# Patient Record
Sex: FEMALE | Race: WHITE | NOT HISPANIC OR LATINO | Employment: FULL TIME | URBAN - METROPOLITAN AREA
[De-identification: names, ages, dates, MRNs, and addresses within clinical notes are randomized per-mention and may not be internally consistent; named-entity substitution may affect disease eponyms.]

---

## 2017-01-05 ENCOUNTER — COMMUNICATION - HEALTHEAST (OUTPATIENT)
Dept: ADMINISTRATIVE | Facility: CLINIC | Age: 37
End: 2017-01-05

## 2017-01-09 ENCOUNTER — AMBULATORY - HEALTHEAST (OUTPATIENT)
Dept: INTERNAL MEDICINE | Facility: CLINIC | Age: 37
End: 2017-01-09

## 2017-01-12 ENCOUNTER — COMMUNICATION - HEALTHEAST (OUTPATIENT)
Dept: ENDOCRINOLOGY | Facility: CLINIC | Age: 37
End: 2017-01-12

## 2017-01-18 ENCOUNTER — OFFICE VISIT - HEALTHEAST (OUTPATIENT)
Dept: RHEUMATOLOGY | Facility: CLINIC | Age: 37
End: 2017-01-18

## 2017-01-18 DIAGNOSIS — Z79.899 HIGH RISK MEDICATION USE: ICD-10-CM

## 2017-01-18 DIAGNOSIS — M25.50 PAIN IN JOINT, MULTIPLE SITES: ICD-10-CM

## 2017-01-18 DIAGNOSIS — M06.4 INFLAMMATORY POLYARTHRITIS (H): ICD-10-CM

## 2017-01-18 LAB
ALT SERPL W P-5'-P-CCNC: 23 U/L (ref 0–45)
CREAT SERPL-MCNC: 0.71 MG/DL (ref 0.6–1.1)
GFR SERPL CREATININE-BSD FRML MDRD: >60 ML/MIN/1.73M2

## 2017-01-24 ENCOUNTER — COMMUNICATION - HEALTHEAST (OUTPATIENT)
Dept: ENDOCRINOLOGY | Facility: CLINIC | Age: 37
End: 2017-01-24

## 2017-01-24 DIAGNOSIS — E10.9 TYPE 1 DIABETES MELLITUS WITHOUT COMPLICATION (H): ICD-10-CM

## 2017-01-30 ENCOUNTER — OFFICE VISIT - HEALTHEAST (OUTPATIENT)
Dept: EDUCATION SERVICES | Facility: CLINIC | Age: 37
End: 2017-01-30

## 2017-01-31 ENCOUNTER — COMMUNICATION - HEALTHEAST (OUTPATIENT)
Dept: ENDOCRINOLOGY | Facility: CLINIC | Age: 37
End: 2017-01-31

## 2017-01-31 ENCOUNTER — COMMUNICATION - HEALTHEAST (OUTPATIENT)
Dept: INTERNAL MEDICINE | Facility: CLINIC | Age: 37
End: 2017-01-31

## 2017-02-01 ENCOUNTER — COMMUNICATION - HEALTHEAST (OUTPATIENT)
Dept: INTERNAL MEDICINE | Facility: CLINIC | Age: 37
End: 2017-02-01

## 2017-02-02 ENCOUNTER — COMMUNICATION - HEALTHEAST (OUTPATIENT)
Dept: INTERNAL MEDICINE | Facility: CLINIC | Age: 37
End: 2017-02-02

## 2017-02-02 ENCOUNTER — RECORDS - HEALTHEAST (OUTPATIENT)
Dept: ADMINISTRATIVE | Facility: OTHER | Age: 37
End: 2017-02-02

## 2017-02-06 ENCOUNTER — RECORDS - HEALTHEAST (OUTPATIENT)
Dept: ADMINISTRATIVE | Facility: OTHER | Age: 37
End: 2017-02-06

## 2017-02-06 ENCOUNTER — OFFICE VISIT - HEALTHEAST (OUTPATIENT)
Dept: EDUCATION SERVICES | Facility: CLINIC | Age: 37
End: 2017-02-06

## 2017-02-08 ENCOUNTER — AMBULATORY - HEALTHEAST (OUTPATIENT)
Dept: ENDOCRINOLOGY | Facility: CLINIC | Age: 37
End: 2017-02-08

## 2017-02-08 ENCOUNTER — OFFICE VISIT - HEALTHEAST (OUTPATIENT)
Dept: INTERNAL MEDICINE | Facility: CLINIC | Age: 37
End: 2017-02-08

## 2017-02-08 DIAGNOSIS — E10.9 TYPE 1 DIABETES (H): ICD-10-CM

## 2017-02-08 DIAGNOSIS — E10.9 TYPE 1 DIABETES MELLITUS WITHOUT COMPLICATION (H): ICD-10-CM

## 2017-02-08 DIAGNOSIS — Z91.199 NONCOMPLIANCE: ICD-10-CM

## 2017-02-08 DIAGNOSIS — E11.10 DIABETIC KETOACIDOSIS (H): ICD-10-CM

## 2017-02-08 DIAGNOSIS — E03.9 HYPOTHYROIDISM: ICD-10-CM

## 2017-02-14 ENCOUNTER — COMMUNICATION - HEALTHEAST (OUTPATIENT)
Dept: INTERNAL MEDICINE | Facility: CLINIC | Age: 37
End: 2017-02-14

## 2017-02-14 DIAGNOSIS — E03.9 HYPOTHYROIDISM, UNSPECIFIED TYPE: ICD-10-CM

## 2017-02-16 ENCOUNTER — COMMUNICATION - HEALTHEAST (OUTPATIENT)
Dept: SCHEDULING | Facility: CLINIC | Age: 37
End: 2017-02-16

## 2017-02-16 ENCOUNTER — AMBULATORY - HEALTHEAST (OUTPATIENT)
Dept: LAB | Facility: CLINIC | Age: 37
End: 2017-02-16

## 2017-02-16 DIAGNOSIS — E10.9 TYPE 1 DIABETES MELLITUS WITHOUT COMPLICATION (H): ICD-10-CM

## 2017-02-16 LAB — LDLC SERPL CALC-MCNC: 76 MG/DL

## 2017-02-17 LAB — HBA1C MFR BLD: 9.7 % (ref 4.2–6.1)

## 2017-02-20 ENCOUNTER — AMBULATORY - HEALTHEAST (OUTPATIENT)
Dept: ENDOCRINOLOGY | Facility: CLINIC | Age: 37
End: 2017-02-20

## 2017-02-20 ENCOUNTER — OFFICE VISIT - HEALTHEAST (OUTPATIENT)
Dept: EDUCATION SERVICES | Facility: CLINIC | Age: 37
End: 2017-02-20

## 2017-02-20 ENCOUNTER — COMMUNICATION - HEALTHEAST (OUTPATIENT)
Dept: INTERNAL MEDICINE | Facility: CLINIC | Age: 37
End: 2017-02-20

## 2017-02-20 ENCOUNTER — OFFICE VISIT - HEALTHEAST (OUTPATIENT)
Dept: ENDOCRINOLOGY | Facility: CLINIC | Age: 37
End: 2017-02-20

## 2017-02-20 DIAGNOSIS — E03.9 HYPOTHYROIDISM, UNSPECIFIED TYPE: ICD-10-CM

## 2017-02-20 DIAGNOSIS — L30.3 ECZEMA, PUSTULAR: ICD-10-CM

## 2017-02-20 DIAGNOSIS — E10.9 TYPE 1 DIABETES MELLITUS WITHOUT COMPLICATION (H): ICD-10-CM

## 2017-02-20 ASSESSMENT — MIFFLIN-ST. JEOR: SCORE: 1165.29

## 2017-02-21 ENCOUNTER — RECORDS - HEALTHEAST (OUTPATIENT)
Dept: ADMINISTRATIVE | Facility: OTHER | Age: 37
End: 2017-02-21

## 2017-02-23 ENCOUNTER — COMMUNICATION - HEALTHEAST (OUTPATIENT)
Dept: INTERNAL MEDICINE | Facility: CLINIC | Age: 37
End: 2017-02-23

## 2017-02-23 DIAGNOSIS — G47.00 INSOMNIA: ICD-10-CM

## 2017-02-27 ENCOUNTER — COMMUNICATION - HEALTHEAST (OUTPATIENT)
Dept: INTERNAL MEDICINE | Facility: CLINIC | Age: 37
End: 2017-02-27

## 2017-02-28 ENCOUNTER — RECORDS - HEALTHEAST (OUTPATIENT)
Dept: ADMINISTRATIVE | Facility: OTHER | Age: 37
End: 2017-02-28

## 2017-03-11 ENCOUNTER — COMMUNICATION - HEALTHEAST (OUTPATIENT)
Dept: INTERNAL MEDICINE | Facility: CLINIC | Age: 37
End: 2017-03-11

## 2017-03-11 ENCOUNTER — COMMUNICATION - HEALTHEAST (OUTPATIENT)
Dept: RHEUMATOLOGY | Facility: CLINIC | Age: 37
End: 2017-03-11

## 2017-03-11 DIAGNOSIS — M06.4 INFLAMMATORY POLYARTHRITIS (H): ICD-10-CM

## 2017-03-11 DIAGNOSIS — M25.50 PAIN IN JOINT, MULTIPLE SITES: ICD-10-CM

## 2017-03-14 ENCOUNTER — COMMUNICATION - HEALTHEAST (OUTPATIENT)
Dept: INTERNAL MEDICINE | Facility: CLINIC | Age: 37
End: 2017-03-14

## 2017-03-16 ENCOUNTER — COMMUNICATION - HEALTHEAST (OUTPATIENT)
Dept: INTERNAL MEDICINE | Facility: CLINIC | Age: 37
End: 2017-03-16

## 2017-03-23 ENCOUNTER — AMBULATORY - HEALTHEAST (OUTPATIENT)
Dept: LAB | Facility: CLINIC | Age: 37
End: 2017-03-23

## 2017-03-23 DIAGNOSIS — M25.50 PAIN IN JOINT, MULTIPLE SITES: ICD-10-CM

## 2017-03-23 DIAGNOSIS — M06.4 INFLAMMATORY POLYARTHRITIS (H): ICD-10-CM

## 2017-03-23 LAB
ALT SERPL W P-5'-P-CCNC: 53 U/L (ref 0–45)
CREAT SERPL-MCNC: 0.64 MG/DL (ref 0.6–1.1)
GFR SERPL CREATININE-BSD FRML MDRD: >60 ML/MIN/1.73M2

## 2017-03-27 ENCOUNTER — OFFICE VISIT - HEALTHEAST (OUTPATIENT)
Dept: RHEUMATOLOGY | Facility: CLINIC | Age: 37
End: 2017-03-27

## 2017-03-27 DIAGNOSIS — M25.50 POLYARTHRALGIA: ICD-10-CM

## 2017-03-27 DIAGNOSIS — Z79.899 HIGH RISK MEDICATION USE: ICD-10-CM

## 2017-03-27 DIAGNOSIS — M06.4 INFLAMMATORY POLYARTHRITIS (H): ICD-10-CM

## 2017-03-27 DIAGNOSIS — M25.50 PAIN IN JOINT, MULTIPLE SITES: ICD-10-CM

## 2017-03-27 ASSESSMENT — MIFFLIN-ST. JEOR: SCORE: 1165.29

## 2017-04-03 ENCOUNTER — COMMUNICATION - HEALTHEAST (OUTPATIENT)
Dept: INTERNAL MEDICINE | Facility: CLINIC | Age: 37
End: 2017-04-03

## 2017-04-03 DIAGNOSIS — J30.2 SEASONAL ALLERGIES: ICD-10-CM

## 2017-04-03 DIAGNOSIS — F32.A DEPRESSION: ICD-10-CM

## 2017-04-10 ENCOUNTER — COMMUNICATION - HEALTHEAST (OUTPATIENT)
Dept: SLEEP MEDICINE | Facility: CLINIC | Age: 37
End: 2017-04-10

## 2017-04-10 ENCOUNTER — COMMUNICATION - HEALTHEAST (OUTPATIENT)
Dept: ENDOCRINOLOGY | Facility: CLINIC | Age: 37
End: 2017-04-10

## 2017-04-10 DIAGNOSIS — E10.9 TYPE 1 DIABETES MELLITUS WITHOUT COMPLICATION (H): ICD-10-CM

## 2017-04-17 ENCOUNTER — RECORDS - HEALTHEAST (OUTPATIENT)
Dept: ADMINISTRATIVE | Facility: OTHER | Age: 37
End: 2017-04-17

## 2017-05-06 ENCOUNTER — COMMUNICATION - HEALTHEAST (OUTPATIENT)
Dept: RHEUMATOLOGY | Facility: CLINIC | Age: 37
End: 2017-05-06

## 2017-05-06 DIAGNOSIS — M06.4 INFLAMMATORY POLYARTHRITIS (H): ICD-10-CM

## 2017-05-06 DIAGNOSIS — M25.50 PAIN IN JOINT, MULTIPLE SITES: ICD-10-CM

## 2017-05-09 ENCOUNTER — COMMUNICATION - HEALTHEAST (OUTPATIENT)
Dept: INTERNAL MEDICINE | Facility: CLINIC | Age: 37
End: 2017-05-09

## 2017-05-19 ENCOUNTER — AMBULATORY - HEALTHEAST (OUTPATIENT)
Dept: LAB | Facility: CLINIC | Age: 37
End: 2017-05-19

## 2017-05-19 DIAGNOSIS — E03.9 HYPOTHYROIDISM, UNSPECIFIED TYPE: ICD-10-CM

## 2017-05-19 DIAGNOSIS — E10.9 TYPE 1 DIABETES MELLITUS WITHOUT COMPLICATION (H): ICD-10-CM

## 2017-05-19 LAB — HBA1C MFR BLD: 7.7 % (ref 3.5–6)

## 2017-05-22 ENCOUNTER — RECORDS - HEALTHEAST (OUTPATIENT)
Dept: ADMINISTRATIVE | Facility: OTHER | Age: 37
End: 2017-05-22

## 2017-05-23 ENCOUNTER — AMBULATORY - HEALTHEAST (OUTPATIENT)
Dept: ENDOCRINOLOGY | Facility: CLINIC | Age: 37
End: 2017-05-23

## 2017-05-23 ENCOUNTER — COMMUNICATION - HEALTHEAST (OUTPATIENT)
Dept: ENDOCRINOLOGY | Facility: CLINIC | Age: 37
End: 2017-05-23

## 2017-05-23 ENCOUNTER — RECORDS - HEALTHEAST (OUTPATIENT)
Dept: ADMINISTRATIVE | Facility: OTHER | Age: 37
End: 2017-05-23

## 2017-05-23 ENCOUNTER — OFFICE VISIT - HEALTHEAST (OUTPATIENT)
Dept: ENDOCRINOLOGY | Facility: CLINIC | Age: 37
End: 2017-05-23

## 2017-05-23 DIAGNOSIS — E10.9 TYPE 1 DIABETES MELLITUS WITHOUT COMPLICATION (H): ICD-10-CM

## 2017-05-23 DIAGNOSIS — E10.9 TYPE 1 DIABETES MELLITUS (H): ICD-10-CM

## 2017-05-23 DIAGNOSIS — E03.9 HYPOTHYROIDISM: ICD-10-CM

## 2017-05-23 ASSESSMENT — MIFFLIN-ST. JEOR: SCORE: 1176.17

## 2017-05-24 ENCOUNTER — RECORDS - HEALTHEAST (OUTPATIENT)
Dept: ADMINISTRATIVE | Facility: OTHER | Age: 37
End: 2017-05-24

## 2017-05-24 ENCOUNTER — OFFICE VISIT - HEALTHEAST (OUTPATIENT)
Dept: INTERNAL MEDICINE | Facility: CLINIC | Age: 37
End: 2017-05-24

## 2017-05-24 DIAGNOSIS — K21.9 GERD (GASTROESOPHAGEAL REFLUX DISEASE): ICD-10-CM

## 2017-05-24 DIAGNOSIS — L91.8 SKIN TAG: ICD-10-CM

## 2017-05-24 ASSESSMENT — MIFFLIN-ST. JEOR: SCORE: 1179.8

## 2017-06-01 ENCOUNTER — COMMUNICATION - HEALTHEAST (OUTPATIENT)
Dept: ENDOCRINOLOGY | Facility: CLINIC | Age: 37
End: 2017-06-01

## 2017-06-06 ENCOUNTER — COMMUNICATION - HEALTHEAST (OUTPATIENT)
Dept: RHEUMATOLOGY | Facility: CLINIC | Age: 37
End: 2017-06-06

## 2017-06-06 ENCOUNTER — COMMUNICATION - HEALTHEAST (OUTPATIENT)
Dept: INTERNAL MEDICINE | Facility: CLINIC | Age: 37
End: 2017-06-06

## 2017-06-06 DIAGNOSIS — M25.50 PAIN IN JOINT, MULTIPLE SITES: ICD-10-CM

## 2017-06-06 DIAGNOSIS — M06.4 INFLAMMATORY POLYARTHRITIS (H): ICD-10-CM

## 2017-06-07 ENCOUNTER — AMBULATORY - HEALTHEAST (OUTPATIENT)
Dept: LAB | Facility: CLINIC | Age: 37
End: 2017-06-07

## 2017-06-07 DIAGNOSIS — M06.4 INFLAMMATORY POLYARTHRITIS (H): ICD-10-CM

## 2017-06-07 DIAGNOSIS — E10.9 TYPE 1 DIABETES MELLITUS WITHOUT COMPLICATION (H): ICD-10-CM

## 2017-06-07 DIAGNOSIS — M25.50 PAIN IN JOINT, MULTIPLE SITES: ICD-10-CM

## 2017-06-07 LAB
ALT SERPL W P-5'-P-CCNC: 17 U/L (ref 0–45)
CREAT SERPL-MCNC: 0.77 MG/DL (ref 0.6–1.1)
GFR SERPL CREATININE-BSD FRML MDRD: >60 ML/MIN/1.73M2

## 2017-06-16 ENCOUNTER — COMMUNICATION - HEALTHEAST (OUTPATIENT)
Dept: ENDOCRINOLOGY | Facility: CLINIC | Age: 37
End: 2017-06-16

## 2017-06-19 ENCOUNTER — COMMUNICATION - HEALTHEAST (OUTPATIENT)
Dept: ENDOCRINOLOGY | Facility: CLINIC | Age: 37
End: 2017-06-19

## 2017-07-04 ENCOUNTER — COMMUNICATION - HEALTHEAST (OUTPATIENT)
Dept: ENDOCRINOLOGY | Facility: CLINIC | Age: 37
End: 2017-07-04

## 2017-07-04 DIAGNOSIS — E10.9 TYPE 1 DIABETES MELLITUS WITHOUT COMPLICATION (H): ICD-10-CM

## 2017-07-05 ENCOUNTER — RECORDS - HEALTHEAST (OUTPATIENT)
Dept: ADMINISTRATIVE | Facility: OTHER | Age: 37
End: 2017-07-05

## 2017-07-10 ENCOUNTER — OFFICE VISIT - HEALTHEAST (OUTPATIENT)
Dept: RHEUMATOLOGY | Facility: CLINIC | Age: 37
End: 2017-07-10

## 2017-07-10 DIAGNOSIS — M25.50 PAIN IN JOINT, MULTIPLE SITES: ICD-10-CM

## 2017-07-10 DIAGNOSIS — M06.4 INFLAMMATORY POLYARTHRITIS (H): ICD-10-CM

## 2017-07-10 DIAGNOSIS — M79.641 RIGHT HAND PAIN: ICD-10-CM

## 2017-07-10 DIAGNOSIS — Z79.899 HIGH RISK MEDICATION USE: ICD-10-CM

## 2017-07-13 ENCOUNTER — RECORDS - HEALTHEAST (OUTPATIENT)
Dept: ADMINISTRATIVE | Facility: OTHER | Age: 37
End: 2017-07-13

## 2017-07-17 ENCOUNTER — COMMUNICATION - HEALTHEAST (OUTPATIENT)
Dept: ENDOCRINOLOGY | Facility: CLINIC | Age: 37
End: 2017-07-17

## 2017-07-17 DIAGNOSIS — E10.9 TYPE 1 DIABETES MELLITUS WITHOUT COMPLICATION (H): ICD-10-CM

## 2017-07-20 ENCOUNTER — COMMUNICATION - HEALTHEAST (OUTPATIENT)
Dept: INTERNAL MEDICINE | Facility: CLINIC | Age: 37
End: 2017-07-20

## 2017-07-24 ENCOUNTER — COMMUNICATION - HEALTHEAST (OUTPATIENT)
Dept: ENDOCRINOLOGY | Facility: CLINIC | Age: 37
End: 2017-07-24

## 2017-07-24 ENCOUNTER — COMMUNICATION - HEALTHEAST (OUTPATIENT)
Dept: INTERNAL MEDICINE | Facility: CLINIC | Age: 37
End: 2017-07-24

## 2017-07-26 ENCOUNTER — OFFICE VISIT - HEALTHEAST (OUTPATIENT)
Dept: INTERNAL MEDICINE | Facility: CLINIC | Age: 37
End: 2017-07-26

## 2017-07-26 DIAGNOSIS — G44.001: ICD-10-CM

## 2017-07-26 DIAGNOSIS — R13.10 PAIN WITH SWALLOWING: ICD-10-CM

## 2017-07-26 DIAGNOSIS — J02.9 ACUTE PHARYNGITIS: ICD-10-CM

## 2017-07-27 ENCOUNTER — COMMUNICATION - HEALTHEAST (OUTPATIENT)
Dept: ENDOCRINOLOGY | Facility: CLINIC | Age: 37
End: 2017-07-27

## 2017-07-27 ENCOUNTER — COMMUNICATION - HEALTHEAST (OUTPATIENT)
Dept: INTERNAL MEDICINE | Facility: CLINIC | Age: 37
End: 2017-07-27

## 2017-07-28 ENCOUNTER — COMMUNICATION - HEALTHEAST (OUTPATIENT)
Dept: SCHEDULING | Facility: CLINIC | Age: 37
End: 2017-07-28

## 2017-07-31 ENCOUNTER — COMMUNICATION - HEALTHEAST (OUTPATIENT)
Dept: EDUCATION SERVICES | Facility: CLINIC | Age: 37
End: 2017-07-31

## 2017-08-02 ENCOUNTER — COMMUNICATION - HEALTHEAST (OUTPATIENT)
Dept: ENDOCRINOLOGY | Facility: CLINIC | Age: 37
End: 2017-08-02

## 2017-08-02 ENCOUNTER — OFFICE VISIT - HEALTHEAST (OUTPATIENT)
Dept: FAMILY MEDICINE | Facility: CLINIC | Age: 37
End: 2017-08-02

## 2017-08-02 DIAGNOSIS — J02.9 PHARYNGITIS: ICD-10-CM

## 2017-08-17 ENCOUNTER — COMMUNICATION - HEALTHEAST (OUTPATIENT)
Dept: LAB | Facility: CLINIC | Age: 37
End: 2017-08-17

## 2017-08-18 ENCOUNTER — COMMUNICATION - HEALTHEAST (OUTPATIENT)
Dept: ENDOCRINOLOGY | Facility: CLINIC | Age: 37
End: 2017-08-18

## 2017-08-18 ENCOUNTER — COMMUNICATION - HEALTHEAST (OUTPATIENT)
Dept: INTERNAL MEDICINE | Facility: CLINIC | Age: 37
End: 2017-08-18

## 2017-08-18 DIAGNOSIS — E10.9 TYPE 1 DIABETES MELLITUS WITHOUT COMPLICATION (H): ICD-10-CM

## 2017-08-29 ENCOUNTER — COMMUNICATION - HEALTHEAST (OUTPATIENT)
Dept: ENDOCRINOLOGY | Facility: CLINIC | Age: 37
End: 2017-08-29

## 2017-08-29 ENCOUNTER — COMMUNICATION - HEALTHEAST (OUTPATIENT)
Dept: PEDIATRICS | Facility: CLINIC | Age: 37
End: 2017-08-29

## 2017-08-29 DIAGNOSIS — E10.9 TYPE 1 DIABETES MELLITUS WITHOUT COMPLICATION (H): ICD-10-CM

## 2017-08-31 ENCOUNTER — OFFICE VISIT - HEALTHEAST (OUTPATIENT)
Dept: ENDOCRINOLOGY | Facility: CLINIC | Age: 37
End: 2017-08-31

## 2017-08-31 DIAGNOSIS — K21.9 GERD (GASTROESOPHAGEAL REFLUX DISEASE): ICD-10-CM

## 2017-08-31 DIAGNOSIS — Z87.891 HISTORY OF TOBACCO ABUSE: ICD-10-CM

## 2017-08-31 DIAGNOSIS — E10.9 TYPE 1 DIABETES MELLITUS WITHOUT COMPLICATION (H): ICD-10-CM

## 2017-08-31 ASSESSMENT — MIFFLIN-ST. JEOR: SCORE: 1183.43

## 2017-09-01 ENCOUNTER — COMMUNICATION - HEALTHEAST (OUTPATIENT)
Dept: ENDOCRINOLOGY | Facility: CLINIC | Age: 37
End: 2017-09-01

## 2017-09-01 DIAGNOSIS — Z87.891 HISTORY OF TOBACCO ABUSE: ICD-10-CM

## 2017-09-13 ENCOUNTER — AMBULATORY - HEALTHEAST (OUTPATIENT)
Dept: LAB | Facility: CLINIC | Age: 37
End: 2017-09-13

## 2017-09-13 DIAGNOSIS — M06.4 INFLAMMATORY POLYARTHRITIS (H): ICD-10-CM

## 2017-09-13 DIAGNOSIS — M25.50 PAIN IN JOINT, MULTIPLE SITES: ICD-10-CM

## 2017-09-13 LAB
ALT SERPL W P-5'-P-CCNC: 22 U/L (ref 0–45)
CREAT SERPL-MCNC: 0.78 MG/DL (ref 0.6–1.1)
GFR SERPL CREATININE-BSD FRML MDRD: >60 ML/MIN/1.73M2

## 2017-09-18 ENCOUNTER — OFFICE VISIT - HEALTHEAST (OUTPATIENT)
Dept: INTERNAL MEDICINE | Facility: CLINIC | Age: 37
End: 2017-09-18

## 2017-09-18 ENCOUNTER — COMMUNICATION - HEALTHEAST (OUTPATIENT)
Dept: INTERNAL MEDICINE | Facility: CLINIC | Age: 37
End: 2017-09-18

## 2017-09-18 DIAGNOSIS — M26.69 TMJ INFLAMMATION: ICD-10-CM

## 2017-09-18 DIAGNOSIS — Z72.0 TOBACCO ABUSE: ICD-10-CM

## 2017-09-18 DIAGNOSIS — L30.3 ECZEMA, PUSTULAR: ICD-10-CM

## 2017-09-18 DIAGNOSIS — E10.9 TYPE 1 DIABETES MELLITUS WITHOUT COMPLICATION (H): ICD-10-CM

## 2017-09-18 DIAGNOSIS — F32.A DEPRESSION: ICD-10-CM

## 2017-09-18 DIAGNOSIS — K21.9 GERD (GASTROESOPHAGEAL REFLUX DISEASE): ICD-10-CM

## 2017-09-18 DIAGNOSIS — G25.81 RESTLESS LEGS SYNDROME (RLS): ICD-10-CM

## 2017-09-18 DIAGNOSIS — G47.00 INSOMNIA: ICD-10-CM

## 2017-09-18 DIAGNOSIS — E03.9 HYPOTHYROIDISM, UNSPECIFIED TYPE: ICD-10-CM

## 2017-09-21 ENCOUNTER — COMMUNICATION - HEALTHEAST (OUTPATIENT)
Dept: ADMINISTRATIVE | Facility: CLINIC | Age: 37
End: 2017-09-21

## 2017-09-29 ENCOUNTER — COMMUNICATION - HEALTHEAST (OUTPATIENT)
Dept: INTERNAL MEDICINE | Facility: CLINIC | Age: 37
End: 2017-09-29

## 2017-09-29 ENCOUNTER — COMMUNICATION - HEALTHEAST (OUTPATIENT)
Dept: ENDOCRINOLOGY | Facility: CLINIC | Age: 37
End: 2017-09-29

## 2017-09-29 DIAGNOSIS — G44.001: ICD-10-CM

## 2017-09-29 DIAGNOSIS — E10.9 TYPE 1 DIABETES MELLITUS WITHOUT COMPLICATION (H): ICD-10-CM

## 2017-10-02 ENCOUNTER — AMBULATORY - HEALTHEAST (OUTPATIENT)
Dept: EDUCATION SERVICES | Facility: CLINIC | Age: 37
End: 2017-10-02

## 2017-10-02 DIAGNOSIS — E10.65 TYPE 1 DIABETES MELLITUS WITH HYPERGLYCEMIA (H): ICD-10-CM

## 2017-10-04 ENCOUNTER — COMMUNICATION - HEALTHEAST (OUTPATIENT)
Dept: EDUCATION SERVICES | Facility: CLINIC | Age: 37
End: 2017-10-04

## 2017-10-11 ENCOUNTER — COMMUNICATION - HEALTHEAST (OUTPATIENT)
Dept: ENDOCRINOLOGY | Facility: CLINIC | Age: 37
End: 2017-10-11

## 2017-10-11 DIAGNOSIS — E10.9 TYPE 1 DIABETES MELLITUS WITHOUT COMPLICATION (H): ICD-10-CM

## 2017-10-11 DIAGNOSIS — Z87.891 HISTORY OF TOBACCO ABUSE: ICD-10-CM

## 2017-10-13 ENCOUNTER — AMBULATORY - HEALTHEAST (OUTPATIENT)
Dept: INTERNAL MEDICINE | Facility: CLINIC | Age: 37
End: 2017-10-13

## 2017-10-13 ENCOUNTER — COMMUNICATION - HEALTHEAST (OUTPATIENT)
Dept: INTERNAL MEDICINE | Facility: CLINIC | Age: 37
End: 2017-10-13

## 2017-10-13 DIAGNOSIS — K21.9 GERD (GASTROESOPHAGEAL REFLUX DISEASE): ICD-10-CM

## 2017-10-17 ENCOUNTER — COMMUNICATION - HEALTHEAST (OUTPATIENT)
Dept: ADMINISTRATIVE | Facility: CLINIC | Age: 37
End: 2017-10-17

## 2017-10-17 ENCOUNTER — RECORDS - HEALTHEAST (OUTPATIENT)
Dept: ADMINISTRATIVE | Facility: OTHER | Age: 37
End: 2017-10-17

## 2017-10-24 ENCOUNTER — RECORDS - HEALTHEAST (OUTPATIENT)
Dept: ADMINISTRATIVE | Facility: OTHER | Age: 37
End: 2017-10-24

## 2017-10-25 ENCOUNTER — COMMUNICATION - HEALTHEAST (OUTPATIENT)
Dept: ENDOCRINOLOGY | Facility: CLINIC | Age: 37
End: 2017-10-25

## 2017-10-25 DIAGNOSIS — E10.9 TYPE 1 DIABETES MELLITUS WITHOUT COMPLICATION (H): ICD-10-CM

## 2017-10-26 ENCOUNTER — COMMUNICATION - HEALTHEAST (OUTPATIENT)
Dept: ADMINISTRATIVE | Facility: CLINIC | Age: 37
End: 2017-10-26

## 2017-10-26 ENCOUNTER — COMMUNICATION - HEALTHEAST (OUTPATIENT)
Dept: INTERNAL MEDICINE | Facility: CLINIC | Age: 37
End: 2017-10-26

## 2017-10-29 ENCOUNTER — COMMUNICATION - HEALTHEAST (OUTPATIENT)
Dept: RHEUMATOLOGY | Facility: CLINIC | Age: 37
End: 2017-10-29

## 2017-10-29 DIAGNOSIS — M06.4 INFLAMMATORY POLYARTHRITIS (H): ICD-10-CM

## 2017-10-30 ENCOUNTER — COMMUNICATION - HEALTHEAST (OUTPATIENT)
Dept: INTERNAL MEDICINE | Facility: CLINIC | Age: 37
End: 2017-10-30

## 2017-10-31 ENCOUNTER — COMMUNICATION - HEALTHEAST (OUTPATIENT)
Dept: RHEUMATOLOGY | Facility: CLINIC | Age: 37
End: 2017-10-31

## 2017-10-31 DIAGNOSIS — M06.4 INFLAMMATORY POLYARTHRITIS (H): ICD-10-CM

## 2017-10-31 DIAGNOSIS — M25.50 PAIN IN JOINT, MULTIPLE SITES: ICD-10-CM

## 2017-11-02 ENCOUNTER — RECORDS - HEALTHEAST (OUTPATIENT)
Dept: ADMINISTRATIVE | Facility: OTHER | Age: 37
End: 2017-11-02

## 2017-11-03 ENCOUNTER — COMMUNICATION - HEALTHEAST (OUTPATIENT)
Dept: INTERNAL MEDICINE | Facility: CLINIC | Age: 37
End: 2017-11-03

## 2017-11-06 ENCOUNTER — COMMUNICATION - HEALTHEAST (OUTPATIENT)
Dept: INTERNAL MEDICINE | Facility: CLINIC | Age: 37
End: 2017-11-06

## 2017-11-07 ENCOUNTER — COMMUNICATION - HEALTHEAST (OUTPATIENT)
Dept: ENDOCRINOLOGY | Facility: CLINIC | Age: 37
End: 2017-11-07

## 2017-11-08 ENCOUNTER — COMMUNICATION - HEALTHEAST (OUTPATIENT)
Dept: INTERNAL MEDICINE | Facility: CLINIC | Age: 37
End: 2017-11-08

## 2017-11-08 ENCOUNTER — AMBULATORY - HEALTHEAST (OUTPATIENT)
Dept: LAB | Facility: CLINIC | Age: 37
End: 2017-11-08

## 2017-11-08 DIAGNOSIS — M25.50 PAIN IN JOINT, MULTIPLE SITES: ICD-10-CM

## 2017-11-08 DIAGNOSIS — M06.4 INFLAMMATORY POLYARTHRITIS (H): ICD-10-CM

## 2017-11-08 LAB
ALT SERPL W P-5'-P-CCNC: 20 U/L (ref 0–45)
CREAT SERPL-MCNC: 0.79 MG/DL (ref 0.6–1.1)
GFR SERPL CREATININE-BSD FRML MDRD: >60 ML/MIN/1.73M2

## 2017-11-10 ENCOUNTER — AMBULATORY - HEALTHEAST (OUTPATIENT)
Dept: ENDOCRINOLOGY | Facility: CLINIC | Age: 37
End: 2017-11-10

## 2017-11-10 DIAGNOSIS — E10.65 TYPE 1 DIABETES MELLITUS WITH HYPERGLYCEMIA (H): ICD-10-CM

## 2017-11-14 ENCOUNTER — OFFICE VISIT - HEALTHEAST (OUTPATIENT)
Dept: RHEUMATOLOGY | Facility: CLINIC | Age: 37
End: 2017-11-14

## 2017-11-14 DIAGNOSIS — M06.4 INFLAMMATORY POLYARTHRITIS (H): ICD-10-CM

## 2017-11-14 ASSESSMENT — MIFFLIN-ST. JEOR: SCORE: 1185.24

## 2017-11-20 ENCOUNTER — RECORDS - HEALTHEAST (OUTPATIENT)
Dept: ADMINISTRATIVE | Facility: OTHER | Age: 37
End: 2017-11-20

## 2017-11-20 ENCOUNTER — COMMUNICATION - HEALTHEAST (OUTPATIENT)
Dept: INTERNAL MEDICINE | Facility: CLINIC | Age: 37
End: 2017-11-20

## 2017-11-30 ENCOUNTER — COMMUNICATION - HEALTHEAST (OUTPATIENT)
Dept: RHEUMATOLOGY | Facility: CLINIC | Age: 37
End: 2017-11-30

## 2017-11-30 DIAGNOSIS — M25.50 PAIN IN JOINT, MULTIPLE SITES: ICD-10-CM

## 2017-11-30 DIAGNOSIS — M06.4 INFLAMMATORY POLYARTHRITIS (H): ICD-10-CM

## 2017-12-05 ENCOUNTER — AMBULATORY - HEALTHEAST (OUTPATIENT)
Dept: LAB | Facility: CLINIC | Age: 37
End: 2017-12-05

## 2017-12-05 DIAGNOSIS — E10.65 TYPE 1 DIABETES MELLITUS WITH HYPERGLYCEMIA (H): ICD-10-CM

## 2017-12-05 LAB — HBA1C MFR BLD: 7.8 % (ref 3.5–6)

## 2017-12-06 ENCOUNTER — RECORDS - HEALTHEAST (OUTPATIENT)
Dept: ADMINISTRATIVE | Facility: OTHER | Age: 37
End: 2017-12-06

## 2017-12-12 ENCOUNTER — OFFICE VISIT - HEALTHEAST (OUTPATIENT)
Dept: ENDOCRINOLOGY | Facility: CLINIC | Age: 37
End: 2017-12-12

## 2017-12-12 DIAGNOSIS — E10.65 TYPE 1 DIABETES MELLITUS WITH HYPERGLYCEMIA (H): ICD-10-CM

## 2017-12-12 ASSESSMENT — MIFFLIN-ST. JEOR: SCORE: 1209.29

## 2017-12-27 ENCOUNTER — OFFICE VISIT - HEALTHEAST (OUTPATIENT)
Dept: EDUCATION SERVICES | Facility: CLINIC | Age: 37
End: 2017-12-27

## 2017-12-28 ENCOUNTER — COMMUNICATION - HEALTHEAST (OUTPATIENT)
Dept: RHEUMATOLOGY | Facility: CLINIC | Age: 37
End: 2017-12-28

## 2017-12-28 DIAGNOSIS — M25.50 PAIN IN JOINT, MULTIPLE SITES: ICD-10-CM

## 2017-12-28 DIAGNOSIS — M06.4 INFLAMMATORY POLYARTHRITIS (H): ICD-10-CM

## 2018-01-08 ENCOUNTER — COMMUNICATION - HEALTHEAST (OUTPATIENT)
Dept: INTERNAL MEDICINE | Facility: CLINIC | Age: 38
End: 2018-01-08

## 2018-01-08 ENCOUNTER — COMMUNICATION - HEALTHEAST (OUTPATIENT)
Dept: RHEUMATOLOGY | Facility: CLINIC | Age: 38
End: 2018-01-08

## 2018-01-08 DIAGNOSIS — M06.4 INFLAMMATORY POLYARTHRITIS (H): ICD-10-CM

## 2018-01-08 DIAGNOSIS — M25.50 PAIN IN JOINT, MULTIPLE SITES: ICD-10-CM

## 2018-01-09 ENCOUNTER — AMBULATORY - HEALTHEAST (OUTPATIENT)
Dept: INTERNAL MEDICINE | Facility: CLINIC | Age: 38
End: 2018-01-09

## 2018-01-09 DIAGNOSIS — Z87.891 HISTORY OF TOBACCO ABUSE: ICD-10-CM

## 2018-01-12 ENCOUNTER — COMMUNICATION - HEALTHEAST (OUTPATIENT)
Dept: LAB | Facility: CLINIC | Age: 38
End: 2018-01-12

## 2018-01-12 DIAGNOSIS — M06.4 INFLAMMATORY POLYARTHRITIS (H): ICD-10-CM

## 2018-01-30 ENCOUNTER — COMMUNICATION - HEALTHEAST (OUTPATIENT)
Dept: RHEUMATOLOGY | Facility: CLINIC | Age: 38
End: 2018-01-30

## 2018-01-30 ENCOUNTER — RECORDS - HEALTHEAST (OUTPATIENT)
Dept: ADMINISTRATIVE | Facility: OTHER | Age: 38
End: 2018-01-30

## 2018-01-30 ENCOUNTER — OFFICE VISIT - HEALTHEAST (OUTPATIENT)
Dept: EDUCATION SERVICES | Facility: CLINIC | Age: 38
End: 2018-01-30

## 2018-01-30 DIAGNOSIS — M06.4 INFLAMMATORY POLYARTHRITIS (H): ICD-10-CM

## 2018-01-30 DIAGNOSIS — M25.50 PAIN IN JOINT, MULTIPLE SITES: ICD-10-CM

## 2018-01-30 DIAGNOSIS — E10.9 TYPE 1 DIABETES MELLITUS (H): ICD-10-CM

## 2018-02-09 ENCOUNTER — AMBULATORY - HEALTHEAST (OUTPATIENT)
Dept: ENDOCRINOLOGY | Facility: CLINIC | Age: 38
End: 2018-02-09

## 2018-02-09 DIAGNOSIS — E10.65 TYPE 1 DIABETES MELLITUS WITH HYPERGLYCEMIA (H): ICD-10-CM

## 2018-02-12 ENCOUNTER — AMBULATORY - HEALTHEAST (OUTPATIENT)
Dept: LAB | Facility: CLINIC | Age: 38
End: 2018-02-12

## 2018-02-12 DIAGNOSIS — M06.4 INFLAMMATORY POLYARTHRITIS (H): ICD-10-CM

## 2018-02-12 LAB
ALBUMIN SERPL-MCNC: 3.9 G/DL (ref 3.5–5)
ALT SERPL W P-5'-P-CCNC: 26 U/L (ref 0–45)
CREAT SERPL-MCNC: 0.66 MG/DL (ref 0.6–1.1)
ERYTHROCYTE [DISTWIDTH] IN BLOOD BY AUTOMATED COUNT: 11.2 % (ref 11–14.5)
GFR SERPL CREATININE-BSD FRML MDRD: >60 ML/MIN/1.73M2
HCT VFR BLD AUTO: 42.7 % (ref 35–47)
HGB BLD-MCNC: 14.3 G/DL (ref 12–16)
MCH RBC QN AUTO: 30.7 PG (ref 27–34)
MCHC RBC AUTO-ENTMCNC: 33.5 G/DL (ref 32–36)
MCV RBC AUTO: 92 FL (ref 80–100)
PLATELET # BLD AUTO: 427 THOU/UL (ref 140–440)
PMV BLD AUTO: 7.9 FL (ref 7–10)
RBC # BLD AUTO: 4.66 MILL/UL (ref 3.8–5.4)
WBC: 11.1 THOU/UL (ref 4–11)

## 2018-02-15 ENCOUNTER — RECORDS - HEALTHEAST (OUTPATIENT)
Dept: ADMINISTRATIVE | Facility: OTHER | Age: 38
End: 2018-02-15

## 2018-02-20 ENCOUNTER — OFFICE VISIT - HEALTHEAST (OUTPATIENT)
Dept: CARDIOLOGY | Facility: CLINIC | Age: 38
End: 2018-02-20

## 2018-02-20 DIAGNOSIS — I34.0 NON-RHEUMATIC MITRAL REGURGITATION: ICD-10-CM

## 2018-02-20 ASSESSMENT — MIFFLIN-ST. JEOR: SCORE: 1225.16

## 2018-02-26 ENCOUNTER — COMMUNICATION - HEALTHEAST (OUTPATIENT)
Dept: RHEUMATOLOGY | Facility: CLINIC | Age: 38
End: 2018-02-26

## 2018-02-26 DIAGNOSIS — M25.50 PAIN IN JOINT, MULTIPLE SITES: ICD-10-CM

## 2018-02-26 DIAGNOSIS — M06.4 INFLAMMATORY POLYARTHRITIS (H): ICD-10-CM

## 2018-02-27 ENCOUNTER — HOSPITAL ENCOUNTER (OUTPATIENT)
Dept: CARDIOLOGY | Facility: HOSPITAL | Age: 38
Discharge: HOME | End: 2018-02-27
Attending: INTERNAL MEDICINE

## 2018-02-27 DIAGNOSIS — I34.0 NON-RHEUMATIC MITRAL REGURGITATION: ICD-10-CM

## 2018-02-27 LAB
AORTIC ROOT: 2.7 CM
AORTIC VALVE MEAN VELOCITY: 81.6 CM/S
ASCENDING AORTA: 3.3 CM
AV DIMENSIONLESS INDEX VTI: 0.7
AV MEAN GRADIENT: 3 MMHG
AV PEAK GRADIENT: 6.3 MMHG
AV VALVE AREA: 2 CM2
BSA FOR ECHO PROCEDURE: 1.61 M2
CV BLOOD PRESSURE: NORMAL MMHG
CV ECHO HEIGHT: 64 IN
CV ECHO WEIGHT: 127 LBS
DOP CALC AO PEAK VEL: 125 CM/S
DOP CALC AO VTI: 26.7 CM
DOP CALC LVOT AREA: 2.83 CM2
DOP CALC LVOT DIAMETER: 1.9 CM
DOP CALC LVOT STROKE VOLUME: 52.7 CM3
DOP CALC MV VTI: 35.8 CM
DOP CALCLVOT PEAK VEL VTI: 18.6 CM
EJECTION FRACTION: 63 % (ref 55–75)
FRACTIONAL SHORTENING: 35.4 % (ref 28–44)
INTERVENTRICULAR SEPTUM IN END DIASTOLE: 0.9 CM (ref 0.6–0.9)
IVS/PW RATIO: 1
LA AREA 1: 22 CM2
LA AREA 2: 28.1 CM2
LEFT ATRIUM AREA: 28.1 CM2
LEFT ATRIUM LENGTH: 5.8 CM
LEFT ATRIUM SIZE: 3.7 CM
LEFT ATRIUM VOLUME INDEX: 56.3 ML/M2
LEFT ATRIUM VOLUME: 90.6 ML
LEFT VENTRICLE DIASTOLIC VOLUME INDEX: 56.5 CM3/M2 (ref 34–74)
LEFT VENTRICLE DIASTOLIC VOLUME: 91 CM3 (ref 46–106)
LEFT VENTRICLE MASS INDEX: 91.8 G/M2
LEFT VENTRICLE SYSTOLIC VOLUME INDEX: 21.1 CM3/M2 (ref 11–31)
LEFT VENTRICLE SYSTOLIC VOLUME: 34 CM3 (ref 14–42)
LEFT VENTRICULAR INTERNAL DIMENSION IN DIASTOLE: 4.8 CM (ref 3.8–5.2)
LEFT VENTRICULAR INTERNAL DIMENSION IN SYSTOLE: 3.1 CM (ref 2.2–3.5)
LEFT VENTRICULAR MASS: 147.8 G
LEFT VENTRICULAR OUTFLOW TRACT MEAN GRADIENT: 2 MMHG
LEFT VENTRICULAR OUTFLOW TRACT MEAN VELOCITY: 57.7 CM/S
LEFT VENTRICULAR POSTERIOR WALL IN END DIASTOLE: 0.9 CM (ref 0.6–0.9)
LV STROKE VOLUME INDEX: 32.7 ML/M2
MITRAL REGURGITANT VELOCITY TIME INTEGRAL: 114 CM
MITRAL VALVE DECELERATION SLOPE: 5610 MM/S2
MITRAL VALVE E/A RATIO: 2.2
MITRAL VALVE MEAN INFLOW VELOCITY: 55.1 CM/S
MITRAL VALVE PEAK VELOCITY: 119 CM/S
MITRAL VALVE PRESSURE HALF-TIME: 63 MS
MR FLOW: 7 CM3
MR MEAN GRADIENT: 83 MMHG
MR MEAN VELOCITY: 420 CM/S
MR PEAK GRADIENT: 116.6 MMHG
MR PISA EROA: 0.1 CM2
MR PISA RADIUS: 0.4 CM
MR PISA VN NYQUIST: 34.6 CM/S
MV AREA VTI: 1.47 CM2
MV AVERAGE E/E' RATIO: 12 CM/S
MV DECELERATION TIME: 268 MS
MV E'TISSUE VEL-LAT: 7.7 CM/S
MV E'TISSUE VEL-MED: 8.77 CM/S
MV LATERAL E/E' RATIO: 12.9
MV MEAN GRADIENT: 2 MMHG
MV MEDIAL E/E' RATIO: 11.3
MV PEAK A VELOCITY: 44.9 CM/S
MV PEAK E VELOCITY: 99.2 CM/S
MV PEAK GRADIENT: 5.7 MMHG
MV REGURGITANT VOLUME: 7.3 CC
MV VALVE AREA BY CONTINUITY EQUATION: 1.5 CM2
MV VALVE AREA PRESSURE 1/2 METHOD: 3.5 CM2
NUC REST DIASTOLIC VOLUME INDEX: 2028.8 LBS
NUC REST SYSTOLIC VOLUME INDEX: 64 IN
PISA MR PEAK VEL: 540 CM/S
TRICUSPID REGURGITATION PEAK PRESSURE GRADIENT: 20.6 MMHG
TRICUSPID VALVE ANULAR PLANE SYSTOLIC EXCURSION: 2.6 CM
TRICUSPID VALVE PEAK REGURGITANT VELOCITY: 227 CM/S

## 2018-02-27 ASSESSMENT — MIFFLIN-ST. JEOR: SCORE: 1225.16

## 2018-03-11 ENCOUNTER — COMMUNICATION - HEALTHEAST (OUTPATIENT)
Dept: RHEUMATOLOGY | Facility: CLINIC | Age: 38
End: 2018-03-11

## 2018-03-11 DIAGNOSIS — M06.4 INFLAMMATORY POLYARTHRITIS (H): ICD-10-CM

## 2018-03-13 ENCOUNTER — AMBULATORY - HEALTHEAST (OUTPATIENT)
Dept: LAB | Facility: CLINIC | Age: 38
End: 2018-03-13

## 2018-03-13 DIAGNOSIS — E10.65 TYPE 1 DIABETES MELLITUS WITH HYPERGLYCEMIA (H): ICD-10-CM

## 2018-03-13 LAB
HBA1C MFR BLD: 7.5 % (ref 3.5–6)
LDLC SERPL CALC-MCNC: 81 MG/DL

## 2018-03-14 ENCOUNTER — COMMUNICATION - HEALTHEAST (OUTPATIENT)
Dept: INTERNAL MEDICINE | Facility: CLINIC | Age: 38
End: 2018-03-14

## 2018-03-15 ENCOUNTER — RECORDS - HEALTHEAST (OUTPATIENT)
Dept: ADMINISTRATIVE | Facility: OTHER | Age: 38
End: 2018-03-15

## 2018-03-19 ENCOUNTER — OFFICE VISIT - HEALTHEAST (OUTPATIENT)
Dept: INTERNAL MEDICINE | Facility: CLINIC | Age: 38
End: 2018-03-19

## 2018-03-19 DIAGNOSIS — L91.8 SKIN TAG: ICD-10-CM

## 2018-03-19 DIAGNOSIS — E03.9 HYPOTHYROIDISM, UNSPECIFIED TYPE: ICD-10-CM

## 2018-03-19 DIAGNOSIS — K21.9 GERD (GASTROESOPHAGEAL REFLUX DISEASE): ICD-10-CM

## 2018-03-19 DIAGNOSIS — M26.69 TMJ INFLAMMATION: ICD-10-CM

## 2018-03-19 DIAGNOSIS — F41.9 ANXIETY: ICD-10-CM

## 2018-03-19 DIAGNOSIS — F32.A DEPRESSION: ICD-10-CM

## 2018-03-19 DIAGNOSIS — I05.9 MITRAL VALVE DISORDER: ICD-10-CM

## 2018-03-19 DIAGNOSIS — E10.65 TYPE 1 DIABETES MELLITUS WITH HYPERGLYCEMIA (H): ICD-10-CM

## 2018-03-19 DIAGNOSIS — R25.2 MUSCLE CRAMPS: ICD-10-CM

## 2018-03-19 DIAGNOSIS — Z72.0 TOBACCO ABUSE: ICD-10-CM

## 2018-04-09 ENCOUNTER — COMMUNICATION - HEALTHEAST (OUTPATIENT)
Dept: ADMINISTRATIVE | Facility: CLINIC | Age: 38
End: 2018-04-09

## 2018-04-17 ENCOUNTER — OFFICE VISIT - HEALTHEAST (OUTPATIENT)
Dept: ENDOCRINOLOGY | Facility: CLINIC | Age: 38
End: 2018-04-17

## 2018-04-17 DIAGNOSIS — E10.65 TYPE 1 DIABETES MELLITUS WITH HYPERGLYCEMIA (H): ICD-10-CM

## 2018-04-17 DIAGNOSIS — E11.10 DKA (DIABETIC KETOACIDOSES): ICD-10-CM

## 2018-04-17 ASSESSMENT — MIFFLIN-ST. JEOR: SCORE: 1232.87

## 2018-04-24 ENCOUNTER — COMMUNICATION - HEALTHEAST (OUTPATIENT)
Dept: RHEUMATOLOGY | Facility: CLINIC | Age: 38
End: 2018-04-24

## 2018-04-24 DIAGNOSIS — M06.4 INFLAMMATORY POLYARTHRITIS (H): ICD-10-CM

## 2018-04-24 DIAGNOSIS — M25.50 PAIN IN JOINT, MULTIPLE SITES: ICD-10-CM

## 2018-04-30 ENCOUNTER — COMMUNICATION - HEALTHEAST (OUTPATIENT)
Dept: ENDOCRINOLOGY | Facility: CLINIC | Age: 38
End: 2018-04-30

## 2018-04-30 DIAGNOSIS — E10.9 TYPE 1 DIABETES MELLITUS WITHOUT COMPLICATION (H): ICD-10-CM

## 2018-05-11 ENCOUNTER — AMBULATORY - HEALTHEAST (OUTPATIENT)
Dept: LAB | Facility: CLINIC | Age: 38
End: 2018-05-11

## 2018-05-11 DIAGNOSIS — M06.4 INFLAMMATORY POLYARTHRITIS (H): ICD-10-CM

## 2018-05-11 LAB
ALBUMIN SERPL-MCNC: 3.5 G/DL (ref 3.5–5)
ALT SERPL W P-5'-P-CCNC: 23 U/L (ref 0–45)
CREAT SERPL-MCNC: 0.69 MG/DL (ref 0.6–1.1)
ERYTHROCYTE [DISTWIDTH] IN BLOOD BY AUTOMATED COUNT: 11.5 % (ref 11–14.5)
GFR SERPL CREATININE-BSD FRML MDRD: >60 ML/MIN/1.73M2
HCT VFR BLD AUTO: 37.6 % (ref 35–47)
HGB BLD-MCNC: 12.6 G/DL (ref 12–16)
MCH RBC QN AUTO: 30.7 PG (ref 27–34)
MCHC RBC AUTO-ENTMCNC: 33.6 G/DL (ref 32–36)
MCV RBC AUTO: 91 FL (ref 80–100)
PLATELET # BLD AUTO: 246 THOU/UL (ref 140–440)
PMV BLD AUTO: 8.6 FL (ref 7–10)
RBC # BLD AUTO: 4.11 MILL/UL (ref 3.8–5.4)
WBC: 9.5 THOU/UL (ref 4–11)

## 2018-05-14 ENCOUNTER — OFFICE VISIT - HEALTHEAST (OUTPATIENT)
Dept: RHEUMATOLOGY | Facility: CLINIC | Age: 38
End: 2018-05-14

## 2018-05-14 DIAGNOSIS — M79.641 RIGHT HAND PAIN: ICD-10-CM

## 2018-05-14 DIAGNOSIS — M25.50 PAIN IN JOINT, MULTIPLE SITES: ICD-10-CM

## 2018-05-14 DIAGNOSIS — M06.4 INFLAMMATORY POLYARTHRITIS (H): ICD-10-CM

## 2018-05-14 DIAGNOSIS — M25.50 POLYARTHRALGIA: ICD-10-CM

## 2018-05-14 ASSESSMENT — MIFFLIN-ST. JEOR: SCORE: 1224.71

## 2018-05-20 ENCOUNTER — COMMUNICATION - HEALTHEAST (OUTPATIENT)
Dept: ENDOCRINOLOGY | Facility: CLINIC | Age: 38
End: 2018-05-20

## 2018-05-20 DIAGNOSIS — E10.9 TYPE 1 DIABETES MELLITUS WITHOUT COMPLICATION (H): ICD-10-CM

## 2018-06-01 ENCOUNTER — COMMUNICATION - HEALTHEAST (OUTPATIENT)
Dept: SCHEDULING | Facility: CLINIC | Age: 38
End: 2018-06-01

## 2018-06-05 ENCOUNTER — COMMUNICATION - HEALTHEAST (OUTPATIENT)
Dept: ADMINISTRATIVE | Facility: CLINIC | Age: 38
End: 2018-06-05

## 2018-06-14 ENCOUNTER — AMBULATORY - HEALTHEAST (OUTPATIENT)
Dept: ENDOCRINOLOGY | Facility: CLINIC | Age: 38
End: 2018-06-14

## 2018-06-14 DIAGNOSIS — E10.65 TYPE 1 DIABETES MELLITUS WITH HYPERGLYCEMIA (H): ICD-10-CM

## 2018-06-21 ENCOUNTER — COMMUNICATION - HEALTHEAST (OUTPATIENT)
Dept: ENDOCRINOLOGY | Facility: CLINIC | Age: 38
End: 2018-06-21

## 2018-06-21 DIAGNOSIS — K21.9 GERD (GASTROESOPHAGEAL REFLUX DISEASE): ICD-10-CM

## 2018-06-21 DIAGNOSIS — E10.9 TYPE 1 DIABETES MELLITUS WITHOUT COMPLICATION (H): ICD-10-CM

## 2018-06-28 ENCOUNTER — RECORDS - HEALTHEAST (OUTPATIENT)
Dept: ADMINISTRATIVE | Facility: OTHER | Age: 38
End: 2018-06-28

## 2018-07-13 ENCOUNTER — COMMUNICATION - HEALTHEAST (OUTPATIENT)
Dept: RHEUMATOLOGY | Facility: CLINIC | Age: 38
End: 2018-07-13

## 2018-07-13 DIAGNOSIS — M06.4 INFLAMMATORY POLYARTHRITIS (H): ICD-10-CM

## 2018-07-13 DIAGNOSIS — M25.50 PAIN IN JOINT, MULTIPLE SITES: ICD-10-CM

## 2018-07-17 ENCOUNTER — AMBULATORY - HEALTHEAST (OUTPATIENT)
Dept: LAB | Facility: CLINIC | Age: 38
End: 2018-07-17

## 2018-07-17 ENCOUNTER — AMBULATORY - HEALTHEAST (OUTPATIENT)
Dept: ENDOCRINOLOGY | Facility: CLINIC | Age: 38
End: 2018-07-17

## 2018-07-17 DIAGNOSIS — E10.65 TYPE 1 DIABETES MELLITUS WITH HYPERGLYCEMIA (H): ICD-10-CM

## 2018-07-17 LAB — TSH SERPL DL<=0.005 MIU/L-ACNC: 0.81 UIU/ML (ref 0.3–5)

## 2018-08-14 ENCOUNTER — AMBULATORY - HEALTHEAST (OUTPATIENT)
Dept: LAB | Facility: CLINIC | Age: 38
End: 2018-08-14

## 2018-08-14 DIAGNOSIS — M06.4 INFLAMMATORY POLYARTHRITIS (H): ICD-10-CM

## 2018-08-14 LAB
ALBUMIN SERPL-MCNC: 3.5 G/DL (ref 3.5–5)
ALT SERPL W P-5'-P-CCNC: 20 U/L (ref 0–45)
CREAT SERPL-MCNC: 0.73 MG/DL (ref 0.6–1.1)
ERYTHROCYTE [DISTWIDTH] IN BLOOD BY AUTOMATED COUNT: 10.6 % (ref 11–14.5)
GFR SERPL CREATININE-BSD FRML MDRD: >60 ML/MIN/1.73M2
HCT VFR BLD AUTO: 38.7 % (ref 35–47)
HGB BLD-MCNC: 13.1 G/DL (ref 12–16)
MCH RBC QN AUTO: 30.4 PG (ref 27–34)
MCHC RBC AUTO-ENTMCNC: 33.9 G/DL (ref 32–36)
MCV RBC AUTO: 90 FL (ref 80–100)
PLATELET # BLD AUTO: 284 THOU/UL (ref 140–440)
PMV BLD AUTO: 8.2 FL (ref 7–10)
RBC # BLD AUTO: 4.32 MILL/UL (ref 3.8–5.4)
WBC: 6.3 THOU/UL (ref 4–11)

## 2018-08-15 ENCOUNTER — COMMUNICATION - HEALTHEAST (OUTPATIENT)
Dept: INTERNAL MEDICINE | Facility: CLINIC | Age: 38
End: 2018-08-15

## 2018-08-15 DIAGNOSIS — E03.9 HYPOTHYROIDISM, UNSPECIFIED TYPE: ICD-10-CM

## 2018-08-17 ENCOUNTER — OFFICE VISIT - HEALTHEAST (OUTPATIENT)
Dept: INTERNAL MEDICINE | Facility: CLINIC | Age: 38
End: 2018-08-17

## 2018-08-17 DIAGNOSIS — M06.4 INFLAMMATORY POLYARTHRITIS (H): ICD-10-CM

## 2018-08-17 DIAGNOSIS — D80.2 SELECTIVE IGA IMMUNODEFICIENCY (H): ICD-10-CM

## 2018-08-17 DIAGNOSIS — E10.65 TYPE 1 DIABETES MELLITUS WITH HYPERGLYCEMIA (H): ICD-10-CM

## 2018-08-17 DIAGNOSIS — J32.9 RHINOSINUSITIS: ICD-10-CM

## 2018-08-17 DIAGNOSIS — R05.9 COUGH: ICD-10-CM

## 2018-09-04 ENCOUNTER — AMBULATORY - HEALTHEAST (OUTPATIENT)
Dept: ENDOCRINOLOGY | Facility: CLINIC | Age: 38
End: 2018-09-04

## 2018-09-04 ENCOUNTER — OFFICE VISIT - HEALTHEAST (OUTPATIENT)
Dept: ENDOCRINOLOGY | Facility: CLINIC | Age: 38
End: 2018-09-04

## 2018-09-04 ENCOUNTER — AMBULATORY - HEALTHEAST (OUTPATIENT)
Dept: LAB | Facility: CLINIC | Age: 38
End: 2018-09-04

## 2018-09-04 DIAGNOSIS — E10.65 TYPE 1 DIABETES MELLITUS WITH HYPERGLYCEMIA (H): ICD-10-CM

## 2018-09-04 DIAGNOSIS — Z96.41 INSULIN PUMP STATUS: ICD-10-CM

## 2018-09-04 DIAGNOSIS — E10.9 TYPE 1 DIABETES MELLITUS (H): ICD-10-CM

## 2018-09-04 LAB
CREAT UR-MCNC: 21.6 MG/DL
HBA1C MFR BLD: 7.3 % (ref 3.5–6)
MICROALBUMIN UR-MCNC: <0.5 MG/DL (ref 0–1.99)
MICROALBUMIN/CREAT UR: NORMAL MG/G

## 2018-09-04 ASSESSMENT — MIFFLIN-ST. JEOR: SCORE: 1206.11

## 2018-09-12 ENCOUNTER — COMMUNICATION - HEALTHEAST (OUTPATIENT)
Dept: RHEUMATOLOGY | Facility: CLINIC | Age: 38
End: 2018-09-12

## 2018-09-12 DIAGNOSIS — M06.4 INFLAMMATORY POLYARTHRITIS (H): ICD-10-CM

## 2018-09-15 ENCOUNTER — COMMUNICATION - HEALTHEAST (OUTPATIENT)
Dept: ENDOCRINOLOGY | Facility: CLINIC | Age: 38
End: 2018-09-15

## 2018-09-15 DIAGNOSIS — E10.9 TYPE 1 DIABETES MELLITUS WITHOUT COMPLICATION (H): ICD-10-CM

## 2018-09-27 ENCOUNTER — COMMUNICATION - HEALTHEAST (OUTPATIENT)
Dept: INTERNAL MEDICINE | Facility: CLINIC | Age: 38
End: 2018-09-27

## 2018-09-27 DIAGNOSIS — E03.9 HYPOTHYROIDISM, UNSPECIFIED TYPE: ICD-10-CM

## 2018-09-29 ENCOUNTER — COMMUNICATION - HEALTHEAST (OUTPATIENT)
Dept: ENDOCRINOLOGY | Facility: CLINIC | Age: 38
End: 2018-09-29

## 2018-09-29 DIAGNOSIS — E10.9 TYPE 1 DIABETES MELLITUS WITHOUT COMPLICATION (H): ICD-10-CM

## 2018-10-02 ENCOUNTER — COMMUNICATION - HEALTHEAST (OUTPATIENT)
Dept: INTERNAL MEDICINE | Facility: CLINIC | Age: 38
End: 2018-10-02

## 2018-10-02 DIAGNOSIS — L30.3 ECZEMA, PUSTULAR: ICD-10-CM

## 2018-10-14 ENCOUNTER — COMMUNICATION - HEALTHEAST (OUTPATIENT)
Dept: INTERNAL MEDICINE | Facility: CLINIC | Age: 38
End: 2018-10-14

## 2018-10-14 DIAGNOSIS — F32.A DEPRESSION: ICD-10-CM

## 2018-10-16 ENCOUNTER — COMMUNICATION - HEALTHEAST (OUTPATIENT)
Dept: ENDOCRINOLOGY | Facility: CLINIC | Age: 38
End: 2018-10-16

## 2018-10-16 DIAGNOSIS — E10.9 TYPE 1 DIABETES MELLITUS WITHOUT COMPLICATION (H): ICD-10-CM

## 2018-10-25 ENCOUNTER — AMBULATORY - HEALTHEAST (OUTPATIENT)
Dept: INTERNAL MEDICINE | Facility: CLINIC | Age: 38
End: 2018-10-25

## 2018-10-25 ENCOUNTER — OFFICE VISIT - HEALTHEAST (OUTPATIENT)
Dept: INTERNAL MEDICINE | Facility: CLINIC | Age: 38
End: 2018-10-25

## 2018-10-25 DIAGNOSIS — F33.1 MODERATE EPISODE OF RECURRENT MAJOR DEPRESSIVE DISORDER (H): ICD-10-CM

## 2018-10-25 DIAGNOSIS — M06.4 INFLAMMATORY POLYARTHRITIS (H): ICD-10-CM

## 2018-10-25 DIAGNOSIS — B35.3 TINEA PEDIS OF LEFT FOOT: ICD-10-CM

## 2018-10-25 ASSESSMENT — MIFFLIN-ST. JEOR: SCORE: 1226.07

## 2018-10-29 ENCOUNTER — COMMUNICATION - HEALTHEAST (OUTPATIENT)
Dept: INTERNAL MEDICINE | Facility: CLINIC | Age: 38
End: 2018-10-29

## 2018-10-30 ENCOUNTER — OFFICE VISIT - HEALTHEAST (OUTPATIENT)
Dept: INTERNAL MEDICINE | Facility: CLINIC | Age: 38
End: 2018-10-30

## 2018-10-30 DIAGNOSIS — L98.9 PAINFUL SKIN LESION: ICD-10-CM

## 2018-10-30 LAB
ALBUMIN UR-MCNC: NEGATIVE MG/DL
ALT SERPL W P-5'-P-CCNC: 28 U/L (ref 0–45)
ANION GAP SERPL CALCULATED.3IONS-SCNC: 8 MMOL/L (ref 5–18)
APPEARANCE UR: ABNORMAL
AST SERPL W P-5'-P-CCNC: 25 U/L (ref 0–40)
BACTERIA #/AREA URNS HPF: ABNORMAL HPF
BILIRUB UR QL STRIP: ABNORMAL
BUN SERPL-MCNC: 9 MG/DL (ref 8–22)
CALCIUM SERPL-MCNC: 9.2 MG/DL (ref 8.5–10.5)
CHLORIDE BLD-SCNC: 104 MMOL/L (ref 98–107)
CO2 SERPL-SCNC: 25 MMOL/L (ref 22–31)
COLOR UR AUTO: YELLOW
CREAT SERPL-MCNC: 0.73 MG/DL (ref 0.6–1.1)
GFR SERPL CREATININE-BSD FRML MDRD: >60 ML/MIN/1.73M2
GLUCOSE BLD-MCNC: 173 MG/DL (ref 70–125)
GLUCOSE UR STRIP-MCNC: NEGATIVE MG/DL
HGB UR QL STRIP: ABNORMAL
KETONES UR STRIP-MCNC: ABNORMAL MG/DL
LEUKOCYTE ESTERASE UR QL STRIP: ABNORMAL
NITRATE UR QL: NEGATIVE
PH UR STRIP: 5.5 [PH] (ref 5–8)
POTASSIUM BLD-SCNC: 4.1 MMOL/L (ref 3.5–5)
RBC #/AREA URNS AUTO: ABNORMAL HPF
SODIUM SERPL-SCNC: 137 MMOL/L (ref 136–145)
SP GR UR STRIP: 1.02 (ref 1–1.03)
SQUAMOUS #/AREA URNS AUTO: ABNORMAL LPF
UROBILINOGEN UR STRIP-ACNC: ABNORMAL
WBC #/AREA URNS AUTO: ABNORMAL HPF

## 2018-10-30 ASSESSMENT — MIFFLIN-ST. JEOR: SCORE: 1226.07

## 2018-10-31 ENCOUNTER — RECORDS - HEALTHEAST (OUTPATIENT)
Dept: ADMINISTRATIVE | Facility: OTHER | Age: 38
End: 2018-10-31

## 2018-10-31 LAB — BACTERIA SPEC CULT: NORMAL

## 2018-11-05 ENCOUNTER — COMMUNICATION - HEALTHEAST (OUTPATIENT)
Dept: INTERNAL MEDICINE | Facility: CLINIC | Age: 38
End: 2018-11-05

## 2018-11-12 ENCOUNTER — AMBULATORY - HEALTHEAST (OUTPATIENT)
Dept: LAB | Facility: CLINIC | Age: 38
End: 2018-11-12

## 2018-11-12 DIAGNOSIS — M06.4 INFLAMMATORY POLYARTHRITIS (H): ICD-10-CM

## 2018-11-12 DIAGNOSIS — E10.65 TYPE 1 DIABETES MELLITUS WITH HYPERGLYCEMIA (H): ICD-10-CM

## 2018-11-12 LAB
ALBUMIN SERPL-MCNC: 3.7 G/DL (ref 3.5–5)
ALT SERPL W P-5'-P-CCNC: 18 U/L (ref 0–45)
ANION GAP SERPL CALCULATED.3IONS-SCNC: 8 MMOL/L (ref 5–18)
BUN SERPL-MCNC: 5 MG/DL (ref 8–22)
CALCIUM SERPL-MCNC: 9.2 MG/DL (ref 8.5–10.5)
CHLORIDE BLD-SCNC: 101 MMOL/L (ref 98–107)
CO2 SERPL-SCNC: 28 MMOL/L (ref 22–31)
CREAT SERPL-MCNC: 0.69 MG/DL (ref 0.6–1.1)
ERYTHROCYTE [DISTWIDTH] IN BLOOD BY AUTOMATED COUNT: 11.5 % (ref 11–14.5)
GFR SERPL CREATININE-BSD FRML MDRD: >60 ML/MIN/1.73M2
GLUCOSE BLD-MCNC: 159 MG/DL (ref 70–125)
HCT VFR BLD AUTO: 39.7 % (ref 35–47)
HGB BLD-MCNC: 13.5 G/DL (ref 12–16)
MCH RBC QN AUTO: 30.7 PG (ref 27–34)
MCHC RBC AUTO-ENTMCNC: 34 G/DL (ref 32–36)
MCV RBC AUTO: 90 FL (ref 80–100)
PLATELET # BLD AUTO: 284 THOU/UL (ref 140–440)
PMV BLD AUTO: 8.1 FL (ref 7–10)
POTASSIUM BLD-SCNC: 4.1 MMOL/L (ref 3.5–5)
RBC # BLD AUTO: 4.39 MILL/UL (ref 3.8–5.4)
SODIUM SERPL-SCNC: 137 MMOL/L (ref 136–145)
WBC: 6.3 THOU/UL (ref 4–11)

## 2018-11-14 ENCOUNTER — OFFICE VISIT - HEALTHEAST (OUTPATIENT)
Dept: RHEUMATOLOGY | Facility: CLINIC | Age: 38
End: 2018-11-14

## 2018-11-14 DIAGNOSIS — Z79.899 HIGH RISK MEDICATION USE: ICD-10-CM

## 2018-11-14 DIAGNOSIS — M06.4 INFLAMMATORY POLYARTHRITIS (H): ICD-10-CM

## 2018-11-14 DIAGNOSIS — M25.50 PAIN IN JOINT, MULTIPLE SITES: ICD-10-CM

## 2018-11-14 DIAGNOSIS — Z84.0 FAMILY HISTORY OF PSORIASIS IN MOTHER: ICD-10-CM

## 2018-11-17 ENCOUNTER — COMMUNICATION - HEALTHEAST (OUTPATIENT)
Dept: RHEUMATOLOGY | Facility: CLINIC | Age: 38
End: 2018-11-17

## 2018-11-17 DIAGNOSIS — M06.4 INFLAMMATORY POLYARTHRITIS (H): ICD-10-CM

## 2018-11-30 ENCOUNTER — RECORDS - HEALTHEAST (OUTPATIENT)
Dept: ADMINISTRATIVE | Facility: OTHER | Age: 38
End: 2018-11-30

## 2018-11-30 ENCOUNTER — COMMUNICATION - HEALTHEAST (OUTPATIENT)
Dept: ENDOCRINOLOGY | Facility: CLINIC | Age: 38
End: 2018-11-30

## 2018-12-04 ENCOUNTER — AMBULATORY - HEALTHEAST (OUTPATIENT)
Dept: LAB | Facility: CLINIC | Age: 38
End: 2018-12-04

## 2018-12-04 DIAGNOSIS — E10.65 TYPE 1 DIABETES MELLITUS WITH HYPERGLYCEMIA (H): ICD-10-CM

## 2018-12-04 LAB — HBA1C MFR BLD: 8 % (ref 3.5–6)

## 2018-12-11 ENCOUNTER — OFFICE VISIT - HEALTHEAST (OUTPATIENT)
Dept: ENDOCRINOLOGY | Facility: CLINIC | Age: 38
End: 2018-12-11

## 2018-12-11 DIAGNOSIS — Z96.41 INSULIN PUMP STATUS: ICD-10-CM

## 2018-12-11 DIAGNOSIS — E10.65 TYPE 1 DIABETES MELLITUS WITH HYPERGLYCEMIA (H): ICD-10-CM

## 2018-12-11 DIAGNOSIS — K21.9 GASTROESOPHAGEAL REFLUX DISEASE, ESOPHAGITIS PRESENCE NOT SPECIFIED: ICD-10-CM

## 2018-12-11 ASSESSMENT — MIFFLIN-ST. JEOR: SCORE: 1200.86

## 2018-12-12 ENCOUNTER — RECORDS - HEALTHEAST (OUTPATIENT)
Dept: ADMINISTRATIVE | Facility: OTHER | Age: 38
End: 2018-12-12

## 2018-12-24 ENCOUNTER — COMMUNICATION - HEALTHEAST (OUTPATIENT)
Dept: ADMINISTRATIVE | Facility: CLINIC | Age: 38
End: 2018-12-24

## 2019-01-14 ENCOUNTER — COMMUNICATION - HEALTHEAST (OUTPATIENT)
Dept: INTERNAL MEDICINE | Facility: CLINIC | Age: 39
End: 2019-01-14

## 2019-01-14 DIAGNOSIS — F32.A DEPRESSION: ICD-10-CM

## 2019-01-17 ENCOUNTER — COMMUNICATION - HEALTHEAST (OUTPATIENT)
Dept: ADMINISTRATIVE | Facility: CLINIC | Age: 39
End: 2019-01-17

## 2019-01-17 DIAGNOSIS — E10.65 TYPE 1 DIABETES MELLITUS WITH HYPERGLYCEMIA (H): ICD-10-CM

## 2019-01-22 ENCOUNTER — AMBULATORY - HEALTHEAST (OUTPATIENT)
Dept: ENDOCRINOLOGY | Facility: CLINIC | Age: 39
End: 2019-01-22

## 2019-01-22 DIAGNOSIS — E10.65 TYPE 1 DIABETES MELLITUS WITH HYPERGLYCEMIA (H): ICD-10-CM

## 2019-01-24 ENCOUNTER — OFFICE VISIT - HEALTHEAST (OUTPATIENT)
Dept: INTERNAL MEDICINE | Facility: CLINIC | Age: 39
End: 2019-01-24

## 2019-01-24 DIAGNOSIS — Z96.41 INSULIN PUMP STATUS: ICD-10-CM

## 2019-01-24 DIAGNOSIS — K21.9 GASTROESOPHAGEAL REFLUX DISEASE, ESOPHAGITIS PRESENCE NOT SPECIFIED: ICD-10-CM

## 2019-01-24 DIAGNOSIS — F41.9 ANXIETY: ICD-10-CM

## 2019-01-24 DIAGNOSIS — Z02.89 ENCOUNTER FOR COMPLETION OF FORM WITH PATIENT: ICD-10-CM

## 2019-01-24 DIAGNOSIS — E03.9 HYPOTHYROIDISM, UNSPECIFIED TYPE: ICD-10-CM

## 2019-01-24 DIAGNOSIS — F33.1 MODERATE EPISODE OF RECURRENT MAJOR DEPRESSIVE DISORDER (H): ICD-10-CM

## 2019-01-24 DIAGNOSIS — E10.65 TYPE 1 DIABETES MELLITUS WITH HYPERGLYCEMIA (H): ICD-10-CM

## 2019-01-24 DIAGNOSIS — M06.4 INFLAMMATORY POLYARTHRITIS (H): ICD-10-CM

## 2019-01-24 DIAGNOSIS — F51.01 PRIMARY INSOMNIA: ICD-10-CM

## 2019-01-25 ENCOUNTER — COMMUNICATION - HEALTHEAST (OUTPATIENT)
Dept: INTERNAL MEDICINE | Facility: CLINIC | Age: 39
End: 2019-01-25

## 2019-01-28 ENCOUNTER — COMMUNICATION - HEALTHEAST (OUTPATIENT)
Dept: INTERNAL MEDICINE | Facility: CLINIC | Age: 39
End: 2019-01-28

## 2019-02-01 ENCOUNTER — COMMUNICATION - HEALTHEAST (OUTPATIENT)
Dept: INTERNAL MEDICINE | Facility: CLINIC | Age: 39
End: 2019-02-01

## 2019-02-04 ENCOUNTER — COMMUNICATION - HEALTHEAST (OUTPATIENT)
Dept: INTERNAL MEDICINE | Facility: CLINIC | Age: 39
End: 2019-02-04

## 2019-02-05 ENCOUNTER — AMBULATORY - HEALTHEAST (OUTPATIENT)
Dept: INTERNAL MEDICINE | Facility: CLINIC | Age: 39
End: 2019-02-05

## 2019-02-22 ENCOUNTER — COMMUNICATION - HEALTHEAST (OUTPATIENT)
Dept: INTERNAL MEDICINE | Facility: CLINIC | Age: 39
End: 2019-02-22

## 2019-02-22 DIAGNOSIS — E03.9 HYPOTHYROIDISM, UNSPECIFIED TYPE: ICD-10-CM

## 2019-02-28 ENCOUNTER — COMMUNICATION - HEALTHEAST (OUTPATIENT)
Dept: INTERNAL MEDICINE | Facility: CLINIC | Age: 39
End: 2019-02-28

## 2019-03-04 ENCOUNTER — OFFICE VISIT - HEALTHEAST (OUTPATIENT)
Dept: CARDIOLOGY | Facility: CLINIC | Age: 39
End: 2019-03-04

## 2019-03-04 DIAGNOSIS — I34.0 NON-RHEUMATIC MITRAL REGURGITATION: ICD-10-CM

## 2019-03-04 ASSESSMENT — MIFFLIN-ST. JEOR: SCORE: 1226.07

## 2019-03-05 ENCOUNTER — AMBULATORY - HEALTHEAST (OUTPATIENT)
Dept: LAB | Facility: CLINIC | Age: 39
End: 2019-03-05

## 2019-03-05 DIAGNOSIS — E10.65 TYPE 1 DIABETES MELLITUS WITH HYPERGLYCEMIA (H): ICD-10-CM

## 2019-03-05 LAB
HBA1C MFR BLD: 7.5 % (ref 3.5–6)
LDLC SERPL CALC-MCNC: 84 MG/DL

## 2019-03-12 ENCOUNTER — AMBULATORY - HEALTHEAST (OUTPATIENT)
Dept: ENDOCRINOLOGY | Facility: CLINIC | Age: 39
End: 2019-03-12

## 2019-03-12 ENCOUNTER — OFFICE VISIT - HEALTHEAST (OUTPATIENT)
Dept: ENDOCRINOLOGY | Facility: CLINIC | Age: 39
End: 2019-03-12

## 2019-03-12 DIAGNOSIS — E10.9 TYPE 1 DIABETES MELLITUS WITHOUT COMPLICATION (H): ICD-10-CM

## 2019-03-12 DIAGNOSIS — E10.65 TYPE 1 DIABETES MELLITUS WITH HYPERGLYCEMIA (H): ICD-10-CM

## 2019-03-12 ASSESSMENT — MIFFLIN-ST. JEOR: SCORE: 1210.19

## 2019-03-21 ENCOUNTER — COMMUNICATION - HEALTHEAST (OUTPATIENT)
Dept: INTERNAL MEDICINE | Facility: CLINIC | Age: 39
End: 2019-03-21

## 2019-03-21 DIAGNOSIS — J31.0 RHINITIS, UNSPECIFIED TYPE: ICD-10-CM

## 2019-03-27 ENCOUNTER — COMMUNICATION - HEALTHEAST (OUTPATIENT)
Dept: INTERNAL MEDICINE | Facility: CLINIC | Age: 39
End: 2019-03-27

## 2019-04-02 ENCOUNTER — OFFICE VISIT - HEALTHEAST (OUTPATIENT)
Dept: INTERNAL MEDICINE | Facility: CLINIC | Age: 39
End: 2019-04-02

## 2019-04-02 DIAGNOSIS — Z72.0 TOBACCO ABUSE: ICD-10-CM

## 2019-04-02 DIAGNOSIS — J10.1 INFLUENZA A: ICD-10-CM

## 2019-04-02 DIAGNOSIS — Z09 HOSPITAL DISCHARGE FOLLOW-UP: ICD-10-CM

## 2019-04-02 DIAGNOSIS — L21.9 SEBORRHEIC DERMATITIS: ICD-10-CM

## 2019-04-26 ENCOUNTER — OFFICE VISIT - HEALTHEAST (OUTPATIENT)
Dept: INTERNAL MEDICINE | Facility: CLINIC | Age: 39
End: 2019-04-26

## 2019-04-26 DIAGNOSIS — H10.30 ACUTE BACTERIAL CONJUNCTIVITIS, UNSPECIFIED LATERALITY: ICD-10-CM

## 2019-06-14 ENCOUNTER — COMMUNICATION - HEALTHEAST (OUTPATIENT)
Dept: ENDOCRINOLOGY | Facility: CLINIC | Age: 39
End: 2019-06-14

## 2019-06-16 ENCOUNTER — COMMUNICATION - HEALTHEAST (OUTPATIENT)
Dept: INTERNAL MEDICINE | Facility: CLINIC | Age: 39
End: 2019-06-16

## 2019-06-16 DIAGNOSIS — R11.0 NAUSEA: ICD-10-CM

## 2019-06-24 ENCOUNTER — COMMUNICATION - HEALTHEAST (OUTPATIENT)
Dept: RHEUMATOLOGY | Facility: CLINIC | Age: 39
End: 2019-06-24

## 2019-06-24 DIAGNOSIS — M06.4 INFLAMMATORY POLYARTHRITIS (H): ICD-10-CM

## 2019-06-25 ENCOUNTER — COMMUNICATION - HEALTHEAST (OUTPATIENT)
Dept: INTERNAL MEDICINE | Facility: CLINIC | Age: 39
End: 2019-06-25

## 2019-06-25 DIAGNOSIS — G44.001: ICD-10-CM

## 2019-06-29 ENCOUNTER — COMMUNICATION - HEALTHEAST (OUTPATIENT)
Dept: ENDOCRINOLOGY | Facility: CLINIC | Age: 39
End: 2019-06-29

## 2019-06-29 DIAGNOSIS — E10.65 TYPE 1 DIABETES MELLITUS WITH HYPERGLYCEMIA (H): ICD-10-CM

## 2019-07-09 ENCOUNTER — COMMUNICATION - HEALTHEAST (OUTPATIENT)
Dept: ENDOCRINOLOGY | Facility: CLINIC | Age: 39
End: 2019-07-09

## 2019-07-09 DIAGNOSIS — E10.9 TYPE 1 DIABETES MELLITUS WITHOUT COMPLICATION (H): ICD-10-CM

## 2019-07-23 ENCOUNTER — AMBULATORY - HEALTHEAST (OUTPATIENT)
Dept: LAB | Facility: CLINIC | Age: 39
End: 2019-07-23

## 2019-07-23 DIAGNOSIS — E10.9 TYPE 1 DIABETES MELLITUS WITHOUT COMPLICATION (H): ICD-10-CM

## 2019-07-23 LAB
ANION GAP SERPL CALCULATED.3IONS-SCNC: 10 MMOL/L (ref 5–18)
BUN SERPL-MCNC: 9 MG/DL (ref 8–22)
CALCIUM SERPL-MCNC: 9.5 MG/DL (ref 8.5–10.5)
CHLORIDE BLD-SCNC: 104 MMOL/L (ref 98–107)
CO2 SERPL-SCNC: 24 MMOL/L (ref 22–31)
CREAT SERPL-MCNC: 0.79 MG/DL (ref 0.6–1.1)
GFR SERPL CREATININE-BSD FRML MDRD: >60 ML/MIN/1.73M2
GLUCOSE BLD-MCNC: 215 MG/DL (ref 70–125)
HBA1C MFR BLD: 7.8 % (ref 3.5–6)
POTASSIUM BLD-SCNC: 4.6 MMOL/L (ref 3.5–5)
SODIUM SERPL-SCNC: 138 MMOL/L (ref 136–145)
TSH SERPL DL<=0.005 MIU/L-ACNC: 1 UIU/ML (ref 0.3–5)

## 2019-07-25 ENCOUNTER — COMMUNICATION - HEALTHEAST (OUTPATIENT)
Dept: INTERNAL MEDICINE | Facility: CLINIC | Age: 39
End: 2019-07-25

## 2019-07-25 DIAGNOSIS — F32.A DEPRESSION: ICD-10-CM

## 2019-07-30 ENCOUNTER — RECORDS - HEALTHEAST (OUTPATIENT)
Dept: ADMINISTRATIVE | Facility: OTHER | Age: 39
End: 2019-07-30

## 2019-07-30 ENCOUNTER — AMBULATORY - HEALTHEAST (OUTPATIENT)
Dept: ENDOCRINOLOGY | Facility: CLINIC | Age: 39
End: 2019-07-30

## 2019-07-30 ENCOUNTER — OFFICE VISIT - HEALTHEAST (OUTPATIENT)
Dept: ENDOCRINOLOGY | Facility: CLINIC | Age: 39
End: 2019-07-30

## 2019-07-30 DIAGNOSIS — E10.65 TYPE 1 DIABETES MELLITUS WITH HYPERGLYCEMIA (H): ICD-10-CM

## 2019-07-30 ASSESSMENT — MIFFLIN-ST. JEOR: SCORE: 1207.02

## 2019-08-26 ENCOUNTER — COMMUNICATION - HEALTHEAST (OUTPATIENT)
Dept: INTERNAL MEDICINE | Facility: CLINIC | Age: 39
End: 2019-08-26

## 2019-08-26 DIAGNOSIS — K21.9 GASTROESOPHAGEAL REFLUX DISEASE, ESOPHAGITIS PRESENCE NOT SPECIFIED: ICD-10-CM

## 2019-08-26 DIAGNOSIS — E03.9 HYPOTHYROIDISM, UNSPECIFIED TYPE: ICD-10-CM

## 2019-08-28 ENCOUNTER — COMMUNICATION - HEALTHEAST (OUTPATIENT)
Dept: ENDOCRINOLOGY | Facility: CLINIC | Age: 39
End: 2019-08-28

## 2019-08-30 ENCOUNTER — AMBULATORY - HEALTHEAST (OUTPATIENT)
Dept: SURGERY | Facility: CLINIC | Age: 39
End: 2019-08-30

## 2019-09-23 ENCOUNTER — COMMUNICATION - HEALTHEAST (OUTPATIENT)
Dept: INTERNAL MEDICINE | Facility: CLINIC | Age: 39
End: 2019-09-23

## 2019-09-23 DIAGNOSIS — J31.0 RHINITIS, UNSPECIFIED TYPE: ICD-10-CM

## 2019-09-23 DIAGNOSIS — G44.001: ICD-10-CM

## 2019-09-24 ENCOUNTER — OFFICE VISIT - HEALTHEAST (OUTPATIENT)
Dept: INTERNAL MEDICINE | Facility: CLINIC | Age: 39
End: 2019-09-24

## 2019-09-24 ENCOUNTER — AMBULATORY - HEALTHEAST (OUTPATIENT)
Dept: INTERNAL MEDICINE | Facility: CLINIC | Age: 39
End: 2019-09-24

## 2019-09-24 ENCOUNTER — COMMUNICATION - HEALTHEAST (OUTPATIENT)
Dept: INTERNAL MEDICINE | Facility: CLINIC | Age: 39
End: 2019-09-24

## 2019-09-24 DIAGNOSIS — H10.33 ACUTE BACTERIAL CONJUNCTIVITIS OF BOTH EYES: ICD-10-CM

## 2019-09-24 DIAGNOSIS — H10.30 ACUTE BACTERIAL CONJUNCTIVITIS, UNSPECIFIED LATERALITY: ICD-10-CM

## 2019-11-08 ENCOUNTER — COMMUNICATION - HEALTHEAST (OUTPATIENT)
Dept: INTERNAL MEDICINE | Facility: CLINIC | Age: 39
End: 2019-11-08

## 2019-11-08 DIAGNOSIS — F32.A DEPRESSION: ICD-10-CM

## 2019-11-18 ENCOUNTER — COMMUNICATION - HEALTHEAST (OUTPATIENT)
Dept: ENDOCRINOLOGY | Facility: CLINIC | Age: 39
End: 2019-11-18

## 2019-11-27 ENCOUNTER — RECORDS - HEALTHEAST (OUTPATIENT)
Dept: ADMINISTRATIVE | Facility: OTHER | Age: 39
End: 2019-11-27

## 2019-12-12 ENCOUNTER — COMMUNICATION - HEALTHEAST (OUTPATIENT)
Dept: INTERNAL MEDICINE | Facility: CLINIC | Age: 39
End: 2019-12-12

## 2019-12-12 DIAGNOSIS — F51.01 PRIMARY INSOMNIA: ICD-10-CM

## 2019-12-18 ENCOUNTER — HOSPITAL ENCOUNTER (OUTPATIENT)
Dept: MEDSURG UNIT | Facility: HOSPITAL | Age: 39
Setting detail: OBSERVATION
Discharge: HOME | End: 2019-12-19
Attending: INTERNAL MEDICINE | Admitting: SURGERY
Payer: COMMERCIAL

## 2019-12-18 ENCOUNTER — ANESTHESIA - HEALTHEAST (OUTPATIENT)
Dept: SURGERY | Facility: HOSPITAL | Age: 39
End: 2019-12-18

## 2019-12-18 DIAGNOSIS — K81.0 ACUTE CHOLECYSTITIS: ICD-10-CM

## 2019-12-18 DIAGNOSIS — R73.9 HYPERGLYCEMIA: ICD-10-CM

## 2019-12-18 LAB
ALBUMIN SERPL-MCNC: 4.1 G/DL (ref 3.5–5)
ALBUMIN UR-MCNC: NEGATIVE MG/DL
ALP SERPL-CCNC: 48 U/L (ref 45–120)
ALT SERPL W P-5'-P-CCNC: 19 U/L (ref 0–45)
ANION GAP SERPL CALCULATED.3IONS-SCNC: 13 MMOL/L (ref 5–18)
APPEARANCE UR: CLEAR
AST SERPL W P-5'-P-CCNC: 17 U/L (ref 0–40)
ATRIAL RATE - MUSE: 52 BPM
BACTERIA #/AREA URNS HPF: ABNORMAL HPF
BASE EXCESS BLDV CALC-SCNC: -6.2 MMOL/L
BILIRUB DIRECT SERPL-MCNC: 0.3 MG/DL
BILIRUB SERPL-MCNC: 1 MG/DL (ref 0–1)
BILIRUB UR QL STRIP: NEGATIVE
BUN SERPL-MCNC: 15 MG/DL (ref 8–22)
CALCIUM SERPL-MCNC: 9.4 MG/DL (ref 8.5–10.5)
CHLORIDE BLD-SCNC: 101 MMOL/L (ref 98–107)
CO2 SERPL-SCNC: 22 MMOL/L (ref 22–31)
COLOR UR AUTO: YELLOW
CREAT SERPL-MCNC: 1.09 MG/DL (ref 0.6–1.1)
D DIMER PPP FEU-MCNC: <0.27 FEU UG/ML
DIASTOLIC BLOOD PRESSURE - MUSE: NORMAL
ERYTHROCYTE [DISTWIDTH] IN BLOOD BY AUTOMATED COUNT: 12.2 % (ref 11–14.5)
GFR SERPL CREATININE-BSD FRML MDRD: 56 ML/MIN/1.73M2
GLUCOSE BLD-MCNC: 465 MG/DL (ref 70–125)
GLUCOSE BLDC GLUCOMTR-MCNC: 248 MG/DL (ref 70–139)
GLUCOSE BLDC GLUCOMTR-MCNC: 264 MG/DL (ref 70–139)
GLUCOSE BLDC GLUCOMTR-MCNC: 330 MG/DL (ref 70–139)
GLUCOSE BLDC GLUCOMTR-MCNC: 336 MG/DL (ref 70–139)
GLUCOSE BLDC GLUCOMTR-MCNC: 358 MG/DL (ref 70–139)
GLUCOSE BLDC GLUCOMTR-MCNC: 408 MG/DL (ref 70–139)
GLUCOSE UR STRIP-MCNC: ABNORMAL MG/DL
HCO3, VENOUS, CALC - HISTORICAL: 19.8 MMOL/L (ref 24–30)
HCT VFR BLD AUTO: 40.6 % (ref 35–47)
HGB BLD-MCNC: 13.6 G/DL (ref 12–16)
HGB UR QL STRIP: ABNORMAL
INTERPRETATION ECG - MUSE: NORMAL
KETONES BLD-SCNC: 2.18 MMOL/L
KETONES UR STRIP-MCNC: ABNORMAL MG/DL
LACTATE SERPL-SCNC: 2 MMOL/L (ref 0.5–2.2)
LACTATE SERPL-SCNC: 3.1 MMOL/L (ref 0.5–2.2)
LEUKOCYTE ESTERASE UR QL STRIP: NEGATIVE
LIPASE SERPL-CCNC: 25 U/L (ref 0–52)
MCH RBC QN AUTO: 30.4 PG (ref 27–34)
MCHC RBC AUTO-ENTMCNC: 33.5 G/DL (ref 32–36)
MCV RBC AUTO: 91 FL (ref 80–100)
NITRATE UR QL: NEGATIVE
OXYHEMOGLOBIN (VBG) - HISTORICAL: 86.1 % (ref 70–75)
OXYHGB MFR BLDV: 88.7 % (ref 70–75)
P AXIS - MUSE: 58 DEGREES
PCO2 BLDV: 36 MM HG (ref 35–50)
PH BLDV: 7.33 [PH] (ref 7.35–7.45)
PH UR STRIP: 5.5 [PH] (ref 4.5–8)
PLATELET # BLD AUTO: 334 THOU/UL (ref 140–440)
PMV BLD AUTO: 10.8 FL (ref 8.5–12.5)
PO2 BLDV: 51 MM HG (ref 25–47)
POC PREG URINE (HCG) HE - HISTORICAL: NEGATIVE
POCT KIT EXPIRATION DATE HE - HISTORICAL: NORMAL
POCT KIT LOT NUMBER HE - HISTORICAL: NORMAL
POCT NEGATIVE CONTROL HE - HISTORICAL: NORMAL
POCT POSITIVE CONTROL HE - HISTORICAL: NORMAL
POTASSIUM BLD-SCNC: 4.5 MMOL/L (ref 3.5–5)
PR INTERVAL - MUSE: 124 MS
PROT SERPL-MCNC: 7.1 G/DL (ref 6–8)
QRS DURATION - MUSE: 108 MS
QT - MUSE: 486 MS
QTC - MUSE: 451 MS
R AXIS - MUSE: 76 DEGREES
RBC # BLD AUTO: 4.47 MILL/UL (ref 3.8–5.4)
RBC #/AREA URNS AUTO: ABNORMAL HPF
SODIUM SERPL-SCNC: 136 MMOL/L (ref 136–145)
SP GR UR STRIP: 1.02 (ref 1–1.03)
SQUAMOUS #/AREA URNS AUTO: ABNORMAL LPF
SYSTOLIC BLOOD PRESSURE - MUSE: NORMAL
T AXIS - MUSE: 74 DEGREES
TROPONIN I SERPL-MCNC: <0.01 NG/ML (ref 0–0.29)
TROPONIN I SERPL-MCNC: <0.01 NG/ML (ref 0–0.29)
UROBILINOGEN UR STRIP-ACNC: ABNORMAL
VENTRICULAR RATE- MUSE: 52 BPM
WBC #/AREA URNS AUTO: ABNORMAL HPF
WBC: 12.2 THOU/UL (ref 4–11)

## 2019-12-18 ASSESSMENT — MIFFLIN-ST. JEOR
SCORE: 1183.26
SCORE: 1183.26

## 2019-12-19 ENCOUNTER — SURGERY - HEALTHEAST (OUTPATIENT)
Dept: SURGERY | Facility: HOSPITAL | Age: 39
End: 2019-12-19

## 2019-12-19 LAB
ALBUMIN SERPL-MCNC: 3 G/DL (ref 3.5–5)
ALP SERPL-CCNC: 32 U/L (ref 45–120)
ALT SERPL W P-5'-P-CCNC: 14 U/L (ref 0–45)
ANION GAP SERPL CALCULATED.3IONS-SCNC: 6 MMOL/L (ref 5–18)
AST SERPL W P-5'-P-CCNC: 14 U/L (ref 0–40)
BILIRUB DIRECT SERPL-MCNC: 0.3 MG/DL
BILIRUB SERPL-MCNC: 0.8 MG/DL (ref 0–1)
BUN SERPL-MCNC: 13 MG/DL (ref 8–22)
CALCIUM SERPL-MCNC: 7.7 MG/DL (ref 8.5–10.5)
CHLORIDE BLD-SCNC: 111 MMOL/L (ref 98–107)
CO2 SERPL-SCNC: 22 MMOL/L (ref 22–31)
CREAT SERPL-MCNC: 0.71 MG/DL (ref 0.6–1.1)
ERYTHROCYTE [DISTWIDTH] IN BLOOD BY AUTOMATED COUNT: 12.5 % (ref 11–14.5)
GFR SERPL CREATININE-BSD FRML MDRD: >60 ML/MIN/1.73M2
GLUCOSE BLD-MCNC: 120 MG/DL (ref 70–125)
GLUCOSE BLDC GLUCOMTR-MCNC: 107 MG/DL (ref 70–139)
GLUCOSE BLDC GLUCOMTR-MCNC: 115 MG/DL (ref 70–139)
GLUCOSE BLDC GLUCOMTR-MCNC: 147 MG/DL (ref 70–139)
GLUCOSE BLDC GLUCOMTR-MCNC: 175 MG/DL (ref 70–139)
GLUCOSE BLDC GLUCOMTR-MCNC: 209 MG/DL (ref 70–139)
GLUCOSE BLDC GLUCOMTR-MCNC: 231 MG/DL (ref 70–139)
GLUCOSE BLDC GLUCOMTR-MCNC: 238 MG/DL (ref 70–139)
GLUCOSE BLDC GLUCOMTR-MCNC: 282 MG/DL (ref 70–139)
HBA1C MFR BLD: 8.2 % (ref 4.2–6.1)
HCG UR QL: NEGATIVE
HCT VFR BLD AUTO: 31.7 % (ref 35–47)
HGB BLD-MCNC: 10.7 G/DL (ref 12–16)
INR PPP: 1.34 (ref 0.9–1.1)
MCH RBC QN AUTO: 30.7 PG (ref 27–34)
MCHC RBC AUTO-ENTMCNC: 33.8 G/DL (ref 32–36)
MCV RBC AUTO: 91 FL (ref 80–100)
PLATELET # BLD AUTO: 259 THOU/UL (ref 140–440)
PMV BLD AUTO: 10.4 FL (ref 8.5–12.5)
POTASSIUM BLD-SCNC: 3.7 MMOL/L (ref 3.5–5)
PROT SERPL-MCNC: 5.1 G/DL (ref 6–8)
RBC # BLD AUTO: 3.49 MILL/UL (ref 3.8–5.4)
SODIUM SERPL-SCNC: 139 MMOL/L (ref 136–145)
TROPONIN I SERPL-MCNC: 0.02 NG/ML (ref 0–0.29)
WBC: 11.8 THOU/UL (ref 4–11)

## 2019-12-22 LAB
LAB AP CHARGES (HE HISTORICAL CONVERSION): NORMAL
PATH REPORT.COMMENTS IMP SPEC: NORMAL
PATH REPORT.FINAL DX SPEC: NORMAL
PATH REPORT.GROSS SPEC: NORMAL
PATH REPORT.MICROSCOPIC SPEC OTHER STN: NORMAL
PATH REPORT.RELEVANT HX SPEC: NORMAL
RESULT FLAG (HE HISTORICAL CONVERSION): NORMAL

## 2019-12-24 ENCOUNTER — OFFICE VISIT - HEALTHEAST (OUTPATIENT)
Dept: INTERNAL MEDICINE | Facility: CLINIC | Age: 39
End: 2019-12-24

## 2019-12-24 ENCOUNTER — COMMUNICATION - HEALTHEAST (OUTPATIENT)
Dept: NURSING | Facility: CLINIC | Age: 39
End: 2019-12-24

## 2019-12-24 ENCOUNTER — AMBULATORY - HEALTHEAST (OUTPATIENT)
Dept: CARE COORDINATION | Facility: CLINIC | Age: 39
End: 2019-12-24

## 2019-12-24 DIAGNOSIS — K81.0 ACUTE CHOLECYSTITIS: ICD-10-CM

## 2019-12-24 DIAGNOSIS — Z09 HOSPITAL DISCHARGE FOLLOW-UP: ICD-10-CM

## 2019-12-24 DIAGNOSIS — F33.1 MAJOR DEPRESSIVE DISORDER, RECURRENT EPISODE, MODERATE (H): ICD-10-CM

## 2019-12-24 DIAGNOSIS — E10.65 TYPE 1 DIABETES MELLITUS WITH HYPERGLYCEMIA (H): ICD-10-CM

## 2019-12-24 DIAGNOSIS — E10.649 TYPE 1 DIABETES MELLITUS WITH HYPOGLYCEMIA AND WITHOUT COMA (H): ICD-10-CM

## 2019-12-26 ENCOUNTER — COMMUNICATION - HEALTHEAST (OUTPATIENT)
Dept: NURSING | Facility: CLINIC | Age: 39
End: 2019-12-26

## 2019-12-31 ENCOUNTER — AMBULATORY - HEALTHEAST (OUTPATIENT)
Dept: NURSING | Facility: CLINIC | Age: 39
End: 2019-12-31

## 2019-12-31 ASSESSMENT — ACTIVITIES OF DAILY LIVING (ADL): DEPENDENT_IADLS:: INDEPENDENT

## 2020-01-09 ENCOUNTER — COMMUNICATION - HEALTHEAST (OUTPATIENT)
Dept: CARE COORDINATION | Facility: CLINIC | Age: 40
End: 2020-01-09

## 2020-01-13 ENCOUNTER — COMMUNICATION - HEALTHEAST (OUTPATIENT)
Dept: NURSING | Facility: CLINIC | Age: 40
End: 2020-01-13

## 2020-01-21 ENCOUNTER — COMMUNICATION - HEALTHEAST (OUTPATIENT)
Dept: NURSING | Facility: CLINIC | Age: 40
End: 2020-01-21

## 2020-01-23 ENCOUNTER — COMMUNICATION - HEALTHEAST (OUTPATIENT)
Dept: INTERNAL MEDICINE | Facility: CLINIC | Age: 40
End: 2020-01-23

## 2020-01-23 ENCOUNTER — COMMUNICATION - HEALTHEAST (OUTPATIENT)
Dept: ADMINISTRATIVE | Facility: CLINIC | Age: 40
End: 2020-01-23

## 2020-01-23 DIAGNOSIS — F32.A DEPRESSION: ICD-10-CM

## 2020-01-23 DIAGNOSIS — E10.65 TYPE 1 DIABETES MELLITUS WITH HYPERGLYCEMIA (H): ICD-10-CM

## 2020-01-23 DIAGNOSIS — R06.2 WHEEZING: ICD-10-CM

## 2020-01-29 ENCOUNTER — AMBULATORY - HEALTHEAST (OUTPATIENT)
Dept: LAB | Facility: CLINIC | Age: 40
End: 2020-01-29

## 2020-01-29 DIAGNOSIS — E10.65 TYPE 1 DIABETES MELLITUS WITH HYPERGLYCEMIA (H): ICD-10-CM

## 2020-01-29 LAB
ANION GAP SERPL CALCULATED.3IONS-SCNC: 8 MMOL/L (ref 5–18)
BUN SERPL-MCNC: 7 MG/DL (ref 8–22)
CALCIUM SERPL-MCNC: 9.5 MG/DL (ref 8.5–10.5)
CHLORIDE BLD-SCNC: 102 MMOL/L (ref 98–107)
CO2 SERPL-SCNC: 30 MMOL/L (ref 22–31)
CREAT SERPL-MCNC: 0.68 MG/DL (ref 0.6–1.1)
CREAT UR-MCNC: 106.6 MG/DL
GFR SERPL CREATININE-BSD FRML MDRD: >60 ML/MIN/1.73M2
GLUCOSE BLD-MCNC: 156 MG/DL (ref 70–125)
HBA1C MFR BLD: 7.7 % (ref 3.5–6)
MICROALBUMIN UR-MCNC: 0.57 MG/DL (ref 0–1.99)
MICROALBUMIN/CREAT UR: 5.3 MG/G
POTASSIUM BLD-SCNC: 3.5 MMOL/L (ref 3.5–5)
SODIUM SERPL-SCNC: 140 MMOL/L (ref 136–145)

## 2020-02-04 ENCOUNTER — OFFICE VISIT - HEALTHEAST (OUTPATIENT)
Dept: ENDOCRINOLOGY | Facility: CLINIC | Age: 40
End: 2020-02-04

## 2020-02-04 DIAGNOSIS — E10.65 TYPE 1 DIABETES MELLITUS WITH HYPERGLYCEMIA (H): ICD-10-CM

## 2020-02-04 ASSESSMENT — MIFFLIN-ST. JEOR: SCORE: 1183.42

## 2020-02-12 ENCOUNTER — COMMUNICATION - HEALTHEAST (OUTPATIENT)
Dept: NURSING | Facility: CLINIC | Age: 40
End: 2020-02-12

## 2020-02-17 ENCOUNTER — COMMUNICATION - HEALTHEAST (OUTPATIENT)
Dept: SCHEDULING | Facility: CLINIC | Age: 40
End: 2020-02-17

## 2020-02-24 ENCOUNTER — COMMUNICATION - HEALTHEAST (OUTPATIENT)
Dept: NURSING | Facility: CLINIC | Age: 40
End: 2020-02-24

## 2020-02-26 ENCOUNTER — COMMUNICATION - HEALTHEAST (OUTPATIENT)
Dept: ENDOCRINOLOGY | Facility: CLINIC | Age: 40
End: 2020-02-26

## 2020-02-26 DIAGNOSIS — E10.65 TYPE 1 DIABETES MELLITUS WITH HYPERGLYCEMIA (H): ICD-10-CM

## 2020-03-04 ENCOUNTER — COMMUNICATION - HEALTHEAST (OUTPATIENT)
Dept: ENDOCRINOLOGY | Facility: CLINIC | Age: 40
End: 2020-03-04

## 2020-03-04 DIAGNOSIS — E10.65 TYPE 1 DIABETES MELLITUS WITH HYPERGLYCEMIA (H): ICD-10-CM

## 2020-03-12 ENCOUNTER — COMMUNICATION - HEALTHEAST (OUTPATIENT)
Dept: ENDOCRINOLOGY | Facility: CLINIC | Age: 40
End: 2020-03-12

## 2020-03-12 DIAGNOSIS — E10.65 TYPE 1 DIABETES MELLITUS WITH HYPERGLYCEMIA (H): ICD-10-CM

## 2020-03-15 ENCOUNTER — COMMUNICATION - HEALTHEAST (OUTPATIENT)
Dept: ENDOCRINOLOGY | Facility: CLINIC | Age: 40
End: 2020-03-15

## 2020-03-15 DIAGNOSIS — E10.9 TYPE 1 DIABETES MELLITUS WITHOUT COMPLICATION (H): ICD-10-CM

## 2020-03-17 ENCOUNTER — COMMUNICATION - HEALTHEAST (OUTPATIENT)
Dept: ADMINISTRATIVE | Facility: CLINIC | Age: 40
End: 2020-03-17

## 2020-03-17 DIAGNOSIS — E10.65 TYPE 1 DIABETES MELLITUS WITH HYPERGLYCEMIA (H): ICD-10-CM

## 2020-03-25 ENCOUNTER — COMMUNICATION - HEALTHEAST (OUTPATIENT)
Dept: NURSING | Facility: CLINIC | Age: 40
End: 2020-03-25

## 2020-04-01 ENCOUNTER — AMBULATORY - HEALTHEAST (OUTPATIENT)
Dept: MULTI SPECIALTY CLINIC | Facility: CLINIC | Age: 40
End: 2020-04-01

## 2020-04-01 LAB — PAP SMEAR - HIM PATIENT REPORTED: NORMAL

## 2020-04-08 ENCOUNTER — COMMUNICATION - HEALTHEAST (OUTPATIENT)
Dept: NURSING | Facility: CLINIC | Age: 40
End: 2020-04-08

## 2020-04-16 ENCOUNTER — COMMUNICATION - HEALTHEAST (OUTPATIENT)
Dept: NURSING | Facility: CLINIC | Age: 40
End: 2020-04-16

## 2020-04-28 ENCOUNTER — COMMUNICATION - HEALTHEAST (OUTPATIENT)
Dept: INTERNAL MEDICINE | Facility: CLINIC | Age: 40
End: 2020-04-28

## 2020-04-29 ENCOUNTER — OFFICE VISIT - HEALTHEAST (OUTPATIENT)
Dept: INTERNAL MEDICINE | Facility: CLINIC | Age: 40
End: 2020-04-29

## 2020-04-29 DIAGNOSIS — E10.65 TYPE 1 DIABETES MELLITUS WITH HYPERGLYCEMIA (H): ICD-10-CM

## 2020-04-29 DIAGNOSIS — Z72.0 TOBACCO ABUSE: ICD-10-CM

## 2020-04-29 DIAGNOSIS — E03.9 HYPOTHYROIDISM, UNSPECIFIED TYPE: ICD-10-CM

## 2020-04-29 DIAGNOSIS — K21.9 GASTROESOPHAGEAL REFLUX DISEASE, ESOPHAGITIS PRESENCE NOT SPECIFIED: ICD-10-CM

## 2020-04-29 DIAGNOSIS — F33.41 RECURRENT MAJOR DEPRESSIVE DISORDER, IN PARTIAL REMISSION (H): ICD-10-CM

## 2020-04-29 ASSESSMENT — PATIENT HEALTH QUESTIONNAIRE - PHQ9: SUM OF ALL RESPONSES TO PHQ QUESTIONS 1-9: 9

## 2020-05-01 ENCOUNTER — COMMUNICATION - HEALTHEAST (OUTPATIENT)
Dept: ADMINISTRATIVE | Facility: CLINIC | Age: 40
End: 2020-05-01

## 2020-05-06 ENCOUNTER — OFFICE VISIT - HEALTHEAST (OUTPATIENT)
Dept: EDUCATION SERVICES | Facility: CLINIC | Age: 40
End: 2020-05-06

## 2020-05-06 DIAGNOSIS — E10.65 TYPE 1 DIABETES MELLITUS WITH HYPERGLYCEMIA (H): ICD-10-CM

## 2020-05-13 ENCOUNTER — COMMUNICATION - HEALTHEAST (OUTPATIENT)
Dept: NURSING | Facility: CLINIC | Age: 40
End: 2020-05-13

## 2020-05-28 ENCOUNTER — COMMUNICATION - HEALTHEAST (OUTPATIENT)
Dept: CARE COORDINATION | Facility: CLINIC | Age: 40
End: 2020-05-28

## 2020-06-09 ENCOUNTER — AMBULATORY - HEALTHEAST (OUTPATIENT)
Dept: ENDOCRINOLOGY | Facility: CLINIC | Age: 40
End: 2020-06-09

## 2020-06-09 DIAGNOSIS — E10.65 TYPE 1 DIABETES MELLITUS WITH HYPERGLYCEMIA (H): ICD-10-CM

## 2020-06-15 ENCOUNTER — COMMUNICATION - HEALTHEAST (OUTPATIENT)
Dept: NURSING | Facility: CLINIC | Age: 40
End: 2020-06-15

## 2020-06-29 ENCOUNTER — COMMUNICATION - HEALTHEAST (OUTPATIENT)
Dept: CARE COORDINATION | Facility: CLINIC | Age: 40
End: 2020-06-29

## 2020-07-14 ENCOUNTER — COMMUNICATION - HEALTHEAST (OUTPATIENT)
Dept: NURSING | Facility: CLINIC | Age: 40
End: 2020-07-14

## 2020-07-14 ENCOUNTER — COMMUNICATION - HEALTHEAST (OUTPATIENT)
Dept: CARE COORDINATION | Facility: CLINIC | Age: 40
End: 2020-07-14

## 2020-09-17 ENCOUNTER — COMMUNICATION - HEALTHEAST (OUTPATIENT)
Dept: ENDOCRINOLOGY | Facility: CLINIC | Age: 40
End: 2020-09-17

## 2020-09-17 DIAGNOSIS — E10.65 TYPE 1 DIABETES MELLITUS WITH HYPERGLYCEMIA (H): ICD-10-CM

## 2020-09-24 ENCOUNTER — COMMUNICATION - HEALTHEAST (OUTPATIENT)
Dept: ENDOCRINOLOGY | Facility: CLINIC | Age: 40
End: 2020-09-24

## 2020-09-24 DIAGNOSIS — E10.65 TYPE 1 DIABETES MELLITUS WITH HYPERGLYCEMIA (H): ICD-10-CM

## 2020-09-30 ENCOUNTER — COMMUNICATION - HEALTHEAST (OUTPATIENT)
Dept: ADMINISTRATIVE | Facility: CLINIC | Age: 40
End: 2020-09-30

## 2020-10-18 ENCOUNTER — COMMUNICATION - HEALTHEAST (OUTPATIENT)
Dept: INTERNAL MEDICINE | Facility: CLINIC | Age: 40
End: 2020-10-18

## 2020-10-18 DIAGNOSIS — E03.9 HYPOTHYROIDISM, UNSPECIFIED TYPE: ICD-10-CM

## 2020-10-18 DIAGNOSIS — F33.41 RECURRENT MAJOR DEPRESSIVE DISORDER, IN PARTIAL REMISSION (H): ICD-10-CM

## 2020-11-18 ENCOUNTER — AMBULATORY - HEALTHEAST (OUTPATIENT)
Dept: LAB | Facility: CLINIC | Age: 40
End: 2020-11-18

## 2020-11-18 DIAGNOSIS — E10.65 TYPE 1 DIABETES MELLITUS WITH HYPERGLYCEMIA (H): ICD-10-CM

## 2020-11-18 LAB
ANION GAP SERPL CALCULATED.3IONS-SCNC: 12 MMOL/L (ref 5–18)
BUN SERPL-MCNC: 15 MG/DL (ref 8–22)
CALCIUM SERPL-MCNC: 9.1 MG/DL (ref 8.5–10.5)
CHLORIDE BLD-SCNC: 103 MMOL/L (ref 98–107)
CO2 SERPL-SCNC: 22 MMOL/L (ref 22–31)
CREAT SERPL-MCNC: 0.82 MG/DL (ref 0.6–1.1)
GFR SERPL CREATININE-BSD FRML MDRD: >60 ML/MIN/1.73M2
GLUCOSE BLD-MCNC: 315 MG/DL (ref 70–125)
HBA1C MFR BLD: 9.1 %
LDLC SERPL CALC-MCNC: 115 MG/DL
POTASSIUM BLD-SCNC: 4.1 MMOL/L (ref 3.5–5)
SODIUM SERPL-SCNC: 137 MMOL/L (ref 136–145)
TSH SERPL DL<=0.005 MIU/L-ACNC: 0.43 UIU/ML (ref 0.3–5)

## 2020-11-19 ENCOUNTER — OFFICE VISIT - HEALTHEAST (OUTPATIENT)
Dept: ENDOCRINOLOGY | Facility: CLINIC | Age: 40
End: 2020-11-19

## 2020-11-19 DIAGNOSIS — E10.65 TYPE 1 DIABETES MELLITUS WITH HYPERGLYCEMIA (H): ICD-10-CM

## 2020-11-24 ENCOUNTER — COMMUNICATION - HEALTHEAST (OUTPATIENT)
Dept: ENDOCRINOLOGY | Facility: CLINIC | Age: 40
End: 2020-11-24

## 2020-12-17 ENCOUNTER — COMMUNICATION - HEALTHEAST (OUTPATIENT)
Dept: ENDOCRINOLOGY | Facility: CLINIC | Age: 40
End: 2020-12-17

## 2020-12-17 DIAGNOSIS — E10.65 TYPE 1 DIABETES MELLITUS WITH HYPERGLYCEMIA (H): ICD-10-CM

## 2021-01-06 ENCOUNTER — COMMUNICATION - HEALTHEAST (OUTPATIENT)
Dept: INTERNAL MEDICINE | Facility: CLINIC | Age: 41
End: 2021-01-06

## 2021-01-12 ENCOUNTER — COMMUNICATION - HEALTHEAST (OUTPATIENT)
Dept: ENDOCRINOLOGY | Facility: CLINIC | Age: 41
End: 2021-01-12

## 2021-01-12 ENCOUNTER — COMMUNICATION - HEALTHEAST (OUTPATIENT)
Dept: INTERNAL MEDICINE | Facility: CLINIC | Age: 41
End: 2021-01-12

## 2021-01-12 ENCOUNTER — OFFICE VISIT - HEALTHEAST (OUTPATIENT)
Dept: INTERNAL MEDICINE | Facility: CLINIC | Age: 41
End: 2021-01-12

## 2021-01-12 DIAGNOSIS — M06.4 INFLAMMATORY POLYARTHRITIS (H): ICD-10-CM

## 2021-01-12 DIAGNOSIS — G44.001: ICD-10-CM

## 2021-01-12 DIAGNOSIS — Z72.0 TOBACCO ABUSE: ICD-10-CM

## 2021-01-12 DIAGNOSIS — F33.41 RECURRENT MAJOR DEPRESSIVE DISORDER, IN PARTIAL REMISSION (H): ICD-10-CM

## 2021-01-12 DIAGNOSIS — E10.65 TYPE 1 DIABETES MELLITUS WITH HYPERGLYCEMIA (H): ICD-10-CM

## 2021-01-12 DIAGNOSIS — F15.11 METHAMPHETAMINE ABUSE IN REMISSION (H): ICD-10-CM

## 2021-01-12 ASSESSMENT — ANXIETY QUESTIONNAIRES
6. BECOMING EASILY ANNOYED OR IRRITABLE: SEVERAL DAYS
4. TROUBLE RELAXING: NOT AT ALL
5. BEING SO RESTLESS THAT IT IS HARD TO SIT STILL: SEVERAL DAYS
7. FEELING AFRAID AS IF SOMETHING AWFUL MIGHT HAPPEN: NOT AT ALL
2. NOT BEING ABLE TO STOP OR CONTROL WORRYING: SEVERAL DAYS
3. WORRYING TOO MUCH ABOUT DIFFERENT THINGS: SEVERAL DAYS
GAD7 TOTAL SCORE: 5
1. FEELING NERVOUS, ANXIOUS, OR ON EDGE: SEVERAL DAYS

## 2021-01-12 ASSESSMENT — PATIENT HEALTH QUESTIONNAIRE - PHQ9: SUM OF ALL RESPONSES TO PHQ QUESTIONS 1-9: 11

## 2021-01-22 ENCOUNTER — COMMUNICATION - HEALTHEAST (OUTPATIENT)
Dept: ADMINISTRATIVE | Facility: CLINIC | Age: 41
End: 2021-01-22

## 2021-02-09 ENCOUNTER — COMMUNICATION - HEALTHEAST (OUTPATIENT)
Dept: INTERNAL MEDICINE | Facility: CLINIC | Age: 41
End: 2021-02-09

## 2021-02-09 DIAGNOSIS — E03.9 HYPOTHYROIDISM, UNSPECIFIED TYPE: ICD-10-CM

## 2021-02-18 ENCOUNTER — COMMUNICATION - HEALTHEAST (OUTPATIENT)
Dept: LAB | Facility: CLINIC | Age: 41
End: 2021-02-18

## 2021-02-18 DIAGNOSIS — E10.65 TYPE 1 DIABETES MELLITUS WITH HYPERGLYCEMIA (H): ICD-10-CM

## 2021-02-19 ENCOUNTER — AMBULATORY - HEALTHEAST (OUTPATIENT)
Dept: LAB | Facility: CLINIC | Age: 41
End: 2021-02-19

## 2021-02-19 DIAGNOSIS — E10.65 TYPE 1 DIABETES MELLITUS WITH HYPERGLYCEMIA (H): ICD-10-CM

## 2021-02-19 LAB
CREAT UR-MCNC: 142.6 MG/DL
HBA1C MFR BLD: 8.8 %
MICROALBUMIN UR-MCNC: 3.34 MG/DL (ref 0–1.99)
MICROALBUMIN/CREAT UR: 23.4 MG/G

## 2021-02-22 ENCOUNTER — COMMUNICATION - HEALTHEAST (OUTPATIENT)
Dept: ENDOCRINOLOGY | Facility: CLINIC | Age: 41
End: 2021-02-22

## 2021-02-23 ENCOUNTER — AMBULATORY - HEALTHEAST (OUTPATIENT)
Dept: INTERNAL MEDICINE | Facility: CLINIC | Age: 41
End: 2021-02-23

## 2021-02-23 ENCOUNTER — COMMUNICATION - HEALTHEAST (OUTPATIENT)
Dept: FAMILY MEDICINE | Facility: CLINIC | Age: 41
End: 2021-02-23

## 2021-02-23 DIAGNOSIS — E03.9 HYPOTHYROIDISM, UNSPECIFIED TYPE: ICD-10-CM

## 2021-02-23 DIAGNOSIS — E10.29 TYPE 1 DIABETES MELLITUS WITH MICROALBUMINURIA (H): ICD-10-CM

## 2021-02-23 DIAGNOSIS — R80.9 TYPE 1 DIABETES MELLITUS WITH MICROALBUMINURIA (H): ICD-10-CM

## 2021-02-25 ENCOUNTER — OFFICE VISIT - HEALTHEAST (OUTPATIENT)
Dept: ENDOCRINOLOGY | Facility: CLINIC | Age: 41
End: 2021-02-25

## 2021-02-25 ENCOUNTER — COMMUNICATION - HEALTHEAST (OUTPATIENT)
Dept: ENDOCRINOLOGY | Facility: CLINIC | Age: 41
End: 2021-02-25

## 2021-02-25 DIAGNOSIS — R80.9 TYPE 1 DIABETES MELLITUS WITH MICROALBUMINURIA (H): ICD-10-CM

## 2021-02-25 DIAGNOSIS — E10.29 TYPE 1 DIABETES MELLITUS WITH MICROALBUMINURIA (H): ICD-10-CM

## 2021-02-25 DIAGNOSIS — E10.65 TYPE 1 DIABETES MELLITUS WITH HYPERGLYCEMIA (H): ICD-10-CM

## 2021-03-01 ENCOUNTER — COMMUNICATION - HEALTHEAST (OUTPATIENT)
Dept: ENDOCRINOLOGY | Facility: CLINIC | Age: 41
End: 2021-03-01

## 2021-03-12 ENCOUNTER — COMMUNICATION - HEALTHEAST (OUTPATIENT)
Dept: OTHER | Facility: CLINIC | Age: 41
End: 2021-03-12

## 2021-03-12 ENCOUNTER — COMMUNICATION - HEALTHEAST (OUTPATIENT)
Dept: ADMINISTRATIVE | Facility: CLINIC | Age: 41
End: 2021-03-12

## 2021-03-24 ENCOUNTER — COMMUNICATION - HEALTHEAST (OUTPATIENT)
Dept: OTHER | Facility: CLINIC | Age: 41
End: 2021-03-24

## 2021-04-13 ENCOUNTER — COMMUNICATION - HEALTHEAST (OUTPATIENT)
Dept: ENDOCRINOLOGY | Facility: CLINIC | Age: 41
End: 2021-04-13

## 2021-04-20 ENCOUNTER — OFFICE VISIT - HEALTHEAST (OUTPATIENT)
Dept: FAMILY MEDICINE | Facility: CLINIC | Age: 41
End: 2021-04-20

## 2021-04-20 ENCOUNTER — AMBULATORY - HEALTHEAST (OUTPATIENT)
Dept: FAMILY MEDICINE | Facility: CLINIC | Age: 41
End: 2021-04-20

## 2021-04-20 DIAGNOSIS — J02.9 SORE THROAT: ICD-10-CM

## 2021-04-20 DIAGNOSIS — Z20.822 SUSPECTED COVID-19 VIRUS INFECTION: ICD-10-CM

## 2021-04-20 LAB
DEPRECATED S PYO AG THROAT QL EIA: NORMAL
GROUP A STREP BY PCR: NORMAL

## 2021-04-21 LAB
SARS-COV-2 PCR COMMENT: NORMAL
SARS-COV-2 RNA SPEC QL NAA+PROBE: NEGATIVE
SARS-COV-2 VIRUS SPECIMEN SOURCE: NORMAL

## 2021-04-22 ENCOUNTER — COMMUNICATION - HEALTHEAST (OUTPATIENT)
Dept: SCHEDULING | Facility: CLINIC | Age: 41
End: 2021-04-22

## 2021-04-28 ENCOUNTER — COMMUNICATION - HEALTHEAST (OUTPATIENT)
Dept: ADMINISTRATIVE | Facility: CLINIC | Age: 41
End: 2021-04-28

## 2021-04-29 ENCOUNTER — OFFICE VISIT - HEALTHEAST (OUTPATIENT)
Dept: INTERNAL MEDICINE | Facility: CLINIC | Age: 41
End: 2021-04-29

## 2021-04-29 DIAGNOSIS — D80.2 SELECTIVE IGA IMMUNODEFICIENCY (H): ICD-10-CM

## 2021-04-29 DIAGNOSIS — R53.83 FATIGUE, UNSPECIFIED TYPE: ICD-10-CM

## 2021-04-29 DIAGNOSIS — L91.8 SKIN TAG: ICD-10-CM

## 2021-04-29 DIAGNOSIS — D17.30 LIPOMA OF SKIN AND SUBCUTANEOUS TISSUE: ICD-10-CM

## 2021-04-29 LAB
ERYTHROCYTE [DISTWIDTH] IN BLOOD BY AUTOMATED COUNT: 12.3 % (ref 11–14.5)
HCT VFR BLD AUTO: 40.3 % (ref 35–47)
HGB BLD-MCNC: 13.5 G/DL (ref 12–16)
MCH RBC QN AUTO: 31.2 PG (ref 27–34)
MCHC RBC AUTO-ENTMCNC: 33.5 G/DL (ref 32–36)
MCV RBC AUTO: 93 FL (ref 80–100)
PLATELET # BLD AUTO: 326 THOU/UL (ref 140–440)
PMV BLD AUTO: 10.7 FL (ref 7–10)
RBC # BLD AUTO: 4.33 MILL/UL (ref 3.8–5.4)
TSH SERPL DL<=0.005 MIU/L-ACNC: 0.81 UIU/ML (ref 0.3–5)
WBC: 7.6 THOU/UL (ref 4–11)

## 2021-05-03 ENCOUNTER — COMMUNICATION - HEALTHEAST (OUTPATIENT)
Dept: SCHEDULING | Facility: CLINIC | Age: 41
End: 2021-05-03

## 2021-05-04 ENCOUNTER — AMBULATORY - HEALTHEAST (OUTPATIENT)
Dept: SURGERY | Facility: AMBULATORY SURGERY CENTER | Age: 41
End: 2021-05-04

## 2021-05-04 ENCOUNTER — COMMUNICATION - HEALTHEAST (OUTPATIENT)
Dept: SURGERY | Facility: CLINIC | Age: 41
End: 2021-05-04
Payer: COMMERCIAL

## 2021-05-04 ENCOUNTER — OFFICE VISIT - HEALTHEAST (OUTPATIENT)
Dept: SURGERY | Facility: CLINIC | Age: 41
End: 2021-05-04

## 2021-05-04 DIAGNOSIS — Z11.59 ENCOUNTER FOR SCREENING FOR OTHER VIRAL DISEASES: ICD-10-CM

## 2021-05-04 DIAGNOSIS — R22.9 SKIN MASS: ICD-10-CM

## 2021-05-05 ENCOUNTER — COMMUNICATION - HEALTHEAST (OUTPATIENT)
Dept: INTERNAL MEDICINE | Facility: CLINIC | Age: 41
End: 2021-05-05

## 2021-05-05 DIAGNOSIS — K21.9 GASTROESOPHAGEAL REFLUX DISEASE: ICD-10-CM

## 2021-05-10 ENCOUNTER — AMBULATORY - HEALTHEAST (OUTPATIENT)
Dept: LAB | Facility: CLINIC | Age: 41
End: 2021-05-10

## 2021-05-10 DIAGNOSIS — Z11.59 ENCOUNTER FOR SCREENING FOR OTHER VIRAL DISEASES: ICD-10-CM

## 2021-05-11 ENCOUNTER — COMMUNICATION - HEALTHEAST (OUTPATIENT)
Dept: SCHEDULING | Facility: CLINIC | Age: 41
End: 2021-05-11

## 2021-05-12 ENCOUNTER — ANESTHESIA - HEALTHEAST (OUTPATIENT)
Dept: SURGERY | Facility: AMBULATORY SURGERY CENTER | Age: 41
End: 2021-05-12

## 2021-05-12 ENCOUNTER — COMMUNICATION - HEALTHEAST (OUTPATIENT)
Dept: SCHEDULING | Facility: CLINIC | Age: 41
End: 2021-05-12

## 2021-05-12 ASSESSMENT — MIFFLIN-ST. JEOR
SCORE: 1257.36
SCORE: 1262.36

## 2021-05-13 ENCOUNTER — HOSPITAL ENCOUNTER (OUTPATIENT)
Dept: SURGERY | Facility: AMBULATORY SURGERY CENTER | Age: 41
Discharge: HOME | End: 2021-05-13
Attending: SURGERY | Admitting: SURGERY
Payer: COMMERCIAL

## 2021-05-13 ENCOUNTER — SURGERY - HEALTHEAST (OUTPATIENT)
Dept: SURGERY | Facility: AMBULATORY SURGERY CENTER | Age: 41
End: 2021-05-13

## 2021-05-13 DIAGNOSIS — R22.9 SKIN MASS: ICD-10-CM

## 2021-05-13 LAB
DIPSTICK EXPIRATION DATE - HISTORICAL: NORMAL
DIPSTICK LOT NUMBER - HISTORICAL: NORMAL
GLUCOSE BLDC GLUCOMTR-MCNC: 226 MG/DL (ref 70–125)
Lab: 210
POC PREG URINE (HCG) HE - HISTORICAL: NEGATIVE
POC SPECIFIC GRAVITY, URINE - HISTORICAL: NORMAL
POCT KIT EXPIRATION DATE - HISTORICAL: NORMAL
POCT KIT LOT NUMBER HE - HISTORICAL: NORMAL
POCT NEGATIVE CONTROL HE - HISTORICAL: NORMAL
POCT POSITIVE CONTROL HE - HISTORICAL: NORMAL

## 2021-05-13 ASSESSMENT — MIFFLIN-ST. JEOR
SCORE: 1257.36
SCORE: 1262.36

## 2021-05-20 ENCOUNTER — COMMUNICATION - HEALTHEAST (OUTPATIENT)
Dept: ENDOCRINOLOGY | Facility: CLINIC | Age: 41
End: 2021-05-20

## 2021-05-20 ENCOUNTER — AMBULATORY - HEALTHEAST (OUTPATIENT)
Dept: LAB | Facility: CLINIC | Age: 41
End: 2021-05-20

## 2021-05-20 DIAGNOSIS — E10.65 TYPE 1 DIABETES MELLITUS WITH HYPERGLYCEMIA (H): ICD-10-CM

## 2021-05-20 DIAGNOSIS — R80.9 TYPE 1 DIABETES MELLITUS WITH MICROALBUMINURIA (H): ICD-10-CM

## 2021-05-20 DIAGNOSIS — E10.29 TYPE 1 DIABETES MELLITUS WITH MICROALBUMINURIA (H): ICD-10-CM

## 2021-05-20 LAB
ANION GAP SERPL CALCULATED.3IONS-SCNC: 11 MMOL/L (ref 5–18)
BUN SERPL-MCNC: 17 MG/DL (ref 8–22)
CALCIUM SERPL-MCNC: 8.6 MG/DL (ref 8.5–10.5)
CHLORIDE BLD-SCNC: 98 MMOL/L (ref 98–107)
CO2 SERPL-SCNC: 25 MMOL/L (ref 22–31)
CREAT SERPL-MCNC: 0.78 MG/DL (ref 0.6–1.1)
GFR SERPL CREATININE-BSD FRML MDRD: >60 ML/MIN/1.73M2
GLUCOSE BLD-MCNC: 258 MG/DL (ref 70–125)
HBA1C MFR BLD: 8.9 %
POTASSIUM BLD-SCNC: 4.3 MMOL/L (ref 3.5–5)
SODIUM SERPL-SCNC: 134 MMOL/L (ref 136–145)

## 2021-05-27 ENCOUNTER — OFFICE VISIT - HEALTHEAST (OUTPATIENT)
Dept: ENDOCRINOLOGY | Facility: CLINIC | Age: 41
End: 2021-05-27

## 2021-05-27 DIAGNOSIS — R80.9 TYPE 1 DIABETES MELLITUS WITH MICROALBUMINURIA (H): ICD-10-CM

## 2021-05-27 DIAGNOSIS — E10.29 TYPE 1 DIABETES MELLITUS WITH MICROALBUMINURIA (H): ICD-10-CM

## 2021-05-29 ENCOUNTER — RECORDS - HEALTHEAST (OUTPATIENT)
Dept: ADMINISTRATIVE | Facility: CLINIC | Age: 41
End: 2021-05-29

## 2021-05-30 ENCOUNTER — HEALTH MAINTENANCE LETTER (OUTPATIENT)
Age: 41
End: 2021-05-30

## 2021-06-09 ENCOUNTER — COMMUNICATION - HEALTHEAST (OUTPATIENT)
Dept: INTERNAL MEDICINE | Facility: CLINIC | Age: 41
End: 2021-06-09

## 2021-06-14 ENCOUNTER — OFFICE VISIT - HEALTHEAST (OUTPATIENT)
Dept: INTERNAL MEDICINE | Facility: CLINIC | Age: 41
End: 2021-06-14

## 2021-06-14 DIAGNOSIS — J31.0 RHINITIS, UNSPECIFIED TYPE: ICD-10-CM

## 2021-06-14 DIAGNOSIS — M54.2 CERVICAL PAIN (NECK): ICD-10-CM

## 2021-06-14 DIAGNOSIS — R06.83 SNORING: ICD-10-CM

## 2021-06-14 DIAGNOSIS — M54.50 ACUTE BILATERAL LOW BACK PAIN WITHOUT SCIATICA: ICD-10-CM

## 2021-06-17 ENCOUNTER — COMMUNICATION - HEALTHEAST (OUTPATIENT)
Dept: ENDOCRINOLOGY | Facility: CLINIC | Age: 41
End: 2021-06-17

## 2021-06-17 DIAGNOSIS — E10.65 TYPE 1 DIABETES MELLITUS WITH HYPERGLYCEMIA (H): ICD-10-CM

## 2021-07-01 ENCOUNTER — OFFICE VISIT - HEALTHEAST (OUTPATIENT)
Dept: EDUCATION SERVICES | Facility: CLINIC | Age: 41
End: 2021-07-01

## 2021-07-02 ENCOUNTER — COMMUNICATION - HEALTHEAST (OUTPATIENT)
Dept: INTERNAL MEDICINE | Facility: CLINIC | Age: 41
End: 2021-07-02

## 2021-07-02 DIAGNOSIS — R06.2 WHEEZING: ICD-10-CM

## 2021-07-04 NOTE — TELEPHONE ENCOUNTER
Telephone Encounter by Chica King LPN at 7/2/2021 12:08 PM     Author: Chica King LPN Service: -- Author Type: Licensed Nurse    Filed: 7/2/2021 12:09 PM Encounter Date: 7/2/2021 Status: Signed    : Chica King LPN (Licensed Nurse)         Last Office Visit  6/14/2021 Denia Llamas FNP  Notes:  Problem List Items Addressed This Visit           Low back pain - Primary        Start nabumetone and methocarbamol for low back pain.  Consider PT referral if pain is not improving.  We also discussed doing an MRI if symptoms persist beyond 4 to 6 weeks.            Relevant Medications      nabumetone (RELAFEN) 500 MG tablet      methocarbamoL (ROBAXIN) 500 MG tablet                Other Visit Diagnoses      Rhinitis, unspecified type         Relevant Medications     cetirizine (ZYRTEC) 10 MG tablet     Cervical pain (neck)         Relevant Medications     nabumetone (RELAFEN) 500 MG tablet     methocarbamoL (ROBAXIN) 500 MG tablet     Snoring         She does not have sufficient symptoms to have a high suspicion for sleep apnea.              Last Filled:  albuterol (PROAIR HFA;PROVENTIL HFA;VENTOLIN HFA) 90 mcg/actuation inhaler 18 g 0 1/23/2020  No   Sig: INHALE 2 PUFFS BY MOUTH EVERY 6 HOURS AS NEEDED FOR WHEEZING   Sent to pharmacy as: albuterol sulfate HFA 90 mcg/actuation aerosol inhaler (PROAIR HFA;PROVENTIL HFA;VENTOLIN HFA)   Notes to Pharmacy: Patient is due for physical before next refill due.  Call 24/7 to schedule   E-Prescribing Status: Receipt confirmed by pharmacy (1/23/2020 10:25 AM CST)       Next OV:  Visit date not found        Medication teed up for provider signature

## 2021-07-05 ENCOUNTER — COMMUNICATION - HEALTHEAST (OUTPATIENT)
Dept: INTERNAL MEDICINE | Facility: CLINIC | Age: 41
End: 2021-07-05

## 2021-07-05 NOTE — TELEPHONE ENCOUNTER
Telephone Encounter by Samantha Carbajal at 7/5/2021  3:50 PM     Author: Samantha Carbajal Service: -- Author Type: --    Filed: 7/5/2021  3:53 PM Encounter Date: 7/5/2021 Status: Signed    : Samantha Carbajal - [facility]    Checking on status of Prior Auth for Synthroid that was faxed to  (clinic fax) on 06/29/21    Requesting call back.    This is a call center, asking that Care Team requests to speak to Nayely Reyes at

## 2021-07-06 NOTE — TELEPHONE ENCOUNTER
Telephone Encounter by Chica King LPN at 7/6/2021  3:12 PM     Author: Chica King LPN Service: -- Author Type: Licensed Nurse    Filed: 7/6/2021  3:13 PM Encounter Date: 7/5/2021 Status: Signed    : Chica King LPN (Licensed Nurse)       Called and spoke with Nayely,    She is going to fax this form to [phone]

## 2021-07-06 NOTE — TELEPHONE ENCOUNTER
Telephone Encounter by Denia Llamas FNP at 7/6/2021  1:41 PM     Author: Denia Llamas FNP Service: -- Author Type: Nurse Practitioner    Filed: 7/6/2021  1:41 PM Encounter Date: 7/5/2021 Status: Signed    : Denia Llamas FNP (Nurse Practitioner)       I don't recall receiving a PA form. Can it be re-faxed?

## 2021-07-08 ENCOUNTER — COMMUNICATION - HEALTHEAST (OUTPATIENT)
Dept: INTERNAL MEDICINE | Facility: CLINIC | Age: 41
End: 2021-07-08

## 2021-07-08 ENCOUNTER — COMMUNICATION - HEALTHEAST (OUTPATIENT)
Dept: ENDOCRINOLOGY | Facility: CLINIC | Age: 41
End: 2021-07-08

## 2021-07-08 DIAGNOSIS — R80.9 TYPE 1 DIABETES MELLITUS WITH MICROALBUMINURIA (H): ICD-10-CM

## 2021-07-08 DIAGNOSIS — E10.29 TYPE 1 DIABETES MELLITUS WITH MICROALBUMINURIA (H): ICD-10-CM

## 2021-07-08 NOTE — TELEPHONE ENCOUNTER
Telephone Encounter by Chica King LPN at 7/8/2021  3:54 PM     Author: Chica King LPN Service: -- Author Type: Licensed Nurse    Filed: 7/8/2021  3:56 PM Encounter Date: 7/5/2021 Status: Signed    : Chica King LPN (Licensed Nurse)       Prior Authorization Request  Whos requesting:  Pharmacy  Pharmacy Name and Location: [facility] [phone]  Medication Name: Synthroid 125 MCG  Insurance Plan: [employer]  Insurance Member ID Number:  [id number]  CoverMyMeds Key: N/A  Informed patient that prior authorizations can take up to 10 business days for response:   Yes  Okay to leave a detailed message: Yes

## 2021-07-08 NOTE — TELEPHONE ENCOUNTER
Telephone Encounter by Herminia Wilson at 7/8/2021  4:29 PM     Author: Herminia iWlson Service: -- Author Type: Patient Access    Filed: 7/8/2021  4:35 PM Encounter Date: 7/5/2021 Status: Signed    : Herminia Wilson (Patient Access)       Central PA team  [phone]  Pool: HE PA MED ([id number])          PA has been initiated.       PA form completed and faxed insurance via Cover My Meds     Sanchez:  [employer] PERDUE (Sanchez: [id number])     Medication:  SYNTHROID 125 MCG    Insurance:  [employer]        Response will be received via fax and may take up to 5-10 business days depending on plan

## 2021-07-08 NOTE — TELEPHONE ENCOUNTER
Telephone Encounter by Eber Yeh at 7/8/2021  1:46 PM     Author: Eber Yeh Service: -- Author Type: Patient Access    Filed: 7/8/2021  1:47 PM Encounter Date: 7/5/2021 Status: Signed    : Eber Yeh (Patient Access)       Nayely calling from [facility] to confirm that form faxed on 7/6/21 was received and to check status of PA request.  Please call her back with an update at [phone].

## 2021-07-08 NOTE — TELEPHONE ENCOUNTER
Telephone Encounter by Herminia Wilson at 7/8/2021  4:34 PM     Author: Herminia Wilson Service: -- Author Type: Patient Access    Filed: 7/8/2021  4:35 PM Encounter Date: 7/5/2021 Status: Signed    : Herminia Wilson (Patient Access)

## 2021-07-09 NOTE — TELEPHONE ENCOUNTER
Telephone Encounter by Herminia Wilson at 7/9/2021  9:07 AM     Author: Herminia Wilson Service: -- Author Type: Patient Access    Filed: 7/9/2021  9:08 AM Encounter Date: 7/5/2021 Status: Signed    : Herminia Wilson (Patient Access)       Answered clinical questions and sent for electronic review at 9:07 AM

## 2021-07-16 NOTE — H&P
lpadfr3\clpadl0\clpadfl3\clpadb0\clpadfb3\ylbqb0557  \clvertalt\clcbpat6\clpadt0\clpadft3\clpadr0\clpadfr3\clpadl0\clpadfl3\clpadb0\clpadfb3  \sdmvu7092\clvertalt\clcbpat6\clpadt0\clpadft3\clpadr0\clpadfr3\clpadl0\clpadfl3  \clpadb0\clpadfb3\pusfh2108\pard\intbl\ssparaaux0\s0\ri90\ql\plain\f0\fs22\plain  \f1\fs18\uces7703\hich\f1\dbch\f1\loch\f1\cf5\fs18\protect0 Result\plain\f1\fs20  \jiks9570\hich\f1\dbch\f1\loch\f1\cf1\fs20\protect0\cell\pard\intbl\ssparaaux0\s0  \ri90\ql\plain\f0\fs22\plain\f1\fs18\blqy8681\hich\f1\dbch\f1\loch\f1\cf5\fs18\protect0   Value\plain\f1\fs20\eusg7525\hich\f1\dbch\f1\loch\f1\cf1\fs20\protect0\cell\pard  \intbl\ssparaaux0\s0\ri90\ql\plain\f0\fs22\plain\f1\fs18\kydx6029\hich\f1\dbch\f1  \loch\f1\cf5\fs18\protect0 Ref Range\plain\f1\fs20\kkkg3725\hich\f1\dbch\f1\loch  \f1\cf1\fs20\protect0\cell\intbl\row\trowd\trgaph0\trpaddl0\trpaddfl3\trpaddr0\trpaddfr3  \trpaddt0\trpaddft3\trpaddb0\trpaddfb3\trleft0\clvertalt\clpadt0\clpadft3\clpadr0  \clpadfr3\clpadl0\clpadfl3\clpadb0\clpadfb3\eedqf042\clvertalt\clpadt0\clpadft3  \clpadr0\clpadfr3\clpadl0\clpadfl3\clpadb0\clpadfb3\ritiv4953\clvertalt\clpadt0  \clpadft3\clpadr0\clpadfr3\clpadl0\clpadfl3\clpadb0\clpadfb3\tncup2345\clvertalt  \clpadt0\clpadft3\clpadr0\clpadfr3\clpadl0\clpadfl3\clpadb0\clpadfb3\durmq9686\pard  \intbl\ssparaaux0\s0\qc\plain\f0\fs22\plain\f1\fs20\dadx0056\hich\f1\dbch\f1\loch  \f1\cf1\fs20\protect0\cell\pard\intbl\ssparaaux0\s0\ri90\ql\plain\f0\fs22\plain  \f1\fs22\jcds4851\hich\f1\dbch\f1\loch\f1\cf1\fs22\protect0 Lactic Acid\plain\f1  \fs20\cozz7171\hich\f1\dbch\f1\loch\f1\cf1\fs20\protect0\cell\pard\intbl\ssparaaux0  \s0\ri90\ql\plain\f0\fs22\plain\f1\fs22\dmfu6874\hich\f1\dbch\f1\loch\f1\cf1\fs22\protect0   2.0\plain\f1\fs20\vbrs1544\hich\f1\dbch\f1\loch\f1\cf1\fs20\protect0\cell\pard\intbl  \ssparaaux0\s0\ri90\ql\plain\f0\fs22\plain\f1\fs22\flgo8623\hich\f1\dbch\f1\loch  \f1\cf1\fs22\protect0 0.5 - 2.2  mmol/L\plain\f1\fs20\cozu5278\hich\f1\dbch\f1\loch  \f1\cf1\fs20\protect0\cell\intbl\row\trowd\trgaph0\trpaddl0\trpaddfl3\trpaddr0\trpaddfr3  \trpaddt0\trpaddft3\trpaddb0\trpaddfb3\trleft0\clvertalt\clpadt0\clpadft3\clpadr0  \clpadfr3\clpadl0\clpadfl3\clpadb0\clpadfb3\xgqbr9378\pard\intbl\ssparaaux0\s0\ri90  \ql\plain\f0\fs22\plain\f1\fs18\xxab5677\hich\f1\dbch\f1\loch\f1\cf5\fs18\b\protect0   POCT Glucose\plain\f1\fs20\butf5920\hich\f1\dbch\f1\loch\f1\cf1\fs20\protect0\cell  \intbl\row\trowd\trgaph0\trpaddl0\trpaddfl3\trpaddr0\trpaddfr3\trpaddt0\trpaddft3  \trpaddb0\trpaddfb3\trleft0\clvertalt\clpadt0\clpadft3\clpadr0\clpadfr3\clpadl0  \clpadfl3\clpadb0\clpadfb3\fllzj662\clvertalt\clpadt0\clpadft3\clpadr0\clpadfr3  \clpadl0\clpadfl3\clpadb0\clpadfb3\strcl7824\pard\intbl\ssparaaux0\s0\qc\plain\f0  \fs22\plain\f1\fs20\quya7016\hich\f1\dbch\f1\loch\f1\cf1\fs20\protect0\cell\pard  \intbl\ssparaaux0\s0\ri90\ql\plain\f0\fs22\plain\f1\fs22\qiwk3740\hich\f1\dbch\f1  \loch\f1\cf1\fs22\protect0 Collection Time: 12/18/19  1:48 PM\plain\f1\fs20\ufrt8156  \hich\f1\dbch\f1\loch\f1\cf1\fs20\protect0\cell\intbl\row\trowd\trgaph0\trpaddl0  \trpaddfl3\trpaddr0\trpaddfr3\trpaddt0\trpaddft3\trpaddb0\trpaddfb3\trleft0\clvertalt  \clcbpat6\clpadt0\clpadft3\clpadr0\clpadfr3\clpadl0\clpadfl3\clpadb0\clpadfb3\pfpcr0737  \clvertalt\clcbpat6\clpadt0\clpadft3\clpadr0\clpadfr3\clpadl0\clpadfl3\clpadb0\clpadfb3  \oxrzj6559\clvertalt\clcbpat6\clpadt0\clpadft3\clpadr0\clpadfr3\clpadl0\clpadfl3  \clpadb0\clpadfb3\lnews7894\pard\intbl\ssparaaux0\s0\ri90\ql\plain\f0\fs22\plain  \f1\fs18\mgez0469\hich\f1\dbch\f1\loch\f1\cf5\fs18\protect0 Result\plain\f1\fs20  \fbfh4555\hich\f1\dbch\f1\loch\f1\cf1\fs20\protect0\cell\pard\intbl\ssparaaux0\s0  \ri90\ql\plain\f0\fs22\plain\f1\fs18\itxx7861\hich\f1\dbch\f1\loch\f1\cf5\fs18\protect0    Value\plain\f1\fs20\mohe5002\hich\f1\dbch\f1\loch\f1\cf1\fs20\protect0\cell\pard  \intbl\ssparaaux0\s0\ri90\ql\plain\f0\fs22\plain\f1\fs18\xlro8815\hich\f1\dbch\f1  \loch\f1\cf5\fs18\protect0 Ref Range\plain\f1\fs20\yhkq4209\hich\f1\dbch\f1\loch  \f1\cf1\fs20\protect0\cell\intbl\row\trowd\trgaph0\lastrow\trpaddl0\trpaddfl3\trpaddr0  \trpaddfr3\trpaddt0\trpaddft3\trpaddb0\trpaddfb3\trleft0\clvertalt\clpadt0\clpadft3  \clpadr0\clpadfr3\clpadl0\clpadfl3\clpadb0\clpadfb3\mdopf774\clvertalt\clpadt0\clpadft3  \clpadr0\clpadfr3\clpadl0\clpadfl3\clpadb0\clpadfb3\znffu5392\clvertalt\clpadt0  \clpadft3\clpadr0\clpadfr3\clpadl0\clpadfl3\clpadb0\clpadfb3\zbuqr1900\clvertalt  \clpadt0\clpadft3\clpadr0\clpadfr3\clpadl0\clpadfl3\clpadb0\clpadfb3\zyvsu1864\pard  \intbl\ssparaaux0\s0\qc\plain\f0\fs22\plain\f1\fs20\vvtb7354\hich\f1\dbch\f1\loch  \f1\cf1\fs20\protect0\cell\pard\intbl\ssparaaux0\s0\ri90\ql\plain\f0\fs22\plain  \f1\fs22\iqza5360\hich\f1\dbch\f1\loch\f1\cf1\fs22\protect0 Glucose\plain\f1\fs20  \mtzh6201\hich\f1\dbch\f1\loch\f1\cf1\fs20\protect0\cell\pard\intbl\ssparaaux0\s0  \ri90\ql\plain\f0\fs22\plain\f1\fs22\sbvf4845\hich\f1\dbch\f1\loch\f1\cf1\fs22\protect0   330 (H)\plain\f1\fs20\oykj8146\hich\f1\dbch\f1\loch\f1\cf1\fs20\protect0\cell\pard  \intbl\ssparaaux0\s0\ri90\ql\plain\f0\fs22\plain\f1\fs22\myoh3184\hich\f1\dbch\f1  \loch\f1\cf1\fs22\protect0 70 - 139 mg/dL\plain\f1\fs20\jwki2955\hich\f1\dbch\f1  \loch\f1\cf1\fs20\protect0\cell\intbl\row\pard\plain\f0\fs22\plain\f1\fs22\hgju3443  \hich\f1\dbch\f1\loch\f1\cf1\fs22\protect0\protect0 \plain\f1\fs22\par\par\plain  \f1\fs22\tupt8289\hich\f1\dbch\f1\loch\f1\cf1\fs22\b\ul Pertinent Radiology\par\plain\f1\fs22   Radiology Results: personally reviewed\par\par EKG Results: Personally reviewed  \par\par\plain\f1\fs22\gmne8219\hich\f1\dbch\f1\loch\f1\cf1\fs22\b\par\par Advanced   Care  Planning\par\plain\f1\fs22\glcn0136\hich\f1\dbch\f1\loch\f1\cf1\fs22\b\ul\par  \par\plain\f1\fs22  \plain\f1\fs22\dxaw2156\hich\f1\dbch\f1\loch\f1\cf1\fs22\protect\protect0   \plain\f1\fs22\untw6992\hich\f1\dbch\f1\loch\f1\cf1\fs22\protect0 [place]  \plain\f1\fs22\xrcn1254\hich\f1\dbch\f1\loch\f1\cf1\fs22\protect0\protect0 \plain\f1\fs22    M.D \par\par\ql\plain\f0\fs22\plain\f1\fs22\par}  lpadfr3\clpadl0\clpadfl3\clpadb0\clpadfb3\xxlwa9455  \clvertalt\clcbpat6\clpadt0\clpadft3\clpadr0\clpadfr3\clpadl0\clpadfl3\clpadb0\clpadfb3  \udlfs6257\clvertalt\clcbpat6\clpadt0\clpadft3\clpadr0\clpadfr3\clpadl0\clpadfl3  \clpadb0\clpadfb3\jwmvv5451\pard\intbl\ssparaaux0\s0\ri90\ql\plain\f0\fs22\plain  \f1\fs18\mzjz9049\hich\f1\dbch\f1\loch\f1\cf5\fs18\protect0 Result\plain\f1\fs20  \fuje3047\hich\f1\dbch\f1\loch\f1\cf1\fs20\protect0\cell\pard\intbl\ssparaaux0\s0  \ri90\ql\plain\f0\fs22\plain\f1\fs18\cxjc2346\hich\f1\dbch\f1\loch\f1\cf5\fs18\protect0   Value\plain\f1\fs20\afym7331\hich\f1\dbch\f1\loch\f1\cf1\fs20\protect0\cell\pard  \intbl\ssparaaux0\s0\ri90\ql\plain\f0\fs22\plain\f1\fs18\kxrw1806\hich\f1\dbch\f1  \loch\f1\cf5\fs18\protect0 Ref Range\plain\f1\fs20\ickt0650\hich\f1\dbch\f1\loch  \f1\cf1\fs20\protect0\cell\intbl\row\trowd\trgaph0\trpaddl0\trpaddfl3\trpaddr0\trpaddfr3  \trpaddt0\trpaddft3\trpaddb0\trpaddfb3\trleft0\clvertalt\clpadt0\clpadft3\clpadr0  \clpadfr3\clpadl0\clpadfl3\clpadb0\clpadfb3\ddikk230\clvertalt\clpadt0\clpadft3  \clpadr0\clpadfr3\clpadl0\clpadfl3\clpadb0\clpadfb3\mxzun3486\clvertalt\clpadt0  \clpadft3\clpadr0\clpadfr3\clpadl0\clpadfl3\clpadb0\clpadfb3\yyfdd5765\clvertalt  \clpadt0\clpadft3\clpadr0\clpadfr3\clpadl0\clpadfl3\clpadb0\clpadfb3\omblj4142\pard  \intbl\ssparaaux0\s0\qc\plain\f0\fs22\plain\f1\fs20\rlzr4345\hich\f1\dbch\f1\loch  \f1\cf1\fs20\protect0\cell\pard\intbl\ssparaaux0\s0\ri90\ql\plain\f0\fs22\plain  \f1\fs22\uwne5379\hich\f1\dbch\f1\loch\f1\cf1\fs22\protect0 Lactic  Acid\plain\f1  \fs20\pwjl7933\hich\f1\dbch\f1\loch\f1\cf1\fs20\protect0\cell\pard\intbl\ssparaaux0  \s0\ri90\ql\plain\f0\fs22\plain\f1\fs22\vppm0295\hich\f1\dbch\f1\loch\f1\cf1\fs22\protect0   2.0\plain\f1\fs20\fnld5267\hich\f1\dbch\f1\loch\f1\cf1\fs20\protect0\cell\pard\intbl  \ssparaaux0\s0\ri90\ql\plain\f0\fs22\plain\f1\fs22\xedd2475\hich\f1\dbch\f1\loch  \f1\cf1\fs22\protect0 0.5 - 2.2 mmol/L\plain\f1\fs20\wsui1344\hich\f1\dbch\f1\loch  \f1\cf1\fs20\protect0\cell\intbl\row\trowd\trgaph0\trpaddl0\trpaddfl3\trpaddr0\trpaddfr3  \trpaddt0\trpaddft3\trpaddb0\trpaddfb3\trleft0\clvertalt\clpadt0\clpadft3\clpadr0  \clpadfr3\clpadl0\clpadfl3\clpadb0\clpadfb3\ffpwy9084\pard\intbl\ssparaaux0\s0\ri90  \ql\plain\f0\fs22\plain\f1\fs18\mank5115\hich\f1\dbch\f1\loch\f1\cf5\fs18\b\protect0   POCT Glucose\plain\f1\fs20\bomw1546\hich\f1\dbch\f1\loch\f1\cf1\fs20\protect0\cell  \intbl\row\trowd\trgaph0\trpaddl0\trpaddfl3\trpaddr0\trpaddfr3\trpaddt0\trpaddft3  \trpaddb0\trpaddfb3\trleft0\clvertalt\clpadt0\clpadft3\clpadr0\clpadfr3\clpadl0  \clpadfl3\clpadb0\clpadfb3\nldnz881\clvertalt\clpadt0\clpadft3\clpadr0\clpadfr3  \clpadl0\clpadfl3\clpadb0\clpadfb3\eaomw8393\pard\intbl\ssparaaux0\s0\qc\plain\f0  \fs22\plain\f1\fs20\bwbw5590\hich\f1\dbch\f1\loch\f1\cf1\fs20\protect0\cell\pard  \intbl\ssparaaux0\s0\ri90\ql\plain\f0\fs22\plain\f1\fs22\nqqi6285\hich\f1\dbch\f1  \loch\f1\cf1\fs22\protect0 Collection Time: 12/18/19  1:48  PM\plain\f1\fs20\tcag9753  \hich\f1\dbch\f1\loch\f1\cf1\fs20\protect0\cell\intbl\row\trowd\trgaph0\trpaddl0  \trpaddfl3\trpaddr0\trpaddfr3\trpaddt0\trpaddft3\trpaddb0\trpaddfb3\trleft0\clvertalt  \clcbpat6\clpadt0\clpadft3\clpadr0\clpadfr3\clpadl0\clpadfl3\clpadb0\clpadfb3\hfnyh1938  \clvertalt\clcbpat6\clpadt0\clpadft3\clpadr0\clpadfr3\clpadl0\clpadfl3\clpadb0\clpadfb3  \hhhjy8505\clvertalt\clcbpat6\clpadt0\clpadft3\clpadr0\clpadfr3\clpadl0\clpadfl3  \clpadb0\clpadfb3\upmnd2161\pard\intbl\ssparaaux0\s0\ri90\ql\plain\f0\fs22\plain  \f1\fs18\xhyh9075\hich\f1\dbch\f1\loch\f1\cf5\fs18\protect0 Result\plain\f1\fs20  \chbm5002\hich\f1\dbch\f1\loch\f1\cf1\fs20\protect0\cell\pard\intbl\ssparaaux0\s0  \ri90\ql\plain\f0\fs22\plain\f1\fs18\qumr7563\hich\f1\dbch\f1\loch\f1\cf5\fs18\protect0   Value\plain\f1\fs20\zmdu2005\hich\f1\dbch\f1\loch\f1\cf1\fs20\protect0\cell\pard  \intbl\ssparaaux0\s0\ri90\ql\plain\f0\fs22\plain\f1\fs18\xsyz9826\hich\f1\dbch\f1  \loch\f1\cf5\fs18\protect0 Ref Range\plain\f1\fs20\idao6975\hich\f1\dbch\f1\loch  \f1\cf1\fs20\protect0\cell\intbl\row\trowd\trgaph0\lastrow\trpaddl0\trpaddfl3\trpaddr0  \trpaddfr3\trpaddt0\trpaddft3\trpaddb0\trpaddfb3\trleft0\clvertalt\clpadt0\clpadft3  \clpadr0\clpadfr3\clpadl0\clpadfl3\clpadb0\clpadfb3\umzoo001\clvertalt\clpadt0\clpadft3  \clpadr0\clpadfr3\clpadl0\clpadfl3\clpadb0\clpadfb3\pdcmh6728\clvertalt\clpadt0  \clpadft3\clpadr0\clpadfr3\clpadl0\clpadfl3\clpadb0\clpadfb3\iqlnp5214\clvertalt  \clpadt0\clpadft3\clpadr0\clpadfr3\clpadl0\clpadfl3\clpadb0\clpadfb3\jqxil8614\pard  \intbl\ssparaaux0\s0\qc\plain\f0\fs22\plain\f1\fs20\ksbz2047\hich\f1\dbch\f1\loch  \f1\cf1\fs20\protect0\cell\pard\intbl\ssparaaux0\s0\ri90\ql\plain\f0\fs22\plain  \f1\fs22\otki0025\hich\f1\dbch\f1\loch\f1\cf1\fs22\protect0  Glucose\plain\f1\fs20  \jpfv1168\hich\f1\dbch\f1\loch\f1\cf1\fs20\protect0\cell\pard\intbl\ssparaaux0\s0  \ri90\ql\plain\f0\fs22\plain\f1\fs22\khch5303\hich\f1\dbch\f1\loch\f1\cf1\fs22\protect0   330 (H)\plain\f1\fs20\qezk0303\hich\f1\dbch\f1\loch\f1\cf1\fs20\protect0\cell\pard  \intbl\ssparaaux0\s0\ri90\ql\plain\f0\fs22\plain\f1\fs22\rpai6073\hich\f1\dbch\f1  \loch\f1\cf1\fs22\protect0 70 - 139 mg/dL\plain\f1\fs20\ihkb4857\hich\f1\dbch\f1  \loch\f1\cf1\fs20\protect0\cell\intbl\row\pard\plain\f0\fs22\plain\f1\fs22\ptft0097  \hich\f1\dbch\f1\loch\f1\cf1\fs22\protect0\protect0 \plain\f1\fs22\par\par\plain  \f1\fs22\wozk4387\hich\f1\dbch\f1\loch\f1\cf1\fs22\b\ul Pertinent Radiology\par\plain\f1\fs22   Radiology Results: personally reviewed\par\par EKG Results: Personally reviewed  \par\par\plain\f1\fs22\nldr5017\hich\f1\dbch\f1\loch\f1\cf1\fs22\b\par\par Advanced   Care Planning\par\plain\f1\fs22\aesq9995\hich\f1\dbch\f1\loch\f1\cf1\fs22\b\ul\par  \par\plain\f1\fs22  \plain\f1\fs22\twpb5963\hich\f1\dbch\f1\loch\f1\cf1\fs22\protect\protect0   \plain\f1\fs22\xoyk4333\hich\f1\dbch\f1\loch\f1\cf1\fs22\protect0 [place]  \plain\f1\fs22\lrcu3852\hich\f1\dbch\f1\loch\f1\cf1\fs22\protect0\protect0 \plain\f1\fs22    M.D \par\par\ql\plain\f0\fs22\plain\f1\fs22\par}

## 2021-07-16 NOTE — ED PROVIDER NOTES
EEE  EEEEEEEEEEEEEEEEEEEprotect0   12.0 - 16.0 g/dL\plain\f1\fs20\pglt6167\hich\f1\dbch\f1\loch\f1\cf1\fs20\protect0  \cell\intbl\row\pard\intbl\ssparaaux0\s0\qc\plain\f0\fs22\plain\f1\fs20\bmdz9608  \hich\f1\dbch\f1\loch\f1\cf1\fs20\protect0\cell\pard\intbl\ssparaaux0\s0\ri90\ql  \plain\f0\fs22\plain\f1\fs22\kaez5149\hich\f1\dbch\f1\loch\f1\cf1\fs22\protect0   Hematocrit\plain\f1\fs20\aavm4722\hich\f1\dbch\f1\loch\f1\cf1\fs20\protect0\cell  \pard\intbl\ssparaaux0\s0\ri90\ql\plain\f0\fs22\plain\f1\fs22\lshm6869\hich\f1\dbch  \f1\loch\f1\cf1\fs22\protect0 40.6\plain\f1\fs20\wuno9758\hich\f1\dbch\f1\loch\f1  \cf1\fs20\protect0\cell\pard\intbl\ssparaaux0\s0\ri90\ql\plain\f0\fs22\plain\f1  \fs22\sgee8012\hich\f1\dbch\f1\loch\f1\cf1\fs22\protect0 35.0 - 47.0 %\plain\f1  \fs20\luch4609\hich\f1\dbch\f1\loch\f1\cf1\fs20\protect0\cell\intbl\row\pard\intbl  \ssparaaux0\s0\qc\plain\f0\fs22\plain\f1\fs20\ysez0443\hich\f1\dbch\f1\loch\f1\cf1  \fs20\protect0\cell\pard\intbl\ssparaaux0\s0\ri90\ql\plain\f0\fs22\plain\f1\fs22  \jeee0963\hich\f1\dbch\f1\loch\f1\cf1\fs22\protect0 MCV\plain\f1\fs20\oyhi1709\hich  \f1\dbch\f1\loch\f1\cf1\fs20\protect0\cell\pard\intbl\ssparaaux0\s0\ri90\ql\plain  \f0\fs22\plain\f1\fs22\ipwi8050\hich\f1\dbch\f1\loch\f1\cf1\fs22\protect0 91\plain  \f1\fs20\pzfd2671\hich\f1\dbch\f1\loch\f1\cf1\fs20\protect0\cell\pard\intbl\ssparaaux0  \s0\ri90\ql\plain\f0\fs22\plain\f1\fs22\djzo4856\hich\f1\dbch\f1\loch\f1\cf1\fs22\protect0   80 - 100 fL\plain\f1\fs20\ptsk4604\hich\f1\dbch\f1\loch\f1\cf1\fs20\protect0\cell  \intbl\row\pard\intbl\ssparaaux0\s0\qc\plain\f0\fs22\plain\f1\fs20\efxq5847\hich  \f1\dbch\f1\loch\f1\cf1\fs20\protect0\cell\pard\intbl\ssparaaux0\s0\ri90\ql\plain  \f0\fs22\plain\f1\fs22\gqbe9651\hich\f1\dbch\f1\loch\f1\cf1\fs22\protect0  MCH\plain  \f1\fs20\oqhe9069\hich\f1\dbch\f1\loch\f1\cf1\fs20\protect0\cell\pard\intbl\ssparaaux0  \s0\ri90\ql\plain\f0\fs22\plain\f1\fs22\hrxz2160\hich\f1\dbch\f1\loch\f1\cf1\fs22\protect0   30.4\plain\f1\fs20\qjmv7864\hich\f1\dbch\f1\loch\f1\cf1\fs20\protect0\cell\pard  \intbl\ssparaaux0\s0\ri90\ql\plain\f0\fs22\plain\f1\fs22\oyeq3703\hich\f1\dbch\f1  \loch\f1\cf1\fs22\protect0 27.0 - 34.0 pg\plain\f1\fs20\eenu3088\hich\f1\dbch\f1  \loch\f1\cf1\fs20\protect0\cell\intbl\row\pard\intbl\ssparaaux0\s0\qc\plain\f0\fs22  \plain\f1\fs20\hrnz2400\hich\f1\dbch\f1\loch\f1\cf1\fs20\protect0\cell\pard\intbl  \ssparaaux0\s0\ri90\ql\plain\f0\fs22\plain\f1\fs22\qycg7573\hich\f1\dbch\f1\loch  \f1\cf1\fs22\protect0 MCHC\plain\f1\fs20\mpkk7862\hich\f1\dbch\f1\loch\f1\cf1\fs20  \protect0\cell\pard\intbl\ssparaaux0\s0\ri90\ql\plain\f0\fs22\plain\f1\fs22\anks5096  \hich\f1\dbch\f1\loch\f1\cf1\fs22\protect0 33.5\plain\f1\fs20\uzkz2129\hich\f1\dbch  \f1\loch\f1\cf1\fs20\protect0\cell\pard\intbl\ssparaaux0\s0\ri90\ql\plain\f0\fs22  \plain\f1\fs22\hyqt8564\hich\f1\dbch\f1\loch\f1\cf1\fs22\protect0 32.0 - 36.0 g/dL  \plain\f1\fs20\cxmr3648\hich\f1\dbch\f1\loch\f1\cf1\fs20\protect0\cell\intbl\row  \pard\intbl\ssparaaux0\s0\qc\plain\f0\fs22\plain\f1\fs20\cqjc0107\hich\f1\dbch\f1  \loch\f1\cf1\fs20\protect0\cell\pard\intbl\ssparaaux0\s0\ri90\ql\plain\f0\fs22\plain  \f1\fs22\abqc0782\hich\f1\dbch\f1\loch\f1\cf1\fs22\protect0 RDW\plain\f1\fs20\cqdz7562  \hich\f1\dbch\f1\loch\f1\cf1\fs20\protect0\cell\pard\intbl\ssparaaux0\s0\ri90\ql  \plain\f0\fs22\plain\f1\fs22\zpce0019\hich\f1\dbch\f1\loch\f1\cf1\fs22\protect0   12.2\plain\f1\fs20\izde6198\hich\f1\dbch\f1\loch\f1\cf1\fs20\protect0\cell\pard  \intbl\ssparaaux0\s0\ri90\ql\plain\f0\fs22\plain\f1\fs22\tmhh2489\hich\f1\dbch\f1  \loch\f1\cf1\fs22\protect0 11.0 - 14.5  %\plain\f1\fs20\hniw5664\hich\f1\dbch\f1  \loch\f1\cf1\fs20\protect0\cell\intbl\row\pard\intbl\ssparaaux0\s0\qc\plain\f0\fs22  \plain\f1\fs20\frlk5974\hich\f1\dbch\f1\loch\f1\cf1\fs20\protect0\cell\pard\intbl  \ssparaaux0\s0\ri90\ql\plain\f0\fs22\plain\f1\fs22\lotw8164\hich\f1\dbch\f1\loch  \f1\cf1\fs22\protect0 Platelets\plain\f1\fs20\atht8452\hich\f1\dbch\f1\loch\f1\cf1  \fs20\protect0\cell\pard\intbl\ssparaaux0\s0\ri90\ql\plain\f0\fs22\plain\f1\fs22  \qkpm4686\hich\f1\dbch\f1\loch\f1\cf1\fs22\protect0 334\plain\f1\fs20\aqat7706\hich  \f1\dbch\f1\loch\f1\cf1\fs20\protect0\cell\pard\intbl\ssparaaux0\s0\ri90\ql\plain  \f0\fs22\plain\f1\fs22\slmu3391\hich\f1\dbch\f1\loch\f1\cf1\fs22\protect0 140 -   440 thou/uL\plain\f1\fs20\jnnc8394\hich\f1\dbch\f1\loch\f1\cf1\fs20\protect0\cell  \intbl\row\pard\intbl\ssparaaux0\s0\qc\plain\f0\fs22\plain\f1\fs20\vxxd5436\hich  \f1\dbch\f1\loch\f1\cf1\fs20\protect0\cell\pard\intbl\ssparaaux0\s0\ri90\ql\plain  \f0\fs22\plain\f1\fs22\ffwm1679\hich\f1\dbch\f1\loch\f1\cf1\fs22\protect0 MPV\plain  \f1\fs20\svjw0277\hich\f1\dbch\f1\loch\f1\cf1\fs20\protect0\cell\pard\intbl\ssparaaux0  \s0\ri90\ql\plain\f0\fs22\plain\f1\fs22\kwui3025\hich\f1\dbch\f1\loch\f1\cf1\fs22\protect0   10.8\plain\f1\fs20\bspd1840\hich\f1\dbch\f1\loch\f1\cf1\fs20\protect0\cell\pard  \intbl\ssparaaux0\s0\ri90\ql\plain\f0\fs22\plain\f1\fs22\sibo5228\hich\f1\dbch\f1  \loch\f1\cf1\fs22\protect0 8.5 - 12.5 fL\plain\f1\fs20\yqrw3656\hich\f1\dbch\f1  \loch\f1\cf1\fs20\protect0\cell\intbl\row\trowd\trgaph0\trpaddl0\trpaddfl3\trpaddr0  \trpaddfr3\trpaddt0\trpaddft3\trpaddb0\trpaddfb3\trleft0\clvertalt\clpadt0\clpadft3  \clpadr0\clpadfr3\clpadl0\clpadfl3\clpadb0\clpadfb3\aommg4226\pard\intbl\ssparaaux0  \s0\ri90\ql\plain\f0\fs22\plain\f1\fs18\fxrd3659\hich\f1\dbch\f1\loch\f1\cf6\fs18\b\protect0    Lipase\plain\f1\fs20\tiqr8229\hich\f1\dbch\f1\loch\f1\cf1\fs20\protect0\cell\intbl  \row\trowd\trgaph0\trpaddl0\trpaddfl3\trpaddr0\trpaddfr3\trpaddt0\trpaddft3\trpaddb0  \trpaddfb3\trleft0\clvertalt\sszpntt63\clpadt0\clpadft3\clpadr0\clpadfr3\clpadl0  \clpadfl3\clpadb0\clpadfb3\oxevj6273\clvertalt\mglqusd33\clpadt0\clpadft3\clpadr0  \clpadfr3\clpadl0\clpadfl3\clpadb0\clpadfb3\pmyzm2784\clvertalt\iwrhoiq15\clpadt0  \clpadft3\clpadr0\clpadfr3\clpadl0\clpadfl3\clpadb0\clpadfb3\ppkiz6420\pard\intbl  \ssparaaux0\s0\ri90\ql\plain\f0\fs22\plain\f1\fs18\fbpb8816\hich\f1\dbch\f1\loch  \f1\cf6\fs18\protect0 Result\plain\f1\fs20\ujui3813\hich\f1\dbch\f1\loch\f1\cf1  \fs20\protect0\cell\pard\intbl\ssparaaux0\s0\ri90\ql\plain\f0\fs22\plain\f1\fs18  \iuon6516\hich\f1\dbch\f1\loch\f1\cf6\fs18\protect0 Value\plain\f1\fs20\zbwt5926  \hich\f1\dbch\f1\loch\f1\cf1\fs20\protect0\cell\pard\intbl\ssparaaux0\s0\ri90\ql  \plain\f0\fs22\plain\f1\fs18\lqcn8025\hich\f1\dbch\f1\loch\f1\cf6\fs18\protect0   Ref Range\plain\f1\fs20\uuwe6429\hich\f1\dbch\f1\loch\f1\cf1\fs20\protect0\cell  \intbl\row\trowd\trgaph0\trpaddl0\trpaddfl3\trpaddr0\trpaddfr3\trpaddt0\trpaddft3  \trpaddb0\trpaddfb3\trleft0\clvertalt\clpadt0\clpadft3\clpadr0\clpadfr3\clpadl0  \clpadfl3\clpadb0\clpadfb3\clnyd406\clvertalt\clpadt0\clpadft3\clpadr0\clpadfr3  \clpadl0\clpadfl3\clpadb0\clpadfb3\uawzz8251\clvertalt\clpadt0\clpadft3\clpadr0  \clpadfr3\clpadl0\clpadfl3\clpadb0\clpadfb3\uokig9976\clvertalt\clpadt0\clpadft3  \clpadr0\clpadfr3\clpadl0\clpadfl3\clpadb0\clpadfb3\oeufm5000\pard\intbl\ssparaaux0  \s0\qc\plain\f0\fs22\plain\f1\fs20\epzs1438\hich\f1\dbch\f1\loch\f1\cf1\fs20\protect0  \cell\pard\intbl\ssparaaux0\s0\ri90\ql\plain\f0\fs22\plain\f1\fs22\ivvb4843\hich  \f1\dbch\f1\loch\f1\cf1\fs22\protect0  Lipase\plain\f1\fs20\xndz2938\hich\f1\dbch  \f1\loch\f1\cf1\fs20\protect0\cell\pard\intbl\ssparaaux0\s0\ri90\ql\plain\f0\fs22  \plain\f1\fs22\pdnt5113\hich\f1\dbch\f1\loch\f1\cf1\fs22\protect0 25\plain\f1\fs20  \inli9034\hich\f1\dbch\f1\loch\f1\cf1\fs20\protect0\cell\pard\intbl\ssparaaux0\s0  \ri90\ql\plain\f0\fs22\plain\f1\fs22\lkun0747\hich\f1\dbch\f1\loch\f1\cf1\fs22\protect0   0 - 52 U/L\plain\f1\fs20\bqrx7709\hich\f1\dbch\f1\loch\f1\cf1\fs20\protect0\cell  \intbl\row\trowd\trgaph0\trpaddl0\trpaddfl3\trpaddr0\trpaddfr3\trpaddt0\trpaddft3  \trpaddb0\trpaddfb3\trleft0\clvertalt\clpadt0\clpadft3\clpadr0\clpadfr3\clpadl0  \clpadfl3\clpadb0\clpadfb3\ncnef5848\pard\intbl\ssparaaux0\s0\ri90\ql\plain\f0\fs22  \plain\f1\fs18\ghil1481\hich\f1\dbch\f1\loch\f1\cf6\fs18\b\protect0 Urinalysis-UC   if Indicated\plain\f1\fs20\nptk0188\hich\f1\dbch\f1\loch\f1\cf1\fs20\protect0\cell  \intbl\row\trowd\trgaph0\trpaddl0\trpaddfl3\trpaddr0\trpaddfr3\trpaddt0\trpaddft3  \trpaddb0\trpaddfb3\trleft0\clvertalt\czjlcga76\clpadt0\clpadft3\clpadr0\clpadfr3  \clpadl0\clpadfl3\clpadb0\clpadfb3\wleja3153\clvertalt\qmmlsnv29\clpadt0\clpadft3  \clpadr0\clpadfr3\clpadl0\clpadfl3\clpadb0\clpadfb3\wgxnb8811\clvertalt\gqmvgpa51  \clpadt0\clpadft3\clpadr0\clpadfr3\clpadl0\clpadfl3\clpadb0\clpadfb3\suqsr3837\pard  \intbl\ssparaaux0\s0\ri90\ql\plain\f0\fs22\plain\f1\fs18\zyxn8813\hich\f1\dbch\f1  \loch\f1\cf6\fs18\protect0 Result\plain\f1\fs20\dayi4742\hich\f1\dbch\f1\loch\f1  \cf1\fs20\protect0\cell\pard\intbl\ssparaaux0\s0\ri90\ql\plain\f0\fs22\plain\f1  \fs18\sveo8526\hich\f1\dbch\f1\loch\f1\cf6\fs18\protect0 Value\plain\f1\fs20\kxvm8612  \hich\f1\dbch\f1\loch\f1\cf1\fs20\protect0\cell\pard\intbl\ssparaaux0\s0\ri90\ql  \plain\f0\fs22\plain\f1\fs18\svjj1434\hich\f1\dbch\f1\loch\f1\cf6\fs18\protect0   Ref  Range\plain\f1\fs20\fwzp1282\hich\f1\dbch\f1\loch\f1\cf1\fs20\protect0\cell  \intbl\row\trowd\trgaph0\trpaddl0\trpaddfl3\trpaddr0\trpaddfr3\trpaddt0\trpaddft3  \trpaddb0\trpaddfb3\trleft0\clvertalt\clpadt0\clpadft3\clpadr0\clpadfr3\clpadl0  \clpadfl3\clpadb0\clpadfb3\ghupf354\clvertalt\clpadt0\clpadft3\clpadr0\clpadfr3  \clpadl0\clpadfl3\clpadb0\clpadfb3\clbus5821\clvertalt\clpadt0\clpadft3\clpadr0  \clpadfr3\clpadl0\clpadfl3\clpadb0\clpadfb3\trieh1119\clvertalt\clpadt0\clpadft3  \clpadr0\clpadfr3\clpadl0\clpadfl3\clpadb0\clpadfb3\oltqg6036\pard\intbl\ssparaaux0  \s0\qc\plain\f0\fs22\plain\f1\fs20\ukfm6265\hich\f1\dbch\f1\loch\f1\cf1\fs20\protect0  \cell\pard\intbl\ssparaaux0\s0\ri90\ql\plain\f0\fs22\plain\f1\fs22\jius2944\hich  \f1\dbch\f1\loch\f1\cf1\fs22\protect0 Color, UA\plain\f1\fs20\pvmd6576\hich\f1\dbch  \f1\loch\f1\cf1\fs20\protect0\cell\pard\intbl\ssparaaux0\s0\ri90\ql\plain\f0\fs22  \plain\f1\fs22\wlpc6264\hich\f1\dbch\f1\loch\f1\cf1\fs22\protect0 Yellow\plain\f1  \fs20\cxlj2578\hich\f1\dbch\f1\loch\f1\cf1\fs20\protect0\cell\pard\intbl\ssparaaux0  \s0\ri90\ql\plain\f0\fs22\plain\f1\fs22\bzby8308\hich\f1\dbch\f1\loch\f1\cf1\fs22\protect0   Colorless, Yellow, Straw, Light Yellow\plain\f1\fs20\llpb1341\hich\f1\dbch\f1\loch  \f1\cf1\fs20\protect0\cell\intbl\row\pard\intbl\ssparaaux0\s0\qc\plain\f0\fs22\plain  \f1\fs20\nyva9195\hich\f1\dbch\f1\loch\f1\cf1\fs20\protect0\cell\pard\intbl\ssparaaux0  \s0\ri90\ql\plain\f0\fs22\plain\f1\fs22\anrn6877\hich\f1\dbch\f1\loch\f1\cf1\fs22\protect0   Clarity, UA\plain\f1\fs20\jqgt7965\hich\f1\dbch\f1\loch\f1\cf1\fs20\protect0\cell  \pard\intbl\ssparaaux0\s0\ri90\ql\plain\f0\fs22\plain\f1\fs22\pqld1717\hich\f1\dbch  \f1\loch\f1\cf1\fs22\protect0 Clear\plain\f1\fs20\gxqw8714\hich\f1\dbch\f1\loch  \f1\cf1\fs20\protect0\cell\pard\intbl\ssparaaux0\s0\ri90\ql\plain\f0\fs22\plain  \f1\fs22\dywc4844\hich\f1\dbch\f1\loch\f1\cf1\fs22\protect0  Clear\plain\f1\fs20  \cann5696\hich\f1\dbch\f1\loch\f1\cf1\fs20\protect0\cell\intbl\row\pard\intbl\ssparaaux0  \s0\qc\plain\f0\fs22\plain\f1\fs20\rcvp5787\hich\f1\dbch\f1\loch\f1\cf1\fs20\protect0  \cell\pard\intbl\ssparaaux0\s0\ri90\ql\plain\f0\fs22\plain\f1\fs22\cnoi6293\hich  \f1\dbch\f1\loch\f1\cf1\fs22\protect0 Glucose, UA\plain\f1\fs20\rapl9374\hich\f1  \dbch\f1\loch\f1\cf1\fs20\protect0\cell\pard\intbl\ssparaaux0\s0\ri90\ql\plain\f0  \fs22\plain\f1\fs22\dmcs5925\hich\f1\dbch\f1\loch\f1\cf1\fs22\protect0 >1000 mg/dL   (!!)\plain\f1\fs20\rgrh0244\hich\f1\dbch\f1\loch\f1\cf1\fs20\protect0\cell\pard  \intbl\ssparaaux0\s0\ri90\ql\plain\f0\fs22\plain\f1\fs22\pbvb7055\hich\f1\dbch\f1  \loch\f1\cf1\fs22\protect0 Negative\plain\f1\fs20\qiox2085\hich\f1\dbch\f1\loch  \f1\cf1\fs20\protect0\cell\intbl\row\pard\intbl\ssparaaux0\s0\qc\plain\f0\fs22\plain  \f1\fs20\cool6461\hich\f1\dbch\f1\loch\f1\cf1\fs20\protect0\cell\pard\intbl\ssparaaux0  \s0\ri90\ql\plain\f0\fs22\plain\f1\fs22\bccn1196\hich\f1\dbch\f1\loch\f1\cf1\fs22\protect0   Bilirubin, UA\plain\f1\fs20\kwkv3742\hich\f1\dbch\f1\loch\f1\cf1\fs20\protect0\cell  \pard\intbl\ssparaaux0\s0\ri90\ql\plain\f0\fs22\plain\f1\fs22\skii4915\hich\f1\dbch  \f1\loch\f1\cf1\fs22\protect0 Negative\plain\f1\fs20\wvih5911\hich\f1\dbch\f1\loch  \f1\cf1\fs20\protect0\cell\pard\intbl\ssparaaux0\s0\ri90\ql\plain\f0\fs22\plain  \f1\fs22\scwd2130\hich\f1\dbch\f1\loch\f1\cf1\fs22\protect0 Negative\plain\f1\fs20  \wvpy0309\hich\f1\dbch\f1\loch\f1\cf1\fs20\protect0\cell\intbl\row\pard\intbl\ssparaaux0  \s0\qc\plain\f0\fs22\plain\f1\fs20\yuhi6031\hich\f1\dbch\f1\loch\f1\cf1\fs20\protect0  \cell\pard\intbl\ssparaaux0\s0\ri90\ql\plain\f0\fs22\plain\f1\fs22\bbzy3342\hich  \f1\dbch\f1\loch\f1\cf1\fs22\protect0 Ketones,  UA\plain\f1\fs20\husp5143\hich\f1  \dbch\f1\loch\f1\cf1\fs20\protect0\cell\pard\intbl\ssparaaux0\s0\ri90\ql\plain\f0  \fs22\plain\f1\fs22\mqww9787\hich\f1\dbch\f1\loch\f1\cf1\fs22\protect0 80 mg/dL   (!!)\plain\f1\fs20\hyex9653\hich\f1\dbch\f1\loch\f1\cf1\fs20\protect0\cell\pard  \intbl\ssparaaux0\s0\ri90\ql\plain\f0\fs22\plain\f1\fs22\paym4290\hich\f1\dbch\f1  \loch\f1\cf1\fs22\protect0 Negative\plain\f1\fs20\arvo5253\hich\f1\dbch\f1\loch  \f1\cf1\fs20\protect0\cell\intbl\row\pard\intbl\ssparaaux0\s0\qc\plain\f0\fs22\plain  \f1\fs20\qkpk3639\hich\f1\dbch\f1\loch\f1\cf1\fs20\protect0\cell\pard\intbl\ssparaaux0  \s0\ri90\ql\plain\f0\fs22\plain\f1\fs22\xnag2507\hich\f1\dbch\f1\loch\f1\cf1\fs22\protect0   Specific [place], UA\plain\f1\fs20\aauc2157\hich\f1\dbch\f1\loch\f1\cf1\fs20\protect0  \cell\pard\intbl\ssparaaux0\s0\ri90\ql\plain\f0\fs22\plain\f1\fs22\ujjw7527\hich  \f1\dbch\f1\loch\f1\cf1\fs22\protect0 1.024\plain\f1\fs20\ccjl0341\hich\f1\dbch  \f1\loch\f1\cf1\fs20\protect0\cell\pard\intbl\ssparaaux0\s0\ri90\ql\plain\f0\fs22  \plain\f1\fs22\ffrl6479\hich\f1\dbch\f1\loch\f1\cf1\fs22\protect0 1.001 - 1.030  \plain\f1\fs20\oevk1685\hich\f1\dbch\f1\loch\f1\cf1\fs20\protect0\cell\intbl\row  \pard\intbl\ssparaaux0\s0\qc\plain\f0\fs22\plain\f1\fs20\nqkh5201\hich\f1\dbch\f1  \loch\f1\cf1\fs20\protect0\cell\pard\intbl\ssparaaux0\s0\ri90\ql\plain\f0\fs22\plain  \f1\fs22\uyln8354\hich\f1\dbch\f1\loch\f1\cf1\fs22\protect0 Blood, UA\plain\f1\fs20  \pnrj3004\hich\f1\dbch\f1\loch\f1\cf1\fs20\protect0\cell\pard\intbl\ssparaaux0\s0  \ri90\ql\plain\f0\fs22\plain\f1\fs22\mvtd1721\hich\f1\dbch\f1\loch\f1\cf1\fs22\protect0   Moderate (!)\plain\f1\fs20\oldo5222\hich\f1\dbch\f1\loch\f1\cf1\fs20\protect0\cell  \pard\intbl\ssparaaux0\s0\ri90\ql\plain\f0\fs22\plain\f1\fs22\dbrx9152\hich\f1\dbch  \f1\loch\f1\cf1\fs22\protect0  Negative\plain\f1\fs20\ogiy6733\hich\f1\dbch\f1\loch  \f1\cf1\fs20\protect0\cell\intbl\row\pard\intbl\ssparaaux0\s0\qc\plain\f0\fs22\plain  \f1\fs20\yjuv6581\hich\f1\dbch\f1\loch\f1\cf1\fs20\protect0\cell\pard\intbl\ssparaaux0  \s0\ri90\ql\plain\f0\fs22\plain\f1\fs22\pmey5916\hich\f1\dbch\f1\loch\f1\cf1\fs22\protect0   pH, UA\plain\f1\fs20\eayx8181\hich\f1\dbch\f1\loch\f1\cf1\fs20\protect0\cell\pard  \intbl\ssparaaux0\s0\ri90\ql\plain\f0\fs22\plain\f1\fs22\ewnn9224\hich\f1\dbch\f1  \loch\f1\cf1\fs22\protect0 5.5\plain\f1\fs20\ceor3291\hich\f1\dbch\f1\loch\f1\cf1  \fs20\protect0\cell\pard\intbl\ssparaaux0\s0\ri90\ql\plain\f0\fs22\plain\f1\fs22  \rzji4599\hich\f1\dbch\f1\loch\f1\cf1\fs22\protect0 4.5 - 8.0\plain\f1\fs20\cxrd0875  \hich\f1\dbch\f1\loch\f1\cf1\fs20\protect0\cell\intbl\row\pard\intbl\ssparaaux0  \s0\qc\plain\f0\fs22\plain\f1\fs20\lesy1323\hich\f1\dbch\f1\loch\f1\cf1\fs20\protect0  \cell\pard\intbl\ssparaaux0\s0\ri90\ql\plain\f0\fs22\plain\f1\fs22\ulmw8604\hich  \f1\dbch\f1\loch\f1\cf1\fs22\protect0 Protein, UA\plain\f1\fs20\ixfu1660\hich\f1  \dbch\f1\loch\f1\cf1\fs20\protect0\cell\pard\intbl\ssparaaux0\s0\ri90\ql\plain\f0  \fs22\plain\f1\fs22\okpj7156\hich\f1\dbch\f1\loch\f1\cf1\fs22\protect0 Negative  \plain\f1\fs20\okar3087\hich\f1\dbch\f1\loch\f1\cf1\fs20\protect0\cell\pard\intbl  \ssparaaux0\s0\ri90\ql\plain\f0\fs22\plain\f1\fs22\naty8658\hich\f1\dbch\f1\loch  \f1\cf1\fs22\protect0 Negative mg/dL\plain\f1\fs20\cyzy7511\hich\f1\dbch\f1\loch  \f1\cf1\fs20\protect0\cell\intbl\row\pard\intbl\ssparaaux0\s0\qc\plain\f0\fs22\plain  \f1\fs20\wlox1084\hich\f1\dbch\f1\loch\f1\cf1\fs20\protect0\cell\pard\intbl\ssparaaux0  \s0\ri90\ql\plain\f0\fs22\plain\f1\fs22\vffx6648\hich\f1\dbch\f1\loch\f1\cf1\fs22\protect0   Urobilinogen,  UA\plain\f1\fs20\cfed6183\hich\f1\dbch\f1\loch\f1\cf1\fs20\protect0  \cell\pard\intbl\ssparaaux0\s0\ri90\ql\plain\f0\fs22\plain\f1\fs22\rmkl0691\hich  \f1\dbch\f1\loch\f1\cf1\fs22\protect0 <2.0 E.U./dL\plain\f1\fs20\kuxe0755\hich\f1  \dbch\f1\loch\f1\cf1\fs20\protect0\cell\pard\intbl\ssparaaux0\s0\ri90\ql\plain\f0  \fs22\plain\f1\fs22\oblq4243\hich\f1\dbch\f1\loch\f1\cf1\fs22\protect0 <2.0 E.U./dL,   2.0 E.U./dL\plain\f1\fs20\wzfe9723\hich\f1\dbch\f1\loch\f1\cf1\fs20\protect0\cell  \intbl\row\pard\intbl\ssparaaux0\s0\qc\plain\f0\fs22\plain\f1\fs20\imix7538\hich  \f1\dbch\f1\loch\f1\cf1\fs20\protect0\cell\pard\intbl\ssparaaux0\s0\ri90\ql\plain  \f0\fs22\plain\f1\fs22\qxgf1112\hich\f1\dbch\f1\loch\f1\cf1\fs22\protect0 Nitrite,   UA\plain\f1\fs20\drlj1141\hich\f1\dbch\f1\loch\f1\cf1\fs20\protect0\cell\pard\intbl  \ssparaaux0\s0\ri90\ql\plain\f0\fs22\plain\f1\fs22\xzly6408\hich\f1\dbch\f1\loch  \f1\cf1\fs22\protect0 Negative\plain\f1\fs20\wubo1759\hich\f1\dbch\f1\loch\f1\cf1  \fs20\protect0\cell\pard\intbl\ssparaaux0\s0\ri90\ql\plain\f0\fs22\plain\f1\fs22  \rtzb3759\hich\f1\dbch\f1\loch\f1\cf1\fs22\protect0 Negative\plain\f1\fs20\lpwk9532  \hich\f1\dbch\f1\loch\f1\cf1\fs20\protect0\cell\intbl\row\pard\intbl\ssparaaux0  \s0\qc\plain\f0\fs22\plain\f1\fs20\ofcn5791\hich\f1\dbch\f1\loch\f1\cf1\fs20\protect0  \cell\pard\intbl\ssparaaux0\s0\ri90\ql\plain\f0\fs22\plain\f1\fs22\emcd2559\hich  \f1\dbch\f1\loch\f1\cf1\fs22\protect0 Leukocytes, UA\plain\f1\fs20\myjz0923\hich  \f1\dbch\f1\loch\f1\cf1\fs20\protect0\cell\pard\intbl\ssparaaux0\s0\ri90\ql\plain  \f0\fs22\plain\f1\fs22\ihey6038\hich\f1\dbch\f1\loch\f1\cf1\fs22\protect0 Negative  \plain\f1\fs20\qdxa5686\hich\f1\dbch\f1\loch\f1\cf1\fs20\protect0\cell\pard\intbl  \ssparaaux0\s0\ri90\ql\plain\f0\fs22\plain\f1\fs22\bamc4053\hich\f1\dbch\f1\loch  \f1\cf1\fs22\protect0  Negative\plain\f1\fs20\igwl8021\hich\f1\dbch\f1\loch\f1\cf1  \fs20\protect0\cell\intbl\row\pard\intbl\ssparaaux0\s0\qc\plain\f0\fs22\plain\f1  \fs20\jvko4249\hich\f1\dbch\f1\loch\f1\cf1\fs20\protect0\cell\pard\intbl\ssparaaux0  \s0\ri90\ql\plain\f0\fs22\plain\f1\fs22\vtmb9549\hich\f1\dbch\f1\loch\f1\cf1\fs22\protect0   Bacteria, UA\plain\f1\fs20\hqws5999\hich\f1\dbch\f1\loch\f1\cf1\fs20\protect0\cell  \pard\intbl\ssparaaux0\s0\ri90\ql\plain\f0\fs22\plain\f1\fs22\dhkr9858\hich\f1\dbch  \f1\loch\f1\cf1\fs22\protect0 None Seen\plain\f1\fs20\vygk8162\hich\f1\dbch\f1\loch  \f1\cf1\fs20\protect0\cell\pard\intbl\ssparaaux0\s0\ri90\ql\plain\f0\fs22\plain  \f1\fs22\hxdf2208\hich\f1\dbch\f1\loch\f1\cf1\fs22\protect0 None Seen hpf\plain  \f1\fs20\bvup5544\hich\f1\dbch\f1\loch\f1\cf1\fs20\protect0\cell\intbl\row\pard  \intbl\ssparaaux0\s0\qc\plain\f0\fs22\plain\f1\fs20\zjak4312\hich\f1\dbch\f1\loch  \f1\cf1\fs20\protect0\cell\pard\intbl\ssparaaux0\s0\ri90\ql\plain\f0\fs22\plain  \f1\fs22\tgxn4731\hich\f1\dbch\f1\loch\f1\cf1\fs22\protect0 RBC, UA\plain\f1\fs20  \lfga3786\hich\f1\dbch\f1\loch\f1\cf1\fs20\protect0\cell\pard\intbl\ssparaaux0\s0  \ri90\ql\plain\f0\fs22\plain\f1\fs22\yfvz8959\hich\f1\dbch\f1\loch\f1\cf1\fs22\protect0   0-2\plain\f1\fs20\jnwo6391\hich\f1\dbch\f1\loch\f1\cf1\fs20\protect0\cell\pard\intbl  \ssparaaux0\s0\ri90\ql\plain\f0\fs22\plain\f1\fs22\afsh2120\hich\f1\dbch\f1\loch  \f1\cf1\fs22\protect0 None Seen, 0-2 hpf\plain\f1\fs20\bnfv8668\hich\f1\dbch\f1  \loch\f1\cf1\fs20\protect0\cell\intbl\row\pard\intbl\ssparaaux0\s0\qc\plain\f0\fs22  \plain\f1\fs20\qsek7597\hich\f1\dbch\f1\loch\f1\cf1\fs20\protect0\cell\pard\intbl  \ssparaaux0\s0\ri90\ql\plain\f0\fs22\plain\f1\fs22\iwdd0951\hich\f1\dbch\f1\loch  \f1\cf1\fs22\protect0 WBC,  UA\plain\f1\fs20\iugi8282\hich\f1\dbch\f1\loch\f1\cf1  \fs20\protect0\cell\pard\intbl\ssparaaux0\s0\ri90\ql\plain\f0\fs22\plain\f1\fs22  \wald0475\hich\f1\dbch\f1\loch\f1\cf1\fs22\protect0 0-5\plain\f1\fs20\dubf4010\hich  \f1\dbch\f1\loch\f1\cf1\fs20\protect0\cell\pard\intbl\ssparaaux0\s0\ri90\ql\plain  \f0\fs22\plain\f1\fs22\exll1678\hich\f1\dbch\f1\loch\f1\cf1\fs22\protect0 None Seen,   0-5 hpf\plain\f1\fs20\maqd0867\hich\f1\dbch\f1\loch\f1\cf1\fs20\protect0\cell\intbl  \row\pard\intbl\ssparaaux0\s0\qc\plain\f0\fs22\plain\f1\fs20\fciy3968\hich\f1\dbch  \f1\loch\f1\cf1\fs20\protect0\cell\pard\intbl\ssparaaux0\s0\ri90\ql\plain\f0\fs22  \plain\f1\fs22\yvea2621\hich\f1\dbch\f1\loch\f1\cf1\fs22\protect0 Squam Epithel,   UA\plain\f1\fs20\kgpt6166\hich\f1\dbch\f1\loch\f1\cf1\fs20\protect0\cell\pard\intbl  \ssparaaux0\s0\ri90\ql\plain\f0\fs22\plain\f1\fs22\hptt1633\hich\f1\dbch\f1\loch  \f1\cf1\fs22\protect0 25-50 (!)\plain\f1\fs20\hlav4639\hich\f1\dbch\f1\loch\f1\cf1  \fs20\protect0\cell\pard\intbl\ssparaaux0\s0\ri90\ql\plain\f0\fs22\plain\f1\fs22  \ppxv2612\hich\f1\dbch\f1\loch\f1\cf1\fs22\protect0 None Seen, 0-5 lpf\plain\f1  \fs20\rhkn2375\hich\f1\dbch\f1\loch\f1\cf1\fs20\protect0\cell\intbl\row\trowd\trgaph0  \trpaddl0\trpaddfl3\trpaddr0\trpaddfr3\trpaddt0\trpaddft3\trpaddb0\trpaddfb3\trleft0  \clvertalt\clpadt0\clpadft3\clpadr0\clpadfr3\clpadl0\clpadfl3\clpadb0\clpadfb3\uwker8034  \pard\intbl\ssparaaux0\s0\ri90\ql\plain\f0\fs22\plain\f1\fs18\otqz2596\hich\f1\dbch  \f1\loch\f1\cf6\fs18\b\protect0 Blood Gases,  Venous\plain\f1\fs20\kpmn8012\hich  \f1\dbch\f1\loch\f1\cf1\fs20\protect0\cell\intbl\row\trowd\trgaph0\trpaddl0\trpaddfl3  \trpaddr0\trpaddfr3\trpaddt0\trpaddft3\trpaddb0\trpaddfb3\trleft0\clvertalt\nrpdrjy49  \clpadt0\clpadft3\clpadr0\clpadfr3\clpadl0\clpadfl3\clpadb0\clpadfb3\oyxrk1405\clvertalt  \gcefhva48\clpadt0\clpadft3\clpadr0\clpadfr3\clpadl0\clpadfl3\clpadb0\clpadfb3\hdcjz4019  \clvertalt\eikunqa93\clpadt0\clpadft3\clpadr0\clpadfr3\clpadl0\clpadfl3\clpadb0  \clpadfb3\weqih8340\pard\intbl\ssparaaux0\s0\ri90\ql\plain\f0\fs22\plain\f1\fs18  \ewvi3750\hich\f1\dbch\f1\loch\f1\cf6\fs18\protect0 Result\plain\f1\fs20\zpwf4374  \hich\f1\dbch\f1\loch\f1\cf1\fs20\protect0\cell\pard\intbl\ssparaaux0\s0\ri90\ql  \plain\f0\fs22\plain\f1\fs18\eveu0505\hich\f1\dbch\f1\loch\f1\cf6\fs18\protect0   Value\plain\f1\fs20\fovy8392\hich\f1\dbch\f1\loch\f1\cf1\fs20\protect0\cell\pard  \intbl\ssparaaux0\s0\ri90\ql\plain\f0\fs22\plain\f1\fs18\htbt2616\hich\f1\dbch\f1  \loch\f1\cf6\fs18\protect0 Ref Range\plain\f1\fs20\goxh3009\hich\f1\dbch\f1\loch  \f1\cf1\fs20\protect0\cell\intbl\row\trowd\trgaph0\trpaddl0\trpaddfl3\trpaddr0\trpaddfr3  \trpaddt0\trpaddft3\trpaddb0\trpaddfb3\trleft0\clvertalt\clpadt0\clpadft3\clpadr0  \clpadfr3\clpadl0\clpadfl3\clpadb0\clpadfb3\zwjph714\clvertalt\clpadt0\clpadft3  \clpadr0\clpadfr3\clpadl0\clpadfl3\clpadb0\clpadfb3\xqkvo7445\clvertalt\clpadt0  \clpadft3\clpadr0\clpadfr3\clpadl0\clpadfl3\clpadb0\clpadfb3\updpb2425\clvertalt  \clpadt0\clpadft3\clpadr0\clpadfr3\clpadl0\clpadfl3\clpadb0\clpadfb3\ytcos5452\pard  \intbl\ssparaaux0\s0\qc\plain\f0\fs22\plain\f1\fs20\qxxc5921\hich\f1\dbch\f1\loch  \f1\cf1\fs20\protect0\cell\pard\intbl\ssparaaux0\s0\ri90\ql\plain\f0\fs22\plain  \f1\fs22\ybzq1183\hich\f1\dbch\f1\loch\f1\cf1\fs22\protect0 pH,  Venous\plain\f1  \fs20\knjk2903\hich\f1\dbch\f1\loch\f1\cf1\fs20\protect0\cell\pard\intbl\ssparaaux0  \s0\ri90\ql\plain\f0\fs22\plain\f1\fs22\rstc5479\hich\f1\dbch\f1\loch\f1\cf1\fs22\protect0   7.33 (L)\plain\f1\fs20\fcqj3168\hich\f1\dbch\f1\loch\f1\cf1\fs20\protect0\cell\pard  \intbl\ssparaaux0\s0\ri90\ql\plain\f0\fs22\plain\f1\fs22\luku7355\hich\f1\dbch\f1  \loch\f1\cf1\fs22\protect0 7.35 - 7.45\plain\f1\fs20\krvq4654\hich\f1\dbch\f1\loch  \f1\cf1\fs20\protect0\cell\intbl\row\pard\intbl\ssparaaux0\s0\qc\plain\f0\fs22\plain  \f1\fs20\exqq5357\hich\f1\dbch\f1\loch\f1\cf1\fs20\protect0\cell\pard\intbl\ssparaaux0  \s0\ri90\ql\plain\f0\fs22\plain\f1\fs22\lbxm2478\hich\f1\dbch\f1\loch\f1\cf1\fs22\protect0   pCO2, Venous\plain\f1\fs20\gyri4949\hich\f1\dbch\f1\loch\f1\cf1\fs20\protect0\cell  \pard\intbl\ssparaaux0\s0\ri90\ql\plain\f0\fs22\plain\f1\fs22\fzeo8177\hich\f1\dbch  \f1\loch\f1\cf1\fs22\protect0 36\plain\f1\fs20\okfi8469\hich\f1\dbch\f1\loch\f1  \cf1\fs20\protect0\cell\pard\intbl\ssparaaux0\s0\ri90\ql\plain\f0\fs22\plain\f1  \fs22\qxhe7688\hich\f1\dbch\f1\loch\f1\cf1\fs22\protect0 35 - 50 mm Hg\plain\f1  \fs20\klez5984\hich\f1\dbch\f1\loch\f1\cf1\fs20\protect0\cell\intbl\row\pard\intbl  \ssparaaux0\s0\qc\plain\f0\fs22\plain\f1\fs20\ssht7645\hich\f1\dbch\f1\loch\f1\cf1  \fs20\protect0\cell\pard\intbl\ssparaaux0\s0\ri90\ql\plain\f0\fs22\plain\f1\fs22  \ftoq5389\hich\f1\dbch\f1\loch\f1\cf1\fs22\protect0 pO2, Brenden\plain\f1\fs20\sttp3257  \hich\f1\dbch\f1\loch\f1\cf1\fs20\protect0\cell\pard\intbl\ssparaaux0\s0\ri90\ql  \plain\f0\fs22\plain\f1\fs22\djsa2162\hich\f1\dbch\f1\loch\f1\cf1\fs22\protect0   51 (H)\plain\f1\fs20\iqfo6989\hich\f1\dbch\f1\loch\f1\cf1\fs20\protect0\cell\pard  \intbl\ssparaaux0\s0\ri90\ql\plain\f0\fs22\plain\f1\fs22\zijr2251\hich\f1\dbch\f1  \loch\f1\cf1\fs22\protect0 25 - 47 mm  Hg\plain\f1\fs20\fgeq2391\hich\f1\dbch\f1  \loch\f1\cf1\fs20\protect0\cell\intbl\row\pard\intbl\ssparaaux0\s0\qc\plain\f0\fs22  \plain\f1\fs20\cndu4718\hich\f1\dbch\f1\loch\f1\cf1\fs20\protect0\cell\pard\intbl  \ssparaaux0\s0\ri90\ql\plain\f0\fs22\plain\f1\fs22\ffjt7270\hich\f1\dbch\f1\loch  \f1\cf1\fs22\protect0 Base Excess, Venous\plain\f1\fs20\dzth9320\hich\f1\dbch\f1  \loch\f1\cf1\fs20\protect0\cell\pard\intbl\ssparaaux0\s0\ri90\ql\plain\f0\fs22\plain  \f1\fs22\cnqg3161\hich\f1\dbch\f1\loch\f1\cf1\fs22\protect0 -6.2\plain\f1\fs20\axbi2262  \hich\f1\dbch\f1\loch\f1\cf1\fs20\protect0\cell\pard\intbl\ssparaaux0\s0\ri90\ql  \plain\f0\fs22\plain\f1\fs22\tlls0216\hich\f1\dbch\f1\loch\f1\cf1\fs22\protect0   mmol/L\plain\f1\fs20\qkyb0750\hich\f1\dbch\f1\loch\f1\cf1\fs20\protect0\cell\intbl  \row\pard\intbl\ssparaaux0\s0\qc\plain\f0\fs22\plain\f1\fs20\dzrx6211\hich\f1\dbch  \f1\loch\f1\cf1\fs20\protect0\cell\pard\intbl\ssparaaux0\s0\ri90\ql\plain\f0\fs22  \plain\f1\fs22\raiv1603\hich\f1\dbch\f1\loch\f1\cf1\fs22\protect0 HCO3, Venous\plain  \f1\fs20\olli1352\hich\f1\dbch\f1\loch\f1\cf1\fs20\protect0\cell\pard\intbl\ssparaaux0  \s0\ri90\ql\plain\f0\fs22\plain\f1\fs22\gkkr6450\hich\f1\dbch\f1\loch\f1\cf1\fs22\protect0   19.8 (L)\plain\f1\fs20\wuip8770\hich\f1\dbch\f1\loch\f1\cf1\fs20\protect0\cell\pard  \intbl\ssparaaux0\s0\ri90\ql\plain\f0\fs22\plain\f1\fs22\smqr0751\hich\f1\dbch\f1  \loch\f1\cf1\fs22\protect0 24.0 - 30.0 mmol/L\plain\f1\fs20\zdgo5717\hich\f1\dbch  \f1\loch\f1\cf1\fs20\protect0\cell\intbl\row\pard\intbl\ssparaaux0\s0\qc\plain\f0  \fs22\plain\f1\fs20\ivum7473\hich\f1\dbch\f1\loch\f1\cf1\fs20\protect0\cell\pard  \intbl\ssparaaux0\s0\ri90\ql\plain\f0\fs22\plain\f1\fs22\yfql3192\hich\f1\dbch\f1  \loch\f1\cf1\fs22\protect0  Oxyhemoglobin\plain\f1\fs20\yikv4862\hich\f1\dbch\f1  \loch\f1\cf1\fs20\protect0\cell\pard\intbl\ssparaaux0\s0\ri90\ql\plain\f0\fs22\plain  \f1\fs22\cjtf0330\hich\f1\dbch\f1\loch\f1\cf1\fs22\protect0 86.1 (H)\plain\f1\fs20  \vfas3705\hich\f1\dbch\f1\loch\f1\cf1\fs20\protect0\cell\pard\intbl\ssparaaux0\s0  \ri90\ql\plain\f0\fs22\plain\f1\fs22\cnvx3273\hich\f1\dbch\f1\loch\f1\cf1\fs22\protect0   70.0 - 75.0 %\plain\f1\fs20\jwui5134\hich\f1\dbch\f1\loch\f1\cf1\fs20\protect0\cell  \intbl\row\pard\intbl\ssparaaux0\s0\qc\plain\f0\fs22\plain\f1\fs20\ljke6626\hich  \f1\dbch\f1\loch\f1\cf1\fs20\protect0\cell\pard\intbl\ssparaaux0\s0\ri90\ql\plain  \f0\fs22\plain\f1\fs22\eyko1723\hich\f1\dbch\f1\loch\f1\cf1\fs22\protect0 O2 Sat,   Venous\plain\f1\fs20\rosq8628\hich\f1\dbch\f1\loch\f1\cf1\fs20\protect0\cell\pard  \intbl\ssparaaux0\s0\ri90\ql\plain\f0\fs22\plain\f1\fs22\yeka8776\hich\f1\dbch\f1  \loch\f1\cf1\fs22\protect0 88.7 (H)\plain\f1\fs20\zzwl2392\hich\f1\dbch\f1\loch  \f1\cf1\fs20\protect0\cell\pard\intbl\ssparaaux0\s0\ri90\ql\plain\f0\fs22\plain  \f1\fs22\kahs4461\hich\f1\dbch\f1\loch\f1\cf1\fs22\protect0 70.0 - 75.0 %\plain  \f1\fs20\uwdv9086\hich\f1\dbch\f1\loch\f1\cf1\fs20\protect0\cell\intbl\row\trowd  \trgaph0\trpaddl0\trpaddfl3\trpaddr0\trpaddfr3\trpaddt0\trpaddft3\trpaddb0\trpaddfb3  \trleft0\clvertalt\clpadt0\clpadft3\clpadr0\clpadfr3\clpadl0\clpadfl3\clpadb0\clpadfb3  \hcdpi2954\pard\intbl\ssparaaux0\s0\ri90\ql\plain\f0\fs22\plain\f1\fs18\sica4845  \hich\f1\dbch\f1\loch\f1\cf6\fs18\b\protect0 Beta-hydroxybutyrate  (BHOB)\plain\f1  \fs20\wqrq4911\hich\f1\dbch\f1\loch\f1\cf1\fs20\protect0\cell\intbl\row\trowd\trgaph0  \trpaddl0\trpaddfl3\trpaddr0\trpaddfr3\trpaddt0\trpaddft3\trpaddb0\trpaddfb3\trleft0  \clvertalt\uuxswlp62\clpadt0\clpadft3\clpadr0\clpadfr3\clpadl0\clpadfl3\clpadb0  \clpadfb3\hbhfe4617\clvertalt\whvkpfs44\clpadt0\clpadft3\clpadr0\clpadfr3\clpadl0  \clpadfl3\clpadb0\clpadfb3\hfrwj3842\clvertalt\aqumfnb30\clpadt0\clpadft3\clpadr0  \clpadfr3\clpadl0\clpadfl3\clpadb0\clpadfb3\hvalg0230\pard\intbl\ssparaaux0\s0\ri90  \ql\plain\f0\fs22\plain\f1\fs18\qisj5416\hich\f1\dbch\f1\loch\f1\cf6\fs18\protect0   Result\plain\f1\fs20\tpdi4053\hich\f1\dbch\f1\loch\f1\cf1\fs20\protect0\cell\pard  \intbl\ssparaaux0\s0\ri90\ql\plain\f0\fs22\plain\f1\fs18\raal6846\hich\f1\dbch\f1  \loch\f1\cf6\fs18\protect0 Value\plain\f1\fs20\otzf3699\hich\f1\dbch\f1\loch\f1  \cf1\fs20\protect0\cell\pard\intbl\ssparaaux0\s0\ri90\ql\plain\f0\fs22\plain\f1  \fs18\jmwk4907\hich\f1\dbch\f1\loch\f1\cf6\fs18\protect0 Ref Range\plain\f1\fs20  \wtsn0843\hich\f1\dbch\f1\loch\f1\cf1\fs20\protect0\cell\intbl\row\trowd\trgaph0  \trpaddl0\trpaddfl3\trpaddr0\trpaddfr3\trpaddt0\trpaddft3\trpaddb0\trpaddfb3\trleft0  \clvertalt\clpadt0\clpadft3\clpadr0\clpadfr3\clpadl0\clpadfl3\clpadb0\clpadfb3\mgfsq489  \clvertalt\clpadt0\clpadft3\clpadr0\clpadfr3\clpadl0\clpadfl3\clpadb0\clpadfb3\nkolb9515  \clvertalt\clpadt0\clpadft3\clpadr0\clpadfr3\clpadl0\clpadfl3\clpadb0\clpadfb3\vtqge0300  \clvertalt\clpadt0\clpadft3\clpadr0\clpadfr3\clpadl0\clpadfl3\clpadb0\clpadfb3\yqdrk4952  \pard\intbl\ssparaaux0\s0\qc\plain\f0\fs22\plain\f1\fs20\fzrw4320\hich\f1\dbch\f1  \loch\f1\cf1\fs20\protect0\cell\pard\intbl\ssparaaux0\s0\ri90\ql\plain\f0\fs22\plain  \f1\fs22\ygbp7491\hich\f1\dbch\f1\loch\f1\cf1\fs22\protect0 Beta-Hydroxybutyrate    (BHOB)\plain\f1\fs20\rhcb4828\hich\f1\dbch\f1\loch\f1\cf1\fs20\protect0\cell\pard  \intbl\ssparaaux0\s0\ri90\ql\plain\f0\fs22\plain\f1\fs22\hdqi0919\hich\f1\dbch\f1  \loch\f1\cf1\fs22\protect0 2.18 (H)\plain\f1\fs20\ioqg4554\hich\f1\dbch\f1\loch  \f1\cf1\fs20\protect0\cell\pard\intbl\ssparaaux0\s0\ri90\ql\plain\f0\fs22\plain  \f1\fs22\dpuz0604\hich\f1\dbch\f1\loch\f1\cf1\fs22\protect0 <=0.3 mmol/L\plain\f1  \fs20\cyky8907\hich\f1\dbch\f1\loch\f1\cf1\fs20\protect0\cell\intbl\row\trowd\trgaph0  \trpaddl0\trpaddfl3\trpaddr0\trpaddfr3\trpaddt0\trpaddft3\trpaddb0\trpaddfb3\trleft0  \clvertalt\clpadt0\clpadft3\clpadr0\clpadfr3\clpadl0\clpadfl3\clpadb0\clpadfb3\bgqgz3850  \pard\intbl\ssparaaux0\s0\ri90\ql\plain\f0\fs22\plain\f1\fs18\ddbt9162\hich\f1\dbch  \f1\loch\f1\cf6\fs18\b\protect0 Lactic Acid\plain\f1\fs20\miub7504\hich\f1\dbch  \f1\loch\f1\cf1\fs20\protect0\cell\intbl\row\trowd\trgaph0\trpaddl0\trpaddfl3\trpaddr0  \trpaddfr3\trpaddt0\trpaddft3\trpaddb0\trpaddfb3\trleft0\clvertalt\osvnxrl94\clpadt0  \clpadft3\clpadr0\clpadfr3\clpadl0\clpadfl3\clpadb0\clpadfb3\bxxib1545\clvertalt  \zaddobh30\clpadt0\clpadft3\clpadr0\clpadfr3\clpadl0\clpadfl3\clpadb0\clpadfb3\jepou3051  \clvertalt\tuonjvi20\clpadt0\clpadft3\clpadr0\clpadfr3\clpadl0\clpadfl3\clpadb0  \clpadfb3\krllm2613\pard\intbl\ssparaaux0\s0\ri90\ql\plain\f0\fs22\plain\f1\fs18  \dsvt1509\hich\f1\dbch\f1\loch\f1\cf6\fs18\protect0 Result\plain\f1\fs20\outd8781  \hich\f1\dbch\f1\loch\f1\cf1\fs20\protect0\cell\pard\intbl\ssparaaux0\s0\ri90\ql  \plain\f0\fs22\plain\f1\fs18\vpel1753\hich\f1\dbch\f1\loch\f1\cf6\fs18\protect0   Value\plain\f1\fs20\nkjv7977\hich\f1\dbch\f1\loch\f1\cf1\fs20\protect0\cell\pard  \intbl\ssparaaux0\s0\ri90\ql\plain\f0\fs22\plain\f1\fs18\nlth7548\hich\f1\dbch\f1  \loch\f1\cf6\fs18\protect0 Ref  Range\plain\f1\fs20\pprd1949\hich\f1\dbch\f1\loch  \f1\cf1\fs20\protect0\cell\intbl\row\trowd\trgaph0\trpaddl0\trpaddfl3\trpaddr0\trpaddfr3  \trpaddt0\trpaddft3\trpaddb0\trpaddfb3\trleft0\clvertalt\clpadt0\clpadft3\clpadr0  \clpadfr3\clpadl0\clpadfl3\clpadb0\clpadfb3\ydohs710\clvertalt\clpadt0\clpadft3  \clpadr0\clpadfr3\clpadl0\clpadfl3\clpadb0\clpadfb3\ineyz7544\clvertalt\clpadt0  \clpadft3\clpadr0\clpadfr3\clpadl0\clpadfl3\clpadb0\clpadfb3\dkoex4723\clvertalt  \clpadt0\clpadft3\clpadr0\clpadfr3\clpadl0\clpadfl3\clpadb0\clpadfb3\tytax0338\pard  \intbl\ssparaaux0\s0\qc\plain\f0\fs22\plain\f1\fs20\weie8087\hich\f1\dbch\f1\loch  \f1\cf1\fs20\protect0\cell\pard\intbl\ssparaaux0\s0\ri90\ql\plain\f0\fs22\plain  \f1\fs22\mwkz8541\hich\f1\dbch\f1\loch\f1\cf1\fs22\protect0 Lactic Acid\plain\f1  \fs20\uxkb3539\hich\f1\dbch\f1\loch\f1\cf1\fs20\protect0\cell\pard\intbl\ssparaaux0  \s0\ri90\ql\plain\f0\fs22\plain\f1\fs22\txux8718\hich\f1\dbch\f1\loch\f1\cf1\fs22\protect0   3.1 (H)\plain\f1\fs20\kehj5600\hich\f1\dbch\f1\loch\f1\cf1\fs20\protect0\cell\pard  \intbl\ssparaaux0\s0\ri90\ql\plain\f0\fs22\plain\f1\fs22\itbz7641\hich\f1\dbch\f1  \loch\f1\cf1\fs22\protect0 0.5 - 2.2 mmol/L\plain\f1\fs20\fwoi1046\hich\f1\dbch  \f1\loch\f1\cf1\fs20\protect0\cell\intbl\row\trowd\trgaph0\trpaddl0\trpaddfl3\trpaddr0  \trpaddfr3\trpaddt0\trpaddft3\trpaddb0\trpaddfb3\trleft0\clvertalt\clpadt0\clpadft3  \clpadr0\clpadfr3\clpadl0\clpadfl3\clpadb0\clpadfb3\vypqk3120\pard\intbl\ssparaaux0  \s0\ri90\ql\plain\f0\fs22\plain\f1\fs18\gkad4954\hich\f1\dbch\f1\loch\f1\cf6\fs18\b\protect0   Basic Metabolic  Panel\plain\f1\fs20\cwvn8352\hich\f1\dbch\f1\loch\f1\cf1\fs20\protect0  \cell\intbl\row\trowd\trgaph0\trpaddl0\trpaddfl3\trpaddr0\trpaddfr3\trpaddt0\trpaddft3  \trpaddb0\trpaddfb3\trleft0\clvertalt\lixjsrr94\clpadt0\clpadft3\clpadr0\clpadfr3  \clpadl0\clpadfl3\clpadb0\clpadfb3\ppzol4856\clvertalt\fmbuzqv21\clpadt0\clpadft3  \clpadr0\clpadfr3\clpadl0\clpadfl3\clpadb0\clpadfb3\itpfy5134\clvertalt\qghqlpa87  \clpadt0\clpadft3\clpadr0\clpadfr3\clpadl0\clpadfl3\clpadb0\clpadfb3\pjfuq4031\pard  \intbl\ssparaaux0\s0\ri90\ql\plain\f0\fs22\plain\f1\fs18\karh6269\hich\f1\dbch\f1  \loch\f1\cf6\fs18\protect0 Result\plain\f1\fs20\kwfh0456\hich\f1\dbch\f1\loch\f1  \cf1\fs20\protect0\cell\pard\intbl\ssparaaux0\s0\ri90\ql\plain\f0\fs22\plain\f1  \fs18\mhtl2551\hich\f1\dbch\f1\loch\f1\cf6\fs18\protect0 Value\plain\f1\fs20\ppcx7985  \hich\f1\dbch\f1\loch\f1\cf1\fs20\protect0\cell\pard\intbl\ssparaaux0\s0\ri90\ql  \plain\f0\fs22\plain\f1\fs18\hsqg6458\hich\f1\dbch\f1\loch\f1\cf6\fs18\protect0   Ref Range\plain\f1\fs20\hexo0945\hich\f1\dbch\f1\loch\f1\cf1\fs20\protect0\cell  \intbl\row\trowd\trgaph0\trpaddl0\trpaddfl3\trpaddr0\trpaddfr3\trpaddt0\trpaddft3  \trpaddb0\trpaddfb3\trleft0\clvertalt\clpadt0\clpadft3\clpadr0\clpadfr3\clpadl0  \clpadfl3\clpadb0\clpadfb3\wwwpm462\clvertalt\clpadt0\clpadft3\clpadr0\clpadfr3  \clpadl0\clpadfl3\clpadb0\clpadfb3\xpfnc9212\clvertalt\clpadt0\clpadft3\clpadr0  \clpadfr3\clpadl0\clpadfl3\clpadb0\clpadfb3\yefhu4234\clvertalt\clpadt0\clpadft3  \clpadr0\clpadfr3\clpadl0\clpadfl3\clpadb0\clpadfb3\jlfwj1790\pard\intbl\ssparaaux0  \s0\qc\plain\f0\fs22\plain\f1\fs20\kxsd2042\hich\f1\dbch\f1\loch\f1\cf1\fs20\protect0  \cell\pard\intbl\ssparaaux0\s0\ri90\ql\plain\f0\fs22\plain\f1\fs22\nktv5834\hich  \f1\dbch\f1\loch\f1\cf1\fs22\protect0  Sodium\plain\f1\fs20\ockt1210\hich\f1\dbch  \f1\loch\f1\cf1\fs20\protect0\cell\pard\intbl\ssparaaux0\s0\ri90\ql\plain\f0\fs22  \plain\f1\fs22\szjt4544\hich\f1\dbch\f1\loch\f1\cf1\fs22\protect0 136\plain\f1\fs20  \wmit9464\hich\f1\dbch\f1\loch\f1\cf1\fs20\protect0\cell\pard\intbl\ssparaaux0\s0  \ri90\ql\plain\f0\fs22\plain\f1\fs22\jttl5055\hich\f1\dbch\f1\loch\f1\cf1\fs22\protect0   136 - 145 mmol/L\plain\f1\fs20\yndd8581\hich\f1\dbch\f1\loch\f1\cf1\fs20\protect0  \cell\intbl\row\pard\intbl\ssparaaux0\s0\qc\plain\f0\fs22\plain\f1\fs20\sejr9237  \hich\f1\dbch\f1\loch\f1\cf1\fs20\protect0\cell\pard\intbl\ssparaaux0\s0\ri90\ql  \plain\f0\fs22\plain\f1\fs22\ggbw4183\hich\f1\dbch\f1\loch\f1\cf1\fs22\protect0   Potassium\plain\f1\fs20\oirv8585\hich\f1\dbch\f1\loch\f1\cf1\fs20\protect0\cell  \pard\intbl\ssparaaux0\s0\ri90\ql\plain\f0\fs22\plain\f1\fs22\vmtk1184\hich\f1\dbch  \f1\loch\f1\cf1\fs22\protect0 4.5\plain\f1\fs20\zuew5456\hich\f1\dbch\f1\loch\f1  \cf1\fs20\protect0\cell\pard\intbl\ssparaaux0\s0\ri90\ql\plain\f0\fs22\plain\f1  \fs22\jfeu7744\hich\f1\dbch\f1\loch\f1\cf1\fs22\protect0 3.5 - 5.0 mmol/L\plain  \f1\fs20\sqsv3360\hich\f1\dbch\f1\loch\f1\cf1\fs20\protect0\cell\intbl\row\pard  \intbl\ssparaaux0\s0\qc\plain\f0\fs22\plain\f1\fs20\beqy7480\hich\f1\dbch\f1\loch  \f1\cf1\fs20\protect0\cell\pard\intbl\ssparaaux0\s0\ri90\ql\plain\f0\fs22\plain  \f1\fs22\enxa0478\hich\f1\dbch\f1\loch\f1\cf1\fs22\protect0 Chloride\plain\f1\fs20  \pyso3100\hich\f1\dbch\f1\loch\f1\cf1\fs20\protect0\cell\pard\intbl\ssparaaux0\s0  \ri90\ql\plain\f0\fs22\plain\f1\fs22\pcto0621\hich\f1\dbch\f1\loch\f1\cf1\fs22\protect0   101\plain\f1\fs20\pwtp0726\hich\f1\dbch\f1\loch\f1\cf1\fs20\protect0\cell\pard\intbl  \ssparaaux0\s0\ri90\ql\plain\f0\fs22\plain\f1\fs22\pkeu2265\hich\f1\dbch\f1\loch  \f1\cf1\fs22\protect0 98 - 107  mmol/L\plain\f1\fs20\hoey4399\hich\f1\dbch\f1\loch  \f1\cf1\fs20\protect0\cell\intbl\row\pard\intbl\ssparaaux0\s0\qc\plain\f0\fs22\plain  \f1\fs20\fjuz7931\hich\f1\dbch\f1\loch\f1\cf1\fs20\protect0\cell\pard\intbl\ssparaaux0  \s0\ri90\ql\plain\f0\fs22\plain\f1\fs22\txwb5825\hich\f1\dbch\f1\loch\f1\cf1\fs22\protect0   CO2\plain\f1\fs20\ytsx4725\hich\f1\dbch\f1\loch\f1\cf1\fs20\protect0\cell\pard\intbl  \ssparaaux0\s0\ri90\ql\plain\f0\fs22\plain\f1\fs22\jkfw9176\hich\f1\dbch\f1\loch  \f1\cf1\fs22\protect0 22\plain\f1\fs20\wdbc6626\hich\f1\dbch\f1\loch\f1\cf1\fs20  \protect0\cell\pard\intbl\ssparaaux0\s0\ri90\ql\plain\f0\fs22\plain\f1\fs22\ctsq5166  \hich\f1\dbch\f1\loch\f1\cf1\fs22\protect0 22 - 31 mmol/L\plain\f1\fs20\yjgk2084  \hich\f1\dbch\f1\loch\f1\cf1\fs20\protect0\cell\intbl\row\pard\intbl\ssparaaux0  \s0\qc\plain\f0\fs22\plain\f1\fs20\euki8123\hich\f1\dbch\f1\loch\f1\cf1\fs20\protect0  \cell\pard\intbl\ssparaaux0\s0\ri90\ql\plain\f0\fs22\plain\f1\fs22\xzku9482\hich  \f1\dbch\f1\loch\f1\cf1\fs22\protect0 Anion Gap, Calculation\plain\f1\fs20\dook7664  \hich\f1\dbch\f1\loch\f1\cf1\fs20\protect0\cell\pard\intbl\ssparaaux0\s0\ri90\ql  \plain\f0\fs22\plain\f1\fs22\gkbx9718\hich\f1\dbch\f1\loch\f1\cf1\fs22\protect0   13\plain\f1\fs20\myba5925\hich\f1\dbch\f1\loch\f1\cf1\fs20\protect0\cell\pard\intbl  \ssparaaux0\s0\ri90\ql\plain\f0\fs22\plain\f1\fs22\sbrt3034\hich\f1\dbch\f1\loch  \f1\cf1\fs22\protect0 5 - 18 mmol/L\plain\f1\fs20\apzu1421\hich\f1\dbch\f1\loch  \f1\cf1\fs20\protect0\cell\intbl\row\pard\intbl\ssparaaux0\s0\qc\plain\f0\fs22\plain  \f1\fs20\kghs0938\hich\f1\dbch\f1\loch\f1\cf1\fs20\protect0\cell\pard\intbl\ssparaaux0  \s0\ri90\ql\plain\f0\fs22\plain\f1\fs22\xali9341\hich\f1\dbch\f1\loch\f1\cf1\fs22\protect0   Glucose\plain\f1\fs20\dplz6678\hich\f1\dbch\f1\loch\f1\cf1\fs20\protect0\cell\pard  \intbl\ssparaaux0\s0\ri90\ql\plain\f0\fs22\plain\f1\fs22\kfru8211\hich\f1\dbch\f1  \loch\f1\cf1\fs22\protect0 465  (HH)\plain\f1\fs20\ereo4257\hich\f1\dbch\f1\loch  \f1\cf1\fs20\protect0\cell\pard\intbl\ssparaaux0\s0\ri90\ql\plain\f0\fs22\plain  \f1\fs22\hsey0218\hich\f1\dbch\f1\loch\f1\cf1\fs22\protect0 70 - 125 mg/dL\plain  \f1\fs20\rprt3735\hich\f1\dbch\f1\loch\f1\cf1\fs20\protect0\cell\intbl\row\pard  \intbl\ssparaaux0\s0\qc\plain\f0\fs22\plain\f1\fs20\zigv6246\hich\f1\dbch\f1\loch  \f1\cf1\fs20\protect0\cell\pard\intbl\ssparaaux0\s0\ri90\ql\plain\f0\fs22\plain  \f1\fs22\drng0324\hich\f1\dbch\f1\loch\f1\cf1\fs22\protect0 Calcium\plain\f1\fs20  \nenm7618\hich\f1\dbch\f1\loch\f1\cf1\fs20\protect0\cell\pard\intbl\ssparaaux0\s0  \ri90\ql\plain\f0\fs22\plain\f1\fs22\urgd8660\hich\f1\dbch\f1\loch\f1\cf1\fs22\protect0   9.4\plain\f1\fs20\elgw4704\hich\f1\dbch\f1\loch\f1\cf1\fs20\protect0\cell\pard\intbl  \ssparaaux0\s0\ri90\ql\plain\f0\fs22\plain\f1\fs22\gtuo9242\hich\f1\dbch\f1\loch  \f1\cf1\fs22\protect0 8.5 - 10.5 mg/dL\plain\f1\fs20\ogob5233\hich\f1\dbch\f1\loch  \f1\cf1\fs20\protect0\cell\intbl\row\pard\intbl\ssparaaux0\s0\qc\plain\f0\fs22\plain  \f1\fs20\udil8250\hich\f1\dbch\f1\loch\f1\cf1\fs20\protect0\cell\pard\intbl\ssparaaux0  \s0\ri90\ql\plain\f0\fs22\plain\f1\fs22\fftx2240\hich\f1\dbch\f1\loch\f1\cf1\fs22\protect0   BUN\plain\f1\fs20\vjia6024\hich\f1\dbch\f1\loch\f1\cf1\fs20\protect0\cell\pard\intbl  \ssparaaux0\s0\ri90\ql\plain\f0\fs22\plain\f1\fs22\waym4255\hich\f1\dbch\f1\loch  \f1\cf1\fs22\protect0 15\plain\f1\fs20\aogt3776\hich\f1\dbch\f1\loch\f1\cf1\fs20  \protect0\cell\pard\intbl\ssparaaux0\s0\ri90\ql\plain\f0\fs22\plain\f1\fs22\lsar3680  \hich\f1\dbch\f1\loch\f1\cf1\fs22\protect0 8 - 22 mg/dL\plain\f1\fs20\iykv5690\hich  \f1\dbch\f1\loch\f1\cf1\fs20\protect0\cell\intbl\row\pard\intbl\ssparaaux0\s0\qc  \plain\f0\fs22\plain\f1\fs20\yooq2950\hich\f1\dbch\f1\loch\f1\cf1\fs20\protect0  \cell\pard\intbl\ssparaaux0\s0\ri90\ql\plain\f0\fs22\plain\f1\fs22\lxtf7197\hich  \f1\dbch\f1\loch\f1\cf1\fs22\protect0  Creatinine\plain\f1\fs20\mems1009\hich\f1  \dbch\f1\loch\f1\cf1\fs20\protect0\cell\pard\intbl\ssparaaux0\s0\ri90\ql\plain\f0  \fs22\plain\f1\fs22\xzmt7136\hich\f1\dbch\f1\loch\f1\cf1\fs22\protect0 1.09\plain  \f1\fs20\boya0049\hich\f1\dbch\f1\loch\f1\cf1\fs20\protect0\cell\pard\intbl\ssparaaux0  \s0\ri90\ql\plain\f0\fs22\plain\f1\fs22\uhni1318\hich\f1\dbch\f1\loch\f1\cf1\fs22\protect0   0.60 - 1.10 mg/dL\plain\f1\fs20\pdtr6628\hich\f1\dbch\f1\loch\f1\cf1\fs20\protect0  \cell\intbl\row\pard\intbl\ssparaaux0\s0\qc\plain\f0\fs22\plain\f1\fs20\rbgd5728  \hich\f1\dbch\f1\loch\f1\cf1\fs20\protect0\cell\pard\intbl\ssparaaux0\s0\ri90\ql  \plain\f0\fs22\plain\f1\fs22\etwx5883\hich\f1\dbch\f1\loch\f1\cf1\fs22\protect0   GFR MDRD Af Amer\plain\f1\fs20\xemv3158\hich\f1\dbch\f1\loch\f1\cf1\fs20\protect0  \cell\pard\intbl\ssparaaux0\s0\ri90\ql\plain\f0\fs22\plain\f1\fs22\upgw0802\hich  \f1\dbch\f1\loch\f1\cf1\fs22\protect0 >60\plain\f1\fs20\bynj7595\hich\f1\dbch\f1  \loch\f1\cf1\fs20\protect0\cell\pard\intbl\ssparaaux0\s0\ri90\ql\plain\f0\fs22\plain  \f1\fs22\pqja3153\hich\f1\dbch\f1\loch\f1\cf1\fs22\protect0 >60 mL/min/1.73m2\plain  \f1\fs20\yqtc3318\hich\f1\dbch\f1\loch\f1\cf1\fs20\protect0\cell\intbl\row\pard  \intbl\ssparaaux0\s0\qc\plain\f0\fs22\plain\f1\fs20\iqos4320\hich\f1\dbch\f1\loch  \f1\cf1\fs20\protect0\cell\pard\intbl\ssparaaux0\s0\ri90\ql\plain\f0\fs22\plain  \f1\fs22\bccw9507\hich\f1\dbch\f1\loch\f1\cf1\fs22\protect0 GFR MDRD Non Af Amer  \plain\f1\fs20\glqk4297\hich\f1\dbch\f1\loch\f1\cf1\fs20\protect0\cell\pard\intbl  \ssparaaux0\s0\ri90\ql\plain\f0\fs22\plain\f1\fs22\dzon0384\hich\f1\dbch\f1\loch  \f1\cf1\fs22\protect0 56 (L)\plain\f1\fs20\jzkc7593\hich\f1\dbch\f1\loch\f1\cf1  \fs20\protect0\cell\pard\intbl\ssparaaux0\s0\ri90\ql\plain\f0\fs22\plain\f1\fs22  \uvzs3403\hich\f1\dbch\f1\loch\f1\cf1\fs22\protect0 >60  mL/min/1.73m2\plain\f1\fs20  \jfwv7345\hich\f1\dbch\f1\loch\f1\cf1\fs20\protect0\cell\intbl\row\trowd\trgaph0  \trpaddl0\trpaddfl3\trpaddr0\trpaddfr3\trpaddt0\trpaddft3\trpaddb0\trpaddfb3\trleft0  \clvertalt\clpadt0\clpadft3\clpadr0\clpadfr3\clpadl0\clpadfl3\clpadb0\clpadfb3\dalxx8859  \pard\intbl\ssparaaux0\s0\ri90\ql\plain\f0\fs22\plain\f1\fs18\vxji5511\hich\f1\dbch  \f1\loch\f1\cf6\fs18\b\protect0 Hepatic Profile\plain\f1\fs20\wvvn5250\hich\f1\dbch  \f1\loch\f1\cf1\fs20\protect0\cell\intbl\row\trowd\trgaph0\trpaddl0\trpaddfl3\trpaddr0  \trpaddfr3\trpaddt0\trpaddft3\trpaddb0\trpaddfb3\trleft0\clvertalt\wgmgibe43\clpadt0  \clpadft3\clpadr0\clpadfr3\clpadl0\clpadfl3\clpadb0\clpadfb3\oppjl5430\clvertalt  \yuqczet24\clpadt0\clpadft3\clpadr0\clpadfr3\clpadl0\clpadfl3\clpadb0\clpadfb3\qhpxw7677  \clvertalt\jifpiub70\clpadt0\clpadft3\clpadr0\clpadfr3\clpadl0\clpadfl3\clpadb0  \clpadfb3\bfmbq7242\pard\intbl\ssparaaux0\s0\ri90\ql\plain\f0\fs22\plain\f1\fs18  \yojv7458\hich\f1\dbch\f1\loch\f1\cf6\fs18\protect0 Result\plain\f1\fs20\zojb7898  \hich\f1\dbch\f1\loch\f1\cf1\fs20\protect0\cell\pard\intbl\ssparaaux0\s0\ri90\ql  \plain\f0\fs22\plain\f1\fs18\tfkp8843\hich\f1\dbch\f1\loch\f1\cf6\fs18\protect0   Value\plain\f1\fs20\xluu7074\hich\f1\dbch\f1\loch\f1\cf1\fs20\protect0\cell\pard  \intbl\ssparaaux0\s0\ri90\ql\plain\f0\fs22\plain\f1\fs18\rslc8691\hich\f1\dbch\f1  \loch\f1\cf6\fs18\protect0 Ref  Range\plain\f1\fs20\vwxc8258\hich\f1\dbch\f1\loch  \f1\cf1\fs20\protect0\cell\intbl\row\trowd\trgaph0\trpaddl0\trpaddfl3\trpaddr0\trpaddfr3  \trpaddt0\trpaddft3\trpaddb0\trpaddfb3\trleft0\clvertalt\clpadt0\clpadft3\clpadr0  \clpadfr3\clpadl0\clpadfl3\clpadb0\clpadfb3\wgklt456\clvertalt\clpadt0\clpadft3  \clpadr0\clpadfr3\clpadl0\clpadfl3\clpadb0\clpadfb3\rodkr1907\clvertalt\clpadt0  \clpadft3\clpadr0\clpadfr3\clpadl0\clpadfl3\clpadb0\clpadfb3\zknkl6965\clvertalt  \clpadt0\clpadft3\clpadr0\clpadfr3\clpadl0\clpadfl3\clpadb0\clpadfb3\citqs0485\pard  \intbl\ssparaaux0\s0\qc\plain\f0\fs22\plain\f1\fs20\hhas4382\hich\f1\dbch\f1\loch  \f1\cf1\fs20\protect0\cell\pard\intbl\ssparaaux0\s0\ri90\ql\plain\f0\fs22\plain  \f1\fs22\oqmm5009\hich\f1\dbch\f1\loch\f1\cf1\fs22\protect0 Bilirubin, Total\plain  \f1\fs20\mfsv9382\hich\f1\dbch\f1\loch\f1\cf1\fs20\protect0\cell\pard\intbl\ssparaaux0  \s0\ri90\ql\plain\f0\fs22\plain\f1\fs22\rggb0418\hich\f1\dbch\f1\loch\f1\cf1\fs22\protect0   1.0\plain\f1\fs20\npqw3743\hich\f1\dbch\f1\loch\f1\cf1\fs20\protect0\cell\pard\intbl  \ssparaaux0\s0\ri90\ql\plain\f0\fs22\plain\f1\fs22\nafr4384\hich\f1\dbch\f1\loch  \f1\cf1\fs22\protect0 0.0 - 1.0 mg/dL\plain\f1\fs20\elsk7266\hich\f1\dbch\f1\loch  \f1\cf1\fs20\protect0\cell\intbl\row\pard\intbl\ssparaaux0\s0\qc\plain\f0\fs22\plain  \f1\fs20\vvta2039\hich\f1\dbch\f1\loch\f1\cf1\fs20\protect0\cell\pard\intbl\ssparaaux0  \s0\ri90\ql\plain\f0\fs22\plain\f1\fs22\wmzx1183\hich\f1\dbch\f1\loch\f1\cf1\fs22\protect0   Bilirubin, Direct\plain\f1\fs20\gyfz3066\hich\f1\dbch\f1\loch\f1\cf1\fs20\protect0  \cell\pard\intbl\ssparaaux0\s0\ri90\ql\plain\f0\fs22\plain\f1\fs22\qper2319\hich  \f1\dbch\f1\loch\f1\cf1\fs22\protect0 0.3\plain\f1\fs20\mddn2951\hich\f1\dbch\f1  \loch\f1\cf1\fs20\protect0\cell\pard\intbl\ssparaaux0\s0\ri90\ql\plain\f0\fs22\plain  \f1\fs22\beho6870\hich\f1\dbch\f1\loch\f1\cf1\fs22\protect0 <=0.5  mg/dL\plain\f1  \fs20\nfyo8472\hich\f1\dbch\f1\loch\f1\cf1\fs20\protect0\cell\intbl\row\pard\intbl  \ssparaaux0\s0\qc\plain\f0\fs22\plain\f1\fs20\jzkq7145\hich\f1\dbch\f1\loch\f1\cf1  \fs20\protect0\cell\pard\intbl\ssparaaux0\s0\ri90\ql\plain\f0\fs22\plain\f1\fs22  \myub3425\hich\f1\dbch\f1\loch\f1\cf1\fs22\protect0 Protein, Total\plain\f1\fs20  \msdb9384\hich\f1\dbch\f1\loch\f1\cf1\fs20\protect0\cell\pard\intbl\ssparaaux0\s0  \ri90\ql\plain\f0\fs22\plain\f1\fs22\npiz3515\hich\f1\dbch\f1\loch\f1\cf1\fs22\protect0   7.1\plain\f1\fs20\mnfc4385\hich\f1\dbch\f1\loch\f1\cf1\fs20\protect0\cell\pard\intbl  \ssparaaux0\s0\ri90\ql\plain\f0\fs22\plain\f1\fs22\cmfy0452\hich\f1\dbch\f1\loch  \f1\cf1\fs22\protect0 6.0 - 8.0 g/dL\plain\f1\fs20\iiut1389\hich\f1\dbch\f1\loch  \f1\cf1\fs20\protect0\cell\intbl\row\pard\intbl\ssparaaux0\s0\qc\plain\f0\fs22\plain  \f1\fs20\xirl5489\hich\f1\dbch\f1\loch\f1\cf1\fs20\protect0\cell\pard\intbl\ssparaaux0  \s0\ri90\ql\plain\f0\fs22\plain\f1\fs22\vedh5629\hich\f1\dbch\f1\loch\f1\cf1\fs22\protect0   Albumin\plain\f1\fs20\uekm0232\hich\f1\dbch\f1\loch\f1\cf1\fs20\protect0\cell\pard  \intbl\ssparaaux0\s0\ri90\ql\plain\f0\fs22\plain\f1\fs22\yzeb8275\hich\f1\dbch\f1  \loch\f1\cf1\fs22\protect0 4.1\plain\f1\fs20\dnul6233\hich\f1\dbch\f1\loch\f1\cf1  \fs20\protect0\cell\pard\intbl\ssparaaux0\s0\ri90\ql\plain\f0\fs22\plain\f1\fs22  \ijer3212\hich\f1\dbch\f1\loch\f1\cf1\fs22\protect0 3.5 - 5.0 g/dL\plain\f1\fs20  \hudx1867\hich\f1\dbch\f1\loch\f1\cf1\fs20\protect0\cell\intbl\row\pard\intbl\ssparaaux0  \s0\qc\plain\f0\fs22\plain\f1\fs20\waem9118\hich\f1\dbch\f1\loch\f1\cf1\fs20\protect0  \cell\pard\intbl\ssparaaux0\s0\ri90\ql\plain\f0\fs22\plain\f1\fs22\vmyg3416\hich  \f1\dbch\f1\loch\f1\cf1\fs22\protect0 Alkaline  Phosphatase\plain\f1\fs20\xykd9778  \hich\f1\dbch\f1\loch\f1\cf1\fs20\protect0\cell\pard\intbl\ssparaaux0\s0\ri90\ql  \plain\f0\fs22\plain\f1\fs22\xdvc6144\hich\f1\dbch\f1\loch\f1\cf1\fs22\protect0   48\plain\f1\fs20\jxid8910\hich\f1\dbch\f1\loch\f1\cf1\fs20\protect0\cell\pard\intbl  \ssparaaux0\s0\ri90\ql\plain\f0\fs22\plain\f1\fs22\faby8504\hich\f1\dbch\f1\loch  \f1\cf1\fs22\protect0 45 - 120 U/L\plain\f1\fs20\xyep0953\hich\f1\dbch\f1\loch\f1  \cf1\fs20\protect0\cell\intbl\row\pard\intbl\ssparaaux0\s0\qc\plain\f0\fs22\plain  \f1\fs20\jenf1455\hich\f1\dbch\f1\loch\f1\cf1\fs20\protect0\cell\pard\intbl\ssparaaux0  \s0\ri90\ql\plain\f0\fs22\plain\f1\fs22\bmle5910\hich\f1\dbch\f1\loch\f1\cf1\fs22\protect0   AST\plain\f1\fs20\goeg8650\hich\f1\dbch\f1\loch\f1\cf1\fs20\protect0\cell\pard\intbl  \ssparaaux0\s0\ri90\ql\plain\f0\fs22\plain\f1\fs22\ucoh1240\hich\f1\dbch\f1\loch  \f1\cf1\fs22\protect0 17\plain\f1\fs20\qedq1938\hich\f1\dbch\f1\loch\f1\cf1\fs20  \protect0\cell\pard\intbl\ssparaaux0\s0\ri90\ql\plain\f0\fs22\plain\f1\fs22\pinn2054  \hich\f1\dbch\f1\loch\f1\cf1\fs22\protect0 0 - 40 U/L\plain\f1\fs20\urnv2331\hich  \f1\dbch\f1\loch\f1\cf1\fs20\protect0\cell\intbl\row\pard\intbl\ssparaaux0\s0\qc  \plain\f0\fs22\plain\f1\fs20\pyox7881\hich\f1\dbch\f1\loch\f1\cf1\fs20\protect0  \cell\pard\intbl\ssparaaux0\s0\ri90\ql\plain\f0\fs22\plain\f1\fs22\dkky1283\hich  \f1\dbch\f1\loch\f1\cf1\fs22\protect0 ALT\plain\f1\fs20\mgrx5043\hich\f1\dbch\f1  \loch\f1\cf1\fs20\protect0\cell\pard\intbl\ssparaaux0\s0\ri90\ql\plain\f0\fs22\plain  \f1\fs22\ypan9765\hich\f1\dbch\f1\loch\f1\cf1\fs22\protect0 19\plain\f1\fs20\lhlo5454  \hich\f1\dbch\f1\loch\f1\cf1\fs20\protect0\cell\pard\intbl\ssparaaux0\s0\ri90\ql  \plain\f0\fs22\plain\f1\fs22\scbf8912\hich\f1\dbch\f1\loch\f1\cf1\fs22\protect0   0 - 45  U/L\plain\f1\fs20\qjhh4449\hich\f1\dbch\f1\loch\f1\cf1\fs20\protect0\cell  \intbl\row\trowd\trgaph0\trpaddl0\trpaddfl3\trpaddr0\trpaddfr3\trpaddt0\trpaddft3  \trpaddb0\trpaddfb3\trleft0\clvertalt\clpadt0\clpadft3\clpadr0\clpadfr3\clpadl0  \clpadfl3\clpadb0\clpadfb3\vktmb9428\pard\intbl\ssparaaux0\s0\ri90\ql\plain\f0\fs22  \plain\f1\fs18\imak5043\hich\f1\dbch\f1\loch\f1\cf6\fs18\b\protect0 D-dimer, Quantitative  \plain\f1\fs20\ukui4734\hich\f1\dbch\f1\loch\f1\cf1\fs20\protect0\cell\intbl\row  \trowd\trgaph0\trpaddl0\trpaddfl3\trpaddr0\trpaddfr3\trpaddt0\trpaddft3\trpaddb0  \trpaddfb3\trleft0\clvertalt\auvyzsw52\clpadt0\clpadft3\clpadr0\clpadfr3\clpadl0  \clpadfl3\clpadb0\clpadfb3\sksin4269\clvertalt\dbptarh54\clpadt0\clpadft3\clpadr0  \clpadfr3\clpadl0\clpadfl3\clpadb0\clpadfb3\pnhzh6517\clvertalt\vbhznms46\clpadt0  \clpadft3\clpadr0\clpadfr3\clpadl0\clpadfl3\clpadb0\clpadfb3\hvqqo4164\pard\intbl  \ssparaaux0\s0\ri90\ql\plain\f0\fs22\plain\f1\fs18\kmrn5651\hich\f1\dbch\f1\loch  \f1\cf6\fs18\protect0 Result\plain\f1\fs20\tstx6042\hich\f1\dbch\f1\loch\f1\cf1  \fs20\protect0\cell\pard\intbl\ssparaaux0\s0\ri90\ql\plain\f0\fs22\plain\f1\fs18  \tkdx9123\hich\f1\dbch\f1\loch\f1\cf6\fs18\protect0 Value\plain\f1\fs20\myeq1869  \hich\f1\dbch\f1\loch\f1\cf1\fs20\protect0\cell\pard\intbl\ssparaaux0\s0\ri90\ql  \plain\f0\fs22\plain\f1\fs18\mkps2277\hich\f1\dbch\f1\loch\f1\cf6\fs18\protect0   Ref  Range\plain\f1\fs20\hpzz0364\hich\f1\dbch\f1\loch\f1\cf1\fs20\protect0\cell  \intbl\row\trowd\trgaph0\trpaddl0\trpaddfl3\trpaddr0\trpaddfr3\trpaddt0\trpaddft3  \trpaddb0\trpaddfb3\trleft0\clvertalt\clpadt0\clpadft3\clpadr0\clpadfr3\clpadl0  \clpadfl3\clpadb0\clpadfb3\tuzcj845\clvertalt\clpadt0\clpadft3\clpadr0\clpadfr3  \clpadl0\clpadfl3\clpadb0\clpadfb3\jkwwh4632\clvertalt\clpadt0\clpadft3\clpadr0  \clpadfr3\clpadl0\clpadfl3\clpadb0\clpadfb3\xghzl5431\clvertalt\clpadt0\clpadft3  \clpadr0\clpadfr3\clpadl0\clpadfl3\clpadb0\clpadfb3\trtft1969\pard\intbl\ssparaaux0  \s0\qc\plain\f0\fs22\plain\f1\fs20\ihvv0016\hich\f1\dbch\f1\loch\f1\cf1\fs20\protect0  \cell\pard\intbl\ssparaaux0\s0\ri90\ql\plain\f0\fs22\plain\f1\fs22\yvhb3136\hich  \f1\dbch\f1\loch\f1\cf1\fs22\protect0 D-Dimer, Quant\plain\f1\fs20\sqmf9761\hich  \f1\dbch\f1\loch\f1\cf1\fs20\protect0\cell\pard\intbl\ssparaaux0\s0\ri90\ql\plain  \f0\fs22\plain\f1\fs22\wmkx0811\hich\f1\dbch\f1\loch\f1\cf1\fs22\protect0 <0.27  \plain\f1\fs20\xgiq8907\hich\f1\dbch\f1\loch\f1\cf1\fs20\protect0\cell\pard\intbl  \ssparaaux0\s0\ri90\ql\plain\f0\fs22\plain\f1\fs22\ihgw4167\hich\f1\dbch\f1\loch  \f1\cf1\fs22\protect0 <=0.50 FEU ug/mL\plain\f1\fs20\dwft0856\hich\f1\dbch\f1\loch  \f1\cf1\fs20\protect0\cell\intbl\row\trowd\trgaph0\trpaddl0\trpaddfl3\trpaddr0\trpaddfr3  \trpaddt0\trpaddft3\trpaddb0\trpaddfb3\trleft0\clvertalt\clpadt0\clpadft3\clpadr0  \clpadfr3\clpadl0\clpadfl3\clpadb0\clpadfb3\yqknv9756\pard\intbl\ssparaaux0\s0\ri90  \ql\plain\f0\fs22\plain\f1\fs18\fzpt7201\hich\f1\dbch\f1\loch\f1\cf6\fs18\b\protect0   Lactic  Acid\plain\f1\fs20\yrbk2300\hich\f1\dbch\f1\loch\f1\cf1\fs20\protect0\cell  \intbl\row\trowd\trgaph0\trpaddl0\trpaddfl3\trpaddr0\trpaddfr3\trpaddt0\trpaddft3  \trpaddb0\trpaddfb3\trleft0\clvertalt\foprosf60\clpadt0\clpadft3\clpadr0\clpadfr3  \clpadl0\clpadfl3\clpadb0\clpadfb3\sbfor1994\clvertalt\hrdlnpf61\clpadt0\clpadft3  \clpadr0\clpadfr3\clpadl0\clpadfl3\clpadb0\clpadfb3\olewi8031\clvertalt\avscrxi96  \clpadt0\clpadft3\clpadr0\clpadfr3\clpadl0\clpadfl3\clpadb0\clpadfb3\vxnwb3276\pard  \intbl\ssparaaux0\s0\ri90\ql\plain\f0\fs22\plain\f1\fs18\tpyj9870\hich\f1\dbch\f1  \loch\f1\cf6\fs18\protect0 Result\plain\f1\fs20\dzyz9718\hich\f1\dbch\f1\loch\f1  \cf1\fs20\protect0\cell\pard\intbl\ssparaaux0\s0\ri90\ql\plain\f0\fs22\plain\f1  \fs18\oxsk9409\hich\f1\dbch\f1\loch\f1\cf6\fs18\protect0 Value\plain\f1\fs20\edby5871  \hich\f1\dbch\f1\loch\f1\cf1\fs20\protect0\cell\pard\intbl\ssparaaux0\s0\ri90\ql  \plain\f0\fs22\plain\f1\fs18\hicc6956\hich\f1\dbch\f1\loch\f1\cf6\fs18\protect0   Ref Range\plain\f1\fs20\yjkf8561\hich\f1\dbch\f1\loch\f1\cf1\fs20\protect0\cell  \intbl\row\trowd\trgaph0\trpaddl0\trpaddfl3\trpaddr0\trpaddfr3\trpaddt0\trpaddft3  \trpaddb0\trpaddfb3\trleft0\clvertalt\clpadt0\clpadft3\clpadr0\clpadfr3\clpadl0  \clpadfl3\clpadb0\clpadfb3\qlmlu433\clvertalt\clpadt0\clpadft3\clpadr0\clpadfr3  \clpadl0\clpadfl3\clpadb0\clpadfb3\oqknn0840\clvertalt\clpadt0\clpadft3\clpadr0  \clpadfr3\clpadl0\clpadfl3\clpadb0\clpadfb3\dunik6117\clvertalt\clpadt0\clpadft3  \clpadr0\clpadfr3\clpadl0\clpadfl3\clpadb0\clpadfb3\pkyeg7399\pard\intbl\ssparaaux0  \s0\qc\plain\f0\fs22\plain\f1\fs20\shve8671\hich\f1\dbch\f1\loch\f1\cf1\fs20\protect0  \cell\pard\intbl\ssparaaux0\s0\ri90\ql\plain\f0\fs22\plain\f1\fs22\scqe2933\hich  \f1\dbch\f1\loch\f1\cf1\fs22\protect0 Lactic  Acid\plain\f1\fs20\ckrs0729\hich\f1  \dbch\f1\loch\f1\cf1\fs20\protect0\cell\pard\intbl\ssparaaux0\s0\ri90\ql\plain\f0  \fs22\plain\f1\fs22\xkzo0544\hich\f1\dbch\f1\loch\f1\cf1\fs22\protect0 2.0\plain  \f1\fs20\cvku7346\hich\f1\dbch\f1\loch\f1\cf1\fs20\protect0\cell\pard\intbl\ssparaaux0  \s0\ri90\ql\plain\f0\fs22\plain\f1\fs22\mlpq8005\hich\f1\dbch\f1\loch\f1\cf1\fs22\protect0   0.5 - 2.2 mmol/L\plain\f1\fs20\rgew1956\hich\f1\dbch\f1\loch\f1\cf1\fs20\protect0  \cell\intbl\row\trowd\trgaph0\trpaddl0\trpaddfl3\trpaddr0\trpaddfr3\trpaddt0\trpaddft3  \trpaddb0\trpaddfb3\trleft0\clvertalt\clpadt0\clpadft3\clpadr0\clpadfr3\clpadl0  \clpadfl3\clpadb0\clpadfb3\nkpzd1397\pard\intbl\ssparaaux0\s0\ri90\ql\plain\f0\fs22  \plain\f1\fs18\bqom8236\hich\f1\dbch\f1\loch\f1\cf6\fs18\b\protect0 POCT Glucose  \plain\f1\fs20\hawg2617\hich\f1\dbch\f1\loch\f1\cf1\fs20\protect0\cell\intbl\row  \trowd\trgaph0\trpaddl0\trpaddfl3\trpaddr0\trpaddfr3\trpaddt0\trpaddft3\trpaddb0  \trpaddfb3\trleft0\clvertalt\qleiqmk80\clpadt0\clpadft3\clpadr0\clpadfr3\clpadl0  \clpadfl3\clpadb0\clpadfb3\ajwmk2388\clvertalt\bovfhsg41\clpadt0\clpadft3\clpadr0  \clpadfr3\clpadl0\clpadfl3\clpadb0\clpadfb3\wqmju5777\clvertalt\odvovly32\clpadt0  \clpadft3\clpadr0\clpadfr3\clpadl0\clpadfl3\clpadb0\clpadfb3\cmdaf1897\pard\intbl  \ssparaaux0\s0\ri90\ql\plain\f0\fs22\plain\f1\fs18\mwmz4553\hich\f1\dbch\f1\loch  \f1\cf6\fs18\protect0 Result\plain\f1\fs20\xwvt3887\hich\f1\dbch\f1\loch\f1\cf1  \fs20\protect0\cell\pard\intbl\ssparaaux0\s0\ri90\ql\plain\f0\fs22\plain\f1\fs18  \dxpr8075\hich\f1\dbch\f1\loch\f1\cf6\fs18\protect0 Value\plain\f1\fs20\pctm5308  \hich\f1\dbch\f1\loch\f1\cf1\fs20\protect0\cell\pard\intbl\ssparaaux0\s0\ri90\ql  \plain\f0\fs22\plain\f1\fs18\ksuq3503\hich\f1\dbch\f1\loch\f1\cf6\fs18\protect0   Ref  Range\plain\f1\fs20\zruh5050\hich\f1\dbch\f1\loch\f1\cf1\fs20\protect0\cell  \intbl\row\trowd\trgaph0\trpaddl0\trpaddfl3\trpaddr0\trpaddfr3\trpaddt0\trpaddft3  \trpaddb0\trpaddfb3\trleft0\clvertalt\clpadt0\clpadft3\clpadr0\clpadfr3\clpadl0  \clpadfl3\clpadb0\clpadfb3\dxzly477\clvertalt\clpadt0\clpadft3\clpadr0\clpadfr3  \clpadl0\clpadfl3\clpadb0\clpadfb3\cgjxm7682\clvertalt\clpadt0\clpadft3\clpadr0  \clpadfr3\clpadl0\clpadfl3\clpadb0\clpadfb3\mpzak0123\clvertalt\clpadt0\clpadft3  \clpadr0\clpadfr3\clpadl0\clpadfl3\clpadb0\clpadfb3\hytjv4382\pard\intbl\ssparaaux0  \s0\qc\plain\f0\fs22\plain\f1\fs20\roha6101\hich\f1\dbch\f1\loch\f1\cf1\fs20\protect0  \cell\pard\intbl\ssparaaux0\s0\ri90\ql\plain\f0\fs22\plain\f1\fs22\attr4102\hich  \f1\dbch\f1\loch\f1\cf1\fs22\protect0 Glucose\plain\f1\fs20\tspp2913\hich\f1\dbch  \f1\loch\f1\cf1\fs20\protect0\cell\pard\intbl\ssparaaux0\s0\ri90\ql\plain\f0\fs22  \plain\f1\fs22\rwzd6526\hich\f1\dbch\f1\loch\f1\cf1\fs22\protect0 408 (H)\plain  \f1\fs20\tvyw2760\hich\f1\dbch\f1\loch\f1\cf1\fs20\protect0\cell\pard\intbl\ssparaaux0  \s0\ri90\ql\plain\f0\fs22\plain\f1\fs22\nyet9817\hich\f1\dbch\f1\loch\f1\cf1\fs22\protect0   70 - 139 mg/dL\plain\f1\fs20\gbga7589\hich\f1\dbch\f1\loch\f1\cf1\fs20\protect0  \cell\intbl\row\trowd\trgaph0\trpaddl0\trpaddfl3\trpaddr0\trpaddfr3\trpaddt0\trpaddft3  \trpaddb0\trpaddfb3\trleft0\clvertalt\clpadt0\clpadft3\clpadr0\clpadfr3\clpadl0  \clpadfl3\clpadb0\clpadfb3\cyegj8291\pard\intbl\ssparaaux0\s0\ri90\ql\plain\f0\fs22  \plain\f1\fs18\ldod6686\hich\f1\dbch\f1\loch\f1\cf6\fs18\b\protect0 POCT pregnancy,    urine\plain\f1\fs20\icjk3443\hich\f1\dbch\f1\loch\f1\cf1\fs20\protect0\cell\intbl  \row\trowd\trgaph0\trpaddl0\trpaddfl3\trpaddr0\trpaddfr3\trpaddt0\trpaddft3\trpaddb0  \trpaddfb3\trleft0\clvertalt\rupwpgg23\clpadt0\clpadft3\clpadr0\clpadfr3\clpadl0  \clpadfl3\clpadb0\clpadfb3\udzws1280\clvertalt\mvjkasu94\clpadt0\clpadft3\clpadr0  \clpadfr3\clpadl0\clpadfl3\clpadb0\clpadfb3\hxtvj6689\clvertalt\cqhrzrm82\clpadt0  \clpadft3\clpadr0\clpadfr3\clpadl0\clpadfl3\clpadb0\clpadfb3\hnfcw5072\pard\intbl  \ssparaaux0\s0\ri90\ql\plain\f0\fs22\plain\f1\fs18\ydpa4868\hich\f1\dbch\f1\loch  \f1\cf6\fs18\protect0 Result\plain\f1\fs20\xwzw7752\hich\f1\dbch\f1\loch\f1\cf1  \fs20\protect0\cell\pard\intbl\ssparaaux0\s0\ri90\ql\plain\f0\fs22\plain\f1\fs18  \vkcn6363\hich\f1\dbch\f1\loch\f1\cf6\fs18\protect0 Value\plain\f1\fs20\wple4696  \hich\f1\dbch\f1\loch\f1\cf1\fs20\protect0\cell\pard\intbl\ssparaaux0\s0\ri90\ql  \plain\f0\fs22\plain\f1\fs18\nkgh1503\hich\f1\dbch\f1\loch\f1\cf6\fs18\protect0   Ref Range\plain\f1\fs20\boya5343\hich\f1\dbch\f1\loch\f1\cf1\fs20\protect0\cell  \intbl\row\trowd\trgaph0\trpaddl0\trpaddfl3\trpaddr0\trpaddfr3\trpaddt0\trpaddft3  \trpaddb0\trpaddfb3\trleft0\clvertalt\clpadt0\clpadft3\clpadr0\clpadfr3\clpadl0  \clpadfl3\clpadb0\clpadfb3\azflj286\clvertalt\clpadt0\clpadft3\clpadr0\clpadfr3  \clpadl0\clpadfl3\clpadb0\clpadfb3\hraqj2550\clvertalt\clpadt0\clpadft3\clpadr0  \clpadfr3\clpadl0\clpadfl3\clpadb0\clpadfb3\ximap3676\clvertalt\clpadt0\clpadft3  \clpadr0\clpadfr3\clpadl0\clpadfl3\clpadb0\clpadfb3\fswlv9454\pard\intbl\ssparaaux0  \s0\qc\plain\f0\fs22\plain\f1\fs20\ysri7735\hich\f1\dbch\f1\loch\f1\cf1\fs20\protect0  \cell\pard\intbl\ssparaaux0\s0\ri90\ql\plain\f0\fs22\plain\f1\fs22\bhzr1871\hich  \f1\dbch\f1\loch\f1\cf1\fs22\protect0 POC Preg,  Urine\plain\f1\fs20\roig0891\hich  \f1\dbch\f1\loch\f1\cf1\fs20\protect0\cell\pard\intbl\ssparaaux0\s0\ri90\ql\plain  \f0\fs22\plain\f1\fs22\bvea6734\hich\f1\dbch\f1\loch\f1\cf1\fs22\protect0 Negative  \plain\f1\fs20\vcte1170\hich\f1\dbch\f1\loch\f1\cf1\fs20\protect0\cell\pard\intbl  \ssparaaux0\s0\ri90\ql\plain\f0\fs22\plain\f1\fs22\fohb8711\hich\f1\dbch\f1\loch  \f1\cf1\fs22\protect0 Negative\plain\f1\fs20\kuso0203\hich\f1\dbch\f1\loch\f1\cf1  \fs20\protect0\cell\intbl\row\pard\intbl\ssparaaux0\s0\qc\plain\f0\fs22\plain\f1  \fs20\zxpa2630\hich\f1\dbch\f1\loch\f1\cf1\fs20\protect0\cell\pard\intbl\ssparaaux0  \s0\ri90\ql\plain\f0\fs22\plain\f1\fs22\unfm8433\hich\f1\dbch\f1\loch\f1\cf1\fs22\protect0   POCT Kit Lot Number\plain\f1\fs20\ijhf8167\hich\f1\dbch\f1\loch\f1\cf1\fs20\protect0  \cell\pard\intbl\ssparaaux0\s0\ri90\ql\plain\f0\fs22\plain\f1\fs22\hszx6687\hich  \f1\dbch\f1\loch\f1\cf1\fs22\protect0 8273685\plain\f1\fs20\lhup8110\hich\f1\dbch  \f1\loch\f1\cf1\fs20\protect0\cell\pard\intbl\ssparaaux0\s0\ri90\ql\plain\f0\fs22  \plain\f1\fs20\vjcu8599\hich\f1\dbch\f1\loch\f1\cf1\fs20\protect0\cell\intbl\row  \pard\intbl\ssparaaux0\s0\qc\plain\f0\fs22\plain\f1\fs20\awar6866\hich\f1\dbch\f1  \loch\f1\cf1\fs20\protect0\cell\pard\intbl\ssparaaux0\s0\ri90\ql\plain\f0\fs22\plain  \f1\fs22\gamt6719\hich\f1\dbch\f1\loch\f1\cf1\fs22\protect0 POCT Kit Expiration   Date\plain\f1\fs20\ihry2538\hich\f1\dbch\f1\loch\f1\cf1\fs20\protect0\cell\pard  \intbl\ssparaaux0\s0\ri90\ql\plain\f0\fs22\plain\f1\fs22\knvm4842\hich\f1\dbch\f1  \loch\f1\cf1\fs22\protect0  2021-04-30\plain\f1\fs20\dbqd3842\hich\f1\dbch\f1\loch  \f1\cf1\fs20\protect0\cell\pard\intbl\ssparaaux0\s0\ri90\ql\plain\f0\fs22\plain  \f1\fs20\vfzx5645\hich\f1\dbch\f1\loch\f1\cf1\fs20\protect0\cell\intbl\row\pard  \intbl\ssparaaux0\s0\qc\plain\f0\fs22\plain\f1\fs20\zsir3891\hich\f1\dbch\f1\loch  \f1\cf1\fs20\protect0\cell\pard\intbl\ssparaaux0\s0\ri90\ql\plain\f0\fs22\plain  \f1\fs22\kbih1486\hich\f1\dbch\f1\loch\f1\cf1\fs22\protect0 Pos Control\plain\f1  \fs20\hrrc8403\hich\f1\dbch\f1\loch\f1\cf1\fs20\protect0\cell\pard\intbl\ssparaaux0  \s0\ri90\ql\plain\f0\fs22\plain\f1\fs22\sudt8248\hich\f1\dbch\f1\loch\f1\cf1\fs22\protect0   Valid Control\plain\f1\fs20\cqnf1909\hich\f1\dbch\f1\loch\f1\cf1\fs20\protect0\cell  \pard\intbl\ssparaaux0\s0\ri90\ql\plain\f0\fs22\plain\f1\fs22\lvyp5540\hich\f1\dbch  \f1\loch\f1\cf1\fs22\protect0 Valid Control\plain\f1\fs20\slds8357\hich\f1\dbch  \f1\loch\f1\cf1\fs20\protect0\cell\intbl\row\pard\intbl\ssparaaux0\s0\qc\plain\f0  \fs22\plain\f1\fs20\tqiz8460\hich\f1\dbch\f1\loch\f1\cf1\fs20\protect0\cell\pard  \intbl\ssparaaux0\s0\ri90\ql\plain\f0\fs22\plain\f1\fs22\jydt9145\hich\f1\dbch\f1  \loch\f1\cf1\fs22\protect0 Neg Control\plain\f1\fs20\buws9477\hich\f1\dbch\f1\loch  \f1\cf1\fs20\protect0\cell\pard\intbl\ssparaaux0\s0\ri90\ql\plain\f0\fs22\plain  \f1\fs22\azul5653\hich\f1\dbch\f1\loch\f1\cf1\fs22\protect0 Valid Control\plain  \f1\fs20\vrwy4575\hich\f1\dbch\f1\loch\f1\cf1\fs20\protect0\cell\pard\intbl\ssparaaux0  \s0\ri90\ql\plain\f0\fs22\plain\f1\fs22\ixom4886\hich\f1\dbch\f1\loch\f1\cf1\fs22\protect0   Valid  Control\plain\f1\fs20\hzxx5499\hich\f1\dbch\f1\loch\f1\cf1\fs20\protect0\cell  \intbl\row\trowd\trgaph0\trpaddl0\trpaddfl3\trpaddr0\trpaddfr3\trpaddt0\trpaddft3  \trpaddb0\trpaddfb3\trleft0\clvertalt\clpadt0\clpadft3\clpadr0\clpadfr3\clpadl0  \clpadfl3\clpadb0\clpadfb3\dqwjd8255\pard\intbl\ssparaaux0\s0\ri90\ql\plain\f0\fs22  \plain\f1\fs18\huhc2486\hich\f1\dbch\f1\loch\f1\cf6\fs18\b\protect0 POCT Glucose  \plain\f1\fs20\nukb9750\hich\f1\dbch\f1\loch\f1\cf1\fs20\protect0\cell\intbl\row  \trowd\trgaph0\trpaddl0\trpaddfl3\trpaddr0\trpaddfr3\trpaddt0\trpaddft3\trpaddb0  \trpaddfb3\trleft0\clvertalt\ushjkei32\clpadt0\clpadft3\clpadr0\clpadfr3\clpadl0  \clpadfl3\clpadb0\clpadfb3\eujbo4311\clvertalt\qdfqdfo23\clpadt0\clpadft3\clpadr0  \clpadfr3\clpadl0\clpadfl3\clpadb0\clpadfb3\dgysa3379\clvertalt\xbfuwro10\clpadt0  \clpadft3\clpadr0\clpadfr3\clpadl0\clpadfl3\clpadb0\clpadfb3\rifly7297\pard\intbl  \ssparaaux0\s0\ri90\ql\plain\f0\fs22\plain\f1\fs18\cjjh4589\hich\f1\dbch\f1\loch  \f1\cf6\fs18\protect0 Result\plain\f1\fs20\utfu8405\hich\f1\dbch\f1\loch\f1\cf1  \fs20\protect0\cell\pard\intbl\ssparaaux0\s0\ri90\ql\plain\f0\fs22\plain\f1\fs18  \sqdh5961\hich\f1\dbch\f1\loch\f1\cf6\fs18\protect0 Value\plain\f1\fs20\rzsp9629  \hich\f1\dbch\f1\loch\f1\cf1\fs20\protect0\cell\pard\intbl\ssparaaux0\s0\ri90\ql  \plain\f0\fs22\plain\f1\fs18\xlyl4534\hich\f1\dbch\f1\loch\f1\cf6\fs18\protect0   Ref  Range\plain\f1\fs20\ekfd3612\hich\f1\dbch\f1\loch\f1\cf1\fs20\protect0\cell  \intbl\row\trowd\trgaph0\trpaddl0\trpaddfl3\trpaddr0\trpaddfr3\trpaddt0\trpaddft3  \trpaddb0\trpaddfb3\trleft0\clvertalt\clpadt0\clpadft3\clpadr0\clpadfr3\clpadl0  \clpadfl3\clpadb0\clpadfb3\uvouo058\clvertalt\clpadt0\clpadft3\clpadr0\clpadfr3  \clpadl0\clpadfl3\clpadb0\clpadfb3\wsjcx2638\clvertalt\clpadt0\clpadft3\clpadr0  \clpadfr3\clpadl0\clpadfl3\clpadb0\clpadfb3\dkijn4450\clvertalt\clpadt0\clpadft3  \clpadr0\clpadfr3\clpadl0\clpadfl3\clpadb0\clpadfb3\yfvcy8877\pard\intbl\ssparaaux0  \s0\qc\plain\f0\fs22\plain\f1\fs20\zwpx3132\hich\f1\dbch\f1\loch\f1\cf1\fs20\protect0  \cell\pard\intbl\ssparaaux0\s0\ri90\ql\plain\f0\fs22\plain\f1\fs22\gvug8195\hich  \f1\dbch\f1\loch\f1\cf1\fs22\protect0 Glucose\plain\f1\fs20\vxat2669\hich\f1\dbch  \f1\loch\f1\cf1\fs20\protect0\cell\pard\intbl\ssparaaux0\s0\ri90\ql\plain\f0\fs22  \plain\f1\fs22\qkbf7252\hich\f1\dbch\f1\loch\f1\cf1\fs22\protect0 336 (H)\plain  \f1\fs20\zncb7930\hich\f1\dbch\f1\loch\f1\cf1\fs20\protect0\cell\pard\intbl\ssparaaux0  \s0\ri90\ql\plain\f0\fs22\plain\f1\fs22\nync8781\hich\f1\dbch\f1\loch\f1\cf1\fs22\protect0   70 - 139 mg/dL\plain\f1\fs20\pjzj3640\hich\f1\dbch\f1\loch\f1\cf1\fs20\protect0  \cell\intbl\row\trowd\trgaph0\trpaddl0\trpaddfl3\trpaddr0\trpaddfr3\trpaddt0\trpaddft3  \trpaddb0\trpaddfb3\trleft0\clvertalt\clpadt0\clpadft3\clpadr0\clpadfr3\clpadl0  \clpadfl3\clpadb0\clpadfb3\ubvar2931\pard\intbl\ssparaaux0\s0\ri90\ql\plain\f0\fs22  \plain\f1\fs18\kpkc7332\hich\f1\dbch\f1\loch\f1\cf6\fs18\b\protect0 POCT  Glucose  \plain\f1\fs20\cywk2631\hich\f1\dbch\f1\loch\f1\cf1\fs20\protect0\cell\intbl\row  \trowd\trgaph0\trpaddl0\trpaddfl3\trpaddr0\trpaddfr3\trpaddt0\trpaddft3\trpaddb0  \trpaddfb3\trleft0\clvertalt\mohdvuk46\clpadt0\clpadft3\clpadr0\clpadfr3\clpadl0  \clpadfl3\clpadb0\clpadfb3\ycltx0733\clvertalt\zmadvom35\clpadt0\clpadft3\clpadr0  \clpadfr3\clpadl0\clpadfl3\clpadb0\clpadfb3\yvpqs5358\clvertalt\mzdvryv32\clpadt0  \clpadft3\clpadr0\clpadfr3\clpadl0\clpadfl3\clpadb0\clpadfb3\acady0661\pard\intbl  \ssparaaux0\s0\ri90\ql\plain\f0\fs22\plain\f1\fs18\bcuo8046\hich\f1\dbch\f1\loch  \f1\cf6\fs18\protect0 Result\plain\f1\fs20\ykai2560\hich\f1\dbch\f1\loch\f1\cf1  \fs20\protect0\cell\pard\intbl\ssparaaux0\s0\ri90\ql\plain\f0\fs22\plain\f1\fs18  \jajk8905\hich\f1\dbch\f1\loch\f1\cf6\fs18\protect0 Value\plain\f1\fs20\zwpp7323  \hich\f1\dbch\f1\loch\f1\cf1\fs20\protect0\cell\pard\intbl\ssparaaux0\s0\ri90\ql  \plain\f0\fs22\plain\f1\fs18\udkp8810\hich\f1\dbch\f1\loch\f1\cf6\fs18\protect0   Ref Range\plain\f1\fs20\gdur9666\hich\f1\dbch\f1\loch\f1\cf1\fs20\protect0\cell  \intbl\row\trowd\trgaph0\trpaddl0\trpaddfl3\trpaddr0\trpaddfr3\trpaddt0\trpaddft3  \trpaddb0\trpaddfb3\trleft0\clvertalt\clpadt0\clpadft3\clpadr0\clpadfr3\clpadl0  \clpadfl3\clpadb0\clpadfb3\nuarl786\clvertalt\clpadt0\clpadft3\clpadr0\clpadfr3  \clpadl0\clpadfl3\clpadb0\clpadfb3\ftkss8288\clvertalt\clpadt0\clpadft3\clpadr0  \clpadfr3\clpadl0\clpadfl3\clpadb0\clpadfb3\dvbyg0626\clvertalt\clpadt0\clpadft3  \clpadr0\clpadfr3\clpadl0\clpadfl3\clpadb0\clpadfb3\ozcbj1921\pard\intbl\ssparaaux0  \s0\qc\plain\f0\fs22\plain\f1\fs20\vlbg2059\hich\f1\dbch\f1\loch\f1\cf1\fs20\protect0  \cell\pard\intbl\ssparaaux0\s0\ri90\ql\plain\f0\fs22\plain\f1\fs22\btul6196\hich  \f1\dbch\f1\loch\f1\cf1\fs22\protect0  Glucose\plain\f1\fs20\vanc0079\hich\f1\dbch  \f1\loch\f1\cf1\fs20\protect0\cell\pard\intbl\ssparaaux0\s0\ri90\ql\plain\f0\fs22  \plain\f1\fs22\unma4781\hich\f1\dbch\f1\loch\f1\cf1\fs22\protect0 330 (H)\plain  \f1\fs20\fxsz0947\hich\f1\dbch\f1\loch\f1\cf1\fs20\protect0\cell\pard\intbl\ssparaaux0  \s0\ri90\ql\plain\f0\fs22\plain\f1\fs22\psqm0919\hich\f1\dbch\f1\loch\f1\cf1\fs22\protect0   70 - 139 mg/dL\plain\f1\fs20\bani1495\hich\f1\dbch\f1\loch\f1\cf1\fs20\protect0  \cell\intbl\row\trowd\trgaph0\trpaddl0\trpaddfl3\trpaddr0\trpaddfr3\trpaddt0\trpaddft3  \trpaddb0\trpaddfb3\trleft0\clvertalt\clpadt0\clpadft3\clpadr0\clpadfr3\clpadl0  \clpadfl3\clpadb0\clpadfb3\tvzcq1419\pard\intbl\ssparaaux0\s0\ri90\ql\plain\f0\fs22  \plain\f1\fs18\rxpr7020\hich\f1\dbch\f1\loch\f1\cf6\fs18\b\protect0 ECG 12 lead   nursing unit performed\plain\f1\fs20\inpe9633\hich\f1\dbch\f1\loch\f1\cf1\fs20\protect0  \cell\intbl\row\trowd\trgaph0\trpaddl0\trpaddfl3\trpaddr0\trpaddfr3\trpaddt0\trpaddft3  \trpaddb0\trpaddfb3\trleft0\clvertalt\jgcttct90\clpadt0\clpadft3\clpadr0\clpadfr3  \clpadl0\clpadfl3\clpadb0\clpadfb3\scteb4662\clvertalt\zxhzxip43\clpadt0\clpadft3  \clpadr0\clpadfr3\clpadl0\clpadfl3\clpadb0\clpadfb3\ejtnr1812\clvertalt\hhhikmd03  \clpadt0\clpadft3\clpadr0\clpadfr3\clpadl0\clpadfl3\clpadb0\clpadfb3\ynczf3295\pard  \intbl\ssparaaux0\s0\ri90\ql\plain\f0\fs22\plain\f1\fs18\xjus4166\hich\f1\dbch\f1  \loch\f1\cf6\fs18\protect0 Result\plain\f1\fs20\ighr5070\hich\f1\dbch\f1\loch\f1  \cf1\fs20\protect0\cell\pard\intbl\ssparaaux0\s0\ri90\ql\plain\f0\fs22\plain\f1  \fs18\uvei3215\hich\f1\dbch\f1\loch\f1\cf6\fs18\protect0 Value\plain\f1\fs20\hnyi6550  \hich\f1\dbch\f1\loch\f1\cf1\fs20\protect0\cell\pard\intbl\ssparaaux0\s0\ri90\ql  \plain\f0\fs22\plain\f1\fs18\mliu1387\hich\f1\dbch\f1\loch\f1\cf6\fs18\protect0   Ref  Range\plain\f1\fs20\jowz8923\hich\f1\dbch\f1\loch\f1\cf1\fs20\protect0\cell  \intbl\row\trowd\trgaph0\trpaddl0\trpaddfl3\trpaddr0\trpaddfr3\trpaddt0\trpaddft3  \trpaddb0\trpaddfb3\trleft0\clvertalt\clpadt0\clpadft3\clpadr0\clpadfr3\clpadl0  \clpadfl3\clpadb0\clpadfb3\znpgg424\clvertalt\clpadt0\clpadft3\clpadr0\clpadfr3  \clpadl0\clpadfl3\clpadb0\clpadfb3\nnspz5280\clvertalt\clpadt0\clpadft3\clpadr0  \clpadfr3\clpadl0\clpadfl3\clpadb0\clpadfb3\dojjy1330\clvertalt\clpadt0\clpadft3  \clpadr0\clpadfr3\clpadl0\clpadfl3\clpadb0\clpadfb3\ioymd6914\pard\intbl\ssparaaux0  \s0\qc\plain\f0\fs22\plain\f1\fs20\lldm8686\hich\f1\dbch\f1\loch\f1\cf1\fs20\protect0  \cell\pard\intbl\ssparaaux0\s0\ri90\ql\plain\f0\fs22\plain\f1\fs22\qzkq2361\hich  \f1\dbch\f1\loch\f1\cf1\fs22\protect0 SYSTOLIC BLOOD PRESSURE\plain\f1\fs20\csny2505  \hich\f1\dbch\f1\loch\f1\cf1\fs20\protect0\cell\pard\intbl\ssparaaux0\s0\ri90\ql  \plain\f0\fs22\plain\f1\fs20\taix6611\hich\f1\dbch\f1\loch\f1\cf1\fs20\protect0  \cell\pard\intbl\ssparaaux0\s0\ri90\ql\plain\f0\fs22\plain\f1\fs20\lwzc7241\hich  \f1\dbch\f1\loch\f1\cf1\fs20\protect0\cell\intbl\row\pard\intbl\ssparaaux0\s0\qc  \plain\f0\fs22\plain\f1\fs20\sbbr6413\hich\f1\dbch\f1\loch\f1\cf1\fs20\protect0  \cell\pard\intbl\ssparaaux0\s0\ri90\ql\plain\f0\fs22\plain\f1\fs22\rosw3994\hich  \f1\dbch\f1\loch\f1\cf1\fs22\protect0 DIASTOLIC BLOOD PRESSURE\plain\f1\fs20\fgar6867  \hich\f1\dbch\f1\loch\f1\cf1\fs20\protect0\cell\pard\intbl\ssparaaux0\s0\ri90\ql  \plain\f0\fs22\plain\f1\fs20\hbjy4193\hich\f1\dbch\f1\loch\f1\cf1\fs20\protect0  \cell\pard\intbl\ssparaaux0\s0\ri90\ql\plain\f0\fs22\plain\f1\fs20\iprh5623\hich  \f1\dbch\f1\loch\f1\cf1\fs20\protect0\cell\intbl\row\pard\intbl\ssparaaux0\s0\qc  \plain\f0\fs22\plain\f1\fs20\gmjl4823\hich\f1\dbch\f1\loch\f1\cf1\fs20\protect0  \cell\pard\intbl\ssparaaux0\s0\ri90\ql\plain\f0\fs22\plain\f1\fs22\scrt4662\hich  \f1\dbch\f1\loch\f1\cf1\fs22\protect0 VENTRICULAR  RATE\plain\f1\fs20\zkhw1014\hich  \f1\dbch\f1\loch\f1\cf1\fs20\protect0\cell\pard\intbl\ssparaaux0\s0\ri90\ql\plain  \f0\fs22\plain\f1\fs22\amnv4711\hich\f1\dbch\f1\loch\f1\cf1\fs22\protect0 52\plain  \f1\fs20\oqmm5642\hich\f1\dbch\f1\loch\f1\cf1\fs20\protect0\cell\pard\intbl\ssparaaux0  \s0\ri90\ql\plain\f0\fs22\plain\f1\fs22\keyr9995\hich\f1\dbch\f1\loch\f1\cf1\fs22\protect0   BPM\plain\f1\fs20\jncl0510\hich\f1\dbch\f1\loch\f1\cf1\fs20\protect0\cell\intbl  \row\pard\intbl\ssparaaux0\s0\qc\plain\f0\fs22\plain\f1\fs20\uzxs1177\hich\f1\dbch  \f1\loch\f1\cf1\fs20\protect0\cell\pard\intbl\ssparaaux0\s0\ri90\ql\plain\f0\fs22  \plain\f1\fs22\qefb4433\hich\f1\dbch\f1\loch\f1\cf1\fs22\protect0 ATRIAL RATE\plain  \f1\fs20\xjnm1931\hich\f1\dbch\f1\loch\f1\cf1\fs20\protect0\cell\pard\intbl\ssparaaux0  \s0\ri90\ql\plain\f0\fs22\plain\f1\fs22\phts8097\hich\f1\dbch\f1\loch\f1\cf1\fs22\protect0   52\plain\f1\fs20\mtyo5730\hich\f1\dbch\f1\loch\f1\cf1\fs20\protect0\cell\pard\intbl  \ssparaaux0\s0\ri90\ql\plain\f0\fs22\plain\f1\fs22\imzr6823\hich\f1\dbch\f1\loch  \f1\cf1\fs22\protect0 BPM\plain\f1\fs20\ryic1374\hich\f1\dbch\f1\loch\f1\cf1\fs20  \protect0\cell\intbl\row\pard\intbl\ssparaaux0\s0\qc\plain\f0\fs22\plain\f1\fs20  \zwev3423\hich\f1\dbch\f1\loch\f1\cf1\fs20\protect0\cell\pard\intbl\ssparaaux0\s0  \ri90\ql\plain\f0\fs22\plain\f1\fs22\rfya0416\hich\f1\dbch\f1\loch\f1\cf1\fs22\protect0   P-R INTERVAL\plain\f1\fs20\kmeo6288\hich\f1\dbch\f1\loch\f1\cf1\fs20\protect0\cell  \pard\intbl\ssparaaux0\s0\ri90\ql\plain\f0\fs22\plain\f1\fs22\vpus9941\hich\f1\dbch  \f1\loch\f1\cf1\fs22\protect0 124\plain\f1\fs20\omfp9310\hich\f1\dbch\f1\loch\f1  \cf1\fs20\protect0\cell\pard\intbl\ssparaaux0\s0\ri90\ql\plain\f0\fs22\plain\f1  \fs22\slmi8962\hich\f1\dbch\f1\loch\f1\cf1\fs22\protect0  ms\plain\f1\fs20\qaai6046  \hich\f1\dbch\f1\loch\f1\cf1\fs20\protect0\cell\intbl\row\pard\intbl\ssparaaux0  \s0\qc\plain\f0\fs22\plain\f1\fs20\jund8499\hich\f1\dbch\f1\loch\f1\cf1\fs20\protect0  \cell\pard\intbl\ssparaaux0\s0\ri90\ql\plain\f0\fs22\plain\f1\fs22\wize8860\hich  \f1\dbch\f1\loch\f1\cf1\fs22\protect0 QRS DURATION\plain\f1\fs20\bfks4778\hich\f1  \dbch\f1\loch\f1\cf1\fs20\protect0\cell\pard\intbl\ssparaaux0\s0\ri90\ql\plain\f0  \fs22\plain\f1\fs22\bvew2045\hich\f1\dbch\f1\loch\f1\cf1\fs22\protect0 108\plain  \f1\fs20\xnnb3322\hich\f1\dbch\f1\loch\f1\cf1\fs20\protect0\cell\pard\intbl\ssparaaux0  \s0\ri90\ql\plain\f0\fs22\plain\f1\fs22\ranu8322\hich\f1\dbch\f1\loch\f1\cf1\fs22\protect0   ms\plain\f1\fs20\ltlz0135\hich\f1\dbch\f1\loch\f1\cf1\fs20\protect0\cell\intbl\row  \pard\intbl\ssparaaux0\s0\qc\plain\f0\fs22\plain\f1\fs20\giob1097\hich\f1\dbch\f1  \loch\f1\cf1\fs20\protect0\cell\pard\intbl\ssparaaux0\s0\ri90\ql\plain\f0\fs22\plain  \f1\fs22\uebb9083\hich\f1\dbch\f1\loch\f1\cf1\fs22\protect0 Q-T INTERVAL\plain\f1  \fs20\ytxl6232\hich\f1\dbch\f1\loch\f1\cf1\fs20\protect0\cell\pard\intbl\ssparaaux0  \s0\ri90\ql\plain\f0\fs22\plain\f1\fs22\uqca9934\hich\f1\dbch\f1\loch\f1\cf1\fs22\protect0   486\plain\f1\fs20\wpvl3302\hich\f1\dbch\f1\loch\f1\cf1\fs20\protect0\cell\pard\intbl  \ssparaaux0\s0\ri90\ql\plain\f0\fs22\plain\f1\fs22\jphr1697\hich\f1\dbch\f1\loch  \f1\cf1\fs22\protect0 ms\plain\f1\fs20\wfno1705\hich\f1\dbch\f1\loch\f1\cf1\fs20  \protect0\cell\intbl\row\pard\intbl\ssparaaux0\s0\qc\plain\f0\fs22\plain\f1\fs20  \tdrw3416\hich\f1\dbch\f1\loch\f1\cf1\fs20\protect0\cell\pard\intbl\ssparaaux0\s0  \ri90\ql\plain\f0\fs22\plain\f1\fs22\xdid8923\hich\f1\dbch\f1\loch\f1\cf1\fs22\protect0   QTC CALCULATION (BEZET)\plain\f1\fs20\bxuf2740\hich\f1\dbch\f1\loch\f1\cf1\fs20  \protect0\cell\pard\intbl\ssparaaux0\s0\ri90\ql\plain\f0\fs22\plain\f1\fs22\kdsu0931  \hich\f1\dbch\f1\loch\f1\cf1\fs22\protect0  451\plain\f1\fs20\wuea3217\hich\f1\dbch  \f1\loch\f1\cf1\fs20\protect0\cell\pard\intbl\ssparaaux0\s0\ri90\ql\plain\f0\fs22  \plain\f1\fs22\ftic1434\hich\f1\dbch\f1\loch\f1\cf1\fs22\protect0 ms\plain\f1\fs20  \pwbl7971\hich\f1\dbch\f1\loch\f1\cf1\fs20\protect0\cell\intbl\row\pard\intbl\ssparaaux0  \s0\qc\plain\f0\fs22\plain\f1\fs20\gwzc5419\hich\f1\dbch\f1\loch\f1\cf1\fs20\protect0  \cell\pard\intbl\ssparaaux0\s0\ri90\ql\plain\f0\fs22\plain\f1\fs22\jsgk7983\hich  \f1\dbch\f1\loch\f1\cf1\fs22\protect0 P Axis\plain\f1\fs20\ioys1399\hich\f1\dbch  \f1\loch\f1\cf1\fs20\protect0\cell\pard\intbl\ssparaaux0\s0\ri90\ql\plain\f0\fs22  \plain\f1\fs22\jtnl1972\hich\f1\dbch\f1\loch\f1\cf1\fs22\protect0 58\plain\f1\fs20  \enbl6119\hich\f1\dbch\f1\loch\f1\cf1\fs20\protect0\cell\pard\intbl\ssparaaux0\s0  \ri90\ql\plain\f0\fs22\plain\f1\fs22\yaze5262\hich\f1\dbch\f1\loch\f1\cf1\fs22\protect0   degrees\plain\f1\fs20\vgbn5760\hich\f1\dbch\f1\loch\f1\cf1\fs20\protect0\cell\intbl  \row\pard\intbl\ssparaaux0\s0\qc\plain\f0\fs22\plain\f1\fs20\cufi8912\hich\f1\dbch  \f1\loch\f1\cf1\fs20\protect0\cell\pard\intbl\ssparaaux0\s0\ri90\ql\plain\f0\fs22  \plain\f1\fs22\nati7350\hich\f1\dbch\f1\loch\f1\cf1\fs22\protect0 R AXIS\plain\f1  \fs20\ynvp4597\hich\f1\dbch\f1\loch\f1\cf1\fs20\protect0\cell\pard\intbl\ssparaaux0  \s0\ri90\ql\plain\f0\fs22\plain\f1\fs22\vzog0094\hich\f1\dbch\f1\loch\f1\cf1\fs22\protect0   76\plain\f1\fs20\xjql1436\hich\f1\dbch\f1\loch\f1\cf1\fs20\protect0\cell\pard\intbl  \ssparaaux0\s0\ri90\ql\plain\f0\fs22\plain\f1\fs22\zgtq7410\hich\f1\dbch\f1\loch  \f1\cf1\fs22\protect0 degrees\plain\f1\fs20\nyrp8387\hich\f1\dbch\f1\loch\f1\cf1  \fs20\protect0\cell\intbl\row\pard\intbl\ssparaaux0\s0\qc\plain\f0\fs22\plain\f1  \fs20\ucep9098\hich\f1\dbch\f1\loch\f1\cf1\fs20\protect0\cell\pard\intbl\ssparaaux0  \s0\ri90\ql\plain\f0\fs22\plain\f1\fs22\xxfu9647\hich\f1\dbch\f1\loch\f1\cf1\fs22\protect0   T  AXIS\plain\f1\fs20\kvor4166\hich\f1\dbch\f1\loch\f1\cf1\fs20\protect0\cell\pard  \intbl\ssparaaux0\s0\ri90\ql\plain\f0\fs22\plain\f1\fs22\wskv8917\hich\f1\dbch\f1  \loch\f1\cf1\fs22\protect0 74\plain\f1\fs20\thnx3734\hich\f1\dbch\f1\loch\f1\cf1  \fs20\protect0\cell\pard\intbl\ssparaaux0\s0\ri90\ql\plain\f0\fs22\plain\f1\fs22  \tqsu0107\hich\f1\dbch\f1\loch\f1\cf1\fs22\protect0 degrees\plain\f1\fs20\qsqf8671  \hich\f1\dbch\f1\loch\f1\cf1\fs20\protect0\cell\intbl\row\pard\intbl\ssparaaux0  \s0\qc\plain\f0\fs22\plain\f1\fs20\nykk3435\hich\f1\dbch\f1\loch\f1\cf1\fs20\protect0  \cell\pard\intbl\ssparaaux0\s0\ri90\ql\plain\f0\fs22\plain\f1\fs22\ulrq3775\hich  \f1\dbch\f1\loch\f1\cf1\fs22\protect0 MUSE DIAGNOSIS\plain\f1\fs20\wefd3884\hich  \f1\dbch\f1\loch\f1\cf1\fs20\protect0\cell\pard\intbl\ssparaaux0\s0\ri90\ql\plain  \f0\fs22\plain\f1\fs20\icfp9585\hich\f1\dbch\f1\loch\f1\cf1\fs20\protect0\cell\pard  \intbl\ssparaaux0\s0\ri90\ql\plain\f0\fs22\plain\f1\fs20\pbrj3526\hich\f1\dbch\f1  \loch\f1\cf1\fs20\protect0\cell\intbl\row\trowd\trgaph0\lastrow\trpaddl0\trpaddfl3  \trpaddr0\trpaddfr3\trpaddt0\trpaddft3\trpaddb0\trpaddfb3\trleft0\clvertalt\clpadt0  \clpadft3\clpadr0\clpadfr3\clpadl0\clpadfl3\clpadb0\clpadfb3\tvple142\clvertalt  \clpadt0\clpadft3\clpadr0\clpadfr3\clpadl0\clpadfl3\clpadb0\clpadfb3\slwkg163\clvertalt  \clpadt0\clpadft3\clpadr0\clpadfr3\clpadl0\clpadfl3\clpadb0\clpadfb3\zglys9428\pard  \intbl\ssparaaux0\s0\qc\plain\f0\fs22\plain\f1\fs20\frwi7534\hich\f1\dbch\f1\loch  \f1\cf1\fs20\protect0\cell\pard\intbl\ssparaaux0\s0\qc\plain\f0\fs22\plain\f1\fs20  \cvqc9095\hich\f1\dbch\f1\loch\f1\cf1\fs20\protect0\cell\pard\intbl\ssparaaux0\s0  \ri90\ql\plain\f0\fs22\plain\f1\fs22\ovvq5203\hich\f1\dbch\f1\loch\f1\cf1\fs22\protect0   Sinus bradycardia\par Otherwise normal ECG\par When compared with ECG of 19-JUL-2017   15:54,\par Nonspecific T wave abnormality no longer evident in Inferior leads\par   Confirmed by SEE ED  PROVIDER NOTE FOR, ECG INTERPRETATION (4000),  ASHWIN CERNA (350) on 12/18/2019 8:38:25 AM\par\plain\f1\fs20\leju0340\hich\f1\dbch\f1  \loch\f1\cf1\fs20\protect0\cell\intbl\row\pard\plain\f0\fs22\plain\f1\fs22\cxks1734  \hich\f1\dbch\f1\loch\f1\cf1\fs22\protect0\protect0 \plain\f1\fs22\svqp9465\hich  \f1\dbch\f1\loch\f1\cf4\fs22\par\ql\plain\f0\fs22\plain\f1\fs22\fznt9540\hich\f1  \dbch\f1\loch\f1\cf1\fs22\b\ul\par\plain\f1\fs22\sjah3696\hich\f1\dbch\f1\loch\f1\cf3\fs22\b\ul   RADIOLOGY:\par\plain\f1\fs22 Reviewed all pertinent imaging. Please see official   radiology report.\par\pard\plain\f0\fs22\plain\f1\fs22\axde8236\hich\f1\dbch\f1  \loch\f1\cf1\fs22\protect\protect0 \plain\f1\fs22\znht1603\hich\f1\dbch\f1\loch  \f1\cf1\fs22\protect0 Xr Chest 1 View\par\par Result Date: 12/18/2019\par EXAM:   XR CHEST 1 VIEW LOCATION: [facility] DATE/TIME: 12/18/2019 11:11 AM INDICATION:   Right-sided chest pain and shortness of breath. Hyperglycemia. COMPARISON: 4/14/2016.   \par\par shallow inspiration. Heart size and pulmonary vascularity normal. No focal   pulmonary infiltrate.\par\par Us Abdomen Limited\par\par Result Date: 12/18/2019\par   EXAM: US ABDOMEN LIMITED LOCATION: [facility] DATE/TIME: 12/18/2019 11:04   AM INDICATION: RUQ abdominal pain COMPARISON: None. TECHNIQUE: Limited abdominal   ultrasound. FINDINGS: GALLBLADDER: Gallstones in an otherwise normal gallbladder.   Gallbladder wall upper limits of normal in thickness. No pericholecystic fluid.   The patient is apparently tender over the gallbladder with transducer pressure consistent   with a positive sonographic Mclean's sign. BILE DUCTS: No biliary dilatation. The   common duct measures 5 mm. LIVER: Hypoechoic liver parenchyma with relatively prominent,   echogenic portal triads which can be seen in the setting of hepatic inflammation   such as hepatitis. No focal mass. RIGHT KIDNEY: No hydronephrosis. PANCREAS: The    visualized portions are normal. No ascites. \par\par 1.  Cholelithiasis with tenderness   over the gallbladder, gallbladder wall thickening at upper limits of normal. 2.    Bile ducts normal in caliber. 3.  Hypoechoic appearing liver parenchyma nonspecific   but does raise question of hepatitis.\par\plain\f1\fs22\cetv2552\hich\f1\dbch\f1  \loch\f1\cf1\fs22\protect0\protect0 \plain\f1\fs22\par\ql\plain\f0\fs22\plain\f1  \fs22\waps0063\hich\f1\dbch\f1\loch\f1\cf4\fs22\par\plain\f1\fs22\bmhq6114\hich  \f1\dbch\f1\loch\f1\cf3\fs22\b\ul EKG:\plain\f1\fs22   \par Performed at: 8:20\par\par   Impression: Sinus bradycardia\par\plain\f1\fs22\jdfx7488\hich\f1\dbch\f1\loch\f1  \cf1\fs22\b\ul\par\plain\f1\fs22 Rate: 52\par Rhythm: sinus\par Axis: normal axis\par   OR Interval: 124\par QRS Interval: 108\par QTc Interval: 451\par ST Changes: none\par   Comparison: bradycardia is now present\par\plain\f1\fs22\elxp2301\hich\f1\dbch\f1  \loch\f1\cf1\fs22\b\ul\par\plain\f1\fs22 I have independently reviewed and interpreted   the EKG(s) documented above.\par\par\plain\f1\fs22\nlue2405\hich\f1\dbch\f1\loch  \f1\cf3\fs22\b\ul\protect\protect0 \plain\f1\fs22\qktr1590\hich\f1\dbch\f1\loch  \f1\cf3\fs22\b\ul\protect0 Procedures\plain\f1\fs22\vyqs1223\hich\f1\dbch\f1\loch  \f1\cf3\fs22\b\ul\protect0\protect0 \plain\f1\fs22\par None\par\par\plain\f2\fs22  \turd6951\hich\f2\dbch\f2\loch\f2\cf1\fs22\par\plain\f1\fs22\par Chu Barillas M.D.\par   Family Medicine PGY-1\par [facility]\par\plain\f1\fs22\aodp1135\hich\f1  \dbch\f1\loch\f1\cf1\fs22\protect\protect0 \plain\f1\fs22\elci4645\hich\f1\dbch  \f1\loch\f1\cf1\fs22\protect0 [facility]\par [facility] EMERGENCY   DEPARTMENT\par [address]par [facility] [address]\par Dept: [phone]\par   Loc: [phone]\plain\f1\fs22\yfhq5732\hich\f1\dbch\f1\loch\f1\cf1\fs22\protect0\protect0   \plain\f1\fs22\par\par  \par Chu Barillas MD\par Resident\par 12/18/19  1540\par\par}  EEE  EEEEEEEEEEEEEEEEEEEprotect0   12.0 - 16.0 g/dL\plain\f1\fs20\emxu9985\hich\f1\dbch\f1\loch\f1\cf1\fs20\protect0  \cell\intbl\row\pard\intbl\ssparaaux0\s0\qc\plain\f0\fs22\plain\f1\fs20\gsag1096  \hich\f1\dbch\f1\loch\f1\cf1\fs20\protect0\cell\pard\intbl\ssparaaux0\s0\ri90\ql  \plain\f0\fs22\plain\f1\fs22\ryaz5979\hich\f1\dbch\f1\loch\f1\cf1\fs22\protect0   Hematocrit\plain\f1\fs20\dwte7721\hich\f1\dbch\f1\loch\f1\cf1\fs20\protect0\cell  \pard\intbl\ssparaaux0\s0\ri90\ql\plain\f0\fs22\plain\f1\fs22\ghqe5677\hich\f1\dbch  \f1\loch\f1\cf1\fs22\protect0 40.6\plain\f1\fs20\cufi6919\hich\f1\dbch\f1\loch\f1  \cf1\fs20\protect0\cell\pard\intbl\ssparaaux0\s0\ri90\ql\plain\f0\fs22\plain\f1  \fs22\envt3737\hich\f1\dbch\f1\loch\f1\cf1\fs22\protect0 35.0 - 47.0 %\plain\f1  \fs20\wqjr8009\hich\f1\dbch\f1\loch\f1\cf1\fs20\protect0\cell\intbl\row\pard\intbl  \ssparaaux0\s0\qc\plain\f0\fs22\plain\f1\fs20\ntkz3463\hich\f1\dbch\f1\loch\f1\cf1  \fs20\protect0\cell\pard\intbl\ssparaaux0\s0\ri90\ql\plain\f0\fs22\plain\f1\fs22  \zxhy9178\hich\f1\dbch\f1\loch\f1\cf1\fs22\protect0 MCV\plain\f1\fs20\kegq0478\hich  \f1\dbch\f1\loch\f1\cf1\fs20\protect0\cell\pard\intbl\ssparaaux0\s0\ri90\ql\plain  \f0\fs22\plain\f1\fs22\hafm9907\hich\f1\dbch\f1\loch\f1\cf1\fs22\protect0 91\plain  \f1\fs20\gjyy2243\hich\f1\dbch\f1\loch\f1\cf1\fs20\protect0\cell\pard\intbl\ssparaaux0  \s0\ri90\ql\plain\f0\fs22\plain\f1\fs22\unkd9210\hich\f1\dbch\f1\loch\f1\cf1\fs22\protect0   80 - 100 fL\plain\f1\fs20\zfso5662\hich\f1\dbch\f1\loch\f1\cf1\fs20\protect0\cell  \intbl\row\pard\intbl\ssparaaux0\s0\qc\plain\f0\fs22\plain\f1\fs20\mtde1132\hich  \f1\dbch\f1\loch\f1\cf1\fs20\protect0\cell\pard\intbl\ssparaaux0\s0\ri90\ql\plain  \f0\fs22\plain\f1\fs22\secr7157\hich\f1\dbch\f1\loch\f1\cf1\fs22\protect0  MCH\plain  \f1\fs20\nwfa4562\hich\f1\dbch\f1\loch\f1\cf1\fs20\protect0\cell\pard\intbl\ssparaaux0  \s0\ri90\ql\plain\f0\fs22\plain\f1\fs22\fhpr9225\hich\f1\dbch\f1\loch\f1\cf1\fs22\protect0   30.4\plain\f1\fs20\nglh8297\hich\f1\dbch\f1\loch\f1\cf1\fs20\protect0\cell\pard  \intbl\ssparaaux0\s0\ri90\ql\plain\f0\fs22\plain\f1\fs22\kibl3879\hich\f1\dbch\f1  \loch\f1\cf1\fs22\protect0 27.0 - 34.0 pg\plain\f1\fs20\ehqm3115\hich\f1\dbch\f1  \loch\f1\cf1\fs20\protect0\cell\intbl\row\pard\intbl\ssparaaux0\s0\qc\plain\f0\fs22  \plain\f1\fs20\gvni4930\hich\f1\dbch\f1\loch\f1\cf1\fs20\protect0\cell\pard\intbl  \ssparaaux0\s0\ri90\ql\plain\f0\fs22\plain\f1\fs22\xzgt6034\hich\f1\dbch\f1\loch  \f1\cf1\fs22\protect0 MCHC\plain\f1\fs20\ruhe4618\hich\f1\dbch\f1\loch\f1\cf1\fs20  \protect0\cell\pard\intbl\ssparaaux0\s0\ri90\ql\plain\f0\fs22\plain\f1\fs22\lqzp2571  \hich\f1\dbch\f1\loch\f1\cf1\fs22\protect0 33.5\plain\f1\fs20\lodu8513\hich\f1\dbch  \f1\loch\f1\cf1\fs20\protect0\cell\pard\intbl\ssparaaux0\s0\ri90\ql\plain\f0\fs22  \plain\f1\fs22\nadc1017\hich\f1\dbch\f1\loch\f1\cf1\fs22\protect0 32.0 - 36.0 g/dL  \plain\f1\fs20\qxmh0337\hich\f1\dbch\f1\loch\f1\cf1\fs20\protect0\cell\intbl\row  \pard\intbl\ssparaaux0\s0\qc\plain\f0\fs22\plain\f1\fs20\topg5790\hich\f1\dbch\f1  \loch\f1\cf1\fs20\protect0\cell\pard\intbl\ssparaaux0\s0\ri90\ql\plain\f0\fs22\plain  \f1\fs22\cxkt4616\hich\f1\dbch\f1\loch\f1\cf1\fs22\protect0 RDW\plain\f1\fs20\olhs6929  \hich\f1\dbch\f1\loch\f1\cf1\fs20\protect0\cell\pard\intbl\ssparaaux0\s0\ri90\ql  \plain\f0\fs22\plain\f1\fs22\wsck2291\hich\f1\dbch\f1\loch\f1\cf1\fs22\protect0   12.2\plain\f1\fs20\pdpn8618\hich\f1\dbch\f1\loch\f1\cf1\fs20\protect0\cell\pard  \intbl\ssparaaux0\s0\ri90\ql\plain\f0\fs22\plain\f1\fs22\zsxf3806\hich\f1\dbch\f1  \loch\f1\cf1\fs22\protect0 11.0 - 14.5  %\plain\f1\fs20\zuuu6785\hich\f1\dbch\f1  \loch\f1\cf1\fs20\protect0\cell\intbl\row\pard\intbl\ssparaaux0\s0\qc\plain\f0\fs22  \plain\f1\fs20\bflf7966\hich\f1\dbch\f1\loch\f1\cf1\fs20\protect0\cell\pard\intbl  \ssparaaux0\s0\ri90\ql\plain\f0\fs22\plain\f1\fs22\fnww3633\hich\f1\dbch\f1\loch  \f1\cf1\fs22\protect0 Platelets\plain\f1\fs20\nyoy0064\hich\f1\dbch\f1\loch\f1\cf1  \fs20\protect0\cell\pard\intbl\ssparaaux0\s0\ri90\ql\plain\f0\fs22\plain\f1\fs22  \spir6325\hich\f1\dbch\f1\loch\f1\cf1\fs22\protect0 334\plain\f1\fs20\nysv1318\hich  \f1\dbch\f1\loch\f1\cf1\fs20\protect0\cell\pard\intbl\ssparaaux0\s0\ri90\ql\plain  \f0\fs22\plain\f1\fs22\qzsp3839\hich\f1\dbch\f1\loch\f1\cf1\fs22\protect0 140 -   440 thou/uL\plain\f1\fs20\mjxf4277\hich\f1\dbch\f1\loch\f1\cf1\fs20\protect0\cell  \intbl\row\pard\intbl\ssparaaux0\s0\qc\plain\f0\fs22\plain\f1\fs20\syan3614\hich  \f1\dbch\f1\loch\f1\cf1\fs20\protect0\cell\pard\intbl\ssparaaux0\s0\ri90\ql\plain  \f0\fs22\plain\f1\fs22\xiog2092\hich\f1\dbch\f1\loch\f1\cf1\fs22\protect0 MPV\plain  \f1\fs20\drcv6799\hich\f1\dbch\f1\loch\f1\cf1\fs20\protect0\cell\pard\intbl\ssparaaux0  \s0\ri90\ql\plain\f0\fs22\plain\f1\fs22\onml1750\hich\f1\dbch\f1\loch\f1\cf1\fs22\protect0   10.8\plain\f1\fs20\dadd3991\hich\f1\dbch\f1\loch\f1\cf1\fs20\protect0\cell\pard  \intbl\ssparaaux0\s0\ri90\ql\plain\f0\fs22\plain\f1\fs22\kwhb2886\hich\f1\dbch\f1  \loch\f1\cf1\fs22\protect0 8.5 - 12.5 fL\plain\f1\fs20\ygws4836\hich\f1\dbch\f1  \loch\f1\cf1\fs20\protect0\cell\intbl\row\trowd\trgaph0\trpaddl0\trpaddfl3\trpaddr0  \trpaddfr3\trpaddt0\trpaddft3\trpaddb0\trpaddfb3\trleft0\clvertalt\clpadt0\clpadft3  \clpadr0\clpadfr3\clpadl0\clpadfl3\clpadb0\clpadfb3\nwmjg8504\pard\intbl\ssparaaux0  \s0\ri90\ql\plain\f0\fs22\plain\f1\fs18\juxf3098\hich\f1\dbch\f1\loch\f1\cf6\fs18\b\protect0    Lipase\plain\f1\fs20\cnva4570\hich\f1\dbch\f1\loch\f1\cf1\fs20\protect0\cell\intbl  \row\trowd\trgaph0\trpaddl0\trpaddfl3\trpaddr0\trpaddfr3\trpaddt0\trpaddft3\trpaddb0  \trpaddfb3\trleft0\clvertalt\eaodpgj01\clpadt0\clpadft3\clpadr0\clpadfr3\clpadl0  \clpadfl3\clpadb0\clpadfb3\mszlt7242\clvertalt\lgiuidd51\clpadt0\clpadft3\clpadr0  \clpadfr3\clpadl0\clpadfl3\clpadb0\clpadfb3\ztwkb1034\clvertalt\mtzhmpq13\clpadt0  \clpadft3\clpadr0\clpadfr3\clpadl0\clpadfl3\clpadb0\clpadfb3\zhzta9454\pard\intbl  \ssparaaux0\s0\ri90\ql\plain\f0\fs22\plain\f1\fs18\zlbu4544\hich\f1\dbch\f1\loch  \f1\cf6\fs18\protect0 Result\plain\f1\fs20\ptbs6918\hich\f1\dbch\f1\loch\f1\cf1  \fs20\protect0\cell\pard\intbl\ssparaaux0\s0\ri90\ql\plain\f0\fs22\plain\f1\fs18  \airt8568\hich\f1\dbch\f1\loch\f1\cf6\fs18\protect0 Value\plain\f1\fs20\cozi7950  \hich\f1\dbch\f1\loch\f1\cf1\fs20\protect0\cell\pard\intbl\ssparaaux0\s0\ri90\ql  \plain\f0\fs22\plain\f1\fs18\tfqj1721\hich\f1\dbch\f1\loch\f1\cf6\fs18\protect0   Ref Range\plain\f1\fs20\ouxu2826\hich\f1\dbch\f1\loch\f1\cf1\fs20\protect0\cell  \intbl\row\trowd\trgaph0\trpaddl0\trpaddfl3\trpaddr0\trpaddfr3\trpaddt0\trpaddft3  \trpaddb0\trpaddfb3\trleft0\clvertalt\clpadt0\clpadft3\clpadr0\clpadfr3\clpadl0  \clpadfl3\clpadb0\clpadfb3\lpqma397\clvertalt\clpadt0\clpadft3\clpadr0\clpadfr3  \clpadl0\clpadfl3\clpadb0\clpadfb3\nibtn5632\clvertalt\clpadt0\clpadft3\clpadr0  \clpadfr3\clpadl0\clpadfl3\clpadb0\clpadfb3\zodfd4009\clvertalt\clpadt0\clpadft3  \clpadr0\clpadfr3\clpadl0\clpadfl3\clpadb0\clpadfb3\znsyx6858\pard\intbl\ssparaaux0  \s0\qc\plain\f0\fs22\plain\f1\fs20\kwag9843\hich\f1\dbch\f1\loch\f1\cf1\fs20\protect0  \cell\pard\intbl\ssparaaux0\s0\ri90\ql\plain\f0\fs22\plain\f1\fs22\hrby6043\hich  \f1\dbch\f1\loch\f1\cf1\fs22\protect0  Lipase\plain\f1\fs20\xhia8303\hich\f1\dbch  \f1\loch\f1\cf1\fs20\protect0\cell\pard\intbl\ssparaaux0\s0\ri90\ql\plain\f0\fs22  \plain\f1\fs22\aqgk7557\hich\f1\dbch\f1\loch\f1\cf1\fs22\protect0 25\plain\f1\fs20  \ktza5385\hich\f1\dbch\f1\loch\f1\cf1\fs20\protect0\cell\pard\intbl\ssparaaux0\s0  \ri90\ql\plain\f0\fs22\plain\f1\fs22\ghwz2369\hich\f1\dbch\f1\loch\f1\cf1\fs22\protect0   0 - 52 U/L\plain\f1\fs20\ubot5334\hich\f1\dbch\f1\loch\f1\cf1\fs20\protect0\cell  \intbl\row\trowd\trgaph0\trpaddl0\trpaddfl3\trpaddr0\trpaddfr3\trpaddt0\trpaddft3  \trpaddb0\trpaddfb3\trleft0\clvertalt\clpadt0\clpadft3\clpadr0\clpadfr3\clpadl0  \clpadfl3\clpadb0\clpadfb3\qoqpn3968\pard\intbl\ssparaaux0\s0\ri90\ql\plain\f0\fs22  \plain\f1\fs18\iltb0371\hich\f1\dbch\f1\loch\f1\cf6\fs18\b\protect0 Urinalysis-UC   if Indicated\plain\f1\fs20\dfgg6861\hich\f1\dbch\f1\loch\f1\cf1\fs20\protect0\cell  \intbl\row\trowd\trgaph0\trpaddl0\trpaddfl3\trpaddr0\trpaddfr3\trpaddt0\trpaddft3  \trpaddb0\trpaddfb3\trleft0\clvertalt\bbnypek14\clpadt0\clpadft3\clpadr0\clpadfr3  \clpadl0\clpadfl3\clpadb0\clpadfb3\avyam2760\clvertalt\pgqctoa85\clpadt0\clpadft3  \clpadr0\clpadfr3\clpadl0\clpadfl3\clpadb0\clpadfb3\nsbde7918\clvertalt\rozuzxz89  \clpadt0\clpadft3\clpadr0\clpadfr3\clpadl0\clpadfl3\clpadb0\clpadfb3\guwvd5522\pard  \intbl\ssparaaux0\s0\ri90\ql\plain\f0\fs22\plain\f1\fs18\ntfj8817\hich\f1\dbch\f1  \loch\f1\cf6\fs18\protect0 Result\plain\f1\fs20\sqiw7655\hich\f1\dbch\f1\loch\f1  \cf1\fs20\protect0\cell\pard\intbl\ssparaaux0\s0\ri90\ql\plain\f0\fs22\plain\f1  \fs18\guar1204\hich\f1\dbch\f1\loch\f1\cf6\fs18\protect0 Value\plain\f1\fs20\csei6815  \hich\f1\dbch\f1\loch\f1\cf1\fs20\protect0\cell\pard\intbl\ssparaaux0\s0\ri90\ql  \plain\f0\fs22\plain\f1\fs18\dqlo0533\hich\f1\dbch\f1\loch\f1\cf6\fs18\protect0   Ref  Range\plain\f1\fs20\cuvz1093\hich\f1\dbch\f1\loch\f1\cf1\fs20\protect0\cell  \intbl\row\trowd\trgaph0\trpaddl0\trpaddfl3\trpaddr0\trpaddfr3\trpaddt0\trpaddft3  \trpaddb0\trpaddfb3\trleft0\clvertalt\clpadt0\clpadft3\clpadr0\clpadfr3\clpadl0  \clpadfl3\clpadb0\clpadfb3\esovy246\clvertalt\clpadt0\clpadft3\clpadr0\clpadfr3  \clpadl0\clpadfl3\clpadb0\clpadfb3\xrleu5116\clvertalt\clpadt0\clpadft3\clpadr0  \clpadfr3\clpadl0\clpadfl3\clpadb0\clpadfb3\wxoki9533\clvertalt\clpadt0\clpadft3  \clpadr0\clpadfr3\clpadl0\clpadfl3\clpadb0\clpadfb3\dhcsf3488\pard\intbl\ssparaaux0  \s0\qc\plain\f0\fs22\plain\f1\fs20\phvx4186\hich\f1\dbch\f1\loch\f1\cf1\fs20\protect0  \cell\pard\intbl\ssparaaux0\s0\ri90\ql\plain\f0\fs22\plain\f1\fs22\yswt6705\hich  \f1\dbch\f1\loch\f1\cf1\fs22\protect0 Color, UA\plain\f1\fs20\yukz7818\hich\f1\dbch  \f1\loch\f1\cf1\fs20\protect0\cell\pard\intbl\ssparaaux0\s0\ri90\ql\plain\f0\fs22  \plain\f1\fs22\ptrb7121\hich\f1\dbch\f1\loch\f1\cf1\fs22\protect0 Yellow\plain\f1  \fs20\qhpx9354\hich\f1\dbch\f1\loch\f1\cf1\fs20\protect0\cell\pard\intbl\ssparaaux0  \s0\ri90\ql\plain\f0\fs22\plain\f1\fs22\cgwi9704\hich\f1\dbch\f1\loch\f1\cf1\fs22\protect0   Colorless, Yellow, Straw, Light Yellow\plain\f1\fs20\krra2469\hich\f1\dbch\f1\loch  \f1\cf1\fs20\protect0\cell\intbl\row\pard\intbl\ssparaaux0\s0\qc\plain\f0\fs22\plain  \f1\fs20\ujcg7392\hich\f1\dbch\f1\loch\f1\cf1\fs20\protect0\cell\pard\intbl\ssparaaux0  \s0\ri90\ql\plain\f0\fs22\plain\f1\fs22\rgac9880\hich\f1\dbch\f1\loch\f1\cf1\fs22\protect0   Clarity, UA\plain\f1\fs20\nxlg7177\hich\f1\dbch\f1\loch\f1\cf1\fs20\protect0\cell  \pard\intbl\ssparaaux0\s0\ri90\ql\plain\f0\fs22\plain\f1\fs22\kryh4373\hich\f1\dbch  \f1\loch\f1\cf1\fs22\protect0 Clear\plain\f1\fs20\xwmp2274\hich\f1\dbch\f1\loch  \f1\cf1\fs20\protect0\cell\pard\intbl\ssparaaux0\s0\ri90\ql\plain\f0\fs22\plain  \f1\fs22\crnl3227\hich\f1\dbch\f1\loch\f1\cf1\fs22\protect0  Clear\plain\f1\fs20  \cwsk8477\hich\f1\dbch\f1\loch\f1\cf1\fs20\protect0\cell\intbl\row\pard\intbl\ssparaaux0  \s0\qc\plain\f0\fs22\plain\f1\fs20\lgnz2090\hich\f1\dbch\f1\loch\f1\cf1\fs20\protect0  \cell\pard\intbl\ssparaaux0\s0\ri90\ql\plain\f0\fs22\plain\f1\fs22\yhdt5884\hich  \f1\dbch\f1\loch\f1\cf1\fs22\protect0 Glucose, UA\plain\f1\fs20\nwzl1446\hich\f1  \dbch\f1\loch\f1\cf1\fs20\protect0\cell\pard\intbl\ssparaaux0\s0\ri90\ql\plain\f0  \fs22\plain\f1\fs22\osqs7881\hich\f1\dbch\f1\loch\f1\cf1\fs22\protect0 >1000 mg/dL   (!!)\plain\f1\fs20\rbwu3106\hich\f1\dbch\f1\loch\f1\cf1\fs20\protect0\cell\pard  \intbl\ssparaaux0\s0\ri90\ql\plain\f0\fs22\plain\f1\fs22\uieo3811\hich\f1\dbch\f1  \loch\f1\cf1\fs22\protect0 Negative\plain\f1\fs20\zhjy2190\hich\f1\dbch\f1\loch  \f1\cf1\fs20\protect0\cell\intbl\row\pard\intbl\ssparaaux0\s0\qc\plain\f0\fs22\plain  \f1\fs20\yint2465\hich\f1\dbch\f1\loch\f1\cf1\fs20\protect0\cell\pard\intbl\ssparaaux0  \s0\ri90\ql\plain\f0\fs22\plain\f1\fs22\phpu5298\hich\f1\dbch\f1\loch\f1\cf1\fs22\protect0   Bilirubin, UA\plain\f1\fs20\gatv3777\hich\f1\dbch\f1\loch\f1\cf1\fs20\protect0\cell  \pard\intbl\ssparaaux0\s0\ri90\ql\plain\f0\fs22\plain\f1\fs22\fjhs6094\hich\f1\dbch  \f1\loch\f1\cf1\fs22\protect0 Negative\plain\f1\fs20\zlqx8763\hich\f1\dbch\f1\loch  \f1\cf1\fs20\protect0\cell\pard\intbl\ssparaaux0\s0\ri90\ql\plain\f0\fs22\plain  \f1\fs22\gnff6414\hich\f1\dbch\f1\loch\f1\cf1\fs22\protect0 Negative\plain\f1\fs20  \yxpt0708\hich\f1\dbch\f1\loch\f1\cf1\fs20\protect0\cell\intbl\row\pard\intbl\ssparaaux0  \s0\qc\plain\f0\fs22\plain\f1\fs20\naaj3729\hich\f1\dbch\f1\loch\f1\cf1\fs20\protect0  \cell\pard\intbl\ssparaaux0\s0\ri90\ql\plain\f0\fs22\plain\f1\fs22\ssol5633\hich  \f1\dbch\f1\loch\f1\cf1\fs22\protect0 Ketones,  UA\plain\f1\fs20\vris5954\hich\f1  \dbch\f1\loch\f1\cf1\fs20\protect0\cell\pard\intbl\ssparaaux0\s0\ri90\ql\plain\f0  \fs22\plain\f1\fs22\lqbw2169\hich\f1\dbch\f1\loch\f1\cf1\fs22\protect0 80 mg/dL   (!!)\plain\f1\fs20\rtjx7106\hich\f1\dbch\f1\loch\f1\cf1\fs20\protect0\cell\pard  \intbl\ssparaaux0\s0\ri90\ql\plain\f0\fs22\plain\f1\fs22\lrci8188\hich\f1\dbch\f1  \loch\f1\cf1\fs22\protect0 Negative\plain\f1\fs20\wizq0361\hich\f1\dbch\f1\loch  \f1\cf1\fs20\protect0\cell\intbl\row\pard\intbl\ssparaaux0\s0\qc\plain\f0\fs22\plain  \f1\fs20\cmav4867\hich\f1\dbch\f1\loch\f1\cf1\fs20\protect0\cell\pard\intbl\ssparaaux0  \s0\ri90\ql\plain\f0\fs22\plain\f1\fs22\klsm8666\hich\f1\dbch\f1\loch\f1\cf1\fs22\protect0   [place], UA\plain\f1\fs20\wpty3323\hich\f1\dbch\f1\loch\f1\cf1\fs20\protect0  \cell\pard\intbl\ssparaaux0\s0\ri90\ql\plain\f0\fs22\plain\f1\fs22\fsmr9138\hich  \f1\dbch\f1\loch\f1\cf1\fs22\protect0 1.024\plain\f1\fs20\iuew4546\hich\f1\dbch  \f1\loch\f1\cf1\fs20\protect0\cell\pard\intbl\ssparaaux0\s0\ri90\ql\plain\f0\fs22  \plain\f1\fs22\qccf5732\hich\f1\dbch\f1\loch\f1\cf1\fs22\protect0 1.001 - 1.030  \plain\f1\fs20\enyx9680\hich\f1\dbch\f1\loch\f1\cf1\fs20\protect0\cell\intbl\row  \pard\intbl\ssparaaux0\s0\qc\plain\f0\fs22\plain\f1\fs20\mvni7969\hich\f1\dbch\f1  \loch\f1\cf1\fs20\protect0\cell\pard\intbl\ssparaaux0\s0\ri90\ql\plain\f0\fs22\plain  \f1\fs22\bhwy7271\hich\f1\dbch\f1\loch\f1\cf1\fs22\protect0 Blood, UA\plain\f1\fs20  \cfox2008\hich\f1\dbch\f1\loch\f1\cf1\fs20\protect0\cell\pard\intbl\ssparaaux0\s0  \ri90\ql\plain\f0\fs22\plain\f1\fs22\oezo6298\hich\f1\dbch\f1\loch\f1\cf1\fs22\protect0   Moderate (!)\plain\f1\fs20\qson0828\hich\f1\dbch\f1\loch\f1\cf1\fs20\protect0\cell  \pard\intbl\ssparaaux0\s0\ri90\ql\plain\f0\fs22\plain\f1\fs22\bwif0748\hich\f1\dbch  \f1\loch\f1\cf1\fs22\protect0  Negative\plain\f1\fs20\jpxq8740\hich\f1\dbch\f1\loch  \f1\cf1\fs20\protect0\cell\intbl\row\pard\intbl\ssparaaux0\s0\qc\plain\f0\fs22\plain  \f1\fs20\vbce7898\hich\f1\dbch\f1\loch\f1\cf1\fs20\protect0\cell\pard\intbl\ssparaaux0  \s0\ri90\ql\plain\f0\fs22\plain\f1\fs22\ukdb4525\hich\f1\dbch\f1\loch\f1\cf1\fs22\protect0   pH, UA\plain\f1\fs20\cgaz4524\hich\f1\dbch\f1\loch\f1\cf1\fs20\protect0\cell\pard  \intbl\ssparaaux0\s0\ri90\ql\plain\f0\fs22\plain\f1\fs22\ugdi9125\hich\f1\dbch\f1  \loch\f1\cf1\fs22\protect0 5.5\plain\f1\fs20\hnfo0936\hich\f1\dbch\f1\loch\f1\cf1  \fs20\protect0\cell\pard\intbl\ssparaaux0\s0\ri90\ql\plain\f0\fs22\plain\f1\fs22  \rzsf9417\hich\f1\dbch\f1\loch\f1\cf1\fs22\protect0 4.5 - 8.0\plain\f1\fs20\wngb3001  \hich\f1\dbch\f1\loch\f1\cf1\fs20\protect0\cell\intbl\row\pard\intbl\ssparaaux0  \s0\qc\plain\f0\fs22\plain\f1\fs20\puxu8761\hich\f1\dbch\f1\loch\f1\cf1\fs20\protect0  \cell\pard\intbl\ssparaaux0\s0\ri90\ql\plain\f0\fs22\plain\f1\fs22\hxzy4834\hich  \f1\dbch\f1\loch\f1\cf1\fs22\protect0 Protein, UA\plain\f1\fs20\qxdn8969\hich\f1  \dbch\f1\loch\f1\cf1\fs20\protect0\cell\pard\intbl\ssparaaux0\s0\ri90\ql\plain\f0  \fs22\plain\f1\fs22\xjgd0530\hich\f1\dbch\f1\loch\f1\cf1\fs22\protect0 Negative  \plain\f1\fs20\epqo1482\hich\f1\dbch\f1\loch\f1\cf1\fs20\protect0\cell\pard\intbl  \ssparaaux0\s0\ri90\ql\plain\f0\fs22\plain\f1\fs22\pdhw4209\hich\f1\dbch\f1\loch  \f1\cf1\fs22\protect0 Negative mg/dL\plain\f1\fs20\ovly9352\hich\f1\dbch\f1\loch  \f1\cf1\fs20\protect0\cell\intbl\row\pard\intbl\ssparaaux0\s0\qc\plain\f0\fs22\plain  \f1\fs20\knyj5476\hich\f1\dbch\f1\loch\f1\cf1\fs20\protect0\cell\pard\intbl\ssparaaux0  \s0\ri90\ql\plain\f0\fs22\plain\f1\fs22\chib8394\hich\f1\dbch\f1\loch\f1\cf1\fs22\protect0   Urobilinogen,  UA\plain\f1\fs20\ogmh2043\hich\f1\dbch\f1\loch\f1\cf1\fs20\protect0  \cell\pard\intbl\ssparaaux0\s0\ri90\ql\plain\f0\fs22\plain\f1\fs22\pwsu4073\hich  \f1\dbch\f1\loch\f1\cf1\fs22\protect0 <2.0 E.U./dL\plain\f1\fs20\kcoy2990\hich\f1  \dbch\f1\loch\f1\cf1\fs20\protect0\cell\pard\intbl\ssparaaux0\s0\ri90\ql\plain\f0  \fs22\plain\f1\fs22\wwju0615\hich\f1\dbch\f1\loch\f1\cf1\fs22\protect0 <2.0 E.U./dL,   2.0 E.U./dL\plain\f1\fs20\usln0054\hich\f1\dbch\f1\loch\f1\cf1\fs20\protect0\cell  \intbl\row\pard\intbl\ssparaaux0\s0\qc\plain\f0\fs22\plain\f1\fs20\odsn7886\hich  \f1\dbch\f1\loch\f1\cf1\fs20\protect0\cell\pard\intbl\ssparaaux0\s0\ri90\ql\plain  \f0\fs22\plain\f1\fs22\cbtf5943\hich\f1\dbch\f1\loch\f1\cf1\fs22\protect0 Nitrite,   UA\plain\f1\fs20\aynu6645\hich\f1\dbch\f1\loch\f1\cf1\fs20\protect0\cell\pard\intbl  \ssparaaux0\s0\ri90\ql\plain\f0\fs22\plain\f1\fs22\ggry9549\hich\f1\dbch\f1\loch  \f1\cf1\fs22\protect0 Negative\plain\f1\fs20\bopp8688\hich\f1\dbch\f1\loch\f1\cf1  \fs20\protect0\cell\pard\intbl\ssparaaux0\s0\ri90\ql\plain\f0\fs22\plain\f1\fs22  \azqo6083\hich\f1\dbch\f1\loch\f1\cf1\fs22\protect0 Negative\plain\f1\fs20\irri5783  \hich\f1\dbch\f1\loch\f1\cf1\fs20\protect0\cell\intbl\row\pard\intbl\ssparaaux0  \s0\qc\plain\f0\fs22\plain\f1\fs20\qbwd2254\hich\f1\dbch\f1\loch\f1\cf1\fs20\protect0  \cell\pard\intbl\ssparaaux0\s0\ri90\ql\plain\f0\fs22\plain\f1\fs22\zxwp4555\hich  \f1\dbch\f1\loch\f1\cf1\fs22\protect0 Leukocytes, UA\plain\f1\fs20\ooer8210\hich  \f1\dbch\f1\loch\f1\cf1\fs20\protect0\cell\pard\intbl\ssparaaux0\s0\ri90\ql\plain  \f0\fs22\plain\f1\fs22\tkud9318\hich\f1\dbch\f1\loch\f1\cf1\fs22\protect0 Negative  \plain\f1\fs20\zzgo0854\hich\f1\dbch\f1\loch\f1\cf1\fs20\protect0\cell\pard\intbl  \ssparaaux0\s0\ri90\ql\plain\f0\fs22\plain\f1\fs22\cbql5459\hich\f1\dbch\f1\loch  \f1\cf1\fs22\protect0  Negative\plain\f1\fs20\payj7428\hich\f1\dbch\f1\loch\f1\cf1  \fs20\protect0\cell\intbl\row\pard\intbl\ssparaaux0\s0\qc\plain\f0\fs22\plain\f1  \fs20\kbvo0467\hich\f1\dbch\f1\loch\f1\cf1\fs20\protect0\cell\pard\intbl\ssparaaux0  \s0\ri90\ql\plain\f0\fs22\plain\f1\fs22\fwpb2436\hich\f1\dbch\f1\loch\f1\cf1\fs22\protect0   Bacteria, UA\plain\f1\fs20\lxlx6398\hich\f1\dbch\f1\loch\f1\cf1\fs20\protect0\cell  \pard\intbl\ssparaaux0\s0\ri90\ql\plain\f0\fs22\plain\f1\fs22\jwdx2021\hich\f1\dbch  \f1\loch\f1\cf1\fs22\protect0 None Seen\plain\f1\fs20\cydq6973\hich\f1\dbch\f1\loch  \f1\cf1\fs20\protect0\cell\pard\intbl\ssparaaux0\s0\ri90\ql\plain\f0\fs22\plain  \f1\fs22\gmqn2988\hich\f1\dbch\f1\loch\f1\cf1\fs22\protect0 None Seen hpf\plain  \f1\fs20\dwsh0119\hich\f1\dbch\f1\loch\f1\cf1\fs20\protect0\cell\intbl\row\pard  \intbl\ssparaaux0\s0\qc\plain\f0\fs22\plain\f1\fs20\qorm4609\hich\f1\dbch\f1\loch  \f1\cf1\fs20\protect0\cell\pard\intbl\ssparaaux0\s0\ri90\ql\plain\f0\fs22\plain  \f1\fs22\beho5572\hich\f1\dbch\f1\loch\f1\cf1\fs22\protect0 RBC, UA\plain\f1\fs20  \neam4630\hich\f1\dbch\f1\loch\f1\cf1\fs20\protect0\cell\pard\intbl\ssparaaux0\s0  \ri90\ql\plain\f0\fs22\plain\f1\fs22\dshb4628\hich\f1\dbch\f1\loch\f1\cf1\fs22\protect0   0-2\plain\f1\fs20\nhjd4333\hich\f1\dbch\f1\loch\f1\cf1\fs20\protect0\cell\pard\intbl  \ssparaaux0\s0\ri90\ql\plain\f0\fs22\plain\f1\fs22\vmbn3135\hich\f1\dbch\f1\loch  \f1\cf1\fs22\protect0 None Seen, 0-2 hpf\plain\f1\fs20\jupz7159\hich\f1\dbch\f1  \loch\f1\cf1\fs20\protect0\cell\intbl\row\pard\intbl\ssparaaux0\s0\qc\plain\f0\fs22  \plain\f1\fs20\gmld5968\hich\f1\dbch\f1\loch\f1\cf1\fs20\protect0\cell\pard\intbl  \ssparaaux0\s0\ri90\ql\plain\f0\fs22\plain\f1\fs22\vjyw2804\hich\f1\dbch\f1\loch  \f1\cf1\fs22\protect0 WBC,  UA\plain\f1\fs20\bnue8937\hich\f1\dbch\f1\loch\f1\cf1  \fs20\protect0\cell\pard\intbl\ssparaaux0\s0\ri90\ql\plain\f0\fs22\plain\f1\fs22  \ugfh2505\hich\f1\dbch\f1\loch\f1\cf1\fs22\protect0 0-5\plain\f1\fs20\gvqm4371\hich  \f1\dbch\f1\loch\f1\cf1\fs20\protect0\cell\pard\intbl\ssparaaux0\s0\ri90\ql\plain  \f0\fs22\plain\f1\fs22\nytu0179\hich\f1\dbch\f1\loch\f1\cf1\fs22\protect0 None Seen,   0-5 hpf\plain\f1\fs20\uyah6661\hich\f1\dbch\f1\loch\f1\cf1\fs20\protect0\cell\intbl  \row\pard\intbl\ssparaaux0\s0\qc\plain\f0\fs22\plain\f1\fs20\vtlp8086\hich\f1\dbch  \f1\loch\f1\cf1\fs20\protect0\cell\pard\intbl\ssparaaux0\s0\ri90\ql\plain\f0\fs22  \plain\f1\fs22\zfnj3847\hich\f1\dbch\f1\loch\f1\cf1\fs22\protect0 Squam Epithel,   UA\plain\f1\fs20\joap3573\hich\f1\dbch\f1\loch\f1\cf1\fs20\protect0\cell\pard\intbl  \ssparaaux0\s0\ri90\ql\plain\f0\fs22\plain\f1\fs22\knke3791\hich\f1\dbch\f1\loch  \f1\cf1\fs22\protect0 25-50 (!)\plain\f1\fs20\dckf5745\hich\f1\dbch\f1\loch\f1\cf1  \fs20\protect0\cell\pard\intbl\ssparaaux0\s0\ri90\ql\plain\f0\fs22\plain\f1\fs22  \dvgl7594\hich\f1\dbch\f1\loch\f1\cf1\fs22\protect0 None Seen, 0-5 lpf\plain\f1  \fs20\iplc5324\hich\f1\dbch\f1\loch\f1\cf1\fs20\protect0\cell\intbl\row\trowd\trgaph0  \trpaddl0\trpaddfl3\trpaddr0\trpaddfr3\trpaddt0\trpaddft3\trpaddb0\trpaddfb3\trleft0  \clvertalt\clpadt0\clpadft3\clpadr0\clpadfr3\clpadl0\clpadfl3\clpadb0\clpadfb3\yylfu0887  \pard\intbl\ssparaaux0\s0\ri90\ql\plain\f0\fs22\plain\f1\fs18\jqen9593\hich\f1\dbch  \f1\loch\f1\cf6\fs18\b\protect0 Blood Gases,  Venous\plain\f1\fs20\oysl0324\hich  \f1\dbch\f1\loch\f1\cf1\fs20\protect0\cell\intbl\row\trowd\trgaph0\trpaddl0\trpaddfl3  \trpaddr0\trpaddfr3\trpaddt0\trpaddft3\trpaddb0\trpaddfb3\trleft0\clvertalt\qweaoto35  \clpadt0\clpadft3\clpadr0\clpadfr3\clpadl0\clpadfl3\clpadb0\clpadfb3\oobix4421\clvertalt  \irrfdhc73\clpadt0\clpadft3\clpadr0\clpadfr3\clpadl0\clpadfl3\clpadb0\clpadfb3\enxur7070  \clvertalt\oczrbxb66\clpadt0\clpadft3\clpadr0\clpadfr3\clpadl0\clpadfl3\clpadb0  \clpadfb3\ogmyt6393\pard\intbl\ssparaaux0\s0\ri90\ql\plain\f0\fs22\plain\f1\fs18  \gatd9069\hich\f1\dbch\f1\loch\f1\cf6\fs18\protect0 Result\plain\f1\fs20\hzry1707  \hich\f1\dbch\f1\loch\f1\cf1\fs20\protect0\cell\pard\intbl\ssparaaux0\s0\ri90\ql  \plain\f0\fs22\plain\f1\fs18\ouze6711\hich\f1\dbch\f1\loch\f1\cf6\fs18\protect0   Value\plain\f1\fs20\gpek8946\hich\f1\dbch\f1\loch\f1\cf1\fs20\protect0\cell\pard  \intbl\ssparaaux0\s0\ri90\ql\plain\f0\fs22\plain\f1\fs18\wxrk4158\hich\f1\dbch\f1  \loch\f1\cf6\fs18\protect0 Ref Range\plain\f1\fs20\pgpm6299\hich\f1\dbch\f1\loch  \f1\cf1\fs20\protect0\cell\intbl\row\trowd\trgaph0\trpaddl0\trpaddfl3\trpaddr0\trpaddfr3  \trpaddt0\trpaddft3\trpaddb0\trpaddfb3\trleft0\clvertalt\clpadt0\clpadft3\clpadr0  \clpadfr3\clpadl0\clpadfl3\clpadb0\clpadfb3\gerjt202\clvertalt\clpadt0\clpadft3  \clpadr0\clpadfr3\clpadl0\clpadfl3\clpadb0\clpadfb3\bxovz6899\clvertalt\clpadt0  \clpadft3\clpadr0\clpadfr3\clpadl0\clpadfl3\clpadb0\clpadfb3\tpyzn3958\clvertalt  \clpadt0\clpadft3\clpadr0\clpadfr3\clpadl0\clpadfl3\clpadb0\clpadfb3\bihjs3198\pard  \intbl\ssparaaux0\s0\qc\plain\f0\fs22\plain\f1\fs20\stxd9621\hich\f1\dbch\f1\loch  \f1\cf1\fs20\protect0\cell\pard\intbl\ssparaaux0\s0\ri90\ql\plain\f0\fs22\plain  \f1\fs22\bjcb9289\hich\f1\dbch\f1\loch\f1\cf1\fs22\protect0 pH,  Venous\plain\f1  \fs20\umsr7921\hich\f1\dbch\f1\loch\f1\cf1\fs20\protect0\cell\pard\intbl\ssparaaux0  \s0\ri90\ql\plain\f0\fs22\plain\f1\fs22\nweu4759\hich\f1\dbch\f1\loch\f1\cf1\fs22\protect0   7.33 (L)\plain\f1\fs20\wiek5187\hich\f1\dbch\f1\loch\f1\cf1\fs20\protect0\cell\pard  \intbl\ssparaaux0\s0\ri90\ql\plain\f0\fs22\plain\f1\fs22\ycyu7821\hich\f1\dbch\f1  \loch\f1\cf1\fs22\protect0 7.35 - 7.45\plain\f1\fs20\clpk8614\hich\f1\dbch\f1\loch  \f1\cf1\fs20\protect0\cell\intbl\row\pard\intbl\ssparaaux0\s0\qc\plain\f0\fs22\plain  \f1\fs20\sare7420\hich\f1\dbch\f1\loch\f1\cf1\fs20\protect0\cell\pard\intbl\ssparaaux0  \s0\ri90\ql\plain\f0\fs22\plain\f1\fs22\csyt2269\hich\f1\dbch\f1\loch\f1\cf1\fs22\protect0   pCO2, Venous\plain\f1\fs20\jloo7467\hich\f1\dbch\f1\loch\f1\cf1\fs20\protect0\cell  \pard\intbl\ssparaaux0\s0\ri90\ql\plain\f0\fs22\plain\f1\fs22\rnnx0771\hich\f1\dbch  \f1\loch\f1\cf1\fs22\protect0 36\plain\f1\fs20\camp5735\hich\f1\dbch\f1\loch\f1  \cf1\fs20\protect0\cell\pard\intbl\ssparaaux0\s0\ri90\ql\plain\f0\fs22\plain\f1  \fs22\ziug7306\hich\f1\dbch\f1\loch\f1\cf1\fs22\protect0 35 - 50 mm Hg\plain\f1  \fs20\kafj1985\hich\f1\dbch\f1\loch\f1\cf1\fs20\protect0\cell\intbl\row\pard\intbl  \ssparaaux0\s0\qc\plain\f0\fs22\plain\f1\fs20\djmc3144\hich\f1\dbch\f1\loch\f1\cf1  \fs20\protect0\cell\pard\intbl\ssparaaux0\s0\ri90\ql\plain\f0\fs22\plain\f1\fs22  \srss1890\hich\f1\dbch\f1\loch\f1\cf1\fs22\protect0 pO2, Brenden\plain\f1\fs20\xqbw4476  \hich\f1\dbch\f1\loch\f1\cf1\fs20\protect0\cell\pard\intbl\ssparaaux0\s0\ri90\ql  \plain\f0\fs22\plain\f1\fs22\yocg1764\hich\f1\dbch\f1\loch\f1\cf1\fs22\protect0   51 (H)\plain\f1\fs20\kbvj0569\hich\f1\dbch\f1\loch\f1\cf1\fs20\protect0\cell\pard  \intbl\ssparaaux0\s0\ri90\ql\plain\f0\fs22\plain\f1\fs22\wymb1921\hich\f1\dbch\f1  \loch\f1\cf1\fs22\protect0 25 - 47 mm  Hg\plain\f1\fs20\jrju4081\hich\f1\dbch\f1  \loch\f1\cf1\fs20\protect0\cell\intbl\row\pard\intbl\ssparaaux0\s0\qc\plain\f0\fs22  \plain\f1\fs20\mimf6746\hich\f1\dbch\f1\loch\f1\cf1\fs20\protect0\cell\pard\intbl  \ssparaaux0\s0\ri90\ql\plain\f0\fs22\plain\f1\fs22\olic5646\hich\f1\dbch\f1\loch  \f1\cf1\fs22\protect0 Base Excess, Venous\plain\f1\fs20\odov1417\hich\f1\dbch\f1  \loch\f1\cf1\fs20\protect0\cell\pard\intbl\ssparaaux0\s0\ri90\ql\plain\f0\fs22\plain  \f1\fs22\vnnv1676\hich\f1\dbch\f1\loch\f1\cf1\fs22\protect0 -6.2\plain\f1\fs20\mtdw7472  \hich\f1\dbch\f1\loch\f1\cf1\fs20\protect0\cell\pard\intbl\ssparaaux0\s0\ri90\ql  \plain\f0\fs22\plain\f1\fs22\yhtg1349\hich\f1\dbch\f1\loch\f1\cf1\fs22\protect0   mmol/L\plain\f1\fs20\oiik6258\hich\f1\dbch\f1\loch\f1\cf1\fs20\protect0\cell\intbl  \row\pard\intbl\ssparaaux0\s0\qc\plain\f0\fs22\plain\f1\fs20\pctn2646\hich\f1\dbch  \f1\loch\f1\cf1\fs20\protect0\cell\pard\intbl\ssparaaux0\s0\ri90\ql\plain\f0\fs22  \plain\f1\fs22\pefi5565\hich\f1\dbch\f1\loch\f1\cf1\fs22\protect0 HCO3, Venous\plain  \f1\fs20\zdvl2465\hich\f1\dbch\f1\loch\f1\cf1\fs20\protect0\cell\pard\intbl\ssparaaux0  \s0\ri90\ql\plain\f0\fs22\plain\f1\fs22\tyxp2306\hich\f1\dbch\f1\loch\f1\cf1\fs22\protect0   19.8 (L)\plain\f1\fs20\eksf5500\hich\f1\dbch\f1\loch\f1\cf1\fs20\protect0\cell\pard  \intbl\ssparaaux0\s0\ri90\ql\plain\f0\fs22\plain\f1\fs22\saoe4061\hich\f1\dbch\f1  \loch\f1\cf1\fs22\protect0 24.0 - 30.0 mmol/L\plain\f1\fs20\dhtq8768\hich\f1\dbch  \f1\loch\f1\cf1\fs20\protect0\cell\intbl\row\pard\intbl\ssparaaux0\s0\qc\plain\f0  \fs22\plain\f1\fs20\tptt8712\hich\f1\dbch\f1\loch\f1\cf1\fs20\protect0\cell\pard  \intbl\ssparaaux0\s0\ri90\ql\plain\f0\fs22\plain\f1\fs22\aqsc1548\hich\f1\dbch\f1  \loch\f1\cf1\fs22\protect0  Oxyhemoglobin\plain\f1\fs20\ydll0550\hich\f1\dbch\f1  \loch\f1\cf1\fs20\protect0\cell\pard\intbl\ssparaaux0\s0\ri90\ql\plain\f0\fs22\plain  \f1\fs22\jwne5882\hich\f1\dbch\f1\loch\f1\cf1\fs22\protect0 86.1 (H)\plain\f1\fs20  \feou5182\hich\f1\dbch\f1\loch\f1\cf1\fs20\protect0\cell\pard\intbl\ssparaaux0\s0  \ri90\ql\plain\f0\fs22\plain\f1\fs22\zjzu4378\hich\f1\dbch\f1\loch\f1\cf1\fs22\protect0   70.0 - 75.0 %\plain\f1\fs20\guhk1382\hich\f1\dbch\f1\loch\f1\cf1\fs20\protect0\cell  \intbl\row\pard\intbl\ssparaaux0\s0\qc\plain\f0\fs22\plain\f1\fs20\npzl6671\hich  \f1\dbch\f1\loch\f1\cf1\fs20\protect0\cell\pard\intbl\ssparaaux0\s0\ri90\ql\plain  \f0\fs22\plain\f1\fs22\mzxg4308\hich\f1\dbch\f1\loch\f1\cf1\fs22\protect0 O2 Sat,   Venous\plain\f1\fs20\ncxd9507\hich\f1\dbch\f1\loch\f1\cf1\fs20\protect0\cell\pard  \intbl\ssparaaux0\s0\ri90\ql\plain\f0\fs22\plain\f1\fs22\amdw8756\hich\f1\dbch\f1  \loch\f1\cf1\fs22\protect0 88.7 (H)\plain\f1\fs20\milv9705\hich\f1\dbch\f1\loch  \f1\cf1\fs20\protect0\cell\pard\intbl\ssparaaux0\s0\ri90\ql\plain\f0\fs22\plain  \f1\fs22\bheo6947\hich\f1\dbch\f1\loch\f1\cf1\fs22\protect0 70.0 - 75.0 %\plain  \f1\fs20\ulxz7961\hich\f1\dbch\f1\loch\f1\cf1\fs20\protect0\cell\intbl\row\trowd  \trgaph0\trpaddl0\trpaddfl3\trpaddr0\trpaddfr3\trpaddt0\trpaddft3\trpaddb0\trpaddfb3  \trleft0\clvertalt\clpadt0\clpadft3\clpadr0\clpadfr3\clpadl0\clpadfl3\clpadb0\clpadfb3  \rczrd2623\pard\intbl\ssparaaux0\s0\ri90\ql\plain\f0\fs22\plain\f1\fs18\qjap1303  \hich\f1\dbch\f1\loch\f1\cf6\fs18\b\protect0 Beta-hydroxybutyrate  (BHOB)\plain\f1  \fs20\dbcr3836\hich\f1\dbch\f1\loch\f1\cf1\fs20\protect0\cell\intbl\row\trowd\trgaph0  \trpaddl0\trpaddfl3\trpaddr0\trpaddfr3\trpaddt0\trpaddft3\trpaddb0\trpaddfb3\trleft0  \clvertalt\wewctpc42\clpadt0\clpadft3\clpadr0\clpadfr3\clpadl0\clpadfl3\clpadb0  \clpadfb3\fbmdg1535\clvertalt\jjkavtq66\clpadt0\clpadft3\clpadr0\clpadfr3\clpadl0  \clpadfl3\clpadb0\clpadfb3\sxjhx5910\clvertalt\wsybmaf08\clpadt0\clpadft3\clpadr0  \clpadfr3\clpadl0\clpadfl3\clpadb0\clpadfb3\pebcb1144\pard\intbl\ssparaaux0\s0\ri90  \ql\plain\f0\fs22\plain\f1\fs18\wmoe9493\hich\f1\dbch\f1\loch\f1\cf6\fs18\protect0   Result\plain\f1\fs20\dmzn5274\hich\f1\dbch\f1\loch\f1\cf1\fs20\protect0\cell\pard  \intbl\ssparaaux0\s0\ri90\ql\plain\f0\fs22\plain\f1\fs18\tetx2920\hich\f1\dbch\f1  \loch\f1\cf6\fs18\protect0 Value\plain\f1\fs20\koxl9034\hich\f1\dbch\f1\loch\f1  \cf1\fs20\protect0\cell\pard\intbl\ssparaaux0\s0\ri90\ql\plain\f0\fs22\plain\f1  \fs18\hsha7633\hich\f1\dbch\f1\loch\f1\cf6\fs18\protect0 Ref Range\plain\f1\fs20  \vjdz6027\hich\f1\dbch\f1\loch\f1\cf1\fs20\protect0\cell\intbl\row\trowd\trgaph0  \trpaddl0\trpaddfl3\trpaddr0\trpaddfr3\trpaddt0\trpaddft3\trpaddb0\trpaddfb3\trleft0  \clvertalt\clpadt0\clpadft3\clpadr0\clpadfr3\clpadl0\clpadfl3\clpadb0\clpadfb3\klngo118  \clvertalt\clpadt0\clpadft3\clpadr0\clpadfr3\clpadl0\clpadfl3\clpadb0\clpadfb3\uryoj4926  \clvertalt\clpadt0\clpadft3\clpadr0\clpadfr3\clpadl0\clpadfl3\clpadb0\clpadfb3\yeofc2962  \clvertalt\clpadt0\clpadft3\clpadr0\clpadfr3\clpadl0\clpadfl3\clpadb0\clpadfb3\etvjq5816  \pard\intbl\ssparaaux0\s0\qc\plain\f0\fs22\plain\f1\fs20\ovwb5029\hich\f1\dbch\f1  \loch\f1\cf1\fs20\protect0\cell\pard\intbl\ssparaaux0\s0\ri90\ql\plain\f0\fs22\plain  \f1\fs22\nqmf6651\hich\f1\dbch\f1\loch\f1\cf1\fs22\protect0 Beta-Hydroxybutyrate    (BHOB)\plain\f1\fs20\efej1171\hich\f1\dbch\f1\loch\f1\cf1\fs20\protect0\cell\pard  \intbl\ssparaaux0\s0\ri90\ql\plain\f0\fs22\plain\f1\fs22\rjsj7198\hich\f1\dbch\f1  \loch\f1\cf1\fs22\protect0 2.18 (H)\plain\f1\fs20\tzaj2220\hich\f1\dbch\f1\loch  \f1\cf1\fs20\protect0\cell\pard\intbl\ssparaaux0\s0\ri90\ql\plain\f0\fs22\plain  \f1\fs22\lmuj9617\hich\f1\dbch\f1\loch\f1\cf1\fs22\protect0 <=0.3 mmol/L\plain\f1  \fs20\tylg9299\hich\f1\dbch\f1\loch\f1\cf1\fs20\protect0\cell\intbl\row\trowd\trgaph0  \trpaddl0\trpaddfl3\trpaddr0\trpaddfr3\trpaddt0\trpaddft3\trpaddb0\trpaddfb3\trleft0  \clvertalt\clpadt0\clpadft3\clpadr0\clpadfr3\clpadl0\clpadfl3\clpadb0\clpadfb3\bxrar7673  \pard\intbl\ssparaaux0\s0\ri90\ql\plain\f0\fs22\plain\f1\fs18\vdiu7391\hich\f1\dbch  \f1\loch\f1\cf6\fs18\b\protect0 Lactic Acid\plain\f1\fs20\kpga5129\hich\f1\dbch  \f1\loch\f1\cf1\fs20\protect0\cell\intbl\row\trowd\trgaph0\trpaddl0\trpaddfl3\trpaddr0  \trpaddfr3\trpaddt0\trpaddft3\trpaddb0\trpaddfb3\trleft0\clvertalt\dfrkcxc89\clpadt0  \clpadft3\clpadr0\clpadfr3\clpadl0\clpadfl3\clpadb0\clpadfb3\niydy1867\clvertalt  \lsumeic36\clpadt0\clpadft3\clpadr0\clpadfr3\clpadl0\clpadfl3\clpadb0\clpadfb3\ccumw6320  \clvertalt\tfwsyii14\clpadt0\clpadft3\clpadr0\clpadfr3\clpadl0\clpadfl3\clpadb0  \clpadfb3\lxqkb2690\pard\intbl\ssparaaux0\s0\ri90\ql\plain\f0\fs22\plain\f1\fs18  \evsp6573\hich\f1\dbch\f1\loch\f1\cf6\fs18\protect0 Result\plain\f1\fs20\iobw7103  \hich\f1\dbch\f1\loch\f1\cf1\fs20\protect0\cell\pard\intbl\ssparaaux0\s0\ri90\ql  \plain\f0\fs22\plain\f1\fs18\ztmr7933\hich\f1\dbch\f1\loch\f1\cf6\fs18\protect0   Value\plain\f1\fs20\ywlw4573\hich\f1\dbch\f1\loch\f1\cf1\fs20\protect0\cell\pard  \intbl\ssparaaux0\s0\ri90\ql\plain\f0\fs22\plain\f1\fs18\ggft5494\hich\f1\dbch\f1  \loch\f1\cf6\fs18\protect0 Ref  Range\plain\f1\fs20\vxdi9325\hich\f1\dbch\f1\loch  \f1\cf1\fs20\protect0\cell\intbl\row\trowd\trgaph0\trpaddl0\trpaddfl3\trpaddr0\trpaddfr3  \trpaddt0\trpaddft3\trpaddb0\trpaddfb3\trleft0\clvertalt\clpadt0\clpadft3\clpadr0  \clpadfr3\clpadl0\clpadfl3\clpadb0\clpadfb3\leuqh430\clvertalt\clpadt0\clpadft3  \clpadr0\clpadfr3\clpadl0\clpadfl3\clpadb0\clpadfb3\bmilb7684\clvertalt\clpadt0  \clpadft3\clpadr0\clpadfr3\clpadl0\clpadfl3\clpadb0\clpadfb3\hfpbg5262\clvertalt  \clpadt0\clpadft3\clpadr0\clpadfr3\clpadl0\clpadfl3\clpadb0\clpadfb3\iilcg4566\pard  \intbl\ssparaaux0\s0\qc\plain\f0\fs22\plain\f1\fs20\dufk9041\hich\f1\dbch\f1\loch  \f1\cf1\fs20\protect0\cell\pard\intbl\ssparaaux0\s0\ri90\ql\plain\f0\fs22\plain  \f1\fs22\swsh9712\hich\f1\dbch\f1\loch\f1\cf1\fs22\protect0 Lactic Acid\plain\f1  \fs20\ohax7773\hich\f1\dbch\f1\loch\f1\cf1\fs20\protect0\cell\pard\intbl\ssparaaux0  \s0\ri90\ql\plain\f0\fs22\plain\f1\fs22\kzsc4107\hich\f1\dbch\f1\loch\f1\cf1\fs22\protect0   3.1 (H)\plain\f1\fs20\gbya7784\hich\f1\dbch\f1\loch\f1\cf1\fs20\protect0\cell\pard  \intbl\ssparaaux0\s0\ri90\ql\plain\f0\fs22\plain\f1\fs22\crwz8433\hich\f1\dbch\f1  \loch\f1\cf1\fs22\protect0 0.5 - 2.2 mmol/L\plain\f1\fs20\lrzk2025\hich\f1\dbch  \f1\loch\f1\cf1\fs20\protect0\cell\intbl\row\trowd\trgaph0\trpaddl0\trpaddfl3\trpaddr0  \trpaddfr3\trpaddt0\trpaddft3\trpaddb0\trpaddfb3\trleft0\clvertalt\clpadt0\clpadft3  \clpadr0\clpadfr3\clpadl0\clpadfl3\clpadb0\clpadfb3\qfywt5737\pard\intbl\ssparaaux0  \s0\ri90\ql\plain\f0\fs22\plain\f1\fs18\ystl9994\hich\f1\dbch\f1\loch\f1\cf6\fs18\b\protect0   Basic Metabolic  Panel\plain\f1\fs20\gfde9094\hich\f1\dbch\f1\loch\f1\cf1\fs20\protect0  \cell\intbl\row\trowd\trgaph0\trpaddl0\trpaddfl3\trpaddr0\trpaddfr3\trpaddt0\trpaddft3  \trpaddb0\trpaddfb3\trleft0\clvertalt\mkafotv87\clpadt0\clpadft3\clpadr0\clpadfr3  \clpadl0\clpadfl3\clpadb0\clpadfb3\fotsy2286\clvertalt\hjejadw55\clpadt0\clpadft3  \clpadr0\clpadfr3\clpadl0\clpadfl3\clpadb0\clpadfb3\ucscl2182\clvertalt\ugbzbmz87  \clpadt0\clpadft3\clpadr0\clpadfr3\clpadl0\clpadfl3\clpadb0\clpadfb3\ulzhi4653\pard  \intbl\ssparaaux0\s0\ri90\ql\plain\f0\fs22\plain\f1\fs18\kuzq2970\hich\f1\dbch\f1  \loch\f1\cf6\fs18\protect0 Result\plain\f1\fs20\cirh8556\hich\f1\dbch\f1\loch\f1  \cf1\fs20\protect0\cell\pard\intbl\ssparaaux0\s0\ri90\ql\plain\f0\fs22\plain\f1  \fs18\vsqs8172\hich\f1\dbch\f1\loch\f1\cf6\fs18\protect0 Value\plain\f1\fs20\vlow5495  \hich\f1\dbch\f1\loch\f1\cf1\fs20\protect0\cell\pard\intbl\ssparaaux0\s0\ri90\ql  \plain\f0\fs22\plain\f1\fs18\xjgu5744\hich\f1\dbch\f1\loch\f1\cf6\fs18\protect0   Ref Range\plain\f1\fs20\ytmb4988\hich\f1\dbch\f1\loch\f1\cf1\fs20\protect0\cell  \intbl\row\trowd\trgaph0\trpaddl0\trpaddfl3\trpaddr0\trpaddfr3\trpaddt0\trpaddft3  \trpaddb0\trpaddfb3\trleft0\clvertalt\clpadt0\clpadft3\clpadr0\clpadfr3\clpadl0  \clpadfl3\clpadb0\clpadfb3\ctfsa569\clvertalt\clpadt0\clpadft3\clpadr0\clpadfr3  \clpadl0\clpadfl3\clpadb0\clpadfb3\byzyt0839\clvertalt\clpadt0\clpadft3\clpadr0  \clpadfr3\clpadl0\clpadfl3\clpadb0\clpadfb3\rjhmr7339\clvertalt\clpadt0\clpadft3  \clpadr0\clpadfr3\clpadl0\clpadfl3\clpadb0\clpadfb3\gecoc0717\pard\intbl\ssparaaux0  \s0\qc\plain\f0\fs22\plain\f1\fs20\msog7290\hich\f1\dbch\f1\loch\f1\cf1\fs20\protect0  \cell\pard\intbl\ssparaaux0\s0\ri90\ql\plain\f0\fs22\plain\f1\fs22\azdl4456\hich  \f1\dbch\f1\loch\f1\cf1\fs22\protect0  Sodium\plain\f1\fs20\jkxs2090\hich\f1\dbch  \f1\loch\f1\cf1\fs20\protect0\cell\pard\intbl\ssparaaux0\s0\ri90\ql\plain\f0\fs22  \plain\f1\fs22\kvqk0055\hich\f1\dbch\f1\loch\f1\cf1\fs22\protect0 136\plain\f1\fs20  \bcvv0401\hich\f1\dbch\f1\loch\f1\cf1\fs20\protect0\cell\pard\intbl\ssparaaux0\s0  \ri90\ql\plain\f0\fs22\plain\f1\fs22\diwm7306\hich\f1\dbch\f1\loch\f1\cf1\fs22\protect0   136 - 145 mmol/L\plain\f1\fs20\lwdf6631\hich\f1\dbch\f1\loch\f1\cf1\fs20\protect0  \cell\intbl\row\pard\intbl\ssparaaux0\s0\qc\plain\f0\fs22\plain\f1\fs20\zyeh7717  \hich\f1\dbch\f1\loch\f1\cf1\fs20\protect0\cell\pard\intbl\ssparaaux0\s0\ri90\ql  \plain\f0\fs22\plain\f1\fs22\hyyu6829\hich\f1\dbch\f1\loch\f1\cf1\fs22\protect0   Potassium\plain\f1\fs20\rekq8194\hich\f1\dbch\f1\loch\f1\cf1\fs20\protect0\cell  \pard\intbl\ssparaaux0\s0\ri90\ql\plain\f0\fs22\plain\f1\fs22\johe5201\hich\f1\dbch  \f1\loch\f1\cf1\fs22\protect0 4.5\plain\f1\fs20\igio9441\hich\f1\dbch\f1\loch\f1  \cf1\fs20\protect0\cell\pard\intbl\ssparaaux0\s0\ri90\ql\plain\f0\fs22\plain\f1  \fs22\dnnp9688\hich\f1\dbch\f1\loch\f1\cf1\fs22\protect0 3.5 - 5.0 mmol/L\plain  \f1\fs20\pdqz9095\hich\f1\dbch\f1\loch\f1\cf1\fs20\protect0\cell\intbl\row\pard  \intbl\ssparaaux0\s0\qc\plain\f0\fs22\plain\f1\fs20\eoyj5747\hich\f1\dbch\f1\loch  \f1\cf1\fs20\protect0\cell\pard\intbl\ssparaaux0\s0\ri90\ql\plain\f0\fs22\plain  \f1\fs22\ikae1136\hich\f1\dbch\f1\loch\f1\cf1\fs22\protect0 Chloride\plain\f1\fs20  \vvqj0808\hich\f1\dbch\f1\loch\f1\cf1\fs20\protect0\cell\pard\intbl\ssparaaux0\s0  \ri90\ql\plain\f0\fs22\plain\f1\fs22\fkli6793\hich\f1\dbch\f1\loch\f1\cf1\fs22\protect0   101\plain\f1\fs20\cdmw1760\hich\f1\dbch\f1\loch\f1\cf1\fs20\protect0\cell\pard\intbl  \ssparaaux0\s0\ri90\ql\plain\f0\fs22\plain\f1\fs22\cxjn6228\hich\f1\dbch\f1\loch  \f1\cf1\fs22\protect0 98 - 107  mmol/L\plain\f1\fs20\avhj0087\hich\f1\dbch\f1\loch  \f1\cf1\fs20\protect0\cell\intbl\row\pard\intbl\ssparaaux0\s0\qc\plain\f0\fs22\plain  \f1\fs20\kvkv2530\hich\f1\dbch\f1\loch\f1\cf1\fs20\protect0\cell\pard\intbl\ssparaaux0  \s0\ri90\ql\plain\f0\fs22\plain\f1\fs22\xjdk7076\hich\f1\dbch\f1\loch\f1\cf1\fs22\protect0   CO2\plain\f1\fs20\hnta4471\hich\f1\dbch\f1\loch\f1\cf1\fs20\protect0\cell\pard\intbl  \ssparaaux0\s0\ri90\ql\plain\f0\fs22\plain\f1\fs22\ywjt3701\hich\f1\dbch\f1\loch  \f1\cf1\fs22\protect0 22\plain\f1\fs20\aaim0745\hich\f1\dbch\f1\loch\f1\cf1\fs20  \protect0\cell\pard\intbl\ssparaaux0\s0\ri90\ql\plain\f0\fs22\plain\f1\fs22\uzoq6174  \hich\f1\dbch\f1\loch\f1\cf1\fs22\protect0 22 - 31 mmol/L\plain\f1\fs20\muzv0009  \hich\f1\dbch\f1\loch\f1\cf1\fs20\protect0\cell\intbl\row\pard\intbl\ssparaaux0  \s0\qc\plain\f0\fs22\plain\f1\fs20\oyvq6340\hich\f1\dbch\f1\loch\f1\cf1\fs20\protect0  \cell\pard\intbl\ssparaaux0\s0\ri90\ql\plain\f0\fs22\plain\f1\fs22\kpcl5757\hich  \f1\dbch\f1\loch\f1\cf1\fs22\protect0 Anion Gap, Calculation\plain\f1\fs20\jppg5837  \hich\f1\dbch\f1\loch\f1\cf1\fs20\protect0\cell\pard\intbl\ssparaaux0\s0\ri90\ql  \plain\f0\fs22\plain\f1\fs22\snhn6727\hich\f1\dbch\f1\loch\f1\cf1\fs22\protect0   13\plain\f1\fs20\oqgf4107\hich\f1\dbch\f1\loch\f1\cf1\fs20\protect0\cell\pard\intbl  \ssparaaux0\s0\ri90\ql\plain\f0\fs22\plain\f1\fs22\qglj5559\hich\f1\dbch\f1\loch  \f1\cf1\fs22\protect0 5 - 18 mmol/L\plain\f1\fs20\egxd6816\hich\f1\dbch\f1\loch  \f1\cf1\fs20\protect0\cell\intbl\row\pard\intbl\ssparaaux0\s0\qc\plain\f0\fs22\plain  \f1\fs20\soqh4331\hich\f1\dbch\f1\loch\f1\cf1\fs20\protect0\cell\pard\intbl\ssparaaux0  \s0\ri90\ql\plain\f0\fs22\plain\f1\fs22\qfbn6731\hich\f1\dbch\f1\loch\f1\cf1\fs22\protect0   Glucose\plain\f1\fs20\engt4225\hich\f1\dbch\f1\loch\f1\cf1\fs20\protect0\cell\pard  \intbl\ssparaaux0\s0\ri90\ql\plain\f0\fs22\plain\f1\fs22\lxac8690\hich\f1\dbch\f1  \loch\f1\cf1\fs22\protect0 465  (HH)\plain\f1\fs20\knzl3787\hich\f1\dbch\f1\loch  \f1\cf1\fs20\protect0\cell\pard\intbl\ssparaaux0\s0\ri90\ql\plain\f0\fs22\plain  \f1\fs22\yaka0088\hich\f1\dbch\f1\loch\f1\cf1\fs22\protect0 70 - 125 mg/dL\plain  \f1\fs20\bohg8129\hich\f1\dbch\f1\loch\f1\cf1\fs20\protect0\cell\intbl\row\pard  \intbl\ssparaaux0\s0\qc\plain\f0\fs22\plain\f1\fs20\xquv4773\hich\f1\dbch\f1\loch  \f1\cf1\fs20\protect0\cell\pard\intbl\ssparaaux0\s0\ri90\ql\plain\f0\fs22\plain  \f1\fs22\iers5886\hich\f1\dbch\f1\loch\f1\cf1\fs22\protect0 Calcium\plain\f1\fs20  \eeir6345\hich\f1\dbch\f1\loch\f1\cf1\fs20\protect0\cell\pard\intbl\ssparaaux0\s0  \ri90\ql\plain\f0\fs22\plain\f1\fs22\kumm0926\hich\f1\dbch\f1\loch\f1\cf1\fs22\protect0   9.4\plain\f1\fs20\cptw5972\hich\f1\dbch\f1\loch\f1\cf1\fs20\protect0\cell\pard\intbl  \ssparaaux0\s0\ri90\ql\plain\f0\fs22\plain\f1\fs22\myux8373\hich\f1\dbch\f1\loch  \f1\cf1\fs22\protect0 8.5 - 10.5 mg/dL\plain\f1\fs20\zant7722\hich\f1\dbch\f1\loch  \f1\cf1\fs20\protect0\cell\intbl\row\pard\intbl\ssparaaux0\s0\qc\plain\f0\fs22\plain  \f1\fs20\ivse6434\hich\f1\dbch\f1\loch\f1\cf1\fs20\protect0\cell\pard\intbl\ssparaaux0  \s0\ri90\ql\plain\f0\fs22\plain\f1\fs22\dqcj1294\hich\f1\dbch\f1\loch\f1\cf1\fs22\protect0   BUN\plain\f1\fs20\veyt4204\hich\f1\dbch\f1\loch\f1\cf1\fs20\protect0\cell\pard\intbl  \ssparaaux0\s0\ri90\ql\plain\f0\fs22\plain\f1\fs22\esoj7608\hich\f1\dbch\f1\loch  \f1\cf1\fs22\protect0 15\plain\f1\fs20\uwtq2726\hich\f1\dbch\f1\loch\f1\cf1\fs20  \protect0\cell\pard\intbl\ssparaaux0\s0\ri90\ql\plain\f0\fs22\plain\f1\fs22\spyk3118  \hich\f1\dbch\f1\loch\f1\cf1\fs22\protect0 8 - 22 mg/dL\plain\f1\fs20\mqih3201\hich  \f1\dbch\f1\loch\f1\cf1\fs20\protect0\cell\intbl\row\pard\intbl\ssparaaux0\s0\qc  \plain\f0\fs22\plain\f1\fs20\mexi0384\hich\f1\dbch\f1\loch\f1\cf1\fs20\protect0  \cell\pard\intbl\ssparaaux0\s0\ri90\ql\plain\f0\fs22\plain\f1\fs22\wvpf8752\hich  \f1\dbch\f1\loch\f1\cf1\fs22\protect0  Creatinine\plain\f1\fs20\cfxc8219\hich\f1  \dbch\f1\loch\f1\cf1\fs20\protect0\cell\pard\intbl\ssparaaux0\s0\ri90\ql\plain\f0  \fs22\plain\f1\fs22\tzjc9996\hich\f1\dbch\f1\loch\f1\cf1\fs22\protect0 1.09\plain  \f1\fs20\mpdi8644\hich\f1\dbch\f1\loch\f1\cf1\fs20\protect0\cell\pard\intbl\ssparaaux0  \s0\ri90\ql\plain\f0\fs22\plain\f1\fs22\ardk9430\hich\f1\dbch\f1\loch\f1\cf1\fs22\protect0   0.60 - 1.10 mg/dL\plain\f1\fs20\eksr6598\hich\f1\dbch\f1\loch\f1\cf1\fs20\protect0  \cell\intbl\row\pard\intbl\ssparaaux0\s0\qc\plain\f0\fs22\plain\f1\fs20\xpop6517  \hich\f1\dbch\f1\loch\f1\cf1\fs20\protect0\cell\pard\intbl\ssparaaux0\s0\ri90\ql  \plain\f0\fs22\plain\f1\fs22\pccl5560\hich\f1\dbch\f1\loch\f1\cf1\fs22\protect0   GFR MDRD Af Amer\plain\f1\fs20\pcul0041\hich\f1\dbch\f1\loch\f1\cf1\fs20\protect0  \cell\pard\intbl\ssparaaux0\s0\ri90\ql\plain\f0\fs22\plain\f1\fs22\epol7790\hich  \f1\dbch\f1\loch\f1\cf1\fs22\protect0 >60\plain\f1\fs20\eftg4785\hich\f1\dbch\f1  \loch\f1\cf1\fs20\protect0\cell\pard\intbl\ssparaaux0\s0\ri90\ql\plain\f0\fs22\plain  \f1\fs22\ubow2024\hich\f1\dbch\f1\loch\f1\cf1\fs22\protect0 >60 mL/min/1.73m2\plain  \f1\fs20\fegq9075\hich\f1\dbch\f1\loch\f1\cf1\fs20\protect0\cell\intbl\row\pard  \intbl\ssparaaux0\s0\qc\plain\f0\fs22\plain\f1\fs20\asbr2164\hich\f1\dbch\f1\loch  \f1\cf1\fs20\protect0\cell\pard\intbl\ssparaaux0\s0\ri90\ql\plain\f0\fs22\plain  \f1\fs22\grjz4026\hich\f1\dbch\f1\loch\f1\cf1\fs22\protect0 GFR MDRD Non Af Amer  \plain\f1\fs20\zkqc7203\hich\f1\dbch\f1\loch\f1\cf1\fs20\protect0\cell\pard\intbl  \ssparaaux0\s0\ri90\ql\plain\f0\fs22\plain\f1\fs22\dyyp1321\hich\f1\dbch\f1\loch  \f1\cf1\fs22\protect0 56 (L)\plain\f1\fs20\vfjh4216\hich\f1\dbch\f1\loch\f1\cf1  \fs20\protect0\cell\pard\intbl\ssparaaux0\s0\ri90\ql\plain\f0\fs22\plain\f1\fs22  \mhhy0652\hich\f1\dbch\f1\loch\f1\cf1\fs22\protect0 >60  mL/min/1.73m2\plain\f1\fs20  \uwqr2855\hich\f1\dbch\f1\loch\f1\cf1\fs20\protect0\cell\intbl\row\trowd\trgaph0  \trpaddl0\trpaddfl3\trpaddr0\trpaddfr3\trpaddt0\trpaddft3\trpaddb0\trpaddfb3\trleft0  \clvertalt\clpadt0\clpadft3\clpadr0\clpadfr3\clpadl0\clpadfl3\clpadb0\clpadfb3\wwwof2439  \pard\intbl\ssparaaux0\s0\ri90\ql\plain\f0\fs22\plain\f1\fs18\fyly9033\hich\f1\dbch  \f1\loch\f1\cf6\fs18\b\protect0 Hepatic Profile\plain\f1\fs20\tcrr3816\hich\f1\dbch  \f1\loch\f1\cf1\fs20\protect0\cell\intbl\row\trowd\trgaph0\trpaddl0\trpaddfl3\trpaddr0  \trpaddfr3\trpaddt0\trpaddft3\trpaddb0\trpaddfb3\trleft0\clvertalt\naszdml63\clpadt0  \clpadft3\clpadr0\clpadfr3\clpadl0\clpadfl3\clpadb0\clpadfb3\haass4035\clvertalt  \zlwpvhw66\clpadt0\clpadft3\clpadr0\clpadfr3\clpadl0\clpadfl3\clpadb0\clpadfb3\hzplc8151  \clvertalt\gflufzr94\clpadt0\clpadft3\clpadr0\clpadfr3\clpadl0\clpadfl3\clpadb0  \clpadfb3\ofbws9476\pard\intbl\ssparaaux0\s0\ri90\ql\plain\f0\fs22\plain\f1\fs18  \frzx2777\hich\f1\dbch\f1\loch\f1\cf6\fs18\protect0 Result\plain\f1\fs20\wvpl2149  \hich\f1\dbch\f1\loch\f1\cf1\fs20\protect0\cell\pard\intbl\ssparaaux0\s0\ri90\ql  \plain\f0\fs22\plain\f1\fs18\yqzx2089\hich\f1\dbch\f1\loch\f1\cf6\fs18\protect0   Value\plain\f1\fs20\aesm2246\hich\f1\dbch\f1\loch\f1\cf1\fs20\protect0\cell\pard  \intbl\ssparaaux0\s0\ri90\ql\plain\f0\fs22\plain\f1\fs18\kxio3045\hich\f1\dbch\f1  \loch\f1\cf6\fs18\protect0 Ref  Range\plain\f1\fs20\onjv0763\hich\f1\dbch\f1\loch  \f1\cf1\fs20\protect0\cell\intbl\row\trowd\trgaph0\trpaddl0\trpaddfl3\trpaddr0\trpaddfr3  \trpaddt0\trpaddft3\trpaddb0\trpaddfb3\trleft0\clvertalt\clpadt0\clpadft3\clpadr0  \clpadfr3\clpadl0\clpadfl3\clpadb0\clpadfb3\zozmy306\clvertalt\clpadt0\clpadft3  \clpadr0\clpadfr3\clpadl0\clpadfl3\clpadb0\clpadfb3\hgnud2641\clvertalt\clpadt0  \clpadft3\clpadr0\clpadfr3\clpadl0\clpadfl3\clpadb0\clpadfb3\vtctc8519\clvertalt  \clpadt0\clpadft3\clpadr0\clpadfr3\clpadl0\clpadfl3\clpadb0\clpadfb3\fngbb1743\pard  \intbl\ssparaaux0\s0\qc\plain\f0\fs22\plain\f1\fs20\vpeo2965\hich\f1\dbch\f1\loch  \f1\cf1\fs20\protect0\cell\pard\intbl\ssparaaux0\s0\ri90\ql\plain\f0\fs22\plain  \f1\fs22\gprm9004\hich\f1\dbch\f1\loch\f1\cf1\fs22\protect0 Bilirubin, Total\plain  \f1\fs20\rrih9617\hich\f1\dbch\f1\loch\f1\cf1\fs20\protect0\cell\pard\intbl\ssparaaux0  \s0\ri90\ql\plain\f0\fs22\plain\f1\fs22\xuoz2992\hich\f1\dbch\f1\loch\f1\cf1\fs22\protect0   1.0\plain\f1\fs20\jsgt2000\hich\f1\dbch\f1\loch\f1\cf1\fs20\protect0\cell\pard\intbl  \ssparaaux0\s0\ri90\ql\plain\f0\fs22\plain\f1\fs22\mhqy2974\hich\f1\dbch\f1\loch  \f1\cf1\fs22\protect0 0.0 - 1.0 mg/dL\plain\f1\fs20\lrde4938\hich\f1\dbch\f1\loch  \f1\cf1\fs20\protect0\cell\intbl\row\pard\intbl\ssparaaux0\s0\qc\plain\f0\fs22\plain  \f1\fs20\holx5758\hich\f1\dbch\f1\loch\f1\cf1\fs20\protect0\cell\pard\intbl\ssparaaux0  \s0\ri90\ql\plain\f0\fs22\plain\f1\fs22\lcfq3547\hich\f1\dbch\f1\loch\f1\cf1\fs22\protect0   Bilirubin, Direct\plain\f1\fs20\ejph1685\hich\f1\dbch\f1\loch\f1\cf1\fs20\protect0  \cell\pard\intbl\ssparaaux0\s0\ri90\ql\plain\f0\fs22\plain\f1\fs22\gxjl1601\hich  \f1\dbch\f1\loch\f1\cf1\fs22\protect0 0.3\plain\f1\fs20\zrmg5388\hich\f1\dbch\f1  \loch\f1\cf1\fs20\protect0\cell\pard\intbl\ssparaaux0\s0\ri90\ql\plain\f0\fs22\plain  \f1\fs22\ayrs5474\hich\f1\dbch\f1\loch\f1\cf1\fs22\protect0 <=0.5  mg/dL\plain\f1  \fs20\yres7870\hich\f1\dbch\f1\loch\f1\cf1\fs20\protect0\cell\intbl\row\pard\intbl  \ssparaaux0\s0\qc\plain\f0\fs22\plain\f1\fs20\rxpj0805\hich\f1\dbch\f1\loch\f1\cf1  \fs20\protect0\cell\pard\intbl\ssparaaux0\s0\ri90\ql\plain\f0\fs22\plain\f1\fs22  \iouo3316\hich\f1\dbch\f1\loch\f1\cf1\fs22\protect0 Protein, Total\plain\f1\fs20  \bmra7729\hich\f1\dbch\f1\loch\f1\cf1\fs20\protect0\cell\pard\intbl\ssparaaux0\s0  \ri90\ql\plain\f0\fs22\plain\f1\fs22\pgfn3946\hich\f1\dbch\f1\loch\f1\cf1\fs22\protect0   7.1\plain\f1\fs20\aszl8914\hich\f1\dbch\f1\loch\f1\cf1\fs20\protect0\cell\pard\intbl  \ssparaaux0\s0\ri90\ql\plain\f0\fs22\plain\f1\fs22\ogbj1338\hich\f1\dbch\f1\loch  \f1\cf1\fs22\protect0 6.0 - 8.0 g/dL\plain\f1\fs20\uajc7170\hich\f1\dbch\f1\loch  \f1\cf1\fs20\protect0\cell\intbl\row\pard\intbl\ssparaaux0\s0\qc\plain\f0\fs22\plain  \f1\fs20\spav9194\hich\f1\dbch\f1\loch\f1\cf1\fs20\protect0\cell\pard\intbl\ssparaaux0  \s0\ri90\ql\plain\f0\fs22\plain\f1\fs22\iclm5818\hich\f1\dbch\f1\loch\f1\cf1\fs22\protect0   Albumin\plain\f1\fs20\zebg8826\hich\f1\dbch\f1\loch\f1\cf1\fs20\protect0\cell\pard  \intbl\ssparaaux0\s0\ri90\ql\plain\f0\fs22\plain\f1\fs22\drks5059\hich\f1\dbch\f1  \loch\f1\cf1\fs22\protect0 4.1\plain\f1\fs20\yovh4814\hich\f1\dbch\f1\loch\f1\cf1  \fs20\protect0\cell\pard\intbl\ssparaaux0\s0\ri90\ql\plain\f0\fs22\plain\f1\fs22  \nnxb4687\hich\f1\dbch\f1\loch\f1\cf1\fs22\protect0 3.5 - 5.0 g/dL\plain\f1\fs20  \yosd9961\hich\f1\dbch\f1\loch\f1\cf1\fs20\protect0\cell\intbl\row\pard\intbl\ssparaaux0  \s0\qc\plain\f0\fs22\plain\f1\fs20\bpec5062\hich\f1\dbch\f1\loch\f1\cf1\fs20\protect0  \cell\pard\intbl\ssparaaux0\s0\ri90\ql\plain\f0\fs22\plain\f1\fs22\xcte1460\hich  \f1\dbch\f1\loch\f1\cf1\fs22\protect0 Alkaline  Phosphatase\plain\f1\fs20\igev1713  \hich\f1\dbch\f1\loch\f1\cf1\fs20\protect0\cell\pard\intbl\ssparaaux0\s0\ri90\ql  \plain\f0\fs22\plain\f1\fs22\sdsp8752\hich\f1\dbch\f1\loch\f1\cf1\fs22\protect0   48\plain\f1\fs20\ftha5557\hich\f1\dbch\f1\loch\f1\cf1\fs20\protect0\cell\pard\intbl  \ssparaaux0\s0\ri90\ql\plain\f0\fs22\plain\f1\fs22\apeb0634\hich\f1\dbch\f1\loch  \f1\cf1\fs22\protect0 45 - 120 U/L\plain\f1\fs20\qazc7470\hich\f1\dbch\f1\loch\f1  \cf1\fs20\protect0\cell\intbl\row\pard\intbl\ssparaaux0\s0\qc\plain\f0\fs22\plain  \f1\fs20\ukyy7452\hich\f1\dbch\f1\loch\f1\cf1\fs20\protect0\cell\pard\intbl\ssparaaux0  \s0\ri90\ql\plain\f0\fs22\plain\f1\fs22\sqhq1844\hich\f1\dbch\f1\loch\f1\cf1\fs22\protect0   AST\plain\f1\fs20\ajbb4752\hich\f1\dbch\f1\loch\f1\cf1\fs20\protect0\cell\pard\intbl  \ssparaaux0\s0\ri90\ql\plain\f0\fs22\plain\f1\fs22\gzzg9201\hich\f1\dbch\f1\loch  \f1\cf1\fs22\protect0 17\plain\f1\fs20\zdbt2898\hich\f1\dbch\f1\loch\f1\cf1\fs20  \protect0\cell\pard\intbl\ssparaaux0\s0\ri90\ql\plain\f0\fs22\plain\f1\fs22\cqpa4515  \hich\f1\dbch\f1\loch\f1\cf1\fs22\protect0 0 - 40 U/L\plain\f1\fs20\pwhb0421\hich  \f1\dbch\f1\loch\f1\cf1\fs20\protect0\cell\intbl\row\pard\intbl\ssparaaux0\s0\qc  \plain\f0\fs22\plain\f1\fs20\ddkz4124\hich\f1\dbch\f1\loch\f1\cf1\fs20\protect0  \cell\pard\intbl\ssparaaux0\s0\ri90\ql\plain\f0\fs22\plain\f1\fs22\khkw8203\hich  \f1\dbch\f1\loch\f1\cf1\fs22\protect0 ALT\plain\f1\fs20\hzun6144\hich\f1\dbch\f1  \loch\f1\cf1\fs20\protect0\cell\pard\intbl\ssparaaux0\s0\ri90\ql\plain\f0\fs22\plain  \f1\fs22\qubc1044\hich\f1\dbch\f1\loch\f1\cf1\fs22\protect0 19\plain\f1\fs20\kunh5556  \hich\f1\dbch\f1\loch\f1\cf1\fs20\protect0\cell\pard\intbl\ssparaaux0\s0\ri90\ql  \plain\f0\fs22\plain\f1\fs22\fndw6774\hich\f1\dbch\f1\loch\f1\cf1\fs22\protect0   0 - 45  U/L\plain\f1\fs20\cfgp9494\hich\f1\dbch\f1\loch\f1\cf1\fs20\protect0\cell  \intbl\row\trowd\trgaph0\trpaddl0\trpaddfl3\trpaddr0\trpaddfr3\trpaddt0\trpaddft3  \trpaddb0\trpaddfb3\trleft0\clvertalt\clpadt0\clpadft3\clpadr0\clpadfr3\clpadl0  \clpadfl3\clpadb0\clpadfb3\cscmg5417\pard\intbl\ssparaaux0\s0\ri90\ql\plain\f0\fs22  \plain\f1\fs18\nfge3141\hich\f1\dbch\f1\loch\f1\cf6\fs18\b\protect0 D-dimer, Quantitative  \plain\f1\fs20\bvfb6437\hich\f1\dbch\f1\loch\f1\cf1\fs20\protect0\cell\intbl\row  \trowd\trgaph0\trpaddl0\trpaddfl3\trpaddr0\trpaddfr3\trpaddt0\trpaddft3\trpaddb0  \trpaddfb3\trleft0\clvertalt\chajcnl51\clpadt0\clpadft3\clpadr0\clpadfr3\clpadl0  \clpadfl3\clpadb0\clpadfb3\nldqo1182\clvertalt\lednaki92\clpadt0\clpadft3\clpadr0  \clpadfr3\clpadl0\clpadfl3\clpadb0\clpadfb3\ynkuv4558\clvertalt\ixqadlf32\clpadt0  \clpadft3\clpadr0\clpadfr3\clpadl0\clpadfl3\clpadb0\clpadfb3\jxoht0584\pard\intbl  \ssparaaux0\s0\ri90\ql\plain\f0\fs22\plain\f1\fs18\cbst7023\hich\f1\dbch\f1\loch  \f1\cf6\fs18\protect0 Result\plain\f1\fs20\qrtf6821\hich\f1\dbch\f1\loch\f1\cf1  \fs20\protect0\cell\pard\intbl\ssparaaux0\s0\ri90\ql\plain\f0\fs22\plain\f1\fs18  \dfgo1308\hich\f1\dbch\f1\loch\f1\cf6\fs18\protect0 Value\plain\f1\fs20\dyjy2248  \hich\f1\dbch\f1\loch\f1\cf1\fs20\protect0\cell\pard\intbl\ssparaaux0\s0\ri90\ql  \plain\f0\fs22\plain\f1\fs18\mthg8220\hich\f1\dbch\f1\loch\f1\cf6\fs18\protect0   Ref  Range\plain\f1\fs20\hkun4758\hich\f1\dbch\f1\loch\f1\cf1\fs20\protect0\cell  \intbl\row\trowd\trgaph0\trpaddl0\trpaddfl3\trpaddr0\trpaddfr3\trpaddt0\trpaddft3  \trpaddb0\trpaddfb3\trleft0\clvertalt\clpadt0\clpadft3\clpadr0\clpadfr3\clpadl0  \clpadfl3\clpadb0\clpadfb3\esjat604\clvertalt\clpadt0\clpadft3\clpadr0\clpadfr3  \clpadl0\clpadfl3\clpadb0\clpadfb3\vofxf8545\clvertalt\clpadt0\clpadft3\clpadr0  \clpadfr3\clpadl0\clpadfl3\clpadb0\clpadfb3\jnrpo5210\clvertalt\clpadt0\clpadft3  \clpadr0\clpadfr3\clpadl0\clpadfl3\clpadb0\clpadfb3\dddez6137\pard\intbl\ssparaaux0  \s0\qc\plain\f0\fs22\plain\f1\fs20\keef7990\hich\f1\dbch\f1\loch\f1\cf1\fs20\protect0  \cell\pard\intbl\ssparaaux0\s0\ri90\ql\plain\f0\fs22\plain\f1\fs22\jtzg8004\hich  \f1\dbch\f1\loch\f1\cf1\fs22\protect0 D-Dimer, Quant\plain\f1\fs20\vgua1474\hich  \f1\dbch\f1\loch\f1\cf1\fs20\protect0\cell\pard\intbl\ssparaaux0\s0\ri90\ql\plain  \f0\fs22\plain\f1\fs22\lfyv8155\hich\f1\dbch\f1\loch\f1\cf1\fs22\protect0 <0.27  \plain\f1\fs20\earu2098\hich\f1\dbch\f1\loch\f1\cf1\fs20\protect0\cell\pard\intbl  \ssparaaux0\s0\ri90\ql\plain\f0\fs22\plain\f1\fs22\xfvg6173\hich\f1\dbch\f1\loch  \f1\cf1\fs22\protect0 <=0.50 FEU ug/mL\plain\f1\fs20\bfiz2265\hich\f1\dbch\f1\loch  \f1\cf1\fs20\protect0\cell\intbl\row\trowd\trgaph0\trpaddl0\trpaddfl3\trpaddr0\trpaddfr3  \trpaddt0\trpaddft3\trpaddb0\trpaddfb3\trleft0\clvertalt\clpadt0\clpadft3\clpadr0  \clpadfr3\clpadl0\clpadfl3\clpadb0\clpadfb3\qtopr9953\pard\intbl\ssparaaux0\s0\ri90  \ql\plain\f0\fs22\plain\f1\fs18\pmmu3239\hich\f1\dbch\f1\loch\f1\cf6\fs18\b\protect0   Lactic  Acid\plain\f1\fs20\bbde2656\hich\f1\dbch\f1\loch\f1\cf1\fs20\protect0\cell  \intbl\row\trowd\trgaph0\trpaddl0\trpaddfl3\trpaddr0\trpaddfr3\trpaddt0\trpaddft3  \trpaddb0\trpaddfb3\trleft0\clvertalt\qlroqrb36\clpadt0\clpadft3\clpadr0\clpadfr3  \clpadl0\clpadfl3\clpadb0\clpadfb3\uusau1752\clvertalt\gxtghuv69\clpadt0\clpadft3  \clpadr0\clpadfr3\clpadl0\clpadfl3\clpadb0\clpadfb3\cjamr9837\clvertalt\vdtcptg18  \clpadt0\clpadft3\clpadr0\clpadfr3\clpadl0\clpadfl3\clpadb0\clpadfb3\wkigi2031\pard  \intbl\ssparaaux0\s0\ri90\ql\plain\f0\fs22\plain\f1\fs18\ftkd1892\hich\f1\dbch\f1  \loch\f1\cf6\fs18\protect0 Result\plain\f1\fs20\doce2022\hich\f1\dbch\f1\loch\f1  \cf1\fs20\protect0\cell\pard\intbl\ssparaaux0\s0\ri90\ql\plain\f0\fs22\plain\f1  \fs18\afnu9711\hich\f1\dbch\f1\loch\f1\cf6\fs18\protect0 Value\plain\f1\fs20\elka5779  \hich\f1\dbch\f1\loch\f1\cf1\fs20\protect0\cell\pard\intbl\ssparaaux0\s0\ri90\ql  \plain\f0\fs22\plain\f1\fs18\hcyu2818\hich\f1\dbch\f1\loch\f1\cf6\fs18\protect0   Ref Range\plain\f1\fs20\zuvx5633\hich\f1\dbch\f1\loch\f1\cf1\fs20\protect0\cell  \intbl\row\trowd\trgaph0\trpaddl0\trpaddfl3\trpaddr0\trpaddfr3\trpaddt0\trpaddft3  \trpaddb0\trpaddfb3\trleft0\clvertalt\clpadt0\clpadft3\clpadr0\clpadfr3\clpadl0  \clpadfl3\clpadb0\clpadfb3\mgrfg833\clvertalt\clpadt0\clpadft3\clpadr0\clpadfr3  \clpadl0\clpadfl3\clpadb0\clpadfb3\ibvsv2049\clvertalt\clpadt0\clpadft3\clpadr0  \clpadfr3\clpadl0\clpadfl3\clpadb0\clpadfb3\kwbqu8059\clvertalt\clpadt0\clpadft3  \clpadr0\clpadfr3\clpadl0\clpadfl3\clpadb0\clpadfb3\weepd7240\pard\intbl\ssparaaux0  \s0\qc\plain\f0\fs22\plain\f1\fs20\iotn9635\hich\f1\dbch\f1\loch\f1\cf1\fs20\protect0  \cell\pard\intbl\ssparaaux0\s0\ri90\ql\plain\f0\fs22\plain\f1\fs22\wwdp3610\hich  \f1\dbch\f1\loch\f1\cf1\fs22\protect0 Lactic  Acid\plain\f1\fs20\qevs8986\hich\f1  \dbch\f1\loch\f1\cf1\fs20\protect0\cell\pard\intbl\ssparaaux0\s0\ri90\ql\plain\f0  \fs22\plain\f1\fs22\uesr7665\hich\f1\dbch\f1\loch\f1\cf1\fs22\protect0 2.0\plain  \f1\fs20\sian5393\hich\f1\dbch\f1\loch\f1\cf1\fs20\protect0\cell\pard\intbl\ssparaaux0  \s0\ri90\ql\plain\f0\fs22\plain\f1\fs22\vyet0724\hich\f1\dbch\f1\loch\f1\cf1\fs22\protect0   0.5 - 2.2 mmol/L\plain\f1\fs20\ncrc9211\hich\f1\dbch\f1\loch\f1\cf1\fs20\protect0  \cell\intbl\row\trowd\trgaph0\trpaddl0\trpaddfl3\trpaddr0\trpaddfr3\trpaddt0\trpaddft3  \trpaddb0\trpaddfb3\trleft0\clvertalt\clpadt0\clpadft3\clpadr0\clpadfr3\clpadl0  \clpadfl3\clpadb0\clpadfb3\mtoxm3604\pard\intbl\ssparaaux0\s0\ri90\ql\plain\f0\fs22  \plain\f1\fs18\kbkf3836\hich\f1\dbch\f1\loch\f1\cf6\fs18\b\protect0 POCT Glucose  \plain\f1\fs20\sqku5829\hich\f1\dbch\f1\loch\f1\cf1\fs20\protect0\cell\intbl\row  \trowd\trgaph0\trpaddl0\trpaddfl3\trpaddr0\trpaddfr3\trpaddt0\trpaddft3\trpaddb0  \trpaddfb3\trleft0\clvertalt\nrdytgv23\clpadt0\clpadft3\clpadr0\clpadfr3\clpadl0  \clpadfl3\clpadb0\clpadfb3\canxt8025\clvertalt\yguckwj17\clpadt0\clpadft3\clpadr0  \clpadfr3\clpadl0\clpadfl3\clpadb0\clpadfb3\qyfym2830\clvertalt\zzsdyru80\clpadt0  \clpadft3\clpadr0\clpadfr3\clpadl0\clpadfl3\clpadb0\clpadfb3\uyion1427\pard\intbl  \ssparaaux0\s0\ri90\ql\plain\f0\fs22\plain\f1\fs18\egmc7663\hich\f1\dbch\f1\loch  \f1\cf6\fs18\protect0 Result\plain\f1\fs20\vgpt5591\hich\f1\dbch\f1\loch\f1\cf1  \fs20\protect0\cell\pard\intbl\ssparaaux0\s0\ri90\ql\plain\f0\fs22\plain\f1\fs18  \zvqu6375\hich\f1\dbch\f1\loch\f1\cf6\fs18\protect0 Value\plain\f1\fs20\tjtw9073  \hich\f1\dbch\f1\loch\f1\cf1\fs20\protect0\cell\pard\intbl\ssparaaux0\s0\ri90\ql  \plain\f0\fs22\plain\f1\fs18\tnim4354\hich\f1\dbch\f1\loch\f1\cf6\fs18\protect0   Ref  Range\plain\f1\fs20\zjsx3636\hich\f1\dbch\f1\loch\f1\cf1\fs20\protect0\cell  \intbl\row\trowd\trgaph0\trpaddl0\trpaddfl3\trpaddr0\trpaddfr3\trpaddt0\trpaddft3  \trpaddb0\trpaddfb3\trleft0\clvertalt\clpadt0\clpadft3\clpadr0\clpadfr3\clpadl0  \clpadfl3\clpadb0\clpadfb3\pzcty425\clvertalt\clpadt0\clpadft3\clpadr0\clpadfr3  \clpadl0\clpadfl3\clpadb0\clpadfb3\murgz4104\clvertalt\clpadt0\clpadft3\clpadr0  \clpadfr3\clpadl0\clpadfl3\clpadb0\clpadfb3\yaalo8175\clvertalt\clpadt0\clpadft3  \clpadr0\clpadfr3\clpadl0\clpadfl3\clpadb0\clpadfb3\nwboc5100\pard\intbl\ssparaaux0  \s0\qc\plain\f0\fs22\plain\f1\fs20\vlha2478\hich\f1\dbch\f1\loch\f1\cf1\fs20\protect0  \cell\pard\intbl\ssparaaux0\s0\ri90\ql\plain\f0\fs22\plain\f1\fs22\hvaw6148\hich  \f1\dbch\f1\loch\f1\cf1\fs22\protect0 Glucose\plain\f1\fs20\rgez3338\hich\f1\dbch  \f1\loch\f1\cf1\fs20\protect0\cell\pard\intbl\ssparaaux0\s0\ri90\ql\plain\f0\fs22  \plain\f1\fs22\zhyp6031\hich\f1\dbch\f1\loch\f1\cf1\fs22\protect0 408 (H)\plain  \f1\fs20\bwov9992\hich\f1\dbch\f1\loch\f1\cf1\fs20\protect0\cell\pard\intbl\ssparaaux0  \s0\ri90\ql\plain\f0\fs22\plain\f1\fs22\rjmu8509\hich\f1\dbch\f1\loch\f1\cf1\fs22\protect0   70 - 139 mg/dL\plain\f1\fs20\fyel8037\hich\f1\dbch\f1\loch\f1\cf1\fs20\protect0  \cell\intbl\row\trowd\trgaph0\trpaddl0\trpaddfl3\trpaddr0\trpaddfr3\trpaddt0\trpaddft3  \trpaddb0\trpaddfb3\trleft0\clvertalt\clpadt0\clpadft3\clpadr0\clpadfr3\clpadl0  \clpadfl3\clpadb0\clpadfb3\llpud3818\pard\intbl\ssparaaux0\s0\ri90\ql\plain\f0\fs22  \plain\f1\fs18\gldr7161\hich\f1\dbch\f1\loch\f1\cf6\fs18\b\protect0 POCT pregnancy,    urine\plain\f1\fs20\dzct8495\hich\f1\dbch\f1\loch\f1\cf1\fs20\protect0\cell\intbl  \row\trowd\trgaph0\trpaddl0\trpaddfl3\trpaddr0\trpaddfr3\trpaddt0\trpaddft3\trpaddb0  \trpaddfb3\trleft0\clvertalt\ergzrvv94\clpadt0\clpadft3\clpadr0\clpadfr3\clpadl0  \clpadfl3\clpadb0\clpadfb3\ewhkh0791\clvertalt\mxyfyvj99\clpadt0\clpadft3\clpadr0  \clpadfr3\clpadl0\clpadfl3\clpadb0\clpadfb3\osbra3469\clvertalt\vmzrujr40\clpadt0  \clpadft3\clpadr0\clpadfr3\clpadl0\clpadfl3\clpadb0\clpadfb3\xxqir4097\pard\intbl  \ssparaaux0\s0\ri90\ql\plain\f0\fs22\plain\f1\fs18\gerw6731\hich\f1\dbch\f1\loch  \f1\cf6\fs18\protect0 Result\plain\f1\fs20\ysyw0049\hich\f1\dbch\f1\loch\f1\cf1  \fs20\protect0\cell\pard\intbl\ssparaaux0\s0\ri90\ql\plain\f0\fs22\plain\f1\fs18  \yxzz4456\hich\f1\dbch\f1\loch\f1\cf6\fs18\protect0 Value\plain\f1\fs20\wtul4489  \hich\f1\dbch\f1\loch\f1\cf1\fs20\protect0\cell\pard\intbl\ssparaaux0\s0\ri90\ql  \plain\f0\fs22\plain\f1\fs18\dzpx7051\hich\f1\dbch\f1\loch\f1\cf6\fs18\protect0   Ref Range\plain\f1\fs20\owvy3625\hich\f1\dbch\f1\loch\f1\cf1\fs20\protect0\cell  \intbl\row\trowd\trgaph0\trpaddl0\trpaddfl3\trpaddr0\trpaddfr3\trpaddt0\trpaddft3  \trpaddb0\trpaddfb3\trleft0\clvertalt\clpadt0\clpadft3\clpadr0\clpadfr3\clpadl0  \clpadfl3\clpadb0\clpadfb3\jvtej681\clvertalt\clpadt0\clpadft3\clpadr0\clpadfr3  \clpadl0\clpadfl3\clpadb0\clpadfb3\pgmpv3775\clvertalt\clpadt0\clpadft3\clpadr0  \clpadfr3\clpadl0\clpadfl3\clpadb0\clpadfb3\glxuz8351\clvertalt\clpadt0\clpadft3  \clpadr0\clpadfr3\clpadl0\clpadfl3\clpadb0\clpadfb3\zsppd5922\pard\intbl\ssparaaux0  \s0\qc\plain\f0\fs22\plain\f1\fs20\bxgj3617\hich\f1\dbch\f1\loch\f1\cf1\fs20\protect0  \cell\pard\intbl\ssparaaux0\s0\ri90\ql\plain\f0\fs22\plain\f1\fs22\ikka9572\hich  \f1\dbch\f1\loch\f1\cf1\fs22\protect0 POC Preg,  Urine\plain\f1\fs20\ojun5688\hich  \f1\dbch\f1\loch\f1\cf1\fs20\protect0\cell\pard\intbl\ssparaaux0\s0\ri90\ql\plain  \f0\fs22\plain\f1\fs22\qnuf3864\hich\f1\dbch\f1\loch\f1\cf1\fs22\protect0 Negative  \plain\f1\fs20\myfx3402\hich\f1\dbch\f1\loch\f1\cf1\fs20\protect0\cell\pard\intbl  \ssparaaux0\s0\ri90\ql\plain\f0\fs22\plain\f1\fs22\fbsh0610\hich\f1\dbch\f1\loch  \f1\cf1\fs22\protect0 Negative\plain\f1\fs20\hlku2335\hich\f1\dbch\f1\loch\f1\cf1  \fs20\protect0\cell\intbl\row\pard\intbl\ssparaaux0\s0\qc\plain\f0\fs22\plain\f1  \fs20\zozk4306\hich\f1\dbch\f1\loch\f1\cf1\fs20\protect0\cell\pard\intbl\ssparaaux0  \s0\ri90\ql\plain\f0\fs22\plain\f1\fs22\ifni3778\hich\f1\dbch\f1\loch\f1\cf1\fs22\protect0   POCT Kit Lot Number\plain\f1\fs20\ibtl9868\hich\f1\dbch\f1\loch\f1\cf1\fs20\protect0  \cell\pard\intbl\ssparaaux0\s0\ri90\ql\plain\f0\fs22\plain\f1\fs22\dbur8639\hich  \f1\dbch\f1\loch\f1\cf1\fs22\protect0 5684166\plain\f1\fs20\vkdc8427\hich\f1\dbch  \f1\loch\f1\cf1\fs20\protect0\cell\pard\intbl\ssparaaux0\s0\ri90\ql\plain\f0\fs22  \plain\f1\fs20\xeff8479\hich\f1\dbch\f1\loch\f1\cf1\fs20\protect0\cell\intbl\row  \pard\intbl\ssparaaux0\s0\qc\plain\f0\fs22\plain\f1\fs20\bfjf9697\hich\f1\dbch\f1  \loch\f1\cf1\fs20\protect0\cell\pard\intbl\ssparaaux0\s0\ri90\ql\plain\f0\fs22\plain  \f1\fs22\ldzq2594\hich\f1\dbch\f1\loch\f1\cf1\fs22\protect0 POCT Kit Expiration   Date\plain\f1\fs20\zgjv9044\hich\f1\dbch\f1\loch\f1\cf1\fs20\protect0\cell\pard  \intbl\ssparaaux0\s0\ri90\ql\plain\f0\fs22\plain\f1\fs22\thaq2808\hich\f1\dbch\f1  \loch\f1\cf1\fs22\protect0  2021-04-30\plain\f1\fs20\gred6921\hich\f1\dbch\f1\loch  \f1\cf1\fs20\protect0\cell\pard\intbl\ssparaaux0\s0\ri90\ql\plain\f0\fs22\plain  \f1\fs20\vurc6211\hich\f1\dbch\f1\loch\f1\cf1\fs20\protect0\cell\intbl\row\pard  \intbl\ssparaaux0\s0\qc\plain\f0\fs22\plain\f1\fs20\glmb9219\hich\f1\dbch\f1\loch  \f1\cf1\fs20\protect0\cell\pard\intbl\ssparaaux0\s0\ri90\ql\plain\f0\fs22\plain  \f1\fs22\ttoo5114\hich\f1\dbch\f1\loch\f1\cf1\fs22\protect0 Pos Control\plain\f1  \fs20\gdcl2560\hich\f1\dbch\f1\loch\f1\cf1\fs20\protect0\cell\pard\intbl\ssparaaux0  \s0\ri90\ql\plain\f0\fs22\plain\f1\fs22\yczy9255\hich\f1\dbch\f1\loch\f1\cf1\fs22\protect0   Valid Control\plain\f1\fs20\nxol9496\hich\f1\dbch\f1\loch\f1\cf1\fs20\protect0\cell  \pard\intbl\ssparaaux0\s0\ri90\ql\plain\f0\fs22\plain\f1\fs22\kuhd0558\hich\f1\dbch  \f1\loch\f1\cf1\fs22\protect0 Valid Control\plain\f1\fs20\ljpm8976\hich\f1\dbch  \f1\loch\f1\cf1\fs20\protect0\cell\intbl\row\pard\intbl\ssparaaux0\s0\qc\plain\f0  \fs22\plain\f1\fs20\anow3632\hich\f1\dbch\f1\loch\f1\cf1\fs20\protect0\cell\pard  \intbl\ssparaaux0\s0\ri90\ql\plain\f0\fs22\plain\f1\fs22\bqzw3148\hich\f1\dbch\f1  \loch\f1\cf1\fs22\protect0 Neg Control\plain\f1\fs20\rpit7861\hich\f1\dbch\f1\loch  \f1\cf1\fs20\protect0\cell\pard\intbl\ssparaaux0\s0\ri90\ql\plain\f0\fs22\plain  \f1\fs22\dbdi2667\hich\f1\dbch\f1\loch\f1\cf1\fs22\protect0 Valid Control\plain  \f1\fs20\ufcx8955\hich\f1\dbch\f1\loch\f1\cf1\fs20\protect0\cell\pard\intbl\ssparaaux0  \s0\ri90\ql\plain\f0\fs22\plain\f1\fs22\zpop9149\hich\f1\dbch\f1\loch\f1\cf1\fs22\protect0   Valid  Control\plain\f1\fs20\sgtf8714\hich\f1\dbch\f1\loch\f1\cf1\fs20\protect0\cell  \intbl\row\trowd\trgaph0\trpaddl0\trpaddfl3\trpaddr0\trpaddfr3\trpaddt0\trpaddft3  \trpaddb0\trpaddfb3\trleft0\clvertalt\clpadt0\clpadft3\clpadr0\clpadfr3\clpadl0  \clpadfl3\clpadb0\clpadfb3\oqakg8782\pard\intbl\ssparaaux0\s0\ri90\ql\plain\f0\fs22  \plain\f1\fs18\plhh4925\hich\f1\dbch\f1\loch\f1\cf6\fs18\b\protect0 POCT Glucose  \plain\f1\fs20\jgui2268\hich\f1\dbch\f1\loch\f1\cf1\fs20\protect0\cell\intbl\row  \trowd\trgaph0\trpaddl0\trpaddfl3\trpaddr0\trpaddfr3\trpaddt0\trpaddft3\trpaddb0  \trpaddfb3\trleft0\clvertalt\dqsuxvm97\clpadt0\clpadft3\clpadr0\clpadfr3\clpadl0  \clpadfl3\clpadb0\clpadfb3\qloxv6368\clvertalt\neidvqb72\clpadt0\clpadft3\clpadr0  \clpadfr3\clpadl0\clpadfl3\clpadb0\clpadfb3\xumir7953\clvertalt\kwgkiye01\clpadt0  \clpadft3\clpadr0\clpadfr3\clpadl0\clpadfl3\clpadb0\clpadfb3\ufjgx0953\pard\intbl  \ssparaaux0\s0\ri90\ql\plain\f0\fs22\plain\f1\fs18\zrwk4943\hich\f1\dbch\f1\loch  \f1\cf6\fs18\protect0 Result\plain\f1\fs20\drxj3406\hich\f1\dbch\f1\loch\f1\cf1  \fs20\protect0\cell\pard\intbl\ssparaaux0\s0\ri90\ql\plain\f0\fs22\plain\f1\fs18  \wust9360\hich\f1\dbch\f1\loch\f1\cf6\fs18\protect0 Value\plain\f1\fs20\qryn0240  \hich\f1\dbch\f1\loch\f1\cf1\fs20\protect0\cell\pard\intbl\ssparaaux0\s0\ri90\ql  \plain\f0\fs22\plain\f1\fs18\vukx9073\hich\f1\dbch\f1\loch\f1\cf6\fs18\protect0   Ref  Range\plain\f1\fs20\vrim9084\hich\f1\dbch\f1\loch\f1\cf1\fs20\protect0\cell  \intbl\row\trowd\trgaph0\trpaddl0\trpaddfl3\trpaddr0\trpaddfr3\trpaddt0\trpaddft3  \trpaddb0\trpaddfb3\trleft0\clvertalt\clpadt0\clpadft3\clpadr0\clpadfr3\clpadl0  \clpadfl3\clpadb0\clpadfb3\gojii546\clvertalt\clpadt0\clpadft3\clpadr0\clpadfr3  \clpadl0\clpadfl3\clpadb0\clpadfb3\rbran4102\clvertalt\clpadt0\clpadft3\clpadr0  \clpadfr3\clpadl0\clpadfl3\clpadb0\clpadfb3\zfhsb5058\clvertalt\clpadt0\clpadft3  \clpadr0\clpadfr3\clpadl0\clpadfl3\clpadb0\clpadfb3\saead7236\pard\intbl\ssparaaux0  \s0\qc\plain\f0\fs22\plain\f1\fs20\enzu1944\hich\f1\dbch\f1\loch\f1\cf1\fs20\protect0  \cell\pard\intbl\ssparaaux0\s0\ri90\ql\plain\f0\fs22\plain\f1\fs22\itjx6086\hich  \f1\dbch\f1\loch\f1\cf1\fs22\protect0 Glucose\plain\f1\fs20\wswr5608\hich\f1\dbch  \f1\loch\f1\cf1\fs20\protect0\cell\pard\intbl\ssparaaux0\s0\ri90\ql\plain\f0\fs22  \plain\f1\fs22\xlos6551\hich\f1\dbch\f1\loch\f1\cf1\fs22\protect0 336 (H)\plain  \f1\fs20\zuhx1750\hich\f1\dbch\f1\loch\f1\cf1\fs20\protect0\cell\pard\intbl\ssparaaux0  \s0\ri90\ql\plain\f0\fs22\plain\f1\fs22\ljfz4907\hich\f1\dbch\f1\loch\f1\cf1\fs22\protect0   70 - 139 mg/dL\plain\f1\fs20\rzyg7813\hich\f1\dbch\f1\loch\f1\cf1\fs20\protect0  \cell\intbl\row\trowd\trgaph0\trpaddl0\trpaddfl3\trpaddr0\trpaddfr3\trpaddt0\trpaddft3  \trpaddb0\trpaddfb3\trleft0\clvertalt\clpadt0\clpadft3\clpadr0\clpadfr3\clpadl0  \clpadfl3\clpadb0\clpadfb3\acjwf0047\pard\intbl\ssparaaux0\s0\ri90\ql\plain\f0\fs22  \plain\f1\fs18\sgoz7688\hich\f1\dbch\f1\loch\f1\cf6\fs18\b\protect0 POCT  Glucose  \plain\f1\fs20\jlqi6703\hich\f1\dbch\f1\loch\f1\cf1\fs20\protect0\cell\intbl\row  \trowd\trgaph0\trpaddl0\trpaddfl3\trpaddr0\trpaddfr3\trpaddt0\trpaddft3\trpaddb0  \trpaddfb3\trleft0\clvertalt\lsmfmwe50\clpadt0\clpadft3\clpadr0\clpadfr3\clpadl0  \clpadfl3\clpadb0\clpadfb3\rrwyg4811\clvertalt\rghpalh51\clpadt0\clpadft3\clpadr0  \clpadfr3\clpadl0\clpadfl3\clpadb0\clpadfb3\sckgp4250\clvertalt\yvjwsmx76\clpadt0  \clpadft3\clpadr0\clpadfr3\clpadl0\clpadfl3\clpadb0\clpadfb3\rutfe8186\pard\intbl  \ssparaaux0\s0\ri90\ql\plain\f0\fs22\plain\f1\fs18\zags0295\hich\f1\dbch\f1\loch  \f1\cf6\fs18\protect0 Result\plain\f1\fs20\ayvx7446\hich\f1\dbch\f1\loch\f1\cf1  \fs20\protect0\cell\pard\intbl\ssparaaux0\s0\ri90\ql\plain\f0\fs22\plain\f1\fs18  \bika8411\hich\f1\dbch\f1\loch\f1\cf6\fs18\protect0 Value\plain\f1\fs20\eufl0986  \hich\f1\dbch\f1\loch\f1\cf1\fs20\protect0\cell\pard\intbl\ssparaaux0\s0\ri90\ql  \plain\f0\fs22\plain\f1\fs18\ekes8666\hich\f1\dbch\f1\loch\f1\cf6\fs18\protect0   Ref Range\plain\f1\fs20\tufs6070\hich\f1\dbch\f1\loch\f1\cf1\fs20\protect0\cell  \intbl\row\trowd\trgaph0\trpaddl0\trpaddfl3\trpaddr0\trpaddfr3\trpaddt0\trpaddft3  \trpaddb0\trpaddfb3\trleft0\clvertalt\clpadt0\clpadft3\clpadr0\clpadfr3\clpadl0  \clpadfl3\clpadb0\clpadfb3\dccdl878\clvertalt\clpadt0\clpadft3\clpadr0\clpadfr3  \clpadl0\clpadfl3\clpadb0\clpadfb3\adsdh9528\clvertalt\clpadt0\clpadft3\clpadr0  \clpadfr3\clpadl0\clpadfl3\clpadb0\clpadfb3\lpzrd5943\clvertalt\clpadt0\clpadft3  \clpadr0\clpadfr3\clpadl0\clpadfl3\clpadb0\clpadfb3\dflol3716\pard\intbl\ssparaaux0  \s0\qc\plain\f0\fs22\plain\f1\fs20\yawu7178\hich\f1\dbch\f1\loch\f1\cf1\fs20\protect0  \cell\pard\intbl\ssparaaux0\s0\ri90\ql\plain\f0\fs22\plain\f1\fs22\exsl2397\hich  \f1\dbch\f1\loch\f1\cf1\fs22\protect0  Glucose\plain\f1\fs20\yyzo3507\hich\f1\dbch  \f1\loch\f1\cf1\fs20\protect0\cell\pard\intbl\ssparaaux0\s0\ri90\ql\plain\f0\fs22  \plain\f1\fs22\ewff5053\hich\f1\dbch\f1\loch\f1\cf1\fs22\protect0 330 (H)\plain  \f1\fs20\elqz5643\hich\f1\dbch\f1\loch\f1\cf1\fs20\protect0\cell\pard\intbl\ssparaaux0  \s0\ri90\ql\plain\f0\fs22\plain\f1\fs22\xwhy1616\hich\f1\dbch\f1\loch\f1\cf1\fs22\protect0   70 - 139 mg/dL\plain\f1\fs20\fxfp2516\hich\f1\dbch\f1\loch\f1\cf1\fs20\protect0  \cell\intbl\row\trowd\trgaph0\trpaddl0\trpaddfl3\trpaddr0\trpaddfr3\trpaddt0\trpaddft3  \trpaddb0\trpaddfb3\trleft0\clvertalt\clpadt0\clpadft3\clpadr0\clpadfr3\clpadl0  \clpadfl3\clpadb0\clpadfb3\wjnlb5047\pard\intbl\ssparaaux0\s0\ri90\ql\plain\f0\fs22  \plain\f1\fs18\kvca9216\hich\f1\dbch\f1\loch\f1\cf6\fs18\b\protect0 ECG 12 lead   nursing unit performed\plain\f1\fs20\lohf3455\hich\f1\dbch\f1\loch\f1\cf1\fs20\protect0  \cell\intbl\row\trowd\trgaph0\trpaddl0\trpaddfl3\trpaddr0\trpaddfr3\trpaddt0\trpaddft3  \trpaddb0\trpaddfb3\trleft0\clvertalt\hevxmho65\clpadt0\clpadft3\clpadr0\clpadfr3  \clpadl0\clpadfl3\clpadb0\clpadfb3\lpotu0957\clvertalt\fxrkcic51\clpadt0\clpadft3  \clpadr0\clpadfr3\clpadl0\clpadfl3\clpadb0\clpadfb3\fesre2459\clvertalt\rkwqyif34  \clpadt0\clpadft3\clpadr0\clpadfr3\clpadl0\clpadfl3\clpadb0\clpadfb3\nhxwl3954\pard  \intbl\ssparaaux0\s0\ri90\ql\plain\f0\fs22\plain\f1\fs18\suqp6094\hich\f1\dbch\f1  \loch\f1\cf6\fs18\protect0 Result\plain\f1\fs20\fros0828\hich\f1\dbch\f1\loch\f1  \cf1\fs20\protect0\cell\pard\intbl\ssparaaux0\s0\ri90\ql\plain\f0\fs22\plain\f1  \fs18\ikks4675\hich\f1\dbch\f1\loch\f1\cf6\fs18\protect0 Value\plain\f1\fs20\iqez6840  \hich\f1\dbch\f1\loch\f1\cf1\fs20\protect0\cell\pard\intbl\ssparaaux0\s0\ri90\ql  \plain\f0\fs22\plain\f1\fs18\yanc0145\hich\f1\dbch\f1\loch\f1\cf6\fs18\protect0   Ref  Range\plain\f1\fs20\iwfi4347\hich\f1\dbch\f1\loch\f1\cf1\fs20\protect0\cell  \intbl\row\trowd\trgaph0\trpaddl0\trpaddfl3\trpaddr0\trpaddfr3\trpaddt0\trpaddft3  \trpaddb0\trpaddfb3\trleft0\clvertalt\clpadt0\clpadft3\clpadr0\clpadfr3\clpadl0  \clpadfl3\clpadb0\clpadfb3\hcgrg057\clvertalt\clpadt0\clpadft3\clpadr0\clpadfr3  \clpadl0\clpadfl3\clpadb0\clpadfb3\tdwkc3289\clvertalt\clpadt0\clpadft3\clpadr0  \clpadfr3\clpadl0\clpadfl3\clpadb0\clpadfb3\ngbne6489\clvertalt\clpadt0\clpadft3  \clpadr0\clpadfr3\clpadl0\clpadfl3\clpadb0\clpadfb3\qftru1817\pard\intbl\ssparaaux0  \s0\qc\plain\f0\fs22\plain\f1\fs20\jxmm1428\hich\f1\dbch\f1\loch\f1\cf1\fs20\protect0  \cell\pard\intbl\ssparaaux0\s0\ri90\ql\plain\f0\fs22\plain\f1\fs22\rmyo9387\hich  \f1\dbch\f1\loch\f1\cf1\fs22\protect0 SYSTOLIC BLOOD PRESSURE\plain\f1\fs20\ytnw9915  \hich\f1\dbch\f1\loch\f1\cf1\fs20\protect0\cell\pard\intbl\ssparaaux0\s0\ri90\ql  \plain\f0\fs22\plain\f1\fs20\uzrd4999\hich\f1\dbch\f1\loch\f1\cf1\fs20\protect0  \cell\pard\intbl\ssparaaux0\s0\ri90\ql\plain\f0\fs22\plain\f1\fs20\ccic1309\hich  \f1\dbch\f1\loch\f1\cf1\fs20\protect0\cell\intbl\row\pard\intbl\ssparaaux0\s0\qc  \plain\f0\fs22\plain\f1\fs20\pzco6076\hich\f1\dbch\f1\loch\f1\cf1\fs20\protect0  \cell\pard\intbl\ssparaaux0\s0\ri90\ql\plain\f0\fs22\plain\f1\fs22\nglm5231\hich  \f1\dbch\f1\loch\f1\cf1\fs22\protect0 DIASTOLIC BLOOD PRESSURE\plain\f1\fs20\kwrw0274  \hich\f1\dbch\f1\loch\f1\cf1\fs20\protect0\cell\pard\intbl\ssparaaux0\s0\ri90\ql  \plain\f0\fs22\plain\f1\fs20\flbk2990\hich\f1\dbch\f1\loch\f1\cf1\fs20\protect0  \cell\pard\intbl\ssparaaux0\s0\ri90\ql\plain\f0\fs22\plain\f1\fs20\syrv7891\hich  \f1\dbch\f1\loch\f1\cf1\fs20\protect0\cell\intbl\row\pard\intbl\ssparaaux0\s0\qc  \plain\f0\fs22\plain\f1\fs20\txcd5339\hich\f1\dbch\f1\loch\f1\cf1\fs20\protect0  \cell\pard\intbl\ssparaaux0\s0\ri90\ql\plain\f0\fs22\plain\f1\fs22\somi0821\hich  \f1\dbch\f1\loch\f1\cf1\fs22\protect0 VENTRICULAR  RATE\plain\f1\fs20\axwu7720\hich  \f1\dbch\f1\loch\f1\cf1\fs20\protect0\cell\pard\intbl\ssparaaux0\s0\ri90\ql\plain  \f0\fs22\plain\f1\fs22\lpvn9128\hich\f1\dbch\f1\loch\f1\cf1\fs22\protect0 52\plain  \f1\fs20\hzbq8954\hich\f1\dbch\f1\loch\f1\cf1\fs20\protect0\cell\pard\intbl\ssparaaux0  \s0\ri90\ql\plain\f0\fs22\plain\f1\fs22\qplo7782\hich\f1\dbch\f1\loch\f1\cf1\fs22\protect0   BPM\plain\f1\fs20\siye6015\hich\f1\dbch\f1\loch\f1\cf1\fs20\protect0\cell\intbl  \row\pard\intbl\ssparaaux0\s0\qc\plain\f0\fs22\plain\f1\fs20\tnls2439\hich\f1\dbch  \f1\loch\f1\cf1\fs20\protect0\cell\pard\intbl\ssparaaux0\s0\ri90\ql\plain\f0\fs22  \plain\f1\fs22\ejdr7257\hich\f1\dbch\f1\loch\f1\cf1\fs22\protect0 ATRIAL RATE\plain  \f1\fs20\mabs3517\hich\f1\dbch\f1\loch\f1\cf1\fs20\protect0\cell\pard\intbl\ssparaaux0  \s0\ri90\ql\plain\f0\fs22\plain\f1\fs22\pece5619\hich\f1\dbch\f1\loch\f1\cf1\fs22\protect0   52\plain\f1\fs20\plpn6084\hich\f1\dbch\f1\loch\f1\cf1\fs20\protect0\cell\pard\intbl  \ssparaaux0\s0\ri90\ql\plain\f0\fs22\plain\f1\fs22\jpzl9532\hich\f1\dbch\f1\loch  \f1\cf1\fs22\protect0 BPM\plain\f1\fs20\ihou6245\hich\f1\dbch\f1\loch\f1\cf1\fs20  \protect0\cell\intbl\row\pard\intbl\ssparaaux0\s0\qc\plain\f0\fs22\plain\f1\fs20  \wvut4554\hich\f1\dbch\f1\loch\f1\cf1\fs20\protect0\cell\pard\intbl\ssparaaux0\s0  \ri90\ql\plain\f0\fs22\plain\f1\fs22\vlyr5587\hich\f1\dbch\f1\loch\f1\cf1\fs22\protect0   P-R INTERVAL\plain\f1\fs20\zqbn2907\hich\f1\dbch\f1\loch\f1\cf1\fs20\protect0\cell  \pard\intbl\ssparaaux0\s0\ri90\ql\plain\f0\fs22\plain\f1\fs22\gksf7925\hich\f1\dbch  \f1\loch\f1\cf1\fs22\protect0 124\plain\f1\fs20\yaoo7362\hich\f1\dbch\f1\loch\f1  \cf1\fs20\protect0\cell\pard\intbl\ssparaaux0\s0\ri90\ql\plain\f0\fs22\plain\f1  \fs22\vrdj8118\hich\f1\dbch\f1\loch\f1\cf1\fs22\protect0  ms\plain\f1\fs20\ntfu6956  \hich\f1\dbch\f1\loch\f1\cf1\fs20\protect0\cell\intbl\row\pard\intbl\ssparaaux0  \s0\qc\plain\f0\fs22\plain\f1\fs20\ngvl2775\hich\f1\dbch\f1\loch\f1\cf1\fs20\protect0  \cell\pard\intbl\ssparaaux0\s0\ri90\ql\plain\f0\fs22\plain\f1\fs22\aqcf9827\hich  \f1\dbch\f1\loch\f1\cf1\fs22\protect0 QRS DURATION\plain\f1\fs20\ucii0349\hich\f1  \dbch\f1\loch\f1\cf1\fs20\protect0\cell\pard\intbl\ssparaaux0\s0\ri90\ql\plain\f0  \fs22\plain\f1\fs22\ncqt8321\hich\f1\dbch\f1\loch\f1\cf1\fs22\protect0 108\plain  \f1\fs20\mokw0882\hich\f1\dbch\f1\loch\f1\cf1\fs20\protect0\cell\pard\intbl\ssparaaux0  \s0\ri90\ql\plain\f0\fs22\plain\f1\fs22\uylc1679\hich\f1\dbch\f1\loch\f1\cf1\fs22\protect0   ms\plain\f1\fs20\pozf3219\hich\f1\dbch\f1\loch\f1\cf1\fs20\protect0\cell\intbl\row  \pard\intbl\ssparaaux0\s0\qc\plain\f0\fs22\plain\f1\fs20\tirk7516\hich\f1\dbch\f1  \loch\f1\cf1\fs20\protect0\cell\pard\intbl\ssparaaux0\s0\ri90\ql\plain\f0\fs22\plain  \f1\fs22\oxkv1632\hich\f1\dbch\f1\loch\f1\cf1\fs22\protect0 Q-T INTERVAL\plain\f1  \fs20\rjxx3916\hich\f1\dbch\f1\loch\f1\cf1\fs20\protect0\cell\pard\intbl\ssparaaux0  \s0\ri90\ql\plain\f0\fs22\plain\f1\fs22\sfpn5110\hich\f1\dbch\f1\loch\f1\cf1\fs22\protect0   486\plain\f1\fs20\pjxj2500\hich\f1\dbch\f1\loch\f1\cf1\fs20\protect0\cell\pard\intbl  \ssparaaux0\s0\ri90\ql\plain\f0\fs22\plain\f1\fs22\hyth1618\hich\f1\dbch\f1\loch  \f1\cf1\fs22\protect0 ms\plain\f1\fs20\bewi3672\hich\f1\dbch\f1\loch\f1\cf1\fs20  \protect0\cell\intbl\row\pard\intbl\ssparaaux0\s0\qc\plain\f0\fs22\plain\f1\fs20  \oqai0777\hich\f1\dbch\f1\loch\f1\cf1\fs20\protect0\cell\pard\intbl\ssparaaux0\s0  \ri90\ql\plain\f0\fs22\plain\f1\fs22\hcfd6771\hich\f1\dbch\f1\loch\f1\cf1\fs22\protect0   QTC CALCULATION (BEZET)\plain\f1\fs20\lizg1764\hich\f1\dbch\f1\loch\f1\cf1\fs20  \protect0\cell\pard\intbl\ssparaaux0\s0\ri90\ql\plain\f0\fs22\plain\f1\fs22\krni6608  \hich\f1\dbch\f1\loch\f1\cf1\fs22\protect0  451\plain\f1\fs20\pokc3128\hich\f1\dbch  \f1\loch\f1\cf1\fs20\protect0\cell\pard\intbl\ssparaaux0\s0\ri90\ql\plain\f0\fs22  \plain\f1\fs22\mlml7510\hich\f1\dbch\f1\loch\f1\cf1\fs22\protect0 ms\plain\f1\fs20  \ltyp4188\hich\f1\dbch\f1\loch\f1\cf1\fs20\protect0\cell\intbl\row\pard\intbl\ssparaaux0  \s0\qc\plain\f0\fs22\plain\f1\fs20\dcvd8322\hich\f1\dbch\f1\loch\f1\cf1\fs20\protect0  \cell\pard\intbl\ssparaaux0\s0\ri90\ql\plain\f0\fs22\plain\f1\fs22\sjbm5895\hich  \f1\dbch\f1\loch\f1\cf1\fs22\protect0 P Axis\plain\f1\fs20\oqtl5150\hich\f1\dbch  \f1\loch\f1\cf1\fs20\protect0\cell\pard\intbl\ssparaaux0\s0\ri90\ql\plain\f0\fs22  \plain\f1\fs22\atlw6596\hich\f1\dbch\f1\loch\f1\cf1\fs22\protect0 58\plain\f1\fs20  \fmvy7719\hich\f1\dbch\f1\loch\f1\cf1\fs20\protect0\cell\pard\intbl\ssparaaux0\s0  \ri90\ql\plain\f0\fs22\plain\f1\fs22\mmcd2268\hich\f1\dbch\f1\loch\f1\cf1\fs22\protect0   degrees\plain\f1\fs20\wybt3946\hich\f1\dbch\f1\loch\f1\cf1\fs20\protect0\cell\intbl  \row\pard\intbl\ssparaaux0\s0\qc\plain\f0\fs22\plain\f1\fs20\rsgg7993\hich\f1\dbch  \f1\loch\f1\cf1\fs20\protect0\cell\pard\intbl\ssparaaux0\s0\ri90\ql\plain\f0\fs22  \plain\f1\fs22\ybgz8383\hich\f1\dbch\f1\loch\f1\cf1\fs22\protect0 R AXIS\plain\f1  \fs20\grpf5046\hich\f1\dbch\f1\loch\f1\cf1\fs20\protect0\cell\pard\intbl\ssparaaux0  \s0\ri90\ql\plain\f0\fs22\plain\f1\fs22\ntkh3238\hich\f1\dbch\f1\loch\f1\cf1\fs22\protect0   76\plain\f1\fs20\oubi9164\hich\f1\dbch\f1\loch\f1\cf1\fs20\protect0\cell\pard\intbl  \ssparaaux0\s0\ri90\ql\plain\f0\fs22\plain\f1\fs22\vuie4811\hich\f1\dbch\f1\loch  \f1\cf1\fs22\protect0 degrees\plain\f1\fs20\pcsv5706\hich\f1\dbch\f1\loch\f1\cf1  \fs20\protect0\cell\intbl\row\pard\intbl\ssparaaux0\s0\qc\plain\f0\fs22\plain\f1  \fs20\qpht4594\hich\f1\dbch\f1\loch\f1\cf1\fs20\protect0\cell\pard\intbl\ssparaaux0  \s0\ri90\ql\plain\f0\fs22\plain\f1\fs22\zgmh3387\hich\f1\dbch\f1\loch\f1\cf1\fs22\protect0   T  AXIS\plain\f1\fs20\ozfo7096\hich\f1\dbch\f1\loch\f1\cf1\fs20\protect0\cell\pard  \intbl\ssparaaux0\s0\ri90\ql\plain\f0\fs22\plain\f1\fs22\ddee0971\hich\f1\dbch\f1  \loch\f1\cf1\fs22\protect0 74\plain\f1\fs20\lnfo2950\hich\f1\dbch\f1\loch\f1\cf1  \fs20\protect0\cell\pard\intbl\ssparaaux0\s0\ri90\ql\plain\f0\fs22\plain\f1\fs22  \kwel7921\hich\f1\dbch\f1\loch\f1\cf1\fs22\protect0 degrees\plain\f1\fs20\xlru5769  \hich\f1\dbch\f1\loch\f1\cf1\fs20\protect0\cell\intbl\row\pard\intbl\ssparaaux0  \s0\qc\plain\f0\fs22\plain\f1\fs20\brne0707\hich\f1\dbch\f1\loch\f1\cf1\fs20\protect0  \cell\pard\intbl\ssparaaux0\s0\ri90\ql\plain\f0\fs22\plain\f1\fs22\cnff6316\hich  \f1\dbch\f1\loch\f1\cf1\fs22\protect0 MUSE DIAGNOSIS\plain\f1\fs20\vlfp2960\hich  \f1\dbch\f1\loch\f1\cf1\fs20\protect0\cell\pard\intbl\ssparaaux0\s0\ri90\ql\plain  \f0\fs22\plain\f1\fs20\oflm8585\hich\f1\dbch\f1\loch\f1\cf1\fs20\protect0\cell\pard  \intbl\ssparaaux0\s0\ri90\ql\plain\f0\fs22\plain\f1\fs20\fnpr4364\hich\f1\dbch\f1  \loch\f1\cf1\fs20\protect0\cell\intbl\row\trowd\trgaph0\lastrow\trpaddl0\trpaddfl3  \trpaddr0\trpaddfr3\trpaddt0\trpaddft3\trpaddb0\trpaddfb3\trleft0\clvertalt\clpadt0  \clpadft3\clpadr0\clpadfr3\clpadl0\clpadfl3\clpadb0\clpadfb3\nivqm056\clvertalt  \clpadt0\clpadft3\clpadr0\clpadfr3\clpadl0\clpadfl3\clpadb0\clpadfb3\nacij808\clvertalt  \clpadt0\clpadft3\clpadr0\clpadfr3\clpadl0\clpadfl3\clpadb0\clpadfb3\wnvoj4348\pard  \intbl\ssparaaux0\s0\qc\plain\f0\fs22\plain\f1\fs20\fcqi3765\hich\f1\dbch\f1\loch  \f1\cf1\fs20\protect0\cell\pard\intbl\ssparaaux0\s0\qc\plain\f0\fs22\plain\f1\fs20  \dspx8188\hich\f1\dbch\f1\loch\f1\cf1\fs20\protect0\cell\pard\intbl\ssparaaux0\s0  \ri90\ql\plain\f0\fs22\plain\f1\fs22\jndi5910\hich\f1\dbch\f1\loch\f1\cf1\fs22\protect0   Sinus bradycardia\par Otherwise normal ECG\par When compared with ECG of 19-JUL-2017   15:54,\par Nonspecific T wave abnormality no longer evident in Inferior leads\par   Confirmed by SEE ED  PROVIDER NOTE FOR, ECG INTERPRETATION (4000),  ASHWIN CERNA (350) on 12/18/2019 8:38:25 AM\par\plain\f1\fs20\ewuf5923\hich\f1\dbch\f1  \loch\f1\cf1\fs20\protect0\cell\intbl\row\pard\plain\f0\fs22\plain\f1\fs22\vnoo6875  \hich\f1\dbch\f1\loch\f1\cf1\fs22\protect0\protect0 \plain\f1\fs22\njrg1720\hich  \f1\dbch\f1\loch\f1\cf4\fs22\par\ql\plain\f0\fs22\plain\f1\fs22\vsnh9045\hich\f1  \dbch\f1\loch\f1\cf1\fs22\b\ul\par\plain\f1\fs22\wxfm7954\hich\f1\dbch\f1\loch\f1\cf3\fs22\b\ul   RADIOLOGY:\par\plain\f1\fs22 Reviewed all pertinent imaging. Please see official   radiology report.\par\pard\plain\f0\fs22\plain\f1\fs22\jypl9282\hich\f1\dbch\f1  \loch\f1\cf1\fs22\protect\protect0 \plain\f1\fs22\zavc7851\hich\f1\dbch\f1\loch  \f1\cf1\fs22\protect0 Xr Chest 1 View\par\par Result Date: 12/18/2019\par EXAM:   XR CHEST 1 VIEW LOCATION: [facility] [place] DATE/TIME: 12/18/2019 11:11 AM INDICATION:   Right-sided chest pain and shortness of breath. Hyperglycemia. COMPARISON: 4/14/2016.   \par\par shallow inspiration. Heart size and pulmonary vascularity normal. No focal   pulmonary infiltrate.\par\par Us Abdomen Limited\par\par Result Date: 12/18/2019\par   EXAM: US ABDOMEN LIMITED LOCATION: [facility] [place] DATE/TIME: 12/18/2019 11:04   AM INDICATION: RUQ abdominal pain COMPARISON: None. TECHNIQUE: Limited abdominal   ultrasound. FINDINGS: GALLBLADDER: Gallstones in an otherwise normal gallbladder.   Gallbladder wall upper limits of normal in thickness. No pericholecystic fluid.   The patient is apparently tender over the gallbladder with transducer pressure consistent   with a positive sonographic Mclean's sign. BILE DUCTS: No biliary dilatation. The   common duct measures 5 mm. LIVER: Hypoechoic liver parenchyma with relatively prominent,   echogenic portal triads which can be seen in the setting of hepatic inflammation   such as hepatitis. No focal mass. RIGHT KIDNEY: No hydronephrosis. PANCREAS: The    visualized portions are normal. No ascites. \par\par 1.  Cholelithiasis with tenderness   over the gallbladder, gallbladder wall thickening at upper limits of normal. 2.    Bile ducts normal in caliber. 3.  Hypoechoic appearing liver parenchyma nonspecific   but does raise question of hepatitis.\par\plain\f1\fs22\zyrd0441\hich\f1\dbch\f1  \loch\f1\cf1\fs22\protect0\protect0 \plain\f1\fs22\par\ql\plain\f0\fs22\plain\f1  \fs22\bzld0796\hich\f1\dbch\f1\loch\f1\cf4\fs22\par\plain\f1\fs22\ydmh1791\hich  \f1\dbch\f1\loch\f1\cf3\fs22\b\ul EKG:\plain\f1\fs22   \par Performed at: 8:20\par\par   Impression: Sinus bradycardia\par\plain\f1\fs22\hmlr5815\hich\f1\dbch\f1\loch\f1  \cf1\fs22\b\ul\par\plain\f1\fs22 Rate: 52\par Rhythm: sinus\par Axis: normal axis\par   DC Interval: 124\par QRS Interval: 108\par QTc Interval: 451\par ST Changes: none\par   Comparison: bradycardia is now present\par\plain\f1\fs22\zuln5254\hich\f1\dbch\f1  \loch\f1\cf1\fs22\b\ul\par\plain\f1\fs22 I have independently reviewed and interpreted   the EKG(s) documented above.\par\par\plain\f1\fs22\rsey1014\hich\f1\dbch\f1\loch  \f1\cf3\fs22\b\ul\protect\protect0 \plain\f1\fs22\lwwn0975\hich\f1\dbch\f1\loch  \f1\cf3\fs22\b\ul\protect0 Procedures\plain\f1\fs22\jsrr6512\hich\f1\dbch\f1\loch  \f1\cf3\fs22\b\ul\protect0\protect0 \plain\f1\fs22\par None\par\par\plain\f2\fs22  \snsm9495\hich\f2\dbch\f2\loch\f2\cf1\fs22\par\plain\f1\fs22\par Chu Barillas M.D.\par   Family Medicine PGY-1\par [facility]\par\plain\f1\fs22\yjyk1364\hich\f1  \dbch\f1\loch\f1\cf1\fs22\protect\protect0 \plain\f1\fs22\nfyp2803\hich\f1\dbch  \f1\loch\f1\cf1\fs22\protect0 [facility]\par [facility] EMERGENCY   DEPARTMENT\par [address]par [facility] [id number]\par Dept: [phone]\par   Loc: [phone]\plain\f1\fs22\polu4280\hich\f1\dbch\f1\loch\f1\cf1\fs22\protect0\protect0   \plain\f1\fs22\par\par  \par Chu Barillas MD\par Resident\par 12/18/19  1540\par\par}

## 2021-07-20 VITALS
BODY MASS INDEX: 20.34 KG/M2 | HEIGHT: 64 IN | BODY MASS INDEX: 20.08 KG/M2 | WEIGHT: 118.5 LBS | WEIGHT: 120 LBS | WEIGHT: 117.6 LBS | BODY MASS INDEX: 20.6 KG/M2

## 2021-07-20 VITALS — WEIGHT: 121 LBS | BODY MASS INDEX: 20.77 KG/M2 | WEIGHT: 118 LBS | BODY MASS INDEX: 20.25 KG/M2

## 2021-07-20 VITALS
HEIGHT: 64 IN | BODY MASS INDEX: 21.08 KG/M2 | WEIGHT: 123.5 LBS | WEIGHT: 127 LBS | BODY MASS INDEX: 21.68 KG/M2 | HEIGHT: 64 IN

## 2021-07-20 VITALS — HEIGHT: 64 IN | WEIGHT: 122.6 LBS | BODY MASS INDEX: 20.93 KG/M2

## 2021-07-20 VITALS — BODY MASS INDEX: 21.05 KG/M2 | HEIGHT: 64 IN | WEIGHT: 123.3 LBS

## 2021-07-20 VITALS
BODY MASS INDEX: 19.81 KG/M2 | HEIGHT: 64 IN | BODY MASS INDEX: 19.39 KG/M2 | WEIGHT: 116 LBS | HEIGHT: 64 IN | WEIGHT: 113.6 LBS | WEIGHT: 116.8 LBS | BODY MASS INDEX: 19.94 KG/M2 | HEIGHT: 64 IN

## 2021-07-20 VITALS
HEIGHT: 64 IN | WEIGHT: 126.8 LBS | BODY MASS INDEX: 21.65 KG/M2 | WEIGHT: 126.8 LBS | HEIGHT: 64 IN | BODY MASS INDEX: 21.65 KG/M2

## 2021-07-20 VITALS
WEIGHT: 117.56 LBS | HEIGHT: 64 IN | BODY MASS INDEX: 20.6 KG/M2 | DIASTOLIC BLOOD PRESSURE: 60 MMHG | SYSTOLIC BLOOD PRESSURE: 102 MMHG | WEIGHT: 117.56 LBS | HEIGHT: 64 IN | BODY MASS INDEX: 20.07 KG/M2 | BODY MASS INDEX: 20.07 KG/M2 | WEIGHT: 120 LBS | HEART RATE: 72 BPM

## 2021-07-20 VITALS
BODY MASS INDEX: 21.63 KG/M2 | BODY MASS INDEX: 21.94 KG/M2 | WEIGHT: 126.7 LBS | WEIGHT: 128.5 LBS | WEIGHT: 125.5 LBS | BODY MASS INDEX: 21.54 KG/M2 | HEIGHT: 64 IN | HEIGHT: 64 IN

## 2021-07-20 VITALS — WEIGHT: 135 LBS | BODY MASS INDEX: 23.05 KG/M2 | HEIGHT: 64 IN

## 2021-07-20 VITALS
HEIGHT: 64 IN | HEIGHT: 64 IN | HEIGHT: 64 IN | WEIGHT: 121.8 LBS | WEIGHT: 127 LBS | BODY MASS INDEX: 21.68 KG/M2 | WEIGHT: 127 LBS | BODY MASS INDEX: 21.8 KG/M2 | WEIGHT: 127 LBS | BODY MASS INDEX: 21.68 KG/M2 | BODY MASS INDEX: 20.79 KG/M2

## 2021-07-20 VITALS — BODY MASS INDEX: 21.46 KG/M2 | WEIGHT: 125 LBS

## 2021-07-20 VITALS — BODY MASS INDEX: 20.96 KG/M2 | HEIGHT: 64 IN | WEIGHT: 122.8 LBS

## 2021-07-20 VITALS — HEIGHT: 64 IN | WEIGHT: 118 LBS | BODY MASS INDEX: 20.14 KG/M2

## 2021-07-20 VITALS
BODY MASS INDEX: 19.39 KG/M2 | WEIGHT: 114.6 LBS | WEIGHT: 113.6 LBS | BODY MASS INDEX: 19.88 KG/M2 | BODY MASS INDEX: 19.67 KG/M2 | WEIGHT: 115.8 LBS | HEIGHT: 64 IN

## 2021-07-20 VITALS — WEIGHT: 124 LBS | BODY MASS INDEX: 21.28 KG/M2

## 2021-07-20 VITALS — WEIGHT: 120.3 LBS | BODY MASS INDEX: 20.72 KG/M2

## 2021-07-20 NOTE — PROGRESS NOTES
Assessment: Kera is here today for insulin pump upgrade.  She has been using 630G Medtronic and now is transitioning to 670G Medtronic pump.  Her pump settings are as follows:      Basal   Bolus   Insulin Sensitivity             Time u/hr  Time Ratio  Time Sensitivity   12:00 AM 0.95  12 1:12  12 53   7:00 AM 0.65         8:00 PM 0.95                      Goal: 100-120            Active insulin time:  4 hours                   Total Basal:  18.9 units     Guardian sensors are on back order.       Plan:   Settings entered into insulin pump as indicated above.  Pump training checklist provided and reviewed with pt.  Scanned into chart for reference.      Pt is agreeable to call if two hypoglycemic episodes in one week or hyperglycemia in one week or concerns.    Reviewed the Guardian sensor, she plans to start sensor when she receives them and schedule an appointment after wearing for 7-10 days- for training for auto mode.    Monitoring   Meter (per above goals): Assessed and Discussed - Tha Contour linked to pump  Monitoring: Discussed  BG goals: Discussed  Carb ID/Count: Discussed    Acute Complications: Prevent, Detect, Treat:  Hypoglycemia: Discussed  Hyperglycemia/DKA prevention: Discussed  Sick Days: Literature provided      Time spent with the patient: 60 minutes for diabetes education and counseling.   Previous Education: yes  Visit Type:MNT          Subjective and Objective:      Vani Corona is referred by Denia Llamas for Diabetes Education.     Lab Results   Component Value Date    HGBA1C 7.9 (H) 07/19/2017         Goals     None          Follow up:   Diabetes classes for CDE when she receives Guardian sensors      Education:     Monitoring   Meter (per above goals): assessment, discussed, pt returned demonstration,  Monitoring: assessment, discussed, pt returned demonstration, literature provided   BG goals: assessment, discussed, pt returned demonstration, literature provided      Nutrition  Management:  assessment, discussed, pt returned demo,  literature provided   Weight:assessment, discussed, pt returned demo,  literature provided   Portions/Balance: assessment, discussed, pt returned demo,  literature provided   Carb ID/Count: assessment, discussed, pt returned demo,  literature provided   Label Reading: assessment, discussed, pt returned demo,  literature provided   Heart Healthy Fats:assessment, discussed, pt returned demo,  literature provided   Menu Planning: assessment, discussed, pt returned demo,  literature provided   Dining Out: assessment, discussed,  literature provided   Physical Activity: assessment, discussed,  literature provided   Medications: assessment, discussed  Orals: assessment, discussed  IDiabetes Disease Process: Discussed    Acute Complications: Prevent, Detect, Treat:  Hypoglycemia: Discussed and Literature provided  Hyperglycemia: Discussed and Literature provided        Goal Setting and Problem Solving: Discussed  Barriers: Discussed  Psychosocial Adjustments: Discussed      Time spent with the patient: 45 minutes for diabetes education and counseling.   Previous Education: yes  Visit Type:ANGELA Lobo  10/3/2017

## 2021-07-20 NOTE — TELEPHONE ENCOUNTER
Calling [facility] to get more information.    Per the [facility] rep, all the HCPC codes that were submitted are covered under the medical plan. She did reach out to a supervisor and as long as the pharmacy is billing under medical benefits and not Rx, they should be covered. Sending to Mere Jo fromFV DME teams as the update to confirm that medical is being billed and not pharmacy. Will await if anything further needs to be processed after.

## 2021-07-20 NOTE — PROGRESS NOTES
"Internal Medicine Office Visit  [facility]   Patient Name: Vani Corona  Patient Age: 40 y.o.  YOB: 1980  MRN: [id number]    Date of Visit: 6/14/2021  Reason for Office Visit:   Chief Complaint   Patient presents with     Back Pain     Snoring           Assessment / Plan / Medical Decision Making:    Problem List Items Addressed This Visit     Low back pain - Primary     Start nabumetone and methocarbamol for low back pain.  Consider PT referral if pain is not improving.  We also discussed doing an MRI if symptoms persist beyond 4 to 6 weeks.         Relevant Medications    nabumetone (RELAFEN) 500 MG tablet    methocarbamoL (ROBAXIN) 500 MG tablet      Other Visit Diagnoses     Rhinitis, unspecified type        Relevant Medications    cetirizine (ZYRTEC) 10 MG tablet    Cervical pain (neck)        Relevant Medications    nabumetone (RELAFEN) 500 MG tablet    methocarbamoL (ROBAXIN) 500 MG tablet    Snoring        She does not have sufficient symptoms to have a high suspicion for sleep apnea.          I have discontinued Vani Corona's baclofen. I have also changed her cetirizine. Additionally, I am having her start on nabumetone and methocarbamoL. Lastly, I am having her maintain her cholecalciferol (vitamin D3), insulin syringe-needle U-100, clobetasoL, aspirin, ketoconazole, glucagon (human recombinant), albuterol, (blood-glucose meter,continuous), blood glucose meter, blood glucose test, escitalopram oxalate, buPROPion, clomiPHENE, HYDROcodone-acetaminophen, Accu-Chek Fastclix Lancet Drum, SUMAtriptan, insulin aspart U-100, (pen needle, diabetic), lisinopriL, Synthroid, insulin glargine, t:slim X2, t:90 Infusion Set 23\", t:slim X2 Insulin Pump, pantoprazole, and Dexcom G6 Transmitter.        No orders of the defined types were placed in this encounter.  Followup: Return in about 4 weeks (around 7/12/2021), or if symptoms worsen or fail to improve, for Recheck. " earlier if needed.        Denia Llamas, CNP        HPI:  Vani Corona is a 40 y.o. year old who presents to the office today for back pain.     2 weeks ago Wednesday she awoke with pain in her low back that is constant and now today she has tightness in the lateral neck. No new activities. She does not recall an injury. It is hit or miss whether acetaminophen or ibuprofen will help with the pain. There are no comfortable positions. She has had back pain in the past, she had an MRI of the lumbar spine in 2015 and [facility]. No fevers, chills. No loss of bowel/bladder control. No radiation of pain into the legs.     Her boyfriend tells her that she snores and it keeps him awake at night.  He has never described apneic episodes.      Health Maintenance Review  Health Maintenance   Topic Date Due     DEPRESSION ACTION PLAN  Never done     COVID-19 Vaccine (1) Never done     HIV SCREENING  Never done     Pneumococcal Vaccine: Pediatrics (0 to 5 Years) and At-Risk Patients (6 to 64 Years) (2 of 4 - PCV13) 10/03/2015     HEPATITIS B VACCINES (3 of 3 - Risk 3-dose series) 08/14/2016     LIPID  02/15/2017     PREVENTIVE CARE VISIT  04/14/2017     DIABETIC FOOT EXAM  09/04/2019     DIABETIC EYE EXAM  01/24/2020     A1C  11/20/2021     MICROALBUMIN  02/19/2022     BMP  05/20/2022     ADVANCE CARE PLANNING  03/19/2023     PAP SMEAR  04/01/2023     TD 18+ HE  08/31/2025     HEPATITIS C SCREENING  Completed     INFLUENZA VACCINE RULE BASED  Completed     TDAP ADULT ONE TIME DOSE  Completed       Current Scheduled Meds:  Outpatient Encounter Medications as of 6/14/2021   Medication Sig Dispense Refill     ACCU-CHEK FASTCLIX LANCET DRUM USE TO TEST BLOOD SUGAR 4 6 TIMES DAILY       albuterol (PROAIR HFA;PROVENTIL HFA;VENTOLIN HFA) 90 mcg/actuation inhaler INHALE 2 PUFFS BY MOUTH EVERY 6 HOURS AS NEEDED FOR WHEEZING 18 g 0     aspirin 81 MG EC tablet Take 81 mg by mouth every evening.        blood  "glucose meter (GLUCOMETER) Use 1 each As Directed as needed. Dispense glucometer brand per patient's insurance at pharmacy discretion. 1 each 0     blood glucose test strips Use 1 each As Directed as needed (4-6 times daily). Dispense brand per patient's insurance at pharmacy discretion. 400 strip 1     blood-glucose meter,continuous (DEXCOM G6 ) Misc Use 1 each As Directed continuous. 1 each 0     buPROPion (WELLBUTRIN XL) 150 MG 24 hr tablet TAKE 1 TABLET BY MOUTH EVERY DAY 90 tablet 1     cholecalciferol, vitamin D3, 1,000 unit tablet Take 1,000 Units by mouth every evening.        clobetasol (TEMOVATE) 0.05 % cream Apply 1 application topically 2 (two) times a day as needed. 45 g 1     clomiPHENE (CLOMID) 50 mg tablet TAKE 2 TABLETS (100MG) BY ORAL ROUTE ONCE DAILY FOR 5 DAYS. STARTING ON CYCLE DAY 3       DEXCOM G6 TRANSMITTER Eveline CHANGE EVERY 3 MONTHS 1 Device 0     escitalopram oxalate (LEXAPRO) 10 MG tablet TAKE 1 TABLET BY MOUTH EVERY DAY 90 tablet 1     glucagon, human recombinant, (GLUCAGON) 1 mg injection Inject 1 mg into the shoulder, thigh, or buttocks once as needed. 2 each 0     HYDROcodone-acetaminophen 5-325 mg per tablet TAKE 1 TAB BY MOUTH EVERY 6 HOURS AS NEEDED       infusion set for insulin pump (T:90 INFUSION SET 23\") ISet Use 1 each As Directed daily. To change every 3 days 30 each 3     insulin aspart U-100 (NOVOLOG FLEXPEN U-100 INSULIN) 100 unit/mL (3 mL) injection pen Inject subcutaneously CHO ratio 1:10 - 1:15 + sliding scale 1:50>150 up to 40 units daily. 30 mL 1     insulin glargine (LANTUS SOLOSTAR PEN) 100 unit/mL (3 mL) pen Inject 24 Units under the skin at bedtime.       insulin pump cartridge (T:SLIM X2) Use 1 each As Directed daily. To change every 3 days 30 each 3     insulin syringe-needle U-100 (BD INSULIN SYRINGE ULT-FINE II) 0.3 mL 31 gauge x 5/16 Syrg Use as needed with insulin 50 each 5     ketoconazole (NIZORAL) 2 % cream Apply topically 2 (two) times a day as " "needed.       lisinopriL (ZESTRIL) 2.5 MG tablet Take 1 tablet (2.5 mg total) by mouth daily. 90 tablet 1     pantoprazole (PROTONIX) 40 MG tablet TAKE ONE TABLET BY MOUTH EVERY EVENING 90 tablet 3     pen needle, diabetic (BD ULTRA-FINE ADALBERTO PEN NEEDLE) 32 gauge x 5/32\" Ndle USE 7 TIMES DAILY 400 each 1     subcutaneous insulin pump (T:SLIM X2 INSULIN PUMP) Misc Use 1 each As Directed daily. 1 each 1     SUMAtriptan (IMITREX) 25 MG tablet Take 1 tablet (25 mg total) by mouth daily as needed for migraine (may repeat in 2 hours if headache has not resolved). 18 tablet 6     SYNTHROID 125 mcg tablet Take 1 tablet (125 mcg total) by mouth daily. Due for lab work 90 tablet 2     [DISCONTINUED] baclofen (LIORESAL) 10 MG tablet Take 10 mg by mouth 3 (three) times a day as needed.       [DISCONTINUED] blood-glucose sensor (DEXCOM G6 SENSOR) Eveline Apply one sensor to the skin every 10 days. 9 Device 1     [DISCONTINUED] cetirizine (ZYRTEC) 10 MG tablet TAKE 1 TABLET(10 MG) BY MOUTH EVERY EVENING 90 tablet 0     cetirizine (ZYRTEC) 10 MG tablet Take 1 tablet (10 mg total) by mouth daily. 90 tablet 0     methocarbamoL (ROBAXIN) 500 MG tablet Take 1 tablet (500 mg total) by mouth 4 (four) times a day as needed. 40 tablet 0     nabumetone (RELAFEN) 500 MG tablet Take 1 tablet (500 mg total) by mouth 2 (two) times a day as needed for pain. 60 tablet 0     No facility-administered encounter medications on file as of 6/14/2021.            Objective / Physical Examination:  Vitals:    06/14/21 1721   BP: 102/58   Pulse: 60   Weight: 138 lb (62.6 kg)     Wt Readings from Last 3 Encounters:   06/14/21 138 lb (62.6 kg)   05/13/21 135 lb (61.2 kg)   05/04/21 134 lb (60.8 kg)     Body mass index is 23.69 kg/m .     ______________________________________________________________________    STOPBANG ADELINA ASSESSMENT    1.  Do you snore loudly (louder than talking or loud enough to be heard through closed doors):  yes    2.  Do you often feel " tired, fatigued, or sleepy during the day:  yes    3.  Has anyone observed you stop breathing during sleep:  no    4.  Do you have or are you being treated for high blood pressure:  no    5.  Body mass index > 35:  Body mass index is 23.69 kg/m .    6.  Age > 50:  40 y.o.     7.  Neck circumference greater than 40 cm:  no    8.  Gender male:  female     Total score:  2    A score of 3 or greater indicates a risk for sleep apnea (84% sensitivity).  A score of 5 or greater is more predictive of clinically relevant moderate to severe obstructive sleep apnea.    ______________________________________________________________________      Constitutional: In no apparent distress  Respiratory: Clear to auscultation bilaterally. Normal inspiratory and expiratory effort  Cardiovascular: Regular rate and rhythm  MSK: Normal gait.  No spinous process tenderness.  Negative straight leg raises

## 2021-07-20 NOTE — CONSULTS
DIABETES CARE  Situation:  Consulted by Provider for Diabetes Education  39 year old female with type 1 Diabetes. Patient was admitted for abdominal pain consistent with cholecystitis. Surgery this am.  Insulin pump was disconnected yesterday and Lantus given.  Bg on admission was high, likely cannula bent or plugged..     Background:  Has had diabetes for more than 20 years. Patient sees NP at the [facility], last seen in July, due again in January. Does not see a CDE in clinic.     PCP: Denia Llamas  Social: Lives with boyfriend  Concerned that she may be losing her job.She has worn a sensor in past but cannot afford. She cannot meet copays    Current Meds for BG Management: Insulin pump restarted now  Basal Rates:  Rate 1: 7437-7413  0.950 u/hr  Rate 2: 7437-4075  0.650 u/hr  Rate 3: 2152-8984  0.750 u/hr  Rate 4: 4380-2158  0.950 u/hr     Total 24 hour basal dose: 19.7 units  Temporary basal rate of 0% set for 4 hours (until Lantus wears off) then kicks into the basal rates above.  Can use pump now for bolusing.      Carbohydrate Ratio:  0000 to 1200  12 Grams/unit  1200 to 0000  10 Grams/unit     Correction/Sensitivity:  0000 to 0000 ;  53 mg/dL/unit          Blood Glucose Target Range:  0000 to  0000;100-120      Active Insulin: _2.45__ hours  Brand of insulin pump: Medtronic 670 G    Labs:  Hemoglobin A1C: 8.2              Hgb: 13.6    SCr: 0.71    GFR: >60   Blood Glucose POC: 115 this am, marcos after surgery    Diet Order:  ADAT   Intake: Just clear liquids so far   Weight: 53.3 kg    BMI: 20.2    DM EDUCATION/COUNSELING:  Barriers to Learning and/or DM Self-Management: None except insurance concerns  Previous DM Education: has had  Current Education   Patient cannot afford sensor so test BG with Contour USB 1-2 times per day  Denies having low BG, aware of sx, still feels even after over 20 years.  Uses juice and has something with her all the time.  Discussed the glucagon kit she has at  "home.  Educated on the new glucagon pen and nasal glucagon. She can ask her endocrine NP about it in January.  Reviewed hyperglycemia action with a pump, bolusing then checking BG in 1 hour and if higher, to remove infusion set, give injection by syringe for correction and put a new infusion set in a new site. This is to prevent DKA.      Assessment:  Pump assessment completed, orders entered and patient safe to use insulin pump in the hospital independently.    Recommendations:  1. FU with endocrine NP in January  2. Mentioned the OP CDEs there also if needed for support/education    Refer to \"Recommendations for Management of Hyperglycemia in the Hospitalized Patient\" and \"ADA Standards of Medical Care in DM - 2018\", found on the DM Med Order set and on the InfoNet under Clinical Services> Diabetes Care.    [facility] BG goals for blood glucose levels are < 180 for improved health outcomes.      Thank you,     April Morton RN, CDE, Lead Inpatient [place] [address]  [email or web address]  [email or web address]   Office: [phone]  pager: [phone]                                            "

## 2021-07-20 NOTE — LETTER
Letter by Aura Georges BSW at      Author: Aura Georges BSW Service: -- Author Type: --    Filed:  Encounter Date: 12/31/2019 Status: Signed       Care Plan  About Me:    Patient Name:  Vani CASH Caribou    YOB: 1980  Age:         39 y.o.   [facility] MRN:    [id number] Telephone Information:  Home Phone [phone]   Mobile [phone]       Address:  [address] [place] [address] Email address:  [email or web address]      Emergency Contact(s)  Extended Emergency Contact Information      Name: Joslyn Anderson    Home Phone Number: [phone]  Relation: Mother      Name: decline, per pt    Relation: Declined          Primary language:  English     needed? No   [employer]:  [phone] op. 1  Other communication barriers: None  Preferred Method of Communication:  Regine  Current living arrangement: I live in a private home with family  Mobility Status/ Medical Equipment: Independent    Health Maintenance  Health Maintenance Reviewed: Not assessed    My Access Plan  Medical Emergency 911   Primary Clinic Line [facility] - [phone]   24 Hour Appointment Line [phone] or  [phone]) (toll-free)   24 Hour Nurse Line [phone] (toll-free)   Preferred Urgent Care [facility], [phone]   Preferred Hospital [facility]  [phone]   Preferred Pharmacy [id number] - [place], [place] - [address] [place] [address] AT [facility]     Behavioral Health Crisis Line The National Suicide Prevention Lifeline at 1-120.424.4061 or 911             My Care Team Members  Patient Care Team       Relationship Specialty Notifications Start End    Denia Llamas FNP PCP - General Nurse Practitioner  9/21/17     Phone: [phone] Fax: [phone] [address] [facility] [address]    Denia Llamas FNP Assigned PCP   7/28/19     Phone:  [phone] Fax: [phone]         [address] [facility] [address] [facility] [address]            My Care Plans  Self Management and Treatment Plan  Goals and (Comments)  Goals        General    Financial Wellbeing (pt-stated)     Notes - Note edited  12/31/2019  2:29 PM by Aura Georges SW    I would like to meet with W for assistance completing [facility] application and exploring [place] and/or [place] Care for myself and my children within the next 30 days    Action steps to achieve this goal  1.  I will answer the phone when W contacts me to schedule an appointment.  2.  I will provide all necessary paperwork/documentation to assist in this process.  3.  I will communicate with CHW at outreach.    NOTE: Pt reports that her MA ended on October 31st; her daughter currently has active MA. IRASEMA to communicate with [facility] signed on 12/31/19 and left on W Angélica's desk.    Date goal set:  12/31/19  Discussed/updated:      Medication 1 (pt-stated)     Notes - Note created  12/31/2019  2:14 PM by Aura Georges SW    I would like to meet with [facility] RN to discuss medications and diabetes management within the next 30 days    Action steps to achieve this goal  1.  I will answer the phone when W calls me to schedule an appointment with [facility] RN.  2.  I will bring all of my medications to my scheduled appointment with [facility] RN.    NOTE:  assessment completed on 12/31/19.    Date goal set:  12/31/19  Discussed/updated:      Mental Health Management (pt-stated)     Notes - Note created  12/31/2019  2:34 PM by Aura Georges SW    I would like to get my daughter connected to PCA and mental health case management services through [facility] within the next 90 days    Action steps to achieve this goal  1.  I will answer the phone when [facility] contacts me to schedule an in-home assessment for PCA and children's mental health case management.   2.  I will provide all necessary paperwork/documentation  to assist in this process.  3.  I will communicate with CHW at outreach.    Date goal set:  12/31/19  Discussed/updated:               Advance Care Plans/Directives Type:        My Medical and Care Information  Problem List   Patient Active Problem List   Diagnosis   ? Restless Legs Syndrome   ? Selective IgA Deficiency   ? Major depressive disorder, recurrent episode, moderate (H)   ? Hypothyroidism, unspecified type   ? Type 1 diabetes mellitus (H)   ? Arthralgias In Multiple Sites   ? Eczema, pustular   ? Low back pain   ? Sciatica of left side   ? Anxiety   ? Depression    ? TMJ inflammation - bilateral   ? Insomnia    ? Non-rheumatic mitral regurgitation   ? Polyarthralgia   ? GERD (gastroesophageal reflux disease) H2 blocker not effective    ? Inflammatory polyarthritis (H)   ? High risk medication use   ? Right hand pain   ? Tobacco abuse   ? Insulin pump status   ? Family history of psoriasis in mother and sister   ? Parent-child relational problem   ? 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 Cholecystitis   ? Acute cholecystitis      Current Medications and Allergies:  See printed Medication Report.    Care Coordination Start Date: 12/31/2019   Frequency of Care Coordination:     Form Last Updated: 12/31/2019

## 2021-07-20 NOTE — TELEPHONE ENCOUNTER
RN cannot approve Refill Request    RN can NOT refill this medication med is not covered by policy/route to provider. Last office visit: 4/2/2019 Denia Llamas FNP Last Physical: Visit date not found Last MTM visit: Visit date not found Last visit same specialty: 4/2/2019 Denia Llamas FNP.  Next visit within 3 mo: Visit date not found  Next physical within 3 mo: Visit date not found      Marianna Doherty, Care Connection Triage/Med Refill 6/16/2019    Requested Prescriptions   Pending Prescriptions Disp Refills     ondansetron (ZOFRAN) 4 MG tablet [Pharmacy Med Name: ONDANSETRON 4MG TABLETS] 10 tablet 0     Sig: TAKE 1 TABLET(4 MG) BY MOUTH EVERY 12 HOURS AS NEEDED FOR NAUSEA       There is no refill protocol information for this order

## 2021-07-20 NOTE — TELEPHONE ENCOUNTER
Date: 8/11/2020 Status: Can   Time: 8:00 AM Length: 20   Visit Type: OFFICE VISIT [[id number]] Copay: $0.00   Provider: Anayeli Dave NP Department: [facility] END[facility]   Referring Provider: SAMRA ROMERO CSN: [id number]   Notes: DM Follow Up   Made On:  Canceled: 2/4/2020 8:45 AM  8/4/2020 3:50 PM By:  By: EHDY RODRIGEZ BAIRLIES   Cancel Rsn: Patient Initiated (pt states shes does not have any insurance)

## 2021-07-20 NOTE — TELEPHONE ENCOUNTER
Chayo assistance calling again to clarify the Novolog script.  Please fax new script with NPI and state license number to [phone].  Any questions [phone].

## 2021-07-20 NOTE — TELEPHONE ENCOUNTER
It's $35 - but she has to get the cartridges that go in it. She will have to go back to basal and prandial insulin, and the prandial insulin is what goes in the pens.

## 2021-07-20 NOTE — PROGRESS NOTES
Progress Notes by Denia Bailey Diabetes Ed at 5/6/2020  9:30 AM     Author: Denia Bailey, Diabetes Ed Service: -- Author Type: Diabetes Ed    Filed: 5/6/2020 12:03 PM Encounter Date: 5/6/2020 Status: Attested    : Denia Bailey, Diabetes Ed (Registered Dietitian) Cosigner: Denia Llamas FNP at 5/6/2020 12:26 PM    Attestation signed by Denia Llamas FNP at 5/6/2020 12:26 PM    Agree with plan                 Video Visit    Patient has given verbal consent to a Video visit? Yes    Patient would like to receive their AVS by AVS Preference: Regine.    Patient would like the video invitation sent by: Text to cell phone: [phone]      Video Start Time: 9:33AM      Video-Visit Details    Type of service:  Video Visit    Video End Time (time video stopped): 10:01AM    Originating Location (pt. Location): Home    Distant Location (provider location):  [place] [facility]     Mode of Communication:  Video Conference via [facility]      Diabetes [facility] Follow Up Visit  Assessment:     Vani is here via video visit today to set up her Dexcom G6 glucose monitoring system. Pt with Type 1 Diabetes    Vani reports taking 1 unit per 10 gram of carbohydrate of Novolog. Taking 20 units of Basaglar. She reports her fasting BG are elevated, but her after dinner readings are elevated too.    She has started to use the InPen with her insulin and feels this is helping. She does not have an insulin pump right now.    Walked Vani through the parts of the Dexcom- applicator, sensor and transmitter. She has the application on her phone and plans to use it this way.   I shared my screen with Vani and we watched the Dexcom start video for the G6. She was able to place the sensor and transmitter with no issues, however she did not have the code for the transmitter as she tossed the box and her gareth closed after she scanned it from the actual transmitter. She had a work call and had to  leave the appointment. She did feel comfortable starting a new sensor and states that she has more that she can restart one.     Advised her to call or mychart/message the team if she is having issues with a new sensor. Will follow up with her to determine if she needs more assistance.    Advised her to send her care team a share code so we can review BG. She reports her BG in the AM fasting have been elevated.     Plan:     1. Monitor po intake limiting carbohydrates to 45-60 grams per meal and taking Novolog before meals  2. Increasing activity to 30 minutes/day 5x weekly.  3. Monitor BG 4-5 times daily with new Dexcom G6 on phone  4. Share glucose information with team when she is comfortable   5. Follow up with CDE in 1-2 months to assess BG and adjust medications as needed.    Subjective and Objective:      Vani Corona is referred by Denia Llamas FNP for Diabetes Education.     Lab Results   Component Value Date    HGBA1C 7.7 (H) 01/29/2020     Component      Latest Ref Rng & Units 3/5/2019 7/23/2019 12/18/2019 1/29/2020   Hemoglobin A1c      3.5 - 6.0 % 7.5 (H) 7.8 (H) 8.2 (H) 7.7 (H)     Current diabetes medications:    1.   Novolog 1:10 three times a day with meals  2.   Basaglar 20 units once daily       Goals            ? Medication      Take insulin as prescribed      ? Monitor      Check BG 4-5 times daily with Dexcom          Follow up:   CDE (certified diabetic educator)      Education:     Monitoring   Provided information on how to check BG. Reviewed use of Dexcom G6 monitoring system  Discussed keeping a log of BG and discussed when and who to call when BG are not in target range. Reviewed FBS goals of  mg/dL and 2 hr post meal BG goal of <180 mg/dL.   Meter (per above goals): Discussed and Patient returned demonstration  Monitoring: Discussed and Patient returned demonstration  BG goals: Discussed and Patient returned demonstration    Nutrition Management/Healthy Eating  Healthy  Eating: Reviewed carbohydrates, label reading, importance of making healthy choices, meal planning and eating out. Discussed 1 carbohydrate = 15 grams of carbohydrate on a food label. Discussed appropriate balance of carbohydrates at each meal. 45-60 grams discussed per meal each day. Snacks encouraged 15-30 grams of carbohydrates with protein.   Nutrition Management: Not addressed  Weight: Not addressed  Portions/Balance: Discussed  Carb ID/Count: Discussed  Label Reading: Discussed  Heart Healthy Fats: Not addressed  Menu Planning: Not addressed  Dining Out: Not addressed    Being Active:  Reviewed importance of movement for management of diabetes. Discussed ways to increase activity. Pt discussed wanting to incorporate   Physical Activity: Not addressed    Taking Medication:  Discussed medications patient is on. Reviewed side effects of medications and scheduling them into their daily schedule.  Medications: Discussed  Orals: NA  Injected Medications: Discussed   Storage/Exp:Discussed   Site Rotation: Discussed   Sites Assessed: no    Diabetes Disease Process: Discussed    Acute Complications: Prevent, Detect, Treat/Reducing Risks:  Reducing Risks: Provided education on diabetes diease, BG goals, A1C, signs/symptoms of hypo/hypoglycemia and how to treat.   Hypoglycemia: Discussed  Hyperglycemia: Discussed  Sick Days: Not addressed  Driving: Not addressed    Chronic Complications/Reducing Risks:  Foot Care:Not addressed  Skin Care: Not addressed  Eye: Not addressed  ABC: Not addressed  Teeth:Not addressed    Healthy Coping and Problem Solving:  Problem Solving: Reviewed learning from experiences and discussed importance of analyzing trouble areas and ways to evaluate and improve from those situations   Healthy Coping: Discussed importance of healthy hobbies, reviewed support groups and benefits of activity.   Goal Setting and Problem Solving: Discussed  Barriers: Discussed  Psychosocial Adjustments:  Discussed      Time spent with the patient: 28 minutes via video call for diabetes education and counseling.   Previous Education: yes  Visit Type:DSMT  Hours Remaining: DSMT 1.5 and MNT 2      Thank you,  MARLY Parker RDN, [facility]   Certified Diabetes Care &   5/6/2020  Any diabetes medication dose changes were made via the CDE Protocol. A copy of this encounter was shared with the provider.

## 2021-07-20 NOTE — ED PROVIDER NOTES
At this point I agree with the current assessment done in the Emergency Department. I, Mynor Thomas DO, have reviewed the documentation, personally taken the patient's history, performed an exam and agree with the physical finds, diagnosis and management plan.     I am seeing this patient along with Chu Barillas MD. Vani Corona is a 39 y.o.female, with a history including type I diabetes, hypothyroidism, substance abuse, and depression who presents to the Emergency Department for the evaluation of chest pain.    Patient reports sudden onset of sharp chest pain that began this morning while she was laying in bed. She also notes mild shortness of breath. At time of interview, her chest pain and shortness of breath had largely resolved. Patient is a type I diabetic and states her sugars were elevated this morning to 400. She took 6.4 units of Novolog following sugar reading. Patient denies history of similar symptoms and states she does not get chest pain with DKA. Patient denies abdominal pain or any other complaints at this time.     The creation of this record is based on the scribe s observations of the work being performed by Mynor Thomas DO and the provider s statements to them. It was created on his behalf by Vani Hernandez, a trained medical scribe. This document has been checked and approved by the attending provider.    ROS:   Respiratory: Positive for shortness of breath   Cardiovascular: Positive for chest pain  GI: Negative for abdominal pain    Physical Exam:   /46 (Patient Position: Sitting)   Pulse (!) 57   Temp 97.4  F (36.3  C) (Temporal)   Resp 20   Wt 130 lb (59 kg)   LMP 11/18/2019   BMI 22.31 kg/m    General presentation: Alert, Vital signs reviewed. No acute distress. Not ill appearing. Fatigued appearing.   Pulmonary: Currently in no acute respiratory distress. Normal, non labored respirations, the lung sounds are normal with good equal air movement. Clear to  auscultation bilaterally.    Circulatory: Regular rate and rhythm. No murmurs, rubs, or gallops. Peripheral pulses are strong and equal in bilateral upper and lower extremities.   Abdominal: The abdomen is soft. Mild to moderate RUQ tenderness to palpation. No rigidity, guarding, or rebound. Bowel sounds normal.     Results for orders placed or performed during the hospital encounter of 12/18/19   Troponin I   Result Value Ref Range    Troponin I <0.01 0.00 - 0.29 ng/mL   HM2(CBC w/o Differential)   Result Value Ref Range    WBC 12.2 (H) 4.0 - 11.0 thou/uL    RBC 4.47 3.80 - 5.40 mill/uL    Hemoglobin 13.6 12.0 - 16.0 g/dL    Hematocrit 40.6 35.0 - 47.0 %    MCV 91 80 - 100 fL    MCH 30.4 27.0 - 34.0 pg    MCHC 33.5 32.0 - 36.0 g/dL    RDW 12.2 11.0 - 14.5 %    Platelets 334 140 - 440 thou/uL    MPV 10.8 8.5 - 12.5 fL   Lipase   Result Value Ref Range    Lipase 25 0 - 52 U/L   Urinalysis-UC if Indicated   Result Value Ref Range    Color, UA Yellow Colorless, Yellow, Straw, Light Yellow    Clarity, UA Clear Clear    Glucose, UA >1000 mg/dL (!!) Negative    Bilirubin, UA Negative Negative    Ketones, UA 80 mg/dL (!!) Negative    Specific Gravity, UA 1.024 1.001 - 1.030    Blood, UA Moderate (!) Negative    pH, UA 5.5 4.5 - 8.0    Protein, UA Negative Negative mg/dL    Urobilinogen, UA <2.0 E.U./dL <2.0 E.U./dL, 2.0 E.U./dL    Nitrite, UA Negative Negative    Leukocytes, UA Negative Negative    Bacteria, UA None Seen None Seen hpf    RBC, UA 0-2 None Seen, 0-2 hpf    WBC, UA 0-5 None Seen, 0-5 hpf    Squam Epithel, UA 25-50 (!) None Seen, 0-5 lpf   Blood Gases, Venous   Result Value Ref Range    pH, Venous 7.33 (L) 7.35 - 7.45    pCO2, Venous 36 35 - 50 mm Hg    pO2, Brenden 51 (H) 25 - 47 mm Hg    Base Excess, Venous -6.2 mmol/L    HCO3, Venous 19.8 (L) 24.0 - 30.0 mmol/L    Oxyhemoglobin 86.1 (H) 70.0 - 75.0 %    O2 Sat, Venous 88.7 (H) 70.0 - 75.0 %   Beta-hydroxybutyrate (BHOB)   Result Value Ref Range     Beta-Hydroxybutyrate (BHOB) 2.18 (H) <=0.3 mmol/L   Lactic Acid   Result Value Ref Range    Lactic Acid 3.1 (H) 0.5 - 2.2 mmol/L   Basic Metabolic Panel   Result Value Ref Range    Sodium 136 136 - 145 mmol/L    Potassium 4.5 3.5 - 5.0 mmol/L    Chloride 101 98 - 107 mmol/L    CO2 22 22 - 31 mmol/L    Anion Gap, Calculation 13 5 - 18 mmol/L    Glucose 465 (HH) 70 - 125 mg/dL    Calcium 9.4 8.5 - 10.5 mg/dL    BUN 15 8 - 22 mg/dL    Creatinine 1.09 0.60 - 1.10 mg/dL    GFR MDRD Af Amer >60 >60 mL/min/1.73m2    GFR MDRD Non Af Amer 56 (L) >60 mL/min/1.73m2   Hepatic Profile   Result Value Ref Range    Bilirubin, Total 1.0 0.0 - 1.0 mg/dL    Bilirubin, Direct 0.3 <=0.5 mg/dL    Protein, Total 7.1 6.0 - 8.0 g/dL    Albumin 4.1 3.5 - 5.0 g/dL    Alkaline Phosphatase 48 45 - 120 U/L    AST 17 0 - 40 U/L    ALT 19 0 - 45 U/L   D-dimer, Quantitative   Result Value Ref Range    D-Dimer, Quant <0.27 <=0.50 FEU ug/mL   Lactic Acid   Result Value Ref Range    Lactic Acid 2.0 0.5 - 2.2 mmol/L   POCT Glucose   Result Value Ref Range    Glucose 408 (H) 70 - 139 mg/dL   POCT pregnancy, urine   Result Value Ref Range    POC Preg, Urine Negative Negative    POCT Kit Lot Number [id number]     POCT Kit Expiration Date 2021-04-30     Pos Control Valid Control Valid Control    Neg Control Valid Control Valid Control   POCT Glucose   Result Value Ref Range    Glucose 336 (H) 70 - 139 mg/dL   POCT Glucose   Result Value Ref Range    Glucose 330 (H) 70 - 139 mg/dL   ECG 12 lead nursing unit performed   Result Value Ref Range    SYSTOLIC BLOOD PRESSURE      DIASTOLIC BLOOD PRESSURE      VENTRICULAR RATE 52 BPM    ATRIAL RATE 52 BPM    P-R INTERVAL 124 ms    QRS DURATION 108 ms    Q-T INTERVAL 486 ms    QTC CALCULATION (BEZET) 451 ms    P Axis 58 degrees    R AXIS 76 degrees    T AXIS 74 degrees    MUSE DIAGNOSIS       Sinus bradycardia  Otherwise normal ECG  When compared with ECG of 19-JUL-2017 15:54,  Nonspecific T wave abnormality no longer  evident in Inferior leads  Confirmed by SEE ED PROVIDER NOTE FOR, ECG INTERPRETATION ([id number]),  ASHWIN CERNA ([id number]) on 12/18/2019 8:38:25 AM       XR Chest 1 View   Final Result   shallow inspiration. Heart size and pulmonary vascularity normal. No focal pulmonary infiltrate.      US Abdomen Limited   Final Result   1.  Cholelithiasis with tenderness over the gallbladder, gallbladder wall thickening at upper limits of normal.   2.  Bile ducts normal in caliber.   3.  Hypoechoic appearing liver parenchyma nonspecific but does raise question of hepatitis.          MDM: 39-year-old female with a history of insulin-dependent diabetes mellitus presented to the ED for evaluation of sudden onset, sharp, right-sided chest pain that started earlier today.  In addition to this the patient also reported elevated blood sugars.  Upon arrival to the ED the patient was bradycardic but she was otherwise hemodynamically stable.  At the time of my examination she did not appear to be in any obvious distress or discomfort.  She stated that the chest pain had since subsided even prior to being given anything for pain here in the ED.  On exam the patient was noted to have mild tenderness palpation located in the right upper quadrant.  She did not have any evidence of an acute abdomen, however.  The remainder of her physical exam was unremarkable.  Following initial evaluation the patient was given a liter of normal saline.  She was also given a dose of IV Zofran.  Laboratory tests show an elevated white blood cell count of 12.2.  The CBC was otherwise unremarkable.  Patient's glucose was 408.  Additional labs including BMP and VBG did not indicate that the patient was in DKA.  She did have mild serum ketones, however.  The patient's d-dimer, troponin, and chest x-ray were unremarkable.  She did have a slightly elevated lactic acid level of 3.1.  UPT was negative.  UA does not show evidence of a urinary tract infection.  Because  of the persistent right upper quadrant abdominal pain ultrasound was obtained.  The ultrasound reports tenderness to palpation noted over the gallbladder during the ultrasound exam.  The gallbladder walls were also slightly thickened.  Given her physical exam findings, elevated white blood cell count, and ultrasound results there was a concern for possible cholecystitis.  Patient was given additional IV pain medications, IV fluids, and IV Zofran.  Her case was discussed with the on-call surgeon Dr. Leiva who evaluated the patient here in the ED.  The patient was admitted for further evaluation and treatment of the possible cholecystitis and hyperglycemia.    IMartha Jaremy Dale, DO, personally performed the services described in this documentation, as scribed by Vani Hernandez in my presence, and it is both accurate and complete.       Mynor Thomas DO  12/18/19 [id number]

## 2021-07-20 NOTE — TELEPHONE ENCOUNTER
Telephone Encounter by Mere Jo at 3/24/2021 11:23 AM     Author: Mere Jo Service: -- Author Type: --    Filed: 3/24/2021 11:27 AM Encounter Date: 3/24/2021 Status: Signed    : Mere Jo       Hello team!    Can you please submit a prior auth for the Tandem Tslim X2 basal pump, infusion sets and cartridges to [facility] through the patient's medical benefits?    Thank you!

## 2021-07-20 NOTE — TELEPHONE ENCOUNTER
Refill Approved    Rx renewed per Medication Renewal Policy. Medication was last renewed on 6/26/19.    Samantha Guerra, [facility] Triage/Med Refill 9/23/2019     Requested Prescriptions   Pending Prescriptions Disp Refills     SUMAtriptan (IMITREX) 25 MG tablet [Pharmacy Med Name: SUMATRIPTAN 25MG TABLETS] 18 tablet 0     Sig: TAKE 1 TABLET(25 MG) BY MOUTH EVERY 2 HOURS AS NEEDED FOR MIGRAINE       Triptans Refill Protocol Passed - 9/23/2019  3:18 AM        Passed - PCP or prescribing provider visit in past 12 months       Last office visit with prescriber/PCP: 4/2/2019 Denia Llamas FNP OR same dept: 4/2/2019 Denia Llamas FNP OR same specialty: 4/2/2019 Denia Llamas FNP  Last physical: Visit date not found Last MTM visit: Visit date not found   Next visit within 3 mo: Visit date not found  Next physical within 3 mo: Visit date not found  Prescriber OR PCP: MOMO Nguyen  Last diagnosis associated with med order: 1. Cluster headache syndrome, intractable  - SUMAtriptan (IMITREX) 25 MG tablet [Pharmacy Med Name: SUMATRIPTAN 25MG TABLETS]; TAKE 1 TABLET(25 MG) BY MOUTH EVERY 2 HOURS AS NEEDED FOR MIGRAINE  Dispense: 18 tablet; Refill: 0    If protocol passes may refill for 12 months if within 3 months of last provider visit (or a total of 15 months).

## 2021-07-20 NOTE — TELEPHONE ENCOUNTER
Telephone Encounter by Hanna Mccauley at 3/29/2019  3:10 PM     Author: Hanna Mccauley Service: -- Author Type: --    Filed: 3/29/2019  3:10 PM Encounter Date: 3/27/2019 Status: Signed    : Hanna Mccauley APPROVED:    Approval start date: 3/25/2019  Approval end date: 3/25/2020    Pharmacy has been notified of approval and will contact patient when medication is ready for pickup.

## 2021-07-20 NOTE — ADDENDUM NOTE
Addendum Note by Liz Song RN at 12/23/2020 11:58 AM     Author: Liz Song RN Service: -- Author Type: Registered Nurse    Filed: 12/23/2020 11:58 AM Encounter Date: 12/17/2020 Status: Signed    : Liz Song RN (Registered Nurse)    Addended by: LIZ SONG on: 12/23/2020 11:58 AM        Modules accepted: Orders

## 2021-07-20 NOTE — PROGRESS NOTES
Clinic Care Coordination Contact  [facility]/Voicemail       Clinical Data: Care Coordinator Outreach  Outreach attempted x 1.  Left message on patient's voicemail with call back information and requested return call.  Plan:  Care Coordinator will try to reach patient again in 10 business days.      Note:  Connected with billing today for the status of FAA. I was advised they didn't receive the application to process. I have refaxed this over today. Shirley reviewed the application. They are requesting 401K statement balance with her year to date. Left detailed message today regarding this for patient.

## 2021-07-20 NOTE — PROGRESS NOTES
Clinic Care Coordination Contact     Situation: Pt chart reviewed by  care coordinator.     Background:   - Most recent  assessment completed on 12/31/19.  - Pt was enrolled due to concerns surrounding insurance coverage and cost of medications.   - Pt initially expressed interest in meeting with RN to discuss medications and diabetes management, however, she no showed these appointments when scheduled.   - CHW assisted pt in coordinating with billing and completing financial aid/[employer] applications. Approved for 50% on 4/16/20.  - Pt had a virtual visit with PCP on 4/29/20 and a virtual visit with a diabetic educator on 5/6/20. She is scheduled to follow up with endocrinology on 8/6/20 at 8am.  - Last outreach by CHW on 5/13/20. During this conversation, no new goals or concerns were identified. Pt declined scheduling a visit with CCC RN.     Assessment: All previous goals completed. Enrollment status adjusted.      Plan/Recommendations: Pt will be transitioned to CCC maintenance at this time.

## 2021-07-20 NOTE — PROGRESS NOTES
"Vani Corona is a 40 y.o. female who is being evaluated via a billable video visit.      The patient has been notified of following:     \"This video visit will be conducted via a call between you and your physician/provider. We have found that certain health care needs can be provided without the need for an in-person physical exam.  This service lets us provide the care you need with a video conversation.  If a prescription is necessary we can send it directly to your pharmacy.  If lab work is needed we can place an order for that and you can then stop by our lab to have the test done at a later time.    Video visits are billed at different rates depending on your insurance coverage. Please reach out to your insurance provider with any questions.    If during the course of the call the physician/provider feels a video visit is not appropriate, you will not be charged for this service.\"    Patient has given verbal consent to a Video visit? Yes  How would you like to obtain your AVS? AVS Preference: MyChart.  If dropped by the video visit, the video invitation should be sent to: Text to cell phone: [phone]  Will anyone else be joining your video visit? No        Additional provider notes:       Reason for visit      1. Type 1 diabetes mellitus with hyperglycemia (H)        HPI     Vani Corona is a very pleasant 40 y.o. old female who presents for follow up.  SUMMARY:    Vani is contacted today in follow-up for DM 1. We attempted a Video Visit, but were unable to successfully connect. Her current A1c is 9.1 and significantly higher than her last at 7.7. She is currently without her insulin pump and that is reflective in her provided BG.  She reports as well, that she has had difficulties getting insulin. She just got Lantus recently and is back on it at 26 units. She is taking Novolog with meals.  She reports that she lost her job in October.  There is lots of stress in her life. She has been " symptomatic with night sweats and headaches 2/2 higher BG.         Blood glucose data:   11/11  244  152  375  11/12  319  254  11/13  286  11/14  394  413  11/15  321  420  11/16  382  329  11/17  259  303  247  11/18  273  400  68      Past Medical History     Patient Active Problem List   Diagnosis     Restless Legs Syndrome     Selective IgA Deficiency     Major depressive disorder, recurrent episode, moderate (H)     Hypothyroidism, unspecified type     Type 1 diabetes mellitus (H)     Arthralgias In Multiple Sites     Eczema, pustular     Low back pain     Sciatica of left side     Anxiety     Depression      TMJ inflammation - bilateral     Insomnia      Non-rheumatic mitral regurgitation     Polyarthralgia     GERD (gastroesophageal reflux disease) H2 blocker not effective      Inflammatory polyarthritis (H)     High risk medication use     Right hand pain     Tobacco abuse     Insulin pump status     Family history of psoriasis in mother and sister     Parent-child relational problem     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    Social History      reports that she quit smoking about 3 years ago. Her smoking use included cigarettes. She smoked 0.50 packs per day. She has never used smokeless tobacco. She reports that she does not drink alcohol or use drugs.      Review of Systems     Patient has no polyuria or polydipsia, no chest pain, dyspnea or TIA's, no numbness, tingling or pain in extremities  Remainder negative except as noted in HPI.    Vital Signs     There were no vitals taken for this visit.  Wt Readings from Last 3 Encounters:   02/04/20 118 lb 11.2 oz (53.8 kg)   12/24/19 120 lb (54.4 kg)    12/18/19 117 lb 9 oz (53.3 kg)             Assessment     1. Type 1 diabetes mellitus with hyperglycemia (H)        Plan     We will try and get a PA for an insulin pump. She is also awaiting a PA for the Synthroid.  She is going to try splitting her Lantus dose to see if this helps - 13 units two times a day. We will f/u together in 3 months.          Lab Results     Hemoglobin A1c   Date Value Ref Range Status   11/18/2020 9.1 (H) <=5.6 % Final     Comment:     Normal <5.7% Prediabete 5.7-6.4% Diabletes 6.5% or higher - adopted from ADA consensus guidelines   01/29/2020 7.7 (H) 3.5 - 6.0 % Final   12/18/2019 8.2 (H) 4.2 - 6.1 % Final   07/23/2019 7.8 (H) 3.5 - 6.0 % Final   03/05/2019 7.5 (H) 3.5 - 6.0 % Final     Creatinine   Date Value Ref Range Status   11/18/2020 0.82 0.60 - 1.10 mg/dL Final   01/29/2020 0.68 0.60 - 1.10 mg/dL Final   12/19/2019 0.71 0.60 - 1.10 mg/dL Final     Microalbumin, Random Urine   Date Value Ref Range Status   01/29/2020 0.57 0.00 - 1.99 mg/dL Final       Cholesterol   Date Value Ref Range Status   02/15/2016 174 <=199 mg/dL Final     HDL Cholesterol   Date Value Ref Range Status   02/15/2016 45 (L) >=50 mg/dL Final     LDL Calculated   Date Value Ref Range Status   02/15/2016 105 <=129 mg/dL Final     Triglycerides   Date Value Ref Range Status   02/15/2016 122 <=149 mg/dL Final       Lab Results   Component Value Date    ALT 14 12/19/2019    AST 14 12/19/2019    ALKPHOS 32 (L) 12/19/2019    BILITOT 0.8 12/19/2019         Current Medications     Outpatient Medications Prior to Visit   Medication Sig Dispense Refill     albuterol (PROAIR HFA;PROVENTIL HFA;VENTOLIN HFA) 90 mcg/actuation inhaler INHALE 2 PUFFS BY MOUTH EVERY 6 HOURS AS NEEDED FOR WHEEZING 18 g 0     aspirin 81 MG EC tablet Take 81 mg by mouth every evening.        baclofen (LIORESAL) 10 MG tablet Take 10 mg by mouth 3 (three) times a day as needed.       blood glucose meter (GLUCOMETER) Use 1 each As Directed as  needed. Dispense glucometer brand per patient's insurance at pharmacy discretion. 1 each 0     blood glucose test strips Use 1 each As Directed as needed (4-6 times daily). Dispense brand per patient's insurance at pharmacy discretion. 400 strip 1     blood-glucose meter,continuous (DEXCOM G6 ) Misc Use 1 each As Directed continuous. 1 each 0     blood-glucose sensor (DEXCOM G6 SENSOR) Eveline Apply one sensor to the skin every 10 days. 9 Device 1     blood-glucose transmitter (DEXCOM G6 TRANSMITTER) Eveline Use 1 each As Directed every 3 (three) months. 1 Device 1     buPROPion (WELLBUTRIN XL) 150 MG 24 hr tablet TAKE 1 TABLET BY MOUTH EVERY DAY 90 tablet 1     cetirizine (ZYRTEC) 10 MG tablet TAKE 1 TABLET(10 MG) BY MOUTH EVERY EVENING 90 tablet 0     cholecalciferol, vitamin D3, 1,000 unit tablet Take 1,000 Units by mouth every evening.        clobetasol (TEMOVATE) 0.05 % cream Apply 1 application topically 2 (two) times a day as needed. 45 g 1     CONTOUR NEXT TEST STRIPS strips TEST BLOOD SUGAR 6 TIMES DAILY 400 strip 2     escitalopram oxalate (LEXAPRO) 10 MG tablet TAKE 1 TABLET BY MOUTH EVERY DAY 90 tablet 1     glucagon, human recombinant, (GLUCAGON) 1 mg injection Inject 1 mg into the shoulder, thigh, or buttocks once as needed. 2 each 0     insulin aspart U-100 (NOVOLOG FLEXPEN U-100 INSULIN) 100 unit/mL (3 mL) injection pen Inject subcutaneously CHO ratio 1:10 - 1:15 + sliding scale 1:50>150 up to 40 units daily. 35 mL 1     insulin glargine (BASAGLAR KWIKPEN) 100 unit/mL (3 mL) pen Inject 24 Units under the skin daily. 21 mL 0     insulin syringe-needle U-100 (BD INSULIN SYRINGE ULT-FINE II) 0.3 mL 31 gauge x 5/16 Syrg Use as needed with insulin 50 each 5     ketoconazole (NIZORAL) 2 % cream Apply topically 2 (two) times a day as needed.       nicotine polacrilex (COMMIT) 2 MG lozenge Apply 1 lozenge (2 mg total) to the mouth or throat as needed for smoking cessation. 24 each 11     pantoprazole  "(PROTONIX) 40 MG tablet Take 1 tablet (40 mg total) by mouth every evening. 90 tablet 3     pen needle, diabetic (BD ULTRA-FINE ADALBERTO PEN NEEDLE) 32 gauge x 5/32\" Ndle USE 7 TIMES DAILY 400 each 1     SUMAtriptan (IMITREX) 25 MG tablet TAKE 1 TABLET(25 MG) BY MOUTH EVERY 2 HOURS AS NEEDED FOR MIGRAINE 18 tablet 6     SYNTHROID 125 mcg tablet Take 1 tablet (125 mcg total) by mouth daily. Due for lab work 90 tablet 0     ACCU-CHEK FASTCLIX LANCET DRUM USE TO TEST BLOOD SUGAR 4 6 TIMES DAILY       clomiPHENE (CLOMID) 50 mg tablet TAKE 2 TABLETS (100MG) BY ORAL ROUTE ONCE DAILY FOR 5 DAYS. STARTING ON CYCLE DAY 3       fluconazole (DIFLUCAN) 150 MG tablet TAKE 1 TABLET BY MOUTH EVERY DAY FOR 3 DAYS       HYDROcodone-acetaminophen 5-325 mg per tablet TAKE 1 TAB BY MOUTH EVERY 6 HOURS AS NEEDED       insulin admin supplies (INPEN, FOR NOVOLOG,) InPn Inject 4 Units under the skin 3 (three) times a day before meals. 5 each 1     insulin aspart U-100 (NOVOLOG U-100 INSULIN ASPART) 100 unit/mL injection Inject 40 Units under the skin daily. Via the pump (Patient taking differently: Inject under the skin daily. Via the pump- up to 40 units daily) 36 mL PRN     No facility-administered medications prior to visit.        Phone call duration: 19 minutes    Robbie YORK    "

## 2021-07-20 NOTE — TELEPHONE ENCOUNTER
No help needed - just make a conversion from pump to basal and prandial dosage per her insulin pump settings

## 2021-07-20 NOTE — TELEPHONE ENCOUNTER
Patient was on Erica's schedule for a Pump start, but the appointment was cancelled due to furlough.    She has the new G6 pump and needs assistance with setting up the pump. She has been on a pump before and will just need guidance on setting up the pump over the phone.    Please advise on what to do since Erica will not be available for the next 2 months.    Vani @ [phone]

## 2021-07-20 NOTE — TELEPHONE ENCOUNTER
"RN Triage:    Spoke with 39 yr old Vani who c/o elevated blood sugar.    Pt states she forgot to take her Lantus last night at 9:30 pm.    Pt didn't check her BS last night.    BS this morning is 399.    Pt states she feels \"icky\" but denies feeling weak, dehydrated and has not been vomiting.  Denies rapid breathing.    PLAN:  Advised pt to drink extra fluids.  Advised pt to take her morning sliding scale insulin as ordered.  Will consult with PCP regarding missed Lantus insulin and instruction for ongoing insulin today, please advise.  Advised pt to call back if symptoms are worsening.    Blanca Herrera RN   [facility] RN Triage      Reason for Disposition    Blood glucose > 240 mg/dl (13 mmol/l)    Protocols used: DIABETES - HIGH BLOOD SUGAR-A-OH      "

## 2021-07-20 NOTE — PROGRESS NOTES
Internal Medicine Office Visit  [facility]  Patient Name: Vani Corona  Patient Age: 38 y.o.  YOB: 1980  MRN: [id number]    Date of Visit: 2019  Reason for Office Visit:   Chief Complaint   Patient presents with     Hospital Visit Follow Up     doing better     Rash     on neck           Assessment / Plan / Medical Decision Makin. Hospital discharge follow-up  2. Influenza A  - Reviewed inpatient records. Symptoms have resolved  - Recommend annual influenza vaccine     3. Seborrheic dermatitis  - triamcinolone (KENALOG) 0.1 % ointment; Apply topically 2 (two) times a day for 10 days.  Dispense: 30 g; Refill: 0    4. Tobacco abuse  - We reviewed potential risks/side effects of Chantix. She is to stop the medication if she experiences mood changes or suicidal thoughts. Seek emergent care if needed.   - varenicline (CHANTIX STARTING MONTH BOX) 0.5 mg (11)- 1 mg (42) tablet; 1 wk before you stop smoking take 0.5mg daily on days 1-3, 0.5mg 2 times each day on days 4-7, then 1mg 2 times daily.  Dispense: 53 tablet; Refill: 0  - varenicline (CHANTIX CONTINUING MONTH BOX) 1 mg tablet; Take 1 tablet (1 mg total) by mouth 2 (two) times a day.  Dispense: 60 tablet; Refill: 1        Health Maintenance Review  Health Maintenance   Topic Date Due     PAP SMEAR  10/22/2009     DEPRESSION FOLLOW UP  2019     DIABETES FOLLOW-UP  2019     DIABETES FOOT EXAM  2019     DIABETES URINE MICROALBUMIN  2019     DIABETES HEMOGLOBIN A1C  2019     DIABETES OPHTHALMOLOGY EXAM  2020     ADVANCE DIRECTIVES DISCUSSED WITH PATIENT  2023     TD 18+ HE  2025     INFLUENZA VACCINE RULE BASED  Completed     TDAP ADULT ONE TIME DOSE  Completed         I am having Vani Corona start on triamcinolone, varenicline, and varenicline. I am also having her maintain her levonorgestrel, cholecalciferol (vitamin D3), glucagon, baclofen, insulin  pump cartridge, SUMAtriptan, insulin syringe-needle U-100, infusion set for insulin pump, insulin pump syringe, albuterol, clobetasol, buPROPion, CONTOUR NEXT TEST STRIPS, ketoconazole, HYDROcodone-acetaminophen, aspirin, escitalopram oxalate, ADMELOG U-100 INSULIN LISPRO, pantoprazole, ondansetron, SYNTHROID, and cetirizine.      HPI:  Vani Corona is a 38 y.o. year old who presents to the office today for follow-up of her recent hospitalization.  She was seen in the emergency department on 3/27 with a complaint of nausea and body aches.  She was diagnosed with influenza A.  She also complained of a headache and a head CT was done, there was no acute process on imaging.  She is a type I diabetic and thus was treated with Tamiflu. She finished the medication yesterday, feels much better. She worked 10 hours yesterday. No headaches, appetite is normal.    Reviewed smoking.  She is interested in trying Chantix again.  She tried this in 2015 approximately and it reportedly made her feel crazy, however she states that she was going through bad breakup at the time and recently her mental health has been very stable.  She would really like to try this once again.      He has a itchy rash on the back of her neck.  She has had this for about 2 weeks.  It has been improving recently.  She has been using an over-the-counter cortisone cream.    Review of Systems- pertinent positive in bold:  A 10-point ROS is negative except as stated in HPI         Current Scheduled Meds:  Outpatient Encounter Medications as of 4/2/2019   Medication Sig Dispense Refill     ADMELOG U-100 INSULIN LISPRO 100 unit/mL injection INJECT UP  UNITS D VIA INSULIN PUMP 108 mL 0     albuterol (VENTOLIN HFA) 90 mcg/actuation inhaler Inhale 2 puffs every 6 (six) hours as needed for wheezing. 1 Inhaler 2     aspirin 81 MG EC tablet Take 81 mg by mouth.       baclofen (LIORESAL) 10 MG tablet Take 10 mg by mouth 3 (three) times a day as needed.        buPROPion (WELLBUTRIN XL) 150 MG 24 hr tablet TAKE 1 TABLET(150 MG) BY MOUTH EVERY MORNING 90 tablet 2     cetirizine (ZYRTEC) 10 MG tablet TAKE 1 TABLET(10 MG) BY MOUTH EVERY EVENING 90 tablet 1     cholecalciferol, vitamin D3, 1,000 unit tablet Take 1,000 Units by mouth every evening.        clobetasol (TEMOVATE) 0.05 % cream Apply 1 application topically 2 (two) times a day as needed. 45 g 1     CONTOUR NEXT TEST STRIPS strips TEST BLOOD SUGAR 6 TIMES DAILY 200 strip 11     escitalopram oxalate (LEXAPRO) 10 MG tablet TAKE 1 TABLET(10 MG) BY MOUTH DAILY 90 tablet 3     GLUCAGON 1 mg injection INJECT WHOLE DOSE IF LOW BLOOD SUGAR & BLOOD SUGAR NOT COMING UP AFTER TREATING 2X WITH FOOD. 2 each 1     HYDROcodone-acetaminophen (NORCO) 5-325 mg per tablet Take 1 tablet by mouth every 4 (four) hours as needed for pain. 20 tablet 0     infusion set for insulin pump (MINIMED INFUSION SET) ISet Use every 3 days with pump 40 each 3     insulin pump cartridge Inject under the skin continuous. Insulin pump discharge instructions from 07/20/17.  This is intended as additional information to the patient's PTA insulin pump order.  Continue with insulin pump at present settings. 1 each 0     insulin pump syringe (PARADIGM RESERVOIR) 3 mL Misc Use every 3 days with set 40 each 3     insulin syringe-needle U-100 (BD INSULIN SYRINGE ULT-FINE II) 0.3 mL 31 gauge x 5/16 Syrg Use as needed with insulin 50 each 5     ketoconazole (NIZORAL) 2 % cream applly three times a day for two weeks 15 g 0     levonorgestrel (MIRENA) 20 mcg/24 hr (5 years) IUD 1 each by Intrauterine route once. Placed 8/2015       ondansetron (ZOFRAN) 4 MG tablet Take 1 tablet (4 mg total) by mouth every 12 (twelve) hours as needed for nausea. 10 tablet 0     pantoprazole (PROTONIX) 40 MG tablet Take 1 tablet (40 mg total) by mouth daily. 90 tablet 1     SUMAtriptan (IMITREX) 25 MG tablet TAKE 1 TABLET(25 MG) BY MOUTH EVERY 2 HOURS AS NEEDED FOR MIGRAINE 18 tablet  5     SYNTHROID 125 mcg tablet TAKE 1 TABLET BY MOUTH EVERY DAY AT 6AM 90 tablet 1     [] oseltamivir (TAMIFLU) 75 MG capsule Take 1 capsule (75 mg total) by mouth every 12 (twelve) hours for 5 days. 10 capsule 0     triamcinolone (KENALOG) 0.1 % ointment Apply topically 2 (two) times a day for 10 days. 30 g 0     varenicline (CHANTIX CONTINUING MONTH BOX) 1 mg tablet Take 1 tablet (1 mg total) by mouth 2 (two) times a day. 60 tablet 1     varenicline (CHANTIX STARTING MONTH BOX) 0.5 mg (11)- 1 mg (42) tablet 1 wk before you stop smoking take 0.5mg daily on days 1-3, 0.5mg 2 times each day on days 4-7, then 1mg 2 times daily. 53 tablet 0     No facility-administered encounter medications on file as of 2019.      Past Medical History:   Diagnosis Date     Asthma      Chest pain      Depression      Diabetes (H)      Fainting      Heart disease     MVP     Methamphetamine abuse (H)      Methamphetamine abuse in remission, in remission since treatment in 2014 4/10/2015    Treatment 2014. Clean since.      Rheumatoid arthritis (H)      Substance abuse (H)     methamphetamine     Thyroid disease      Past Surgical History:   Procedure Laterality Date     CERVICAL BIOPSY  W/ LOOP ELECTRODE EXCISION        SECTION, CLASSIC       colposcopy      LEAP     HERNIA REPAIR       [facility]  DELIVERY ONLY      Description:  Section;  Recorded: 2010;  Comments: 09 - breech     [facility] REPAIR UMBILICAL RUTH ANN,<6Y/O,REDUC      Description: Umbilical Hernia Repair;  Recorded: 10/03/2014;     [facility] REVISE MEDIAN N/CARPAL TUNNEL SURG      Description: Neuroplasty Decompression Median Nerve At Carpal Tunnel;  Recorded: 10/03/2014;     Social History     Tobacco Use     Smoking status: Former Smoker     Packs/day: 0.50     Types: Cigarettes     Last attempt to quit: 2017     Years since quittin.7     Smokeless tobacco: Never Used     Tobacco comment: She had stopped for a month as of  3/16/16, but now is back on 1/2 PPD.  11/7/16   Substance Use Topics     Alcohol use: No     Drug use: No     Types: Methamphetamines     Comment: Off 2 years, 1 month and 19 days as of 3/16/16       Objective / Physical Examination:  Vitals:    04/02/19 0843   BP: 120/62   Pulse: 74   Temp: 97.6  F (36.4  C)   TempSrc: Oral   Weight: 124 lb (56.2 kg)     Wt Readings from Last 3 Encounters:   04/02/19 124 lb (56.2 kg)   03/27/19 130 lb (59 kg)   03/12/19 123 lb 8 oz (56 kg)     Body mass index is 21.28 kg/m .     Study Result     EXAM: CT HEAD WO CONTRAST  LOCATION: [facility]  DATE/TIME: 3/27/2019 9:39 PM     INDICATION: Headache, new, meningitis or encephalitis suspected New headache  COMPARISON: None.  TECHNIQUE: Routine without IV contrast. Multiplanar reformats. Dose reduction techniques were used.     FINDINGS:  INTRACRANIAL CONTENTS: No intracranial hemorrhage, extraaxial collection, or mass effect.  No CT evidence of acute infarct. Normal parenchymal density for age. The ventricles and sulci are normal for age.      VISUALIZED ORBITS/SINUSES/MASTOIDS: No significant orbital abnormality. No significant paranasal sinus mucosal disease. No significant middle ear or mastoid effusion.     OSSEOUS STRUCTURES/SOFT TISSUES: No significant abnormality.     IMPRESSION:   CONCLUSION:  1.  No CT finding of a mass, acute infarct or hemorrhage.       General Appearance: Alert and oriented, cooperative, affect appropriate, speech clear, in no apparent distress  Lungs: Clear to auscultation bilaterally. Normal inspiratory and expiratory effort  Cardiovascular: Regular rate, normal S1, S2. No murmurs, rubs, or gallops  Integumentary: flesh colored, papular rash at the base of the neck    No orders of the defined types were placed in this encounter.  Followup: Return in about 6 months (around 10/2/2019) for Next scheduled follow up. earlier if needed.      Denia Llamas, CNP

## 2021-07-20 NOTE — TELEPHONE ENCOUNTER
Refill Approved    Rx renewed per Medication Renewal Policy. Medication was last renewed on 4/29/20.    Alirio Rothman, [facility] Connection Triage/Med Refill 5/6/2021     Requested Prescriptions   Pending Prescriptions Disp Refills     pantoprazole (PROTONIX) 40 MG tablet [Pharmacy Med Name: PANTOPRAZOLE SODIUM 40MG TBEC] 90 tablet 2     Sig: TAKE ONE TABLET BY MOUTH EVERY EVENING       GI Medications Refill Protocol Passed - 5/5/2021  9:43 AM        Passed - PCP or prescribing provider visit in last 12 or next 3 months.     Last office visit with prescriber/PCP: 4/29/2021 Denia Llamas FNP OR same dept: 4/29/2021 Denia Llamas FNP OR same specialty: 4/29/2021 Denia Llamas FNP  Last physical: Visit date not found Last MTM visit: Visit date not found   Next visit within 3 mo: Visit date not found  Next physical within 3 mo: Visit date not found  Prescriber OR PCP: MOMO Nguyen  Last diagnosis associated with med order: 1. Gastroesophageal reflux disease  - pantoprazole (PROTONIX) 40 MG tablet [Pharmacy Med Name: PANTOPRAZOLE SODIUM 40MG TBEC]; TAKE ONE TABLET BY MOUTH EVERY EVENING  Dispense: 90 tablet; Refill: 2    If protocol passes may refill for 12 months if within 3 months of last provider visit (or a total of 15 months).

## 2021-07-20 NOTE — PROGRESS NOTES
Clinic Care Coordination Contact    Community Health Worker Follow Up    Goals:   Goals Addressed                 This Visit's Progress       General      COMPLETED: Financial Wellbeing (pt-stated)   On track     I would like to meet with FRW for assistance completing [facility] application and exploring [place] and/or [place] Care for myself and my children within the next 30 days    Personal Action Step  I have been approved for 50% for Financial Assistance's for my medical bills.   I will need to reapply every 6 months      Discussed/updated:5/13/2020        Medication 1 (pt-stated)   On Hold     I would like to meet with [facility] RN to discuss medications and diabetes management within the next 30 days    Action steps to achieve this goal  1.  I will let Angélica know if I would like to schedule a RN visits to discuss Medications.   2.  I will bring all of my medications to my scheduled appointment with [facility] RN, if I schedule an appointment.         Date goal set:  2/24/2020  Discussed/updated: 5/13/2020            CHW Follow-up:   Vani is doing good. 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 She stated she did get a bill but she doesn't have the money to pay it. We talked about that she doesn't need to pay in full and even small payments will help, even it is $20. She felt relief and said she maybe able to make a payment Friday.       I contacted FAA department today as I couldn't see patients application status. Marcelina stated, she was Approved for 50% She was approved on 4/16/20 it covered DOS 12/18/19-10/16/20    I asked her if she would like to schedule an appointment with the  RN to review medications. She stated she was good and that Denia and her just reviewed them.       CHW Plan:   CHW will follow up in 1 month (6/15/2020)  If patient no longer wants to meet with RN and no other goals, discuss maintanance

## 2021-07-20 NOTE — PROGRESS NOTES
Clinic Care Coordination Contact     Situation: Pt chart reviewed by SW care coordinator.     Background:   - Most recent SW assessment completed on 12/31/19.  - Moved to maintenance on 5/28/20. See SW note from this date for further detail regarding course of [facility] enrollment.  - Last outreach by CHW on 7/14/20. During this conversation, no new goals or concerns were identified. Pt will need to contact the HE/ billing department directly to initiate future payment plans and/or resolve outstanding medical bills. She is aware of this and capable of following up accordingly.     Assessment: All previous goals completed. Episode resolved. Enrollment status adjusted. CCC team members removed from Care Team.     Plan/Recommendations:   1.) Pt will be graduated from [facility] at this time.

## 2021-07-20 NOTE — ADDENDUM NOTE
Addendum Note by Liz Song RN at 9/23/2020 12:29 PM     Author: Liz Song RN Service: -- Author Type: Registered Nurse    Filed: 9/23/2020 12:29 PM Encounter Date: 9/17/2020 Status: Signed    : Liz Song RN (Registered Nurse)    Addended by: LIZ SONG on: 9/23/2020 12:29 PM        Modules accepted: Orders

## 2021-07-20 NOTE — TELEPHONE ENCOUNTER
Debbi - there is no Rx for Tandem pump.   I know you seen pt last week.   We can't just start a PA w/o a prescription.   Did you want me to send in the Rx for Tandem or did you somehow do it already?

## 2021-07-20 NOTE — PROGRESS NOTES
"ASSESSMENT AND PLAN:  Vani Corona 38 y.o. female is a for follow-up of inflammatory polyarthritis, undifferentiated, seronegative.  She is doing great.  She did have an acute onset of pain in the left neck recently.  She is planning pregnancy.  She is going to discontinue methotrexate.  She will stop folic acid.  She is aware that it would be better for her to have at least 6 months of hiatus between stopping methotrexate and beginning the work for conception.  She used to take hydroxychloroquine tolerated it well she would like to go back on that.  With that her family history of psoriasis 1 with psoriatic arthritis under consideration as a characterization of her inflammatory arthropathy.  We will meet here in 3 months.        Diagnoses and all orders for this visit:    Inflammatory polyarthritis (H)  -     hydroxychloroquine (PLAQUENIL) 200 mg tablet; Take 1 tablet (200 mg total) by mouth 2 (two) times a day.  Dispense: 60 tablet; Refill: 6    High risk medication use    Arthralgias In Multiple Sites    Family history of psoriasis in mother and sister      HISTORY OF PRESENTING ILLNESS:  Vani Corona, 38 y.o., female returns for follow-up.  She has seronegative undifferentiated inflammatory arthropathy.  She noted family history of psoriasis mother and sister.  That brings up the question of psoriatic arthritis in this patient.  She is planning pregnancy.  She is going to discontinue methotrexate and folic acid.  She noted pain level of 10/10 but that pertains to the neck pain that came on this past Monday.  She does not have involvement of any of the appendicular joints.  She noted pain level to be 0 to \"mild.  She recalls that when she was not on methotrexate her hands were so much painful that she had difficulty performing her day-to-day activities.  She noted discomfort in the right hand wrist and elbow.  At this she feels is subsequent to her recent unaccustomed activity.  She is able do all her " day-to-day activities without difficulty.  She rated her pain at 2.0/10.  She noted morning stiffness is nonexistent.  She noted no fever weight loss blurry vision eye redness mouth also nausea or rash.  She noted cough but has quit smoking 2 months ago.  In the MDHAQ she inadvertently marked these questions in the opposite manner.She has diabetes she is on insulin pump.   There is no history of Raynauds, psoriasis herself or the family, ulcerative colitis or Crohn's disease.  She had her labs drawn recently.  Further historical information and ADL limitations as noted in the multidimensional health assessment questionnaire attached in the EMR.      ALLERGIES:Augmentin [amoxicillin-pot clavulanate]; Chantix [varenicline]; and Venlafaxine    PAST MEDICAL/ACTIVE PROBLEMS/MEDICATION/ FAMILY HISTORY/SOCIAL DATA:  The patient has a family history of  Past Medical History:   Diagnosis Date     Asthma      Chest pain      Depression      Diabetes (H)      Fainting      Heart disease     MVP     Methamphetamine abuse (H)      Methamphetamine abuse in remission, in remission since treatment in 2014 4/10/2015    Treatment 2014. Clean since.      Rheumatoid arthritis (H)      Substance abuse (H)     methamphetamine     Thyroid disease      Social History     Tobacco Use   Smoking Status Former Smoker     Packs/day: 0.50     Types: Cigarettes     Last attempt to quit: 2017     Years since quittin.3   Smokeless Tobacco Never Used   Tobacco Comment    She had stopped for a month as of 3/16/16, but now is back on 1/2 PPD.  16     Patient Active Problem List   Diagnosis     Restless Legs Syndrome     Mitral prolapse with 2 or more clicks     Selective IgA Deficiency     Moderate episode of recurrent major depressive disorder (H)     Hypothyroidism, unspecified type     Type 1 diabetes mellitus (H)     Arthralgias In Multiple Sites     Eczema, pustular     Low back pain     Sciatica of left side     Anxiety      Depression      TMJ inflammation - bilateral     Insomnia      Non-rheumatic mitral regurgitation     Polyarthralgia     GERD (gastroesophageal reflux disease) H2 blocker not effective      Inflammatory polyarthritis (H)     High risk medication use     Right hand pain     Tobacco abuse     Insulin pump status     Current Outpatient Medications   Medication Sig Dispense Refill     ADMELOG U-100 INSULIN LISPRO 100 unit/mL injection INJECT UP TO 40 UNITS D VIA INSULIN PUMP  1     albuterol (VENTOLIN HFA) 90 mcg/actuation inhaler Inhale 2 puffs every 6 (six) hours as needed for wheezing. 1 Inhaler 2     baclofen (LIORESAL) 10 MG tablet Take 10 mg by mouth 3 (three) times a day as needed.       blood glucose meter (GLUCOMETER) Use 1 each As Directed as needed. Dispense glucometer brand per patient's insurance at pharmacy discretion. 1 each 0     buPROPion (WELLBUTRIN XL) 150 MG 24 hr tablet TAKE 1 TABLET(150 MG) BY MOUTH EVERY MORNING 90 tablet 2     cetirizine (ZYRTEC) 10 MG tablet Take 1 tablet (10 mg total) by mouth every evening. 90 tablet 1     cholecalciferol, vitamin D3, 1,000 unit tablet Take 1,000 Units by mouth every evening.        clobetasol (TEMOVATE) 0.05 % cream Apply 1 application topically 2 (two) times a day as needed. 45 g 1     CONTOUR NEXT TEST STRIPS strips TEST BLOOD SUGAR 6 TIMES DAILY 200 strip 11     diabetic supplies, miscellan. Misc Insert sensor weekly 40 each 3     escitalopram oxalate (LEXAPRO) 10 MG tablet Take 1 tablet (10 mg total) by mouth daily. 30 tablet 2     folic acid (FOLVITE) 1 MG tablet   0     GLUCAGON 1 mg injection INJECT WHOLE DOSE IF LOW BLOOD SUGAR & BLOOD SUGAR NOT COMING UP AFTER TREATING 2X WITH FOOD. 2 each 1     HYDROcodone-acetaminophen (NORCO) 5-325 mg per tablet Take 1 tablet by mouth every 4 (four) hours as needed for pain. 20 tablet 0     infusion set for insulin pump (MINIMED INFUSION SET) ISet Use every 3 days with pump 40 each 3     insulin pump cartridge  Inject under the skin continuous. Insulin pump discharge instructions from 07/20/17.  This is intended as additional information to the patient's PTA insulin pump order.  Continue with insulin pump at present settings. 1 each 0     insulin pump syringe (PARADIGM RESERVOIR) 3 mL Misc Use every 3 days with set 40 each 3     insulin syringe-needle U-100 (BD INSULIN SYRINGE ULT-FINE II) 0.3 mL 31 gauge x 5/16 Syrg Use as needed with insulin 50 each 5     ketoconazole (NIZORAL) 2 % cream applly three times a day for two weeks 15 g 0     levonorgestrel (MIRENA) 20 mcg/24 hr (5 years) IUD 1 each by Intrauterine route once. Placed 8/2015       methotrexate 2.5 MG tablet TAKE 8 TABLETS BY MOUTH ONCE A WEEK 96 tablet 0     pantoprazole (PROTONIX) 40 MG tablet Take 1 tablet (40 mg total) by mouth daily. 90 tablet 3     SUMAtriptan (IMITREX) 25 MG tablet TAKE 1 TABLET(25 MG) BY MOUTH EVERY 2 HOURS AS NEEDED FOR MIGRAINE 18 tablet 5     SYNTHROID 125 mcg tablet TAKE 1 TABLET BY MOUTH DAILY AT 6AM 90 tablet 2     terbinafine HCl (LAMISIL) 250 mg tablet Take 1 tablet (250 mg total) by mouth daily. 7 tablet 0     traZODone (DESYREL) 50 MG tablet Take 1 tablet (50 mg total) by mouth at bedtime. 90 tablet 3     No current facility-administered medications for this visit.      DETAILED EXAMINATION  11/14/18  :  There were no vitals filed for this visit.  Alert oriented. Head including the face is examined for malar rash, heliotropes, scarring, lupus pernio. Eyes examined for redness such as in episcleritis/scleritis, periorbital lesions.   Neck/ Face examined for parotid gland swelling, range of motion of neck.  Left upper and lower and right upper and lower extremities examined for tenderness, swelling, warmth of the appendicular joints, range of motion, edema, rash.  Some of the important findings included: She does not have synovitis in any of the palpable joints of upper extremities.  She has full range of motion of the shoulders.   Scars from prior bilateral carpal tunnel release surgery.  No dactylitis.  Nails painted.             LAB / IMAGING DATA:  ALT   Date Value Ref Range Status   11/12/2018 18 0 - 45 U/L Final   10/30/2018 28 0 - 45 U/L Final   08/14/2018 20 0 - 45 U/L Final     Albumin   Date Value Ref Range Status   11/12/2018 3.7 3.5 - 5.0 g/dL Final   08/14/2018 3.5 3.5 - 5.0 g/dL Final   05/11/2018 3.5 3.5 - 5.0 g/dL Final     Creatinine   Date Value Ref Range Status   11/12/2018 0.69 0.60 - 1.10 mg/dL Final   10/30/2018 0.73 0.60 - 1.10 mg/dL Final   08/14/2018 0.73 0.60 - 1.10 mg/dL Final       WBC   Date Value Ref Range Status   11/12/2018 6.3 4.0 - 11.0 thou/uL Final   08/14/2018 6.3 4.0 - 11.0 thou/uL Final     Hemoglobin   Date Value Ref Range Status   11/12/2018 13.5 12.0 - 16.0 g/dL Final   08/14/2018 13.1 12.0 - 16.0 g/dL Final   05/11/2018 12.6 12.0 - 16.0 g/dL Final     Platelets   Date Value Ref Range Status   11/12/2018 284 140 - 440 thou/uL Final   08/14/2018 284 140 - 440 thou/uL Final   05/11/2018 246 140 - 440 thou/uL Final       Lab Results   Component Value Date    RF 17.8 06/01/2015    SEDRATE 6 04/14/2016

## 2021-07-20 NOTE — INTERVAL H&P NOTE
H&P reviewed. No updates needed. Will proceed to OR as planned for excision of left arm skin mass. Site marked in preop. Discharge home postop.    Babs Leiva MD  General Surgery  [facility]  [phone]

## 2021-07-20 NOTE — TELEPHONE ENCOUNTER
Refill Approved    Rx renewed per Medication Renewal Policy. Medication was last renewed on 1/24/2019.  Last OV 4/2/2019.    Shira Addison, [facility] Connection Triage/Med Refill 8/27/2019     Requested Prescriptions   Pending Prescriptions Disp Refills     pantoprazole (PROTONIX) 40 MG tablet [Pharmacy Med Name: PANTOPRAZOLE 40MG TABLETS] 90 tablet 0     Sig: TAKE 1 TABLET BY MOUTH M EVERY DAY       GI Medications Refill Protocol Passed - 8/26/2019  3:42 PM        Passed - PCP or prescribing provider visit in last 12 or next 3 months.     Last office visit with prescriber/PCP: 4/2/2019 Denia Llamas FNP OR same dept: 4/2/2019 Denia Llamas FNP OR same specialty: 4/2/2019 Denia Llamas FNP  Last physical: Visit date not found Last MTM visit: Visit date not found   Next visit within 3 mo: Visit date not found  Next physical within 3 mo: Visit date not found  Prescriber OR PCP: MOMO Nguyen  Last diagnosis associated with med order: 1. Gastroesophageal reflux disease, esophagitis presence not specified  - pantoprazole (PROTONIX) 40 MG tablet [Pharmacy Med Name: PANTOPRAZOLE 40MG TABLETS]; TAKE 1 TABLET BY MOUTH M EVERY DAY  Dispense: 90 tablet; Refill: 0    If protocol passes may refill for 12 months if within 3 months of last provider visit (or a total of 15 months).

## 2021-07-20 NOTE — ANESTHESIA CARE TRANSFER NOTE
Last vitals:   Vitals:    12/19/19 0825   BP: 117/58   Pulse: 77   Resp: 18   Temp: 36.8  C (98.2  F)   SpO2: 100%     Patient's level of consciousness is drowsy  Spontaneous respirations: yes  Maintains airway independently: yes  Dentition unchanged: yes  Oropharynx: oropharynx clear of all foreign objects    QCDR Measures:  ASA# 20 - Surgical Safety Checklist: WHO surgical safety checklist completed prior to induction    PQRS# 430 - Adult PONV Prevention: 4558F - Pt received => 2 anti-emetic agents (different classes) preop & intraop  ASA# 8 - Peds PONV Prevention: NA - Not pediatric patient, not GA or 2 or more risk factors NOT present  PQRS# 424 - Lauren-op Temp Management: 4559F - At least one body temp DOCUMENTED => 35.5C or 95.9F within required timeframe  PQRS# 426 - PACU Transfer Protocol: - Transfer of care checklist used  ASA# 14 - Acute Post-op Pain: ASA14B - Patient did NOT experience pain >= 7 out of 10

## 2021-07-20 NOTE — TELEPHONE ENCOUNTER
I called the pt, she states that she does not need refills. The pt states that she cannot afford her pump supplies and needs another options. I informed I will send to the CDE team as well as Debbi and either they or I will contact her back.

## 2021-07-20 NOTE — TELEPHONE ENCOUNTER
Called patient to inform her of provider's recommendations and assist with scheduling an appointment.  Left message for patient to call clinic back at her earliest convenience.    Sent a qunbt message.

## 2021-07-20 NOTE — TELEPHONE ENCOUNTER
I called the pt, she informs me that she uses up to 120 units per day. I informed I will obtain the approval from THERESA Werner to adjust her RX and send the RX to her pharmacy.

## 2021-07-20 NOTE — LETTER
Letter by Denia Llamas FNP at      Author: Denia Llamas FNP Service: -- Author Type: --    Filed:  Encounter Date: 2019 Status: (Other)         Vani Corona  : 1980  [address] [place] [address]        To Whom it May Concern:      Vani Corona has been my patient since .  She has a history of gastroesophageal reflux disease and has tried many treatments for this.  She has failed treatments with omeprazole, famotidine, ranitidine.  The only medication which seems to work well for her is pantoprazole 40 mg daily.  If she discontinues this medication she has reflux every day which is debilitating and causes difficulty eating which can in turn complicate her diabetes management.  For this reason, I am asking for an exception to the existing quantity limit given her extenuating circumstances and need for this medication daily year-round.      Please contact the office with any additional questions or concern.     Sincerely,              MOMO Nieto 18

## 2021-07-20 NOTE — ED NOTES
Patient came in for c/o right sided chest pain, shortness of breath, and body aches that started early this am while patient was laying in bed. Patient checked her blood sugar and found it to be 400+ and took 6.4 units of Novolog at 0655 and then states she laid back down in bed. Patient states she did not feel any better after laying for a time and came in to ED. Patient has hx of DM type 1 and has insulin pump.

## 2021-07-20 NOTE — TELEPHONE ENCOUNTER
Refill Approved    Rx renewed per Medication Renewal Policy. Medication was last renewed on 4/29/20.    Samantha Guerra, [facility] Connection Triage/Med Refill 10/19/2020     Requested Prescriptions   Pending Prescriptions Disp Refills     escitalopram oxalate (LEXAPRO) 10 MG tablet [Pharmacy Med Name: ESCITALOPRAM 10 MG TABLET] 90 tablet 1     Sig: TAKE 1 TABLET BY MOUTH EVERY DAY       [facility] Refill Protocol  Passed - 10/18/2020  7:31 AM        Passed - PCP or prescribing provider visit in last year     Last office visit with prescriber/PCP: 12/24/2019 Denai Llamas FNP OR same dept: 12/24/2019 Denia Llamas FNP OR same specialty: 12/24/2019 Denia Llamas FNP  Last physical: Visit date not found Last MTM visit: Visit date not found   Next visit within 3 mo: Visit date not found  Next physical within 3 mo: Visit date not found  Prescriber OR PCP: MOMO Nguyen  Last diagnosis associated with med order: 1. Recurrent major depressive disorder, in partial remission (H)  - escitalopram oxalate (LEXAPRO) 10 MG tablet [Pharmacy Med Name: ESCITALOPRAM 10 MG TABLET]; TAKE 1 TABLET BY MOUTH EVERY DAY  Dispense: 90 tablet; Refill: 1  - buPROPion (WELLBUTRIN XL) 150 MG 24 hr tablet [Pharmacy Med Name: BUPROPION HCL  MG TABLET]; TAKE 1 TABLET BY MOUTH EVERY DAY  Dispense: 90 tablet; Refill: 1    2. Hypothyroidism, unspecified type  - SYNTHROID 125 mcg tablet [Pharmacy Med Name: SYNTHROID 125 MCG TABLET]; TAKE 1 TABLET BY MOUTH EVERY DAY  Dispense: 90 tablet; Refill: 1    If protocol passes may refill for 12 months if within 3 months of last provider visit (or a total of 15 months).                SYNTHROID 125 mcg tablet [Pharmacy Med Name: SYNTHROID 125 MCG TABLET] 90 tablet 1     Sig: TAKE 1 TABLET BY MOUTH EVERY DAY       Thyroid Hormones Protocol Failed - 10/18/2020  7:31 AM        Failed - TSH on file in past 12 months for patient age 12 & older     TSH   Date Value Ref Range Status   07/23/2019 1.00  0.30 - 5.00 uIU/mL Final                   Passed - Provider visit in past 12 months or next 3 months     Last office visit with prescriber/PCP: 12/24/2019 Denia Llamas FNP OR nellie dept: 12/24/2019 Denia Llamas FNP OR same specialty: 12/24/2019 Denia Llamas FNP  Last physical: Visit date not found Last MTM visit: Visit date not found   Next visit within 3 mo: Visit date not found  Next physical within 3 mo: Visit date not found  Prescriber OR PCP: MOMO Nguyen  Last diagnosis associated with med order: 1. Recurrent major depressive disorder, in partial remission (H)  - escitalopram oxalate (LEXAPRO) 10 MG tablet [Pharmacy Med Name: ESCITALOPRAM 10 MG TABLET]; TAKE 1 TABLET BY MOUTH EVERY DAY  Dispense: 90 tablet; Refill: 1  - buPROPion (WELLBUTRIN XL) 150 MG 24 hr tablet [Pharmacy Med Name: BUPROPION HCL  MG TABLET]; TAKE 1 TABLET BY MOUTH EVERY DAY  Dispense: 90 tablet; Refill: 1    2. Hypothyroidism, unspecified type  - SYNTHROID 125 mcg tablet [Pharmacy Med Name: SYNTHROID 125 MCG TABLET]; TAKE 1 TABLET BY MOUTH EVERY DAY  Dispense: 90 tablet; Refill: 1    If protocol passes may refill for 12 months if within 3 months of last provider visit (or a total of 15 months).                buPROPion (WELLBUTRIN XL) 150 MG 24 hr tablet [Pharmacy Med Name: BUPROPION HCL  MG TABLET] 90 tablet 1     Sig: TAKE 1 TABLET BY MOUTH EVERY DAY       Tricyclics/Misc Antidepressant/Antianxiety Meds Refill Protocol Passed - 10/18/2020  7:31 AM        Passed - PCP or prescribing provider visit in last year     Last office visit with prescriber/PCP: 12/24/2019 Denia Llamas FNP OR nellie dept: 12/24/2019 Denia Llamas FNP OR same specialty: 12/24/2019 Denia Llamas FNP  Last physical: Visit date not found Last MTM visit: Visit date not found   Next visit within 3 mo: Visit date not found  Next physical within 3 mo: Visit date not found  Prescriber OR PCP: Denia Llamas  FNP  Last diagnosis associated with med order: 1. Recurrent major depressive disorder, in partial remission (H)  - escitalopram oxalate (LEXAPRO) 10 MG tablet [Pharmacy Med Name: ESCITALOPRAM 10 MG TABLET]; TAKE 1 TABLET BY MOUTH EVERY DAY  Dispense: 90 tablet; Refill: 1  - buPROPion (WELLBUTRIN XL) 150 MG 24 hr tablet [Pharmacy Med Name: BUPROPION HCL  MG TABLET]; TAKE 1 TABLET BY MOUTH EVERY DAY  Dispense: 90 tablet; Refill: 1    2. Hypothyroidism, unspecified type  - SYNTHROID 125 mcg tablet [Pharmacy Med Name: SYNTHROID 125 MCG TABLET]; TAKE 1 TABLET BY MOUTH EVERY DAY  Dispense: 90 tablet; Refill: 1    If protocol passes may refill for 12 months if within 3 months of last provider visit (or a total of 15 months).

## 2021-07-20 NOTE — TELEPHONE ENCOUNTER
Spoke with [facility]. They need the novolog flexpen rx including NP's NPI as well as a photo ID ('s license) faxed to [phone].    Documents faxed.

## 2021-07-20 NOTE — TELEPHONE ENCOUNTER
RN cannot approve Refill Request    RN can NOT refill this medication med is not covered by policy/route to provider. Last office visit: 12/24/2019 Denia Llamas FNP Last Physical: Visit date not found Last MTM visit: Visit date not found Last visit same specialty: 12/24/2019 Denia Llamas FNP.  Next visit within 3 mo: Visit date not found  Next physical within 3 mo: Visit date not found      Samantha Guerra, [facility] Triage/Med Refill 10/19/2020    Requested Prescriptions   Pending Prescriptions Disp Refills     SYNTHROID 125 mcg tablet [Pharmacy Med Name: SYNTHROID 125 MCG TABLET] 90 tablet 1     Sig: TAKE 1 TABLET BY MOUTH EVERY DAY       Thyroid Hormones Protocol Failed - 10/18/2020  7:31 AM        Failed - TSH on file in past 12 months for patient age 12 & older     TSH   Date Value Ref Range Status   07/23/2019 1.00 0.30 - 5.00 uIU/mL Final                   Passed - Provider visit in past 12 months or next 3 months     Last office visit with prescriber/PCP: 12/24/2019 Denia Llamas FNP OR same dept: 12/24/2019 Denia Llamas FNP OR same specialty: 12/24/2019 Denia Llamas FNP  Last physical: Visit date not found Last MTM visit: Visit date not found   Next visit within 3 mo: Visit date not found  Next physical within 3 mo: Visit date not found  Prescriber OR PCP: MOMO Nguyen  Last diagnosis associated with med order: 1. Recurrent major depressive disorder, in partial remission (H)  - escitalopram oxalate (LEXAPRO) 10 MG tablet; TAKE 1 TABLET BY MOUTH EVERY DAY  Dispense: 90 tablet; Refill: 1  - buPROPion (WELLBUTRIN XL) 150 MG 24 hr tablet; TAKE 1 TABLET BY MOUTH EVERY DAY  Dispense: 90 tablet; Refill: 1    2. Hypothyroidism, unspecified type  - SYNTHROID 125 mcg tablet [Pharmacy Med Name: SYNTHROID 125 MCG TABLET]; TAKE 1 TABLET BY MOUTH EVERY DAY  Dispense: 90 tablet; Refill: 1    If protocol passes may refill for 12 months if within 3 months of last provider  visit (or a total of 15 months).              Signed Prescriptions Disp Refills    escitalopram oxalate (LEXAPRO) 10 MG tablet 90 tablet 1     Sig: TAKE 1 TABLET BY MOUTH EVERY DAY       [facility] Refill Protocol  Passed - 10/18/2020  7:31 AM        Passed - PCP or prescribing provider visit in last year     Last office visit with prescriber/PCP: 12/24/2019 Denia Llamas FNP OR same dept: 12/24/2019 Denia Llamas FNP OR same specialty: 12/24/2019 Denia Llamas FNP  Last physical: Visit date not found Last MTM visit: Visit date not found   Next visit within 3 mo: Visit date not found  Next physical within 3 mo: Visit date not found  Prescriber OR PCP: MOMO Nguyen  Last diagnosis associated with med order: 1. Recurrent major depressive disorder, in partial remission (H)  - escitalopram oxalate (LEXAPRO) 10 MG tablet; TAKE 1 TABLET BY MOUTH EVERY DAY  Dispense: 90 tablet; Refill: 1  - buPROPion (WELLBUTRIN XL) 150 MG 24 hr tablet; TAKE 1 TABLET BY MOUTH EVERY DAY  Dispense: 90 tablet; Refill: 1    2. Hypothyroidism, unspecified type  - SYNTHROID 125 mcg tablet [Pharmacy Med Name: SYNTHROID 125 MCG TABLET]; TAKE 1 TABLET BY MOUTH EVERY DAY  Dispense: 90 tablet; Refill: 1    If protocol passes may refill for 12 months if within 3 months of last provider visit (or a total of 15 months).               buPROPion (WELLBUTRIN XL) 150 MG 24 hr tablet 90 tablet 1     Sig: TAKE 1 TABLET BY MOUTH EVERY DAY       Tricyclics/Misc Antidepressant/Antianxiety Meds Refill Protocol Passed - 10/18/2020  7:31 AM        Passed - PCP or prescribing provider visit in last year     Last office visit with prescriber/PCP: 12/24/2019 Denia Llamas FNP OR nellie dept: 12/24/2019 Denia Llamas FNP OR same specialty: 12/24/2019 Denia Llamas FNP  Last physical: Visit date not found Last MTM visit: Visit date not found   Next visit within 3 mo: Visit date not found  Next physical within 3 mo: Visit date not  found  Prescriber OR PCP: MOMO Nguyen  Last diagnosis associated with med order: 1. Recurrent major depressive disorder, in partial remission (H)  - escitalopram oxalate (LEXAPRO) 10 MG tablet; TAKE 1 TABLET BY MOUTH EVERY DAY  Dispense: 90 tablet; Refill: 1  - buPROPion (WELLBUTRIN XL) 150 MG 24 hr tablet; TAKE 1 TABLET BY MOUTH EVERY DAY  Dispense: 90 tablet; Refill: 1    2. Hypothyroidism, unspecified type  - SYNTHROID 125 mcg tablet [Pharmacy Med Name: SYNTHROID 125 MCG TABLET]; TAKE 1 TABLET BY MOUTH EVERY DAY  Dispense: 90 tablet; Refill: 1    If protocol passes may refill for 12 months if within 3 months of last provider visit (or a total of 15 months).

## 2021-07-20 NOTE — TELEPHONE ENCOUNTER
Please schedule a follow up appointment. VV is okay. I haven't seen her since last April so would be good to address in a visit

## 2021-07-20 NOTE — PROGRESS NOTES
Pharmacy Note - Admission Medication History    Pertinent Provider Information: Patient has insulin pump on.       ______________________________________________________________________    Prior To Admission (PTA) med list completed and updated in EMR.       PTA Med List   Medication Sig Note Last Dose     albuterol (VENTOLIN HFA) 90 mcg/actuation inhaler Inhale 2 puffs every 6 (six) hours as needed for wheezing.  prn     aspirin 81 MG EC tablet Take 81 mg by mouth every evening.   12/17/2019 at Unknown time     baclofen (LIORESAL) 10 MG tablet Take 10 mg by mouth 3 (three) times a day as needed.  prn     buPROPion (WELLBUTRIN XL) 150 MG 24 hr tablet TAKE 1 TABLET(150 MG) BY MOUTH EVERY MORNING  12/17/2019 at Unknown time     cetirizine (ZYRTEC) 10 MG tablet TAKE 1 TABLET(10 MG) BY MOUTH EVERY EVENING  12/17/2019 at Unknown time     cholecalciferol, vitamin D3, 1,000 unit tablet Take 1,000 Units by mouth every evening.   12/17/2019 at Unknown time     clobetasol (TEMOVATE) 0.05 % cream Apply 1 application topically 2 (two) times a day as needed.  prn     escitalopram oxalate (LEXAPRO) 10 MG tablet TAKE 1 TABLET(10 MG) BY MOUTH DAILY (Patient taking differently: Take 10 mg by mouth at bedtime. )  12/17/2019 at evening     GLUCAGON 1 mg injection INJECT WHOLE DOSE IF LOW BLOOD SUGAR & BLOOD SUGAR NOT COMING UP AFTER TREATING 2X WITH FOOD.  prn     insulin aspart U-100 (NOVOLOG U-100 INSULIN ASPART) 100 unit/mL injection Inject 40 Units under the skin daily. Via the pump (Patient taking differently: Inject under the skin daily. Via the pump- up to 40 units daily)  hs pump on     ketoconazole (NIZORAL) 2 % cream Apply topically 2 (two) times a day as needed.  prn     ondansetron (ZOFRAN) 4 MG tablet TAKE 1 TABLET(4 MG) BY MOUTH EVERY 12 HOURS AS NEEDED FOR NAUSEA  prn     pantoprazole (PROTONIX) 40 MG tablet TAKE 1 TABLET BY MOUTH M EVERY DAY (Patient taking differently: Take 40 mg by mouth every evening. )  12/17/2019  at Unknown time     SUMAtriptan (IMITREX) 25 MG tablet TAKE 1 TABLET(25 MG) BY MOUTH EVERY 2 HOURS AS NEEDED FOR MIGRAINE  prn     SYNTHROID 125 mcg tablet TAKE 1 TABLET BY MOUTH EVERY DAY AT 6 AM 12/18/2019: Must be brand  12/17/2019 at Unknown time     traZODone (DESYREL) 50 MG tablet TAKE 1 TABLET(50 MG) BY MOUTH AT BEDTIME  12/17/2019 at Unknown time       Information source(s): Patient and CareEverywhere/SureScripts    Summary of Changes to PTA Med List  New: none  Discontinued: Chantix, Norco, Plaquenil  Changed: ketoconazole cream    Patient was asked about OTC/herbal products specifically.  PTA med list reflects this.    Based on the pharmacist s assessment, the PTA med list information appears reliable    Patient appears adherent: Yes    Allergies were reviewed, assessed, and updated with the patient.      Patient does not use any multi-dose medications prior to admission.    Thank you for the opportunity to participate in the care of this patient.    Alexandra Ramírez, PharmD  12/18/2019 11:22 AM

## 2021-07-20 NOTE — PROGRESS NOTES
"Vani Corona is a 39 y.o. female who is being evaluated via a billable video visit.      The patient has been notified of following:     \"This video visit will be conducted via a call between you and your physician/provider. We have found that certain health care needs can be provided without the need for an in-person physical exam.  This service lets us provide the care you need with a video conversation.  If a prescription is necessary we can send it directly to your pharmacy.  If lab work is needed we can place an order for that and you can then stop by our lab to have the test done at a later time.    Video visits are billed at different rates depending on your insurance coverage. Please reach out to your insurance provider with any questions.    If during the course of the call the physician/provider feels a video visit is not appropriate, you will not be charged for this service.\"    Patient has given verbal consent to a Video visit? Yes    Patient would like to receive their AVS by AVS Preference: Regine.    Patient would like the video invitation sent by: Text to cell phone: [phone]    Will anyone else be joining your video visit? No        Video Start Time: 8:10 AM      Video-Visit Details    Type of service:  Video Visit  Video End Time (time video stopped): 8:31 AM  Originating Location (pt. Location): Home    Distant Location (provider location):  [facility], Provider's home     Mode of Communication:  Video Conference via [facility]    Internal Medicine Office Visit- VIDEO VISIT  [facility]   Patient Name: Vani Corona  Patient Age: 39 y.o.  YOB: 1980  MRN: [id number]    Date of Visit: 2020  Reason for Video Visit:   Chief Complaint   Patient presents with     Medication Management       Assessment / Plan / Medical Decision Makin. Recurrent major depressive disorder, in partial remission (H)  Encouraged her to continue " psychology visits  No change in current medications   - buPROPion (WELLBUTRIN XL) 150 MG 24 hr tablet; Take 1 tablet (150 mg total) by mouth daily.  Dispense: 90 tablet; Refill: 1  - escitalopram oxalate (LEXAPRO) 10 MG tablet; Take 1 tablet (10 mg total) by mouth daily.  Dispense: 90 tablet; Refill: 1    2. Hypothyroidism, unspecified type  Needs branded product due to GI distress associated with generic versions   - SYNTHROID 125 mcg tablet; Take 1 tablet (125 mcg total) by mouth daily.  Dispense: 90 tablet; Refill: 1    3. Gastroesophageal reflux disease, esophagitis presence not specified  Stable, needs to continue PPI, no relief with H2RA products  - pantoprazole (PROTONIX) 40 MG tablet; Take 1 tablet (40 mg total) by mouth every evening.  Dispense: 90 tablet; Refill: 3    4. Type 1 diabetes mellitus with hyperglycemia (H)  - Ambulatory referral to Diabetes Education Program (CDE), needs assistance with new CGM (Dexcom6)  - Endocrinology follow up in August     5. Tobacco abuse  She would like to try lozenges for smokes cessation   - nicotine polacrilex (COMMIT) 2 MG lozenge; Apply 1 lozenge (2 mg total) to the mouth or throat as needed for smoking cessation.  Dispense: 24 each; Refill: 11      Telephone visit duration: 20 minutes     Health aintenance Review  Health Maintenance   Topic Date Due     DEPRESSION ACTION PLAN  1980     HIV SCREENING  10/22/1994     PAP SMEAR  10/22/2000     LIPID  02/15/2017     PREVENTIVE CARE VISIT  04/14/2017     DIABETIC FOOT EXAM  09/04/2019     DIABETIC EYE EXAM  01/24/2020     A1C  07/29/2020     BMP  01/29/2021     MICROALBUMIN  01/29/2021     ADVANCE CARE PLANNING  03/19/2023     TD 18+ HE  08/31/2025     PNEUMOCOCCAL IMMUNIZATION 19-64 MEDIUM RISK  Completed     INFLUENZA VACCINE RULE BASED  Completed     TDAP ADULT ONE TIME DOSE  Completed         I have discontinued Vani Corona's insulin pump cartridge, infusion set for insulin pump, insulin pump syringe,  ondansetron, and traZODone. I have also changed her buPROPion, Synthroid, and pantoprazole. Additionally, I am having her start on nicotine polacrilex. Lastly, I am having her maintain her cholecalciferol (vitamin D3), baclofen, insulin syringe-needle U-100, clobetasoL, aspirin, insulin aspart U-100, cetirizine, SUMAtriptan, ketoconazole, glucagon (human recombinant), albuterol, (blood-glucose meter,continuous), blood-glucose sensor, blood-glucose transmitter, InPen (for NovoLOG or Fiasp), Contour Next Test Strips, (pen needle, diabetic), insulin aspart U-100, insulin glargine, blood glucose meter, blood glucose test, and escitalopram oxalate.      HPI:  Vani Corona is 39 y.o. year old and was contacted today for a telephone visit due to the Coronavirus pandemic.    Diabetes reviewed, she has a new Dexcom 6, not yet comfortable using this.      Mood was reviewed, she had an appointment with psychology April last Monday and she is scheduled for May 11. Doing well overall.     Migraines were reviewed, she is having 1-3 migraines per month.     Tobacco use was reviewed. She would like to consider lozenges for smoking cessation.     Review of Systems- pertinent positive in bold:  Pertinent findings as in HPI       Current Scheduled Meds:  Outpatient Encounter Medications as of 4/29/2020   Medication Sig Dispense Refill     albuterol (PROAIR HFA;PROVENTIL HFA;VENTOLIN HFA) 90 mcg/actuation inhaler INHALE 2 PUFFS BY MOUTH EVERY 6 HOURS AS NEEDED FOR WHEEZING 18 g 0     aspirin 81 MG EC tablet Take 81 mg by mouth every evening.        baclofen (LIORESAL) 10 MG tablet Take 10 mg by mouth 3 (three) times a day as needed.       blood glucose meter (GLUCOMETER) Use 1 each As Directed as needed. Dispense glucometer brand per patient's insurance at pharmacy discretion. 1 each 0     blood glucose test strips Use 1 each As Directed as needed (4-6 times daily). Dispense brand per patient's insurance at pharmacy discretion. 400  strip 1     blood-glucose meter,continuous (DEXCOM G6 ) Misc Use 1 each As Directed continuous. 1 each 0     blood-glucose sensor (DEXCOM G6 SENSOR) Eveline Apply one sensor to the skin every 10 days. 9 Device 1     blood-glucose transmitter (DEXCOM G6 TRANSMITTER) Eveline Use 1 each As Directed every 3 (three) months. 1 Device 1     cetirizine (ZYRTEC) 10 MG tablet TAKE 1 TABLET(10 MG) BY MOUTH EVERY EVENING 90 tablet 0     cholecalciferol, vitamin D3, 1,000 unit tablet Take 1,000 Units by mouth every evening.        clobetasol (TEMOVATE) 0.05 % cream Apply 1 application topically 2 (two) times a day as needed. 45 g 1     CONTOUR NEXT TEST STRIPS strips TEST BLOOD SUGAR 6 TIMES DAILY 400 strip 2     escitalopram oxalate (LEXAPRO) 10 MG tablet Take 1 tablet (10 mg total) by mouth daily. 90 tablet 1     glucagon, human recombinant, (GLUCAGON) 1 mg injection Inject 1 mg into the shoulder, thigh, or buttocks once as needed. 2 each 0     insulin admin supplies (INPEN, FOR NOVOLOG,) InPn Inject 4 Units under the skin 3 (three) times a day before meals. 5 each 1     insulin aspart U-100 (NOVOLOG FLEXPEN U-100 INSULIN) 100 unit/mL (3 mL) injection pen Inject subcutaneously CHO ratio 1:10 - 1:15 + sliding scale 1:50>150 up to 40 units daily. 35 mL 1     insulin aspart U-100 (NOVOLOG U-100 INSULIN ASPART) 100 unit/mL injection Inject 40 Units under the skin daily. Via the pump (Patient taking differently: Inject under the skin daily. Via the pump- up to 40 units daily) 36 mL PRN     insulin glargine (BASAGLAR KWIKPEN) 100 unit/mL (3 mL) pen Inject 20 Units under the skin daily. 1800 mL 1     insulin syringe-needle U-100 (BD INSULIN SYRINGE ULT-FINE II) 0.3 mL 31 gauge x 5/16 Syrg Use as needed with insulin 50 each 5     ketoconazole (NIZORAL) 2 % cream Apply topically 2 (two) times a day as needed.       pantoprazole (PROTONIX) 40 MG tablet Take 1 tablet (40 mg total) by mouth every evening. 90 tablet 3     pen needle,  "diabetic (BD ULTRA-FINE ADALBERTO PEN NEEDLE) 32 gauge x 5/32\" Ndle USE 7 TIMES DAILY 400 each 1     SUMAtriptan (IMITREX) 25 MG tablet TAKE 1 TABLET(25 MG) BY MOUTH EVERY 2 HOURS AS NEEDED FOR MIGRAINE 18 tablet 6     [DISCONTINUED] buPROPion (WELLBUTRIN XL) 150 MG 24 hr tablet TAKE 1 TABLET(150 MG) BY MOUTH EVERY MORNING 90 tablet 1     [DISCONTINUED] escitalopram oxalate (LEXAPRO) 10 MG tablet Take 1 tablet (10 mg total) by mouth daily. 90 tablet 0     [DISCONTINUED] infusion set for insulin pump (MINIMED INFUSION SET) ISet Use every 3 days with pump 40 each 3     [DISCONTINUED] insulin pump cartridge Inject under the skin continuous. Insulin pump discharge instructions from 07/20/17.  This is intended as additional information to the patient's PTA insulin pump order.  Continue with insulin pump at present settings. 1 each 0     [DISCONTINUED] insulin pump syringe (PARADIGM RESERVOIR) 3 mL Misc Use every 3 days with set 40 each 3     [DISCONTINUED] ondansetron (ZOFRAN) 4 MG tablet TAKE 1 TABLET(4 MG) BY MOUTH EVERY 12 HOURS AS NEEDED FOR NAUSEA 10 tablet 0     [DISCONTINUED] pantoprazole (PROTONIX) 40 MG tablet TAKE 1 TABLET BY MOUTH M EVERY DAY (Patient taking differently: Take 40 mg by mouth every evening. ) 90 tablet 2     [DISCONTINUED] SYNTHROID 125 mcg tablet TAKE 1 TABLET BY MOUTH EVERY DAY AT 6 AM 90 tablet 1     [DISCONTINUED] traZODone (DESYREL) 50 MG tablet TAKE 1 TABLET(50 MG) BY MOUTH AT BEDTIME 90 tablet 2     buPROPion (WELLBUTRIN XL) 150 MG 24 hr tablet Take 1 tablet (150 mg total) by mouth daily. 90 tablet 1     nicotine polacrilex (COMMIT) 2 MG lozenge Apply 1 lozenge (2 mg total) to the mouth or throat as needed for smoking cessation. 24 each 11     SYNTHROID 125 mcg tablet Take 1 tablet (125 mcg total) by mouth daily. 90 tablet 1     No facility-administered encounter medications on file as of 4/29/2020.        Past Medical History:   Diagnosis Date     Asthma      Chest pain      Depression      " Diabetes (H)      Fainting      Heart disease     MVP     Methamphetamine abuse in remission, in remission since treatment in 2014 4/10/2015    Treatment 2014. Clean since.      Rheumatoid arthritis (H)      Substance abuse (H)     methamphetamine     Thyroid disease      Past Surgical History:   Procedure Laterality Date     CERVICAL BIOPSY  W/ LOOP ELECTRODE EXCISION        SECTION, CLASSIC       colposcopy      LEAP     HERNIA REPAIR       LAPAROSCOPIC CHOLECYSTECTOMY N/A 2019    Procedure: CHOLECYSTECTOMY, LAPAROSCOPIC;  Surgeon: Rinku Doran MD;  Location: [facility];  Service: General     HI  DELIVERY ONLY      Description:  Section;  Recorded: 2010;  Comments: 09 - breech     HI REPAIR UMBILICAL RUTH ANN,<6Y/O,REDUC      Description: Umbilical Hernia Repair;  Recorded: 10/03/2014;     HI REVISE MEDIAN N/CARPAL TUNNEL SURG      Description: Neuroplasty Decompression Median Nerve At Carpal Tunnel;  Recorded: 10/03/2014;     Social History     Tobacco Use     Smoking status: Former Smoker     Packs/day: 0.50     Types: Cigarettes     Last attempt to quit: 2017     Years since quittin.8     Smokeless tobacco: Never Used     Tobacco comment: She had stopped for a month as of 3/16/16, but now is back on 1/2 PPD.  16   Substance Use Topics     Alcohol use: No     Drug use: No     Types: Methamphetamines     Comment: Off 2 years, 1 month and 19 days as of 3/16/16       Objective / Physical Examination:  PHQ-9 Total Score: 9 (2020  8:05 AM)  Insight is good. Thought process is logical. Patient is speaking in full sentences.     Orders Placed This Encounter   Procedures     Ambulatory referral to Diabetes Education Program (CDE)   Followup: Return in about 6 months (around 10/29/2020) for Next scheduled follow up. earlier if needed.

## 2021-07-20 NOTE — TELEPHONE ENCOUNTER
----- Message from Vani Taylor CMA sent at 1/24/2019  2:38 PM CST -----  Please start PA  ----- Message -----  From: Denia Llamas FNP  Sent: 1/24/2019   9:08 AM  To: Denia Llamas Care Team Pool    Please start PA for protonix. Patient has failed therapy with ranitidine, famotidine, and omeprazole. It is medically necessary that she continue this medication long-term

## 2021-07-20 NOTE — PLAN OF CARE
Problem: Pain  Goal: Patient's pain/discomfort is manageable  Outcome: Progressing   Pt denies pain.     Problem: Safety  Goal: Patient will be injury free during hospitalization  Outcome: Progressing   Pt up independently in room with call light in reach and uses it appropriately     Pt has IV fluids and antibiotics infusing as ordered. Pt remains NPO after midnight. Pt blood glucose at [phone], sliding scale 3 units Novolog given as ordered. Pt blood glucose at 0512  115, no sliding scale required per orders. Pt' urine pregnancy test negative. Pt will continue to be monitored.

## 2021-07-20 NOTE — TELEPHONE ENCOUNTER
Central PA team  [phone]  Pool: HE PA MED ([id number])          PA has been initiated.       PA form completed and faxed insurance via Cover My Meds     Key:  [id number]     Medication:  SYNTHROID 125MCG    Insurance:  [employer]        Response will be received via fax and may take up to 5-10 business days depending on plan

## 2021-07-20 NOTE — TELEPHONE ENCOUNTER
RN cannot approve Refill Request    RN can NOT refill this medication med is not covered by policy/route to provider       . Last office visit: 4/2/2019 Denia Llamas FNP Last Physical: Visit date not found Last MTM visit: Visit date not found Last visit same specialty: 4/2/2019 Denia Llamas FNP.  Next visit within 3 mo: Visit date not found  Next physical within 3 mo: Visit date not found      Samantha Guerra, [facility] Triage/Med Refill 9/25/2019    Requested Prescriptions   Pending Prescriptions Disp Refills     ciprofloxacin HCl (CILOXAN) 0.3 % ophthalmic solution [Pharmacy Med Name: CIPROFLOXACIN 0.3% OP [id number]] 5 mL 0     Sig: INSTILL 1 TO 2 DROPS IN AFFECTED EYE(S) FOUR TIMES DAILY FOR 5 TO 7 DAYS       There is no refill protocol information for this order

## 2021-07-20 NOTE — TELEPHONE ENCOUNTER
RN Triage:  Follow up note.    Pt indicates she no longer is using the insulin pump.    Blanca Herrera RN   [facility] RN Triage

## 2021-07-20 NOTE — PROGRESS NOTES
[facility]  ENDOCRINOLOGY    Diabetes Note 12/14/2017    Vani Corona, 1980, [id number]          Reason for visit      1. Type 1 diabetes mellitus with hyperglycemia        HPI     Vani Corona is a very pleasant 37 y.o. old female who presents for follow up.  SUMMARY:  Vani returns today in f/u for DM 1.  She has the new 670 pump and has been using it with a sensor to manage her insulin administration.  She has not yet been trained in how to use the Auto Mode.  She tells me that she had rather high blood sugars recently and that it was because she had skipped lunch - a big no-no!  She is wearing her sensor about 80% of the time.  Her basal/bolus ratio is good at 50/50. Her Ave BG over the last two weeks is high at 257 with an SD of 109.  Looking at her sensor readings on the pump download, she is coming into morning at a good place.  The rest of the day however, her blood sugars are higher than they need to be.  I am hopeful that the addition of Auto Mode will help pull these down.      Past Medical History     Patient Active Problem List   Diagnosis     Restless Legs Syndrome     Mitral prolapse with 2 or more clicks     Selective IgA Deficiency     Hypothyroidism, unspecified type     Type 1 diabetes mellitus     Arthralgias In Multiple Sites     Eczema, pustular     Abnormal Electrocardiogram     Low back pain     Sciatica of left side     Anxiety     Depression      TMJ inflammation - bilateral     Insomnia      Non-rheumatic mitral regurgitation     Polyarthralgia     GERD (gastroesophageal reflux disease) H2 blocker not effective      Inflammatory polyarthritis     High risk medication use     Right hand pain     Tobacco abuse        Family History       family history includes No Medical Problems in her daughter, other, other, sister, and son; Other in her father; Stroke in her paternal grandmother.    Social History      reports that she quit smoking about 5 months ago. Her smoking use  "included Cigarettes. She smoked 0.50 packs per day. She has never used smokeless tobacco. She reports that she does not drink alcohol or use illicit drugs.      Review of Systems     Patient has no polyuria or polydipsia, no chest pain, dyspnea or TIA's, no numbness, tingling or pain in extremities  Remainder negative except as noted in HPI.    Vital Signs     /64  Ht 5' 4\" (1.626 m)  Wt 123 lb 4.8 oz (55.9 kg)  BMI 21.16 kg/m2  Wt Readings from Last 3 Encounters:   12/12/17 123 lb 4.8 oz (55.9 kg)   11/14/17 118 lb (53.5 kg)   09/18/17 120 lb (54.4 kg)       Physical Exam     Constitutional:  Well developed, Well nourished  HENT:  Normocephalic,   Neck: Thyroid normal, No lymph nodes, Supple  Eyes:  PERRL, Conjunctiva pink  Respiratory:  Normal breath sounds, No respiratory distress  Cardiovascular:  Normal heart rate, Normal rhythm, No murmurs  GI:  Bowel sounds normal, Soft, No tenderness      Assessment     1. Type 1 diabetes mellitus with hyperglycemia        Plan     I have facilitated her getting in with the CDE for training in using the Auto Mode.  Otherwise, encouraged to not skip meals and to continue to pay attention. I added a basal setting at 1200 with an increase to 0.750. We will see if this helps with her midday excursions. She will f/u with me after she meets with the DE.  Time spent with pt today: 25 min with >50% spent in counseling and coordination of care.        Anayeli ANDERSON Endocrinology  12/14/2017  7:28 AM        Lab Results     Hemoglobin A1c   Date Value Ref Range Status   12/05/2017 7.8 (H) 3.5 - 6.0 % Final   07/19/2017 7.9 (H) 4.2 - 6.1 % Final   05/19/2017 7.7 (H) 3.5 - 6.0 % Final   02/16/2017 9.7 (H) 4.2 - 6.1 % Final   01/30/2017 10.0 (H) 4.2 - 6.1 % Final     Creatinine   Date Value Ref Range Status   11/08/2017 0.79 0.60 - 1.10 mg/dL Final   09/13/2017 0.78 0.60 - 1.10 mg/dL Final   07/20/2017 0.69 0.60 - 1.10 mg/dL Final     Microalbumin, Random Urine   Date Value " Ref Range Status   02/16/2017 <0.50 0.00 - 1.99 mg/dL Final       Cholesterol   Date Value Ref Range Status   02/15/2016 174 <=199 mg/dL Final     HDL Cholesterol   Date Value Ref Range Status   02/15/2016 45 (L) >=50 mg/dL Final     LDL Calculated   Date Value Ref Range Status   02/15/2016 105 <=129 mg/dL Final     Triglycerides   Date Value Ref Range Status   02/15/2016 122 <=149 mg/dL Final       Lab Results   Component Value Date    ALT 20 11/08/2017    AST 15 07/20/2017    ALKPHOS 35 (L) 07/20/2017    BILITOT 0.8 07/20/2017         Current Medications     Outpatient Medications Prior to Visit   Medication Sig Dispense Refill     baclofen (LIORESAL) 10 MG tablet Take 10 mg by mouth 3 (three) times a day as needed.       blood glucose meter (GLUCOMETER) Use 1 each As Directed as needed. Dispense glucometer brand per patient's insurance at pharmacy discretion. 1 each 0     buPROPion (WELLBUTRIN XL) 150 MG 24 hr tablet Take 1 tablet (150 mg total) by mouth every morning. 90 tablet 3     cetirizine (ZYRTEC) 10 MG tablet Take 1 tablet (10 mg total) by mouth every evening. 90 tablet 3     cholecalciferol, vitamin D3, 1,000 unit tablet Take 1,000 Units by mouth every evening.        CONTOUR NEXT STRIPS strips TEST BLOOD SUGAR 6 TIMES DAILY 200 strip 5     famotidine (PEPCID) 40 MG tablet   1     folic acid (FOLVITE) 1 MG tablet Take 1 tablet (1 mg total) by mouth daily. 30 tablet 11     GLUCAGON 1 mg injection INJECT WHOLE DOSE IF LOW BLOOD SUGAR & BLOOD SUGAR NOT COMING UP AFTER TREATING 2X WITH FOOD. 2 each 1     hydroxychloroquine (PLAQUENIL) 200 mg tablet Take 1 tablet (200 mg total) by mouth 2 (two) times a day. 180 tablet 0     insulin pump cartridge Crtg 1 each Inject under the skin continuous. Novolog insulin pump       insulin pump cartridge Inject under the skin continuous. Insulin pump discharge instructions from 07/20/17.  This is intended as additional information to the patient's PTA insulin pump  "order.  Continue with insulin pump at present settings. 1 each 0     levonorgestrel (MIRENA) 20 mcg/24 hr (5 years) IUD 1 each by Intrauterine route once. Placed 8/2015       methotrexate 2.5 MG tablet TAKE 8 TABLETS(20 MG TOTAL) BY MOUTH ONCE A WEEK 32 tablet 0     nicotine (NICODERM CQ) 14 mg/24 hr APPLY 1 PATCH AS DIRECTED ONCE EACH DAY( EVERY 24 HOURS) 28 patch 0     nicotine polacrilex (NICORETTE) 2 mg gum Chew as directed.  Go to \"www.QuitPlan.org\" and read about how to use this drug.  Call them, too.  Get support. Pharm:  Please read note. 100 each 3     NOVOLOG 100 unit/mL injection INJECT 3 TIMES SUBCUTANEOUSLY DAILY VIA INSULIN PUMP UP TO 40 UNITS DAILY 30 mL 0     pantoprazole (PROTONIX) 40 MG tablet Take 1 tablet (40 mg total) by mouth daily. 90 tablet 3     SUMAtriptan (IMITREX) 25 MG tablet TAKE 1 TABLET(25 MG) BY MOUTH EVERY 2 HOURS AS NEEDED FOR MIGRAINE 18 tablet 5     SYNTHROID 125 mcg tablet Take 1 tablet (125 mcg total) by mouth Daily at 6:00 am. 90 tablet 3     traZODone (DESYREL) 50 MG tablet Take 1 tablet (50 mg total) by mouth at bedtime. 90 tablet 3     No facility-administered medications prior to visit.            "

## 2021-07-20 NOTE — PROGRESS NOTES
[facility] Clinic Note  Patient Name: Vani Jenkinsr  Patient Age: 36 y.o.  YOB: 1980  MRN: [id number]  ?  Date of Visit: 5/24/2017  Reason for Office Visit:   Chief Complaint   Patient presents with     Follow-up     skin tags, 3-4 mos FU, prilosec not working     HPI: Vani CASH [place] 36 y.o. who presents to clinic for f/u;    1. prilosec is not working well. Sometimes lasts throughout the day. Down to 2 cups of coffee, an energy drink and 2 sodas. No hematemesis, no melena. No dysphagia.     2. Skin tag on right axilla, getting caught in bra. Would like it frozen off      Review of Systems: As noted in HPI     Current Scheduled Meds:  Outpatient Encounter Prescriptions as of 5/24/2017   Medication Sig Dispense Refill     blood glucose meter (GLUCOMETER) Use 1 each As Directed as needed. Dispense glucometer brand per patient's insurance at pharmacy discretion. 1 each 0     blood glucose test strips Contour Next strips . Use to check blood sugars 6 times daily 200 each 1     buPROPion (WELLBUTRIN XL) 150 MG 24 hr tablet TAKE 1 TABLET BY MOUTH DAILY IN THE MORNING FOR DEPRESSION & ANXIETY. 90 tablet 0     cetirizine (ZYRTEC) 10 MG tablet TAKE 1 TABLET BY MOUTH DAILY 90 tablet 2     cholecalciferol, vitamin D3, 1,000 unit tablet Take 1,000 Units by mouth daily.       clobetasol (TEMOVATE) 0.05 % cream Apply to affected area twice daily 45 g 5     folic acid (FOLVITE) 1 MG tablet Take 1 tablet (1 mg total) by mouth daily. 30 tablet 11     GLUCAGON 1 mg injection INJECT WHOLE DOSE IF LOW BLOOD SUGAR & BLOOD SUGAR NOT COMING UP AFTER TREATING 2X WITH FOOD. 2 each 1     levonorgestrel (MIRENA) 20 mcg/24 hr (5 years) IUD 1 each by Intrauterine route once.       levothyroxine (SYNTHROID) 125 MCG tablet Take 1 tablet (125 mcg total) by mouth Daily at 6:00 am. 90 tablet 3     methotrexate 2.5 MG tablet TAKE 8 TABLETS(20 MG TOTAL) BY MOUTH ONCE A WEEK 32 tablet 0     nicotine polacrilex (NICORETTE) 2  "mg gum Chew as directed.  Go to \"www.QuitPlan.org\" and read about how to use this drug.  Call them, too.  Get support. Pharm:  Please read note. 100 each 3     NOVOLOG 100 unit/mL injection Inject 3 times daily via insulin pump up to 40 units daily. TYRONE 40 mL 1     traZODone (DESYREL) 50 MG tablet Take 1 tablet (50 mg total) by mouth at bedtime as needed for sleep. 30 tablet 0     [DISCONTINUED] omeprazole (PRILOSEC) 20 MG capsule TAKE ONE CAPSULE BY MOUTH DAILY 30 MINUTES BEFORE BREAKFAST. 30 capsule 4     pantoprazole (PROTONIX) 40 MG tablet Take 1 tablet (40 mg total) by mouth daily. 30 tablet 1     No facility-administered encounter medications on file as of 5/24/2017.        Objective / Physical Examination:  /62  Pulse 76  Ht 5' 4\" (1.626 m)  Wt 116 lb 12.8 oz (53 kg)  BMI 20.05 kg/m2  Wt Readings from Last 3 Encounters:   05/24/17 116 lb 12.8 oz (53 kg)   05/23/17 116 lb (52.6 kg)   03/27/17 113 lb 9.6 oz (51.5 kg)     Body mass index is 20.05 kg/(m^2). (>25?)    General Appearance: Alert and oriented in no acute distress  Abdomen: Bowel sounds active all four quadrants. Soft, non-tender.  Integumentary: Warm and dry. Skin tag anterior left axilla area 1 mm     Assessment / Plan / Medical Decision Making:      Encounter Diagnoses   Name Primary?     GERD (gastroesophageal reflux disease) Yes     Skin tag         1. GERD (gastroesophageal reflux disease)  Stressed importance of diet and lifestyle, specifically cutting out energy drinks, limit coffee, acidic sauces, etc.   Will switch to Protonix 40 mg daily, see if this has a better effect.  If not improving, may want to refer to GI for EGD        2. Skin tag  Cryotherapy freezing done in clinic today, with freeze/thaw method. Aftercare discussed. Patient tolerated procedure well.     Return if symptoms worsen or fail to improve. earlier if needed.    Total time spent with patient was 15  minutes with >50% of time spent in face-to-face counseling " regarding the above plan     Jeff Bartlett MD  [facility]

## 2021-07-20 NOTE — ANESTHESIA CARE TRANSFER NOTE
Last vitals:   Vitals:    05/13/21 1239   BP: 91/52   Pulse: (!) 55   Resp: 16   Temp: 36.8  C (98.2  F)   SpO2: 98%     Pt brought to phase 2 on 6L O2. Monitors applied. VSS.    Patient's level of consciousness is drowsy  Spontaneous respirations: yes  Maintains airway independently: yes  Dentition unchanged: yes  Oropharynx: oropharynx clear of all foreign objects    QCDR Measures:  ASA# 20 - Surgical Safety Checklist: WHO surgical safety checklist completed prior to induction    PQRS# 430 - Adult PONV Prevention: 4558F - Pt received => 2 anti-emetic agents (different classes) preop & intraop  ASA# 8 - Peds PONV Prevention: NA - Not pediatric patient, not GA or 2 or more risk factors NOT present  PQRS# 424 - Lauren-op Temp Management: NA - MAC anesthesia or case < 60 minutes  PQRS# 426 - PACU Transfer Protocol: - Transfer of care checklist used  ASA# 14 - Acute Post-op Pain: ASA14B - Patient did NOT experience pain >= 7 out of 10

## 2021-07-20 NOTE — H&P (VIEW-ONLY)
General Surgery Consultation    Consulting Provider: Denia Llamas    CC: left skin mass    History:   Vani Corona is a 40 y.o. female referred to see me for left arm skin mass. Pt states the mass has been present for about a year and has grown slightly larger recently. It causes discomfort and aching pain when she touches it or when she leans her forearm and/or elbow against things. She denies any open wounds, drainage from area, fever, chills. She denies any previous history of skin masses or lumps.     Allergies:  Augmentin [amoxicillin-pot clavulanate], Chantix [varenicline], Venlafaxine, and Fluconazole    Past medical history:  Past Medical History:   Diagnosis Date     Asthma      Chest pain      Depression      Diabetes (H)      Fainting      Heart disease     MVP     Methamphetamine abuse in remission, in remission since treatment in 2014 4/10/2015    Treatment 2014. Clean since.      Rheumatoid arthritis (H)      Substance abuse (H)     methamphetamine     Thyroid disease        Past surgical history:  Past Surgical History:   Procedure Laterality Date     CERVICAL BIOPSY  W/ LOOP ELECTRODE EXCISION        SECTION, CLASSIC       colposcopy      LEAP     HERNIA REPAIR       LAPAROSCOPIC CHOLECYSTECTOMY N/A 2019    Procedure: CHOLECYSTECTOMY, LAPAROSCOPIC;  Surgeon: Rinku Doran MD;  [place] [facility]: General     UT  DELIVERY ONLY      Description:  Section;  Recorded: 2010;  Comments: 09 - breech     UT REPAIR UMBILICAL RUTH ANN,<6Y/O,REDUC      Description: Umbilical Hernia Repair;  Recorded: 10/03/2014;     UT REVISE MEDIAN N/CARPAL TUNNEL SURG      Description: Neuroplasty Decompression Median Nerve At Carpal Tunnel;  Recorded: 10/03/2014;       Medications:    Current Outpatient Medications:      ACCU-CHEK FASTCLIX LANCET DRUM, USE TO TEST BLOOD SUGAR 4 6 TIMES DAILY, Disp: , Rfl:      albuterol (PROAIR HFA;PROVENTIL  "HFA;VENTOLIN HFA) 90 mcg/actuation inhaler, INHALE 2 PUFFS BY MOUTH EVERY 6 HOURS AS NEEDED FOR WHEEZING, Disp: 18 g, Rfl: 0     aspirin 81 MG EC tablet, Take 81 mg by mouth every evening. , Disp: , Rfl:      baclofen (LIORESAL) 10 MG tablet, Take 10 mg by mouth 3 (three) times a day as needed., Disp: , Rfl:      blood glucose meter (GLUCOMETER), Use 1 each As Directed as needed. Dispense glucometer brand per patient's insurance at pharmacy discretion., Disp: 1 each, Rfl: 0     blood glucose test strips, Use 1 each As Directed as needed (4-6 times daily). Dispense brand per patient's insurance at pharmacy discretion., Disp: 400 strip, Rfl: 1     blood-glucose meter,continuous (DEXCOM G6 ) Misc, Use 1 each As Directed continuous., Disp: 1 each, Rfl: 0     blood-glucose sensor (DEXCOM G6 SENSOR) Eveline, Apply one sensor to the skin every 10 days., Disp: 9 Device, Rfl: 1     blood-glucose transmitter (DEXCOM G6 TRANSMITTER) Eveline, Use 1 each As Directed every 3 (three) months., Disp: 1 Device, Rfl: 1     buPROPion (WELLBUTRIN XL) 150 MG 24 hr tablet, TAKE 1 TABLET BY MOUTH EVERY DAY, Disp: 90 tablet, Rfl: 1     cetirizine (ZYRTEC) 10 MG tablet, TAKE 1 TABLET(10 MG) BY MOUTH EVERY EVENING, Disp: 90 tablet, Rfl: 0     cholecalciferol, vitamin D3, 1,000 unit tablet, Take 1,000 Units by mouth every evening. , Disp: , Rfl:      clobetasol (TEMOVATE) 0.05 % cream, Apply 1 application topically 2 (two) times a day as needed., Disp: 45 g, Rfl: 1     clomiPHENE (CLOMID) 50 mg tablet, TAKE 2 TABLETS (100MG) BY ORAL ROUTE ONCE DAILY FOR 5 DAYS. STARTING ON CYCLE DAY 3, Disp: , Rfl:      escitalopram oxalate (LEXAPRO) 10 MG tablet, TAKE 1 TABLET BY MOUTH EVERY DAY, Disp: 90 tablet, Rfl: 1     glucagon, human recombinant, (GLUCAGON) 1 mg injection, Inject 1 mg into the shoulder, thigh, or buttocks once as needed., Disp: 2 each, Rfl: 0     infusion set for insulin pump (T:90 INFUSION SET 23\") ISet, Use 1 each As Directed daily. " "To change every 3 days, Disp: 30 each, Rfl: 3     insulin aspart U-100 (NOVOLOG FLEXPEN U-100 INSULIN) 100 unit/mL (3 mL) injection pen, Inject subcutaneously CHO ratio 1:10 - 1:15 + sliding scale 1:50>150 up to 40 units daily., Disp: 30 mL, Rfl: 1     insulin glargine (LANTUS SOLOSTAR PEN) 100 unit/mL (3 mL) pen, Inject 24 Units under the skin at bedtime., Disp: , Rfl:      insulin pump cartridge (T:SLIM X2), Use 1 each As Directed daily. To change every 3 days, Disp: 30 each, Rfl: 3     insulin syringe-needle U-100 (BD INSULIN SYRINGE ULT-FINE II) 0.3 mL 31 gauge x 5/16 Syrg, Use as needed with insulin, Disp: 50 each, Rfl: 5     ketoconazole (NIZORAL) 2 % cream, Apply topically 2 (two) times a day as needed., Disp: , Rfl:      lisinopriL (ZESTRIL) 2.5 MG tablet, Take 1 tablet (2.5 mg total) by mouth daily., Disp: 90 tablet, Rfl: 1     pantoprazole (PROTONIX) 40 MG tablet, Take 1 tablet (40 mg total) by mouth every evening., Disp: 90 tablet, Rfl: 3     pen needle, diabetic (BD ULTRA-FINE ADALBERTO PEN NEEDLE) 32 gauge x 5/32\" Ndle, USE 7 TIMES DAILY, Disp: 400 each, Rfl: 1     subcutaneous insulin pump (T:SLIM X2 INSULIN PUMP) Misc, Use 1 each As Directed daily., Disp: 1 each, Rfl: 1     SUMAtriptan (IMITREX) 25 MG tablet, Take 1 tablet (25 mg total) by mouth daily as needed for migraine (may repeat in 2 hours if headache has not resolved)., Disp: 18 tablet, Rfl: 6     SYNTHROID 125 mcg tablet, Take 1 tablet (125 mcg total) by mouth daily. Due for lab work, Disp: 90 tablet, Rfl: 2     HYDROcodone-acetaminophen 5-325 mg per tablet, TAKE 1 TAB BY MOUTH EVERY 6 HOURS AS NEEDED, Disp: , Rfl:     Family history:  Family History   Problem Relation Age of Onset     Other Father         Father abuses multiple drugs.     No Medical Problems Sister      No Medical Problems Daughter      No Medical Problems Son      No Medical Problems Other      Stroke Paternal Grandmother      No Medical Problems Other        Social history:   " reports that she quit smoking about 3 years ago. Her smoking use included cigarettes. She smoked 0.50 packs per day. She has never used smokeless tobacco. She reports current alcohol use. She reports that she does not use drugs.    Review of Systems:  General: No complaints or constitutional symptoms  Skin: see HPI    Exam:  /62 (Patient Site: Right Arm, Patient Position: Sitting, Cuff Size: Adult Regular)   Wt 134 lb (60.8 kg)   BMI 23.00 kg/m    Body mass index is 23 kg/m .  General : Alert, cooperative, appears stated age   Skin: 3x3x2 cm mass medial left arm just above elbow, semi firm, mobile, mildly tender to palpation, no overlying skin changes  Lungs: Normal respiratory effort, breath sounds equal bilaterally  Heart: Regular rate and rhythm  Labs:  Lab Results   Component Value Date    WBC 7.6 04/29/2021    HGB 13.5 04/29/2021    HCT 40.3 04/29/2021    MCV 93 04/29/2021     04/29/2021         Lab Results   Component Value Date    ALT 14 12/19/2019    AST 14 12/19/2019    ALKPHOS 32 (L) 12/19/2019    BILITOT 0.8 12/19/2019     Assessment/Plan:   Vani Corona is a 40 y.o. female with symptomatic left arm skin mass, likely lipoma.  I have explained the pathophysiology of lipomas in detail as well as the surgical versus non-operative management strategies.      The risks of surgery were discussed in detail which include, but are not limited to, bleeding, infection, injury to surrounding structures, recurrent masses.  Additionally, the risks of non operative management were discussed which include, but are not limited to, enlargement of mass, recurrent pain.    She understands everything which was discussed and has consented to proceed with a excision of left arm mass under MAC and local at the ambulatory surgery center.  We will schedule surgery accordingly.     Babs Leiva MD  General Surgery  [facility] [place]  [phone]

## 2021-07-20 NOTE — PROGRESS NOTES
Progress Notes by Denia Llamas FNP at 2019  9:33 AM     Author: Denia Llamas FNP Service: -- Author Type: Nurse Practitioner    Filed: 2019  9:33 AM Encounter Date: 2019 Status: Signed    : Denia Llamas FNP (Nurse Practitioner)         Assessment:   The encounter diagnosis was Acute bacterial conjunctivitis of both eyes.     Plan:     Medications Ordered   Medications   ? ciprofloxacin HCl (CILOXAN) 0.3 % ophthalmic solution     Si-2 drops in affected eye(s) four times daily for 5-7 days     Dispense:  5 mL     Refill:  0     Patient Instructions       Bacterial Conjunctivitis    You have an infection in the membranes covering the white part of the eye. This part of the eye is called the conjunctiva. The infection is called conjunctivitis. The most common symptoms of conjunctivitis include a thick, pus-like discharge from the eye, swollen eyelids, redness, eyelids sticking together upon awakening, and a gritty or scratchy feeling in the eye. Your infection was caused by bacteria. It may be treated with medicine. With treatment, the infection takes about 7 to 10 days to resolve.  Home care    Use prescribed antibiotic eye drops or ointment as directed to treat the infection.    Apply a warm compress (towel soaked in warm water) to the affected eye 3 to 4 times a day. Do this just before applying medicine to the eye.    Use a warm, wet cloth to wipe away crusting of the eyelids in the morning. This is caused by mucus drainage during the night. You may also use saline irrigating solution or artificial tears to rinse away mucus in the eye. Do not put a patch over the eye.    Wash your hands before and after touching the infected eye. This is to prevent spreading the infection to the other eye, and to other people. Don't share your towels or washcloths with others.    You may use acetaminophen or ibuprofen to control pain, unless another medicine was prescribed. (Note: If  you have chronic liver or kidney disease or have ever had a stomach ulcer or gastrointestinal bleeding, talk with your doctor before using these medicines.)    Don't wear contact lenses until your eyes have healed and all symptoms are gone.  Follow-up care  Follow up with your healthcare provider, or as advised.  When to seek medical advice  Call your healthcare provider right away if any of these occur:    Worsening vision    Increasing pain in the eye    Increasing swelling or redness of the eyelid    Redness spreading around the eye  Date Last Reviewed: 7/1/2017 2000-2017 [employer]. [address] [place], [place] [id number]. All rights reserved. This information is not intended as a substitute for professional medical care. Always follow your healthcare professional's instructions.          Return for further follow up if needed. Call [phone]) or schedule an appointment via [employer]..    Subjective:   Vani Corona is a 38 y.o. female who submitted an eVisit request for evaluation of her Conjunctivitis.  See the questionnaire and message section of encounter report for information related to history of present illness and review of systems.    The following portions of the patient's history were reviewed and updated as appropriate:  She does not have any pertinent problems on file.  She is allergic to augmentin [amoxicillin-pot clavulanate]; chantix [varenicline]; and venlafaxine..     Objective:   No exam performed today, patient submitted as eVisit.

## 2021-07-20 NOTE — PROGRESS NOTES
Assessment: pt seen today for DM education. She was seen by Debbi today as well. She was recently started on the new [employer] pump at the end of January. She did not get a sensor and she does not know why. I checked the order/CMN and it was ordered. Pt will contact adolfo and or Shannan to see what the issue is.   Pt usually wakes around 7am, she does not eat and leaves to drive her son to school from [place] to [place]. 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 She will sometimes snack in between houses but she notes she is not given an actual lunch break and if they are very busy, often times she does not get lunch. Debbi wrote a note today for pt to bring to work regarding this. She needs to be able to eat lunch every day. She does bring a lunch box with her.   After work, she will eat dinner or go to a meeting and then eat dinner, she does not really snack after dinner.   BG:   FB, 325, 400, 375, 313, 287, 379, 300  1-3pm: 207, 152, 330  5-6pm: 252, 262, >400, >400, 316  7-8pm: >400, 358, 254, >400    She has one basal rate at 0.65 units/hour  I:C 1:14  ISF 53    She has had a couple low BG during the day when she is working, she is active while working. She has not had any low BG in the evening, overnight or waking up. Increased basal rate from 8pm-7am from 0.650 units/hr to 0.950 units/hr.     Plan: check BG more often. Call about sensor. Get something for breakfast and lunch every day.     Subjective and Objective:      Vani Corona is referred by [facility] for Diabetes Education.     Lab Results   Component Value Date    HGBA1C 9.7 (H) 2017     Follow up:   When you get the sensor for training. Or 1 week after you start the sensor if Shannan trains you.       Education:     Monitoring   Meter (per above goals): Assessed and Discussed  Monitoring: Assessed and Discussed  BG goals: Discussed    Nutrition Management  Nutrition Management: Assessed and Discussed  Weight: Not  addressed  Portions/Balance: Assessed and Discussed  Carb ID/Count: Assessed and Discussed  Label Reading: Assessed and Discussed  Heart Healthy Fats: Not addressed  Menu Planning: Assessed and Discussed  Dining Out: Assessed and Discussed  Physical Activity: Assessed and Discussed  Injected Medications: Discussed   Storage/Exp:Not addressed   Site Rotation: Not addressed   Sites Assessed: no    Diabetes Disease Process: Not addressed    Acute Complications: Prevent, Detect, Treat:  Hypoglycemia: Assessed and Discussed  Hyperglycemia: Assessed and Discussed  Sick Days: Not addressed  Driving: Not addressed    Chronic Complications  Foot Care:Not addressed  Skin Care: Not addressed  Eye: Not addressed  ABC: Not addressed  Teeth:Not addressed  Goal Setting and Problem Solving: Assessed and Discussed  Barriers: Assessed and Discussed  Psychosocial Adjustments: Assessed and Discussed      Time spent with the patient: 30 minutes for diabetes education and counseling.   Previous Education: yes  Visit Type:DAVID Jackson  2/20/2017             I agree with the aforementioned diabetes plan.  Anayeli CASH [facility]  2/20/2017  3:47 PM

## 2021-07-20 NOTE — PROGRESS NOTES
"Cardiology Progress Note    Assessment:  Mitral valve prolapse(posterior leaflet) with mitral regurgitation, asymptomatic; by physical exam regurgitation is mild to moderate but it was rated moderate to severe by echo in 2015  Diabetes mellitus type 1  Rheumatoid arthritis    Plan:  Echocardiogram to reassess severity of mitral regurgitation.    As long as there is no echo indices of severe regurgitation we should continue conservative approach.    Follow-up in 1 year    Subjective:   This is 37 y.o. female who comes in today for follow-up visit.  She reports no new cardiac symptoms.  Specifically she has not had any increasing shortness of breath or chest pains.  She denies heart palpitations or syncope.  Last year she was diagnosed with rheumatoid arthritis.  She is taking medications for RA.  She has had good response.    Review of Systems:   General: Night Sweats  Eyes: WNL  Ears/Nose/Throat: WNL  Lungs: Snoring  Heart: WNL  Stomach: WNL  Bladder: WNL  Muscle/Joints: WNL  Skin: WNL  Nervous System: WNL  Mental Health: Anxiety, Depression     Blood: WNL    Objective:   /66 (Patient Site: Left Arm, Patient Position: Sitting, Cuff Size: Adult Regular)  Pulse 76  Resp 12  Ht 5' 4\" (1.626 m)  Wt 126 lb 12.8 oz (57.5 kg)  BMI 21.77 kg/m2  Physical Exam:  GENERAL: no distress  NECK: No JVD  LUNGS: Clear to auscultation.  CARDIAC: regular rhythm, S1 & S2 normal.  No heaves, thrills, gallops .  Mid diastolic clinic with short systolic 2/6 murmur best heard at the apex  ABDOMEN: flat, negative hepatosplenomegaly, soft and non-tender.  EXTREMITIES: No evidence of cyanosis, clubbing or edema.    Current Outpatient Prescriptions   Medication Sig Dispense Refill     baclofen (LIORESAL) 10 MG tablet Take 10 mg by mouth 3 (three) times a day as needed.       blood glucose meter (GLUCOMETER) Use 1 each As Directed as needed. Dispense glucometer brand per patient's insurance at pharmacy discretion. 1 each 0     " "buPROPion (WELLBUTRIN XL) 150 MG 24 hr tablet Take 1 tablet (150 mg total) by mouth every morning. 90 tablet 3     cetirizine (ZYRTEC) 10 MG tablet Take 1 tablet (10 mg total) by mouth every evening. 90 tablet 3     cholecalciferol, vitamin D3, 1,000 unit tablet Take 1,000 Units by mouth every evening.        CONTOUR NEXT STRIPS strips TEST BLOOD SUGAR 6 TIMES DAILY 200 strip 5     folic acid (FOLVITE) 1 MG tablet TAKE 1 TABLET BY MOUTH DAILY 240 tablet 0     GLUCAGON 1 mg injection INJECT WHOLE DOSE IF LOW BLOOD SUGAR & BLOOD SUGAR NOT COMING UP AFTER TREATING 2X WITH FOOD. 2 each 1     insulin pump cartridge Crtg 1 each Inject under the skin continuous. Novolog insulin pump       insulin pump cartridge Inject under the skin continuous. Insulin pump discharge instructions from 07/20/17.  This is intended as additional information to the patient's PTA insulin pump order.  Continue with insulin pump at present settings. 1 each 0     insulin syringe-needle U-100 (BD INSULIN SYRINGE ULT-FINE II) 0.3 mL 31 gauge x 5/16 Syrg Use as needed with insulin 50 each 5     levonorgestrel (MIRENA) 20 mcg/24 hr (5 years) IUD 1 each by Intrauterine route once. Placed 8/2015       methotrexate 2.5 MG tablet TAKE 8 TABLETS(20 MG TOTAL) BY MOUTH ONCE A WEEK 32 tablet 0     nicotine (NICODERM CQ) 14 mg/24 hr APPLY 1 PATCH AS DIRECTED ONCE EACH DAY( EVERY 24 HOURS) 21 patch 1     nicotine polacrilex (NICORETTE) 2 mg gum Chew as directed.  Go to \"www.QuitPlan.org\" and read about how to use this drug.  Call them, too.  Get support. Pharm:  Please read note. 100 each 3     NOVOLOG 100 unit/mL injection INJECT 3 TIMES SUBCUTANEOUSLY DAILY VIA INSULIN PUMP UP TO 40 UNITS DAILY 30 mL 0     pantoprazole (PROTONIX) 40 MG tablet Take 1 tablet (40 mg total) by mouth daily. 90 tablet 3     SUMAtriptan (IMITREX) 25 MG tablet TAKE 1 TABLET(25 MG) BY MOUTH EVERY 2 HOURS AS NEEDED FOR MIGRAINE 18 tablet 5     SYNTHROID 125 mcg tablet Take 1 tablet " (125 mcg total) by mouth Daily at 6:00 am. 90 tablet 3     famotidine (PEPCID) 40 MG tablet   1     hydroxychloroquine (PLAQUENIL) 200 mg tablet Take 1 tablet (200 mg total) by mouth 2 (two) times a day. 180 tablet 0     traZODone (DESYREL) 50 MG tablet Take 1 tablet (50 mg total) by mouth at bedtime. 90 tablet 3     No current facility-administered medications for this visit.        Cardiographics:    Echo June 2015   1.Normal left ventricular size with overall normal systolic function.  2.Mild prolapse of the posterior leaflet of the mitral valve.  3.Moderate to severe mitral regurgitation with a wall impinging jet.  4.When compared to the previous echocardiogram dated 5/15/2012 , the  prolapse appears less on the current study.      GXT June 2015   1. Graded exercise test to 80% of predicted maximal heart rate, subjectively  negative for exercise-induced chest pain.  2. No electrocardiographic changes suggestive of stress induced ischemia.  3. Failure to achieve 85% of her predicted maximal heart rate decreases the  sensitivity of this test.  4. Good exercise capacity for age.    Lab Results:       Lab Results   Component Value Date    CHOL 174 02/15/2016    CHOL 120 03/04/2014    CHOL 187 01/31/2014     Lab Results   Component Value Date    HDL 45 (L) 02/15/2016    HDL 24 (L) 03/04/2014    HDL 33 (L) 01/31/2014     Lab Results   Component Value Date    LDLCALC 105 02/15/2016    LDLCALC 81.2 03/04/2014    LDLCALC 103.4 11/01/2011     Lab Results   Component Value Date    TRIG 122 02/15/2016    TRIG 74 03/04/2014    TRIG 48 11/01/2011     No components found for: CHOLHDL  No results found for: BNP    David (Luis Felipe)  MD Hao

## 2021-07-20 NOTE — PLAN OF CARE
Problem: Discharge Barriers  Goal: Patient's discharge needs are met  Outcome: Completed   Pt discharging to home with significant other, reviewed discharge summary and medications with them both. Addressed all questions. Discussed incision care, when to call the doctor and when she can shower/soak. Follow up appointment made with primary MD. All belongings sent home with patient. Script for pain medication sent as well. Patient stable for discharge.       Problem: Glucose Imbalance  Goal: Achieve optimal glucose control  Outcome: Adequate for Discharge   Pt manages her own insulin pump.       Problem: Pain  Goal: Patient's pain/discomfort is manageable  Outcome: Adequate for Discharge   Patient tolerated at time of discharge, patient noted that she is able to get around freely without any issues. PRN's discussed that she can take at home for pain. Patient notes understanding.     Rosa Cates 12/19/2019

## 2021-07-20 NOTE — TELEPHONE ENCOUNTER
"Debbi Team    Pt calling about med refills:  - insulin glargine (LANTUS SOLOSTAR U-100 INSULIN) 100 unit/mL (3 mL) pen - pen needle, diabetic (BD ULTRA-FINE ADALBERTO PEN NEEDLE) 32 gauge x 5/32\" Ndle  - Dexcom CGM  - Npens    [facility] to: [id number] - [place], [place] - [address] AT [address]    "

## 2021-07-20 NOTE — LETTER
Letter by Denia Llamas FNP at      Author: Denia Llamas FNP Service: -- Author Type: --    Filed:  Encounter Date: 7/25/2019 Status: (Other)       Vani SHAILESH Latham  [address] [place] [address]      07/26/19      Dear Vani,      In reviewing your records, we have determined a medication check is needed in October, please call the [facility] to schedule an appointment.      Medication check/review      We have made attempts to call you for an appointment, please verify your contact information is correct when calling back for an appointment, or if you have transferred your care to another clinic, please contact us so we can update our records.     Please call [phone] to schedule an appointment.    We believe that a strong preventative care program, including regular physicals and follow-up care is an important part of a healthy lifestyle and we are committed to helping you maintain your health.    Thank you for choosing us as your health care provider.    Sincerely,    [place] [id number], [address]  [place] [address]  [phone]

## 2021-07-20 NOTE — TELEPHONE ENCOUNTER
Refill Approved    Rx renewed per Medication Renewal Policy. Medication was last renewed on 2/22/19.    Samantha Gurera, [facility] Triage/Med Refill 8/26/2019     Requested Prescriptions   Pending Prescriptions Disp Refills     SYNTHROID 125 mcg tablet [Pharmacy Med Name: SYNTHROID 0.125MG (125MCG) TABLETS] 90 tablet 0     Sig: TAKE 1 TABLET BY MOUTH EVERY DAY AT 6 AM       Thyroid Hormones Protocol Passed - 8/26/2019  3:17 AM        Passed - Provider visit in past 12 months or next 3 months     Last office visit with prescriber/PCP: 8/17/2018 Mark Pimentel MD OR same dept: 4/2/2019 Denia Llamas FNP OR same specialty: 4/2/2019 Denia Llamas FNP  Last physical: Visit date not found Last MTM visit: Visit date not found   Next visit within 3 mo: Visit date not found  Next physical within 3 mo: Visit date not found  Prescriber OR PCP: Mark Pimentel MD  Last diagnosis associated with med order: 1. Hypothyroidism, unspecified type  - SYNTHROID 125 mcg tablet [Pharmacy Med Name: SYNTHROID 0.125MG (125MCG) TABLETS]; TAKE 1 TABLET BY MOUTH EVERY DAY AT 6 AM  Dispense: 90 tablet; Refill: 0    If protocol passes may refill for 12 months if within 3 months of last provider visit (or a total of 15 months).             Passed - TSH on file in past 12 months for patient age 12 & older     TSH   Date Value Ref Range Status   07/23/2019 1.00 0.30 - 5.00 uIU/mL Final

## 2021-07-20 NOTE — CONSULTS
Consultation - Tamia Quintana PA-C    Vani Corona,  1980, MRN [id number]    [facility]  No admission diagnoses are documented for this encounter.    PCP: Denia Llamas FNP, [phone]   Code status:  Prior       Extended Emergency Contact Information  Primary Emergency Contact: Joslyn Anderson   [facility]  Home Phone: [phone]  Relation: Mother       Assessment and Plan   Acute cholelithiasis with cholecystitis  Plan-goal is to place patient at surgical lap cholecystectomy around 4:00 today.  If not able to get on tonight then will plan on her going on clear liquid diet this afternoon and then she will be n.p.o. after midnight we will set her up first thing in the morning for surgery.  Reviewed pathophysiology and surgical indications.  Patient agreeable to surgery.  Thank you for this consult.     Chief Complaint <principal problem not specified>       HPI   We have been requested by ER  to evaluate Vani Corona, who is a 39 y.o. year old female, for possible acute cholecystitis.  Patient is a 39-year-old female with significant past medical history of type 1 diabetes on insulin pump, GERD, tobacco abuse, diabetic retinopathy, hypothyroidism, nonspecific IgA deficiency, depression and anxiety.  Presented to the emergency room with acute chest and abdominal pain that started this morning.  Patient woke up this morning with acute pain that has been constant.  Pain starts mid chest and goes down to her lower abdomen.  Cannot describe the pain.  Has never had this happen before.  Did not take anything for it.  Associated nausea without vomiting.  Feeling chilled currently without fever.  History of heartburn which requires daily Protonix.  No other associated symptoms.  Has not had any difficulty with surgery in the past.  Blood sugars this morning were 400 which is abnormal for her.       Medical History  Active Ambulatory (Non-Hospital) Problems     "Diagnosis     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)     Parent-child relational problem     Family history of psoriasis in mother and sister     Insulin pump status     Tobacco abuse     Right hand pain     Inflammatory polyarthritis (H)     High risk medication use     GERD (gastroesophageal reflux disease) H2 blocker not effective      Polyarthralgia     Non-rheumatic mitral regurgitation     Insomnia      Anxiety     Depression      TMJ inflammation - bilateral     Low back pain     Sciatica of left side     Restless Legs Syndrome     Mitral prolapse with 2 or more clicks     Selective IgA Deficiency     Major depressive disorder, recurrent episode, moderate (H)     Hypothyroidism, unspecified type     Type 1 diabetes mellitus (H)     Arthralgias In Multiple Sites     Eczema, pustular     Past Medical History:   Diagnosis Date     Asthma      Chest pain      Depression      Diabetes (H)      Fainting      Heart disease      Methamphetamine abuse (H)      Methamphetamine abuse in remission, in remission since treatment in 2014 4/10/2015     Rheumatoid arthritis (H)      Substance abuse (H)      Thyroid disease     Surgical History  She  has a past surgical history that includes pr  delivery only; pr revise median n/carpal tunnel surg; pr repair umbilical lon,<6y/o,reduc; colposcopy ();  section, classic; Hernia repair; and Cervical biopsy w/ loop electrode excision ().   Social History  Reviewed, and she  reports that she quit smoking about 2 years ago. Her smoking use included cigarettes. She smoked 0.50 packs per day. She has never used smokeless tobacco. She reports that she does not drink alcohol or use drugs.   Allergies  Allergies   Allergen Reactions     Augmentin [Amoxicillin-Pot Clavulanate] Nausea And Vomiting and Headache     Chantix [Varenicline]      \"I took the Chantix and that made me a " "(severe) crazy person.\"     Venlafaxine Nausea Only     Went away when venlafaxine stopped.  Did not rechallenge. 1/18/16    Family History  Reviewed, and family history includes No Medical Problems in her daughter, sister, son, and other family members; Other in her father; Stroke in her paternal grandmother.   Psychosocial Needs  Social History     Social History Narrative     Not on file     Additional psychosocial needs reviewed per nursing assessment.     Prior to Admission Medications   (Not in a hospital admission)         Review of Systems:  A 12 point comprehensive review of systems was negative except as noted. Physical Exam:  Temp:  [97.4  F (36.3  C)] 97.4  F (36.3  C)  Heart Rate:  [57-72] 70  Resp:  [14-39] 15  BP: (100-111)/(46-59) 111/59    /59   Pulse 70   Temp 97.4  F (36.3  C) (Temporal)   Resp 15   Wt 130 lb (59 kg)   LMP 11/18/2019   SpO2 96%   BMI 22.31 kg/m    General appearance: alert, appears stated age and cooperative  Neck: no adenopathy, no carotid bruit, no JVD, supple, symmetrical, trachea midline and thyroid not enlarged, symmetric, no tenderness/mass/nodules  Lungs: clear to auscultation bilaterally  Heart: regular rate and rhythm, S1, S2 normal, no murmur, click, rub or gallop  Abdomen: Soft tender right upper quadrant with guarding.  No rebound or peritoneal signs present no organomegaly and no hernias palpated.  Extremities: extremities normal, atraumatic, no cyanosis or edema  Pulses: 2+ and symmetric  Skin: Skin color, texture, turgor normal. No rashes or lesions  Lymph nodes: Cervical, supraclavicular, and axillary nodes normal.  Neurologic: Grossly normal       Pertinent Labs  Lab Results: personally reviewed.   Lab Results   Component Value Date     12/18/2019    K 4.5 12/18/2019     12/18/2019    CO2 22 12/18/2019    BUN 15 12/18/2019    CREATININE 1.09 12/18/2019    CALCIUM 9.4 12/18/2019     Lab Results   Component Value Date    WBC 12.2 (H) " 12/18/2019    HGB 13.6 12/18/2019    HCT 40.6 12/18/2019    MCV 91 12/18/2019     12/18/2019        Pertinent Radiology  Radiology Results: Personally reviewed image/s and Personally reviewed impression/s  Xr Chest 1 View    Result Date: 12/18/2019  EXAM: XR CHEST 1 VIEW LOCATION: [facility] DATE/TIME: 12/18/2019 11:11 AM INDICATION: Right-sided chest pain and shortness of breath. Hyperglycemia. COMPARISON: 4/14/2016.     shallow inspiration. Heart size and pulmonary vascularity normal. No focal pulmonary infiltrate.    Us Abdomen Limited    Result Date: 12/18/2019  EXAM: US ABDOMEN LIMITED LOCATION: [facility] DATE/TIME: 12/18/2019 11:04 AM INDICATION: RUQ abdominal pain COMPARISON: None. TECHNIQUE: Limited abdominal ultrasound. FINDINGS: GALLBLADDER: Gallstones in an otherwise normal gallbladder. Gallbladder wall upper limits of normal in thickness. No pericholecystic fluid. The patient is apparently tender over the gallbladder with transducer pressure consistent with a positive sonographic Mclean's sign. BILE DUCTS: No biliary dilatation. The common duct measures 5 mm. LIVER: Hypoechoic liver parenchyma with relatively prominent, echogenic portal triads which can be seen in the setting of hepatic inflammation such as hepatitis. No focal mass. RIGHT KIDNEY: No hydronephrosis. PANCREAS: The visualized portions are normal. No ascites.     1.  Cholelithiasis with tenderness over the gallbladder, gallbladder wall thickening at upper limits of normal. 2.  Bile ducts normal in caliber. 3.  Hypoechoic appearing liver parenchyma nonspecific but does raise question of hepatitis.      EKG Results: not reviewed.        Tamia Quintana PA-C  [facility]  [address] [place] [id number] [phone]  pager [phone]

## 2021-07-20 NOTE — TELEPHONE ENCOUNTER
Central PA team  [phone]  Pool: HE PA MED ([id number])          PA has been initiated.       PA form completed and faxed insurance via Cover My Meds     Key: [id number]      Medication: Synthroid 125MCG OR TABS    Insurance: [facility] PMAP         Response will be received via fax and may take up to 5-10 business days depending on plan

## 2021-07-20 NOTE — PROGRESS NOTES
NURSING TRANSFER AND ARRIVAL NOTE  :    Patient Name: Vani Corona  : 1980  MRN: [id number]  Patient Location: [id number]        The reason for patient transfer is provider order.  The patient was received from PACU via zoom cart  Equipment used for transport None.     Receiving Unit Action:  Received report from Joni at 0920 via verbal by phone.  Oriented patient to surroundings.   Call light within reach.     Response:  Patient tolerated transfer.    Tess Tillman

## 2021-07-20 NOTE — TELEPHONE ENCOUNTER
Debbi Esteves    [employer] assistance program called. States the form we have is an old version. They are sending us their latest version. Please keep an eye out for and fill out, fax.

## 2021-07-20 NOTE — ANESTHESIA POSTPROCEDURE EVALUATION
Patient: Vani Corona  CHOLECYSTECTOMY, LAPAROSCOPIC  Anesthesia type: general    Patient location: PACU  Last vitals:   Vitals Value Taken Time   /50 12/19/2019 11:27 AM   Temp 36.7  C (98  F) 12/19/2019 11:27 AM   Pulse 65 12/19/2019 11:27 AM   Resp 16 12/19/2019 11:27 AM   SpO2 100 % 12/19/2019 11:27 AM     Post vital signs: stable  Level of consciousness: awake and responds to simple questions  Post-anesthesia pain: pain controlled  Post-anesthesia nausea and vomiting: no  Pulmonary: unassisted, return to baseline  Cardiovascular: stable and blood pressure at baseline  Hydration: adequate  Anesthetic events: no    QCDR Measures:  ASA# 11 - Lauren-op Cardiac Arrest: ASA11B - Patient did NOT experience unanticipated cardiac arrest  ASA# 12 - Lauren-op Mortality Rate: ASA12B - Patient did NOT die  ASA# 13 - PACU Re-Intubation Rate: ASA13B - Patient did NOT require a new airway mgmt  ASA# 10 - Composite Anes Safety: ASA10A - No serious adverse event    Additional Notes: Pain well controlled. Feels well overall.

## 2021-07-20 NOTE — PROGRESS NOTES
"Progress Notes by Aura Georges BSW at 12/31/2019  1:00 PM     Author: Aura Georges BSW Service: -- Author Type:     Filed: 12/31/2019  2:38 PM Encounter Date: 12/31/2019 Status: Signed    : Aura Georges BSW ()       Clinic Care Coordination Contact    Clinic Care Coordination Contact  OUTREACH    Visit Note:    Present for today's visit is pt and CCC SW.    Vani Corona is a 40yo female who is here today for initial CCC assessment. She is currently most concerned about the fact that she cannot afford her medications and diabetic supplies. She saw her PCP on 12/24/19 but reports that she could not  the Glucagon pens that were sent to the pharmacy because the total was $280. She continues to have several episodes of hypoglycemia per night. She is open to meeting with CCC RN to further discuss medications and diabetes management.    She reports that her MA coverage ended on 10/31/19. She now has [employer] insurance through her employer and is currently making approximately $2775/month. She reports that her job is temporary and the assignment will end in March. She would like to know if she qualifies for another option such as [place] or [place]. Reviewed that she does not qualify for MA due to income at this time. She would like assistance completing [facility] application.    She recently got full custody of her daughter again (on 12/12/19). Her daughter, Mary Lou, is 9yo, on MA, and has \"many behavioral issues.\" She is unsure when this MA coverage will end. Her daughter completed a neuropsychological evaluation through [facility] earlier this month and previously had a  through [facility], but no services have ever been established through [facility]. SW submitted referrals to [facility] and Children's Mental Health Case Management today.    Plan:  1.) Send 'Remind Me' message to AARON for assistance " exploring health insurance options and completing [facility] application.  2.) See goals.  3.) Begin scheduled outreach.    Referral Information:  Referral Source: PCP  Primary Diagnosis: Psychosocial    Chief Complaint   Patient presents with   ? Clinic Care Coordination - Initial   ? Clinic Care Coodination - Face To Face     Clinic Utilization  Difficulty keeping appointments:: No  Compliance Concerns: No  No-Show Concerns: No  No PCP office visit in Past Year: No     Utilization    Last refreshed: 12/31/2019  1:35 PM:  Hospital Admissions 1           Last refreshed: 12/31/2019  1:35 PM:  ED Visits 1           Last refreshed: 12/31/2019  1:35 PM:  No Show Count (past year) 1              Current as of: 12/31/2019  1:35 PM            Clinical Concerns:  Current Medical Concerns:  See Problem List    Current Behavioral Concerns: See Problem List    Pain  Pain (GOAL):: No  Health Maintenance Reviewed: Not assessed  Clinical Pathway: None     Medication Management: Concerns about diabetic medications and supplies. Pt reports she cannot afford them.     Functional Status:  Dependent ADLs:: Independent  Dependent IADLs:: Independent  Bed or wheelchair confined:: No  Mobility Status: Independent  Fallen 2 or more times in the past year?: No  Any fall with injury in the past year?: No    Living Situation:  Current living arrangement:: I live in a private home with family  [facility]    Diet/Exercise/Sleep:  Diet:: Diabetic diet  Inadequate nutrition (GOAL):: No  Food Insecurity: No  Tube Feeding: No  Exercise:: Currently not exercising  Inadequate activity/exercise (GOAL):: No  Significant changes in sleep pattern (GOAL): No    Transportation:  Transportation concerns (GOAL):: No  Transportation means:: Family, Regular car     Psychosocial:  Quaker or spiritual beliefs that impact treatment:: No  Mental health DX:: Yes  Mental health DX how managed:: Medication  Mental health  management concern (GOAL):: No  Informal Support system:: Children     Financial/Insurance:   Financial/Insurance concerns (GOAL):: Yes     Community Resources: OP Mental Health(sees therapist through [facility])  Supplies used at home:: None  Equipment Currently Used at Home: glucometer    Advance Care Plan/Directive  Advanced Care Plans/Directives on file:: No  Advanced Care Plan/Directive Status: Considering Options    Referrals Placed: None     Goals        Patient Stated    ? Financial Wellbeing (pt-stated)      I would like to meet with W for assistance completing [facility] application and exploring [place] and/or [place] Care for myself and my children within the next 30 days    Action steps to achieve this goal  1.  I will answer the phone when W contacts me to schedule an appointment.  2.  I will provide all necessary paperwork/documentation to assist in this process.  3.  I will communicate with CHW at outreach.    NOTE: Pt reports that her MA ended on October 31st; her daughter currently has active MA. IRASEMA to communicate with [facility] signed on 12/31/19 and left on CHW Angélica's desk.    Date goal set:  12/31/19  Discussed/updated:      ? Medication 1 (pt-stated)      I would like to meet with CCC RN to discuss medications and diabetes management within the next 30 days    Action steps to achieve this goal  1.  I will answer the phone when W calls me to schedule an appointment with CCC RN.  2.  I will bring all of my medications to my scheduled appointment with CCC RN.    NOTE: SW assessment completed on 12/31/19.    Date goal set:  12/31/19  Discussed/updated:      ? Mental Health Management (pt-stated)      I would like to get my daughter connected to PCA and mental health case management services through [facility] within the next 90 days    Action steps to achieve this goal  1.  I will answer the phone when [facility] contacts me to schedule an in-home assessment for PCA and children's mental  health case management.   2.  I will provide all necessary paperwork/documentation to assist in this process.  3.  I will communicate with CHW at outreach.    Date goal set:  12/31/19  Discussed/updated:          Future Appointments              In 4 weeks [facility], [facility]    In 1 month Anayeli Dave NP [facility], [facility]

## 2021-07-20 NOTE — PROGRESS NOTES
Office Visit - Follow Up   Vani Jenkinsr   38 y.o. female    Date of Visit: 10/25/2018    Chief Complaint   Patient presents with     Rash     RAsh on left foot, using Clobetesol and helped for awhile and now came back     Depression     Would like to get back on depression medications        Assessment and Plan   1. Inflammatory polyarthritis (H)  Is on methotrexate for this she says this is stable.    2. Moderate episode of recurrent major depressive disorder (H)  According to my exam today and her PHQ she is clinically depressed.  Tearful about her daughter who she is not allowed to see.  She her daughter 9-year-old daughter has a legal guardian who has sole custody.  And is very controlling about how much time she gets to meet her biological relatives.  Trying to appeal this and so that she can have her daughter back but it is struggling a lot.  He is currently sober.  Her friend is being unhelpful because she feels that she is at high risk for relapse.  While she feels that initially even though she was doing well and they tapered her off her additional antidepressants she is now feeling more depressed.  Have a therapist.  He is not suicidal.  We will add back Lexapro but at a lower dose at 10 mg.  And add trazodone at 50 mg.  There is danger of this rotten syndrome with multiple SSRI use however she is on a lower dose of the Wellbutrin at 150 mg.  Last EKG was reviewed and was also normal.    3. Tinea pedis of left foot  Except if the infection could be eczema or tenia infection.  But it got worse with the clobetasol suggests fungus will give her a prescription antifungal cream to apply for 2 weeks.            No Follow-up on file.     History of Present Illness   This 38 y.o. old struggling with relapse of her depression and anxiety.  He has type 1 diabetes but there is fairly well controlled.  He is compliant with her medications and doctor visits.  Is currently seeing a psychotherapist.    Review of  "Systems: A comprehensive review of systems was negative except as noted.     Medications, Allergies and Problem List   Reviewed, reconciled and updated     Physical Exam   General Appearance:       /66 (Patient Site: Left Arm, Patient Position: Sitting, Cuff Size: Adult Large)  Pulse 74  Ht 5' 4\" (1.626 m)  Wt 127 lb (57.6 kg)  SpO2 100%  BMI 21.8 kg/m2    General appearance - alert, well appearing, and in no distress  Mental status - alert, oriented to person, place, and time  Extremities - peripheral pulses normal, no pedal edema, no clubbing or cyanosis  Skin - normal coloration and turgor, no rashes, no suspicious skin lesions noted  Little interest or pleasure in doing things: More than half the days  Feeling down, depressed, or hopeless: More than half the days  Trouble falling or staying asleep, or sleeping too much: Nearly every day  Feeling tired or having little energy: More than half the days  Poor appetite or overeating: More than half the days  Feeling bad about yourself - or that you are a failure or have let yourself or your family down: More than half the days  Trouble concentrating on things, such as reading the newspaper or watching television: More than half the days  Moving or speaking so slowly that other people could have noticed. Or the opposite - being so fidgety or restless that you have been moving around a lot more than usual: Not at all  Thoughts that you would be better off dead, or of hurting yourself in some way: Not at all  PHQ-9 Total Score: 15  If you checked off any problems, how difficult have these problems made it for you to do your work, take care of things at home, or get along with other people?: Very difficult                                                                                       Additional Information   Current Outpatient Prescriptions   Medication Sig Dispense Refill     ADMELOG U-100 INSULIN LISPRO 100 unit/mL injection INJECT UP TO 40 UNITS D " VIA INSULIN PUMP  1     albuterol (VENTOLIN HFA) 90 mcg/actuation inhaler Inhale 2 puffs every 6 (six) hours as needed for wheezing. 1 Inhaler 2     baclofen (LIORESAL) 10 MG tablet Take 10 mg by mouth 3 (three) times a day as needed.       blood glucose meter (GLUCOMETER) Use 1 each As Directed as needed. Dispense glucometer brand per patient's insurance at pharmacy discretion. 1 each 0     buPROPion (WELLBUTRIN XL) 150 MG 24 hr tablet TAKE 1 TABLET(150 MG) BY MOUTH EVERY MORNING 90 tablet 2     cetirizine (ZYRTEC) 10 MG tablet Take 1 tablet (10 mg total) by mouth every evening. 90 tablet 1     cholecalciferol, vitamin D3, 1,000 unit tablet Take 1,000 Units by mouth every evening.        clobetasol (TEMOVATE) 0.05 % cream Apply 1 application topically 2 (two) times a day as needed. 45 g 1     CONTOUR NEXT TEST STRIPS strips TEST BLOOD SUGAR 6 TIMES DAILY 200 strip 11     diabetic supplies, miscellan. Misc Insert sensor weekly 40 each 3     folic acid (FOLVITE) 1 MG tablet   0     GLUCAGON 1 mg injection INJECT WHOLE DOSE IF LOW BLOOD SUGAR & BLOOD SUGAR NOT COMING UP AFTER TREATING 2X WITH FOOD. 2 each 1     infusion set for insulin pump (MINIMED INFUSION SET) ISet Use every 3 days with pump 40 each 3     insulin pump cartridge Inject under the skin continuous. Insulin pump discharge instructions from 07/20/17.  This is intended as additional information to the patient's PTA insulin pump order.  Continue with insulin pump at present settings. 1 each 0     insulin pump syringe (PARADIGM RESERVOIR) 3 mL Misc Use every 3 days with set 40 each 3     insulin syringe-needle U-100 (BD INSULIN SYRINGE ULT-FINE II) 0.3 mL 31 gauge x 5/16 Syrg Use as needed with insulin 50 each 5     levonorgestrel (MIRENA) 20 mcg/24 hr (5 years) IUD 1 each by Intrauterine route once. Placed 8/2015       methotrexate 2.5 MG tablet TAKE 8 TABLETS BY MOUTH ONCE A WEEK 96 tablet 0     pantoprazole (PROTONIX) 40 MG tablet Take 1 tablet (40 mg  "total) by mouth daily. 90 tablet 3     SUMAtriptan (IMITREX) 25 MG tablet TAKE 1 TABLET(25 MG) BY MOUTH EVERY 2 HOURS AS NEEDED FOR MIGRAINE 18 tablet 5     SYNTHROID 125 mcg tablet TAKE 1 TABLET BY MOUTH DAILY AT 6AM 90 tablet 2     escitalopram oxalate (LEXAPRO) 10 MG tablet Take 1 tablet (10 mg total) by mouth daily. 30 tablet 2     ketoconazole (NIZORAL) 2 % cream applly three times a day for two weeks 15 g 0     traZODone (DESYREL) 50 MG tablet Take 1 tablet (50 mg total) by mouth at bedtime. 90 tablet 3     No current facility-administered medications for this visit.      Allergies   Allergen Reactions     Augmentin [Amoxicillin-Pot Clavulanate] Nausea And Vomiting and Headache     Chantix [Varenicline]      \"I took the Chantix and that made me a (severe) crazy person.\"     Venlafaxine Nausea Only     Went away when venlafaxine stopped.  Did not rechallenge. 1/18/16     Social History   Substance Use Topics     Smoking status: Former Smoker     Packs/day: 0.50     Types: Cigarettes     Quit date: 7/12/2017     Smokeless tobacco: Never Used      Comment: She had stopped for a month as of 3/16/16, but now is back on 1/2 PPD.  11/7/16     Alcohol use No            Stacie Luna MD    "

## 2021-07-20 NOTE — DISCHARGE SUMMARY
[facility] MEDICINE  DISCHARGE SUMMARY     Primary Care Physician: Denia Llamas FNP  Admission Date: 12/18/2019   Discharge Provider: Karla Gold Discharge Date: 12/19/2019   Diet:  Discharge Diet Order (720h ago, onward)     Start        12/19/19 0000  Discharge diet     Comments:  Diabetic                    Code Status: Full Code   Activity:   Discharge Activity Order (720h ago, onward)     Start        12/19/19 0000  Activity as tolerated     Comments:  Rest when tired                       Condition at Discharge: Stable      REASON FOR PRESENTATION(See Admission Note for Details)   Principal Problem:    Cholecystitis  Active Problems:    Acute cholecystitis      PRINCIPAL & ACTIVE DISCHARGE DIAGNOSES     Principal Problem:    Cholecystitis  Active Problems:    Acute cholecystitis      SIGNIFICANT FINDINGS (Imaging, labs):   Xr Chest 1 View    Result Date: 12/18/2019  EXAM: XR CHEST 1 VIEW LOCATION: [facility] DATE/TIME: 12/18/2019 11:11 AM INDICATION: Right-sided chest pain and shortness of breath. Hyperglycemia. COMPARISON: 4/14/2016.     shallow inspiration. Heart size and pulmonary vascularity normal. No focal pulmonary infiltrate.    Us Abdomen Limited    Result Date: 12/18/2019  EXAM: US ABDOMEN LIMITED LOCATION: [facility] DATE/TIME: 12/18/2019 11:04 AM INDICATION: RUQ abdominal pain COMPARISON: None. TECHNIQUE: Limited abdominal ultrasound. FINDINGS: GALLBLADDER: Gallstones in an otherwise normal gallbladder. Gallbladder wall upper limits of normal in thickness. No pericholecystic fluid. The patient is apparently tender over the gallbladder with transducer pressure consistent with a positive sonographic Mclean's sign. BILE DUCTS: No biliary dilatation. The common duct measures 5 mm. LIVER: Hypoechoic liver parenchyma with relatively prominent, echogenic portal triads which can be seen in the setting of hepatic inflammation such as hepatitis. No focal mass.  RIGHT KIDNEY: No hydronephrosis. PANCREAS: The visualized portions are normal. No ascites.     1.  Cholelithiasis with tenderness over the gallbladder, gallbladder wall thickening at upper limits of normal. 2.  Bile ducts normal in caliber. 3.  Hypoechoic appearing liver parenchyma nonspecific but does raise question of hepatitis.    Results from last 7 days   Lab Units 12/19/19  0436 12/18/19  0830   LN-WHITE BLOOD CELL COUNT thou/uL 11.8* 12.2*   LN-HEMOGLOBIN g/dL 10.7* 13.6   LN-HEMATOCRIT % 31.7* 40.6   LN-PLATELET COUNT thou/uL 259 334       Results from last 7 days   Lab Units 12/19/19  0436 12/18/19  0830   LN-SODIUM mmol/L 139 136   LN-POTASSIUM mmol/L 3.7 4.5   LN-CHLORIDE mmol/L 111* 101   LN-CO2 mmol/L 22 22   LN-BLOOD UREA NITROGEN mg/dL 13 15   LN-CREATININE mg/dL 0.71 1.09   LN-CALCIUM mg/dL 7.7* 9.4   LN-ALBUMIN g/dL 3.0* 4.1   LN-PROTEIN TOTAL g/dL 5.1* 7.1   LN-BILIRUBIN TOTAL mg/dL 0.8 1.0   LN-ALKALINE PHOSPHATASE U/L 32* 48   LN-ALT (SGPT) U/L 14 19   LN-AST (SGOT) U/L 14 17     Results from last 7 days   Lab Units 12/19/19  0436   LN-INR  1.34*       Recent INR results:   Results from last 7 days   Lab Units 12/19/19  0436   LN-INR  1.34*      Warfarin doses (if applicable) or name of other anticoagulant : none  PENDING LABS      Order Current Status    Surgical Pathology Exam In process          PROCEDURES ( this hospitalization only)      CHOLECYSTECTOMY, LAPAROSCOPIC    RECOMMENDATION FOR F/U VISIT     Discharge Orders   Activity as tolerated   Order Comments: Rest when tired     Call MD:  if symptoms get worse     Call MD for:  redness or swelling     Call MD:  if pain or burning when you urinate     Call MD for:  difficulty breathing, headache or visual disturbances     Call MD for:  temperature greater than 101     Call MD for:  severe uncontrolled pain     Call MD for:  constipation (difficulty having a bowel movement)     Call MD for:  dizziness, persistent nausea or vomiting     Call  MD for:  weight gain - more than two pounds in a day or five pounds in a week     Discharge Follow Up - Primary Care Clinic     Order Specific Question Answer Comments   Follow-up in: Within 7 days    Schedule Darryl or Tad follow-up appointment Follow-up appointment with Primary Care Clinic to be scheduled before leaving the hospital      Discharge diet   Order Comments: Diabetic       DISPOSITION      Home    SUMMARY OF HOSPITAL COURSE:    Vani Corona is a 39 y.o. old female  Acute cholecystitis.   S/p Laparoscopic cholecystectomy doing well, passing flatus and tolerating solids, pain controlled, cleared by surgery ok to discharge      Diabetes type 1 on insulin pump with hyperglycemia.   - insulin pump held while NPO and surgery used SS  - insulin pump restarted prior to discharge     Discharge Medications with Med changes:        Medication List      START taking these medications    acetaminophen 650 MG suppository  Dose:  650 mg  Commonly known as:  TYLENOL  650 mg, Rectal, Every 6 hours PRN     HYDROcodone-acetaminophen 5-325 mg per tablet  Quantity:  12 tablet  Dose:  1 tablet  1 tablet, Oral, Every 4 hours PRN        CHANGE how you take these medications    Contour Next Test Strips strips  Quantity:  200 strip  Generic drug:  blood glucose test  TEST BLOOD SUGAR 6 TIMES DAILY  What changed:  See the new instructions.     escitalopram oxalate 10 MG tablet  Quantity:  90 tablet  Commonly known as:  LEXAPRO  TAKE 1 TABLET(10 MG) BY MOUTH DAILY  What changed:  when to take this     insulin aspart U-100 100 unit/mL injection  Quantity:  36 mL  Dose:  40 Units  Commonly known as:  NovoLOG U-100 Insulin aspart  40 Units, Subcutaneous, DAILY, Via the pump  What changed:      how much to take    additional instructions     pantoprazole 40 MG tablet  Quantity:  90 tablet  Commonly known as:  PROTONIX  TAKE 1 TABLET BY MOUTH M EVERY DAY  What changed:  See the new instructions.        CONTINUE taking these  "medications    albuterol 90 mcg/actuation inhaler  Quantity:  1 Inhaler  Dose:  2 puff  Commonly known as:  Ventolin HFA  2 puffs, Inhalation, Every 6 hours PRN     aspirin 81 MG EC tablet  Dose:  81 mg  81 mg, Oral, Every evening     baclofen 10 MG tablet  Dose:  10 mg  Commonly known as:  LIORESAL  10 mg, Oral, 3 times daily PRN     buPROPion 150 MG 24 hr tablet  Quantity:  90 tablet  Commonly known as:  WELLBUTRIN XL  TAKE 1 TABLET(150 MG) BY MOUTH EVERY MORNING     cetirizine 10 MG tablet  Quantity:  90 tablet  Commonly known as:  ZyrTEC  TAKE 1 TABLET(10 MG) BY MOUTH EVERY EVENING     cholecalciferol (vitamin D3) 1,000 unit (25 mcg) tablet  Dose:  1,000 Units  1,000 Units, Oral, Every evening     clobetasol 0.05 % cream  Quantity:  45 g  Dose:  1 application  Commonly known as:  TEMOVATE  1 application, Topical, 2 times daily PRN     glucagon 1 mg injection  Quantity:  2 each  Generic drug:  glucagon (human recombinant)  INJECT WHOLE DOSE IF LOW BLOOD SUGAR & BLOOD SUGAR NOT COMING UP AFTER TREATING 2X WITH FOOD.     infusion set for insulin pump Iset  Quantity:  40 each  Commonly known as:  Minimed Infusion Set  Use every 3 days with pump     insulin pump cartridge  Quantity:  1 each  Subcutaneous, Continuous, Insulin pump discharge instructions from 07/20/17. This is intended as additional information to the patient's PTA insulin pump order. Continue with insulin pump at present settings.     insulin pump syringe 3 mL Misc  Quantity:  40 each  Commonly known as:  Par[employer]igm [place]  Use every 3 days with set     insulin syringe-needle U-100 0.3 mL 31 gauge x 5/16\" Syrg  Quantity:  50 each  Commonly known as:  BD Insulin Syringe Ult-Fine II  Use as needed with insulin     ketoconazole 2 % cream  Commonly known as:  NIZORAL  Topical, 2 times daily PRN     ondansetron 4 MG tablet  Quantity:  10 tablet  Commonly known as:  ZOFRAN  TAKE 1 TABLET(4 MG) BY MOUTH EVERY 12 HOURS AS NEEDED FOR NAUSEA     SUMAtriptan 25 " MG tablet  Quantity:  18 tablet  Commonly known as:  IMITREX  TAKE 1 TABLET(25 MG) BY MOUTH EVERY 2 HOURS AS NEEDED FOR MIGRAINE     Synthroid 125 MCG tablet  Quantity:  90 tablet  Generic drug:  levothyroxine  TAKE 1 TABLET BY MOUTH EVERY DAY AT 6 AM     traZODone 50 MG tablet  Quantity:  90 tablet  Commonly known as:  DESYREL  TAKE 1 TABLET(50 MG) BY MOUTH AT BEDTIME              Rationale for medication changes:    See above      Consults   general surgery    Immunizations given this encounter         Examination     Vital Signs in last 24 hours:   Temp:  [98  F (36.7  C)-99.3  F (37.4  C)] 98.4  F (36.9  C)  Heart Rate:  [61-82] 61  Resp:  [14-22] 18  BP: ()/(46-58) 106/58  SpO2:  [92 %-100 %] 100 %  General appearance: alert, appears stated age and cooperative  Lungs: clear to auscultation bilaterally  Heart: regular rate and rhythm, S1, S2 normal, no murmur, click, rub or gallop  Abdomen: soft mild incisional tenderness     Please see EMR for more detailed significant labs, imaging, consultant notes etc.      Karla Gold MD   [facility] Center[facility] Service: Ph:[phone]  CC:Denia Llamas FNP

## 2021-07-20 NOTE — TELEPHONE ENCOUNTER
I called the pt, there was no answer and the VM was full. The pt should have the company that she gets her supplies from fax us a request.

## 2021-07-20 NOTE — TELEPHONE ENCOUNTER
Pt ID # [id number].  Application denied due to insurance covering the below medication. The assistance program will appeal the application due to the high co-pay that the pt has. They will c/b once the appeal has been completed.

## 2021-07-20 NOTE — PROGRESS NOTES
Internal Medicine Office Visit  [facility]  Patient Name: Vani Corona  Patient Age: 37 y.o.  YOB: 1980  MRN: [id number]    Date of Visit: 3/19/2018  Reason for Office Visit:   Chief Complaint   Patient presents with     Follow-up     6 months, when she walks she gets cramps in her legs and its tough to walk           Assessment / Plan / Medical Decision Makin. Muscle cramps  2. Skin tag  3. Type 1 diabetes mellitus with hyperglycemia  4. Tobacco abuse  5. TMJ inflammation - bilateral  6. Mitral prolapse with 2 or more clicks  7. Hypothyroidism, unspecified type  8. GERD (gastroesophageal reflux disease)  9. Depression  10. Anxiety  - I have counseled the patient for tobacco cessation and the follow up will occur  at the next visit. She is commended for efforts so far  - PAP smear report requested  - Continue current depression/anxiety medications as prescribed  - Follow up with endocrinology for thyroid/DM management. Polyarthralgia as per rheumatology  - 1 year follow up      Health Maintenance Review  Health Maintenance   Topic Date Due     DIABETES URINE MICROALBUMIN  2018     PAP SMEAR  2018 (Originally 10/22/2009)     DIABETES OPHTHALMOLOGY EXAM  2018     DIABETES FOLLOW-UP  2018     DIABETES FOOT EXAM  2018     DIABETES HEMOGLOBIN A1C  2018     DEPRESSION FOLLOW UP  2018     ADVANCE DIRECTIVES DISCUSSED WITH PATIENT  2023     TD 18+ HE  2025     INFLUENZA VACCINE RULE BASED  Completed     TDAP ADULT ONE TIME DOSE  Completed         I have discontinued Ms. Corona's traZODone and famotidine. I am also having her maintain her levonorgestrel, cholecalciferol (vitamin D3), glucagon, blood glucose meter, nicotine polacrilex, baclofen, insulin pump cartridge Crtg 1 each, insulin pump cartridge, NovoLOG U-100 Insulin aspart, buPROPion, cetirizine, SYNTHROID, SUMAtriptan, pantoprazole, CONTOUR NEXT  STRIPS, insulin syringe-needle U-100, folic acid, nicotine, methotrexate, and hydroxychloroquine.      HPI:  Vani Corona is a 37 y.o. year old who presents to the office today for follow up of chronic medical conditions.     Has a skin tag in the right axilla area.    She went for a walk over the weekend and got a cramp in her calf. This occurs intermittently with physical activity. Admits she is not very good about staying hydrated.     Insomnia was treated with trazodone previously but she is sleeping fine so she stopped taking this. Anxiety well controlled with bupropion only and she continues to see a psychologist.     Tobacco use reviewed. She stopped smoking cigarettes but continues to vape. Her goal is to discontinue this by the summer, she is commended for her efforts.     Polyarthralgia- followed by rheumatology, she is currently taking methotrexate and plaquenil for this. She takes folic acid regularly daily as well. Last eye visit June 2017.     Migraine headaches-few migraines recently. She takes sumitriptan as needed and this is sparingly.     TMJ continues to bother her. Symptoms are worse after she eats food that she has to chew a lot such as beef jerky. PT was not effective for her and she was unable to tolerate a mouthpiece. Not a big concern for her at this time.     PAP smears are done through [facility]. Records will be requested today.     GERD- controlled with PPI only, did not tolerated H2RA.     Hypothyroidism- Euthyroid as of 5/2017, followed by endocrinology . DM controlled with insulin pump, last A1C 7.5%.       Review of Systems- pertinent positive in bold:  A 10-point ROS is negative except as stated in HPI         Current Scheduled Meds:  Outpatient Encounter Prescriptions as of 3/19/2018   Medication Sig Dispense Refill     baclofen (LIORESAL) 10 MG tablet Take 10 mg by mouth 3 (three) times a day as needed.       blood glucose meter (GLUCOMETER) Use 1 each As Directed as needed.  Dispense glucometer brand per patient's insurance at pharmacy discretion. 1 each 0     buPROPion (WELLBUTRIN XL) 150 MG 24 hr tablet Take 1 tablet (150 mg total) by mouth every morning. 90 tablet 3     cetirizine (ZYRTEC) 10 MG tablet Take 1 tablet (10 mg total) by mouth every evening. 90 tablet 3     cholecalciferol, vitamin D3, 1,000 unit tablet Take 1,000 Units by mouth every evening.        CONTOUR NEXT STRIPS strips TEST BLOOD SUGAR 6 TIMES DAILY 200 strip 5     folic acid (FOLVITE) 1 MG tablet TAKE 1 TABLET BY MOUTH DAILY 240 tablet 0     GLUCAGON 1 mg injection INJECT WHOLE DOSE IF LOW BLOOD SUGAR & BLOOD SUGAR NOT COMING UP AFTER TREATING 2X WITH FOOD. 2 each 1     hydroxychloroquine (PLAQUENIL) 200 mg tablet TAKE 1 TABLET BY MOUTH TWICE DAILY 180 tablet 0     insulin pump cartridge Crtg 1 each Inject under the skin continuous. Novolog insulin pump       insulin pump cartridge Inject under the skin continuous. Insulin pump discharge instructions from 07/20/17.  This is intended as additional information to the patient's PTA insulin pump order.  Continue with insulin pump at present settings. 1 each 0     insulin syringe-needle U-100 (BD INSULIN SYRINGE ULT-FINE II) 0.3 mL 31 gauge x 5/16 Syrg Use as needed with insulin 50 each 5     levonorgestrel (MIRENA) 20 mcg/24 hr (5 years) IUD 1 each by Intrauterine route once. Placed 8/2015       methotrexate 2.5 MG tablet TAKE 8 TABLETS(20 MG TOTAL) BY MOUTH ONCE A WEEK 32 tablet 1     NOVOLOG 100 unit/mL injection INJECT 3 TIMES SUBCUTANEOUSLY DAILY VIA INSULIN PUMP UP TO 40 UNITS DAILY 30 mL 0     pantoprazole (PROTONIX) 40 MG tablet Take 1 tablet (40 mg total) by mouth daily. 90 tablet 3     SUMAtriptan (IMITREX) 25 MG tablet TAKE 1 TABLET(25 MG) BY MOUTH EVERY 2 HOURS AS NEEDED FOR MIGRAINE 18 tablet 5     SYNTHROID 125 mcg tablet Take 1 tablet (125 mcg total) by mouth Daily at 6:00 am. 90 tablet 3     nicotine (NICODERM CQ) 14 mg/24 hr APPLY 1 PATCH AS DIRECTED  "ONCE EACH DAY( EVERY 24 HOURS) 21 patch 1     nicotine polacrilex (NICORETTE) 2 mg gum Chew as directed.  Go to \"www.QuitPlan.org\" and read about how to use this drug.  Call them, too.  Get support. Pharm:  Please read note. 100 each 3     [DISCONTINUED] famotidine (PEPCID) 40 MG tablet   1     [DISCONTINUED] traZODone (DESYREL) 50 MG tablet Take 1 tablet (50 mg total) by mouth at bedtime. 90 tablet 3     No facility-administered encounter medications on file as of 3/19/2018.      Past Medical History:   Diagnosis Date     Asthma      Chest pain      Depression      Diabetes      Fainting      Heart disease     MVP     Methamphetamine abuse      Methamphetamine abuse in remission, in remission since treatment in 2014 4/10/2015    Treatment 2014. Clean since.      Rheumatoid arthritis      Substance abuse     methamphetamine     Thyroid disease      Past Surgical History:   Procedure Laterality Date     CERVICAL BIOPSY  W/ LOOP ELECTRODE EXCISION        SECTION, CLASSIC       colposcopy      LEAP     HERNIA REPAIR       DE  DELIVERY ONLY      Description:  Section;  Recorded: 2010;  Comments: 09 - breech     DE REPAIR UMBILICAL RUTH ANN,<6Y/O,REDUC      Description: Umbilical Hernia Repair;  Recorded: 10/03/2014;     DE REVISE MEDIAN N/CARPAL TUNNEL SURG      Description: Neuroplasty Decompression Median Nerve At Carpal Tunnel;  Recorded: 10/03/2014;     Social History   Substance Use Topics     Smoking status: Former Smoker     Packs/day: 0.50     Types: Cigarettes     Quit date: 2017     Smokeless tobacco: Never Used      Comment: She had stopped for a month as of 3/16/16, but now is back on  PPD.  16     Alcohol use No       Objective / Physical Examination:  Vitals:    18 1538   BP: 100/60   Patient Site: Right Arm   Patient Position: Sitting   Cuff Size: Adult Regular   Pulse: 75   Weight: 125 lb 8 oz (56.9 kg)     Wt Readings from Last 3 Encounters: "   03/19/18 125 lb 8 oz (56.9 kg)   02/27/18 126 lb 12.8 oz (57.5 kg)   02/20/18 126 lb 12.8 oz (57.5 kg)     Body mass index is 21.54 kg/(m^2).     General Appearance: Alert and oriented, cooperative, affect appropriate, speech clear, in no apparent distress  Lungs: Clear to auscultation bilaterally. Normal inspiratory and expiratory effort  Cardiovascular: Regular rate, normal S1, S2. Grade III/VI murmurs, rubs, or gallops  Extremities: Pulses 2+ and equal throughout. No edema  Integumentary: Warm and dry. Skin tags on the left extensor surface of elbow, right axilla area x 2  Psych: Alert and oriented x3. Well groomed. Does not appear anxious or depressed     Procedure: Skin tags are snipped off using rubbing alcohol for cleansing and sterile iris scissors. Local anesthesia was not used. These pathognomonic lesions are not sent for pathology.      No orders of the defined types were placed in this encounter.  Followup: Return in about 1 year (around 3/19/2019) for Annual physical. earlier if needed.      Denia Llamas, CNP

## 2021-07-20 NOTE — TELEPHONE ENCOUNTER
Central PA team  [phone]  Pool: HE PA MED ([id number])          PA has been initiated.       PA form completed and faxed insurance via Cover My Meds     Key:  [id number]     Medication:  ACCU CHEK GUIDE TEST STRIPS    Insurance:  BLUE PLUS        Response will be received via fax and may take up to 5-10 business days depending on plan

## 2021-07-20 NOTE — PROGRESS NOTES
[facility]  ENDOCRINOLOGY    Diabetes Note 4/19/2018    Vani Corona, 1980, [id number]          Reason for visit      1. Type 1 diabetes mellitus with hyperglycemia    2. DKA (diabetic ketoacidoses)        HPI     Vani Corona is a very pleasant 37 y.o. old female who presents for follow up.  SUMMARY:  Vani returns today in f/u for DM 1. Her current A1c is 7.5, which is better than her last one of 7.8.  She continues to use the Medtronic 670 insulin pump in Autom[employer] and she feels that this has been really helpful in controlling her blood sugars.  Her download shows that Her ave SG was 164 over the last two weeks.  She had 66% of her time in range, with 25% in the 180 to 250 range. She does continue to have some hypoglycemia, but her pump does warn her that this is happening. She mentions that she has been having trouble with [facility] in getting her supplies to her in a timely manner, and with the right quantity.  She will change sites when her numbers are really high and not responding.       Past Medical History     Patient Active Problem List   Diagnosis     Restless Legs Syndrome     Mitral prolapse with 2 or more clicks     Selective IgA Deficiency     Hypothyroidism, unspecified type     Type 1 diabetes mellitus     Arthralgias In Multiple Sites     Eczema, pustular     Low back pain     Sciatica of left side     Anxiety     Depression      TMJ inflammation - bilateral     Insomnia      Non-rheumatic mitral regurgitation     Polyarthralgia     GERD (gastroesophageal reflux disease) H2 blocker not effective      Inflammatory polyarthritis     High risk medication use     Right hand pain     Tobacco abuse        Family History       family history includes No Medical Problems in her daughter, other, other, sister, and son; Other in her father; Stroke in her paternal grandmother.    Social History      reports that she quit smoking about 9 months ago. Her smoking use included Cigarettes. She smoked  "0.50 packs per day. She has never used smokeless tobacco. She reports that she does not drink alcohol or use illicit drugs.      Review of Systems     Patient has no polyuria or polydipsia, no chest pain, dyspnea or TIA's, no numbness, tingling or pain in extremities  Remainder negative except as noted in HPI.    Vital Signs     /56 (Patient Site: Right Arm, Patient Position: Sitting, Cuff Size: Adult Regular)  Pulse 68  Ht 5' 4\" (1.626 m)  Wt 128 lb 8 oz (58.3 kg)  BMI 22.06 kg/m2  Wt Readings from Last 3 Encounters:   04/17/18 128 lb 8 oz (58.3 kg)   03/19/18 125 lb 8 oz (56.9 kg)   02/27/18 126 lb 12.8 oz (57.5 kg)       Physical Exam     Constitutional:  Well developed, Well nourished  HENT:  Normocephalic,   Neck: Thyroid normal, No lymph nodes, Supple  Eyes:  PERRL, Conjunctiva pink  Respiratory:  Normal breath sounds, No respiratory distress  Cardiovascular:  Normal heart rate, Normal rhythm, No murmurs  GI:  Bowel sounds normal, Soft, No tenderness  Musculoskeletal:  No gross deformity or lesions, normal dorsalis pedis pulses  Skin: No acanthosis nigricans, lipoatrophy or lipodystrophy  Neurologic:  Alert & oriented x 3, nonfocal  Psychiatric:  Affect, Mood, Insight appropriate  Diabetic foot exam: no ulcers, charcot's or high risk calluses, Normal monofilament exam        Assessment     1. Type 1 diabetes mellitus with hyperglycemia    2. DKA (diabetic ketoacidoses)        Plan     Encouraged strongly to continue using the sensor and Auto Mode.  Her numbers are much better regulated when she does this.  Told not to ignore the warnings when it tells her that her blood sugars are dropping, and she tells me that they recently made a change to her lower parameters.  She will f/u with me in 3 months and continue to work with the CDE on this.  Time spent with pt today: 25 min with >50% spent in counseling and coordination of care.        Anayeli ANDERSON Endocrinology  4/19/2018  9:17 AM        Lab " Results     Hemoglobin A1c   Date Value Ref Range Status   03/13/2018 7.5 (H) 3.5 - 6.0 % Final   12/05/2017 7.8 (H) 3.5 - 6.0 % Final   07/19/2017 7.9 (H) 4.2 - 6.1 % Final   05/19/2017 7.7 (H) 3.5 - 6.0 % Final   02/16/2017 9.7 (H) 4.2 - 6.1 % Final     Creatinine   Date Value Ref Range Status   02/12/2018 0.66 0.60 - 1.10 mg/dL Final   11/08/2017 0.79 0.60 - 1.10 mg/dL Final   09/13/2017 0.78 0.60 - 1.10 mg/dL Final     Microalbumin, Random Urine   Date Value Ref Range Status   02/16/2017 <0.50 0.00 - 1.99 mg/dL Final       Cholesterol   Date Value Ref Range Status   02/15/2016 174 <=199 mg/dL Final     HDL Cholesterol   Date Value Ref Range Status   02/15/2016 45 (L) >=50 mg/dL Final     LDL Calculated   Date Value Ref Range Status   02/15/2016 105 <=129 mg/dL Final     Triglycerides   Date Value Ref Range Status   02/15/2016 122 <=149 mg/dL Final       Lab Results   Component Value Date    ALT 26 02/12/2018    AST 15 07/20/2017    ALKPHOS 35 (L) 07/20/2017    BILITOT 0.8 07/20/2017         Current Medications     Outpatient Medications Prior to Visit   Medication Sig Dispense Refill     baclofen (LIORESAL) 10 MG tablet Take 10 mg by mouth 3 (three) times a day as needed.       blood glucose meter (GLUCOMETER) Use 1 each As Directed as needed. Dispense glucometer brand per patient's insurance at pharmacy discretion. 1 each 0     buPROPion (WELLBUTRIN XL) 150 MG 24 hr tablet Take 1 tablet (150 mg total) by mouth every morning. 90 tablet 3     cetirizine (ZYRTEC) 10 MG tablet Take 1 tablet (10 mg total) by mouth every evening. 90 tablet 3     cholecalciferol, vitamin D3, 1,000 unit tablet Take 1,000 Units by mouth every evening.        CONTOUR NEXT STRIPS strips TEST BLOOD SUGAR 6 TIMES DAILY 200 strip 5     folic acid (FOLVITE) 1 MG tablet TAKE 1 TABLET BY MOUTH DAILY 240 tablet 0     GLUCAGON 1 mg injection INJECT WHOLE DOSE IF LOW BLOOD SUGAR & BLOOD SUGAR NOT COMING UP AFTER TREATING 2X WITH FOOD. 2 each 1      "hydroxychloroquine (PLAQUENIL) 200 mg tablet TAKE 1 TABLET BY MOUTH TWICE DAILY 180 tablet 0     insulin pump cartridge Crtg 1 each Inject under the skin continuous. Novolog insulin pump       insulin pump cartridge Inject under the skin continuous. Insulin pump discharge instructions from 07/20/17.  This is intended as additional information to the patient's PTA insulin pump order.  Continue with insulin pump at present settings. 1 each 0     insulin syringe-needle U-100 (BD INSULIN SYRINGE ULT-FINE II) 0.3 mL 31 gauge x 5/16 Syrg Use as needed with insulin 50 each 5     levonorgestrel (MIRENA) 20 mcg/24 hr (5 years) IUD 1 each by Intrauterine route once. Placed 8/2015       methotrexate 2.5 MG tablet TAKE 8 TABLETS(20 MG TOTAL) BY MOUTH ONCE A WEEK 32 tablet 1     nicotine (NICODERM CQ) 14 mg/24 hr APPLY 1 PATCH AS DIRECTED ONCE EACH DAY( EVERY 24 HOURS) 21 patch 1     nicotine polacrilex (NICORETTE) 2 mg gum Chew as directed.  Go to \"www.QuitPlan.org\" and read about how to use this drug.  Call them, too.  Get support. Pharm:  Please read note. 100 each 3     NOVOLOG 100 unit/mL injection INJECT 3 TIMES SUBCUTANEOUSLY DAILY VIA INSULIN PUMP UP TO 40 UNITS DAILY (Patient taking differently: Use daily in insulin pump; up to 40 units total) 30 mL 0     pantoprazole (PROTONIX) 40 MG tablet Take 1 tablet (40 mg total) by mouth daily. 90 tablet 3     SUMAtriptan (IMITREX) 25 MG tablet TAKE 1 TABLET(25 MG) BY MOUTH EVERY 2 HOURS AS NEEDED FOR MIGRAINE 18 tablet 5     SYNTHROID 125 mcg tablet Take 1 tablet (125 mcg total) by mouth Daily at 6:00 am. 90 tablet 3     No facility-administered medications prior to visit.            "

## 2021-07-20 NOTE — PROGRESS NOTES
Assessment: Kera has type 1 diabetes and started the Medtronic 670G automode in December 2017.  She is here for follow up.  Downloaded pump;   Time in range is 59%, 31% (180-250) , 1 % hypo.  Time in automode 97% of the time, sensor wear 97% - excellent.   Her total basal dose is 60% (26 units) and bolus 40% (17 units).   Insulin to carb ratio appears to be in range when looking at pre/post meal BG.   She is calibrating correctly, changing set every 4 days.  There were two occ where she left automode because basal was delivered at max rate for 4 hours.  This, along with basal/bolus % indicates she needs more insulin to cover foods and for correction.           Plan:   Encourged careful counting of carbohydrates and to bolus 10-15 minutes before starting to eat.   If higher fat meal, she may need more correction in 2-3 hours.  In order to get correction, need to enter BG and use wizard to suggest correction.  Overall doing very well, she likes using the pump and has very little hypoglcyemia.  Per Debbi, we did decrease the insulin action time to 2.45 hours.   Reviewed  best practices for the 670G automode:  >70% time in range,  >80% time in automode, sensor wear >85%       Manual Mode                 Basal       Bolus       Insulin Sensitivity                             Time u/hr    Time Ratio    Time Sensitivity   12:00 AM 0.95    12 1:12    12 53   7:00 AM 0.65                     12 0.75                    8:00 PM        0.95                               Goal: 100-120                              Active insulin time:  2.5 hours                                               Total Basal:  18.9 units              Time:  30 Minutes    ANGELA Estrada      I agree with the aforementioned diabetes plan.  Anayeli CASH [facility]  1/31/2018  6:20 AM

## 2021-07-20 NOTE — TELEPHONE ENCOUNTER
Central PA team  [phone]  Pool: HE PA MED ([id number])          PA has been initiated.       PA form completed and faxed insurance via Cover My Meds     Key:  [id number]     Medication:  SYNTHROID 125MG    Insurance:  [employer]CP        Response will be received via fax and may take up to 5-10 business days depending on plan

## 2021-07-20 NOTE — TELEPHONE ENCOUNTER
Spoke w/ pt. She is just needing dexcom set up. She is not using a pump.     Scheduled w/ Denia 5/6/20.

## 2021-07-20 NOTE — PATIENT INSTRUCTIONS - HE
Goals for Diabetes Care:    1. Eat 3 balanced meals each day - Monitor carb intake and limit to 45-60 grams per meal  This would be equal to 4 choices ~  1 choice = 15 grams    Do not wait longer than 4-5 hours to eat something  Snacks limit to no more than 15-30 grams of carbohydrates or 1-2 choices  Make sure you include protein source with each meal and at bedtime - this has been shown to help with blood glucose elevations    2. Check blood sugars at least 4-5 times each day at varying times   Blood Glucose Targets:   1. Fasting and before meal target is 80 - 130   2. 2 hours after a meal target is < 180  Always remember to bring meter and log book to all appointments.    3. Activity really helps improve blood sugars. Try to Incorporate 30 minutes activity into each day - does not need to be all at one time & walking counts!    4. Take diabetes medications as prescribed   -Novolog 1 unit per 10 gram of carbohydrates before meals  -Basaglar 20 units daily    Send a share code from your Dexcom to myself or Zakiya Jackson via [employer] so we can look at your BG and assess/adjust as needed.    Call to schedule a follow up with CDE to review BG once using DexCom  -Follow up with your Diabetic Educator as needed to assess BG targets and need for modifications to medications and/or lifestyle.    Call with any questions.  Thank you!  Denia Bailey RDN, MARLY, [facility]   Certified Diabetes Care &   [facility]  Triage [phone]

## 2021-07-20 NOTE — TELEPHONE ENCOUNTER
Cass barry/[employer] assistance program called.     She is calling about the Novolog 10 mil sonia.    Cass @ [phone]

## 2021-07-20 NOTE — TELEPHONE ENCOUNTER
RN cannot approve Refill Request    RN can NOT refill this medication medication not on med list. Last office visit: 4/2/2019 Denia Llamas FNP Last Physical: Visit date not found Last MTM visit: Visit date not found Last visit same specialty: 4/2/2019 Denia Llamas FNP.  Next visit within 3 mo: Visit date not found  Next physical within 3 mo: Visit date not found      Myrtle Jacobs, [facility] Connection Triage/Med Refill 12/13/2019    Requested Prescriptions   Pending Prescriptions Disp Refills     traZODone (DESYREL) 50 MG tablet [Pharmacy Med Name: TRAZODONE 50MG TABLETS] 90 tablet 0     Sig: TAKE 1 TABLET(50 MG) BY MOUTH AT BEDTIME       Tricyclics/Misc Antidepressant/Antianxiety Meds Refill Protocol Passed - 12/12/2019 11:25 AM        Passed - PCP or prescribing provider visit in last year     Last office visit with prescriber/PCP: 4/2/2019 Denia Llamas FNP OR same dept: Visit date not found OR same specialty: 4/2/2019 Denia Llamas FNP  Last physical: Visit date not found Last MTM visit: Visit date not found   Next visit within 3 mo: Visit date not found  Next physical within 3 mo: Visit date not found  Prescriber OR PCP: MOMO Nguyen  Last diagnosis associated with med order: There are no diagnoses linked to this encounter.  If protocol passes may refill for 12 months if within 3 months of last provider visit (or a total of 15 months).

## 2021-07-20 NOTE — PROGRESS NOTES
General Surgery Progress Note  Hospital Day # 0    Subjective:   Pt is feeling OK but ready for surgery    Vitals:    12/18/19 1906 12/19/19 0001 12/19/19 0508 12/19/19 0540   BP: 94/46 91/51 92/54 93/52   Patient Position: Semi-aguero Sitting Sitting Sitting   Pulse: 82 67 64 63   Resp: 16 16 16 16   Temp: 99.3  F (37.4  C) 98.8  F (37.1  C) 98.5  F (36.9  C) 98.4  F (36.9  C)   TempSrc: Oral Oral Oral Oral   SpO2: 97% 97% 98% 98%   Weight:       Height:           Physical Exam:  Lungs:  CTA  CV:       RRR  Ab:       Soft, + BS,     Results from last 7 days   Lab Units 12/19/19  0436   LN-WHITE BLOOD CELL COUNT thou/uL 11.8*   LN-HEMOGLOBIN g/dL 10.7*   LN-HEMATOCRIT % 31.7*   LN-PLATELET COUNT thou/uL 259       Results from last 7 days   Lab Units 12/19/19  0436   LN-SODIUM mmol/L 139   LN-POTASSIUM mmol/L 3.7   LN-CHLORIDE mmol/L 111*   LN-CO2 mmol/L 22   LN-BLOOD UREA NITROGEN mg/dL 13   LN-CREATININE mg/dL 0.71   LN-CALCIUM mg/dL 7.7*   LN-ALBUMIN g/dL 3.0*   LN-PROTEIN TOTAL g/dL 5.1*   LN-BILIRUBIN TOTAL mg/dL 0.8   LN-ALKALINE PHOSPHATASE U/L 32*   LN-ALT (SGPT) U/L 14   LN-AST (SGOT) U/L 14       Assessment:  Pt is progressing well.    Plan: Shamika Doran MD  [facility]  [phone]

## 2021-07-20 NOTE — TELEPHONE ENCOUNTER
Telephone Encounter by Hanna Mccauley at 2/18/2019  9:50 AM     Author: Hanna Mccauley Service: -- Author Type: --    Filed: 2/18/2019  9:51 AM Encounter Date: 1/25/2019 Status: Signed    : Hanna Mccauley APPEAL APPROVED:    Approval start date: 1/24/2019  Approval end date: 1/24/2020    Pharmacy has been notified of approval and will contact patient when medication is ready for pickup.

## 2021-07-20 NOTE — PROGRESS NOTES
Clinic Care Coordination Contact    Community Health Worker Follow Up    Goals:   Goals Addressed                 This Visit's Progress       General      Financial Wellbeing (pt-stated)        I would like to meet with FRW for assistance completing [facility] application and exploring [place] and/or [place] Care for myself and my children within the next 30 days    Action steps to achieve this goal  1.  I will sign the [facility] application once received in the mail. (complete)  2.  I will provide all necessary paperwork/documentation to assist in this process to [facility] Billing or to Angélica. (Complete)  3.  I will let Angélica know if I receive anything in the mail regarding my eligibility for [facility].   4.  I will send in my 401K information to the billing department.(complete)      NOTE: Pt reports that her MA ended on October 31st; her daughter currently has active MA. IRASEMA to communicate with county signed on 12/31/19 and left on CHW Angélica's desk.    Date goal set:  12/31/19  Discussed/updated:4/16/2020            CHW Next Follow-up:   Received 401k Documentation today from patient.       CHW Plan:   CHW sent documentation received to billing for her [facility] care.   CHW will check status of [facility] Care in 1 month  CHW will outreach in 1 month

## 2021-07-20 NOTE — PATIENT INSTRUCTIONS - HE
Patient Instructions by Denia Llamas FNP at 4/26/2019  8:04 AM     Author: Denia Llamas FNP Service: -- Author Type: Nurse Practitioner    Filed: 4/26/2019  8:04 AM Encounter Date: 4/26/2019 Status: Signed    : Denia Llamas FNP (Nurse Practitioner)           Bacterial Conjunctivitis    You have an infection in the membranes covering the white part of the eye. This part of the eye is called the conjunctiva. The infection is called conjunctivitis. The most common symptoms of conjunctivitis include a thick, pus-like discharge from the eye, swollen eyelids, redness, eyelids sticking together upon awakening, and a gritty or scratchy feeling in the eye. Your infection was caused by bacteria. It may be treated with medicine. With treatment, the infection takes about 7 to 10 days to resolve.  Home care    Use prescribed antibiotic eye drops or ointment as directed to treat the infection.    Apply a warm compress (towel soaked in warm water) to the affected eye 3 to 4 times a day. Do this just before applying medicine to the eye.    Use a warm, wet cloth to wipe away crusting of the eyelids in the morning. This is caused by mucus drainage during the night. You may also use saline irrigating solution or artificial tears to rinse away mucus in the eye. Do not put a patch over the eye.    Wash your hands before and after touching the infected eye. This is to prevent spreading the infection to the other eye, and to other people. Don't share your towels or washcloths with others.    You may use acetaminophen or ibuprofen to control pain, unless another medicine was prescribed. (Note: If you have chronic liver or kidney disease or have ever had a stomach ulcer or gastrointestinal bleeding, talk with your doctor before using these medicines.)    Don't wear contact lenses until your eyes have healed and all symptoms are gone.  Follow-up care  Follow up with your healthcare provider, or as advised.  When  to seek medical advice  Call your healthcare provider right away if any of these occur:    Worsening vision    Increasing pain in the eye    Increasing swelling or redness of the eyelid    Redness spreading around the eye  Date Last Reviewed: 7/1/2017 2000-2017 [employer]. [address] [place] [address]. All rights reserved. This information is not intended as a substitute for professional medical care. Always follow your healthcare professional's instructions.        Throw away current contacts and do not wear contacts for the next 1 week. If the eye redness and drainage do not resolve after 2-3 days of antibiotic drop use make an appointment to be seen

## 2021-07-20 NOTE — TELEPHONE ENCOUNTER
Refill request received from [facility] for basaglar. Pt needs an appt for refills.

## 2021-07-20 NOTE — PROGRESS NOTES
Assessment: Kera is here today for Medtronic 670G auto mode training.   Traininig check list will be scanned into medical record.        Plan:  Auto mode activated today.    Settings for manual mode:    Two week follow up scheduled.                Basal     Bolus     Insulin Sensitivity                     Time u/hr   Time Ratio   Time Sensitivity   12:00 AM 0.95   12 1:12   12 53   7:00 AM 0.65               12 0.75               8:00 PM      0.95                      Goal: 100-120                     Active insulin time:  3 hours                                 Total Basal:  18.9 units        Pt is agreeable to call if two hypoglycemic episodes in one week or hyperglycemia in one week or concerns.    Reviewed the Guardian sensor, she plans to start sensor when she receives them and schedule an appointment after wearing for 7-10 days- for training for auto mode.     Time:  45 Minutes    MNT    Erica Estrada I agree with the aforementioned diabetes plan.  Anayeli CASH [facility]  12/28/2017  6:48 AM

## 2021-07-20 NOTE — PROGRESS NOTES
Progress Notes by Ru Rubalcava at 7/26/2017  3:40 PM     Author: Ru Rubalcava Service: -- Author Type: Nurse Practitioner    Filed: 7/26/2017  6:55 PM Encounter Date: 7/26/2017 Status: Signed    : Ru Rubalcava [facility]    Date of Service: 7/26/2017  Primary Provider: Ru Pierce MD    Patient Care Team:  Ru Pierce MD as PCP - General (Internal Medicine)     ______________________________________________________________________     Patient's Pharmacy:    [id number] - [place], [place] - [address]  [address] [place] [address]  Phone: [phone] Fax: [phone]     Patient's Insurance:    Payor: BLUE CROSS / Plan: BLUE PLUS MA / Product Type: PMAP /     ______________________________________________________________________    Assessment:    1. Cluster headache syndrome, intractable    2. Pain with swallowing    3. Acute pharyngitis       ______________________________________________________________________      PHQ-2 Total Score: 3 (2/13/2017 10:00 AM)  PHQ-9 Total Score: 9 (2/13/2017 10:00 AM)     Plan:  Patient Instructions   1. Check Rapid Strep - Negative (results given by phone call @[phone] on 7/26/17)  2. Imitrex - Take as directed for cluster headache  3. Symptomatic therapy suggested: gargle for sore throat, use mist at bedside for congestion. May use acetaminophen, ibuprofen, chloraseptic spray OTC prn.  4. Recommend establishing care with a new PCP as Dr. Pierce is no longer at this clinic.      ______________________________________________________________________     Vani CASH [place] is 36 y.o. female who comes in today for:    Chief Complaint   Patient presents with   ? Hospital Visit Follow Up     headaches, pt stated she has had a constent headache since being in the hospital and its hard to swallow about everything.       Patient Active Problem List  "  Diagnosis   ? Restless Legs Syndrome   ? Mitral prolapse with 2 or more clicks   ? Selective IgA Deficiency   ? Major Depression, Recurrent - dong well as of 3/16/16   ? Hypothyroidism, unspecified type   ? Type 1 diabetes mellitus   ? Arthralgias In Multiple Sites   ? Eczema, pustular   ? Abnormal Electrocardiogram   ? Low back pain   ? Sciatica of left side   ? Methamphetamine abuse in remission   ? Anxiety   ? Depression - recently worse with break-up -    ? TMJ inflammation - right   ? Insomnia - \"brain won't shut off\" at bedtime/in bed   ? Tobacco abuse   ? Psychosocial stressors   ? Musculoskeletal neck pain   ? Night sweats   ? Non-rheumatic mitral regurgitation   ? Polyarthralgia   ? GERD (gastroesophageal reflux disease) - **NOT DISCUSSED ON PRIOR VISITS** - DOES SHE STILL NEED A PPI?  (TWB - 17)   ? Inflammatory polyarthritis   ? High risk medication use   ? Hyperglycemia   ? Right hand pain   ? DKA (diabetic ketoacidoses)   ? Hypomagnesemia   ? Tobacco abuse     Past Medical History:   Diagnosis Date   ? Asthma    ? Chest pain    ? Depression    ? Diabetes    ? Fainting    ? Heart disease     MVP   ? Methamphetamine abuse    ? Rheumatoid arthritis    ? Substance abuse     methamphetamine   ? Thyroid disease      Past Surgical History:   Procedure Laterality Date   ? CERVICAL BIOPSY  W/ LOOP ELECTRODE EXCISION     ?  SECTION, CLASSIC     ? colposcopy      LEAP   ? HERNIA REPAIR     ? SC  DELIVERY ONLY      Description:  Section;  Recorded: 2010;  Comments: 09 - breech   ? SC REPAIR UMBILICAL RUTH ANN,<4Y/O,REDUC      Description: Umbilical Hernia Repair;  Recorded: 10/03/2014;   ? SC REVISE MEDIAN N/CARPAL TUNNEL SURG      Description: Neuroplasty Decompression Median Nerve At Carpal Tunnel;  Recorded: 10/03/2014;     Allergies   Allergen Reactions   ? Augmentin [Amoxicillin-Pot Clavulanate] Nausea And Vomiting and Headache   ? Chantix [Varenicline] " "     \"I took the Chantix and that made me a (severe) crazy person.\"   ? Venlafaxine Nausea Only     Went away when venlafaxine stopped.  Did not rechallenge. 1/18/16     Current Outpatient Prescriptions   Medication Sig   ? baclofen (LIORESAL) 10 MG tablet Take 10 mg by mouth 3 (three) times a day as needed.   ? blood glucose meter (GLUCOMETER) Use 1 each As Directed as needed. Dispense glucometer brand per patient's insurance at pharmacy discretion.   ? buPROPion (WELLBUTRIN XL) 150 MG 24 hr tablet Take 150 mg by mouth every morning.    ? cetirizine (ZYRTEC) 10 MG tablet Take 10 mg by mouth every evening.    ? cholecalciferol, vitamin D3, 1,000 unit tablet Take 1,000 Units by mouth every evening.    ? CONTOUR NEXT STRIPS strips TEST BLOOD SUGAR 6 TIMES DAILY   ? folic acid (FOLVITE) 1 MG tablet Take 1 tablet (1 mg total) by mouth daily.   ? GLUCAGON 1 mg injection INJECT WHOLE DOSE IF LOW BLOOD SUGAR & BLOOD SUGAR NOT COMING UP AFTER TREATING 2X WITH FOOD.   ? hydroxychloroquine (PLAQUENIL) 200 mg tablet Take 200 mg by mouth 2 (two) times a day.   ? insulin pump cartridge Crtg 1 each Inject under the skin continuous. Novolog insulin pump   ? insulin pump cartridge Inject under the skin continuous. Insulin pump discharge instructions from 07/20/17.  This is intended as additional information to the patient's PTA insulin pump order.  Continue with insulin pump at present settings.   ? levonorgestrel (MIRENA) 20 mcg/24 hr (5 years) IUD 1 each by Intrauterine route once.   ? levothyroxine (SYNTHROID) 125 MCG tablet Take 1 tablet (125 mcg total) by mouth Daily at 6:00 am.   ? methotrexate 2.5 MG tablet Take 20 mg by mouth once a week. 8 tabs (20 mg) once weekly on Sunday   ? nicotine (NICODERM CQ) 14 mg/24 hr Place 1 patch on the skin daily.   ? nicotine polacrilex (NICORETTE) 2 mg gum Chew as directed.  Go to \"www.QuitPlan.org\" and read about how to use this drug.  Call them, too.  Get support. Pharm:  Please read " note.   ? NOVOLOG 100 unit/mL injection INJECT 3 TIMES SUBCUTANEOUSLY DAILY VIA INSULIN PUMP UP TO 40 UNITS DAILY   ? pantoprazole (PROTONIX) 40 MG tablet Take 1 tablet (40 mg total) by mouth daily.   ? traZODone (DESYREL) 50 MG tablet Take 1 tablet (50 mg total) by mouth at bedtime as needed for sleep. Must establish care with a new provider before any further refills. (Patient taking differently: Take 50 mg by mouth at bedtime. Must establish care with a new provider before any further refills.)   ? SUMAtriptan (IMITREX) 25 MG tablet Take 1 tablet (25 mg total) by mouth every 2 (two) hours as needed for migraine.     Social History     Social History   ? Marital status: Single     Spouse name: N/A   ? Number of children: N/A   ? Years of education: N/A     Occupational History   ? Not on file.     Social History Main Topics   ? Smoking status: Former Smoker     Packs/day: 0.50     Types: Cigarettes     Quit date: 7/12/2017   ? Smokeless tobacco: Never Used      Comment: She had stopped for a month as of 3/16/16, but now is back on 1/2 PPD.  11/7/16   ? Alcohol use No   ? Drug use: No      Comment: Off 2 years, 1 month and 19 days as of 3/16/16   ? Sexual activity: Yes     Partners: Male     Birth control/ protection: Implant     Other Topics Concern   ? Not on file     Social History Narrative     ______________________________________________________________________     History of present illness: Vani Corona is a pleasant 36 y.o. female who presents in clinic today for follow up from [facility] discharge 7/19/17-7/20/17 for DKA.  The catheter for her insulin pump apparently got crimped/obstructed unbeknownst to her which led to her ER visit and subsequent brief hospitalization. She states that she developed issues with swallowing after taken off the insulin drip, then it changed to sore throat, and then back to issues with swallowing.  She states that it just hurts to swallow anything, liquids or  "solids, etc.  She, her boyfriend and son went [place]aking at the river and later that night her symptoms began.   Saturday, she developed a headache, described as pain in the temples and across her forehead.  Sunday morning she woke up with pain on swallowing.  She has stopped smoking just over a week and a half ago and is maintaining on the Nicotine patch 14 mcg/day. Her headache is rated at a \"12/10\" and has tried Excedrin Migraine Relief and Aleve, neither have been of benefit today.  She is to have her insulin pump replaced at some point, but is waiting to here from [employer].    Review of systems:   10 point review of systems is negative unless noted in the HPI.    ______________________________________________________________________    Wt Readings from Last 3 Encounters:   07/26/17 121 lb (54.9 kg)   07/19/17 112 lb 14.4 oz (51.2 kg)   07/10/17 118 lb (53.5 kg)     BP Readings from Last 3 Encounters:   07/26/17 124/70   07/20/17 95/54   07/10/17 112/68     /70 (Patient Site: Right Arm, Patient Position: Sitting, Cuff Size: Adult Regular)  Pulse 79  Wt 121 lb (54.9 kg)  BMI 20.77 kg/m2     Physical Exam:  General Appearance: Alert, cooperative, no distress, appears stated age  Head: Normocephalic, without obvious abnormality, atraumatic  Eyes: PERRL, conjunctiva/corneas clear, EOM's intact  Ears: Normal TM's and external ear canals, both ears  Nose: Nares normal, septum midline,mucosa normal, no drainage  Throat: Lips, mucosa, and tongue normal; teeth and gums normal; mild to moderate erythema, no exudate, or thrush  Neck: Supple, symmetrical, trachea midline, no adenopathy;  thyroid: not enlarged, symmetric, no tenderness/mass/nodules  Lungs: Clear to auscultation bilaterally, respirations unlabored  Heart: Regular rate and rhythm, S1 and S2 normal, no murmur, rub, or gallop  Abdomen: Soft, non-tender, bowel sounds active all four quadrants,  no masses, no organomegaly  Musculoskeletal: Normal range of " motion. No joint swelling or deformity.   Extremities: Extremities normal, atraumatic, no cyanosis or edema  Lymph nodes: Cervical & supraclavicular nodes normal  Neurologic: She is alert & oriented x 3. She has normal gait  Psychiatric: She has a normal to depressed mood and affect. Tearful at times regarding ongoing personal situation.    Results for orders placed or performed in visit on 07/26/17   Rapid Strep A Screen-Throat   Result Value Ref Range    Rapid Strep A Antigen No Group A Strep detected, presumptive negative No Group A Strep detected, presumptive negative       Ru Rubalcava, LUIS FERNANDO  Internal Medicine  [facility]     Return if symptoms worsen or fail to improve.

## 2021-07-20 NOTE — PROGRESS NOTES
"[facility]  ENDOCRINOLOGY    Diabetes Note 5/28/2017    Vani Corona, 1980, [id number]          Reason for visit      1. Type 1 diabetes mellitus    2. Hypothyroidism        HPI     Vani Corona is a very pleasant 36 y.o. old female who presents for follow up.  SUMMARY:  Vani returns today in f/u for her DM 1.  She is using an Insulin pump and sensor for her insulin administration. She has dropped her A1c from 9.7 to 7.7.  This is the best it has been for some time.  She is pretty happy about it.  She is having the highest elevations after lunch, however, she is responding well to her boluses.  She is having some hypoglycemia, but nothing for which she has needed assistance.       She continues taking Synthroid 125 mcg daily on an empty stomach.  She is feeling well and is having no problems referable to her neck.  She is having no swallowing difficulty or compressive symptoms.  Her current TSH is 2.31 and Free T 4 is 1.0.       Blood glucose data:  Ave: 218 with SD of 106      Insulin Pump Settings     Basal Rate  TIME Units/HR   0000 - 0700 0.950    0700 - 2000 0.650   2000 - 0000 0.950                     Bolus: Insulin : CHO ratio  Time Units Grams CHO   0000 1 14                                      Correction  #Units Blood glucose >Target BG   1 53 120         Past Medical History     Patient Active Problem List   Diagnosis     Restless Legs Syndrome     Mitral prolapse with 2 or more clicks     Selective IgA Deficiency     Major Depression, Recurrent - dong well as of 3/16/16     Hypothyroidism     Type 1 diabetes mellitus     Arthralgias In Multiple Sites     Eczema, pustular     Abnormal Electrocardiogram     Low back pain     Sciatica of left side     Methamphetamine abuse in remission     Anxiety     Depression - recently worse with break-up - August, 2015     TMJ inflammation - right     Insomnia - \"brain won't shut off\" at bedtime/in bed     Tobacco abuse     Psychosocial stressors     " "Musculoskeletal neck pain     Night sweats     Non-rheumatic mitral regurgitation     Polyarthralgia     GERD (gastroesophageal reflux disease) - **NOT DISCUSSED ON PRIOR VISITS** - DOES SHE STILL NEED A PPI?  (TWB - 1/9/17)     Inflammatory polyarthritis     High risk medication use     Hyperglycemia        Family History       family history includes No Medical Problems in her daughter, other, other, sister, and son; Other in her father; Stroke in her paternal grandmother.    Social History      reports that she has been smoking Cigarettes.  She has been smoking about 0.50 packs per day. She has never used smokeless tobacco. She reports that she does not drink alcohol or use illicit drugs.      Review of Systems     Patient has no polyuria or polydipsia, no chest pain, dyspnea or TIA's, no numbness, tingling or pain in extremities  Remainder negative except as noted in HPI.    Vital Signs     /76  Pulse 80  Ht 5' 4\" (1.626 m)  Wt 116 lb (52.6 kg)  BMI 19.91 kg/m2  Wt Readings from Last 3 Encounters:   05/24/17 116 lb 12.8 oz (53 kg)   05/23/17 116 lb (52.6 kg)   03/27/17 113 lb 9.6 oz (51.5 kg)       Physical Exam     Constitutional:  Well developed, Well nourished  HENT:  Normocephalic,   Neck: Thyroid normal, No lymph nodes, Supple  Eyes:  PERRL, Conjunctiva pink  Respiratory:  Normal breath sounds, No respiratory distress  Cardiovascular:  Normal heart rate, Normal rhythm, No murmurs  GI:  Bowel sounds normal, Soft, No tenderness  Musculoskeletal:  No gross deformity or lesions, normal dorsalis pedis pulses  Skin: No acanthosis nigricans, lipoatrophy or lipodystrophy  Neurologic:  Alert & oriented x 3, nonfocal  Psychiatric:  Affect, Mood, Insight appropriate  Diabetic foot exam: no ulcers, charcot's or high risk calluses, Normal monofilament exam          Assessment     1. Type 1 diabetes mellitus    2. Hypothyroidism        Plan       Will leave her settings where they are at present.  Will consider " making changes to her basal rate over the late afternoon/early evening.     She is bio-chemically and physically euthyroid.  She will continue taking Levothyroxine 125 mcg daily.    Time spent with pt today: 25 min with>50% spent in counseling and coordination of care.        Anayeli ANDERSON Endocrin[facility]  5/28/2017  6:28 AM        Lab Results     Hemoglobin A1c   Date Value Ref Range Status   05/19/2017 7.7 (H) 3.5 - 6.0 % Final   02/16/2017 9.7 (H) 4.2 - 6.1 % Final   01/30/2017 10.0 (H) 4.2 - 6.1 % Final   10/13/2016 9.0 (H) 3.5 - 6.0 % Final   07/08/2016 8.4 (H) 3.5 - 6.0 % Final     Creatinine   Date Value Ref Range Status   03/23/2017 0.64 0.60 - 1.10 mg/dL Final   01/31/2017 0.59 (L) 0.60 - 1.10 mg/dL Final   01/30/2017 0.63 0.60 - 1.10 mg/dL Final     Microalbumin, Random Urine   Date Value Ref Range Status   02/16/2017 <0.50 0.00 - 1.99 mg/dL Final       Cholesterol   Date Value Ref Range Status   02/15/2016 174 <=199 mg/dL Final     HDL Cholesterol   Date Value Ref Range Status   02/15/2016 45 (L) >=50 mg/dL Final     LDL Calculated   Date Value Ref Range Status   02/15/2016 105 <=129 mg/dL Final     Triglycerides   Date Value Ref Range Status   02/15/2016 122 <=149 mg/dL Final       Lab Results   Component Value Date    ALT 53 (H) 03/23/2017    AST 13 01/31/2017    ALKPHOS 29 (L) 01/31/2017    BILITOT 0.3 01/31/2017         Current Medications     Outpatient Medications Prior to Visit   Medication Sig Dispense Refill     blood glucose meter (GLUCOMETER) Use 1 each As Directed as needed. Dispense glucometer brand per patient's insurance at pharmacy discretion. 1 each 0     blood glucose test strips Contour Next strips . Use to check blood sugars 6 times daily 200 each 1     buPROPion (WELLBUTRIN XL) 150 MG 24 hr tablet TAKE 1 TABLET BY MOUTH DAILY IN THE MORNING FOR DEPRESSION & ANXIETY. 90 tablet 0     cetirizine (ZYRTEC) 10 MG tablet TAKE 1 TABLET BY MOUTH DAILY 90 tablet 2     cholecalciferol,  "vitamin D3, 1,000 unit tablet Take 1,000 Units by mouth daily.       clobetasol (TEMOVATE) 0.05 % cream Apply to affected area twice daily 45 g 5     folic acid (FOLVITE) 1 MG tablet Take 1 tablet (1 mg total) by mouth daily. 30 tablet 11     GLUCAGON 1 mg injection INJECT WHOLE DOSE IF LOW BLOOD SUGAR & BLOOD SUGAR NOT COMING UP AFTER TREATING 2X WITH FOOD. 2 each 1     levonorgestrel (MIRENA) 20 mcg/24 hr (5 years) IUD 1 each by Intrauterine route once.       levothyroxine (SYNTHROID) 125 MCG tablet Take 1 tablet (125 mcg total) by mouth Daily at 6:00 am. 90 tablet 3     methotrexate 2.5 MG tablet TAKE 8 TABLETS(20 MG TOTAL) BY MOUTH ONCE A WEEK 32 tablet 0     NOVOLOG 100 unit/mL injection Inject 3 times daily via insulin pump up to 40 units daily. TYRONE 40 mL 1     traZODone (DESYREL) 50 MG tablet Take 1 tablet (50 mg total) by mouth at bedtime as needed for sleep. 30 tablet 0     omeprazole (PRILOSEC) 20 MG capsule TAKE ONE CAPSULE BY MOUTH DAILY 30 MINUTES BEFORE BREAKFAST. 30 capsule 4     nicotine polacrilex (NICORETTE) 2 mg gum Chew as directed.  Go to \"www.QuitPlan.org\" and read about how to use this drug.  Call them, too.  Get support. Pharm:  Please read note. 100 each 3     buPROPion (WELLBUTRIN XL) 150 MG 24 hr tablet Take 150 mg by mouth every morning.       cetirizine (ZYRTEC) 10 MG tablet Take 10 mg by mouth daily.       traZODone (DESYREL) 50 MG tablet TAKE 1 TABLET, AT OR 1/2 HOUR PRIOR TO BEDTIME AS NEEDED SLEEP. 90 tablet 0     No facility-administered medications prior to visit.            "

## 2021-07-20 NOTE — ADDENDUM NOTE
Addendum Note by Liz Song RN at 3/19/2020  9:17 AM     Author: Liz Song RN Service: -- Author Type: Registered Nurse    Filed: 3/19/2020  9:17 AM Encounter Date: 3/17/2020 Status: Signed    : Liz Song RN (Registered Nurse)    Addended by: LIZ SONG on: 3/19/2020 09:17 AM        Modules accepted: Orders

## 2021-07-20 NOTE — PROGRESS NOTES
"Spoke with Vani today.  We completed the [facility] [employer] application over the phone.  I will mail out the application for her to sign and provider her 2 most recently paystubs and 2019 taxes.  Vani mentioned she didn't know where she filed last year.  We talked about looking at her past emails, to see if she received an email indicating her taxes were received. She understood.     Vani mentioned that she was advised she didn't qualify for MA, however feels that she should.  Vani will apply on her own through [employer] at this time and will reach out if she needs support.  She stated that she did a renewal recently and they advised her she needed to go through [employer], but states she kept going around in circles.  She does have [employer] through work at this time.  She talked about not being able to afford her medications. She will talk with diabetic ed at next about about this.  I offered to schedule an appointment with the [facility] RN, however patient wants to hold. She states, \"I'm not diabetic if I can't afford my meds\"  I mailed out [facility] prescription program information today      I asked Vani if she had heard anything from [facility] for her daughter.  She said, someone called me, but they didn't know why they were calling me and I didn't know why.  Vani mentioned that she told them that she needed a  and the [facility] worker told her she couldn't help.  Vani states she provided her a couple numbers, but could say what they were. She mentioned she did hang up on the [facility] worker.       Notes:  Did she call the [facility] prescription program (Mailed out on 1/21/2020)  Did she talk with Diabetic education about cost of medication  Did she submit her [facility] application? If so, what's the status          Next Outreach: 2/24/2020  Outreach interval: 1 month   "

## 2021-07-20 NOTE — TELEPHONE ENCOUNTER
Refill Request  Did you contact pharmacy: Yes  Medication name:   Requested Prescriptions     Pending Prescriptions Disp Refills     albuterol (PROAIR HFA;PROVENTIL HFA;VENTOLIN HFA) 90 mcg/actuation inhaler [Pharmacy Med Name: ALBUTEROL HFA INH (200 PUFFS) 18GM] 18 g 0     Sig: INHALE 2 PUFFS BY MOUTH EVERY 6 HOURS AS NEEDED FOR WHEEZING     escitalopram oxalate (LEXAPRO) 10 MG tablet 90 tablet 3     Sig: Take 1 tablet (10 mg total) by mouth daily.     Who prescribed the medication: .Dr Luna  Requested Pharmacy: [facility]  Is patient out of medication: Yes  Patient notified refills processed in 3 business days:  no  Okay to leave a detailed message: yes

## 2021-07-20 NOTE — TELEPHONE ENCOUNTER
Who is calling:  Patient  Reason for Call:  Vani is unable to afford the supplies for her insulin pump. Patient has asked  and the funding is not there she states.  She is thinking that [facility] knows and does not have an appointment with endo until Feb.  Writer asked if the endo nurses were aware of this. She was not sure.  Writer transferred her to endo to directly communicate this issue to Anayeli.  FYI  Date of last appointment with primary care: NA  Okay to leave a detailed message: Yes [phone]

## 2021-07-20 NOTE — TELEPHONE ENCOUNTER
Telephone Encounter by Hanna Mccauley at 11/24/2020  1:59 PM     Author: Hanna Mccauley Service: -- Author Type: --    Filed: 11/24/2020  2:00 PM Encounter Date: 11/24/2020 Status: Signed    : Hanna Mccauley APPROVED:    Approval start date: 11/24/2020  Approval end date:  11/18/2021    Pharmacy has been notified of approval and will contact patient when medication is ready for pickup.

## 2021-07-20 NOTE — OP NOTE
Name:  Vani CASH [place]us  PCP:  Denia Llamas FNP  Procedure Date:  5/13/2021      EXCISION, LESION, UPPER EXTREMITY (Left)    Pre-Procedure Diagnosis:  Skin mass [R22.9]     Post-Procedure Diagnosis:    Left arm skin mass    Surgeon(s):  Babs Leiva MD    Assist:  none    Anesthesia Type:    Mac with local    Estimated Blood Loss:   2 mL from 5/13/2021 12:11 PM to 5/13/2021 12:36 PM    Specimens:    Left arm mass [id number]       Indication for procedure:  40y F presented to the surgery clinic with complaints of a painful left arm bulge. She was found to have a subcutaneous mass and elected for surgical excision.    Operative Report:    After informed consent was obtained, and the risks and benefits of the procedure were discussed, the patient was brought back to the operative suite and placed in the supine position.  Monitored anesthesia care was administered and tolerated well. IV antibiotics were not indicated. A Time Out was held, and the left arm was prepped and draped in the usual sterile fashion.  5 cc of local anesthetic was injected into the skin and subcutaneous tissue surrounding the mass. I made a 2cm incision directly over the mass. The mass was carefully dissected out with a combination of electrocautery and blunt dissection. It was removed with the capsule intact and was sent off as a specimen. hemostasis was obtained.The skin was closed with running 4-0 moncryl. Steri strips and a sterile dressing were placed. All needle, sponge, and instrument counts were correct at the end of the procedure. The patient tolerated the procedure well. She was brought to the recovery room in a stable position. Discharge home from recovery.    Babs Leiva MD  General Surgery  [facility]  [phone]

## 2021-07-20 NOTE — TELEPHONE ENCOUNTER
pantoprazole (PROTONIX) 40 MG tablet 90 tablet 1 1/24/2019  --   Sig - Route: Take 1 tablet (40 mg total) by mouth daily. - Oral   Sent to pharmacy as: pantoprazole (PROTONIX) 40 MG tablet   Notes to Pharmacy: Cancel prescription for nexium   E-Prescribing Status: Receipt confirmed by pharmacy (1/24/2019  9:10 AM CST)     Last OV 01/24/19    1. Gastroesophageal reflux disease, esophagitis presence not specified  - Will do PA for quantity exception through patient's insurance. She has failed treatment with omeprazole, famotidine, ranitidine. Without medication has daily reflux   - pantoprazole (PROTONIX) 40 MG tablet; Take 1 tablet (40 mg total) by mouth daily.  Dispense: 90 tablet; Refill: 1      Nothing scheduled
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
## 2021-07-20 NOTE — PROGRESS NOTES
"[facility]  ENDOCRINOLOGY    Diabetes Note 2/5/2020    Vani Corona, 1980, [id number]          Reason for visit      1. Type 1 diabetes mellitus with hyperglycemia (H)        HPI     Vani Corona is a very pleasant 39 y.o. old female who presents for follow up.  SUMMARY:  Vani returns today in f/u for DM 1. Her current A1c is 7.7 and down from her last at 8.2. She is a little surprised because she reports that she lost her last job, now has a new one, and had lost her insurance and will soon have it back. Consequently, she was unable to use the sensor with her insulin pump  She states that \"she is doing the best that she can\".  Her pump download shows that she was unable to use [employer] because of the lack of the sensor.  She tested 0.5 times a day over the last two weeks. She is not used to having to test, and is not getting this done in a timely manner.  She is using about 30 units a day.  She is getting 60% in Basal and 40% in Bolus. She is taking the insulin, just not testing prior to use.       Past Medical History     Patient Active Problem List   Diagnosis     Restless Legs Syndrome     Selective IgA Deficiency     Major depressive disorder, recurrent episode, moderate (H)     Hypothyroidism, unspecified type     Type 1 diabetes mellitus (H)     Arthralgias In Multiple Sites     Eczema, pustular     Low back pain     Sciatica of left side     Anxiety     Depression      TMJ inflammation - bilateral     Insomnia      Non-rheumatic mitral regurgitation     Polyarthralgia     GERD (gastroesophageal reflux disease) H2 blocker not effective      Inflammatory polyarthritis (H)     High risk medication use     Right hand pain     Tobacco abuse     Insulin pump status     Family history of psoriasis in mother and sister     Parent-child relational problem     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    Social History      reports that she quit smoking about 2 years ago. Her smoking use included cigarettes. She smoked 0.50 packs per day. She has never used smokeless tobacco. She reports that she does not drink alcohol or use drugs.      Review of Systems     Patient has no polyuria or polydipsia, no chest pain, dyspnea or TIA's, no numbness, tingling or pain in extremities  Remainder negative except as noted in HPI.    Vital Signs     /68 (Patient Site: Right Arm, Patient Position: Sitting, Cuff Size: Adult Regular)   Pulse 60   Ht 5' 4\" (1.626 m)   Wt 118 lb 11.2 oz (53.8 kg)   BMI 20.37 kg/m    Wt Readings from Last 3 Encounters:   02/04/20 118 lb 11.2 oz (53.8 kg)   12/24/19 120 lb (54.4 kg)   12/18/19 117 lb 9 oz (53.3 kg)       Physical Exam     Constitutional:  Well developed, Well nourished  HENT:  Normocephalic,   Neck: Thyroid normal, No lymph nodes, Supple  Eyes:  PERRL, Conjunctiva pink  Respiratory:  Normal breath sounds, No respiratory distress  Cardiovascular:  Normal heart rate, Normal rhythm, No murmurs  GI:  Bowel sounds normal, Soft, No tenderness  Musculoskeletal:  No gross deformity or lesions, normal dorsalis pedis pulses  Skin: No acanthosis nigricans, lipoatrophy or lipodystrophy  Neurologic:  Alert & oriented x 3, nonfocal  Psychiatric:  Affect, Mood, Insight appropriate      Assessment     1. Type 1 diabetes mellitus with hyperglycemia (H)        Plan     Gave Vani information regarding the InPen as this might be an alternative for her.  She will get her insurance back in March and will hopefully be able to get her sensors back then. In the meantime, encouraged to work on the testing and getting the info into her pump so she can get all of the insulin that she needs. She " will f/u with me in 6 months. Time spent with pt today: 25 min with >50% spent in counseling and coordination of care.        Anayeli Dave   Endocrinology  2/5/2020  6:41 AM        Lab Results     Hemoglobin A1c   Date Value Ref Range Status   01/29/2020 7.7 (H) 3.5 - 6.0 % Final   12/18/2019 8.2 (H) 4.2 - 6.1 % Final   07/23/2019 7.8 (H) 3.5 - 6.0 % Final   03/05/2019 7.5 (H) 3.5 - 6.0 % Final   12/04/2018 8.0 (H) 3.5 - 6.0 % Final     Creatinine   Date Value Ref Range Status   01/29/2020 0.68 0.60 - 1.10 mg/dL Final   12/19/2019 0.71 0.60 - 1.10 mg/dL Final   12/18/2019 1.09 0.60 - 1.10 mg/dL Final     Microalbumin, Random Urine   Date Value Ref Range Status   01/29/2020 0.57 0.00 - 1.99 mg/dL Final       Cholesterol   Date Value Ref Range Status   02/15/2016 174 <=199 mg/dL Final     HDL Cholesterol   Date Value Ref Range Status   02/15/2016 45 (L) >=50 mg/dL Final     LDL Calculated   Date Value Ref Range Status   02/15/2016 105 <=129 mg/dL Final     Triglycerides   Date Value Ref Range Status   02/15/2016 122 <=149 mg/dL Final       Lab Results   Component Value Date    ALT 14 12/19/2019    AST 14 12/19/2019    ALKPHOS 32 (L) 12/19/2019    BILITOT 0.8 12/19/2019         Current Medications     Outpatient Medications Prior to Visit   Medication Sig Dispense Refill     aspirin 81 MG EC tablet Take 81 mg by mouth every evening.        buPROPion (WELLBUTRIN XL) 150 MG 24 hr tablet TAKE 1 TABLET(150 MG) BY MOUTH EVERY MORNING 90 tablet 1     cetirizine (ZYRTEC) 10 MG tablet TAKE 1 TABLET(10 MG) BY MOUTH EVERY EVENING 90 tablet 0     cholecalciferol, vitamin D3, 1,000 unit tablet Take 1,000 Units by mouth every evening.        clobetasol (TEMOVATE) 0.05 % cream Apply 1 application topically 2 (two) times a day as needed. 45 g 1     CONTOUR NEXT TEST STRIPS strips TEST BLOOD SUGAR 6 TIMES DAILY (Patient taking differently: TEST BLOOD SUGAR 2-6 TIMES DAILY) 200 strip 11     escitalopram oxalate (LEXAPRO) 10 MG  "tablet Take 1 tablet (10 mg total) by mouth daily. 90 tablet 0     infusion set for insulin pump (MINIMED INFUSION SET) ISet Use every 3 days with pump 40 each 3     insulin aspart U-100 (NOVOLOG FLEXPEN U-100 INSULIN) 100 unit/mL (3 mL) injection pen Inject subcutaneously CHO ratio 1:10 - 1:15 + sliding scale 1:50>150 up to 40 units daily. 20 mL 0     insulin aspart U-100 (NOVOLOG U-100 INSULIN ASPART) 100 unit/mL injection Inject 40 Units under the skin daily. Via the pump (Patient taking differently: Inject under the skin daily. Via the pump- up to 40 units daily) 36 mL PRN     insulin glargine (LANTUS SOLOSTAR U-100 INSULIN) 100 unit/mL (3 mL) pen Inject 20 Units under the skin daily. Do not mix Lantus with any other insulin 10 mL 0     insulin pump cartridge Inject under the skin continuous. Insulin pump discharge instructions from 07/20/17.  This is intended as additional information to the patient's PTA insulin pump order.  Continue with insulin pump at present settings. 1 each 0     insulin pump syringe (PARADIGM RESERVOIR) 3 mL Misc Use every 3 days with set 40 each 3     insulin syringe-needle U-100 (BD INSULIN SYRINGE ULT-FINE II) 0.3 mL 31 gauge x 5/16 Syrg Use as needed with insulin 50 each 5     ketoconazole (NIZORAL) 2 % cream Apply topically 2 (two) times a day as needed.       ondansetron (ZOFRAN) 4 MG tablet TAKE 1 TABLET(4 MG) BY MOUTH EVERY 12 HOURS AS NEEDED FOR NAUSEA 10 tablet 0     pantoprazole (PROTONIX) 40 MG tablet TAKE 1 TABLET BY MOUTH M EVERY DAY (Patient taking differently: Take 40 mg by mouth every evening. ) 90 tablet 2     pen needle, diabetic (BD ULTRA-FINE ADALBERTO PEN NEEDLE) 32 gauge x 5/32\" Ndle Use one needle four times a day 200 each 0     SUMAtriptan (IMITREX) 25 MG tablet TAKE 1 TABLET(25 MG) BY MOUTH EVERY 2 HOURS AS NEEDED FOR MIGRAINE 18 tablet 6     SYNTHROID 125 mcg tablet TAKE 1 TABLET BY MOUTH EVERY DAY AT 6 AM 90 tablet 1     traZODone (DESYREL) 50 MG tablet TAKE 1 " TABLET(50 MG) BY MOUTH AT BEDTIME 90 tablet 2     albuterol (PROAIR HFA;PROVENTIL HFA;VENTOLIN HFA) 90 mcg/actuation inhaler INHALE 2 PUFFS BY MOUTH EVERY 6 HOURS AS NEEDED FOR WHEEZING 18 g 0     baclofen (LIORESAL) 10 MG tablet Take 10 mg by mouth 3 (three) times a day as needed.       glucagon, human recombinant, (GLUCAGON) 1 mg injection Inject 1 mg into the shoulder, thigh, or buttocks once as needed. 2 each 0     acetaminophen (TYLENOL) 500 MG tablet Take 1 tablet (500 mg total) by mouth every 4 (four) hours as needed for pain.  0     HYDROcodone-acetaminophen 5-325 mg per tablet Take 1 tablet by mouth every 4 (four) hours as needed for pain. 12 tablet 0     ibuprofen (ADVIL,MOTRIN) 200 MG tablet Take 1 tablet (200 mg total) by mouth every 6 (six) hours as needed for pain.  0     No facility-administered medications prior to visit.

## 2021-07-20 NOTE — TELEPHONE ENCOUNTER
Telephone Encounter by Herminia Wilson at 4/7/2021 11:44 AM     Author: Herminia Wilson Service: -- Author Type: Patient Access    Filed: 4/7/2021  4:32 PM Encounter Date: 3/24/2021 Status: Signed    : Herminia Wilson (Patient Access)       Medical Authorization Not Needed     Insurance details: Per the response from [employer], with the codes that I submitted: , , , , an authorization is not required under the patient medical insurance. They should be covered.            [facility] Information: Possibly shows a paid claim. Reached out to Marcio Jo and she will look into it.          Pharmacy details: [facility] - [place], [place] - [address]

## 2021-07-20 NOTE — PROGRESS NOTES
Progress Notes by Zakiya Jackson RN at 7/1/2021  9:30 AM     Author: Zakiya Jackson RN Service: -- Author Type: Registered Nurse    Filed: 7/1/2021  9:55 AM Encounter Date: 7/1/2021 Status: Signed    : Zakiya Jackson RN (Registered Nurse)       Pt did not show for DM appt today. Will have scheduling contact pt to reschedule.

## 2021-07-20 NOTE — PROGRESS NOTES
Clinic Care Coordination Contact     Situation: Pt chart reviewed by  care coordinator.     Background:   - Most recent SW assessment completed on 12/31/19.  - Moved to maintenance on 5/28/20.  - Last outreach by CHW on 6/15/20 despite pt being moved to maintenance as stated above. During this conversation, pt and CHW discussed contacting [facility] billing to establish a payment plan and contacting [facility] billing regarding outside medical bills. CHW requested that SW change pt enrollment status back to active, however, doing so is not necessary. [facility] staff are not directly assisting and pt is capable of following up with billing independently.   - CHW to follow up in 2 months as indicated in SW note dated 5/28/20.      Assessment: All previous goals completed. Enrollment status maintained.      Plan/Recommendations:   1.) Pt will remain on [facility] maintenance at this time.   - If no new goals/concerns identified at next outreach in 2 months (on/around 7/28/20), CHW will route chart to [facility] SW for graduation review.

## 2021-07-20 NOTE — PROGRESS NOTES
[facility]  ENDOCRINOLOGY    Diabetes Note 12/11/2018    Vani Corona, 1980, [id number]          Reason for visit      1. Type 1 diabetes mellitus with hyperglycemia (H)    2. Gastroesophageal reflux disease, esophagitis presence not specified    3. Insulin pump status        HPI     Vani Corona is a very pleasant 38 y.o. old female who presents for follow up.  SUMMARY:  Vani returns today in f/u for DM 1.  Her current A1c is 8 and up significantly from 7.3.  She reports that she has been having issues with her skin, so she has not been wearing the sensor.  Pretty sure that this is why she has lost control. She continues with her insulin pump, but the download today shows that she has input data only twice  over the last month.  Both entries were above 300.   She mentions that she has had a rash on her feet that has been under the care of both her PCP as well as her Derm.  They are treating it as fungal and she is on Lamisil.  She also reports that she has been denied twice now for the Pantoprazole.      Blood glucose data:  Unavailable    Insulin Pump Settings     Basal Rate  TIME Units/HR   0000 - 0700 0.950   0700 - 1200 0.650   1200 - 2000 0.750   2000 - 0000 0.950             Bolus: Insulin : CHO ratio  Time Units Grams CHO   0000 1 12                          Correction  #Units Blood glucose >Target BG   1 53 120             Past Medical History     Patient Active Problem List   Diagnosis     Restless Legs Syndrome     Mitral prolapse with 2 or more clicks     Selective IgA Deficiency     Major depressive disorder, recurrent episode, moderate (H)     Hypothyroidism, unspecified type     Type 1 diabetes mellitus (H)     Arthralgias In Multiple Sites     Eczema, pustular     Low back pain     Sciatica of left side     Anxiety     Depression      TMJ inflammation - bilateral     Insomnia      Non-rheumatic mitral regurgitation     Polyarthralgia     GERD (gastroesophageal reflux disease) H2  "blocker not effective      Inflammatory polyarthritis (H)     High risk medication use     Right hand pain     Tobacco abuse     Insulin pump status     Family history of psoriasis in mother and sister     Parent-child relational problem        Family History       family history includes No Medical Problems in her daughter, sister, son, and other family members; Other in her father; Stroke in her paternal grandmother.    Social History      reports that she quit smoking about 17 months ago. Her smoking use included cigarettes. She smoked 0.50 packs per day. she has never used smokeless tobacco. She reports that she does not drink alcohol or use drugs.      Review of Systems     Patient has no polyuria or polydipsia, no chest pain, dyspnea or TIA's, no numbness, tingling or pain in extremities  Remainder negative except as noted in HPI.    Vital Signs     BP 92/58 (Patient Site: Right Arm, Patient Position: Sitting, Cuff Size: Adult Regular)   Pulse 68   Ht 5' 3.9\" (1.623 m)   Wt 121 lb 12.8 oz (55.2 kg)   BMI 20.97 kg/m    Wt Readings from Last 3 Encounters:   12/11/18 121 lb 12.8 oz (55.2 kg)   11/14/18 127 lb (57.6 kg)   10/30/18 127 lb (57.6 kg)       Physical Exam     Constitutional:  Well developed, Well nourished  HENT:  Normocephalic,   Neck: Thyroid normal, No lymph nodes, Supple  Eyes:  PERRL, Conjunctiva pink  Respiratory:  Normal breath sounds, No respiratory distress  Cardiovascular:  Normal heart rate, Normal rhythm, No murmurs  GI:  Bowel sounds normal, Soft, No tenderness  Musculoskeletal:  No gross deformity or lesions, normal dorsalis pedis pulses  Skin: No acanthosis nigricans, lipoatrophy or lipodystrophy  Neurologic:  Alert & oriented x 3, nonfocal  Psychiatric:  Affect, Mood, Insight appropriate      Assessment     1. Type 1 diabetes mellitus with hyperglycemia (H)    2. Gastroesophageal reflux disease, esophagitis presence not specified    3. Insulin pump status        Plan     Hand out " given regarding skin care to help her get the sensor back on and working.  No changes to her pump settings because they were fine when she was using the sensor.  We will try Nexium and see if her insurance will cover it.  F/u with me in 3 months. Time spent with pt today: 25 min with >50% spent in counseling and coordination of care.      Anayeli Dave   Endocrinology  12/11/2018  1:04 PM        Lab Results     Hemoglobin A1c   Date Value Ref Range Status   12/04/2018 8.0 (H) 3.5 - 6.0 % Final   09/04/2018 7.3 (H) 3.5 - 6.0 % Final   03/13/2018 7.5 (H) 3.5 - 6.0 % Final   12/05/2017 7.8 (H) 3.5 - 6.0 % Final   07/19/2017 7.9 (H) 4.2 - 6.1 % Final     Creatinine   Date Value Ref Range Status   11/12/2018 0.69 0.60 - 1.10 mg/dL Final   10/30/2018 0.73 0.60 - 1.10 mg/dL Final   08/14/2018 0.73 0.60 - 1.10 mg/dL Final     Microalbumin, Random Urine   Date Value Ref Range Status   09/04/2018 <0.50 0.00 - 1.99 mg/dL Final       Cholesterol   Date Value Ref Range Status   02/15/2016 174 <=199 mg/dL Final     HDL Cholesterol   Date Value Ref Range Status   02/15/2016 45 (L) >=50 mg/dL Final     LDL Calculated   Date Value Ref Range Status   02/15/2016 105 <=129 mg/dL Final     Triglycerides   Date Value Ref Range Status   02/15/2016 122 <=149 mg/dL Final       Lab Results   Component Value Date    ALT 18 11/12/2018    AST 25 10/30/2018    ALKPHOS 35 (L) 07/20/2017    BILITOT 0.8 07/20/2017         Current Medications     Outpatient Medications Prior to Visit   Medication Sig Dispense Refill     ADMELOG U-100 INSULIN LISPRO 100 unit/mL injection INJECT UP TO 40 UNITS D VIA INSULIN PUMP  1     albuterol (VENTOLIN HFA) 90 mcg/actuation inhaler Inhale 2 puffs every 6 (six) hours as needed for wheezing. 1 Inhaler 2     aspirin 81 MG EC tablet Take 81 mg by mouth.       baclofen (LIORESAL) 10 MG tablet Take 10 mg by mouth 3 (three) times a day as needed.       blood glucose meter (GLUCOMETER) Use 1 each As Directed as needed.  Dispense glucometer brand per patient's insurance at pharmacy discretion. 1 each 0     buPROPion (WELLBUTRIN XL) 150 MG 24 hr tablet TAKE 1 TABLET(150 MG) BY MOUTH EVERY MORNING 90 tablet 2     cetirizine (ZYRTEC) 10 MG tablet Take 1 tablet (10 mg total) by mouth every evening. 90 tablet 1     cholecalciferol, vitamin D3, 1,000 unit tablet Take 1,000 Units by mouth every evening.        clobetasol (TEMOVATE) 0.05 % cream Apply 1 application topically 2 (two) times a day as needed. 45 g 1     CONTOUR NEXT TEST STRIPS strips TEST BLOOD SUGAR 6 TIMES DAILY 200 strip 11     diabetic supplies, miscellan. Misc Insert sensor weekly 40 each 3     escitalopram oxalate (LEXAPRO) 10 MG tablet Take 1 tablet (10 mg total) by mouth daily. 30 tablet 2     GLUCAGON 1 mg injection INJECT WHOLE DOSE IF LOW BLOOD SUGAR & BLOOD SUGAR NOT COMING UP AFTER TREATING 2X WITH FOOD. 2 each 1     HYDROcodone-acetaminophen (NORCO) 5-325 mg per tablet Take 1 tablet by mouth every 4 (four) hours as needed for pain. 20 tablet 0     hydroxychloroquine (PLAQUENIL) 200 mg tablet Take 1 tablet (200 mg total) by mouth 2 (two) times a day. 60 tablet 6     infusion set for insulin pump (MINIMED INFUSION SET) ISet Use every 3 days with pump 40 each 3     insulin pump cartridge Inject under the skin continuous. Insulin pump discharge instructions from 07/20/17.  This is intended as additional information to the patient's PTA insulin pump order.  Continue with insulin pump at present settings. 1 each 0     insulin pump syringe (PARADIGM RESERVOIR) 3 mL Misc Use every 3 days with set 40 each 3     insulin syringe-needle U-100 (BD INSULIN SYRINGE ULT-FINE II) 0.3 mL 31 gauge x 5/16 Syrg Use as needed with insulin 50 each 5     ketoconazole (NIZORAL) 2 % cream applly three times a day for two weeks 15 g 0     levonorgestrel (MIRENA) 20 mcg/24 hr (5 years) IUD 1 each by Intrauterine route once. Placed 8/2015       pantoprazole (PROTONIX) 40 MG tablet Take 1  tablet (40 mg total) by mouth daily. 90 tablet 3     SUMAtriptan (IMITREX) 25 MG tablet TAKE 1 TABLET(25 MG) BY MOUTH EVERY 2 HOURS AS NEEDED FOR MIGRAINE 18 tablet 5     SYNTHROID 125 mcg tablet TAKE 1 TABLET BY MOUTH DAILY AT 6AM 90 tablet 2     terbinafine HCl (LAMISIL) 250 mg tablet Take 1 tablet (250 mg total) by mouth daily. 7 tablet 0     traZODone (DESYREL) 50 MG tablet Take 1 tablet (50 mg total) by mouth at bedtime. 90 tablet 3     No facility-administered medications prior to visit.

## 2021-07-20 NOTE — TELEPHONE ENCOUNTER
Telephone Encounter by Hanna Mccauley at 3/2/2021  9:35 AM     Author: Hanna Mccauley Service: -- Author Type: --    Filed: 3/2/2021  9:36 AM Encounter Date: 3/1/2021 Status: Addendum    : Hanna Mccauley    Related Notes: Original Note by Hanna Mccauley filed at 3/2/2021  9:36 AM       No PA needed, new insurance [employer] covers Synthroid tablets.

## 2021-07-20 NOTE — PROGRESS NOTES
Programs Applying For: Connie Care   [facility]:  Case #:  Date Applied: 2/12/2020        Outreach:     2/25/2020 :Updated patient on Connie care status on 2/24. See encounter note.   2/21/2020: Checked with billing today to see if the application has been received. I was advised it has been received, it is still in process. He wasn't sure how long the process may take. He said, at least give 30 days.       2/12/2020: Received connie care application from Vani today. This has been faxed with her bank statement, paystubs and taxes.  Message added in Billing notes    1/21/2020:Started Connie care over phone today. Mailing to patient to sign and provide documentation to either myself or billing. Household of 3, income is $2775.00 a month

## 2021-07-20 NOTE — TELEPHONE ENCOUNTER
Call patient: she should take 50% of her usual dose of Lantus and check her glucose regularly throughout today so that she can administer her bolus/meal insulin

## 2021-07-20 NOTE — PROGRESS NOTES
Vani Corona is a 40 y.o. female who is being evaluated via a billable video visit.      How would you like to obtain your AVS? MyChart.  If dropped from the video visit, the video invitation should be resent by: Text to cell phone: [phone]   Will anyone else be joining your video visit? No    Internal Medicine Office Visit- VIDEO VISIT  [facility]   Patient Name: Vani Corona  Patient Age: 40 y.o.  YOB: 1980  MRN: [id number]      Video-Visit Details    Type of service:  Video Visit  Video Start Time: 7:41 AM  Video End Time (time video stopped): 8:00 AM  Originating Location (pt. Location): [place]    Distant Location (provider location):  [facility]     Mode of Communication:  Video Conference via Ranker      Date of Visit: 2021  Reason for Video Visit:   Chief Complaint   Patient presents with     Medication Management       Assessment / Plan / Medical Decision Makin. Recurrent major depressive disorder, in partial remission (H)  Methamphetamine abuse in remission  Continue follow up with psychology  continue bupropion, escitalopram    2. Type 1 diabetes mellitus with hyperglycemia (H)  Followed by endocrinology    3. Tobacco abuse  Reviewed cessation assistance options   - nicotine (NICODERM CQ) 14 mg/24 hr; Place 1 patch on the skin daily.  Dispense: 30 patch; Refill: 0  - nicotine (NICODERM CQ) 7 mg/24 hr; Place 1 patch on the skin daily.  Dispense: 30 patch; Refill: 0  - nicotine polacrilex (NICORELIEF) 2 mg gum; 2 mg chew and place in buccal surface every 1 hour as needed for nicotine cravings  Dispense: 100 each; Refill: 1    4. Cluster headache syndrome, intractable  Stable   - SUMAtriptan (IMITREX) 25 MG tablet; Take 1 tablet (25 mg total) by mouth daily as needed for migraine (may repeat in 2 hours if headache has not resolved).  Dispense: 18 tablet; Refill: 6    5. Inflammatory polyarthritis (H)  Quiescent, off of  methotrexate since she is trying to conceive       Video visit duration: 19 minutes     Health Maintenance Review  Health Maintenance   Topic Date Due     DEPRESSION ACTION PLAN  1980     HIV SCREENING  10/22/1994     PAP SMEAR  10/22/2000     Pneumococcal Vaccine: Pediatrics (0 to 5 Years) and At-Risk Patients (6 to 64 Years) (2 of 3 - PCV13) 10/03/2015     HEPATITIS B VACCINES (3 of 3 - Risk 3-dose series) 08/14/2016     LIPID  02/15/2017     PREVENTIVE CARE VISIT  04/14/2017     DIABETIC FOOT EXAM  09/04/2019     DIABETIC EYE EXAM  01/24/2020     MICROALBUMIN  01/29/2021     A1C  05/18/2021     BMP  11/18/2021     ADVANCE CARE PLANNING  03/19/2023     TD 18+ HE  08/31/2025     HEPATITIS C SCREENING  Completed     INFLUENZA VACCINE RULE BASED  Completed     TDAP ADULT ONE TIME DOSE  Completed         I have changed Vani Corona's SUMAtriptan. I am also having her start on nicotine, nicotine, and nicotine polacrilex. Additionally, I am having her maintain her cholecalciferol (vitamin D3), baclofen, insulin syringe-needle U-100, clobetasoL, aspirin, cetirizine, ketoconazole, glucagon (human recombinant), albuterol, (blood-glucose meter,continuous), InPen (for NovoLOG or Fiasp), Contour Next Test Strips, blood glucose meter, blood glucose test, pantoprazole, nicotine polacrilex, escitalopram oxalate, Synthroid, buPROPion, clomiPHENE, fluconazole, HYDROcodone-acetaminophen, Accu-Chek Fastclix Lancet Drum, Dexcom G6 Sensor, Dexcom G6 Transmitter, and insulin glargine.      HPI:  Vani Corona is 40 y.o. year old and was contacted today for a video visit. She lost her job in October and is working back at the company she work in previously, [employer]. She has more stress related to her job but also to COVID. She is still seeing her therapist.     She is smoking about 5 cigarettes per day. She would like to use both patches and gum to help her quit.     Migraines were reviewed. She had more headache     She has a  history of polyarthritis. She stopped her medications because she is trying to conceive. She notes more pain in the evenings but overall joints are doing okay. No pain or stiffness in the morning.    DM is followed by endocrinology.     She has psoriasis and has topical creams but has not needed to use these since before September.     Last PAP- April 2020 with Dr. Johns.         Current Scheduled Meds:  Outpatient Encounter Medications as of 1/12/2021   Medication Sig Dispense Refill     ACCU-CHEK FASTCLIX LANCET DRUM USE TO TEST BLOOD SUGAR 4 6 TIMES DAILY       albuterol (PROAIR HFA;PROVENTIL HFA;VENTOLIN HFA) 90 mcg/actuation inhaler INHALE 2 PUFFS BY MOUTH EVERY 6 HOURS AS NEEDED FOR WHEEZING 18 g 0     aspirin 81 MG EC tablet Take 81 mg by mouth every evening.        baclofen (LIORESAL) 10 MG tablet Take 10 mg by mouth 3 (three) times a day as needed.       blood glucose meter (GLUCOMETER) Use 1 each As Directed as needed. Dispense glucometer brand per patient's insurance at pharmacy discretion. 1 each 0     blood glucose test strips Use 1 each As Directed as needed (4-6 times daily). Dispense brand per patient's insurance at pharmacy discretion. 400 strip 1     blood-glucose meter,continuous (DEXCOM G6 ) Misc Use 1 each As Directed continuous. 1 each 0     blood-glucose sensor (DEXCOM G6 SENSOR) Eveline Apply one sensor to the skin every 10 days. 9 Device 1     blood-glucose transmitter (DEXCOM G6 TRANSMITTER) Eveline Use 1 each As Directed every 3 (three) months. 1 Device 1     buPROPion (WELLBUTRIN XL) 150 MG 24 hr tablet TAKE 1 TABLET BY MOUTH EVERY DAY 90 tablet 1     cetirizine (ZYRTEC) 10 MG tablet TAKE 1 TABLET(10 MG) BY MOUTH EVERY EVENING 90 tablet 0     cholecalciferol, vitamin D3, 1,000 unit tablet Take 1,000 Units by mouth every evening.        clobetasol (TEMOVATE) 0.05 % cream Apply 1 application topically 2 (two) times a day as needed. 45 g 1     clomiPHENE (CLOMID) 50 mg tablet TAKE 2  TABLETS (100MG) BY ORAL ROUTE ONCE DAILY FOR 5 DAYS. STARTING ON CYCLE DAY 3       CONTOUR NEXT TEST STRIPS strips TEST BLOOD SUGAR 6 TIMES DAILY 400 strip 2     escitalopram oxalate (LEXAPRO) 10 MG tablet TAKE 1 TABLET BY MOUTH EVERY DAY 90 tablet 1     fluconazole (DIFLUCAN) 150 MG tablet TAKE 1 TABLET BY MOUTH EVERY DAY FOR 3 DAYS       glucagon, human recombinant, (GLUCAGON) 1 mg injection Inject 1 mg into the shoulder, thigh, or buttocks once as needed. 2 each 0     HYDROcodone-acetaminophen 5-325 mg per tablet TAKE 1 TAB BY MOUTH EVERY 6 HOURS AS NEEDED       insulin admin supplies (INPEN, FOR NOVOLOG,) InPn Inject 4 Units under the skin 3 (three) times a day before meals. 5 each 1     insulin glargine (BASAGLAR KWIKPEN) 100 unit/mL (3 mL) pen Inject 13 Units under the skin 2 (two) times a day. 30 mL 1     insulin syringe-needle U-100 (BD INSULIN SYRINGE ULT-FINE II) 0.3 mL 31 gauge x 5/16 Syrg Use as needed with insulin 50 each 5     ketoconazole (NIZORAL) 2 % cream Apply topically 2 (two) times a day as needed.       nicotine (NICODERM CQ) 14 mg/24 hr Place 1 patch on the skin daily. 30 patch 0     nicotine (NICODERM CQ) 7 mg/24 hr Place 1 patch on the skin daily. 30 patch 0     nicotine polacrilex (COMMIT) 2 MG lozenge Apply 1 lozenge (2 mg total) to the mouth or throat as needed for smoking cessation. 24 each 11     nicotine polacrilex (NICORELIEF) 2 mg gum 2 mg chew and place in buccal surface every 1 hour as needed for nicotine cravings 100 each 1     pantoprazole (PROTONIX) 40 MG tablet Take 1 tablet (40 mg total) by mouth every evening. 90 tablet 3     SUMAtriptan (IMITREX) 25 MG tablet Take 1 tablet (25 mg total) by mouth daily as needed for migraine (may repeat in 2 hours if headache has not resolved). 18 tablet 6     SYNTHROID 125 mcg tablet Take 1 tablet (125 mcg total) by mouth daily. Due for lab work 90 tablet 0     [DISCONTINUED] insulin aspart U-100 (NOVOLOG FLEXPEN U-100 INSULIN) 100 unit/mL  "(3 mL) injection pen Inject subcutaneously CHO ratio 1:10 - 1:15 + sliding scale 1:50>150 up to 40 units daily. 35 mL 1     [DISCONTINUED] insulin aspart U-100 (NOVOLOG U-100 INSULIN ASPART) 100 unit/mL injection Inject 40 Units under the skin daily. Via the pump (Patient taking differently: Inject under the skin daily. Via the pump- up to 40 units daily) 36 mL PRN     [DISCONTINUED] pen needle, diabetic (BD ULTRA-FINE ADALBERTO PEN NEEDLE) 32 gauge x \" Ndle USE 7 TIMES DAILY 400 each 1     [DISCONTINUED] SUMAtriptan (IMITREX) 25 MG tablet TAKE 1 TABLET(25 MG) BY MOUTH EVERY 2 HOURS AS NEEDED FOR MIGRAINE 18 tablet 6     No facility-administered encounter medications on file as of 2021.          Past Medical History:   Diagnosis Date     Asthma      Chest pain      Depression      Diabetes (H)      Fainting      Heart disease     MVP     Methamphetamine abuse in remission, in remission since treatment in 2014 4/10/2015    Treatment 2014. Clean since.      Rheumatoid arthritis (H)      Substance abuse (H)     methamphetamine     Thyroid disease      Past Surgical History:   Procedure Laterality Date     CERVICAL BIOPSY  W/ LOOP ELECTRODE EXCISION        SECTION, CLASSIC       colposcopy      LEAP     HERNIA REPAIR       LAPAROSCOPIC CHOLECYSTECTOMY N/A 2019    Procedure: CHOLECYSTECTOMY, LAPAROSCOPIC;  Surgeon: Rinku Doran MD;  [place] [facility] [place]  Service: General     OH  DELIVERY ONLY      Description:  Section;  Recorded: 2010;  Comments: 09 - breech     OH REPAIR UMBILICAL RUTH ANN,<4Y/O,REDUC      Description: Umbilical Hernia Repair;  Recorded: 10/03/2014;     OH REVISE MEDIAN N/CARPAL TUNNEL SURG      Description: Neuroplasty Decompression Median Nerve At Carpal Tunnel;  Recorded: 10/03/2014;     Social History     Tobacco Use     Smoking status: Former Smoker     Packs/day: 0.50     Types: Cigarettes     Quit date: 2017     Years " since quitting: 3.5     Smokeless tobacco: Never Used     Tobacco comment: She had stopped for a month as of 3/16/16, but now is back on 1/2 PPD.  11/7/16   Substance Use Topics     Alcohol use: No     Drug use: No     Types: Methamphetamines     Comment: Off 2 years, 1 month and 19 days as of 3/16/16       Objective / Physical Examination:  Insight is good. Thought process is logical.    No orders of the defined types were placed in this encounter.  Followup: Return in about 6 months (around 7/12/2021) for Next scheduled follow up. earlier if needed.

## 2021-07-20 NOTE — PROGRESS NOTES
Diabetes Care:    Received TC from provider that patient is in ER, has an insulin pump and will be going for gallbladder surgery.  BG on admission 465.  Since we don't know if the elevated BG is related to what is going on with her physically or a bent cannula, best option would be to stop the pump and start subcutaneous injections.  On assessment of her insulin pump:  Basal Rates:  Rate 1: 6156-4301  0.950 u/hr  Rate 2: 6114-6336  0.650 u/hr  Rate 3: 8075-0756  0.750 u/hr  Rate 4: 1138-4965  0.950 u/hr    Total 24 hour basal dose: 19.7     units      Carbohydrate Ratio:  0000 to 1200  12 Grams/unit  1200 to 0000  10 Grams/unit    Correction/Sensitivity:  0000 to 0000 ;  53 mg/dL/unit          Blood Glucose Target Range:  0000 to  0000;100-120      Active Insulin: _2.45__ hours  Brand of insulin pump: Medtronic 670 G   Taking the majority of her basal units/hr, 18 units of Lantus would be the 24 hour amount. Patient has received 6 units of Novologcorrection insulin for the elevated BG here at 1403. Ideally 1 hour after Lantus, pump would be disconnected.   At the site, the infusion set has red inside so it is possible that it is plugged. Or could be bent. It will be removed after Lantus given and pump can be restarted tomorrow.  Note on admission CO2 22, AG 13 BHOB 2.18    Patient sees NP at the [facility], last seen in July, due again in January. Does not see a CDE in clinic.    She has worn a sensor in past but cannot afford. She cannot meet copays. She also states she is losing her job. May need help with this concern.    I will see patient tomorrow. Pump can be restarted then with a temporary basal rate of zero set until Lantus wears off.    Thanks.    April Morton RN, CDE, Lead Inpatient Diabetes Educator    [facility]  [address] [place] [address]  [email or web address]  [email or web address]   Office: [phone]  Pager:  [phone]

## 2021-07-20 NOTE — TELEPHONE ENCOUNTER
The below was faxed 5/4. I will fax again today. I attempted to call and see what the issue was, however the wait time was 1 hr. The fax sent 5/4 includes the state license number, NPI, directions, and quantity for use.

## 2021-07-20 NOTE — PROGRESS NOTES
Clinic Care Coordination Contact    Follow Up Progress Note        Goals addressed this encounter:   Goals Addressed                 This Visit's Progress       General      Financial Wellbeing (pt-stated)        I would like to meet with FRW for assistance completing [facility] application and exploring [place] and/or [place] Care for myself and my children within the next 30 days    Action steps to achieve this goal  1.  I will sign the [facility] application once received in the mail. (complete)  2.  I will provide all necessary paperwork/documentation to assist in this process [facility] or to Angélica. (Complete)  3.  I will let Angélica know if I receive anything in the mail regarding my eligibility for [facility].   4.  I will send in my 401K information to the billing department.     NOTE: Pt reports that her MA ended on October 31st; her daughter currently has active MA. IRASEMA to communicate with county signed on 12/31/19 and left on CHW Angélica's desk.    Date goal set:  12/31/19  Discussed/updated:4/8/2020        Medication 1 (pt-stated)        I would like to meet with CCC RN to discuss medications and diabetes management within the next 30 days    Action steps to achieve this goal  1.  I will let Angélica, my Community Health Worker know, when I'm ready to reschedule my appointment with the Clinic Care Coordination nurse.  (Continous)  2.  I will bring all of my medications to my scheduled appointment with [facility] RN, when an appointment is scheduled.     NOTE: SW assessment completed on 12/31/19.    Date goal set:  2/24/2020  Discussed/updated: 4/8/2020 Not discussed today             Intervention/Education provided during outreach:     Vani was sleeping when I called. She mentioned she just had her tooth pulled. I let her know that I will send her a [employer] message, with request from billing.     Plan:   CHW sent my chart message per call today  CHW will follow up on goals at next outreach    Care  Coordinator will follow up in 1 month

## 2021-07-20 NOTE — PROGRESS NOTES
Internal Medicine Office Visit  Patient Name: Vani Corona  Patient Age: 36 y.o.  YOB: 1980  MRN: [id number]     Care Team:  1. Dr. Mtz- rheumatology  2. Debbi Dave, endocrinology  3. Jodi Etienne, psychology     ?  Date of Visit: 2017  Reason for Office Visit:   Chief Complaint   Patient presents with     [facility] Care     Medication Management       Assessment / Plan / Medical Decision Makin. TMJ inflammation - bilateral  2. Tobacco abuse  3. Type 1 diabetes mellitus without complication  4. Restless Legs Syndrome  5. Hypothyroidism, unspecified type  6. Depression  7. Eczema, pustular  8. GERD (gastroesophageal reflux disease)  9. Insomnia  - Take pantoprazole every other day with famotidine. After 1 week of this, switch to taking famotidine only. Send a [employer] message if you are having a lot of breakthrough acid reflux.   - I have counseled the patient for tobacco cessation and the follow up will occur  at the next visit.   - Follow up with endocrinology for diabetes and hypothyroid management  - Follow up in 6 months       Health Maintenance Review  Health Maintenance   Topic Date Due     DIABETES OPHTHALMOLOGY EXAM  10/22/1989     PAP SMEAR  10/22/2009     ADVANCE DIRECTIVES DISCUSSED WITH PATIENT  2013     INFLUENZA VACCINE RULE BASED (1) 2017     DEPRESSION FOLLOW UP  2017     DIABETES FOLLOW-UP  2017     DIABETES HEMOGLOBIN A1C  2018     DIABETES URINE MICROALBUMIN  2018     DIABETES FOOT EXAM  2018     TD 18+ HE  2025     TDAP ADULT ONE TIME DOSE  Completed         I have changed Ms. Corona's levothyroxine to SYNTHROID. I have also changed her buPROPion and cetirizine. I am also having her start on famotidine. Additionally, I am having her maintain her levonorgestrel, cholecalciferol (vitamin D3), glucagon, blood glucose meter, folic acid, nicotine polacrilex, baclofen, hydroxychloroquine, methotrexate, insulin pump  cartridge Crtg 1 each, insulin pump cartridge, SUMAtriptan, CONTOUR NEXT STRIPS, NovoLOG, pantoprazole, nicotine, and traZODone.     HPI:   Encounter Diagnoses   Name Primary?     TMJ inflammation - bilateral Yes     Tobacco abuse      Type 1 diabetes mellitus without complication      Restless Legs Syndrome      Hypothyroidism, unspecified type      Depression      Eczema, pustular      GERD (gastroesophageal reflux disease)      Insomnia       Vani Corona is a 37 y/o female who presents to the office today to establish care and for medication refills.     GERD- insurance coverage only allows for 120 days of PPI. We discussed trying a cross taper to an alternative reflux medication with less side effects such as famotidine.     Tobacco- using patches but only every other day. She smokes 8 cigarettes on the days she doesn't use the patch.     Shoulder pain- baclofen and vidocin as needed, things are better now    Depression- takes bupropion for this and sees psychologist regularly. She feels that symptoms are well controlled. She also takes trazodone for insomnia, this is also effective.     Migraine headaches- has had more migraines since she left the hospital, no associated with hyper/hypoglycemia. No aura. Imitrex works to relieve the headaches. 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 Her triggers include light, loud noise.     Environmental allergies- year round.     TMJ- she has done PT for this. Had a mouthpiece, couldn't get used to it.      RLS- different treatments in the past have not been effective. Now walking regularly seems to work. She asks about getting a standing desk at work.     DM- followed through endocrinology     Polyarthralgia- followed by rheumatology, she is currently taking methotrexate and plaquenil for this. She takes folic acid regularly daily as well.     Hypothyroidism- Euthyroid as of 5/2017, followed by endocrinology     Review of Systems: 10-point  "ROS is negative except as in HPI      Current Scheduled Meds:  Outpatient Encounter Prescriptions as of 9/18/2017   Medication Sig Dispense Refill     baclofen (LIORESAL) 10 MG tablet Take 10 mg by mouth 3 (three) times a day as needed.       blood glucose meter (GLUCOMETER) Use 1 each As Directed as needed. Dispense glucometer brand per patient's insurance at pharmacy discretion. 1 each 0     buPROPion (WELLBUTRIN XL) 150 MG 24 hr tablet Take 1 tablet (150 mg total) by mouth every morning. 90 tablet 3     cetirizine (ZYRTEC) 10 MG tablet Take 1 tablet (10 mg total) by mouth every evening. 90 tablet 3     cholecalciferol, vitamin D3, 1,000 unit tablet Take 1,000 Units by mouth every evening.        CONTOUR NEXT STRIPS strips TEST BLOOD SUGAR 6 TIMES DAILY 200 strip 0     folic acid (FOLVITE) 1 MG tablet Take 1 tablet (1 mg total) by mouth daily. 30 tablet 11     GLUCAGON 1 mg injection INJECT WHOLE DOSE IF LOW BLOOD SUGAR & BLOOD SUGAR NOT COMING UP AFTER TREATING 2X WITH FOOD. 2 each 1     hydroxychloroquine (PLAQUENIL) 200 mg tablet Take 200 mg by mouth 2 (two) times a day.       insulin pump cartridge Crtg 1 each Inject under the skin continuous. Novolog insulin pump       insulin pump cartridge Inject under the skin continuous. Insulin pump discharge instructions from 07/20/17.  This is intended as additional information to the patient's PTA insulin pump order.  Continue with insulin pump at present settings. 1 each 0     levonorgestrel (MIRENA) 20 mcg/24 hr (5 years) IUD 1 each by Intrauterine route once. Placed 8/2015       methotrexate 2.5 MG tablet Take 20 mg by mouth once a week. 8 tabs (20 mg) once weekly on Sunday       nicotine (NICODERM CQ) 14 mg/24 hr APPLY 1 PATCH AS DIRECTED ONCE EACH DAY( EVERY 24 HOURS) 28 patch 0     nicotine polacrilex (NICORETTE) 2 mg gum Chew as directed.  Go to \"www.QuitPlan.org\" and read about how to use this drug.  Call them, too.  Get support. Pharm:  Please read note. 100 " each 3     NOVOLOG 100 unit/mL injection INJECT 3 TIMES SUBCUTANEOUSLY DAILY VIA INSULIN PUMP UP TO 40 UNITS DAILY 30 mL 0     pantoprazole (PROTONIX) 40 MG tablet Take 1 tablet (40 mg total) by mouth daily. 30 tablet 1     SUMAtriptan (IMITREX) 25 MG tablet Take 1 tablet (25 mg total) by mouth every 2 (two) hours as needed for migraine. 18 tablet 0     SYNTHROID 125 mcg tablet Take 1 tablet (125 mcg total) by mouth Daily at 6:00 am. 90 tablet 3     traZODone (DESYREL) 50 MG tablet Take 1 tablet (50 mg total) by mouth at bedtime. 90 tablet 3     [DISCONTINUED] buPROPion (WELLBUTRIN XL) 150 MG 24 hr tablet Take 150 mg by mouth every morning.        [DISCONTINUED] cetirizine (ZYRTEC) 10 MG tablet Take 10 mg by mouth every evening.        [DISCONTINUED] levothyroxine (SYNTHROID) 125 MCG tablet Take 1 tablet (125 mcg total) by mouth Daily at 6:00 am. 90 tablet 3     [DISCONTINUED] traZODone (DESYREL) 50 MG tablet Take 1 tablet (50 mg total) by mouth at bedtime. 30 tablet 0     famotidine (PEPCID) 40 MG tablet Take 1 tablet (40 mg total) by mouth daily. 90 tablet 1     No facility-administered encounter medications on file as of 2017.      Past Medical History:   Diagnosis Date     Asthma      Chest pain      Depression      Diabetes      Fainting      Heart disease     MVP     Methamphetamine abuse      Methamphetamine abuse in remission, in remission since treatment in 2014 4/10/2015    Treatment 2014. Clean since.      Rheumatoid arthritis      Substance abuse     methamphetamine     Thyroid disease      Past Surgical History:   Procedure Laterality Date     CERVICAL BIOPSY  W/ LOOP ELECTRODE EXCISION        SECTION, CLASSIC       colposcopy      LEAP     HERNIA REPAIR       MA  DELIVERY ONLY      Description:  Section;  Recorded: 2010;  Comments: 09 - breech     MA REPAIR UMBILICAL RUTH ANN,<4Y/O,REDUC      Description: Umbilical Hernia Repair;  Recorded: 10/03/2014;      UT REVISE MEDIAN N/CARPAL TUNNEL SURG      Description: Neuroplasty Decompression Median Nerve At Carpal Tunnel;  Recorded: 10/03/2014;     Social History   Substance Use Topics     Smoking status: Former Smoker     Packs/day: 0.50     Types: Cigarettes     Quit date: 7/12/2017     Smokeless tobacco: Never Used      Comment: She had stopped for a month as of 3/16/16, but now is back on 1/2 PPD.  11/7/16     Alcohol use No       Objective / Physical Examination:  Vitals:    09/18/17 1551   BP: 108/62   Pulse: 68   Weight: 120 lb (54.4 kg)     Wt Readings from Last 3 Encounters:   09/18/17 120 lb (54.4 kg)   08/31/17 117 lb 9.6 oz (53.3 kg)   08/02/17 118 lb 8 oz (53.8 kg)     Body mass index is 20.6 kg/(m^2).     General Appearance: Alert and oriented, cooperative, affect appropriate, speech clear, in no apparent distress  Lungs: Clear to auscultation bilaterally. Normal inspiratory and expiratory effort  Cardiovascular: Regular rate, normal S1, S2. No murmurs, rubs, or gallops  Abdomen: Bowel sounds active all four quadrants. Soft, non-tender    No orders of the defined types were placed in this encounter.  Followup: Return in about 6 months (around 3/18/2018) for Next scheduled follow up. earlier if needed.      Denia Llamas, CNP  [facility]

## 2021-07-20 NOTE — ED NOTES
"Pt was returned from xray due to \"feeling like I was going to pass out\". Unknown if all required xrays were performed before pt was returned.   "

## 2021-07-20 NOTE — PROGRESS NOTES
SUBJECTIVE:  Vani Corona is a 36 y.o. female comes in for evaluation of a sore throat for about the last 10 days.  This started after hospitalization on July 20 for diabetic ketoacidosis.  She was then seen in the walk-in care clinic on July 28 and both the rapid and the coronary strep test were negative.  She continues to have a sore throat and difficulty swallowing.  She is managing her secretions well.  She has been taking ibuprofen and occasional tramadol to help with the pain.    OBJECTIVE:   /60  Pulse 67  Temp 98.3  F (36.8  C) (Oral)   Resp 20  Wt 118 lb 8 oz (53.8 kg)  SpO2 99%  BMI 20.34 kg/m2   Appears uncomfortable and tearful at times.  Ears: normal  Oropharynx: moderate erythema and small edema of the uvula. Speech is normal and no drooling.  Neck: no adenopathy  Lungs: Normal respirations  Skin: no strep-like sandpaper rash.    No testing done    ASSESSMENT:   Will treat as a bacterial pharyngitis based on the duration of the sx.     PLAN:     Patient Instructions     Sore throat over a week.    Past strep testing was negative    The throat appears to be red and inflamed.     I will treat you with an amoxicillin for 10 days.    Use Ibuprofen and tramadol to help with the pain.       Medications Ordered   Medications     amoxicillin (AMOXIL) 250 MG capsule     Sig: Take 2 capsules (500 mg total) by mouth 3 (three) times a day for 10 days.     Dispense:  60 capsule     Refill:  0

## 2021-07-20 NOTE — PROGRESS NOTES
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 I do not think we need to repeat echocardiogram this year as long as he has no new symptoms.    Follow-up in 1 year    Subjective:   This is 38 y.o. female who comes in today follow-up visit.  She reports no new heart palpitations or syncope.  She has no exertional dyspnea or unusual fatigue.    Review of Systems:   General: Night Sweats  Eyes: WNL  Ears/Nose/Throat: WNL  Lungs: Snoring  Heart: WNL  Stomach: WNL  Bladder: WNL  Muscle/Joints: WNL  Skin: WNL  Nervous System: WNL  Mental Health: Depression, Anxiety     Blood: WNL    Objective:   BP 98/50 (Patient Site: Left Arm, Patient Position: Sitting, Cuff Size: Adult Regular)   Pulse (!) 59   Resp 12   Ht 5' 4\" (1.626 m)   Wt 127 lb (57.6 kg)   BMI 21.80 kg/m    Physical Exam:  GENERAL: no distress  NECK: No JVD  LUNGS: Clear to auscultation.  CARDIAC: regular rhythm, S1 & S2 normal.  No heaves, thrills, gallops; mid systolic click with late systolic murmur at the apex 2/6  ABDOMEN: flat, negative hepatosplenomegaly, soft and non-tender.  EXTREMITIES: No evidence of cyanosis, clubbing or edema.    Current Outpatient Medications   Medication Sig Dispense Refill     ADMELOG U-100 INSULIN LISPRO 100 unit/mL injection INJECT UP  UNITS D VIA INSULIN PUMP 108 mL 0     albuterol (VENTOLIN HFA) 90 mcg/actuation inhaler Inhale 2 puffs every 6 (six) hours as needed for wheezing. 1 Inhaler 2     aspirin 81 MG EC tablet Take 81 mg by mouth.       baclofen (LIORESAL) 10 MG tablet Take 10 mg by mouth 3 (three) times a day as needed.       blood glucose meter (GLUCOMETER) Use 1 each As Directed as needed. Dispense glucometer brand per patient's insurance at pharmacy discretion. 1 each 0     buPROPion (WELLBUTRIN XL) 150 " MG 24 hr tablet TAKE 1 TABLET(150 MG) BY MOUTH EVERY MORNING 90 tablet 2     cetirizine (ZYRTEC) 10 MG tablet Take 1 tablet (10 mg total) by mouth every evening. 90 tablet 1     cholecalciferol, vitamin D3, 1,000 unit tablet Take 1,000 Units by mouth every evening.        clobetasol (TEMOVATE) 0.05 % cream Apply 1 application topically 2 (two) times a day as needed. 45 g 1     diabetic supplies, miscellan. Misc Insert sensor weekly 40 each 3     escitalopram oxalate (LEXAPRO) 10 MG tablet TAKE 1 TABLET(10 MG) BY MOUTH DAILY 90 tablet 3     GLUCAGON 1 mg injection INJECT WHOLE DOSE IF LOW BLOOD SUGAR & BLOOD SUGAR NOT COMING UP AFTER TREATING 2X WITH FOOD. 2 each 1     HYDROcodone-acetaminophen (NORCO) 5-325 mg per tablet Take 1 tablet by mouth every 4 (four) hours as needed for pain. 20 tablet 0     infusion set for insulin pump (MINIMED INFUSION SET) ISet Use every 3 days with pump 40 each 3     insulin pump cartridge Inject under the skin continuous. Insulin pump discharge instructions from 07/20/17.  This is intended as additional information to the patient's PTA insulin pump order.  Continue with insulin pump at present settings. 1 each 0     insulin pump syringe (PARADIGM RESERVOIR) 3 mL Misc Use every 3 days with set 40 each 3     insulin syringe-needle U-100 (BD INSULIN SYRINGE ULT-FINE II) 0.3 mL 31 gauge x 5/16 Syrg Use as needed with insulin 50 each 5     ketoconazole (NIZORAL) 2 % cream applly three times a day for two weeks 15 g 0     levonorgestrel (MIRENA) 20 mcg/24 hr (5 years) IUD 1 each by Intrauterine route once. Placed 8/2015       ondansetron (ZOFRAN) 4 MG tablet Take 1 tablet (4 mg total) by mouth every 12 (twelve) hours as needed for nausea. 10 tablet 0     pantoprazole (PROTONIX) 40 MG tablet Take 1 tablet (40 mg total) by mouth daily. 90 tablet 1     SUMAtriptan (IMITREX) 25 MG tablet TAKE 1 TABLET(25 MG) BY MOUTH EVERY 2 HOURS AS NEEDED FOR MIGRAINE 18 tablet 5     SYNTHROID 125 mcg tablet  TAKE 1 TABLET BY MOUTH EVERY DAY AT 6AM 90 tablet 1     CONTOUR NEXT TEST STRIPS strips TEST BLOOD SUGAR 6 TIMES DAILY 200 strip 11     No current facility-administered medications for this visit.        Cardiographics:    Echo: February 2018  1. Normal left ventricular size and systolic performance with a visually estimated ejection fraction of 65-70%.   2. There is mild-moderate posterior mitral valve leaflet prolapse and mild anterior mitral valve leaflet prolapse.  The mitral valve leaflets appear somewhat thickened and myxomatous.      There is moderate mitral insufficiency.  3. Normal right ventricular size and systolic performance.   4. There is mild to moderate left atrial enlargement.      When compared to the prior real-time echocardiogram dated 24 June 2015, the findings appear fairly similar in both examinations.      GXT June 2015   1. Graded exercise test to 80% of predicted maximal heart rate, subjectively  negative for exercise-induced chest pain.  2. No electrocardiographic changes suggestive of stress induced ischemia.  3. Failure to achieve 85% of her predicted maximal heart rate decreases the  sensitivity of this test.  4. Good exercise capacity for age.        Lab Results:       Lab Results   Component Value Date    CHOL 174 02/15/2016    CHOL 120 03/04/2014    CHOL 187 01/31/2014     Lab Results   Component Value Date    HDL 45 (L) 02/15/2016    HDL 24 (L) 03/04/2014    HDL 33 (L) 01/31/2014     Lab Results   Component Value Date    LDLCALC 105 02/15/2016    LDLCALC 81.2 03/04/2014    LDLCALC 103.4 11/01/2011     Lab Results   Component Value Date    TRIG 122 02/15/2016    TRIG 74 03/04/2014    TRIG 48 11/01/2011     No results found for: BNP    David (Luis Felipe)  MD Hao

## 2021-07-20 NOTE — TELEPHONE ENCOUNTER
Refill Approved    Rx renewed per Medication Renewal Policy. Medication was last renewed on 10/19/20.    Alirio Rothman, Care Connection Triage/Med Refill 2/10/2021     Requested Prescriptions   Pending Prescriptions Disp Refills     SYNTHROID 125 mcg tablet 90 tablet 0     Sig: Take 1 tablet (125 mcg total) by mouth daily. Due for lab work       Thyroid Hormones Protocol Passed - 2/9/2021  5:29 PM        Passed - Provider visit in past 12 months or next 3 months     Last office visit with prescriber/PCP: 12/24/2019 Denia Llamas FNP OR same dept: Visit date not found OR same specialty: 12/24/2019 Denia Llamas FNP  Last physical: Visit date not found Last MTM visit: Visit date not found   Next visit within 3 mo: Visit date not found  Next physical within 3 mo: Visit date not found  Prescriber OR PCP: MOMO Nguyen  Last diagnosis associated with med order: 1. Hypothyroidism, unspecified type  - SYNTHROID 125 mcg tablet; Take 1 tablet (125 mcg total) by mouth daily. Due for lab work  Dispense: 90 tablet; Refill: 0    If protocol passes may refill for 12 months if within 3 months of last provider visit (or a total of 15 months).             Passed - TSH on file in past 12 months for patient age 12 & older     TSH   Date Value Ref Range Status   11/18/2020 0.43 0.30 - 5.00 uIU/mL Final

## 2021-07-20 NOTE — LETTER
Letter by Aura Georges BSW at      Author: Aura Georges BSW Service: -- Author Type: --    Filed:  Encounter Date: 12/31/2019 Status: Signed       CARE COORDINATION  December 31, 2019    Vani [place]  [address] [place] [address]      Dear Vani,    I am a clinic care coordinator who works with MOMO Nguyen at the [facility]. I wanted to thank you for spending the time to talk with me.  I have been trying to reach you recently to introduce Clinic Care Coordination and to see if there was anything I could assist you with.  Below is a description of clinic care coordination and how I can further assist you.     The clinic care coordinator team is made up of a registered nurse,  and community health worker who understand the health care system. The goal of clinic care coordination is to help you manage your health and improve access to the health care system in the most efficient manner. The team can assist you in meeting your health care goals by providing education, coordinating services, strengthening the communication among your providers, assist you with any financial, behavioral, psychosocial, chemical dependency, counseling, and/or psychiatric resources if needed.    Please feel free to contact Angélica, the Community Health Worker, at [phone] with any questions or concerns. We are focused on providing you with the highest-quality healthcare experience possible and that all starts with you.     Sincerely,   MAURILIO Oakes, LSW  Enclosed: I have enclosed a copy of the Care Plan. This has helpful information and goals that we have talked about. Please keep this in an easy to access place to use as needed.

## 2021-07-20 NOTE — LETTER
Letter by Aura Georges BSW at      Author: Aura Georges BSW Service: -- Author Type: --    Filed:  Encounter Date: 7/14/2020 Status: (Other)       CARE COORDINATION  [facility]  [address] [place] [address]  July 14, 2020    Vani CASH [place]  [address] [place] [address]      Dear Vani,  Your Care Team congratulates you on your journey to maintain wellness. This document will help guide you on your journey to maintain a healthy lifestyle.  You can use this to help you overcome any barriers you may encounter.  If you should have any questions or concerns, you can contact the members of your Care Team or contact your Primary Care Clinic for assistance.    Health Maintenance  Health Maintenance Reviewed:      My Access Plan  Medical Emergency 911   Primary Clinic Line MOMO Nguyen - [phone]   24 Hour Appointment Line [phone] or  [phone]) (toll-free)   24 Hour Nurse Line [phone]   Preferred Urgent Care     Preferred Hospital     Preferred Pharmacy [id number] - [place], [place] - [address] AT SEC OF [address]     Behavioral Health Crisis Line The National Suicide Prevention Lifeline at 1-386.792.3667 or 772     My Care Team Members  Patient Care Team       Relationship Specialty Notifications Start End    Denia Llamas FNP PCP - General Nurse Practitioner  9/21/17     Phone: [phone] Fax: [phone]         [address] [place] [address] [address] [facility] [address]    Denia Llamas FNP Assigned PCP   7/28/19     Phone: [phone] Fax: [phone]         [address] [place] [address] [address] [facility] [address]              Goals        Patient Stated    ? COMPLETED: Financial Wellbeing (pt-stated)      I would like to meet with FRW for assistance completing [place] Care application and exploring [place] and/or [place] Care for myself and my children within the next 30 days    Personal Action Step  I  have been approved for 50% for Financial Assistance's for my medical bills.   I will need to reapply every 6 months      Discussed/updated:5/13/2020      ? COMPLETED: Financial Wellbeing (pt-stated)      Goal Statement: I would like to look at payment options for my medical bills in the next 2 months  Date Goal set: 6/15/2020   Barriers: Temporary insurance not covering a lot of the medical bills  Strengths: Open to looking at payment options  Date to Achieve By: 2 months  Patient expressed understanding of goal: Yes    Action steps to achieve this goal:  1. I will call [facility] to discuss FAA and if I'm due to reapply in the next month.  2. I will submit all required documentation to [facility] [employer]  3. I will call [facility] Billing department to discuss payment arrangement in the next month.   4. I will review [facility] Insurance options in the next 3 months to determine therapist coverage.     Completed: 6/29/20      ? COMPLETED: Medication 1 (pt-stated)      I would like to meet with CCC RN to discuss medications and diabetes management within the next 30 days    Action steps to achieve this goal  1.  I am not interested in scheduling a visit with CCC RN at this time.    Date goal set:  2/24/20  Discussed/updated: 5/13/20    Completed: 5/28/20      ? COMPLETED: Mental Health Management (pt-stated)      I would like to get my daughter connected to PCA and mental health case management services through [facility] within the next 90 days    Personal Action Step:    I will contact [facility] if I need further support for in home services for my daughter.     Date goal set:  12/31/19  Discussed/updated: 1/21/2020         Other    ? COMPLETED: Medication      Take insulin as prescribed      ? COMPLETED: Monitor      Check BG 4-5 times daily with Dexcom             Advance Care Plans/Directives Type:      We notice that you do not have an Advance Directive on file. Upon completion of your  Health Care Directive, please bring a copy with you to your next office visit.    It has been your Clinic Care Team's pleasure to work with you on your goals.    Regards,  Your Clinic Care Team

## 2021-07-20 NOTE — TELEPHONE ENCOUNTER
Spoke with [facility]. They started the PA through cover my meds because they did not know pt was a [facility] pt.   [facility] states they will do the PA themselves, they have a team.   So this is pending.

## 2021-07-20 NOTE — TELEPHONE ENCOUNTER
Central PA team  [phone]  Pool: HE PA MED ([id number])          PA has been initiated.       PA form completed and faxed insurance via Cover My Meds     Sanchez:  CECILLE PERDUE (Sanchez: [id number])     Medication:  [employer] 90 Infusion Set    Insurance:  [employer] [place] Medicaid       Response will be received via fax and may take up to 5-10 business days depending on plan

## 2021-07-20 NOTE — TELEPHONE ENCOUNTER
Telephone Encounter by Hanna Mccauley at 1/30/2019 11:21 AM     Author: Hanna Mccauley Service: -- Author Type: --    Filed: 1/30/2019 11:22 AM Encounter Date: 1/25/2019 Status: Signed    : Hanna Mccauley       [facility] team  [phone]    PA has been initiated.

## 2021-07-20 NOTE — ADDENDUM NOTE
Addendum Note by Liz Song RN at 11/19/2020  7:40 AM     Author: Liz Song RN Service: -- Author Type: Registered Nurse    Filed: 11/23/2020  4:07 PM Encounter Date: 11/19/2020 Status: Signed    : Liz Song RN (Registered Nurse)    Addended by: LIZ SONG on: 11/23/2020 04:07 PM        Modules accepted: Orders

## 2021-07-20 NOTE — PROGRESS NOTES
Scheduled Follow Up Call: Attempt 1 Community Health Worker called and left a message for the patient. If the patient is returning my call, please transfer the patient to Bran at ext. [phone].    Note:  Reschedule for RN for med management (NS1)  Assist with Andreina Care application  Check if [facility] has contacted to schedule and complete in-home assessment    Plan:  1.) Send 'Remind Me' message to FRW for assistance exploring health insurance options and completing Andreina Care application. -attempted to call patient to assist with Andreina Care application.  2.) See goals. -complete  3.) Begin scheduled outreach. -complete      Outreach: 1/21/2020  Outreach interval: monthly

## 2021-07-20 NOTE — PROGRESS NOTES
Internal Medicine Office Visit  [facility]   Patient Name: Vani Corona  Patient Age: 39 y.o.  YOB: 1980  MRN: [id number]    Date of Visit: 2019  Reason for Office Visit:   Chief Complaint   Patient presents with     Hospital Visit Follow Up           Assessment / Plan / Medical Decision Makin. Hospital discharge follow-up  2. Acute cholecystitis  - Inpatient records and imaging reviewed.  - Will assist scheduling surgery follow up    3. Type 1 diabetes mellitus with hypoglycemia and without coma (H)  - Will have her follow up with endo regarding hypoglycemia, will assist with scheduling   - Reviewed use of glucagon with significant other and patient   - advised a snack at bedtime to help prevent hypoglycemia   - glucagon, human recombinant, (GLUCAGON) 1 mg injection; Inject 1 mg into the shoulder, thigh, or buttocks once as needed.  Dispense: 2 each; Refill: 0        Health Maintenance Review  Health Maintenance   Topic Date Due     DEPRESSION ACTION PLAN  1980     HPV TEST  1980     HIV SCREENING  10/22/1994     PAP SMEAR  10/22/2004     LIPID  02/15/2017     PREVENTIVE CARE VISIT  2017     DIABETIC FOOT EXAM  2019     MICROALBUMIN  2019     DIABETIC EYE EXAM  2020     A1C  2020     BMP  2020     ADVANCE CARE PLANNING  2023     TD 18+ HE  2025     PNEUMOCOCCAL IMMUNIZATION 19-64 MEDIUM RISK  Completed     INFLUENZA VACCINE RULE BASED  Completed     TDAP ADULT ONE TIME DOSE  Completed         I have changed Vani Corona's glucagon to glucagon (human recombinant). I am also having her maintain her cholecalciferol (vitamin D3), baclofen, insulin pump cartridge, insulin syringe-needle U-100, infusion set for insulin pump, insulin pump syringe, albuterol, clobetasol, Contour Next Test Strips, aspirin, escitalopram oxalate, ondansetron, insulin aspart U-100, Synthroid, pantoprazole, cetirizine,  SUMAtriptan, buPROPion, traZODone, ketoconazole, HYDROcodone-acetaminophen, acetaminophen, and ibuprofen.      HPI:  Vani Corona is a 39 y.o. year old who presents to the office today with her significant other for up of her recent hospitalization.  She was seen in the emergency department on 12/18 with a complaint of sharp chest pain associated nausea and vomiting.  Imaging showed acute cholecystitis.  She underwent lap cholecystectomy and was able to discharge to home on 12/19.  Is feeling well overall.  Her pain can be up to an 8 out of 10 but the pain medication helps with this.  She is eating normally and passing gas and having bowel movements.     She notes that she does not currently have a sensor for her insulin pump.  She notes that she has had some hypoglycemia at night.    Review of Systems- pertinent positive in bold:  A 10-point ROS is negative except as stated in HPI         Current Scheduled Meds:  Outpatient Encounter Medications as of 12/24/2019   Medication Sig Dispense Refill     acetaminophen (TYLENOL) 500 MG tablet Take 1 tablet (500 mg total) by mouth every 4 (four) hours as needed for pain.  0     albuterol (VENTOLIN HFA) 90 mcg/actuation inhaler Inhale 2 puffs every 6 (six) hours as needed for wheezing. 1 Inhaler 2     aspirin 81 MG EC tablet Take 81 mg by mouth every evening.        baclofen (LIORESAL) 10 MG tablet Take 10 mg by mouth 3 (three) times a day as needed.       buPROPion (WELLBUTRIN XL) 150 MG 24 hr tablet TAKE 1 TABLET(150 MG) BY MOUTH EVERY MORNING 90 tablet 1     cetirizine (ZYRTEC) 10 MG tablet TAKE 1 TABLET(10 MG) BY MOUTH EVERY EVENING 90 tablet 0     cholecalciferol, vitamin D3, 1,000 unit tablet Take 1,000 Units by mouth every evening.        clobetasol (TEMOVATE) 0.05 % cream Apply 1 application topically 2 (two) times a day as needed. 45 g 1     CONTOUR NEXT TEST STRIPS strips TEST BLOOD SUGAR 6 TIMES DAILY (Patient taking differently: TEST BLOOD SUGAR 2-6 TIMES  DAILY) 200 strip 11     escitalopram oxalate (LEXAPRO) 10 MG tablet TAKE 1 TABLET(10 MG) BY MOUTH DAILY (Patient taking differently: Take 10 mg by mouth at bedtime. ) 90 tablet 3     glucagon, human recombinant, (GLUCAGON) 1 mg injection Inject 1 mg into the shoulder, thigh, or buttocks once as needed. 2 each 0     HYDROcodone-acetaminophen 5-325 mg per tablet Take 1 tablet by mouth every 4 (four) hours as needed for pain. 12 tablet 0     ibuprofen (ADVIL,MOTRIN) 200 MG tablet Take 1 tablet (200 mg total) by mouth every 6 (six) hours as needed for pain.  0     infusion set for insulin pump (MINIMED INFUSION SET) ISet Use every 3 days with pump 40 each 3     insulin aspart U-100 (NOVOLOG U-100 INSULIN ASPART) 100 unit/mL injection Inject 40 Units under the skin daily. Via the pump (Patient taking differently: Inject under the skin daily. Via the pump- up to 40 units daily) 36 mL PRN     insulin pump cartridge Inject under the skin continuous. Insulin pump discharge instructions from 07/20/17.  This is intended as additional information to the patient's PTA insulin pump order.  Continue with insulin pump at present settings. 1 each 0     insulin pump syringe (PARADIGM RESERVOIR) 3 mL Misc Use every 3 days with set 40 each 3     insulin syringe-needle U-100 (BD INSULIN SYRINGE ULT-FINE II) 0.3 mL 31 gauge x 5/16 Syrg Use as needed with insulin 50 each 5     ketoconazole (NIZORAL) 2 % cream Apply topically 2 (two) times a day as needed.       ondansetron (ZOFRAN) 4 MG tablet TAKE 1 TABLET(4 MG) BY MOUTH EVERY 12 HOURS AS NEEDED FOR NAUSEA 10 tablet 0     pantoprazole (PROTONIX) 40 MG tablet TAKE 1 TABLET BY MOUTH M EVERY DAY (Patient taking differently: Take 40 mg by mouth every evening. ) 90 tablet 2     SUMAtriptan (IMITREX) 25 MG tablet TAKE 1 TABLET(25 MG) BY MOUTH EVERY 2 HOURS AS NEEDED FOR MIGRAINE 18 tablet 6     SYNTHROID 125 mcg tablet TAKE 1 TABLET BY MOUTH EVERY DAY AT 6 AM 90 tablet 1     traZODone  (DESYREL) 50 MG tablet TAKE 1 TABLET(50 MG) BY MOUTH AT BEDTIME 90 tablet 2     [DISCONTINUED] GLUCAGON 1 mg injection INJECT WHOLE DOSE IF LOW BLOOD SUGAR & BLOOD SUGAR NOT COMING UP AFTER TREATING 2X WITH FOOD. 2 each 1     No facility-administered encounter medications on file as of 2019.           Past Medical History:   Diagnosis Date     Asthma      Chest pain      Depression      Diabetes (H)      Fainting      Heart disease     MVP     Methamphetamine abuse (H)      Methamphetamine abuse in remission, in remission since treatment in 2014 4/10/2015    Treatment 2014. Clean since.      Rheumatoid arthritis (H)      Substance abuse (H)     methamphetamine     Thyroid disease      Past Surgical History:   Procedure Laterality Date     CERVICAL BIOPSY  W/ LOOP ELECTRODE EXCISION        SECTION, CLASSIC       colposcopy      LEAP     HERNIA REPAIR       LAPAROSCOPIC CHOLECYSTECTOMY N/A 2019    Procedure: CHOLECYSTECTOMY, LAPAROSCOPIC;  Surgeon: Rinku Doran MD;  Location: [facility] [place], [place];  Service: General     ND  DELIVERY ONLY      Description:  Section;  Recorded: 2010;  Comments: 09 - breech     ND REPAIR UMBILICAL RUTH ANN,<4Y/O,REDUC      Description: Umbilical Hernia Repair;  Recorded: 10/03/2014;     ND REVISE MEDIAN N/CARPAL TUNNEL SURG      Description: Neuroplasty Decompression Median Nerve At Carpal Tunnel;  Recorded: 10/03/2014;     Social History     Tobacco Use     Smoking status: Former Smoker     Packs/day: 0.50     Types: Cigarettes     Last attempt to quit: 2017     Years since quittin.4     Smokeless tobacco: Never Used     Tobacco comment: She had stopped for a month as of 3/16/16, but now is back on 1/2 PPD.  16   Substance Use Topics     Alcohol use: No     Drug use: No     Types: Methamphetamines     Comment: Off 2 years, 1 month and 19 days as of 3/16/16       Objective / Physical Examination:      EXAM: XR CHEST  1 VIEW  LOCATION: [facility]  DATE/TIME: 12/18/2019 11:11 AM     INDICATION: Right-sided chest pain and shortness of breath. Hyperglycemia.  COMPARISON: 4/14/2016.     IMPRESSION:   shallow inspiration. Heart size and pulmonary vascularity normal. No focal pulmonary infiltrate     Study Result     EXAM: US ABDOMEN LIMITED  LOCATION: [facility]  DATE/TIME: 12/18/2019 11:04 AM     INDICATION: RUQ abdominal pain  COMPARISON: None.  TECHNIQUE: Limited abdominal ultrasound.     FINDINGS:     GALLBLADDER: Gallstones in an otherwise normal gallbladder. Gallbladder wall upper limits of normal in thickness. No pericholecystic fluid. The patient is apparently tender over the gallbladder with transducer pressure consistent with a positive   sonographic Mclean's sign.  BILE DUCTS: No biliary dilatation. The common duct measures 5 mm.  LIVER: Hypoechoic liver parenchyma with relatively prominent, echogenic portal triads which can be seen in the setting of hepatic inflammation such as hepatitis. No focal mass.     RIGHT KIDNEY: No hydronephrosis.  PANCREAS: The visualized portions are normal.     No ascites.     IMPRESSION:   1.  Cholelithiasis with tenderness over the gallbladder, gallbladder wall thickening at upper limits of normal.  2.  Bile ducts normal in caliber.  3.  Hypoechoic appearing liver parenchyma nonspecific but does raise question of hepatitis.         Vitals:    12/24/19 0849   BP: 102/60   Pulse: 72   Weight: 120 lb (54.4 kg)     Wt Readings from Last 3 Encounters:   12/24/19 120 lb (54.4 kg)   12/18/19 117 lb 9 oz (53.3 kg)   07/30/19 122 lb 12.8 oz (55.7 kg)     Body mass index is 20.6 kg/m .     Constitutional: In no apparent distress  Respiratory: Clear to auscultation bilaterally. Normal inspiratory and expiratory effort  Cardiovascular: Regular rate and rhythm  Gastrointestinal: Bowel sounds active all four quadrants. Soft, mild incisional tenderness.   Psych: Alert and oriented x3.      No orders of the defined types were placed in this encounter.  Followup: Return in about 6 months (around 6/24/2020) for Annual physical. earlier if needed.        Denia Llamas, CNP

## 2021-07-20 NOTE — TELEPHONE ENCOUNTER
Central PA team  [phone]  Pool: [facility] MED ([id number])          MEDICAL PRIOR AUTHORIZATION  has been initiated.       PA form completed and faxed insurance via Cover My Meds     Key:  Submitted through [employer] provider portal:Your prior authorization request was received!  Reference number  [id number]     Medication:  Insulin Pump Supplioes    Insurance:  [employer] benefits.        Response will be received via fax and may take up to 5-10 business days depending on plan

## 2021-07-20 NOTE — TELEPHONE ENCOUNTER
Last OV 19  [facility] paperwork      ssessment / Plan / Medical Decision Makin. Gastroesophageal reflux disease, esophagitis presence not specified  - Will do PA for quantity exception through patient's insurance. She has failed treatment with omeprazole, famotidine, ranitidine. Without medication has daily reflux   - pantoprazole (PROTONIX) 40 MG tablet; Take 1 tablet (40 mg total) by mouth daily.  Dispense: 90 tablet; Refill: 1     2. Type 1 diabetes mellitus with hyperglycemia (H)  3. Insulin pump status  4. Hypothyroidism, unspecified type  - Continue to see endocrinology as scheduled      5. Inflammatory polyarthritis (H)  - Followed by rheumatology      6. Primary insomnia  7. Moderate episode of recurrent major depressive disorder (H)  8. Anxiety  - continue to see psychology regularly  - Continue current medications      9. Encounter for completion of form with patient  - [facility] form completed for patient to miss approximately 1-2 hours of work per appointment with approximately 2 appointments per month which are medically necessary for her medical conditions for appropriate monitoring         Health Maintenance Review

## 2021-07-20 NOTE — TELEPHONE ENCOUNTER
RN cannot approve Refill Request  Last filled 2 weeks ago, never filled by current HE provider    RN can NOT refill this medication med is not covered by policy/route to provider. Last office visit: 7/26/2017 Ru Rubalcava CNP Last Physical: Visit date not found Last MTM visit: Visit date not found Last visit same specialty: 4/2/2019 Denia Llamas FNP.  Next visit within 3 mo: Visit date not found  Next physical within 3 mo: Visit date not found      Elisha Melo, [facility] Connection Triage/Med Refill 6/26/2019    Requested Prescriptions   Pending Prescriptions Disp Refills     SUMAtriptan (IMITREX) 25 MG tablet [Pharmacy Med Name: SUMATRIPTAN 25MG TABLETS] 18 tablet 0     Sig: TAKE 1 TABLET(25 MG) BY MOUTH EVERY 2 HOURS AS NEEDED FOR MIGRAINE       Triptans Refill Protocol Passed - 6/25/2019 11:05 AM        Passed - PCP or prescribing provider visit in past 12 months       Last office visit with prescriber/PCP: 7/26/2017 Ru Rubalcava CNP OR same dept: 4/2/2019 Denia Llamas FNP OR same specialty: 4/2/2019 Denia Llamas FNP  Last physical: Visit date not found Last MTM visit: Visit date not found   Next visit within 3 mo: Visit date not found  Next physical within 3 mo: Visit date not found  Prescriber OR PCP: Ru Rubalcava CNP  Last diagnosis associated with med order: 1. Cluster headache syndrome, intractable  - SUMAtriptan (IMITREX) 25 MG tablet [Pharmacy Med Name: SUMATRIPTAN 25MG TABLETS]; TAKE 1 TABLET(25 MG) BY MOUTH EVERY 2 HOURS AS NEEDED FOR MIGRAINE  Dispense: 18 tablet; Refill: 0    If protocol passes may refill for 12 months if within 3 months of last provider visit (or a total of 15 months).

## 2021-07-20 NOTE — TELEPHONE ENCOUNTER
Debbi Team    [employer] assistance program called.    They received our reorder form for novlog flex pen and needles. The form that was sent is an old form. They will accept it for now. However, they still need a hard copy of RX sent for insulin and needles, Provider NICHOL, State licence #, RX to be signed and dated.     Any questions they can be reached @ [phone]

## 2021-07-20 NOTE — TELEPHONE ENCOUNTER
Refill Approved    Rx renewed per Medication Renewal Policy. Medication was last renewed on 10/25/2018.         Amos Lemus, [facility] Connection Triage/Med Refill 1/15/2019     Requested Prescriptions   Pending Prescriptions Disp Refills     escitalopram oxalate (LEXAPRO) 10 MG tablet [Pharmacy Med Name: ESCITALOPRAM 10MG TABLETS] 30 tablet 0     Sig: TAKE 1 TABLET(10 MG) BY MOUTH DAILY    SSRI Refill Protocol  Passed - 1/14/2019  1:12 PM       Passed - PCP or prescribing provider visit in last year    Last office visit with prescriber/PCP: 10/30/2018 Stacie Luna MD OR same dept: 10/30/2018 Stacie Luna MD OR same specialty: 10/30/2018 Stacie Luna MD  Last physical: Visit date not found Last MTM visit: Visit date not found   Next visit within 3 mo: Visit date not found  Next physical within 3 mo: Visit date not found  Prescriber OR PCP: Stacie Luna MD  Last diagnosis associated with med order: There are no diagnoses linked to this encounter.  If protocol passes may refill for 12 months if within 3 months of last provider visit (or a total of 15 months).

## 2021-07-20 NOTE — PROGRESS NOTES
Clinic Care Coordination Contact    Community Health Worker Follow Up      Intervention and education during outreach:  I had the pleasure to speak with Vani today. I was checking in to see if she has any concerns or barriers and to inform her that we would be outreaching in 2 months as patient had been moved to maintenance.     She mentioned she doing well. She did have concerns regarding her medical bills today.  Although she has received a 50% discount with [facility], she still has a $300 bill. She will try and call them to set up a payment arrangement.  Vani then mentioned that she has also received a bill from [employer]. She learned that her temporary insurance through the agency doesn't cover for a therapist. She has been seeing her therapist since 2014 and would prefer to stay with her.  Vani did say,  has a FAA program that needs to be renewed every 3 months.  She will call [employer] Billing to see if she is up for renewal or if she can set up a payment arrangement. Vani did mention that she hopes to be have full time hours with [facility] in the next couple months. Once this occurs, she will be able to get on [employer] insurance plan. We talked about reviewing the health care plans available and verifying therapist coverage.       CHW Plan:  CHW patient was moved to maintenance by JOSE on 5/28/2020.   CHW added new goal around billing due to out of pocket concerns  CHW povided patient my contact information today   CHW will route encounter to Aura GARCIA to change status to Enrolled  CHW will follow up in 1 month   Re-assessment is due 12/2020

## 2021-07-20 NOTE — TELEPHONE ENCOUNTER
Please submit a quantity over ride for the accu-check test strips. Pt is a type one diabetic and needs to test 6 times per day.

## 2021-07-20 NOTE — TELEPHONE ENCOUNTER
Telephone Encounter by Nito Galvez at 3/12/2021  3:58 PM     Author: Nito Galvez Service: -- Author Type: --    Filed: 3/12/2021  3:58 PM Encounter Date: 3/12/2021 Status: Signed    : Nito Galvez

## 2021-07-20 NOTE — TELEPHONE ENCOUNTER
Name of form/paperwork: CORDELIA  Have you been seen for this request: N/A  Do we have the form: yes  When is form needed by: asap  How would you like the form returned: please fax again, not all pages came through  Fax Number: [phone]  Patient Notified form requests are processed in 3-5 business days: No  (If patient needs form sooner, please note that in this message.)  Okay to leave a detailed message? Yes

## 2021-07-20 NOTE — PROGRESS NOTES
[facility]  ENDOCRINOLOGY    Diabetes Note 3/12/2019    Vani Corona, 1980, [id number]          Reason for visit      1. Type 1 diabetes mellitus with hyperglycemia (H)        HPI     Vani Corona is a very pleasant 38 y.o. old female who presents for follow up.  SUMMARY:  Vani returns today in f/u for DM 1.  Her current A1c is 7.5, and better than her last at 8.  She continues to use an insulin pump for her insulin management.  She has been having a lot of trouble with her sensors and has gone through several in the pursuit of getting it to work and sync with her pump. Her Download shows that she had an ave SG of 172 over the last month.  She was in Auto mode 67 % of the time and Manual mode 33%  She is getting about 40 units of insulin daily, with 42% in Bolus and 58% Basal.  She was within range 56% of the time with  Above range 40 %.  She is coming into morning at a good level, and begins to rise after breakfast.  She stays there pretty much all day, and then repeats the cycle. Highest numbers tend to be pp with dinner.  She has a lot of variability, and always has had the same.        Blood glucose data:  Ave: 206, SD: 74    Past Medical History     Patient Active Problem List   Diagnosis     Restless Legs Syndrome     Mitral prolapse with 2 or more clicks     Selective IgA Deficiency     Major depressive disorder, recurrent episode, moderate (H)     Hypothyroidism, unspecified type     Type 1 diabetes mellitus (H)     Arthralgias In Multiple Sites     Eczema, pustular     Low back pain     Sciatica of left side     Anxiety     Depression      TMJ inflammation - bilateral     Insomnia      Non-rheumatic mitral regurgitation     Polyarthralgia     GERD (gastroesophageal reflux disease) H2 blocker not effective      Inflammatory polyarthritis (H)     High risk medication use     Right hand pain     Tobacco abuse     Insulin pump status     Family history of psoriasis in mother and sister      "Parent-child relational problem     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    Social History      reports that she quit smoking about 20 months ago. Her smoking use included cigarettes. She smoked 0.50 packs per day. she has never used smokeless tobacco. She reports that she does not drink alcohol or use drugs.      Review of Systems     Patient has no polyuria or polydipsia, no chest pain, dyspnea or TIA's, no numbness, tingling or pain in extremities  Remainder negative except as noted in HPI.    Vital Signs     BP 96/60 (Patient Site: Right Arm, Patient Position: Sitting, Cuff Size: Adult Regular)   Pulse 78   Ht 5' 4\" (1.626 m)   Wt 123 lb 8 oz (56 kg)   BMI 21.20 kg/m    Wt Readings from Last 3 Encounters:   03/12/19 123 lb 8 oz (56 kg)   03/04/19 127 lb (57.6 kg)   01/24/19 120 lb 4.8 oz (54.6 kg)       Physical Exam     Constitutional:  Well developed, Well nourished  HENT:  Normocephalic,   Neck: Thyroid normal, No lymph nodes, Supple  Eyes:  PERRL, Conjunctiva pink  Respiratory:  Normal breath sounds, No respiratory distress  Cardiovascular:  Normal heart rate, Normal rhythm, No murmurs  GI:  Bowel sounds normal, Soft, No tenderness  Musculoskeletal:  No gross deformity or lesions, normal dorsalis pedis pulses  Skin: No acanthosis nigricans, lipoatrophy or lipodystrophy  Neurologic:  Alert & oriented x 3, nonfocal  Psychiatric:  Affect, Mood, Insight appropriate      Assessment     1. Type 1 diabetes mellitus with hyperglycemia (H)        Plan     We will work on getting her some help with her sensors.  She will be outside of [employer] until this is accomplished.  We did discuss having her try her arms instead of her abdomen, as " lipodystrophy may be interfering with absorption and transmission.  She will work on this. Time spent with pt today: 25 min with >50% spent in counseling and coordination of care.        Anayeli ANDERSON Endocrinology  3/12/2019  11:32 AM      Lab Results     Hemoglobin A1c   Date Value Ref Range Status   03/05/2019 7.5 (H) 3.5 - 6.0 % Final   12/04/2018 8.0 (H) 3.5 - 6.0 % Final   09/04/2018 7.3 (H) 3.5 - 6.0 % Final   03/13/2018 7.5 (H) 3.5 - 6.0 % Final   12/05/2017 7.8 (H) 3.5 - 6.0 % Final     Creatinine   Date Value Ref Range Status   11/12/2018 0.69 0.60 - 1.10 mg/dL Final   10/30/2018 0.73 0.60 - 1.10 mg/dL Final   08/14/2018 0.73 0.60 - 1.10 mg/dL Final     Microalbumin, Random Urine   Date Value Ref Range Status   09/04/2018 <0.50 0.00 - 1.99 mg/dL Final       Cholesterol   Date Value Ref Range Status   02/15/2016 174 <=199 mg/dL Final     HDL Cholesterol   Date Value Ref Range Status   02/15/2016 45 (L) >=50 mg/dL Final     LDL Calculated   Date Value Ref Range Status   02/15/2016 105 <=129 mg/dL Final     Triglycerides   Date Value Ref Range Status   02/15/2016 122 <=149 mg/dL Final       Lab Results   Component Value Date    ALT 18 11/12/2018    AST 25 10/30/2018    ALKPHOS 35 (L) 07/20/2017    BILITOT 0.8 07/20/2017         Current Medications     Outpatient Medications Prior to Visit   Medication Sig Dispense Refill     ADMELOG U-100 INSULIN LISPRO 100 unit/mL injection INJECT UP  UNITS D VIA INSULIN PUMP 108 mL 0     albuterol (VENTOLIN HFA) 90 mcg/actuation inhaler Inhale 2 puffs every 6 (six) hours as needed for wheezing. 1 Inhaler 2     aspirin 81 MG EC tablet Take 81 mg by mouth.       baclofen (LIORESAL) 10 MG tablet Take 10 mg by mouth 3 (three) times a day as needed.       buPROPion (WELLBUTRIN XL) 150 MG 24 hr tablet TAKE 1 TABLET(150 MG) BY MOUTH EVERY MORNING 90 tablet 2     cetirizine (ZYRTEC) 10 MG tablet Take 1 tablet (10 mg total) by mouth every evening. 90 tablet 1      cholecalciferol, vitamin D3, 1,000 unit tablet Take 1,000 Units by mouth every evening.        clobetasol (TEMOVATE) 0.05 % cream Apply 1 application topically 2 (two) times a day as needed. 45 g 1     CONTOUR NEXT TEST STRIPS strips TEST BLOOD SUGAR 6 TIMES DAILY 200 strip 11     escitalopram oxalate (LEXAPRO) 10 MG tablet TAKE 1 TABLET(10 MG) BY MOUTH DAILY 90 tablet 3     GLUCAGON 1 mg injection INJECT WHOLE DOSE IF LOW BLOOD SUGAR & BLOOD SUGAR NOT COMING UP AFTER TREATING 2X WITH FOOD. 2 each 1     HYDROcodone-acetaminophen (NORCO) 5-325 mg per tablet Take 1 tablet by mouth every 4 (four) hours as needed for pain. 20 tablet 0     infusion set for insulin pump (MINIMED INFUSION SET) ISet Use every 3 days with pump 40 each 3     insulin pump cartridge Inject under the skin continuous. Insulin pump discharge instructions from 07/20/17.  This is intended as additional information to the patient's PTA insulin pump order.  Continue with insulin pump at present settings. 1 each 0     insulin pump syringe (PARADIGM RESERVOIR) 3 mL Misc Use every 3 days with set 40 each 3     insulin syringe-needle U-100 (BD INSULIN SYRINGE ULT-FINE II) 0.3 mL 31 gauge x 5/16 Syrg Use as needed with insulin 50 each 5     ketoconazole (NIZORAL) 2 % cream applly three times a day for two weeks 15 g 0     levonorgestrel (MIRENA) 20 mcg/24 hr (5 years) IUD 1 each by Intrauterine route once. Placed 8/2015       ondansetron (ZOFRAN) 4 MG tablet Take 1 tablet (4 mg total) by mouth every 12 (twelve) hours as needed for nausea. 10 tablet 0     pantoprazole (PROTONIX) 40 MG tablet Take 1 tablet (40 mg total) by mouth daily. 90 tablet 1     SUMAtriptan (IMITREX) 25 MG tablet TAKE 1 TABLET(25 MG) BY MOUTH EVERY 2 HOURS AS NEEDED FOR MIGRAINE 18 tablet 5     SYNTHROID 125 mcg tablet TAKE 1 TABLET BY MOUTH EVERY DAY AT 6AM 90 tablet 1     blood glucose meter (GLUCOMETER) Use 1 each As Directed as needed. Dispense glucometer brand per patient's  insurance at pharmacy discretion. (Patient taking differently: Use 1 each As Directed 3 (three) times a day. Dispense glucometer brand per patient's insurance at pharmacy discretion.      ) 1 each 0     diabetic supplies, miscellan. Misc Insert sensor weekly 40 each 3     No facility-administered medications prior to visit.

## 2021-07-20 NOTE — PROGRESS NOTES
Progress Notes by Anayeli Dave NP at 2/25/2021  8:00 AM     Author: Anayeli Dave NP Service: -- Author Type: Nurse Practitioner    Filed: 2/26/2021  1:46 PM Encounter Date: 2/25/2021 Status: Signed    : Anayeli Dave NP (Nurse Practitioner)       Vani Corona is a 40 y.o. female who is being evaluated via a billable video visit.      How would you like to obtain your AVS? MyChart.  If dropped from the video visit, the video invitation should be resent by: Text to cell phone: [phone]  Will anyone else be joining your video visit? No      Video Start Time: 0800      Reason for visit      1. Type 1 diabetes mellitus with microalbuminuria (H)        HPI     Vani Corona is a very pleasant 40 y.o. old female who presents for follow up.  SUMMARY:    Vani is contacted today Via Video Visit in f/u for DM 1. Her current A1c is 8.8 and down from her last at 9.1, but still too high. She is starting to have Microalbuminuria, and was started on Lisinopril by her PCP recently. She is working from home and it hasn't been helpful for her management. She and her significant other are also trying for a Pregnancy, and informed her that her A1c needs to be lower for safety. She is not currently using the InPen, and is hoping to get a T-Slim insulin pump when her insurance changes after 3/1/21. That would put her in a good place to use the Tandem System and upgrade to the software that would allow the closed loop system. Her Dexcom  download shows that she had an ave BG of 288 over the last two weeks. She was only in range 16% of the time. She is taking about 26 units of Lantus a day. She is taking Novolog with meals.     Blood glucose data:  See Dexcom    Past Medical History     Patient Active Problem List   Diagnosis   ? Restless Legs Syndrome   ? Selective IgA Deficiency   ? Major depressive disorder, recurrent episode, moderate (H)   ? Hypothyroidism, unspecified type   ? Type 1 diabetes mellitus  (H)   ? Arthralgias In Multiple Sites   ? Eczema, pustular   ? Low back pain   ? Sciatica of left side   ? Methamphetamine abuse in remission, in remission since treatment in 1/2014   ? Anxiety   ? Depression    ? TMJ inflammation - bilateral   ? Insomnia    ? Non-rheumatic mitral regurgitation   ? Polyarthralgia   ? GERD (gastroesophageal reflux disease) H2 blocker not effective    ? Inflammatory polyarthritis (H)   ? High risk medication use   ? Right hand pain   ? Tobacco abuse   ? Insulin pump status   ? Family history of psoriasis in mother and sister   ? Parent-child relational problem   ? 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 Cholecystitis   ? Acute cholecystitis        Family History       family history includes No Medical Problems in her daughter, sister, son, and other family members; Other in her father; Stroke in her paternal grandmother.    Social History      reports that she quit smoking about 3 years ago. Her smoking use included cigarettes. She smoked 0.50 packs per day. She has never used smokeless tobacco. She reports that she does not drink alcohol or use drugs.      Review of Systems     Patient has no polyuria or polydipsia, no chest pain, dyspnea or TIA's, no numbness, tingling or pain in extremities  Remainder negative except as noted in HPI.    Vital Signs     There were no vitals taken for this visit.  Wt Readings from Last 3 Encounters:   02/04/20 118 lb 11.2 oz (53.8 kg)   12/24/19 120 lb (54.4 kg)   12/18/19 117 lb 9 oz (53.3 kg)       Physical Exam     Constitutional:  Well developed, Well nourished  HENT:  Normocephalic,   Neck: no abnormalities noted  Eyes:  PERRL, Conjunctiva pink  Respiratory: No respiratory distress  Skin: No acanthosis nigricans,  Neurologic:  Alert & oriented x 3, nonfocal  Psychiatric:  Affect, Mood, Insight appropriate      Assessment     1. Type 1 diabetes mellitus with  microalbuminuria (H)        Plan     Strongly urged Vani to do a better job with her management. I know that she will benefit from having a pump back and we are hopeful that this will happen soon. F/u with me in 3 months.       Anayeli Dave   Endocrinology  2/26/2021  1:21 PM          Lab Results     Hemoglobin A1c   Date Value Ref Range Status   02/19/2021 8.8 (H) <=5.6 % Final   11/18/2020 9.1 (H) <=5.6 % Final     Comment:     Normal <5.7% Prediabete 5.7-6.4% Diabletes 6.5% or higher - adopted from ADA consensus guidelines   01/29/2020 7.7 (H) 3.5 - 6.0 % Final   12/18/2019 8.2 (H) 4.2 - 6.1 % Final   07/23/2019 7.8 (H) 3.5 - 6.0 % Final     Creatinine   Date Value Ref Range Status   11/18/2020 0.82 0.60 - 1.10 mg/dL Final   01/29/2020 0.68 0.60 - 1.10 mg/dL Final   12/19/2019 0.71 0.60 - 1.10 mg/dL Final     Microalbumin, Random Urine   Date Value Ref Range Status   02/19/2021 3.34 (H) 0.00 - 1.99 mg/dL Final       Cholesterol   Date Value Ref Range Status   02/15/2016 174 <=199 mg/dL Final     HDL Cholesterol   Date Value Ref Range Status   02/15/2016 45 (L) >=50 mg/dL Final     LDL Calculated   Date Value Ref Range Status   02/15/2016 105 <=129 mg/dL Final     Triglycerides   Date Value Ref Range Status   02/15/2016 122 <=149 mg/dL Final       Lab Results   Component Value Date    ALT 14 12/19/2019    AST 14 12/19/2019    ALKPHOS 32 (L) 12/19/2019    BILITOT 0.8 12/19/2019         Current Medications     Outpatient Medications Prior to Visit   Medication Sig Dispense Refill   ? insulin aspart U-100 (NOVOLOG FLEXPEN U-100 INSULIN) 100 unit/mL (3 mL) injection pen Inject subcutaneously CHO ratio 1:10 - 1:15 + sliding scale 1:50>150 up to 40 units daily. 30 mL 1   ? insulin glargine (LANTUS SOLOSTAR PEN) 100 unit/mL (3 mL) pen Inject 24 Units under the skin at bedtime.     ? ACCU-CHEK FASTCLIX LANCET DRUM USE TO TEST BLOOD SUGAR 4 6 TIMES DAILY     ? albuterol (PROAIR HFA;PROVENTIL HFA;VENTOLIN HFA) 90  mcg/actuation inhaler INHALE 2 PUFFS BY MOUTH EVERY 6 HOURS AS NEEDED FOR WHEEZING 18 g 0   ? aspirin 81 MG EC tablet Take 81 mg by mouth every evening.      ? baclofen (LIORESAL) 10 MG tablet Take 10 mg by mouth 3 (three) times a day as needed.     ? blood glucose meter (GLUCOMETER) Use 1 each As Directed as needed. Dispense glucometer brand per patient's insurance at pharmacy discretion. 1 each 0   ? blood glucose test strips Use 1 each As Directed as needed (4-6 times daily). Dispense brand per patient's insurance at pharmacy discretion. 400 strip 1   ? blood-glucose meter,continuous (DEXCOM G6 ) Misc Use 1 each As Directed continuous. 1 each 0   ? blood-glucose sensor (DEXCOM G6 SENSOR) Eveline Apply one sensor to the skin every 10 days. 9 Device 1   ? blood-glucose transmitter (DEXCOM G6 TRANSMITTER) Eveline Use 1 each As Directed every 3 (three) months. 1 Device 1   ? buPROPion (WELLBUTRIN XL) 150 MG 24 hr tablet TAKE 1 TABLET BY MOUTH EVERY DAY 90 tablet 1   ? cetirizine (ZYRTEC) 10 MG tablet TAKE 1 TABLET(10 MG) BY MOUTH EVERY EVENING 90 tablet 0   ? cholecalciferol, vitamin D3, 1,000 unit tablet Take 1,000 Units by mouth every evening.      ? clobetasol (TEMOVATE) 0.05 % cream Apply 1 application topically 2 (two) times a day as needed. 45 g 1   ? clomiPHENE (CLOMID) 50 mg tablet TAKE 2 TABLETS (100MG) BY ORAL ROUTE ONCE DAILY FOR 5 DAYS. STARTING ON CYCLE DAY 3     ? CONTOUR NEXT TEST STRIPS strips TEST BLOOD SUGAR 6 TIMES DAILY 400 strip 2   ? escitalopram oxalate (LEXAPRO) 10 MG tablet TAKE 1 TABLET BY MOUTH EVERY DAY 90 tablet 1   ? fluconazole (DIFLUCAN) 150 MG tablet TAKE 1 TABLET BY MOUTH EVERY DAY FOR 3 DAYS     ? glucagon, human recombinant, (GLUCAGON) 1 mg injection Inject 1 mg into the shoulder, thigh, or buttocks once as needed. 2 each 0   ? HYDROcodone-acetaminophen 5-325 mg per tablet TAKE 1 TAB BY MOUTH EVERY 6 HOURS AS NEEDED     ? insulin admin supplies (INPEN, FOR NOVOLOG,) InPn Inject 4  "Units under the skin 3 (three) times a day before meals. 5 each 1   ? insulin syringe-needle U-100 (BD INSULIN SYRINGE ULT-FINE II) 0.3 mL 31 gauge x 5/16 Syrg Use as needed with insulin 50 each 5   ? ketoconazole (NIZORAL) 2 % cream Apply topically 2 (two) times a day as needed.     ? lisinopriL (ZESTRIL) 2.5 MG tablet Take 1 tablet (2.5 mg total) by mouth daily. 90 tablet 1   ? nicotine (NICODERM CQ) 7 mg/24 hr Place 1 patch on the skin daily. 30 patch 0   ? nicotine polacrilex (COMMIT) 2 MG lozenge Apply 1 lozenge (2 mg total) to the mouth or throat as needed for smoking cessation. 24 each 11   ? nicotine polacrilex (NICORELIEF) 2 mg gum 2 mg chew and place in buccal surface every 1 hour as needed for nicotine cravings 100 each 1   ? pantoprazole (PROTONIX) 40 MG tablet Take 1 tablet (40 mg total) by mouth every evening. 90 tablet 3   ? pen needle, diabetic (BD ULTRA-FINE ADALBERTO PEN NEEDLE) 32 gauge x 5/32\" Ndle USE 7 TIMES DAILY 400 each 1   ? SUMAtriptan (IMITREX) 25 MG tablet Take 1 tablet (25 mg total) by mouth daily as needed for migraine (may repeat in 2 hours if headache has not resolved). 18 tablet 6   ? SYNTHROID 125 mcg tablet Take 1 tablet (125 mcg total) by mouth daily. Due for lab work 90 tablet 2   ? insulin glargine (BASAGLAR KWIKPEN) 100 unit/mL (3 mL) pen Inject 13 Units under the skin 2 (two) times a day. 30 mL 1     No facility-administered medications prior to visit.            Video-Visit Details    Type of service:  Video Visit    Video End Time (time video stopped): 0820  Originating Location (pt. Location): Home    Distant Location (provider location):  [facility]     Platform used for Video Visit: Doximity    A1C: 8.8 (2/19/21)  Labs 11/18/20:  LDL: 115  CREATININE: 0.82  TSH: 0.43  Microalbumin: 2/19/21    Blood Glucose Log:Dexcom               "

## 2021-07-20 NOTE — TELEPHONE ENCOUNTER
I called  for the pt tp c/b to inform that her total daily bolus was 12 units/3= 4 units per meal and her total daily basal is 18 units per day, based on pump download. RX request received from [facility]. Does pt want at [employer] or [facility]?

## 2021-07-20 NOTE — TELEPHONE ENCOUNTER
Called and checked on status of appeal.  Per rep this may take up to 30 calendar days.  We will get determination faxed to [facility]

## 2021-07-20 NOTE — PLAN OF CARE
Problem: Knowledge Deficit  Goal: Patient/family/caregiver demonstrates understanding of disease process, treatment plan, medications, and discharge instructions  Outcome: Progressing  Patient arrived to unit from ED at approximately 1600. Oriented to unit and plan of care. NPO at that time awaiting decision regarding timing of surgery. It was later determined by Dr. Doran, surgery will be tomorrow at 0730. Patient allowed to have clear liquid diet until midnight. Patient aware of plan of care.    Problem: Pain  Goal: Patient's pain/discomfort is manageable  Outcome: Progressing  Patient reports pain manageable without intervention at this time.    Problem: Glucose Imbalance  Goal: Achieve optimal glucose control  Outcome: Progressing  *Patient removed insulin pump upon arrival to unit (1600). Lantus order read to give 18u, then remove pump 1 hour after administration. Discussed with Dr. Hernandez since insulin pump already removed - Per Dr. Hernandez, OK to give 18u scheduled Lantus.   Glucose at 1713 - 248  Glucose at 1810 - 264  Glucose at 2121 - 358 (Dr. Hernandez updated)   *Patient will be NPO at midnight, at that time, glucose will be checked q6h per Dr. Hernandez with sliding scale insulin.    Anne Lui

## 2021-07-20 NOTE — PROGRESS NOTES
Progress Notes by Denia Llamas FNP at 2019  8:04 AM     Author: Denia Llamas FNP Service: -- Author Type: Nurse Practitioner    Filed: 2019  8:05 AM Encounter Date: 2019 Status: Signed    : Denia Llamas FNP (Nurse Practitioner)         Assessment:   The encounter diagnosis was Acute bacterial conjunctivitis, unspecified laterality.     Plan:     Medications Ordered   Medications   ? ciprofloxacin HCl (CILOXAN) 0.3 % ophthalmic solution     Si-2 drops in affected eye(s) four times daily for 5-7 days     Dispense:  5 mL     Refill:  0     Patient Instructions       Bacterial Conjunctivitis    You have an infection in the membranes covering the white part of the eye. This part of the eye is called the conjunctiva. The infection is called conjunctivitis. The most common symptoms of conjunctivitis include a thick, pus-like discharge from the eye, swollen eyelids, redness, eyelids sticking together upon awakening, and a gritty or scratchy feeling in the eye. Your infection was caused by bacteria. It may be treated with medicine. With treatment, the infection takes about 7 to 10 days to resolve.  Home care    Use prescribed antibiotic eye drops or ointment as directed to treat the infection.    Apply a warm compress (towel soaked in warm water) to the affected eye 3 to 4 times a day. Do this just before applying medicine to the eye.    Use a warm, wet cloth to wipe away crusting of the eyelids in the morning. This is caused by mucus drainage during the night. You may also use saline irrigating solution or artificial tears to rinse away mucus in the eye. Do not put a patch over the eye.    Wash your hands before and after touching the infected eye. This is to prevent spreading the infection to the other eye, and to other people. Don't share your towels or washcloths with others.    You may use acetaminophen or ibuprofen to control pain, unless another medicine was prescribed.  (Note: If you have chronic liver or kidney disease or have ever had a stomach ulcer or gastrointestinal bleeding, talk with your doctor before using these medicines.)    Don't wear contact lenses until your eyes have healed and all symptoms are gone.  Follow-up care  Follow up with your healthcare provider, or as advised.  When to seek medical advice  Call your healthcare provider right away if any of these occur:    Worsening vision    Increasing pain in the eye    Increasing swelling or redness of the eyelid    Redness spreading around the eye  Date Last Reviewed: 7/1/2017 2000-2017 [employer]. [address] [place] [address]. All rights reserved. This information is not intended as a substitute for professional medical care. Always follow your healthcare professional's instructions.        Throw away current contacts and do not wear contacts for the next 1 week. If the eye redness and drainage do not resolve after 2-3 days of antibiotic drop use make an appointment to be seen    Return for further follow up if needed. Call [phone]) or schedule an appointment via [facility]    Subjective:   Vani Corona is a 38 y.o. female who submitted an eVisit request for evaluation of her Conjunctivitis.  See the questionnaire and message section of encounter report for information related to history of present illness and review of systems.    The following portions of the patient's history were reviewed and updated as appropriate:  She does not have any pertinent problems on file.  She is allergic to augmentin [amoxicillin-pot clavulanate]; chantix [varenicline]; and venlafaxine..     Objective:   No exam performed today, patient submitted as eVisit.

## 2021-07-20 NOTE — TELEPHONE ENCOUNTER
"Pt last seen by Debbi about 6 months ago, at that time:      \"She is getting 34 units of insulin daily.  She has a Basal/Bolus ratio of 59/41\"       Basal Rate when not in Auto mode  TIME Units/HR   0000 - 0700 0.95   0700 - 1200 0.65   1200 - 2000 0.75   2000 - 0000 0.95             Total 24 hour basal dose: 19.7 units/day   Bolus: Insulin : CHO ratio  1 unit of every 12g     Correction  #Units Blood glucose >Target BG   153 53 120     Please let me know if you need help converting pt from pump to pens. Would rec basal dose around 20 units/day and bolus around 1:10 - 1:15 + SS 1:50>150   "

## 2021-07-20 NOTE — PROGRESS NOTES
[facility]  ENDOCRINOLOGY    Diabetes Note 9/5/2018    Vani Corona, 1980, [id number]          Reason for visit      1. Type 1 diabetes mellitus (H)    2. Insulin pump status        HPI     Vani Corona is a very pleasant 37 y.o. old female who presents for follow up.  SUMMARY:  Vani returns today in f/u for DM 1.  Her current A1c is 7.3, which is better than her last at 7.5.  She reports that she has her son back in her household and she is working on her daughter.  She has a new boyfriend, who is treating her well.  They are working on getting a house.  She continues using an insulin pump for her insulin administration.  It is the 670 and she is using the Automode setting. Her ave BG over the last month was 222.  She is taking about 35 units daily.  He was within range about 60% of the time. She is changing her set appropriately. She does continue to have a fair amount of variability. She states that often she forgets to take her insulin before she eats and will then have to dinh to bring it back down.       Blood glucose data:  Ave: 222,SD: 92    Insulin Pump Settings           Bolus: Insulin : CHO ratio  Time Units Grams CHO   0000 1 12                          Correction  #Units Blood glucose >Target BG   1 53 120         Past Medical History     Patient Active Problem List   Diagnosis     Restless Legs Syndrome     Mitral prolapse with 2 or more clicks     Selective IgA Deficiency     Hypothyroidism, unspecified type     Type 1 diabetes mellitus (H)     Arthralgias In Multiple Sites     Eczema, pustular     Low back pain     Sciatica of left side     Anxiety     Depression      TMJ inflammation - bilateral     Insomnia      Non-rheumatic mitral regurgitation     Polyarthralgia     GERD (gastroesophageal reflux disease) H2 blocker not effective      Inflammatory polyarthritis (H)     High risk medication use     Right hand pain     Tobacco abuse     Insulin pump status        Family History  "      family history includes No Medical Problems in her daughter, other, other, sister, and son; Other in her father; Stroke in her paternal grandmother.    Social History      reports that she quit smoking about 13 months ago. Her smoking use included Cigarettes. She smoked 0.50 packs per day. She has never used smokeless tobacco. She reports that she does not drink alcohol or use illicit drugs.      Review of Systems     Patient has no polyuria or polydipsia, no chest pain, dyspnea or TIA's, no numbness, tingling or pain in extremities  Remainder negative except as noted in HPI.    Vital Signs     /64 (Patient Site: Right Arm, Patient Position: Sitting, Cuff Size: Adult Regular)  Pulse 70  Ht 5' 4\" (1.626 m)  Wt 122 lb 9.6 oz (55.6 kg)  BMI 21.04 kg/m2  Wt Readings from Last 3 Encounters:   09/04/18 122 lb 9.6 oz (55.6 kg)   08/17/18 125 lb (56.7 kg)   05/14/18 126 lb 11.2 oz (57.5 kg)       Physical Exam     Constitutional:  Well developed, Well nourished  HENT:  Normocephalic,   Neck: Thyroid normal, No lymph nodes, Supple  Eyes:  PERRL, Conjunctiva pink  Respiratory:  Normal breath sounds, No respiratory distress  Cardiovascular:  Normal heart rate, Normal rhythm, No murmurs  GI:  Bowel sounds normal, Soft, No tenderness  Musculoskeletal:  No gross deformity or lesions, normal dorsalis pedis pulses  Skin: No acanthosis nigricans, lipoatrophy or lipodystrophy  Neurologic:  Alert & oriented x 3, nonfocal  Psychiatric:  Affect, Mood, Insight appropriate  Diabetic foot exam: no ulcers, charcot's or high risk calluses, Normal monofilament exam          Assessment     1. Type 1 diabetes mellitus (H)    2. Insulin pump status        Plan     No setting changes today.  She is going to work on taking her insulin before she eats instead of after. She will f/u with me in 3 months.  Refills provided today.  Time spent with pt today: 25 min with >50% spent in counseling and coordination of care.        Anayeli CASH" Briannatom AND[facility] Endocrinology  9/5/2018  1:18 PM      Lab Results     Hemoglobin A1c   Date Value Ref Range Status   09/04/2018 7.3 (H) 3.5 - 6.0 % Final   03/13/2018 7.5 (H) 3.5 - 6.0 % Final   12/05/2017 7.8 (H) 3.5 - 6.0 % Final   07/19/2017 7.9 (H) 4.2 - 6.1 % Final   05/19/2017 7.7 (H) 3.5 - 6.0 % Final     Creatinine   Date Value Ref Range Status   08/14/2018 0.73 0.60 - 1.10 mg/dL Final   05/11/2018 0.69 0.60 - 1.10 mg/dL Final   02/12/2018 0.66 0.60 - 1.10 mg/dL Final     Microalbumin, Random Urine   Date Value Ref Range Status   09/04/2018 <0.50 0.00 - 1.99 mg/dL Final       Cholesterol   Date Value Ref Range Status   02/15/2016 174 <=199 mg/dL Final     HDL Cholesterol   Date Value Ref Range Status   02/15/2016 45 (L) >=50 mg/dL Final     LDL Calculated   Date Value Ref Range Status   02/15/2016 105 <=129 mg/dL Final     Triglycerides   Date Value Ref Range Status   02/15/2016 122 <=149 mg/dL Final       Lab Results   Component Value Date    ALT 20 08/14/2018    AST 15 07/20/2017    ALKPHOS 35 (L) 07/20/2017    BILITOT 0.8 07/20/2017         Current Medications     Outpatient Medications Prior to Visit   Medication Sig Dispense Refill     albuterol (VENTOLIN HFA) 90 mcg/actuation inhaler Inhale 2 puffs every 6 (six) hours as needed for wheezing. 1 Inhaler 2     baclofen (LIORESAL) 10 MG tablet Take 10 mg by mouth 3 (three) times a day as needed.       blood glucose meter (GLUCOMETER) Use 1 each As Directed as needed. Dispense glucometer brand per patient's insurance at pharmacy discretion. 1 each 0     buPROPion (WELLBUTRIN XL) 150 MG 24 hr tablet Take 1 tablet (150 mg total) by mouth every morning. 90 tablet 3     cetirizine (ZYRTEC) 10 MG tablet Take 1 tablet (10 mg total) by mouth every evening. 90 tablet 1     cholecalciferol, vitamin D3, 1,000 unit tablet Take 1,000 Units by mouth every evening.        CONTOUR NEXT TEST STRIPS strips TEST BLOOD SUGAR 6 TIMES DAILY 200 strip 2     diabetic supplies,  miscellan. Misc Insert sensor weekly 40 each 3     folic acid (FOLVITE) 1 MG tablet Take 1 tablet (1,000 mcg total) by mouth daily. 90 tablet 1     GLUCAGON 1 mg injection INJECT WHOLE DOSE IF LOW BLOOD SUGAR & BLOOD SUGAR NOT COMING UP AFTER TREATING 2X WITH FOOD. 2 each 1     infusion set for insulin pump (MINIMED INFUSION SET) ISet Use every 3 days with pump 40 each 3     insulin pump cartridge Crtg 1 each Inject under the skin continuous. Novolog insulin pump       insulin pump cartridge Inject under the skin continuous. Insulin pump discharge instructions from 07/20/17.  This is intended as additional information to the patient's PTA insulin pump order.  Continue with insulin pump at present settings. 1 each 0     insulin pump syringe (PARADIGM RESERVOIR) 3 mL Misc Use every 3 days with set 40 each 3     insulin syringe-needle U-100 (BD INSULIN SYRINGE ULT-FINE II) 0.3 mL 31 gauge x 5/16 Syrg Use as needed with insulin 50 each 5     levonorgestrel (MIRENA) 20 mcg/24 hr (5 years) IUD 1 each by Intrauterine route once. Placed 8/2015       methotrexate 2.5 MG tablet Take 8 tablets (20 mg total) by mouth once a week. 96 tablet 0     NOVOLOG U-100 INSULIN ASPART 100 unit/mL injection INJECT 3 TIMES SUBCUTANEOUSLY DAILY VIA INSULIN PUMP UP TO 40 UNITS DAILY 30 mL 0     pantoprazole (PROTONIX) 40 MG tablet Take 1 tablet (40 mg total) by mouth daily. 90 tablet 3     SUMAtriptan (IMITREX) 25 MG tablet TAKE 1 TABLET(25 MG) BY MOUTH EVERY 2 HOURS AS NEEDED FOR MIGRAINE 18 tablet 5     SYNTHROID 125 mcg tablet Take 1 tablet (125 mcg total) by mouth Daily at 6:00 am. 90 tablet 1     azithromycin (ZITHROMAX Z-AMERICA) 250 MG tablet 2 tablets today and then 1 pill a day for 4 days. 6 tablet 0     benzonatate (TESSALON) 200 MG capsule Take 1 capsule (200 mg total) by mouth 3 (three) times a day as needed for cough. 30 capsule 0     No facility-administered medications prior to visit.

## 2021-07-20 NOTE — OP NOTE
Operative Note:  Laparoscopic Cholecystectomy    Name:  Vani CASH Cordele  PCP:  Denia Llamas FNP  Procedure Date:  12/19/2019      CHOLECYSTECTOMY, LAPAROSCOPIC (N/A)    Pre-Procedure Diagnosis:  Cholecystitis    Post-Procedure Diagnosis:    Cholecystits    Surgeon(s):  Rinku Doran MD    No Physician Assistant or First Assist has been documented in procedure    Anesthesia Type:  GET      Findings:  Symptomatic cholelithiasis and chronic cholecystitis    Operative Report:    The patient was taken to Operating Room, identified, and the procedure verified as Laparoscopic Cholecystectomy  A Time Out was held.    General endotracheal anesthesia was administered and tolerated well. After the induction, the abdomen was prepped in the usual sterile fashion. The patient was positioned in the supine position. They were sterilely prepped and draped in the usual surgical fashion.    Local anesthetic agent was injected into the skin near the umbilicus and an incision made. A periumbilical incision was made and a 5mm trocar was placed under direct visualization using the optiview technique, and a pneumoperitoneum was brought up to 15 mm Hg. . 2 5's and a  11 mm port was placed under direct vision.  All skin incisions were infiltrated with a local anesthetic agent before making the incision and placing the trocars.     The gallbladder was identified, the fundus grasped and retracted cephalad up over the inferior edge of the liver. The infundibulum was grasped and retracted laterally, exposing the neck of the gallbladder.  The visceral peritoneum overlying the angle of Calot was dissected through with hook cautery. The cystic duct was clearly identified and bluntly dissected circumferentially. The junctions of the gallbladder and the cystic duct was clearly identified.  The cystic duct was clipped with one clip on the gallbladder side and 3 clips more proximally on the cystic duct. The Cystic duct was divided sharply  with laparoscopic scissors between the clips. The cystic artery was identified, it was dissected free of surrounding structures.  The cystic artery was clipped and divided.      The gallbladder was dissected from the liver bed in retrograde fashion with the electrocautery. The gallbladder was removed by placing it in a endocatch bag and extracting it from through the 11 mm trocar site.   The fascia of the 10 mm trocar site was then closed with 0 vicryl using an Endo fascial closure device under direct physician laparoscope     Pneumoperitoneum was reduced.  The trocars were removed.  The skin was then closed with 4-0 monocryl and a sterile dressing was applied.    Instrument, sponge, and needle counts were correct at closure and at the conclusion of the case.    Estimated Blood Loss: 5 cc  * No blood loss documented between In Room and Out of Room log events - 12/19/2019  7:25 AM to 12/19/2019  8:24 AM *    Specimens:    Gall Bladder       Drains:        Complications:    None    Rinku Doran     Date: 12/19/2019  Time: 8:32 AM

## 2021-07-20 NOTE — ANESTHESIA PREPROCEDURE EVALUATION
Anesthesia Evaluation      Patient summary reviewed     Airway   Mallampati: IV  Neck ROM: full   Pulmonary - normal exam    breath sounds clear to auscultation  (+) asthma  a smoker                         Cardiovascular - normal exam  (+) valvular problems/murmurs MR, ,     ECG reviewed (sinus ky)  Rhythm: regular  Rate: normal,         Neuro/Psych    (+) neuromuscular disease (polyarthralgias, RLS),  depression,     Endo/Other    (+) diabetes mellitus type 1 using insulin, hypothyroidism, arthritis,      GI/Hepatic/Renal    (+) GERD,        Other findings: TMJ  Sober from meth for 6 years Small mouth opening      Dental                         Anesthesia Plan  Planned anesthetic: general endotracheal  Pre-op: Mg2+  Intra-op: Glide (TMJ), ETT 6 or 6.5, ketamine 20mg, fentanyl, propofol gtt  ASA 2   Induction: intravenous   Anesthetic plan and risks discussed with: patient  Anesthesia plan special considerations: antiemetics,   Post-op plan: routine recovery

## 2021-07-20 NOTE — PROGRESS NOTES
Progress Notes by Mark Pimentel MD at 8/17/2018  3:55 PM     Author: Mark Pimentel MD Service: -- Author Type: Physician    Filed: 8/19/2018  8:52 PM Encounter Date: 8/17/2018 Status: Signed    : Mark Pimentel MD (Physician)              [facility]    Comprehensive and complex medical care - Chronic disease management - Shared decision making - Care coordination - Compassionate care    Patient advocacy - Rational deprescribing - Minimally disruptive medicine - Ethical focus - Customized care    ______________________________________________________________________     Date of Service: 8/17/2018  Primary Provider: MOMO Nguyen    Patient Care Team:  MOMO Morley as PCP - General (Nurse Practitioner)     ______________________________________________________________________     Patient's Pharmacy:    [facility] [id number] - [place], [place] - [address] [place] [address]  [address] [place] [address]  Phone: [phone] Fax: [phone]    [facility] [address] [place] [address]  [address] [facility] [address]  Phone: [phone] Fax: [phone]     Patient's Contacts:  Name Home Phone Work Phone Mobile Phone Relationship Lgl Ajit   LILY CELAYA [phone]   Mother      Patient's Insurance:    Payor: BLUE CROSS / Plan: BLUE PLUS MA / Product Type: PMAP /     ______________________________________________________________________     Vani CASH Moody is 37 y.o. female who comes in today for:  Chief Complaint   Patient presents with   ? Illness     x 1 week, getting better, cough, congestion, headache      ______________________________________________________________________     Patient Active Problem List   Diagnosis   ? Restless Legs Syndrome   ? Mitral prolapse with 2 or more clicks   ? Selective IgA Deficiency   ? Hypothyroidism, unspecified type   ?  Type 1 diabetes mellitus (H)   ? Arthralgias In Multiple Sites   ? Eczema, pustular   ? Low back pain   ? Sciatica of left side   ? Anxiety   ? Depression    ? TMJ inflammation - bilateral   ? Insomnia    ? Non-rheumatic mitral regurgitation   ? Polyarthralgia   ? GERD (gastroesophageal reflux disease) H2 blocker not effective    ? Inflammatory polyarthritis (H)   ? High risk medication use   ? Right hand pain   ? Tobacco abuse      Current Outpatient Prescriptions   Medication Sig   ? baclofen (LIORESAL) 10 MG tablet Take 10 mg by mouth 3 (three) times a day as needed.   ? blood glucose meter (GLUCOMETER) Use 1 each As Directed as needed. Dispense glucometer brand per patient's insurance at pharmacy discretion.   ? buPROPion (WELLBUTRIN XL) 150 MG 24 hr tablet Take 1 tablet (150 mg total) by mouth every morning.   ? cetirizine (ZYRTEC) 10 MG tablet Take 1 tablet (10 mg total) by mouth every evening.   ? cholecalciferol, vitamin D3, 1,000 unit tablet Take 1,000 Units by mouth every evening.    ? CONTOUR NEXT TEST STRIPS strips TEST BLOOD SUGAR 6 TIMES DAILY   ? diabetic supplies, miscellan. Misc Insert sensor weekly   ? folic acid (FOLVITE) 1 MG tablet Take 1 tablet (1,000 mcg total) by mouth daily.   ? GLUCAGON 1 mg injection INJECT WHOLE DOSE IF LOW BLOOD SUGAR & BLOOD SUGAR NOT COMING UP AFTER TREATING 2X WITH FOOD.   ? infusion set for insulin pump (MINIMED INFUSION SET) ISet Use every 3 days with pump   ? insulin pump cartridge Crtg 1 each Inject under the skin continuous. Novolog insulin pump   ? insulin pump cartridge Inject under the skin continuous. Insulin pump discharge instructions from 07/20/17.  This is intended as additional information to the patient's PTA insulin pump order.  Continue with insulin pump at present settings.   ? insulin pump syringe (PARADIGM RESERVOIR) 3 mL Misc Use every 3 days with set   ? insulin syringe-needle U-100 (BD INSULIN SYRINGE ULT-FINE II) 0.3 mL 31 gauge x 5/16 Syrg Use  as needed with insulin   ? levonorgestrel (MIRENA) 20 mcg/24 hr (5 years) IUD 1 each by Intrauterine route once. Placed 8/2015   ? methotrexate 2.5 MG tablet Take 8 tablets (20 mg total) by mouth once a week.   ? NOVOLOG U-100 INSULIN ASPART 100 unit/mL injection INJECT 3 TIMES SUBCUTANEOUSLY DAILY VIA INSULIN PUMP UP TO 40 UNITS DAILY   ? pantoprazole (PROTONIX) 40 MG tablet Take 1 tablet (40 mg total) by mouth daily.   ? SUMAtriptan (IMITREX) 25 MG tablet TAKE 1 TABLET(25 MG) BY MOUTH EVERY 2 HOURS AS NEEDED FOR MIGRAINE   ? SYNTHROID 125 mcg tablet Take 1 tablet (125 mcg total) by mouth Daily at 6:00 am.   ? albuterol (VENTOLIN HFA) 90 mcg/actuation inhaler Inhale 2 puffs every 6 (six) hours as needed for wheezing.   ? azithromycin (ZITHROMAX Z-AMERICA) 250 MG tablet 2 tablets today and then 1 pill a day for 4 days.   ? benzonatate (TESSALON) 200 MG capsule Take 1 capsule (200 mg total) by mouth 3 (three) times a day as needed for cough.      ______________________________________________________________________       History of present illness:    Patient comes in today for worsening cough congestion over the last 9-10 days.  She has had fevers with this.  She has had a frontal headache with this.  She has had nasal congestion.  She has no difficulty with breathing.  She had a fever on Sunday but not so much now.  She feels miserable.  She might be okay for a couple of hours but not generally well.  She does not come in frequently for this problem.  She said she has success with Tessalon Perles.  She says she has success with inhaler when she has been coughing.  She does feel that this has been very severe for her and she may require additional medications, i.e. an antibiotic.    We reviewed her diabetes.  She is on insulin pump which is a closed-loop system.  She has been doing well with this.    Reviewed her inflammatory arthritis.  She is on methotrexate for this.  It is felt to be seronegative rheumatoid  "arthritis.    Reviewed her selective IgA deficiency as well.  This was noted on tests for celiac.    On review of systems, the patient denies any chest pain or shortness of breath.  ______________________________________________________________________     Exam:    /66  Pulse 71  Temp 97.6  F (36.4  C) (Oral)   Wt 125 lb (56.7 kg)  SpO2 97%  BMI 21.46 kg/m2  Patient is slightly un no lower extremity edema.  omfortable, no apparent distress.  Mood good.  Insight good.  Ears -clear.  Nose exam shows severe rhinitis.  Throat -  clear .  Neck is supple, there is no cervical adenopathy.  No thyromegaly.  Heart regular rate and rhythm.  Lungs are clear to auscultation bilaterally.  Respiratory effort is good.  No lower extremity edema.    ______________________________________________________________________     Assessment:    1. Rhinosinusitis    2. Selective IgA immunodeficiency (H)    3. Cough    4. Inflammatory polyarthritis (H)    5. Type 1 diabetes mellitus with hyperglycemia (H)       ______________________________________________________________________     PHQ-2 Total Score: 2 (3/19/2018  4:17 PM)  PHQ-9 Total Score: 9 (3/19/2018  4:17 PM)    Estimated body mass index is 21.46 kg/(m^2) as calculated from the following:    Height as of 5/14/18: 5' 4\" (1.626 m).    Weight as of this encounter: 125 lb (56.7 kg).     ______________________________________________________________________       Plan:    1. Tessalon Perles, albuterol inhaler.  2. Zithromax given severity of symptoms and immunosuppressed state.  3. Follow-up if not improving.       Mark Pimentel MD  General Internal Medicine  [facility]     Personal office fax - [phone]   Voice mail - [phone]  E-mail - [email or web address]      Return if symptoms worsen or fail to improve.     Future Appointments  Date Time Provider Department Center   9/4/2018 9:20 AM [facility]   9/4/2018 9:40 AM Anayeli CASH" Kaehr, NP [facility] ENDO [facility]   11/9/2018 7:00 AM [facility]   11/14/2018 8:20 AM LEELA Juarez [facility]        ______________________________________________________________________    Relevant ICD-10 codes and order associations:      ICD-10-CM    1. Rhinosinusitis J32.9    2. Selective IgA immunodeficiency (H) D80.2    3. Cough R05    4. Inflammatory polyarthritis (H) M06.4    5. Type 1 diabetes mellitus with hyperglycemia (H) E10.65

## 2021-07-20 NOTE — PROGRESS NOTES
ASSESSMENT AND PLAN:  Vani Corona 37 y.o. female is a for follow-up of inflammatory polyarthritis, undifferentiated, seronegative.  She is doing great on methotrexate hydroxychloroquine.  She has noted discomfort in the right hand, feels secondary to unaccustomed activity recently.  She wonders if she could do without hydroxychloroquine.  She has been able to quit smoking 2 months ago.  She is going to continue methotrexate.  Reminded her of her reproductive aspects.  She is on birth control pill.  Her recent labs are within acceptable range.  Follow-up in 6 months labs every 3 months.  May be given 90 day supply of methotrexate.  She is going to discontinue hydroxychloroquine.          Diagnoses and all orders for this visit:    Inflammatory polyarthritis  -     methotrexate 2.5 MG tablet; TAKE 8 TABLETS(20 MG TOTAL) BY MOUTH ONCE A WEEK  Dispense: 96 tablet; Refill: 0  -     folic acid (FOLVITE) 1 MG tablet; Take 1 tablet (1,000 mcg total) by mouth daily.  Dispense: 90 tablet; Refill: 1    Right hand pain    Polyarthralgia    Arthralgias In Multiple Sites      HISTORY OF PRESENTING ILLNESS:  Vani Corona, 37 y.o., female returns for follow-up.  She has seronegative undifferentiated inflammatory arthropathy.  She noted discomfort in the right hand wrist and elbow.  At this she feels is subsequent to her recent unaccustomed activity.  She is able do all her day-to-day activities without difficulty.  She rated her pain at 2.0/10.  She noted morning stiffness is nonexistent.  She noted no fever weight loss blurry vision eye redness mouth also nausea or rash.  She noted cough but has quit smoking 2 months ago.  In the MDHAQ she inadvertently marked these questions in the opposite manner.She has diabetes she is on insulin pump.   There is no history of Raynauds, psoriasis herself or the family, ulcerative colitis or Crohn's disease.  She had her labs drawn recently.  Further historical information and ADL  limitations as noted in the multidimensional health assessment questionnaire attached in the EMR.      ALLERGIES:Augmentin [amoxicillin-pot clavulanate]; Chantix [varenicline]; and Venlafaxine    PAST MEDICAL/ACTIVE PROBLEMS/MEDICATION/ FAMILY HISTORY/SOCIAL DATA:  The patient has a family history of  Past Medical History:   Diagnosis Date     Asthma      Chest pain      Depression      Diabetes      Fainting      Heart disease     MVP     Methamphetamine abuse      Methamphetamine abuse in remission, in remission since treatment in 1/2014 4/10/2015    Treatment 1/2014. Clean since.      Rheumatoid arthritis      Substance abuse     methamphetamine     Thyroid disease      History   Smoking Status     Former Smoker     Packs/day: 0.50     Types: Cigarettes     Quit date: 7/12/2017   Smokeless Tobacco     Never Used     Comment: She had stopped for a month as of 3/16/16, but now is back on 1/2 PPD.  11/7/16     Patient Active Problem List   Diagnosis     Restless Legs Syndrome     Mitral prolapse with 2 or more clicks     Selective IgA Deficiency     Hypothyroidism, unspecified type     Type 1 diabetes mellitus     Arthralgias In Multiple Sites     Eczema, pustular     Low back pain     Sciatica of left side     Anxiety     Depression      TMJ inflammation - bilateral     Insomnia      Non-rheumatic mitral regurgitation     Polyarthralgia     GERD (gastroesophageal reflux disease) H2 blocker not effective      Inflammatory polyarthritis     High risk medication use     Right hand pain     Tobacco abuse     Current Outpatient Prescriptions   Medication Sig Dispense Refill     baclofen (LIORESAL) 10 MG tablet Take 10 mg by mouth 3 (three) times a day as needed.       blood glucose meter (GLUCOMETER) Use 1 each As Directed as needed. Dispense glucometer brand per patient's insurance at pharmacy discretion. 1 each 0     buPROPion (WELLBUTRIN XL) 150 MG 24 hr tablet Take 1 tablet (150 mg total) by mouth every morning. 90  tablet 3     cetirizine (ZYRTEC) 10 MG tablet Take 1 tablet (10 mg total) by mouth every evening. 90 tablet 3     cholecalciferol, vitamin D3, 1,000 unit tablet Take 1,000 Units by mouth every evening.        CONTOUR NEXT STRIPS strips TEST BLOOD SUGAR 6 TIMES DAILY 200 strip 5     diabetic supplies, miscellan. Misc Insert sensor weekly 40 each 3     folic acid (FOLVITE) 1 MG tablet TAKE 1 TABLET BY MOUTH DAILY 240 tablet 0     GLUCAGON 1 mg injection INJECT WHOLE DOSE IF LOW BLOOD SUGAR & BLOOD SUGAR NOT COMING UP AFTER TREATING 2X WITH FOOD. 2 each 1     hydroxychloroquine (PLAQUENIL) 200 mg tablet TAKE 1 TABLET BY MOUTH TWICE DAILY 180 tablet 0     infusion set for insulin pump (MINIMED INFUSION SET) ISet Use every 3 days with pump 40 each 3     insulin pump cartridge Crtg 1 each Inject under the skin continuous. Novolog insulin pump       insulin pump cartridge Inject under the skin continuous. Insulin pump discharge instructions from 07/20/17.  This is intended as additional information to the patient's PTA insulin pump order.  Continue with insulin pump at present settings. 1 each 0     insulin pump syringe (PARADIGM RESERVOIR) 3 mL Misc Use every 3 days with set 40 each 3     insulin syringe-needle U-100 (BD INSULIN SYRINGE ULT-FINE II) 0.3 mL 31 gauge x 5/16 Syrg Use as needed with insulin 50 each 5     levonorgestrel (MIRENA) 20 mcg/24 hr (5 years) IUD 1 each by Intrauterine route once. Placed 8/2015       methotrexate 2.5 MG tablet TAKE 8 TABLETS(20 MG TOTAL) BY MOUTH ONCE A WEEK 32 tablet 0     NOVOLOG U-100 INSULIN ASPART 100 unit/mL injection INJECT 3 TIMES SUBCUTANEOUSLY DAILY VIA INSULIN PUMP UP TO 40 UNITS DAILY 30 mL 0     pantoprazole (PROTONIX) 40 MG tablet Take 1 tablet (40 mg total) by mouth daily. 90 tablet 3     SUMAtriptan (IMITREX) 25 MG tablet TAKE 1 TABLET(25 MG) BY MOUTH EVERY 2 HOURS AS NEEDED FOR MIGRAINE 18 tablet 5     SYNTHROID 125 mcg tablet Take 1 tablet (125 mcg total) by mouth  "Daily at 6:00 am. 90 tablet 3     nicotine (NICODERM CQ) 14 mg/24 hr APPLY 1 PATCH AS DIRECTED ONCE EACH DAY( EVERY 24 HOURS) 21 patch 1     nicotine polacrilex (NICORETTE) 2 mg gum Chew as directed.  Go to \"www.QuitPlan.org\" and read about how to use this drug.  Call them, too.  Get support. Pharm:  Please read note. 100 each 3     No current facility-administered medications for this visit.      DETAILED EXAMINATION  05/14/18  :  Vitals:    05/14/18 0826   BP: 102/62   Weight: 126 lb 11.2 oz (57.5 kg)   Height: 5' 4\" (1.626 m)     Alert oriented. Head including the face is examined for malar rash, heliotropes, scarring, lupus pernio. Eyes examined for redness such as in episcleritis/scleritis, periorbital lesions.   Neck/ Face examined for parotid gland swelling, range of motion of neck.  Left upper and lower and right upper and lower extremities examined for tenderness, swelling, warmth of the appendicular joints, range of motion, edema, rash.  Some of the important findings included: She does not have synovitis in any of the palpable appendical joints of the upper extremities.  She has no JLT warmth or swelling of the knees.  Full range of motion of the shoulders.  Scars from prior bilateral carpal tunnel release surgery.  No dactylitis.  Nails painted.             LAB / IMAGING DATA:  ALT   Date Value Ref Range Status   05/11/2018 23 0 - 45 U/L Final   02/12/2018 26 0 - 45 U/L Final   11/08/2017 20 0 - 45 U/L Final     Albumin   Date Value Ref Range Status   05/11/2018 3.5 3.5 - 5.0 g/dL Final   02/12/2018 3.9 3.5 - 5.0 g/dL Final   11/08/2017 3.9 3.5 - 5.0 g/dL Final     Creatinine   Date Value Ref Range Status   05/11/2018 0.69 0.60 - 1.10 mg/dL Final   02/12/2018 0.66 0.60 - 1.10 mg/dL Final   11/08/2017 0.79 0.60 - 1.10 mg/dL Final       WBC   Date Value Ref Range Status   05/11/2018 9.5 4.0 - 11.0 thou/uL Final   02/12/2018 11.1 (H) 4.0 - 11.0 thou/uL Final     Hemoglobin   Date Value Ref Range Status "   05/11/2018 12.6 12.0 - 16.0 g/dL Final   02/12/2018 14.3 12.0 - 16.0 g/dL Final   11/08/2017 14.1 12.0 - 16.0 g/dL Final     Platelets   Date Value Ref Range Status   05/11/2018 246 140 - 440 thou/uL Final   02/12/2018 427 140 - 440 thou/uL Final   11/08/2017 345 140 - 440 thou/uL Final       Lab Results   Component Value Date    RF 17.8 06/01/2015    SEDRATE 6 04/14/2016

## 2021-07-20 NOTE — PROGRESS NOTES
[facility]  ENDOCRINOLOGY    Diabetes Note 2/21/2017    Vani Corona, 1980, [id number]          Reason for visit      1. Type 1 diabetes mellitus without complication    2. Hypothyroidism, unspecified type    3. Eczema, pustular        HPI     Vani Corona is a very pleasant 36 y.o. old female who presents for follow up.  SUMMARY:  Vani returns today in f/u for Type 1 DM.  She has been using the pump, which she really likes.  She tells me however, that she recently went to work with a BG of over 300 and within 20 min of being at work (she cleans houses), her blood sugar was in the 60s.  When I asked her if she had eaten anything, she said no, and I told her that there was the problem. Her eating habits are very erratic, and I told her that she needed to be more regular about it, or she would continue to run into problems. Her numbers are still higher than we would like them to be and she needs to work on her carb counting, as well as testing more often.  She is only testing just under twice a day.  She is missing out on insulin when she doesn't test.   She proudly announces to me today that she has reached 3 years of sobriety and is now eligible to sponsor someone herself.    She continues taking Levothyroxine 125 mcg daily on an empty stomach. She is due for labs.    Pt has hx of eczema and has an active patch on her L elbow. She is requesting treatment for it.    Blood glucose data:  Ave: 290 with SD of 124    Insulin Pump Settings     Basal Rate  TIME Units/HR   0000 0.650                         Bolus: Insulin : CHO ratio  Time Units Grams CHO   0000 1 14                          Correction  #Units Blood glucose >Target BG   1 53 120           Past Medical History     Patient Active Problem List   Diagnosis     Restless Legs Syndrome     Mitral prolapse with 2 or more clicks     Selective IgA Deficiency     Major Depression, Recurrent - dong well as of 3/16/16     Hypothyroidism     Type 1  "diabetes mellitus     Arthralgias In Multiple Sites     Eczema, pustular     Abnormal Electrocardiogram     Low back pain     Sciatica of left side     Methamphetamine abuse in remission     Anxiety     Depression - recently worse with break-up - August, 2015     TMJ inflammation - right     Insomnia - \"brain won't shut off\" at bedtime/in bed     Tobacco abuse     Psychosocial stressors     Musculoskeletal neck pain     Night sweats     Non-rheumatic mitral regurgitation     Polyarthralgia     GERD (gastroesophageal reflux disease) - **NOT DISCUSSED ON PRIOR VISITS** - DOES SHE STILL NEED A PPI?  (TWB - 1/9/17)     Inflammatory polyarthritis     High risk medication use     Hyperglycemia        Family History       family history includes No Medical Problems in her daughter, other, other, sister, and son; Other in her father; Stroke in her paternal grandmother.    Social History      reports that she has been smoking Cigarettes.  She has been smoking about 0.50 packs per day. She has never used smokeless tobacco. She reports that she does not drink alcohol or use illicit drugs.      Review of Systems     Patient has no polyuria or polydipsia, no chest pain, dyspnea or TIA's, no numbness, tingling or pain in extremities  Remainder negative except as noted in HPI.    Vital Signs     Visit Vitals     BP 90/62 (Patient Site: Right Arm, Patient Position: Sitting, Cuff Size: Adult Regular)     Pulse 72     Ht 5' 4\" (1.626 m)     Wt 113 lb 9.6 oz (51.5 kg)     BMI 19.5 kg/m2     Wt Readings from Last 3 Encounters:   02/20/17 113 lb 9.6 oz (51.5 kg)   02/08/17 115 lb 12.8 oz (52.5 kg)   01/30/17 120 lb (54.4 kg)       Physical Exam     Constitutional:  Well developed, Well nourished  HENT:  Normocephalic,   Neck: Thyroid normal, No lymph nodes, Supple  Eyes:  PERRL, Conjunctiva pink  Respiratory:  Normal breath sounds, No respiratory distress  Cardiovascular:  Normal heart rate, Normal rhythm, No murmurs  GI:  Bowel sounds " normal, Soft, No tenderness  Musculoskeletal:  No gross deformity or lesions, normal dorsalis pedis pulses  Skin: No acanthosis nigricans, lipoatrophy or lipodystrophy  Neurologic:  Alert & oriented x 3, nonfocal  Psychiatric:  Affect, Mood, Insight appropriate  Diabetic foot exam: not done today      Assessment     1. Type 1 diabetes mellitus without complication    2. Hypothyroidism, unspecified type    3. Eczema, pustular        Plan     Pt had appt with DE after our visit and an adjustment was made to her basal settings. Needs to eat q 3-4 hours while awake.  Needs to test at least with every meal.  Will see back in 3 months.    Will continue to take Levothyroxine daily on an empty stomach.  Will get levels done with next blood draw.    Clobetasol ordered for the Eczema on her elbow.        Anayeli Dave   Endocrinology  2/21/2017  10:10 AM      Lab Results     HEMOGLOBIN A1C   Date Value Ref Range Status   02/16/2017 9.7 (H) 4.2 - 6.1 % Final   01/30/2017 10.0 (H) 4.2 - 6.1 % Final   10/13/2016 9.0 (H) 3.5 - 6.0 % Final   07/08/2016 8.4 (H) 3.5 - 6.0 % Final   02/15/2016 8.8 (H) 3.5 - 6.0 % Final     CREATININE   Date Value Ref Range Status   01/31/2017 0.59 (L) 0.60 - 1.10 mg/dL Final   01/30/2017 0.63 0.60 - 1.10 mg/dL Final   01/30/2017 1.04 0.60 - 1.10 mg/dL Final     MICROALBUMIN, RANDOM URINE   Date Value Ref Range Status   02/16/2017 <0.50 0.00 - 1.99 mg/dL Final       CHOLESTEROL   Date Value Ref Range Status   02/15/2016 174 <=199 mg/dL Final     HDL CHOLESTEROL   Date Value Ref Range Status   02/15/2016 45 (L) >=50 mg/dL Final     LDL CALCULATED   Date Value Ref Range Status   02/15/2016 105 <=129 mg/dL Final     TRIGLYCERIDES   Date Value Ref Range Status   02/15/2016 122 <=149 mg/dL Final       Lab Results   Component Value Date    ALT 14 01/31/2017    AST 13 01/31/2017    ALKPHOS 29 (L) 01/31/2017    BILITOT 0.3 01/31/2017         Current Medications     Outpatient Medications Prior to Visit    Medication Sig Dispense Refill     blood glucose meter (GLUCOMETER) Use 1 each As Directed as needed. Dispense glucometer brand per patient's insurance at pharmacy discretion. 1 each 0     buPROPion (WELLBUTRIN XL) 150 MG 24 hr tablet Take 150 mg by mouth every morning.       cetirizine (ZYRTEC) 10 MG tablet Take 10 mg by mouth daily.       cholecalciferol, vitamin D3, 1,000 unit tablet Take 1,000 Units by mouth daily.       folic acid (FOLVITE) 1 MG tablet Take 1 tablet (1 mg total) by mouth daily. 30 tablet 11     GLUCAGON 1 mg injection INJECT WHOLE DOSE IF LOW BLOOD SUGAR & BLOOD SUGAR NOT COMING UP AFTER TREATING 2X WITH FOOD. 2 each 1     levonorgestrel (MIRENA) 20 mcg/24 hr (5 years) IUD 1 each by Intrauterine route once.       levothyroxine (SYNTHROID) 125 MCG tablet Take 1 tablet (125 mcg total) by mouth Daily at 6:00 am. 90 tablet 3     NOVOLOG 100 unit/mL injection Inject 3 times daily via insulin pump up to 40 units daily. TYRONE 40 mL 1     omeprazole (PRILOSEC) 20 MG capsule TAKE ONE CAPSULE BY MOUTH DAILY 30 MINUTES BEFORE BREAKFAST. 30 capsule 4     traZODone (DESYREL) 50 MG tablet Take 50 mg by mouth bedtime as needed for sleep.       CONTOUR TEST STRIPS strips TEST BLOOD GLUCOSE 6 TIMES DAILY AS DIRECTED 50 strip prn     No facility-administered medications prior to visit.

## 2021-07-20 NOTE — TELEPHONE ENCOUNTER
If you would kindly make the changes and send them in - we could only go by what we had on the pump, and we did the best that we could. She is NEVER a happy person, and likes to gripe about anything and everything.

## 2021-07-20 NOTE — PROGRESS NOTES
General Surgery Consultation    Consulting Provider: Denia Llamas    CC: left skin mass    History:   Vani Corona is a 40 y.o. female referred to see me for left arm skin mass. Pt states the mass has been present for about a year and has grown slightly larger recently. It causes discomfort and aching pain when she touches it or when she leans her forearm and/or elbow against things. She denies any open wounds, drainage from area, fever, chills. She denies any previous history of skin masses or lumps.     Allergies:  Augmentin [amoxicillin-pot clavulanate], Chantix [varenicline], Venlafaxine, and Fluconazole    Past medical history:  Past Medical History:   Diagnosis Date     Asthma      Chest pain      Depression      Diabetes (H)      Fainting      Heart disease     MVP     Methamphetamine abuse in remission, in remission since treatment in 2014 4/10/2015    Treatment 2014. Clean since.      Rheumatoid arthritis (H)      Substance abuse (H)     methamphetamine     Thyroid disease        Past surgical history:  Past Surgical History:   Procedure Laterality Date     CERVICAL BIOPSY  W/ LOOP ELECTRODE EXCISION        SECTION, CLASSIC       colposcopy      LEAP     HERNIA REPAIR       LAPAROSCOPIC CHOLECYSTECTOMY N/A 2019    Procedure: CHOLECYSTECTOMY, LAPAROSCOPIC;  Surgeon: Rinku Doran MD;  Location: [facility] [place];  Service: General     FL  DELIVERY ONLY      Description:  Section;  Recorded: 2010;  Comments: 09 - breech     FL REPAIR UMBILICAL RUTH ANN,<6Y/O,REDUC      Description: Umbilical Hernia Repair;  Recorded: 10/03/2014;     FL REVISE MEDIAN N/CARPAL TUNNEL SURG      Description: Neuroplasty Decompression Median Nerve At Carpal Tunnel;  Recorded: 10/03/2014;       Medications:    Current Outpatient Medications:      ACCU-CHEK FASTCLIX LANCET DRUM, USE TO TEST BLOOD SUGAR 4 6 TIMES DAILY, Disp: , Rfl:      albuterol (PROAIR HFA;PROVENTIL  "HFA;VENTOLIN HFA) 90 mcg/actuation inhaler, INHALE 2 PUFFS BY MOUTH EVERY 6 HOURS AS NEEDED FOR WHEEZING, Disp: 18 g, Rfl: 0     aspirin 81 MG EC tablet, Take 81 mg by mouth every evening. , Disp: , Rfl:      baclofen (LIORESAL) 10 MG tablet, Take 10 mg by mouth 3 (three) times a day as needed., Disp: , Rfl:      blood glucose meter (GLUCOMETER), Use 1 each As Directed as needed. Dispense glucometer brand per patient's insurance at pharmacy discretion., Disp: 1 each, Rfl: 0     blood glucose test strips, Use 1 each As Directed as needed (4-6 times daily). Dispense brand per patient's insurance at pharmacy discretion., Disp: 400 strip, Rfl: 1     blood-glucose meter,continuous (DEXCOM G6 ) Misc, Use 1 each As Directed continuous., Disp: 1 each, Rfl: 0     blood-glucose sensor (DEXCOM G6 SENSOR) Eveline, Apply one sensor to the skin every 10 days., Disp: 9 Device, Rfl: 1     blood-glucose transmitter (DEXCOM G6 TRANSMITTER) Eveline, Use 1 each As Directed every 3 (three) months., Disp: 1 Device, Rfl: 1     buPROPion (WELLBUTRIN XL) 150 MG 24 hr tablet, TAKE 1 TABLET BY MOUTH EVERY DAY, Disp: 90 tablet, Rfl: 1     cetirizine (ZYRTEC) 10 MG tablet, TAKE 1 TABLET(10 MG) BY MOUTH EVERY EVENING, Disp: 90 tablet, Rfl: 0     cholecalciferol, vitamin D3, 1,000 unit tablet, Take 1,000 Units by mouth every evening. , Disp: , Rfl:      clobetasol (TEMOVATE) 0.05 % cream, Apply 1 application topically 2 (two) times a day as needed., Disp: 45 g, Rfl: 1     clomiPHENE (CLOMID) 50 mg tablet, TAKE 2 TABLETS (100MG) BY ORAL ROUTE ONCE DAILY FOR 5 DAYS. STARTING ON CYCLE DAY 3, Disp: , Rfl:      escitalopram oxalate (LEXAPRO) 10 MG tablet, TAKE 1 TABLET BY MOUTH EVERY DAY, Disp: 90 tablet, Rfl: 1     glucagon, human recombinant, (GLUCAGON) 1 mg injection, Inject 1 mg into the shoulder, thigh, or buttocks once as needed., Disp: 2 each, Rfl: 0     infusion set for insulin pump (T:90 INFUSION SET 23\") ISet, Use 1 each As Directed daily. " "To change every 3 days, Disp: 30 each, Rfl: 3     insulin aspart U-100 (NOVOLOG FLEXPEN U-100 INSULIN) 100 unit/mL (3 mL) injection pen, Inject subcutaneously CHO ratio 1:10 - 1:15 + sliding scale 1:50>150 up to 40 units daily., Disp: 30 mL, Rfl: 1     insulin glargine (LANTUS SOLOSTAR PEN) 100 unit/mL (3 mL) pen, Inject 24 Units under the skin at bedtime., Disp: , Rfl:      insulin pump cartridge (T:SLIM X2), Use 1 each As Directed daily. To change every 3 days, Disp: 30 each, Rfl: 3     insulin syringe-needle U-100 (BD INSULIN SYRINGE ULT-FINE II) 0.3 mL 31 gauge x 5/16 Syrg, Use as needed with insulin, Disp: 50 each, Rfl: 5     ketoconazole (NIZORAL) 2 % cream, Apply topically 2 (two) times a day as needed., Disp: , Rfl:      lisinopriL (ZESTRIL) 2.5 MG tablet, Take 1 tablet (2.5 mg total) by mouth daily., Disp: 90 tablet, Rfl: 1     pantoprazole (PROTONIX) 40 MG tablet, Take 1 tablet (40 mg total) by mouth every evening., Disp: 90 tablet, Rfl: 3     pen needle, diabetic (BD ULTRA-FINE ADALBERTO PEN NEEDLE) 32 gauge x 5/32\" Ndle, USE 7 TIMES DAILY, Disp: 400 each, Rfl: 1     subcutaneous insulin pump (T:SLIM X2 INSULIN PUMP) Misc, Use 1 each As Directed daily., Disp: 1 each, Rfl: 1     SUMAtriptan (IMITREX) 25 MG tablet, Take 1 tablet (25 mg total) by mouth daily as needed for migraine (may repeat in 2 hours if headache has not resolved)., Disp: 18 tablet, Rfl: 6     SYNTHROID 125 mcg tablet, Take 1 tablet (125 mcg total) by mouth daily. Due for lab work, Disp: 90 tablet, Rfl: 2     HYDROcodone-acetaminophen 5-325 mg per tablet, TAKE 1 TAB BY MOUTH EVERY 6 HOURS AS NEEDED, Disp: , Rfl:     Family history:  Family History   Problem Relation Age of Onset     Other Father         Father abuses multiple drugs.     No Medical Problems Sister      No Medical Problems Daughter      No Medical Problems Son      No Medical Problems Other      Stroke Paternal Grandmother      No Medical Problems Other        Social history:   " reports that she quit smoking about 3 years ago. Her smoking use included cigarettes. She smoked 0.50 packs per day. She has never used smokeless tobacco. She reports current alcohol use. She reports that she does not use drugs.    Review of Systems:  General: No complaints or constitutional symptoms  Skin: see HPI    Exam:  /62 (Patient Site: Right Arm, Patient Position: Sitting, Cuff Size: Adult Regular)   Wt 134 lb (60.8 kg)   BMI 23.00 kg/m    Body mass index is 23 kg/m .  General : Alert, cooperative, appears stated age   Skin: 3x3x2 cm mass medial left arm just above elbow, semi firm, mobile, mildly tender to palpation, no overlying skin changes  Lungs: Normal respiratory effort, breath sounds equal bilaterally  Heart: Regular rate and rhythm  Labs:  Lab Results   Component Value Date    WBC 7.6 04/29/2021    HGB 13.5 04/29/2021    HCT 40.3 04/29/2021    MCV 93 04/29/2021     04/29/2021         Lab Results   Component Value Date    ALT 14 12/19/2019    AST 14 12/19/2019    ALKPHOS 32 (L) 12/19/2019    BILITOT 0.8 12/19/2019     Assessment/Plan:   Vani Corona is a 40 y.o. female with symptomatic left arm skin mass, likely lipoma.  I have explained the pathophysiology of lipomas in detail as well as the surgical versus non-operative management strategies.      The risks of surgery were discussed in detail which include, but are not limited to, bleeding, infection, injury to surrounding structures, recurrent masses.  Additionally, the risks of non operative management were discussed which include, but are not limited to, enlargement of mass, recurrent pain.    She understands everything which was discussed and has consented to proceed with a excision of left arm mass under MAC and local at the ambulatory surgery center.  We will schedule surgery accordingly.     Babs Leiva MD  General Surgery  [facility]  [phone]

## 2021-07-20 NOTE — TELEPHONE ENCOUNTER
Please submit a PA for synthroid. The medication was prescribed by Denia Llamas. The PA can be performed under Debbi Dave NP.

## 2021-07-20 NOTE — PROGRESS NOTES
Clinic Care Coordination Contact    Follow Up Progress Note        Goals addressed this encounter:   Goals Addressed                 This Visit's Progress       General      Financial Wellbeing (pt-stated)        I would like to meet with FRW for assistance completing [facility] application and exploring [place] and/or [place] Care for myself and my children within the next 30 days    Action steps to achieve this goal  1.  I will sign the [facility] application once received in the mail. (complete)  2.  I will provide all necessary paperwork/documentation to assist in this process to [facility] or to Angélica. (Complete)  3.  I will let Angélica know if I receive anything in the mail regarding my eligibility for [facility].     NOTE: Pt reports that her MA ended on October 31st; her daughter currently has active MA. IRASEMA to communicate with county signed on 12/31/19 and left on CHW Angélica's desk.    Date goal set:  12/31/19  Discussed/updated:2/24/2020        Medication 1 (pt-stated)        I would like to meet with CCC RN to discuss medications and diabetes management within the next 30 days    Action steps to achieve this goal  1.  I will let Angélica, my Community Health Worker know, when I'm ready to reschedule my appointment with the Clinic Care Coordination nurse.  (Continous)  2.  I will bring all of my medications to my scheduled appointment with CCC RN, when an appointment is scheduled.     NOTE: SW assessment completed on 12/31/19.    Date goal set:  2/24/2020  Discussed/updated: 1/21/2020             Intervention/Education provided during outreach:   Updated Vani on the status of [facility]- Currently pending per billing.   She accepted a new job at [employer]. She will have new insurance coverage soon. She will keep me posted if there is a concern with cost of medications/visits with this transition.        Plan:   CHW will follow up on [facility] within 1 month per billing   Care  Coordinator will follow up in 1 month

## 2021-07-20 NOTE — TELEPHONE ENCOUNTER
Appeal initiated through [facility].  Letter and clinic notes sent to [phone] will await determination

## 2021-07-20 NOTE — ADDENDUM NOTE
Addendum Note by Liz Song RN at 1/14/2021  3:10 PM     Author: Liz Song RN Service: -- Author Type: Registered Nurse    Filed: 1/14/2021  3:10 PM Encounter Date: 1/12/2021 Status: Signed    : Liz Song RN (Registered Nurse)    Addended by: LIZ SONG on: 1/14/2021 03:10 PM        Modules accepted: Orders

## 2021-07-20 NOTE — TELEPHONE ENCOUNTER
Patient needs insulin refill, pharmacy says insulin is not refillable yet. The prescription says 40 units a day, however, her dosage varies depending on what and when she eats.. She would like to speak with someone on Debbi's team about fixing the prescription instructions so that she can refill her script. She ran out of insulin yesterday.    OKTDLM

## 2021-07-20 NOTE — PROGRESS NOTES
ASSESSMENT AND PLAN:  Vani Corona 37 y.o. 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  She is doing great with combination of methotrexate and hydroxychloroquine.  Recent labs are normal.  As well as she is doing we will meet here in 6 months with labs every 3 months.  She is aware of the reproductive aspects and contraindication to pregnancy and she is on contraception.          Diagnoses and all orders for this visit:    Inflammatory polyarthritis  -     hydroxychloroquine (PLAQUENIL) 200 mg tablet; Take 1 tablet (200 mg total) by mouth 2 (two) times a day.  Dispense: 180 tablet; Refill: 0      HISTORY OF PRESENTING ILLNESS:  Vani Corona, 37 y.o., female returns for  inflammatory arthropathy.  This is seronegative.  She noted minimal discomfort.  This is sometimes in her right hand, this is not associated morning stiffness.  She has noted no difficulty performing many of her day-to-day activities.  She has diabetes she is on insulin pump.  She rated her pain level 2.0/10.  There is no  fever weight loss blurry vision eye redness mouth ulcer nausea cough or rash.  There is no history of Raynauds.    Further historical information and ADL limitations as noted in the multidimensional health assessment questionnaire attached in the EMR.   She recently had another episode of loss of consciousness.   ALLERGIES:Augmentin [amoxicillin-pot clavulanate]; Chantix [varenicline]; and Venlafaxine    PAST MEDICAL/ACTIVE PROBLEMS/MEDICATION/ FAMILY HISTORY/SOCIAL DATA:  The patient has a family history of  Past Medical History:   Diagnosis Date     Asthma      Chest pain      Depression      Diabetes      Fainting      Heart disease     MVP     Methamphetamine abuse      Methamphetamine abuse in remission, in remission since treatment in 1/2014 4/10/2015    Treatment 1/2014. Clean since.      Rheumatoid arthritis      Substance abuse     methamphetamine     Thyroid disease      History    Smoking Status     Former Smoker     Packs/day: 0.50     Types: Cigarettes     Quit date: 7/12/2017   Smokeless Tobacco     Never Used     Comment: She had stopped for a month as of 3/16/16, but now is back on 1/2 PPD.  11/7/16     Patient Active Problem List   Diagnosis     Restless Legs Syndrome     Mitral prolapse with 2 or more clicks     Selective IgA Deficiency     Hypothyroidism, unspecified type     Type 1 diabetes mellitus     Arthralgias In Multiple Sites     Eczema, pustular     Abnormal Electrocardiogram     Low back pain     Sciatica of left side     Anxiety     Depression      TMJ inflammation - bilateral     Insomnia      Non-rheumatic mitral regurgitation     Polyarthralgia     GERD (gastroesophageal reflux disease) H2 blocker not effective      Inflammatory polyarthritis     High risk medication use     Right hand pain     Tobacco abuse     Current Outpatient Prescriptions   Medication Sig Dispense Refill     baclofen (LIORESAL) 10 MG tablet Take 10 mg by mouth 3 (three) times a day as needed.       blood glucose meter (GLUCOMETER) Use 1 each As Directed as needed. Dispense glucometer brand per patient's insurance at pharmacy discretion. 1 each 0     buPROPion (WELLBUTRIN XL) 150 MG 24 hr tablet Take 1 tablet (150 mg total) by mouth every morning. 90 tablet 3     cetirizine (ZYRTEC) 10 MG tablet Take 1 tablet (10 mg total) by mouth every evening. 90 tablet 3     cholecalciferol, vitamin D3, 1,000 unit tablet Take 1,000 Units by mouth every evening.        CONTOUR NEXT STRIPS strips TEST BLOOD SUGAR 6 TIMES DAILY 200 strip 5     famotidine (PEPCID) 40 MG tablet   1     folic acid (FOLVITE) 1 MG tablet Take 1 tablet (1 mg total) by mouth daily. 30 tablet 11     GLUCAGON 1 mg injection INJECT WHOLE DOSE IF LOW BLOOD SUGAR & BLOOD SUGAR NOT COMING UP AFTER TREATING 2X WITH FOOD. 2 each 1     hydroxychloroquine (PLAQUENIL) 200 mg tablet Take 200 mg by mouth 2 (two) times a day.       insulin pump  "cartridge Crtg 1 each Inject under the skin continuous. Novolog insulin pump       insulin pump cartridge Inject under the skin continuous. Insulin pump discharge instructions from 07/20/17.  This is intended as additional information to the patient's PTA insulin pump order.  Continue with insulin pump at present settings. 1 each 0     levonorgestrel (MIRENA) 20 mcg/24 hr (5 years) IUD 1 each by Intrauterine route once. Placed 8/2015       methotrexate 2.5 MG tablet Take 20 mg by mouth once a week. 8 tabs (20 mg) once weekly on Sunday       methotrexate 2.5 MG tablet TAKE 8 TABLETS BY MOUTH ONCE A WEEK 96 tablet 0     methotrexate 2.5 MG tablet TAKE 8 TABLETS(20 MG TOTAL) BY MOUTH ONCE A WEEK 32 tablet 0     nicotine (NICODERM CQ) 14 mg/24 hr APPLY 1 PATCH AS DIRECTED ONCE EACH DAY( EVERY 24 HOURS) 28 patch 0     nicotine polacrilex (NICORETTE) 2 mg gum Chew as directed.  Go to \"www.QuitPlan.org\" and read about how to use this drug.  Call them, too.  Get support. Pharm:  Please read note. 100 each 3     NOVOLOG 100 unit/mL injection INJECT 3 TIMES SUBCUTANEOUSLY DAILY VIA INSULIN PUMP UP TO 40 UNITS DAILY 30 mL 0     pantoprazole (PROTONIX) 40 MG tablet Take 1 tablet (40 mg total) by mouth daily. 90 tablet 3     SUMAtriptan (IMITREX) 25 MG tablet TAKE 1 TABLET(25 MG) BY MOUTH EVERY 2 HOURS AS NEEDED FOR MIGRAINE 18 tablet 5     SYNTHROID 125 mcg tablet Take 1 tablet (125 mcg total) by mouth Daily at 6:00 am. 90 tablet 3     traZODone (DESYREL) 50 MG tablet Take 1 tablet (50 mg total) by mouth at bedtime. 90 tablet 3     No current facility-administered medications for this visit.      DETAILED EXAMINATION  11/14/17  :  Vitals:    11/14/17 1625   BP: 98/60   Weight: 118 lb (53.5 kg)   Height: 5' 4\" (1.626 m)     Alert oriented. Head including the face is examined for malar rash, heliotropes, scarring, lupus pernio. Eyes examined for redness such as in episcleritis/scleritis, periorbital lesions.   Neck/ Face examined " for parotid gland swelling, range of motion of neck.  Left upper and lower and right upper and lower extremities examined for tenderness, swelling, warmth of the appendicular joints, range of motion, edema, rash.  Some of the important findings included: Bilateral carpal tunnel release scar.  No synovitis in any of the palpable appendicular joints.  There is no dactylitis, affecting the digits.         LAB / IMAGING DATA:  ALT   Date Value Ref Range Status   11/08/2017 20 0 - 45 U/L Final   09/13/2017 22 0 - 45 U/L Final   07/20/2017 15 0 - 45 U/L Final     Albumin   Date Value Ref Range Status   11/08/2017 3.9 3.5 - 5.0 g/dL Final   09/13/2017 3.7 3.5 - 5.0 g/dL Final   07/20/2017 3.0 (L) 3.5 - 5.0 g/dL Final     Creatinine   Date Value Ref Range Status   11/08/2017 0.79 0.60 - 1.10 mg/dL Final   09/13/2017 0.78 0.60 - 1.10 mg/dL Final   07/20/2017 0.69 0.60 - 1.10 mg/dL Final       WBC   Date Value Ref Range Status   11/08/2017 7.3 4.0 - 11.0 thou/uL Final   09/13/2017 5.3 4.0 - 11.0 thou/uL Final     Hemoglobin   Date Value Ref Range Status   11/08/2017 14.1 12.0 - 16.0 g/dL Final   09/13/2017 12.5 12.0 - 16.0 g/dL Final   07/20/2017 10.5 (L) 12.0 - 16.0 g/dL Final     Platelets   Date Value Ref Range Status   11/08/2017 345 140 - 440 thou/uL Final   09/13/2017 281 140 - 440 thou/uL Final   07/20/2017 241 140 - 440 thou/uL Final       Lab Results   Component Value Date    RF 17.8 06/01/2015    SEDRATE 6 04/14/2016

## 2021-07-20 NOTE — ADDENDUM NOTE
Addendum Note by Liz Song RN at 3/4/2020  4:00 PM     Author: Liz Song RN Service: -- Author Type: Registered Nurse    Filed: 3/4/2020  4:00 PM Encounter Date: 3/4/2020 Status: Signed    : Liz Song RN (Registered Nurse)    Addended by: LIZ SONG on: 3/4/2020 04:00 PM        Modules accepted: Orders

## 2021-07-20 NOTE — ANESTHESIA POSTPROCEDURE EVALUATION
Patient: Vani CASH [place]  Procedure(s):  EXCISION, LESION, UPPER EXTREMITY (Left)  Anesthesia type: MAC    Patient location: Phase II Recovery  Last vitals:   Vitals Value Taken Time   /56 05/13/21 1300   Temp 36.8  C (98.2  F) 05/13/21 1239   Pulse 52 05/13/21 1305   Resp 16 05/13/21 1300   SpO2 97 % 05/13/21 1305   Vitals shown include unvalidated device data.  Post vital signs: stable  Level of consciousness: awake and responds to simple questions  Post-anesthesia pain: pain controlled  Post-anesthesia nausea and vomiting: no  Pulmonary: unassisted, return to baseline  Cardiovascular: stable and blood pressure at baseline  Hydration: adequate  Anesthetic events: no    QCDR Measures:  ASA# 11 - Lauren-op Cardiac Arrest: ASA11B - Patient did NOT experience unanticipated cardiac arrest  ASA# 12 - Lauren-op Mortality Rate: ASA12B - Patient did NOT die  ASA# 13 - PACU Re-Intubation Rate: NA - No ETT / LMA used for case  ASA# 10 - Composite Anes Safety: ASA10A - No serious adverse event    Additional Notes:

## 2021-07-20 NOTE — PROGRESS NOTES
Internal Medicine Office Visit  [facility]  Patient Name: Vani Corona  Patient Age: 38 y.o.  YOB: 1980  MRN: [id number]    Date of Visit: 2019  Reason for Office Visit:   Chief Complaint   Patient presents with     Fmla Paperwork     Pt here today requesting FMLA paperwork to be completed for employer           Assessment / Plan / Medical Decision Makin. Gastroesophageal reflux disease, esophagitis presence not specified  - Will do PA for quantity exception through patient's insurance. She has failed treatment with omeprazole, famotidine, ranitidine. Without medication has daily reflux   - pantoprazole (PROTONIX) 40 MG tablet; Take 1 tablet (40 mg total) by mouth daily.  Dispense: 90 tablet; Refill: 1    2. Type 1 diabetes mellitus with hyperglycemia (H)  3. Insulin pump status  4. Hypothyroidism, unspecified type  - Continue to see endocrinology as scheduled     5. Inflammatory polyarthritis (H)  - Followed by rheumatology     6. Primary insomnia  7. Moderate episode of recurrent major depressive disorder (H)  8. Anxiety  - continue to see psychology regularly  - Continue current medications     9. Encounter for completion of form with patient  - FMLA form completed for patient to miss approximately 1-2 hours of work per appointment with approximately 2 appointments per month which are medically necessary for her medical conditions for appropriate monitoring       Health Maintenance Review  Health Maintenance   Topic Date Due     PAP SMEAR  10/22/2009     DIABETES OPHTHALMOLOGY EXAM  2018     DIABETES FOLLOW-UP  2019     DEPRESSION FOLLOW UP  2019     DIABETES HEMOGLOBIN A1C  2019     DIABETES FOOT EXAM  2019     DIABETES URINE MICROALBUMIN  2019     ADVANCE DIRECTIVES DISCUSSED WITH PATIENT  2023     TD 18+ HE  2025     INFLUENZA VACCINE RULE BASED  Completed     TDAP ADULT ONE TIME DOSE  Completed          I have discontinued Vani Corona's pantoprazole, traZODone, and esomeprazole. I am also having her start on pantoprazole. Additionally, I am having her maintain her levonorgestrel, cholecalciferol (vitamin D3), glucagon, blood glucose meter, baclofen, insulin pump cartridge, SUMAtriptan, insulin syringe-needle U-100, infusion set for insulin pump, (diabetic supplies, miscellan.), insulin pump syringe, cetirizine, albuterol, SYNTHROID, clobetasol, buPROPion, CONTOUR NEXT TEST STRIPS, ketoconazole, HYDROcodone-acetaminophen, aspirin, escitalopram oxalate, and ADMELOG U-100 INSULIN LISPRO.      HPI:  Vani Corona is a 38 y.o. year old who presents to the office today for paperwork completion. She has diabetes type 1 and inflammatory polyarthritis. Because of her medical conditions she has approximately 24 appointments per year. Her work has been pushing back with approving time off for her to attend appointments. She requests [facility] to protect her job for these medically necessary appointments.     12/11- she had an endocrinology appointment for diabetes follow up.  A1C 8.0%. Sees endo approximately 4 times per year.     11/14- rheumatology follow up for inflammatory polyarthritis. She is doing well with this, continutes hydroxychloroquine. Considering conception and has stopped methotrexate. She sees rheumatology approximately 2-4 times per year.     We reviewed her history of reflux. Famotidine, omeprazole, and ranitidine were not effective for her. Pantoprazole has been the only medication to effectively relieve symptoms. There has been difficulty in getting this medication covered due to limit restrictions with her insurance. Without the medication she has reflux every day.     She has insomnia, anxiety, and depression. She is doing well with current medications. Continues to see her psychologist monthly.     Review of Systems- pertinent positive in bold:  A 10-point ROS is negative except as stated in  HPI         Current Scheduled Meds:  Outpatient Encounter Medications as of 1/24/2019   Medication Sig Dispense Refill     ADMELOG U-100 INSULIN LISPRO 100 unit/mL injection INJECT UP  UNITS D VIA INSULIN PUMP 108 mL 0     aspirin 81 MG EC tablet Take 81 mg by mouth.       blood glucose meter (GLUCOMETER) Use 1 each As Directed as needed. Dispense glucometer brand per patient's insurance at pharmacy discretion. 1 each 0     buPROPion (WELLBUTRIN XL) 150 MG 24 hr tablet TAKE 1 TABLET(150 MG) BY MOUTH EVERY MORNING 90 tablet 2     cetirizine (ZYRTEC) 10 MG tablet Take 1 tablet (10 mg total) by mouth every evening. 90 tablet 1     cholecalciferol, vitamin D3, 1,000 unit tablet Take 1,000 Units by mouth every evening.        clobetasol (TEMOVATE) 0.05 % cream Apply 1 application topically 2 (two) times a day as needed. 45 g 1     CONTOUR NEXT TEST STRIPS strips TEST BLOOD SUGAR 6 TIMES DAILY 200 strip 11     diabetic supplies, miscellan. Misc Insert sensor weekly 40 each 3     escitalopram oxalate (LEXAPRO) 10 MG tablet TAKE 1 TABLET(10 MG) BY MOUTH DAILY 90 tablet 3     GLUCAGON 1 mg injection INJECT WHOLE DOSE IF LOW BLOOD SUGAR & BLOOD SUGAR NOT COMING UP AFTER TREATING 2X WITH FOOD. 2 each 1     infusion set for insulin pump (MINIMED INFUSION SET) ISet Use every 3 days with pump 40 each 3     insulin pump cartridge Inject under the skin continuous. Insulin pump discharge instructions from 07/20/17.  This is intended as additional information to the patient's PTA insulin pump order.  Continue with insulin pump at present settings. 1 each 0     insulin pump syringe (PARADIGM RESERVOIR) 3 mL Misc Use every 3 days with set 40 each 3     insulin syringe-needle U-100 (BD INSULIN SYRINGE ULT-FINE II) 0.3 mL 31 gauge x 5/16 Syrg Use as needed with insulin 50 each 5     ketoconazole (NIZORAL) 2 % cream applly three times a day for two weeks 15 g 0     levonorgestrel (MIRENA) 20 mcg/24 hr (5 years) IUD 1 each by  Intrauterine route once. Placed 2015       SYNTHROID 125 mcg tablet TAKE 1 TABLET BY MOUTH DAILY AT 6AM 90 tablet 2     [DISCONTINUED] pantoprazole (PROTONIX) 40 MG tablet Take 1 tablet (40 mg total) by mouth daily. 90 tablet 3     albuterol (VENTOLIN HFA) 90 mcg/actuation inhaler Inhale 2 puffs every 6 (six) hours as needed for wheezing. 1 Inhaler 2     baclofen (LIORESAL) 10 MG tablet Take 10 mg by mouth 3 (three) times a day as needed.       HYDROcodone-acetaminophen (NORCO) 5-325 mg per tablet Take 1 tablet by mouth every 4 (four) hours as needed for pain. 20 tablet 0     pantoprazole (PROTONIX) 40 MG tablet Take 1 tablet (40 mg total) by mouth daily. 90 tablet 1     SUMAtriptan (IMITREX) 25 MG tablet TAKE 1 TABLET(25 MG) BY MOUTH EVERY 2 HOURS AS NEEDED FOR MIGRAINE 18 tablet 5     [DISCONTINUED] esomeprazole (NEXIUM) 40 MG capsule Take 1 capsule (40 mg total) by mouth daily before breakfast. 30 capsule 5     [DISCONTINUED] esomeprazole (NEXIUM) 40 MG capsule Take 1 capsule (40 mg total) by mouth daily before breakfast. 90 capsule 3     [DISCONTINUED] traZODone (DESYREL) 50 MG tablet Take 1 tablet (50 mg total) by mouth at bedtime. 90 tablet 3     No facility-administered encounter medications on file as of 2019.      Past Medical History:   Diagnosis Date     Asthma      Chest pain      Depression      Diabetes (H)      Fainting      Heart disease     MVP     Methamphetamine abuse (H)      Methamphetamine abuse in remission, in remission since treatment in 2014 4/10/2015    Treatment 2014. Clean since.      Rheumatoid arthritis (H)      Substance abuse (H)     methamphetamine     Thyroid disease      Past Surgical History:   Procedure Laterality Date     CERVICAL BIOPSY  W/ LOOP ELECTRODE EXCISION        SECTION, CLASSIC       colposcopy      LEAP     HERNIA REPAIR       PA  DELIVERY ONLY      Description:  Section;  Recorded: 2010;  Comments: 09 - breech      [place] REPAIR UMBILICAL RUTH ANN,<4Y/O,REDUC      Description: Umbilical Hernia Repair;  Recorded: 10/03/2014;     [place] REVISE MEDIAN N/CARPAL TUNNEL SURG      Description: Neuroplasty Decompression Median Nerve At Carpal Tunnel;  Recorded: 10/03/2014;     Social History     Tobacco Use     Smoking status: Former Smoker     Packs/day: 0.50     Types: Cigarettes     Last attempt to quit: 2017     Years since quittin.5     Smokeless tobacco: Never Used     Tobacco comment: She had stopped for a month as of 3/16/16, but now is back on 12 PPD.  16   Substance Use Topics     Alcohol use: No     Drug use: No     Comment: Off 2 years, 1 month and 19 days as of 3/16/16       Objective / Physical Examination:  Vitals:    19 0850   BP: 98/64   Patient Site: Right Arm   Patient Position: Sitting   Cuff Size: Adult Regular   Pulse: 68   Resp: 16   Temp: 98.1  F (36.7  C)   TempSrc: Oral   Weight: 120 lb 4.8 oz (54.6 kg)     Wt Readings from Last 3 Encounters:   19 120 lb 4.8 oz (54.6 kg)   18 121 lb 12.8 oz (55.2 kg)   18 127 lb (57.6 kg)     Body mass index is 20.72 kg/m .     Procedure Note   Interface, Powerscribe - 2018 11:11 AM [facility] RADIOLOGY    EXAM: XR ANKLE 3 VIEWS LEFT, XR FOOT 3 VIEWS LEFT  LOCATION: [facility] [place]  DATE/TIME: 2018 11:07 AM    INDICATION: Ankle and foot Injury  COMPARISON: None.    FINDINGS:   Left ankle: No fracture or dislocation. No ankle joint effusion.     Left foot: No fracture or dislocation.           General Appearance: Cooperative, affect appropriate, speech clear, in no apparent distress  Psych: Alert and oriented x 3. Normal mood. Good fund of knowledge     Orders Placed This Encounter   Procedures     Influenza, Seasonal,Quad Inj, 36+ MOS   Followup: No Follow-up on file. earlier if needed.    Total time spent with patient was 25 minutes with >50% of time spent in face-to-face counseling regarding the above plan and  form completion       Denia Llamas, CNP

## 2021-07-20 NOTE — PATIENT INSTRUCTIONS - HE
Dear Vani Corona,    Your symptoms show that you may have coronavirus (COVID-19). This illness can cause fever, cough and trouble breathing. Many people get a mild case and get better on their own. Some people can get very sick.    Because you also reported sore throat I would like to also test you for Strep Throat to determine if we need to treat you for that as well.    What should I do?  We would like to test you for Covid-19 virus and Strep Throat. I have placed orders for these tests.   To schedule: go to your [facility] home page and scroll down to the section that says  You have an appointment that needs to be scheduled  and click the large green button that says  Schedule Now  and follow the steps to find the next available openings.  It is important that when you are asked what the reason for your appointment is that you mention you need BOTH Covid and Strep tests.     If you are unable to complete these [facility] scheduling steps, please call [phone] to schedule your testing.     Return to work/school/ guidance:   Please let your workplace manager and staffing office know when your quarantine ends     We can t give you an exact date as it depends on the above. You can calculate this on your own or work with your manager/staffing office to calculate this. (For example if you were exposed on 10/4, you would have to quarantine for 14 full days. That would be through 10/18. You could return on 10/19.)      If you receive a positive COVID-19 test result, follow the guidance of the those who are giving you the results. Usually the return to work is 10 (or in some cases 20 days from symptom onset.) If you work at [facility], you must also be cleared by Employee Occupational Health and Safety to return to work.        If you receive a negative COVID-19 test result and did not have a high risk exposure to someone with a known positive COVID-19 test, you can return to work once you're free of  fever for 24 hours without fever-reducing medication and your symptoms are improving or resolved.      If you receive a negative COVID-19 test and If you had a high risk exposure to someone who has tested positive for COVID-19 then you can return to work 14 days after your last contact with the positive individual    Note: If you have ongoing exposure to the covid positive person, this quarantine period may be more than 14 days. (For example, if you are continued to be exposed to your child who tested positive and cannot isolate from them, then the quarantine of 7-14 days can't start until your child is no longer contagious. This is typically 10 days from onset of the child's symptoms. So the total duration may be 17-24 days in this case.)    Sign up for [employer].   We know it's scary to hear that you might have COVID-19. We want to track your symptoms to make sure you're okay over the next 2 weeks. Please look for an email from [employer]--this is a free, online program that we'll use to keep in touch. To sign up, follow the link in the email you will receive. Learn more at [email or web address]    How can I take care of myself?    Get lots of rest. Drink extra fluids (unless a doctor has told you not to)    Take Tylenol (acetaminophen) or ibuprofen for fever or pain. If you have liver or kidney problems, ask your family doctor if it's okay to take Tylenol o ibuprofen    If you have other health problems (like cancer, heart failure, an organ transplant or severe kidney disease): Call your specialty clinic if you don't feel better in the next 2 days.    Know when to call 911. Emergency warning signs include:  o Trouble breathing or shortness of breath  o Pain or pressure in the chest that doesn't go away  o Feeling confused like you haven't felt before, or not being able to wake up  o Bluish-colored lips or face    Where can I get more information?  [facility] - About COVID-19:    [email or web address]    CDC - What to Do If You're Sick:   www.cdc.gov/coronavirus/2019-ncov/about/steps-when-sick.html

## 2021-07-20 NOTE — PROGRESS NOTES
ASSESSMENT AND PLAN:  Vani Corona 36 y.o. female  has undifferentiated inflammatory polyarthritis.  She has been doing well with the current combination of methotrexate and hydroxychloroquine until about a week ago when she was serving in volleyball a few times.  Since then she has been hurting in the base of the thumb and the distal radius.  We discussed the differential between first CMC versus of de Quervain's.  The signals on examination are mixed.  For now consider conservative measures, she did not several further workup and evaluation such as with ultrasound and possible consideration for local injection may be done.  Should this settle the follow-up will be in 4 months otherwise the next few weeks.      Diagnoses and all orders for this visit:    Inflammatory polyarthritis  -     methotrexate 2.5 MG tablet; TAKE 8 TABLETS(20 MG TOTAL) BY MOUTH ONCE A WEEK  Dispense: 96 tablet; Refill: 0  -     hydroxychloroquine (PLAQUENIL) 200 mg tablet; TK 1 T PO BID  Dispense: 180 tablet; Refill: 0    Arthralgias In Multiple Sites    High risk medication use    Right hand pain      HISTORY OF PRESENTING ILLNESS:  Vani Corona, 36 y.o., female returns for  inflammatory arthropathy.  She has been doing well with inflammatory arthropathy until about a week ago.  She played a long game of volleyball.  Several services done.  She has been hurting in her right hand base of the thumb distal radius radial area, going on with activity as well as at rest moderately severe no swelling no history of bruising over-the-counter measures have not tried.  She rated pain 5.0/10.  Interfering with some of the day-to-day activities.  There is no morning stiffness fever weight loss blurry vision eye redness mouth ulcer nausea cough or rash.  There is no history of Raynauds.    Further historical information and ADL limitations as noted in the multidimensional health assessment questionnaire attached in the EMR.   She recently had  "another episode of loss of consciousness.   ALLERGIES:Augmentin [amoxicillin-pot clavulanate]; Chantix [varenicline]; and Venlafaxine    PAST MEDICAL/ACTIVE PROBLEMS/MEDICATION/ FAMILY HISTORY/SOCIAL DATA:  The patient has a family history of  Past Medical History:   Diagnosis Date     Asthma      Chest pain      Depression      Diabetes      Fainting      Heart disease     MVP     Methamphetamine abuse      Rheumatoid arthritis      Substance abuse     methamphetamine     Thyroid disease      History   Smoking Status     Current Every Day Smoker     Packs/day: 0.50     Types: Cigarettes   Smokeless Tobacco     Never Used     Comment: She had stopped for a month as of 3/16/16, but now is back on 1/2 PPD.  11/7/16     Patient Active Problem List   Diagnosis     Restless Legs Syndrome     Mitral prolapse with 2 or more clicks     Selective IgA Deficiency     Major Depression, Recurrent - dong well as of 3/16/16     Hypothyroidism     Type 1 diabetes mellitus     Arthralgias In Multiple Sites     Eczema, pustular     Abnormal Electrocardiogram     Low back pain     Sciatica of left side     Methamphetamine abuse in remission     Anxiety     Depression - recently worse with break-up - August, 2015     TMJ inflammation - right     Insomnia - \"brain won't shut off\" at bedtime/in bed     Tobacco abuse     Psychosocial stressors     Musculoskeletal neck pain     Night sweats     Non-rheumatic mitral regurgitation     Polyarthralgia     GERD (gastroesophageal reflux disease) - **NOT DISCUSSED ON PRIOR VISITS** - DOES SHE STILL NEED A PPI?  (TWB - 1/9/17)     Inflammatory polyarthritis     High risk medication use     Hyperglycemia     Current Outpatient Prescriptions   Medication Sig Dispense Refill     baclofen (LIORESAL) 10 MG tablet TAKE 1 TABLET (10 MG TOTAL) BY MOUTH 3 (THREE) TIMES A DAY FOR MUSCLE SPASM. REPLACES TIZANIDINE.  3     blood glucose meter (GLUCOMETER) Use 1 each As Directed as needed. Dispense glucometer " "brand per patient's insurance at pharmacy discretion. 1 each 0     buPROPion (WELLBUTRIN XL) 150 MG 24 hr tablet TAKE 1 TABLET BY MOUTH DAILY IN THE MORNING FOR DEPRESSION & ANXIETY. 90 tablet 0     cetirizine (ZYRTEC) 10 MG tablet TAKE 1 TABLET BY MOUTH DAILY 90 tablet 2     cholecalciferol, vitamin D3, 1,000 unit tablet Take 1,000 Units by mouth daily.       clobetasol (TEMOVATE) 0.05 % cream Apply to affected area twice daily 45 g 5     CONTOUR NEXT STRIPS strips TEST BLOOD SUGAR 6 TIMES DAILY 200 strip 0     folic acid (FOLVITE) 1 MG tablet Take 1 tablet (1 mg total) by mouth daily. 30 tablet 11     GLUCAGON 1 mg injection INJECT WHOLE DOSE IF LOW BLOOD SUGAR & BLOOD SUGAR NOT COMING UP AFTER TREATING 2X WITH FOOD. 2 each 1     hydroxychloroquine (PLAQUENIL) 200 mg tablet TK 1 T PO BID  0     levonorgestrel (MIRENA) 20 mcg/24 hr (5 years) IUD 1 each by Intrauterine route once.       levothyroxine (SYNTHROID) 125 MCG tablet Take 1 tablet (125 mcg total) by mouth Daily at 6:00 am. 90 tablet 3     methotrexate 2.5 MG tablet TAKE 8 TABLETS(20 MG TOTAL) BY MOUTH ONCE A WEEK 32 tablet 1     nicotine polacrilex (NICORETTE) 2 mg gum Chew as directed.  Go to \"www.QuitPlan.org\" and read about how to use this drug.  Call them, too.  Get support. Pharm:  Please read note. 100 each 3     NOVOLOG 100 unit/mL injection Inject 3 times daily via insulin pump up to 40 units daily. TYRONE 40 mL 1     pantoprazole (PROTONIX) 40 MG tablet Take 1 tablet (40 mg total) by mouth daily. 30 tablet 1     traZODone (DESYREL) 50 MG tablet Take 1 tablet (50 mg total) by mouth at bedtime as needed for sleep. Must establish care with a new provider before any further refills. 30 tablet 1     No current facility-administered medications for this visit.      DETAILED EXAMINATION (six area) :  Vitals:    07/10/17 1605   BP: 112/68   Pulse: 68   Weight: 118 lb (53.5 kg)     Alert oriented. Head including the face is examined for malar rash, " heliotropes, scarring, lupus pernio. Eyes examined for redness such as in episcleritis/scleritis, periorbital lesions.   Neck examined  for lymph nodes, range of motion Both upper and lower extremities (all four) examined for swollen, warm &/or  tender joints, range of motion, rash, muscle weakness, edema. The salient normal / abnormal findings are appended.  Tenderness of the first CMC on the right side, and distal radius.  No definite positive Finkelstein.     She has bilateral carpal tunnel release scars.   No appreciable synovitis in any of the palpable appendicular joints.     LAB / IMAGING DATA:  ALT   Date Value Ref Range Status   06/07/2017 17 0 - 45 U/L Final   03/23/2017 53 (H) 0 - 45 U/L Final   01/31/2017 14 0 - 45 U/L Final     Albumin   Date Value Ref Range Status   06/07/2017 3.9 3.5 - 5.0 g/dL Final   03/23/2017 3.6 3.5 - 5.0 g/dL Final   01/31/2017 2.8 (L) 3.5 - 5.0 g/dL Final     Creatinine   Date Value Ref Range Status   06/07/2017 0.77 0.60 - 1.10 mg/dL Final   03/23/2017 0.64 0.60 - 1.10 mg/dL Final   01/31/2017 0.59 (L) 0.60 - 1.10 mg/dL Final       WBC   Date Value Ref Range Status   06/07/2017 5.9 4.0 - 11.0 thou/uL Final   03/23/2017 7.2 4.0 - 11.0 thou/uL Final     Hemoglobin   Date Value Ref Range Status   06/07/2017 13.4 12.0 - 16.0 g/dL Final   03/23/2017 13.5 12.0 - 16.0 g/dL Final   01/30/2017 14.0 12.0 - 16.0 g/dL Final     Platelets   Date Value Ref Range Status   06/07/2017 305 140 - 440 thou/uL Final   03/23/2017 297 140 - 440 thou/uL Final   01/30/2017 257 140 - 440 thou/uL Final       Lab Results   Component Value Date    RF 17.8 06/01/2015    SEDRATE 6 04/14/2016

## 2021-07-20 NOTE — ADDENDUM NOTE
Addendum Note by Liz Song RN at 1/30/2020  4:09 PM     Author: Liz Song RN Service: -- Author Type: Registered Nurse    Filed: 1/30/2020  4:09 PM Encounter Date: 1/23/2020 Status: Signed    : Liz Song RN (Registered Nurse)    Addended by: LIZ SONG on: 1/30/2020 04:09 PM        Modules accepted: Orders

## 2021-07-20 NOTE — TELEPHONE ENCOUNTER
Telephone Encounter by Hanna Mccauley at 2/25/2021  3:18 PM     Author: Hanna Mccauley Service: -- Author Type: --    Filed: 2/25/2021  3:18 PM Encounter Date: 2/22/2021 Status: Signed    : Hanna Mccauley APPROVED:    Approval start date: 2/1/2021  Approval end date:  2/25/2022    Pharmacy has been notified of approval and will contact patient when medication is ready for pickup.

## 2021-07-20 NOTE — PROGRESS NOTES
ASSESSMENT AND PLAN:  Vani Corona 36 y.o. female  has undifferentiated inflammatory polyarthritis.  She is under considerable stress.  She has tolerated methotrexate 20 mg per week.  She has residual pain especially in the right hand.  Add hydroxychloroquine.  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  As her work she cleans homes.  She points to the PIP and MCP and wrist area to denote the site of pain.  Recent labs are reviewed minimal elevation of ALT. I have given her [employer] handout on hydroxychloroquine.  She is aware of the side effects and need for eye examination.  Have asked her to return for follow-up here in 2 months with labs prior.         Diagnoses and all orders for this visit:    Inflammatory polyarthritis  -     hydroxychloroquine (PLAQUENIL) 200 mg tablet; Take 1 tablet (200 mg total) by mouth 2 (two) times a day.  Dispense: 60 tablet; Refill: 6    Polyarthralgia  -     hydroxychloroquine (PLAQUENIL) 200 mg tablet; Take 1 tablet (200 mg total) by mouth 2 (two) times a day.  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  She rated the pain 6 x 0/10 worse on the right side mild on the left side.  The pain she noted comes from the mid forearm distally.  She is not sure of definite swelling is up and she points to the MCPs PIPs and the wrist areas.  She has difficulty performing some of the day-to-day activities.  Yet she noted that the stiffness is about an hour.  Approximately 18 months ago the question of rheumatoid arthritis was entertained.  She took methotrexate for 4-8 weeks.  She had noted improvement with prednisone in  "the past.  She has had no rash mouth versus photosensitivity pleuritic symptoms.  There is no history of Raynauds.    Further historical information and ADL limitations as noted in the multidimensional health assessment questionnaire attached in the EMR.   She recently had another episode of loss of consciousness.   ALLERGIES:Augmentin [amoxicillin-pot clavulanate]; Chantix [varenicline]; and Venlafaxine    PAST MEDICAL/ACTIVE PROBLEMS/MEDICATION/ FAMILY HISTORY/SOCIAL DATA:  The patient has a family history of  Past Medical History:   Diagnosis Date     Asthma      Chest pain      Depression      Diabetes      Fainting      Heart disease     MVP     Methamphetamine abuse      Rheumatoid arthritis      Substance abuse     methamphetamine     Thyroid disease      History   Smoking Status     Current Every Day Smoker     Packs/day: 0.50     Types: Cigarettes   Smokeless Tobacco     Never Used     Comment: She had stopped for a month as of 3/16/16, but now is back on 1/2 PPD.  11/7/16     Patient Active Problem List   Diagnosis     Restless Legs Syndrome     Mitral prolapse with 2 or more clicks     Selective IgA Deficiency     Major Depression, Recurrent - dong well as of 3/16/16     Hypothyroidism     Type 1 diabetes mellitus     Arthralgias In Multiple Sites     Eczema, pustular     Abnormal Electrocardiogram     Low back pain     Sciatica of left side     Methamphetamine abuse in remission     Anxiety     Depression - recently worse with break-up - August, 2015     TMJ inflammation - right     Insomnia - \"brain won't shut off\" at bedtime/in bed     Tobacco abuse     Psychosocial stressors     Musculoskeletal neck pain     Night sweats     Non-rheumatic mitral regurgitation     Polyarthralgia     GERD (gastroesophageal reflux disease) - **NOT DISCUSSED ON PRIOR VISITS** - DOES SHE STILL NEED A PPI?  (TWB - 1/9/17)     Inflammatory polyarthritis     High risk medication use     Hyperglycemia     Current Outpatient " "Prescriptions   Medication Sig Dispense Refill     blood glucose meter (GLUCOMETER) Use 1 each As Directed as needed. Dispense glucometer brand per patient's insurance at pharmacy discretion. 1 each 0     blood glucose test strips Use 6 each As Directed as needed. Contour Next strips       buPROPion (WELLBUTRIN XL) 150 MG 24 hr tablet Take 150 mg by mouth every morning.       cetirizine (ZYRTEC) 10 MG tablet Take 10 mg by mouth daily.       cholecalciferol, vitamin D3, 1,000 unit tablet Take 1,000 Units by mouth daily.       clobetasol (TEMOVATE) 0.05 % cream Apply to affected area twice daily 45 g 5     folic acid (FOLVITE) 1 MG tablet Take 1 tablet (1 mg total) by mouth daily. 30 tablet 11     GLUCAGON 1 mg injection INJECT WHOLE DOSE IF LOW BLOOD SUGAR & BLOOD SUGAR NOT COMING UP AFTER TREATING 2X WITH FOOD. 2 each 1     levonorgestrel (MIRENA) 20 mcg/24 hr (5 years) IUD 1 each by Intrauterine route once.       levothyroxine (SYNTHROID) 125 MCG tablet Take 1 tablet (125 mcg total) by mouth Daily at 6:00 am. 90 tablet 3     methotrexate 2.5 MG tablet Take by mouth once a week. Take 8 tablets once weekly       methotrexate 2.5 MG tablet TAKE 8 TABLETS (20 MG TOTAL) BY MOUTH ONCE A WEEK. 32 tablet 1     nicotine polacrilex (NICORETTE) 2 mg gum Chew as directed.  Go to \"www.QuitPlan.org\" and read about how to use this drug.  Call them, too.  Get support. Pharm:  Please read note. 100 each 3     NOVOLOG 100 unit/mL injection Inject 3 times daily via insulin pump up to 40 units daily. TYRONE 40 mL 1     omeprazole (PRILOSEC) 20 MG capsule TAKE ONE CAPSULE BY MOUTH DAILY 30 MINUTES BEFORE BREAKFAST. 30 capsule 4     traZODone (DESYREL) 50 MG tablet Take 50 mg by mouth bedtime as needed for sleep.       traZODone (DESYREL) 50 MG tablet TAKE 1 TABLET, AT OR 1/2 HOUR PRIOR TO BEDTIME AS NEEDED SLEEP. 90 tablet 0     No current facility-administered medications for this visit.      DETAILED EXAMINATION (six area) :  Vitals:    " "03/27/17 1424   BP: 100/60   Patient Site: Left Arm   Patient Position: Sitting   Cuff Size: Adult Regular   Pulse: 72   Weight: 113 lb 9.6 oz (51.5 kg)   Height: 5' 4\" (1.626 m)     Alert oriented. Head including the face is examined for malar rash, heliotropes, scarring, lupus pernio. Eyes examined for redness such as in episcleritis/scleritis, periorbital lesions.   Neck examined  for lymph nodes, range of motion Both upper and lower extremities (all four) examined for swollen, warm &/or  tender joints, range of motion, rash, muscle weakness, edema. The salient normal / abnormal findings are appended.     She has bilateral carpal tunnel release scars.   No appreciable synovitis in any of the palpable appendicular joints.     LAB / IMAGING DATA:  ALT   Date Value Ref Range Status   03/23/2017 53 (H) 0 - 45 U/L Final   01/31/2017 14 0 - 45 U/L Final   01/18/2017 23 0 - 45 U/L Final     Albumin   Date Value Ref Range Status   03/23/2017 3.6 3.5 - 5.0 g/dL Final   01/31/2017 2.8 (L) 3.5 - 5.0 g/dL Final   01/18/2017 3.7 3.5 - 5.0 g/dL Final     Creatinine   Date Value Ref Range Status   03/23/2017 0.64 0.60 - 1.10 mg/dL Final   01/31/2017 0.59 (L) 0.60 - 1.10 mg/dL Final   01/30/2017 0.63 0.60 - 1.10 mg/dL Final       WBC   Date Value Ref Range Status   03/23/2017 7.2 4.0 - 11.0 thou/uL Final   01/30/2017 6.6 4.0 - 11.0 thou/uL Final     Hemoglobin   Date Value Ref Range Status   03/23/2017 13.5 12.0 - 16.0 g/dL Final   01/30/2017 14.0 12.0 - 16.0 g/dL Final   01/18/2017 13.5 12.0 - 16.0 g/dL Final     Platelets   Date Value Ref Range Status   03/23/2017 297 140 - 440 thou/uL Final   01/30/2017 257 140 - 440 thou/uL Final   01/18/2017 293 140 - 440 thou/uL Final       Lab Results   Component Value Date    RF 17.8 06/01/2015    SEDRATE 6 04/14/2016          "

## 2021-07-20 NOTE — TELEPHONE ENCOUNTER
DM Team,      please call  specialty  pharm @ [phone]     Calling in regards of PA for infusion sets. Wondering how PA is going.

## 2021-07-20 NOTE — PROGRESS NOTES
Programs Applying For: [facility]   County:  Case #:  [facility] Worker:   Jc #:   PMI #:   Tracking:   Date Applied:     Outreach:  Started [facility] over phone today. Mailing to patient to sign and provide documentation to either myself or billing. Household of 3, income is $2775.00 a month

## 2021-07-20 NOTE — TELEPHONE ENCOUNTER
[facility]    Medication Name: Synthroid 125mcg tablet TYRONE    Insurance Plan: [facility]   PBM:    Patient ID Number: [id number]    Please start PA process

## 2021-07-20 NOTE — PLAN OF CARE
Outcome: Care Progression reviewed with Hospitalist, Care Manager.  Discharge Disposition: Discussed and plan to discharge to: home  Planned Discharge Date: today vs Fri.  Problem: Barriers to discharge include: pain control post op, adv. Diet, Gen surg following  Transportation needs/Ride Time: family

## 2021-07-20 NOTE — TELEPHONE ENCOUNTER
Telephone Encounter by Hanna Mccauley at 1/31/2019  8:51 AM     Author: Hanna Mccauley Service: -- Author Type: --    Filed: 1/31/2019  8:52 AM Encounter Date: 1/25/2019 Status: Addendum    : Hanna Mccauley    Related Notes: Original Note by Hanna Mccauley filed at 1/31/2019  8:51 AM       PRIOR AUTHORIZATION DENIED    Denial Rational:  Patient is able to get up to 1 capsule per day for a maximum of 120 days within 365 days.  This is the duration set by patient's plan for PPIs.  This limit is shared across all PPIs.         Appeal Information:  This medication was denied. If physician would like to appeal because patient has contraindication or allergy to covered medication please write letter of medical necessity and route back to PA team to initiate.  If no further action is needed please close encounter thank you.

## 2021-07-20 NOTE — TELEPHONE ENCOUNTER
Refill Approved    Rx renewed per Medication Renewal Policy. Medication was last renewed on 7/25/19.    Samantha Guerra, [facility] Triage/Med Refill 11/8/2019     Requested Prescriptions   Pending Prescriptions Disp Refills     buPROPion (WELLBUTRIN XL) 150 MG 24 hr tablet [Pharmacy Med Name: BUPROPION XL 150MG TABLETS (24 H)] 90 tablet 0     Sig: TAKE 1 TABLET(150 MG) BY MOUTH EVERY MORNING       Tricyclics/Misc Antidepressant/Antianxiety Meds Refill Protocol Passed - 11/8/2019  8:35 AM        Passed - PCP or prescribing provider visit in last year     Last office visit with prescriber/PCP: 4/2/2019 Denia Llamas FNP OR same dept: 4/2/2019 Denia Llamas FNP OR same specialty: 4/2/2019 Denia Llamas FNP  Last physical: Visit date not found Last MTM visit: Visit date not found   Next visit within 3 mo: Visit date not found  Next physical within 3 mo: Visit date not found  Prescriber OR PCP: MOMO Nguyen  Last diagnosis associated with med order: 1. Depression  - buPROPion (WELLBUTRIN XL) 150 MG 24 hr tablet [Pharmacy Med Name: BUPROPION XL 150MG TABLETS (24 H)]; TAKE 1 TABLET(150 MG) BY MOUTH EVERY MORNING  Dispense: 90 tablet; Refill: 0    If protocol passes may refill for 12 months if within 3 months of last provider visit (or a total of 15 months).

## 2021-07-20 NOTE — TELEPHONE ENCOUNTER
Please contact the on-call endocrinology team. This patient is managed by endocrinology and has an insulin pump.

## 2021-07-20 NOTE — TELEPHONE ENCOUNTER
Telephone Encounter by Herminia Wilson at 3/17/2021 12:43 PM     Author: Herminia Wilson Service: -- Author Type: Patient Access    Filed: 3/17/2021  1:23 PM Encounter Date: 3/12/2021 Status: Signed    : Herminia Wilson (Patient Access)

## 2021-07-20 NOTE — TELEPHONE ENCOUNTER
Pharmacy received cartridges instead of the inpen.     Please authorize inpen rx    [facility] @ [phone]

## 2021-07-20 NOTE — TELEPHONE ENCOUNTER
Debbi Team    lantus was changed to gideon Murillo instructions were changed to 18 units, be she has been taking 20 units. was only given 2 pens    novolog    Was originally up to 40units in a day on sliding scale, but new instrucations is 4units every meal, was given only 1 pen    Pharm to use when this is fixed is: [id number] - [place], [place] - [address]       Please call pt back on this issue @ [phone]

## 2021-07-20 NOTE — PLAN OF CARE
Problem: Pain  Goal: Patient's pain/discomfort is manageable  Outcome: Progressing   Pt had no pain after surgery, will continue to observe    Problem: Safety  Goal: Patient will be injury free during hospitalization  Outcome: Progressing   Pt independent in room    Problem: Discharge Barriers  Goal: Patient's discharge needs are met  Outcome: Progressing   Per hospitalist will discharge later today

## 2021-07-20 NOTE — PROGRESS NOTES
ASSESSMENT AND PLAN:  Vani Corona 36 y.o. female  has undifferentiated inflammatory polyarthritis.  She was on methotrexate 15 mg and 2014/2015.  She stopped taking that as she felt so much better.  Now she continues to be hurting.  She is today exhibiting clear signs of inflammation in her metacarpophalangeal joints attest #3 on the right side and the wrist on the left.  She is familiar with methotrexate.  She had tolerated this well in the past.  She has an IUD put in this past August which would be valid for 5 years.  We discussed reproductive issues nonetheless.  We talked about alcohol.  I have asked to check labs today, in 4 weeks and again couple of days prior to next visit here in 2 months.  Folic acid in addition.       Diagnoses and all orders for this visit:    Arthralgias In Multiple Sites  -     methotrexate 2.5 MG tablet; Take 8 tablets (20 mg total) by mouth once a week.  Dispense: 32 tablet; Refill: 1  -     ALT (SGPT); Standing  -     Albumin; Standing  -     HM2(CBC w/o Differential); Standing  -     Creatinine; Standing  -     folic acid (FOLVITE) 1 MG tablet; Take 1 tablet (1 mg total) by mouth daily.  Dispense: 30 tablet; Refill: 11    Inflammatory polyarthritis  -     methotrexate 2.5 MG tablet; Take 8 tablets (20 mg total) by mouth once a week.  Dispense: 32 tablet; Refill: 1  -     ALT (SGPT); Standing  -     Albumin; Standing  -     HM2(CBC w/o Differential); Standing  -     Creatinine; Standing  -     folic acid (FOLVITE) 1 MG tablet; Take 1 tablet (1 mg total) by mouth daily.  Dispense: 30 tablet; Refill: 11    High risk medication use      HISTORY OF PRESENTING ILLNESS:  Vani Corona, 36 y.o., female this patient is a very painful hands.  She reports that these are troubled her over the past several months.  She rates her pain was moderately severe.  This is 7.0/10.  Her symptoms is especially troublesome and she is using the hand such as a holding onto a book.  In the morning she  "noted that the pain is not of a significant severity.  Yet she noted that the stiffness is about an hour.  Approximately 18 months ago the question of rheumatoid arthritis was entertained.  She took methotrexate for 4-8 weeks.  She had noted improvement with prednisone in the past.  She has had no rash mouth versus photosensitivity pleuritic symptoms.  There is no history of Raynauds.  Rest of the history as noted in the 3/19/14 dictation.   Further historical information and ADL limitations as noted in the multidimensional health assessment questionnaire attached in the EMR.   She recently had another episode of loss of consciousness.   ALLERGIES:Augmentin [amoxicillin-pot clavulanate]; Chantix [varenicline]; and Venlafaxine    PAST MEDICAL/ACTIVE PROBLEMS/MEDICATION/ FAMILY HISTORY/SOCIAL DATA:  The patient has a family history of  Past Medical History   Diagnosis Date     Asthma      Chest pain      Depression      Diabetes      Fainting      Heart disease      MVP     Methamphetamine abuse      Rheumatoid arthritis      Substance abuse      methamphetamine     Thyroid disease      History   Smoking Status     Current Every Day Smoker     Packs/day: 0.50     Types: Cigarettes   Smokeless Tobacco     Not on file     Comment: She had stopped for a month as of 3/16/16, but now is back on 1/2 PPD.  11/7/16     Patient Active Problem List   Diagnosis     Restless Legs Syndrome     Mitral prolapse with 2 or more clicks     Selective IgA Deficiency     Major Depression, Recurrent - dong well as of 3/16/16     Hypothyroidism     Type 1 Diabetes Mellitus     Arthralgias In Multiple Sites     Eczema     Abnormal Electrocardiogram     Low back pain     Sciatica of left side     Methamphetamine abuse in remission     Anxiety     Depression - recently worse with break-up - August, 2015     TMJ inflammation - right     Insomnia - \"brain won't shut off\" at bedtime/in bed     Tobacco abuse     Psychosocial stressors     " "Musculoskeletal neck pain     Night sweats     Non-rheumatic mitral regurgitation     Polyarthralgia     GERD (gastroesophageal reflux disease) - **NOT DISCUSSED ON PRIOR VISITS** - DOES SHE STILL NEED A PPI?  (TWB - 1/9/17)     Current Outpatient Prescriptions   Medication Sig Dispense Refill     ACCU-CHEK FASTCLIX USE 6 TIMES DAILY AS DIRECTED 540 each 3     BD ULTRA-FINE ADALBERTO PEN NEEDLES 32 gauge x 5/32\" Ndle USE AS DIRECTED 4 TIMES A  each 2     benzonatate (TESSALON) 100 MG capsule Take 1 up to every 6 hours as needed for cough. 30 capsule 0     blood glucose meter (GLUCOMETER) Use 1 each As Directed as needed. Dispense glucometer brand per patient's insurance at pharmacy discretion. 1 each 0     buPROPion (WELLBUTRIN XL) 150 MG 24 hr tablet TAKE 1 TABLET BY MOUTH DAILY IN THE MORNING FOR DEPRESSION & ANXIETY. 90 tablet 1     cetirizine (ZYRTEC) 10 MG tablet TAKE 1 TABLET BY MOUTH DAILY 90 tablet 3     cholecalciferol, vitamin D3, 1,000 unit tablet Take 1,000 Units by mouth daily.       CONTOUR TEST STRIPS strips TEST BLOOD GLUCOSE 6 TIMES DAILY AS DIRECTED 50 strip prn     DULoxetine (CYMBALTA) 30 MG capsule Take 1 daily for 1 week, then take 2 daily.  Pharmacist:  Please read note. 60 capsule 12     GLUCAGON 1 mg injection INJECT WHOLE DOSE IF LOW BLOOD SUGAR & BLOOD SUGAR NOT COMING UP AFTER TREATING 2X WITH FOOD. 2 each 1     levonorgestrel (MIRENA) 20 mcg/24 hr (5 years) IUD 1 each by Intrauterine route once.       metroNIDAZOLE (FLAGYL) 500 MG tablet TAKE 1 TABLET BY MOUTH TWICE A DAY FOR 7 DAYS  0     mometasone-formoterol (DULERA) 100-5 mcg/actuation HFAA inhaler Inhale 2 puffs as needed. 1 Inhaler 2     nicotine (NICODERM CQ) 14 mg/24 hr APPLY 1 PATCH TO THE SKIN EVERY DAY 30 patch 6     nicotine polacrilex (NICORETTE) 2 mg gum Chew one piece vigorously until nicotine is released, then stop.  Use in place of cigarette urges in addition to patches. 100 each 1     NOVOLOG FLEXPEN 100 unit/mL " injection pen INJECT 8 UNITS WITH EACH MEAL AND CORRECTION UP TO 40 UNITS PER DAY 15 mL 5     omeprazole (PRILOSEC) 20 MG capsule Take 20 mg by mouth daily.       SYNTHROID 150 mcg tablet TAKE 1 TABLET BY MOUTH EVERY DAY AT 6:00 AM (PA APPROVED) 30 tablet 6     TOUJEO SOLOSTAR 300 unit/mL (1.5 mL) injection INJECT 24 UNITS UNDER THE SKIN BEDTIME. 4.5 Syringe 4     traZODone (DESYREL) 50 MG tablet TAKE 1 TABLET, AT OR 1/2 HOUR PRIOR TO BEDTIME AS NEEDED SLEEP. 90 tablet 0     No current facility-administered medications for this visit.        There were no vitals filed for this visit. Comfortable.  Alert oriented.  Eyes are without inflammatory changes.  Examination of both upper and lower extremities is performed for swollen & tender joints, range of motion, rash, weakness, discoloration, warmth, swelling.  The skin examined for nodules. The salient normal / abnormal findings are appended.  She has bilateral carpal tunnel release scars.  She has a synovitis such as of MCP #3 on the right side, left wrist.   LAB / IMAGING DATA:  ALT   Date Value Ref Range Status   04/14/2016 30 0 - 45 U/L Final   02/15/2016 14 0 - 45 U/L Final   08/31/2015 16 0 - 45 U/L Final     ALBUMIN   Date Value Ref Range Status   04/14/2016 3.6 3.5 - 5.0 g/dL Final   02/15/2016 3.7 3.5 - 5.0 g/dL Final   08/31/2015 4.0 3.5 - 5.0 g/dL Final     CREATININE   Date Value Ref Range Status   10/13/2016 0.69 0.60 - 1.10 mg/dL Final   04/14/2016 0.65 0.60 - 1.10 mg/dL Final   02/15/2016 0.72 0.60 - 1.10 mg/dL Final       WBC   Date Value Ref Range Status   04/14/2016 6.6 4.0 - 11.0 thou/uL Final   06/01/2015 6.4 4.0 - 11.0 thou/uL Final     HEMOGLOBIN   Date Value Ref Range Status   04/14/2016 13.8 12.0 - 16.0 g/dL Final   06/01/2015 14.0 12.0 - 16.0 g/dL Final   05/27/2014 13.9 12.0 - 16.0 g/dL Final     PLATELETS   Date Value Ref Range Status   04/14/2016 267 140 - 440 thou/uL Final   06/01/2015 234 140 - 440 thou/uL Final   05/27/2014 348 140 - 440  murray/Quin Final       Lab Results   Component Value Date    RF 17.8 06/01/2015    SEDRATE 6 04/14/2016

## 2021-07-20 NOTE — PROGRESS NOTES
Office Visit - Follow Up   Vani CASH Cornland   38 y.o. female    Date of Visit: 10/30/2018    Chief Complaint   Patient presents with     Follow-up     Rash on top of left foot for couple weeks        Assessment and Plan   1. Painful skin lesion  On physical appearance it is an annular lesion that is blanching it looks like a vasculitic lesion there is definitely scaling and desquamation at the top.  However that could be because she has had clobetasol and steroid treatments can alter the appearance of her rash.  Is mildly tender but there is absolutely no discharge at all.  No surrounding erythema.  Pulses are normal there is no significant edema.  No corresponding lesions on the shins of the rest of his.  The differential diagnosis including still include tenia granuloma annularis could be a purpuric vasculitis.  The fact that it is an isolated lesion and painful is a bit unusual but does seem to point to a vasculitic diagnosis it of note she does have autoimmune disease and has a seronegative inflammatory polyarthritis that she is taking methotrexate for.  It does not look like erythema migrans as the actual ring is not changing in size.  However it is still possible.  And it does not look like a psoriatic  lesion .  Do think because the diagnosis is unclear we should refer her to dermatology for biopsy.  She I will give her some Lamisil tablets in case this is tenia infection to take for 7 days once daily.  We will do a urine test and kidney test today.  Work excuse letter was given for 2 days.  And also very limited Norco which will not be given again.    - Ambulatory referral to Dermatology  - Basic Metabolic Panel  - Urinalysis-UC if Indicated  - AST (SGOT)  - ALT (SGPT)          No Follow-up on file.     History of Present Illness   This 38 y.o. old continues to have a rash on the dorsum of her left foot in between the big toe and the second toe.  Has had this for now about 3 weeks.  Thinks that it was a set  "of opaque area sebaceous pruritic rash at first she said she did scratch it.  Never been any discharge.  Is given clobetasol.  It did disappear except for once of the painful red dot.  Now it has returned.  It is very painful I did see it last time when she came last week for her anxiety and I tried ketoconazole for it as it looked like tenia because of the scaling and it was an annular lesion.  Says it has not improved and now it is hard it hurts for any blanket to touch it or to even bear weight on it.  No other lesions anywhere else in the body she is not feverish or chills just a little dizzy her blood sugars been normal.      Review of Systems: A comprehensive review of systems was negative except as noted.     Medications, Allergies and Problem List   Reviewed, reconciled and updated     Physical Exam   General Appearance:       BP 90/62  Pulse 68  Ht 5' 4\" (1.626 m)  Wt 127 lb (57.6 kg)  BMI 21.8 kg/m2Skin - normal coloration and turgor, no rashes, no suspicious skin lesions noted  annualr lesion abut 15cm across , with blanching purpruic tender external ring , central clearing and some  scaling                                                                                      Additional Information   Current Outpatient Prescriptions   Medication Sig Dispense Refill     ADMELOG U-100 INSULIN LISPRO 100 unit/mL injection INJECT UP TO 40 UNITS D VIA INSULIN PUMP  1     albuterol (VENTOLIN HFA) 90 mcg/actuation inhaler Inhale 2 puffs every 6 (six) hours as needed for wheezing. 1 Inhaler 2     baclofen (LIORESAL) 10 MG tablet Take 10 mg by mouth 3 (three) times a day as needed.       blood glucose meter (GLUCOMETER) Use 1 each As Directed as needed. Dispense glucometer brand per patient's insurance at pharmacy discretion. 1 each 0     buPROPion (WELLBUTRIN XL) 150 MG 24 hr tablet TAKE 1 TABLET(150 MG) BY MOUTH EVERY MORNING 90 tablet 2     cetirizine (ZYRTEC) 10 MG tablet Take 1 tablet (10 mg total) by " mouth every evening. 90 tablet 1     cholecalciferol, vitamin D3, 1,000 unit tablet Take 1,000 Units by mouth every evening.        CONTOUR NEXT TEST STRIPS strips TEST BLOOD SUGAR 6 TIMES DAILY 200 strip 11     diabetic supplies, miscellan. Misc Insert sensor weekly 40 each 3     escitalopram oxalate (LEXAPRO) 10 MG tablet Take 1 tablet (10 mg total) by mouth daily. 30 tablet 2     folic acid (FOLVITE) 1 MG tablet   0     GLUCAGON 1 mg injection INJECT WHOLE DOSE IF LOW BLOOD SUGAR & BLOOD SUGAR NOT COMING UP AFTER TREATING 2X WITH FOOD. 2 each 1     infusion set for insulin pump (MINIMED INFUSION SET) ISet Use every 3 days with pump 40 each 3     insulin pump cartridge Inject under the skin continuous. Insulin pump discharge instructions from 07/20/17.  This is intended as additional information to the patient's PTA insulin pump order.  Continue with insulin pump at present settings. 1 each 0     insulin pump syringe (PARADIGM RESERVOIR) 3 mL Misc Use every 3 days with set 40 each 3     insulin syringe-needle U-100 (BD INSULIN SYRINGE ULT-FINE II) 0.3 mL 31 gauge x 5/16 Syrg Use as needed with insulin 50 each 5     ketoconazole (NIZORAL) 2 % cream applly three times a day for two weeks 15 g 0     levonorgestrel (MIRENA) 20 mcg/24 hr (5 years) IUD 1 each by Intrauterine route once. Placed 8/2015       methotrexate 2.5 MG tablet TAKE 8 TABLETS BY MOUTH ONCE A WEEK 96 tablet 0     pantoprazole (PROTONIX) 40 MG tablet Take 1 tablet (40 mg total) by mouth daily. 90 tablet 3     SUMAtriptan (IMITREX) 25 MG tablet TAKE 1 TABLET(25 MG) BY MOUTH EVERY 2 HOURS AS NEEDED FOR MIGRAINE 18 tablet 5     SYNTHROID 125 mcg tablet TAKE 1 TABLET BY MOUTH DAILY AT 6AM 90 tablet 2     traZODone (DESYREL) 50 MG tablet Take 1 tablet (50 mg total) by mouth at bedtime. 90 tablet 3     clobetasol (TEMOVATE) 0.05 % cream Apply 1 application topically 2 (two) times a day as needed. 45 g 1     HYDROcodone-acetaminophen (NORCO) 5-325 mg per  "tablet Take 1 tablet by mouth every 4 (four) hours as needed for pain. 20 tablet 0     terbinafine HCl (LAMISIL) 250 mg tablet Take 1 tablet (250 mg total) by mouth daily. 7 tablet 0     No current facility-administered medications for this visit.      Allergies   Allergen Reactions     Augmentin [Amoxicillin-Pot Clavulanate] Nausea And Vomiting and Headache     Chantix [Varenicline]      \"I took the Chantix and that made me a (severe) crazy person.\"     Venlafaxine Nausea Only     Went away when venlafaxine stopped.  Did not rechallenge. 1/18/16     Social History   Substance Use Topics     Smoking status: Former Smoker     Packs/day: 0.50     Types: Cigarettes     Quit date: 7/12/2017     Smokeless tobacco: Never Used      Comment: She had stopped for a month as of 3/16/16, but now is back on 1/2 PPD.  11/7/16     Alcohol use No       Time: total time spent with the patient was 15 minutes of which >50% was spent in counseling and coordination of care     Stacie Luna MD    "

## 2021-07-20 NOTE — ANESTHESIA PREPROCEDURE EVALUATION
Anesthesia Evaluation      Patient summary reviewed     Airway   Mallampati: II  Neck ROM: full   Pulmonary - negative ROS and normal exam    breath sounds clear to auscultation  (+) asthma  mild,  well controlled, a smoker (quit 1/2021)                         Cardiovascular - negative ROS and normal exam  Exercise tolerance: > or = 4 METS     ROS comment: Mitral valve prolapse - no symptoms or limitations     Neuro/Psych - negative ROS   (+) depression, anxiety/panic attacks,     Comments: H/o substance abuse - meth - in remission since treatment 2014      Endo/Other    (+) diabetes mellitus type 1 well controlled using insulin, hypothyroidism, arthritis (RA),      GI/Hepatic/Renal    (+) GERD well controlled and intermittent,        Other findings: Took 1/2 dose of long acting insulin last PM - blood glucose 170 this AM prior to arrival      Dental - normal exam   (+) chipped and caps                       Anesthesia Plan  Planned anesthetic: MAC  Versed/fentanyl/propofol  Ketamine PRN  zofran    ASA 3   Induction: intravenous   Anesthetic plan and risks discussed with: patient  Anesthesia plan special considerations: antiemetics,   Post-op plan: routine recovery      5/10/21 covid pcr negative    Results for orders placed or performed during the hospital encounter of 05/13/21   POCT Pregnancy (Beta-hCG, Qual), Urine (Point of Care) on DOS   Result Value Ref Range    POC Preg, Urine Negative Negative    POCT Kit Lot Number [id number]     POCT Kit Expiration Date 11/2,022     Pos Control      Neg Control      Dipstick Lot Number      Dipstick Expiration Date      POC Specific Gravity, Urine         Chemistry        Component Value Date/Time     11/18/2020 1646    K 4.1 11/18/2020 1646     11/18/2020 1646    CO2 22 11/18/2020 1646    BUN 15 11/18/2020 1646    CREATININE 0.82 11/18/2020 1646     (H) 11/18/2020 1646        Component Value Date/Time    CALCIUM 9.1 11/18/2020 1646    ALKPHOS 32 (L)  12/19/2019 0436    AST 14 12/19/2019 0436    ALT 14 12/19/2019 0436    BILITOT 0.8 12/19/2019 0436        Lab Results   Component Value Date    WBC 7.6 04/29/2021    HGB 13.5 04/29/2021    HCT 40.3 04/29/2021    MCV 93 04/29/2021     04/29/2021

## 2021-07-20 NOTE — PROGRESS NOTES
Surgery went well and this pts gall bladder was not that diseased.  I would recommend Discharge later today.    .1.  Remove dressings in 2 days  2.  It is okay to shower starting tomorrow  3.  Do not soak in a tub for 1 week    Follow up with Surgical Nurse practitioner or surgical physician's assistant in 2 weeks    [facility]  [phone]

## 2021-07-20 NOTE — PROGRESS NOTES
Clinic Care Coordination Contact  Community Health Worker Delegation:  Due Date: Within 2 weeks from today's date 1/9/2020    Delegation: No Show for RN Appointment. Please Follow unsuccessful outreach, no show standard work.    Unable to reach patient via phone today.   CHW to give [place] prescription program phone number [phone]. Patient has to call on her own.

## 2021-07-20 NOTE — ED TRIAGE NOTES
Pain to right chest and report of elevated blood sugar today. States that she doesn't feel good and her blood sugar was 400 at home. Took 6.4 units of Novolog at 0654. Has insulin pump.

## 2021-07-20 NOTE — PROGRESS NOTES
"2/8/2017     Visit Vitals     /72 (Patient Site: Right Arm, Patient Position: Sitting, Cuff Size: Adult Regular)     Pulse 81     Wt 115 lb 12.8 oz (52.5 kg)     SpO2 99%     BMI 19.88 kg/m2       Past Medical History:   Diagnosis Date     Asthma      Chest pain      Depression      Diabetes      Fainting      Heart disease     MVP     Methamphetamine abuse      Rheumatoid arthritis      Substance abuse     methamphetamine     Thyroid disease        Social History     Social History     Marital status: Single     Spouse name: N/A     Number of children: N/A     Years of education: N/A     Occupational History     Not on file.     Social History Main Topics     Smoking status: Current Every Day Smoker     Packs/day: 0.50     Types: Cigarettes     Smokeless tobacco: Never Used      Comment: She had stopped for a month as of 3/16/16, but now is back on 1/2 PPD.  11/7/16     Alcohol use No     Drug use: No      Comment: Off 2 years, 1 month and 19 days as of 3/16/16     Sexual activity: Yes     Partners: Male     Birth control/ protection: Implant     Other Topics Concern     Not on file     Social History Narrative       Family History   Problem Relation Age of Onset     Other Father      Father abuses multiple drugs.     No Medical Problems Sister      No Medical Problems Daughter      No Medical Problems Son      No Medical Problems Other      Stroke Paternal Grandmother      No Medical Problems Other        As part of this visit I have today personally reviewed past medical history, family medical history, social history (specifically including tobacco use), current medications and intolerances/allergies.  I have updated and/or or corrected these areas of history as may have been appropriate.    Allergies   Allergen Reactions     Augmentin [Amoxicillin-Pot Clavulanate] Nausea And Vomiting and Headache     Chantix [Varenicline]      \"I took the Chantix and that made me a (severe) crazy person.\"     Venlafaxine " "Nausea Only     Went away when venlafaxine stopped.  Did not rechallenge. 1/18/16       Current Outpatient Prescriptions   Medication Sig     blood glucose meter (GLUCOMETER) Use 1 each As Directed as needed. Dispense glucometer brand per patient's insurance at pharmacy discretion.     buPROPion (WELLBUTRIN XL) 150 MG 24 hr tablet Take 150 mg by mouth every morning.     cetirizine (ZYRTEC) 10 MG tablet Take 10 mg by mouth daily.     cholecalciferol, vitamin D3, 1,000 unit tablet Take 1,000 Units by mouth daily.     CONTOUR TEST STRIPS strips TEST BLOOD GLUCOSE 6 TIMES DAILY AS DIRECTED     folic acid (FOLVITE) 1 MG tablet Take 1 tablet (1 mg total) by mouth daily.     GLUCAGON 1 mg injection INJECT WHOLE DOSE IF LOW BLOOD SUGAR & BLOOD SUGAR NOT COMING UP AFTER TREATING 2X WITH FOOD.     levonorgestrel (MIRENA) 20 mcg/24 hr (5 years) IUD 1 each by Intrauterine route once.     levothyroxine (SYNTHROID, LEVOTHROID) 150 MCG tablet Take 150 mcg by mouth daily.     methotrexate 2.5 MG tablet Take 8 tablets (20 mg total) by mouth once a week. (Patient taking differently: Take 20 mg by mouth once a week. sundays)     NOVOLOG 100 unit/mL injection Inject 3 times daily via insulin pump up to 40 units daily. TYRONE     omeprazole (PRILOSEC) 20 MG capsule TAKE ONE CAPSULE BY MOUTH DAILY 30 MINUTES BEFORE BREAKFAST.     traZODone (DESYREL) 50 MG tablet Take 50 mg by mouth bedtime as needed for sleep.       Patient Active Problem List   Diagnosis     Restless Legs Syndrome     Mitral prolapse with 2 or more clicks     Selective IgA Deficiency     Major Depression, Recurrent - dong well as of 3/16/16     Hypothyroidism     Type 1 diabetes mellitus     Arthralgias In Multiple Sites     Eczema     Abnormal Electrocardiogram     Low back pain     Sciatica of left side     Methamphetamine abuse in remission     Anxiety     Depression - recently worse with break-up - August, 2015     TMJ inflammation - right     Insomnia - \"brain won't " "shut off\" at bedtime/in bed     Tobacco abuse     Psychosocial stressors     Musculoskeletal neck pain     Night sweats     Non-rheumatic mitral regurgitation     Polyarthralgia     GERD (gastroesophageal reflux disease) - **NOT DISCUSSED ON PRIOR VISITS** - DOES SHE STILL NEED A PPI?  (TWB - 1/9/17)     Inflammatory polyarthritis     High risk medication use     Hyperglycemia       Diabetes - diabetes is quite poorly controlled.  This is managed by endocrinology.  She has microalbuminuria.  Lab Results   Component Value Date    HGBA1C 10.0 (H) 01/30/2017     Lab Results   Component Value Date    MICROALBUR 2.95 (H) 01/18/2016       Hypovitaminosis D - I discussed the importance of taking vitamin D every day.  VITAMIN D, TOTAL (25-HYDROXY)   Date Value Ref Range Status   04/10/2015 29.1 (L) 30.0 - 80.0 ng/mL Final       Lipid status - she is not on a statin because she is not yet 39 years old.  She will qualify when she is 39.  Lab Results   Component Value Date    CHOL 174 02/15/2016    CHOL 120 03/04/2014    CHOL 187 01/31/2014     Lab Results   Component Value Date    HDL 45 (L) 02/15/2016    HDL 24 (L) 03/04/2014    HDL 33 (L) 01/31/2014     Lab Results   Component Value Date    LDLCALC 105 02/15/2016    LDLCALC 81.2 03/04/2014    LDLCALC 103.4 11/01/2011     Lab Results   Component Value Date    TRIG 122 02/15/2016    TRIG 74 03/04/2014    TRIG 48 11/01/2011       Thyroid status - change to 125 mcg.  She couldn't remember the six days a week.  She needs prior auth for TYRONE. All others made her nauseated and anorexic.  Lab Results   Component Value Date    TSH <0.01 (L) 07/08/2016       CBC monitoring - OK in hospital  Lab Results   Component Value Date    WBC 6.6 01/30/2017    HGB 14.0 01/30/2017    HCT 41.1 01/30/2017    MCV 88 01/30/2017     01/30/2017    CMP - I reviewed her most recent CMP.  The major issue was hypoalbuminemia and parallel hypocalcemia.    Results for orders placed or performed during " the hospital encounter of 01/30/17   Comprehensive metabolic panel   Result Value Ref Range    Sodium 138 136 - 145 mmol/L    Potassium 3.6 3.5 - 5.0 mmol/L    Chloride 111 (H) 98 - 107 mmol/L    CO2 24 22 - 31 mmol/L    Anion Gap, Calculation 3 (L) 5 - 18 mmol/L    Glucose 173 (H) 70 - 125 mg/dL    BUN 11 8 - 22 mg/dL    Creatinine 0.59 (L) 0.60 - 1.10 mg/dL    GFR MDRD Af Amer >60 >60 mL/min/1.73m2    GFR MDRD Non Af Amer >60 >60 mL/min/1.73m2    Bilirubin, Total 0.3 0.0 - 1.0 mg/dL    Calcium 7.7 (L) 8.5 - 10.5 mg/dL    Protein, Total 4.9 (L) 6.0 - 8.0 g/dL    Albumin 2.8 (L) 3.5 - 5.0 g/dL    Alkaline Phosphatase 29 (L) 45 - 120 U/L    AST 13 0 - 40 U/L    ALT 14 0 - 45 U/L     She was given Novoln N instead of Novolog for her pump, she says, didn't realize it, and ended up in the hospital with sugars all over the place.  She has told the pharmacy.  The pharmacy told her that it was their error.  See AVS.  She lost wages due to this error.    She needs TYRONE Synthroid.  All others make her sick/nauseated/loss of appetite.  I have written her prescription for d.a.w. Synthroid.    Nausea and vomiting-currently denied  Diarrhea and constipation-currently denied  Tachycardia and palpitations-currently denied  Syncope - she has had some issues with changing her pump site.  Neither of his quite certain why this should take place, but that has been her experience.    Cymbalta - she stopped it because it wasn't helping the pain in her hands.  Dr. Mtz put her back on the MTX and that helps her hands.  She therefore appears not to need Cymbalta.    CMP issues - these should clear, I suspect.  It's too early to check them again in my view.    DM - sees Debbi on 2/20.    Chief complaint: Diabetic ketoacidosis, resolved    Present illness: See above.  She got the wrong insulin.  She did not have a predictable response.  She ended up with a profoundly elevated glucose.  She was hospitalized.  She was only in the hospital for  short timeframe.  She is not feeling substantially better.  Her instructors cut her some slack.  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    Physical examination  General this is a well-developed, well-nourished, 36-year-old  American woman who is in no acute distress.  She is not tachypneic.  She does not look clinically ill.  She is back to baseline as far she can tell.  No additional examination was done today.          Impression    1.  Noncompliant hyperthyroidism.  This is because she is taking more than prescribed.  2.  Type 1 diabetes with recent hospitalization for diabetic ketoacidosis, this coming about as a result of insulin dispensing error by her pharmacy.  3.  Psychosocial stressors    Plan    1.  I have encouraged her to look to the pharmacy to make up for her lost wages.  I generated a letter for her to use in this regard.  2.  I put her back on a reduced dose of thyroid, and rather than asking her to take something 60s are weak, I have cut back to a lower dose and will have her take the same dose every day.  3.  Return visit in one month.  4.  Today's visit lasted longer than 25 minutes.  Greater than 50% of this visit was spent in counseling and coordination of care.      Much or all of the text in this note was generated through the use of Dragon Dictate voice-to-text software.  Errors in spelling or words which seem out of context are unintentional.  Dragon has a significant issue with pronouns and, of course, homonyms.  Errors with words of this sort may escape editing.      Patient Instructions   1.  I would recommend that you send a letter, certified, to your pharmacy, indicating that their error in dispensing your insulin appears to have led to your hospital stay and a loss of work hours.  Under this circumstance, you look to them to reimburse you for lost wages.    2.  I am going to rewrite your thyroid.  You were getting too much last summer and  cut back to only six days a week, but now have gone back to taking it every day.  This will simply not work!  You are hyperthyroid because the dose is too high.     Now it's 150 mcgm a day     New dose will be 125 mcg a day.     Make the change as soon as you can.    3.  Come back and see me in a month.  Make that appointment as you leave today, please.

## 2021-07-20 NOTE — PROGRESS NOTES
Internal Medicine Office Visit  [facility]   Patient Name: Vani Corona  Patient Age: 40 y.o.  YOB: 1980  MRN: [id number]    Date of Visit: 4/29/2021  Reason for Office Visit:   Chief Complaint   Patient presents with     Fatigue     Nevus           Assessment / Plan / Medical Decision Making:    Problem List Items Addressed This Visit     Selective IgA Deficiency      Other Visit Diagnoses     Fatigue, unspecified type    -  Primary    Relevant Orders    HM2(CBC w/o Differential) (Completed)    Thyroid Stimulating Hormone (TSH) (Completed)    COVID-19 Serjio RBD Eusebia and Reflex Titer (Completed)    Lipoma of skin and subcutaneous tissue        Relevant Orders    Ambulatory referral to General Surgery    Skin tag             1.  I am uncertain the etiology of her fatigue and the differential is broad.  She does not have other symptoms to suggest a specific source.  We will do some basic lab test to look for thyroid abnormality or anemia.  Additionally, I am ordering a Covid antibody test to see if perhaps she was infected but had a false negative as she did have an exposure last month.  I am not repeating a pregnancy test as she took 1 earlier than today.  If the symptoms continue or progress, she is advised to follow-up  2.  Regarding the mass of the left elbow, I suspect that this is a lipoma.  Because it is uncomfortable she does wish to pursue removal and will be referred to general surgery  3. Two skin tags removed as per procedure notes below     I have discontinued Vani Corona's InPen (for NovoLOG or Fiasp), Contour Next Test Strips, nicotine polacrilex, nicotine, and nicotine polacrilex. I am also having her maintain her cholecalciferol (vitamin D3), baclofen, insulin syringe-needle U-100, clobetasoL, aspirin, cetirizine, ketoconazole, glucagon (human recombinant), albuterol, (blood-glucose meter,continuous), blood glucose meter, blood glucose test, pantoprazole,  "escitalopram oxalate, buPROPion, clomiPHENE, HYDROcodone-acetaminophen, Accu-Chek Fastclix Lancet Drum, Dexcom G6 Sensor, Dexcom G6 Transmitter, SUMAtriptan, insulin aspart U-100, (pen needle, diabetic), lisinopriL, Synthroid, insulin glargine, t:slim X2, t:90 Infusion Set 23\", and t:slim X2 Insulin Pump.                Orders Placed This Encounter   Procedures     HM2(CBC w/o Differential)     Thyroid Stimulating Hormone (TSH)     COVID-19 Serjio RBD Eusebia and Reflex Titer     Ambulatory referral to General Surgery   Followup: Return in about 6 months (around 10/29/2021) for Next scheduled follow up. earlier if needed.        Denia Llamas, CNP        HPI:  Vani Corona is a 40 y.o. year old who presents to the office today for several concerns.     She has been feeling extremely fatigued, about 2 weeks. Her COVID test was negative. Her period is 4 days late, she is trying to conceive. Home pregnancy test was negative. She does not have prolonged/heavy periods. Does not feel depressed. She has been sleeping well. Blood sugars have been pretty regular for her, nothing out of the ordinary.     Left elbow she feels a lump which has been tender if she applies pressure. No drainage from the area.     There are several skin tags on the left shoulder that catch on clothing.       Health Maintenance Review  Health Maintenance   Topic Date Due     DEPRESSION ACTION PLAN  Never done     HIV SCREENING  Never done     COVID-19 Vaccine (1) Never done     HEPATITIS B VACCINES (3 of 3 - Risk 3-dose series) 08/14/2016     LIPID  02/15/2017     PREVENTIVE CARE VISIT  04/14/2017     DIABETIC FOOT EXAM  09/04/2019     DIABETIC EYE EXAM  01/24/2020     A1C  08/19/2021     BMP  11/18/2021     MICROALBUMIN  02/19/2022     ADVANCE CARE PLANNING  03/19/2023     PAP SMEAR  04/01/2023     TD 18+ HE  08/31/2025     Pneumococcal Vaccine: Pediatrics (0 to 5 Years) and At-Risk Patients (6 to 64 Years) (2 of 2) 10/22/2044     HEPATITIS C " SCREENING  Completed     INFLUENZA VACCINE RULE BASED  Completed     TDAP ADULT ONE TIME DOSE  Completed       Current Scheduled Meds:  Outpatient Encounter Medications as of 4/29/2021   Medication Sig Dispense Refill     ACCU-CHEK FASTCLIX LANCET DRUM USE TO TEST BLOOD SUGAR 4 6 TIMES DAILY       albuterol (PROAIR HFA;PROVENTIL HFA;VENTOLIN HFA) 90 mcg/actuation inhaler INHALE 2 PUFFS BY MOUTH EVERY 6 HOURS AS NEEDED FOR WHEEZING 18 g 0     aspirin 81 MG EC tablet Take 81 mg by mouth every evening.        baclofen (LIORESAL) 10 MG tablet Take 10 mg by mouth 3 (three) times a day as needed.       blood glucose meter (GLUCOMETER) Use 1 each As Directed as needed. Dispense glucometer brand per patient's insurance at pharmacy discretion. 1 each 0     blood glucose test strips Use 1 each As Directed as needed (4-6 times daily). Dispense brand per patient's insurance at pharmacy discretion. 400 strip 1     blood-glucose meter,continuous (DEXCOM G6 ) Misc Use 1 each As Directed continuous. 1 each 0     blood-glucose sensor (DEXCOM G6 SENSOR) Eveline Apply one sensor to the skin every 10 days. 9 Device 1     blood-glucose transmitter (DEXCOM G6 TRANSMITTER) Eveline Use 1 each As Directed every 3 (three) months. 1 Device 1     buPROPion (WELLBUTRIN XL) 150 MG 24 hr tablet TAKE 1 TABLET BY MOUTH EVERY DAY 90 tablet 1     cetirizine (ZYRTEC) 10 MG tablet TAKE 1 TABLET(10 MG) BY MOUTH EVERY EVENING 90 tablet 0     cholecalciferol, vitamin D3, 1,000 unit tablet Take 1,000 Units by mouth every evening.        clobetasol (TEMOVATE) 0.05 % cream Apply 1 application topically 2 (two) times a day as needed. 45 g 1     clomiPHENE (CLOMID) 50 mg tablet TAKE 2 TABLETS (100MG) BY ORAL ROUTE ONCE DAILY FOR 5 DAYS. STARTING ON CYCLE DAY 3       escitalopram oxalate (LEXAPRO) 10 MG tablet TAKE 1 TABLET BY MOUTH EVERY DAY 90 tablet 1     glucagon, human recombinant, (GLUCAGON) 1 mg injection Inject 1 mg into the shoulder, thigh, or  "buttocks once as needed. 2 each 0     HYDROcodone-acetaminophen 5-325 mg per tablet TAKE 1 TAB BY MOUTH EVERY 6 HOURS AS NEEDED       infusion set for insulin pump (T:90 INFUSION SET 23\") ISet Use 1 each As Directed daily. To change every 3 days 30 each 3     insulin aspart U-100 (NOVOLOG FLEXPEN U-100 INSULIN) 100 unit/mL (3 mL) injection pen Inject subcutaneously CHO ratio 1:10 - 1:15 + sliding scale 1:50>150 up to 40 units daily. 30 mL 1     insulin glargine (LANTUS SOLOSTAR PEN) 100 unit/mL (3 mL) pen Inject 24 Units under the skin at bedtime.       insulin pump cartridge (T:SLIM X2) Use 1 each As Directed daily. To change every 3 days 30 each 3     insulin syringe-needle U-100 (BD INSULIN SYRINGE ULT-FINE II) 0.3 mL 31 gauge x 5/16 Syrg Use as needed with insulin 50 each 5     ketoconazole (NIZORAL) 2 % cream Apply topically 2 (two) times a day as needed.       lisinopriL (ZESTRIL) 2.5 MG tablet Take 1 tablet (2.5 mg total) by mouth daily. 90 tablet 1     pantoprazole (PROTONIX) 40 MG tablet Take 1 tablet (40 mg total) by mouth every evening. 90 tablet 3     pen needle, diabetic (BD ULTRA-FINE ADALBERTO PEN NEEDLE) 32 gauge x 5/32\" Ndle USE 7 TIMES DAILY 400 each 1     subcutaneous insulin pump (T:SLIM X2 INSULIN PUMP) Misc Use 1 each As Directed daily. 1 each 1     SUMAtriptan (IMITREX) 25 MG tablet Take 1 tablet (25 mg total) by mouth daily as needed for migraine (may repeat in 2 hours if headache has not resolved). 18 tablet 6     SYNTHROID 125 mcg tablet Take 1 tablet (125 mcg total) by mouth daily. Due for lab work 90 tablet 2     [DISCONTINUED] CONTOUR NEXT TEST STRIPS strips TEST BLOOD SUGAR 6 TIMES DAILY 400 strip 2     [DISCONTINUED] insulin admin supplies (INPEN, FOR NOVOLOG,) InPn Inject 4 Units under the skin 3 (three) times a day before meals. 5 each 1     [DISCONTINUED] nicotine (NICODERM CQ) 7 mg/24 hr Place 1 patch on the skin daily. 30 patch 0     [DISCONTINUED] nicotine polacrilex (COMMIT) 2 MG " lozenge Apply 1 lozenge (2 mg total) to the mouth or throat as needed for smoking cessation. 24 each 11     [DISCONTINUED] nicotine polacrilex (NICORELIEF) 2 mg gum 2 mg chew and place in buccal surface every 1 hour as needed for nicotine cravings 100 each 1     No facility-administered encounter medications on file as of 4/29/2021.          Objective / Physical Examination:  Vitals:    04/29/21 1248   BP: 120/62   Pulse: 64   Weight: 134 lb (60.8 kg)     Wt Readings from Last 3 Encounters:   05/04/21 134 lb (60.8 kg)   04/29/21 134 lb (60.8 kg)   02/04/20 118 lb 11.2 oz (53.8 kg)     Body mass index is 23 kg/m .     Constitutional: In no apparent distress  Eyes: Non-icteric.   Respiratory: Clear to auscultation bilaterally. Normal inspiratory and expiratory effort  Cardiovascular: Regular rate and rhythm. No murmurs, rubs, or gallops. No edema.   Gastrointestinal: Bowel sounds active all four quadrants. Soft, non-tender.   Skin: left medial arm above the elbow with a mobile mass which is approximately 3 cm in diameter. 2 skin tags on the left shoulder area  Neuro: Normal gait  Psych: Alert and oriented x3.     P: Skin tags are snipped off using alcohol for cleansing and sterile iris scissors. Local anesthesia was not used. These pathognomonic lesions are not sent for pathology.

## 2021-07-20 NOTE — PROGRESS NOTES
The Clinic Community Health Worker spoke with the patient today to discuss possible Clinic Care Coordination enrollment.  The service was described to the patient and immediate needs were discussed.  The patient agreed to enrollment and an assessment was scheduled.  The patient was provided with contact information for the clinic CHW.             Assessment date: 12/31/2019 at 1:00PM     Notes:  Financial concerns  Medical Bills

## 2021-07-20 NOTE — ED PROVIDER NOTES
IMynor have reviewed the documentation, personally taken the patient's history, performed an exam and agree with the physical finds, diagnosis and management plan.    Please see my note.

## 2021-07-20 NOTE — ED NOTES
Pt administered 4 units of her own novolin insulin at 1330 while unsupervised in room. Pt disclosed this information to MD during reassessment. Pt advised not to do this in the future while staying in ED or as inpatient and to alert staff if she feels she requires insulin. 4 units to be subtracted from next sliding scale administration.

## 2021-07-20 NOTE — PATIENT INSTRUCTIONS - HE
Vani Jenkinsr,    It was a pleasure to see you today at the [facility].     My recommendations after this visit include:    No need for valve repair as of now    SHELBY Bui MD, FACC, [place]

## 2021-07-20 NOTE — TELEPHONE ENCOUNTER
Hello team,    Not sure if this was initiated yet but if not can you please send this prior auth through the medical benefits for the insulin pump supplies.     Thank you!     Calculus of gallbladder and bile duct w/cholecystitis w/o obstruction    Obesity (BMI 30-39.9) Calculus of gallbladder and bile duct w/cholecystitis w/o obstruction    Cervical radiculopathy    Obesity (BMI 30-39.9)    Shoulder impingement, right  Rt shoulder

## 2021-07-20 NOTE — TELEPHONE ENCOUNTER
Telephone Encounter by Herminia Wilson at 3/22/2021  4:18 PM     Author: Herminia Wilson Service: -- Author Type: Patient Access    Filed: 3/22/2021  5:04 PM Encounter Date: 3/12/2021 Status: Signed    : Herminia Wilson (Patient Access)       Prior Authorization Not Needed     Insurance details:  Error.Dismissed.  Error.This request cannot be reviewed by pharmacy. Please send your request directly to our [facility] department for review. If you need additional information, please contact [facility] at [phone].    Pharmacy details: [facility] - [place], [place] - [address]

## 2021-07-20 NOTE — PROGRESS NOTES
[facility]  ENDOCRINOLOGY    Diabetes Note 7/31/2019    Vani Corona, 1980, [id number]          Reason for visit      1. Type 1 diabetes mellitus with hyperglycemia (H)        HPI     Vani Corona is a very pleasant 38 y.o. old female who presents for follow up.  SUMMARY:  Vani returns today in f/u for DM 1.  Her current A1c is higher than her last and at 7.8, up from 7.5.  She mentions that her stress levels have been up recently and that her daughter is having some problems. Her daughter remains in Foster Care and is going through a Psych Evaluation.  Pt is using the 670 pump with sensor. She continues having difficulty keeping the sensor on longer than 5 days, so she loses the benefit of the closed loop for those two days.  Her download shows that she was within range for 65% of the time over the last month and above 33%.  She did have some hypoglycemia, for which she needed no outside assistance.  She had an AVe BG of 195 and SG of 167. She is getting 34 units of insulin daily.  She has a Basal/Bolus ratio of 59/41. She is staying in range overnight, and rising after she begins eating. Pt notes that she was Kayaking recently and received a couple of wounds on her ankles. They are healing.       Blood glucose data:    Insulin Pump Settings     Basal Rate when not in Auto mode  TIME Units/HR   0000 - 0700 0.95   0700 - 1200 0.65   1200 - 2000 0.75   2000 - 0000 0.95             Bolus: Insulin : CHO ratio  Time Units Grams CHO   0000 1 12                          Correction  #Units Blood glucose >Target BG   153 53 120         Past Medical History     Patient Active Problem List   Diagnosis     Restless Legs Syndrome     Mitral prolapse with 2 or more clicks     Selective IgA Deficiency     Major depressive disorder, recurrent episode, moderate (H)     Hypothyroidism, unspecified type     Type 1 diabetes mellitus (H)     Arthralgias In Multiple Sites     Eczema, pustular     Low back pain      "Sciatica of left side     Anxiety     Depression      TMJ inflammation - bilateral     Insomnia      Non-rheumatic mitral regurgitation     Polyarthralgia     GERD (gastroesophageal reflux disease) H2 blocker not effective      Inflammatory polyarthritis (H)     High risk medication use     Right hand pain     Tobacco abuse     Insulin pump status     Family history of psoriasis in mother and sister     Parent-child relational problem     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    Social History      reports that she quit smoking about 2 years ago. Her smoking use included cigarettes. She smoked 0.50 packs per day. She has never used smokeless tobacco. She reports that she does not drink alcohol or use drugs.      Review of Systems     Patient has no polyuria or polydipsia, no chest pain, dyspnea or TIA's, no numbness, tingling or pain in extremities  Remainder negative except as noted in HPI.    Vital Signs     BP 90/64 (Patient Site: Right Arm, Patient Position: Sitting, Cuff Size: Adult Regular)   Pulse 74   Ht 5' 4\" (1.626 m)   Wt 122 lb 12.8 oz (55.7 kg)   BMI 21.08 kg/m    Wt Readings from Last 3 Encounters:   07/30/19 122 lb 12.8 oz (55.7 kg)   04/02/19 124 lb (56.2 kg)   03/27/19 130 lb (59 kg)       Physical Exam     Constitutional:  Well developed, Well nourished  HENT:  Normocephalic,   Neck: Thyroid normal, No lymph nodes, Supple  Eyes:  PERRL, Conjunctiva pink  Respiratory:  Normal breath sounds, No respiratory distress  Cardiovascular:  Normal heart rate, Normal rhythm, No murmurs  GI:  Bowel sounds normal, Soft, No tenderness  Musculoskeletal:  No gross deformity or lesions, normal dorsalis pedis pulses  Skin: No acanthosis nigricans, " lipoatrophy or lipodystrophy. 3 well healing under one centimeter abrasions on outside of R ankle. All scabbed over and no sux infection.  Neurologic:  Alert & oriented x 3, nonfocal  Psychiatric:  Affect, Mood, Insight appropriate      Assessment     1. Type 1 diabetes mellitus with hyperglycemia (H)        Plan     Vani's control worsened slightly. She is going to work on getting it back into the lower 7s.  No changes to her settings today. F/u with me in 6 months. Time spent with pt today: 25 min with >50% spent in counseling and coordination of care.        Anayeli ANDERSON Endocrinology  7/31/2019  2:02 PM      Lab Results     Hemoglobin A1c   Date Value Ref Range Status   07/23/2019 7.8 (H) 3.5 - 6.0 % Final   03/05/2019 7.5 (H) 3.5 - 6.0 % Final   12/04/2018 8.0 (H) 3.5 - 6.0 % Final   09/04/2018 7.3 (H) 3.5 - 6.0 % Final   03/13/2018 7.5 (H) 3.5 - 6.0 % Final     Creatinine   Date Value Ref Range Status   07/23/2019 0.79 0.60 - 1.10 mg/dL Final   03/27/2019 0.81 0.60 - 1.10 mg/dL Final   11/12/2018 0.69 0.60 - 1.10 mg/dL Final     Microalbumin, Random Urine   Date Value Ref Range Status   09/04/2018 <0.50 0.00 - 1.99 mg/dL Final       Cholesterol   Date Value Ref Range Status   02/15/2016 174 <=199 mg/dL Final     HDL Cholesterol   Date Value Ref Range Status   02/15/2016 45 (L) >=50 mg/dL Final     LDL Calculated   Date Value Ref Range Status   02/15/2016 105 <=129 mg/dL Final     Triglycerides   Date Value Ref Range Status   02/15/2016 122 <=149 mg/dL Final       Lab Results   Component Value Date    ALT 27 03/27/2019    AST 27 03/27/2019    ALKPHOS 50 03/27/2019    BILITOT 0.4 03/27/2019         Current Medications     Outpatient Medications Prior to Visit   Medication Sig Dispense Refill     albuterol (VENTOLIN HFA) 90 mcg/actuation inhaler Inhale 2 puffs every 6 (six) hours as needed for wheezing. 1 Inhaler 2     aspirin 81 MG EC tablet Take 81 mg by mouth.       baclofen (LIORESAL) 10 MG tablet  Take 10 mg by mouth 3 (three) times a day as needed.       buPROPion (WELLBUTRIN XL) 150 MG 24 hr tablet TAKE 1 TABLET(150 MG) BY MOUTH EVERY MORNING 90 tablet 0     cetirizine (ZYRTEC) 10 MG tablet TAKE 1 TABLET(10 MG) BY MOUTH EVERY EVENING 90 tablet 1     cholecalciferol, vitamin D3, 1,000 unit tablet Take 1,000 Units by mouth every evening.        clobetasol (TEMOVATE) 0.05 % cream Apply 1 application topically 2 (two) times a day as needed. 45 g 1     CONTOUR NEXT TEST STRIPS strips TEST BLOOD SUGAR 6 TIMES DAILY (Patient taking differently: TEST BLOOD SUGAR 2-6 TIMES DAILY) 200 strip 11     escitalopram oxalate (LEXAPRO) 10 MG tablet TAKE 1 TABLET(10 MG) BY MOUTH DAILY 90 tablet 3     GLUCAGON 1 mg injection INJECT WHOLE DOSE IF LOW BLOOD SUGAR & BLOOD SUGAR NOT COMING UP AFTER TREATING 2X WITH FOOD. 2 each 1     HYDROcodone-acetaminophen (NORCO) 5-325 mg per tablet Take 1 tablet by mouth every 4 (four) hours as needed for pain. 20 tablet 0     hydroxychloroquine (PLAQUENIL) 200 mg tablet TAKE 1 TABLET(200 MG) BY MOUTH TWICE DAILY 60 tablet 0     infusion set for insulin pump (MINIMED INFUSION SET) ISet Use every 3 days with pump 40 each 3     insulin aspart U-100 (NOVOLOG U-100 INSULIN ASPART) 100 unit/mL injection Inject 40 Units under the skin daily. Via the pump 36 mL PRN     insulin pump cartridge Inject under the skin continuous. Insulin pump discharge instructions from 07/20/17.  This is intended as additional information to the patient's PTA insulin pump order.  Continue with insulin pump at present settings. 1 each 0     insulin pump syringe (PARADIGM RESERVOIR) 3 mL Misc Use every 3 days with set 40 each 3     insulin syringe-needle U-100 (BD INSULIN SYRINGE ULT-FINE II) 0.3 mL 31 gauge x 5/16 Syrg Use as needed with insulin 50 each 5     ketoconazole (NIZORAL) 2 % cream applly three times a day for two weeks 15 g 0     levonorgestrel (MIRENA) 20 mcg/24 hr (5 years) IUD 1 each by Intrauterine route  once. Placed 8/2015       ondansetron (ZOFRAN) 4 MG tablet TAKE 1 TABLET(4 MG) BY MOUTH EVERY 12 HOURS AS NEEDED FOR NAUSEA 10 tablet 0     pantoprazole (PROTONIX) 40 MG tablet Take 1 tablet (40 mg total) by mouth daily. 90 tablet 1     SUMAtriptan (IMITREX) 25 MG tablet TAKE 1 TABLET(25 MG) BY MOUTH EVERY 2 HOURS AS NEEDED FOR MIGRAINE 18 tablet 1     SYNTHROID 125 mcg tablet TAKE 1 TABLET BY MOUTH EVERY DAY AT 6AM 90 tablet 1     varenicline (CHANTIX CONTINUING MONTH BOX) 1 mg tablet Take 1 tablet (1 mg total) by mouth 2 (two) times a day. 60 tablet 1     varenicline (CHANTIX STARTING MONTH BOX) 0.5 mg (11)- 1 mg (42) tablet 1 wk before you stop smoking take 0.5mg daily on days 1-3, 0.5mg 2 times each day on days 4-7, then 1mg 2 times daily. 53 tablet 0     No facility-administered medications prior to visit.

## 2021-07-20 NOTE — PATIENT INSTRUCTIONS - HE
Patient Instructions by Denia Llamas FNP at 9/25/2019  9:33 AM     Author: Denia Llamas FNP Service: -- Author Type: Nurse Practitioner    Filed: 9/25/2019  9:33 AM Encounter Date: 9/24/2019 Status: Signed    : Denia Llamas FNP (Nurse Practitioner)           Bacterial Conjunctivitis    You have an infection in the membranes covering the white part of the eye. This part of the eye is called the conjunctiva. The infection is called conjunctivitis. The most common symptoms of conjunctivitis include a thick, pus-like discharge from the eye, swollen eyelids, redness, eyelids sticking together upon awakening, and a gritty or scratchy feeling in the eye. Your infection was caused by bacteria. It may be treated with medicine. With treatment, the infection takes about 7 to 10 days to resolve.  Home care    Use prescribed antibiotic eye drops or ointment as directed to treat the infection.    Apply a warm compress (towel soaked in warm water) to the affected eye 3 to 4 times a day. Do this just before applying medicine to the eye.    Use a warm, wet cloth to wipe away crusting of the eyelids in the morning. This is caused by mucus drainage during the night. You may also use saline irrigating solution or artificial tears to rinse away mucus in the eye. Do not put a patch over the eye.    Wash your hands before and after touching the infected eye. This is to prevent spreading the infection to the other eye, and to other people. Don't share your towels or washcloths with others.    You may use acetaminophen or ibuprofen to control pain, unless another medicine was prescribed. (Note: If you have chronic liver or kidney disease or have ever had a stomach ulcer or gastrointestinal bleeding, talk with your doctor before using these medicines.)    Don't wear contact lenses until your eyes have healed and all symptoms are gone.  Follow-up care  Follow up with your healthcare provider, or as advised.  When  to seek medical advice  Call your healthcare provider right away if any of these occur:    Worsening vision    Increasing pain in the eye    Increasing swelling or redness of the eyelid    Redness spreading around the eye  Date Last Reviewed: 7/1/2017 2000-2017 [employer]. [address] [place] [id number]. All rights reserved. This information is not intended as a substitute for professional medical care. Always follow your healthcare professional's instructions.

## 2021-07-21 ASSESSMENT — ANXIETY QUESTIONNAIRES: GAD7 TOTAL SCORE: 5

## 2021-07-22 NOTE — PROGRESS NOTES
Progress Notes by Anayeli Dave NP at 5/27/2021  8:20 AM     Author: Anayeli Dave NP Service: -- Author Type: Nurse Practitioner    Filed: 5/27/2021  9:06 AM Encounter Date: 5/27/2021 Status: Signed    : Anayeli Dave NP (Nurse Practitioner)       Vani Corona is a 40 y.o. female who is being evaluated via a billable video visit.      How would you like to obtain your AVS? MyChart.  If dropped from the video visit, the video invitation should be resent by: Text to cell phone: [phone]  Will anyone else be joining your video visit? No      Video Start Time: 0820      Reason for visit      1. Type 1 diabetes mellitus with microalbuminuria (H)        HPI     Vani Corona is a very pleasant 40 y.o. old female who presents for follow up.  SUMMARY:    Vani is contacted today in f/u for DM 1. Her current A1c is 8.9.  She reports that she has gotten her old job back and this will allow her to start using her pump again. Dexcom download shows that she continues to experience high BG. It shows that she has been very high for 61% of the time over the last two weeks. She has lost a lot of control while she has been off of the pump. She notes that she just had an eye exam and all was found well, and she is having no problems referable to her feet. She is taking 24 units of Lantus at HS and Novolog 1:10 + sliding scale 1:50>150. Pt reports that she has been feeling quite fatigued lately. Thyroid levels WNL      Blood glucose data:      Past Medical History     Patient Active Problem List   Diagnosis   ? Restless Legs Syndrome   ? Selective IgA Deficiency   ? Major depressive disorder, recurrent episode, moderate (H)   ? Hypothyroidism, unspecified type   ? Type 1 diabetes mellitus (H)   ? Arthralgias In Multiple Sites   ? Eczema, pustular   ? Low back pain   ? Sciatica of left side   ? Methamphetamine abuse in remission, in remission since treatment in 1/2014   ? Anxiety   ? Depression    ? TMJ  inflammation - bilateral   ? Insomnia    ? Non-rheumatic mitral regurgitation   ? Polyarthralgia   ? GERD (gastroesophageal reflux disease) H2 blocker not effective    ? Inflammatory polyarthritis (H)   ? High risk medication use   ? Right hand pain   ? Tobacco abuse   ? Insulin pump status   ? Family history of psoriasis in mother and sister   ? Parent-child relational problem   ? 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 Cholecystitis   ? Acute cholecystitis   ? Skin mass        Family History       family history includes No Medical Problems in her daughter, sister, son, and other family members; Other in her father; Stroke in her paternal grandmother.    Social History      reports that she quit smoking about 3 years ago. Her smoking use included cigarettes. She smoked 0.50 packs per day. She has never used smokeless tobacco. She reports current alcohol use. She reports that she does not use drugs.      Review of Systems     Patient has no polyuria or polydipsia, no chest pain, dyspnea or TIA's, no numbness, tingling or pain in extremities  Remainder negative except as noted in HPI.    Vital Signs     LMP 04/27/2021   Wt Readings from Last 3 Encounters:   05/13/21 135 lb (61.2 kg)   05/04/21 134 lb (60.8 kg)   04/29/21 134 lb (60.8 kg)       Physical Exam     Constitutional:  Well developed, Well nourished  HENT:  Normocephalic,   Neck: Normal in appearance  Eyes:  PERRL, Conjunctiva pink  Respiratory: No respiratory distress  Skin: No acanthosis nigricans,   Neurologic:  Alert & oriented x 3, nonfocal  Psychiatric:  Affect, Mood, Insight appropriate        Assessment     1. Type 1 diabetes mellitus with microalbuminuria (H)        Plan     The sooner we get Vani back on her pump the better. I think that she will have more energy when her BG are back down where they need to be. F/u with me in 6 months.  Referral placed to  MILLIE Dave   Endocrinology  5/27/2021  8:53 AM        Lab Results     Hemoglobin A1c   Date Value Ref Range Status   05/20/2021 8.9 (H) <=5.6 % Final   02/19/2021 8.8 (H) <=5.6 % Final   11/18/2020 9.1 (H) <=5.6 % Final     Comment:     Normal <5.7% Prediabete 5.7-6.4% Diabletes 6.5% or higher - adopted from ADA consensus guidelines   01/29/2020 7.7 (H) 3.5 - 6.0 % Final   12/18/2019 8.2 (H) 4.2 - 6.1 % Final     Creatinine   Date Value Ref Range Status   05/20/2021 0.78 0.60 - 1.10 mg/dL Final   11/18/2020 0.82 0.60 - 1.10 mg/dL Final   01/29/2020 0.68 0.60 - 1.10 mg/dL Final     Microalbumin, Random Urine   Date Value Ref Range Status   02/19/2021 3.34 (H) 0.00 - 1.99 mg/dL Final       Cholesterol   Date Value Ref Range Status   02/15/2016 174 <=199 mg/dL Final     HDL Cholesterol   Date Value Ref Range Status   02/15/2016 45 (L) >=50 mg/dL Final     LDL Calculated   Date Value Ref Range Status   02/15/2016 105 <=129 mg/dL Final     Triglycerides   Date Value Ref Range Status   02/15/2016 122 <=149 mg/dL Final       Lab Results   Component Value Date    ALT 14 12/19/2019    AST 14 12/19/2019    ALKPHOS 32 (L) 12/19/2019    BILITOT 0.8 12/19/2019         Current Medications     Outpatient Medications Prior to Visit   Medication Sig Dispense Refill   ? ACCU-CHEK FASTCLIX LANCET DRUM USE TO TEST BLOOD SUGAR 4 6 TIMES DAILY     ? albuterol (PROAIR HFA;PROVENTIL HFA;VENTOLIN HFA) 90 mcg/actuation inhaler INHALE 2 PUFFS BY MOUTH EVERY 6 HOURS AS NEEDED FOR WHEEZING 18 g 0   ? aspirin 81 MG EC tablet Take 81 mg by mouth every evening.      ? baclofen (LIORESAL) 10 MG tablet Take 10 mg by mouth 3 (three) times a day as needed.     ? blood glucose meter (GLUCOMETER) Use 1 each As Directed as needed. Dispense glucometer brand per patient's insurance at pharmacy discretion. 1 each 0   ? blood glucose test strips Use 1 each As Directed as needed (4-6 times daily). Dispense brand per patient's insurance at  "pharmacy discretion. 400 strip 1   ? blood-glucose meter,continuous (DEXCOM G6 ) Misc Use 1 each As Directed continuous. 1 each 0   ? blood-glucose sensor (DEXCOM G6 SENSOR) Eveline Apply one sensor to the skin every 10 days. 9 Device 1   ? buPROPion (WELLBUTRIN XL) 150 MG 24 hr tablet TAKE 1 TABLET BY MOUTH EVERY DAY 90 tablet 1   ? cetirizine (ZYRTEC) 10 MG tablet TAKE 1 TABLET(10 MG) BY MOUTH EVERY EVENING 90 tablet 0   ? cholecalciferol, vitamin D3, 1,000 unit tablet Take 1,000 Units by mouth every evening.      ? clobetasol (TEMOVATE) 0.05 % cream Apply 1 application topically 2 (two) times a day as needed. 45 g 1   ? clomiPHENE (CLOMID) 50 mg tablet TAKE 2 TABLETS (100MG) BY ORAL ROUTE ONCE DAILY FOR 5 DAYS. STARTING ON CYCLE DAY 3     ? DEXCOM G6 TRANSMITTER Eveline CHANGE EVERY 3 MONTHS 1 Device 0   ? escitalopram oxalate (LEXAPRO) 10 MG tablet TAKE 1 TABLET BY MOUTH EVERY DAY 90 tablet 1   ? glucagon, human recombinant, (GLUCAGON) 1 mg injection Inject 1 mg into the shoulder, thigh, or buttocks once as needed. 2 each 0   ? HYDROcodone-acetaminophen 5-325 mg per tablet TAKE 1 TAB BY MOUTH EVERY 6 HOURS AS NEEDED     ? infusion set for insulin pump (T:90 INFUSION SET 23\") ISet Use 1 each As Directed daily. To change every 3 days 30 each 3   ? insulin aspart U-100 (NOVOLOG FLEXPEN U-100 INSULIN) 100 unit/mL (3 mL) injection pen Inject subcutaneously CHO ratio 1:10 - 1:15 + sliding scale 1:50>150 up to 40 units daily. 30 mL 1   ? insulin glargine (LANTUS SOLOSTAR PEN) 100 unit/mL (3 mL) pen Inject 24 Units under the skin at bedtime.     ? insulin pump cartridge (T:SLIM X2) Use 1 each As Directed daily. To change every 3 days 30 each 3   ? insulin syringe-needle U-100 (BD INSULIN SYRINGE ULT-FINE II) 0.3 mL 31 gauge x 5/16 Syrg Use as needed with insulin 50 each 5   ? ketoconazole (NIZORAL) 2 % cream Apply topically 2 (two) times a day as needed.     ? lisinopriL (ZESTRIL) 2.5 MG tablet Take 1 tablet (2.5 mg " "total) by mouth daily. 90 tablet 1   ? pantoprazole (PROTONIX) 40 MG tablet TAKE ONE TABLET BY MOUTH EVERY EVENING 90 tablet 3   ? pen needle, diabetic (BD ULTRA-FINE ADALBERTO PEN NEEDLE) 32 gauge x 5/32\" Ndle USE 7 TIMES DAILY 400 each 1   ? subcutaneous insulin pump (T:SLIM X2 INSULIN PUMP) Misc Use 1 each As Directed daily. 1 each 1   ? SUMAtriptan (IMITREX) 25 MG tablet Take 1 tablet (25 mg total) by mouth daily as needed for migraine (may repeat in 2 hours if headache has not resolved). 18 tablet 6   ? SYNTHROID 125 mcg tablet Take 1 tablet (125 mcg total) by mouth daily. Due for lab work 90 tablet 2     No facility-administered medications prior to visit.            Video-Visit Details    Type of service:  Video Visit    Video End Time (time video stopped): 0840  Originating Location (pt. Location): Home    Distant Location (provider location):  [facility]     Platform used for Video Visit: Doximity    Date of last OV: 2/25/21  Reason for Visit: DM    Blood Glucose Log: Dexcom               "

## 2021-08-09 ENCOUNTER — MYC MEDICAL ADVICE (OUTPATIENT)
Dept: INTERNAL MEDICINE | Facility: CLINIC | Age: 41
End: 2021-08-09

## 2021-08-09 DIAGNOSIS — B49 FUNGAL INFECTION: Primary | ICD-10-CM

## 2021-08-10 RX ORDER — KETOCONAZOLE 20 MG/G
CREAM TOPICAL 2 TIMES DAILY
Qty: 30 G | Refills: 0 | Status: SHIPPED | OUTPATIENT
Start: 2021-08-10

## 2021-08-10 RX ORDER — KETOCONAZOLE 20 MG/G
CREAM TOPICAL
COMMUNITY
End: 2021-08-10

## 2021-08-10 NOTE — TELEPHONE ENCOUNTER
Last seen: 6/14/2021- Denia Llamas [facility]  Problem List Items Addressed This Visit           Low back pain - Primary        Start nabumetone and methocarbamol for low back pain.  Consider PT referral if pain is not improving.  We also discussed doing an MRI if symptoms persist beyond 4 to 6 weeks.            Relevant Medications      nabumetone (RELAFEN) 500 MG tablet      methocarbamoL (ROBAXIN) 500 MG tablet                Other Visit Diagnoses      Rhinitis, unspecified type         Relevant Medications     cetirizine (ZYRTEC) 10 MG tablet     Cervical pain (neck)         Relevant Medications     nabumetone (RELAFEN) 500 MG tablet     methocarbamoL (ROBAXIN) 500 MG tablet     Snoring         She does not have sufficient symptoms to have a high suspicion for sleep apnea.             Last filled: Listed as historical  ketoconazole (NIZORAL) 2 % cream     --   Sig - Route: Apply topically 2 (two) times a day as needed. - Topical   Class: Historical Med

## 2021-08-12 NOTE — TELEPHONE ENCOUNTER
Telephone Encounter by Herminia Wilson at 7/9/2021 12:32 PM     Author: Herminia Wilson Service: -- Author Type: Patient Access    Filed: 7/9/2021 12:33 PM Encounter Date: 7/5/2021 Status: Signed    : Herminia Wilson (Patient Access)       [place] APPROVED:    Approval start date: 06/09/2021  Approval end date:  07/09/2022    Pharmacy has been notified of approval and will contact patient when medication is ready for pickup.             No

## 2021-08-19 ENCOUNTER — LAB (OUTPATIENT)
Dept: LAB | Facility: CLINIC | Age: 41
End: 2021-08-19
Payer: COMMERCIAL

## 2021-08-19 DIAGNOSIS — R80.9 TYPE 1 DIABETES MELLITUS WITH MICROALBUMINURIA (H): ICD-10-CM

## 2021-08-19 DIAGNOSIS — R80.9 TYPE 1 DIABETES MELLITUS WITH MICROALBUMINURIA (H): Primary | ICD-10-CM

## 2021-08-19 DIAGNOSIS — E10.65 TYPE 1 DIABETES MELLITUS WITH HYPERGLYCEMIA (H): ICD-10-CM

## 2021-08-19 DIAGNOSIS — E10.29 TYPE 1 DIABETES MELLITUS WITH MICROALBUMINURIA (H): ICD-10-CM

## 2021-08-19 DIAGNOSIS — E10.29 TYPE 1 DIABETES MELLITUS WITH MICROALBUMINURIA (H): Primary | ICD-10-CM

## 2021-08-19 LAB — HBA1C MFR BLD: 8.3 % (ref 0–5.6)

## 2021-08-19 PROCEDURE — 83036 HEMOGLOBIN GLYCOSYLATED A1C: CPT

## 2021-08-19 PROCEDURE — 36415 COLL VENOUS BLD VENIPUNCTURE: CPT

## 2021-08-19 RX ORDER — PROCHLORPERAZINE 25 MG/1
SUPPOSITORY RECTAL
Qty: 1 EACH | Refills: 0 | Status: SHIPPED | OUTPATIENT
Start: 2021-08-19 | End: 2021-09-13

## 2021-08-19 NOTE — PROGRESS NOTES
Vani is a 40 year old who is being evaluated via a billable video visit.      How would you like to obtain your AVS? MyChart  If the video visit is dropped, the invitation should be resent by: Text to cell phone: [phone]  Will anyone else be joining your video visit? No      Video Start Time: 0830    [facility] ENDOCRINOLOGY    Diabetes Note 8/26/2021    Vani Corona, 1980, [id number]          Reason for visit      1. Uncontrolled type 1 diabetes mellitus with hyperglycemia (H)        HPI     Vani Corona is a very pleasant 40 year old old female who presents for follow up.  SUMMARY:    Vani is contacted today in f/u for DM 1.  Her current A1c is 8.3 and improved from her last at 8.9. Her current Dexcom download of the last two weeks reflects a higher projected level of 9. She was only in range 27% of the time, and very high 46% of the time. It appears that she is not covering well for dinner and the rest of the evening. She has done far better when she has had a pump and unfortunately was unable to keep her appointment to get this process restarted. She is taking 24 units of Lantus, and her prandial insulin dose is dependent upon what she is eating.     Blood glucose data:      Past Medical History     Patient Active Problem List   Diagnosis     Uncontrolled type 1 diabetes mellitus (H)     Anxiety     Arthralgia of multiple joints     Depression     GERD (gastroesophageal reflux disease)     History of ileostomy     Hypothyroidism, unspecified type     Insomnia     Methamphetamine abuse in remission (H)     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)     Non-rheumatic mitral regurgitation     Skin mass     Tobacco abuse        Family History       family history includes Cerebrovascular Disease in her paternal grandmother; Diabetes in her paternal aunt and another family member; Hypertension in her  paternal grandmother; No Known Problems in her daughter, sister, son, and other family members; Other - See Comments in her father; Thyroid Disease in her mother.    Social History      reports that she quit smoking about 4 years ago. Her smoking use included cigarettes and cigarettes. She smoked 0.50 packs per day. She has never used smokeless tobacco. She reports current alcohol use. She reports that she does not use drugs.      Review of Systems     Patient has no polyuria or polydipsia, no chest pain, dyspnea or TIA's, no numbness, tingling or pain in extremities  Remainder negative except as noted in HPI.      Vital Signs     There were no vitals taken for this visit.  Wt Readings from Last 3 Encounters:   06/14/21 62.6 kg (138 lb)   05/13/21 61.2 kg (135 lb)   05/04/21 60.8 kg (134 lb)       Physical Exam     Constitutional:  Well developed, Well nourished  HENT:  Normocephalic,   Neck: normal in appearance  Eyes:  PERRL, Conjunctiva pink  Respiratory:  No respiratory distress  Skin: No acanthosis nigricans, lipoatrophy or lipodystrophy  Neurologic:  Alert & oriented x 3, nonfocal  Psychiatric:  Affect, Mood, Insight appropriate        Assessment     1. Uncontrolled type 1 diabetes mellitus with hyperglycemia (H)        Plan       Instructed to add 2 units of Novolog to whatever she decides to take with her dinner. Encouraged to make another appointment for the pump. F/u with me in 3 months.       Anayeli Dave NP  [facility]  8/26/2021  1:30 PM        Lab Results     Microalbumin Urine mg/dL   Date Value Ref Range Status   02/19/2021 3.34 (H) 0.00 - 1.99 mg/dL Final       Cholesterol   Date Value Ref Range Status   02/15/2016 174 <=199 mg/dL Final     Direct Measure HDL   Date Value Ref Range Status   02/15/2016 45 (L) >=50 mg/dL Final     Triglycerides   Date Value Ref Range Status   02/15/2016 122 <=149 mg/dL Final       [unfilled]      Current Medications     Outpatient Medications Prior to Visit    Medication Sig Dispense Refill     cetirizine (ZYRTEC) 10 MG tablet TAKE ONE TABLET BY MOUTH ONCE DAILY 90 tablet 3     Continuous Blood Gluc Transmit (DEXCOM G6 TRANSMITTER) MISC Change every 3 months. 1 each 0     insulin aspart (NovoLOG) injection Inject  Subcutaneous See Admin Instructions.       ketoconazole (NIZORAL) 2 % external cream Apply topically 2 times daily 30 g 0     LANTUS 100 U/ML SC SOLN 9 U @ bed time       levonorgestrel (MIRENA) 20 MCG/24HR IUD 1 each by Intrauterine route once.       SYNTHROID 125 MCG OR TABS        No facility-administered medications prior to visit.             Video-Visit Details    Type of service:  Video Visit    Video End Time:0850    Originating Location (pt. Location): Home    Distant Location (provider location):  [facility] [place]     Platform used for Video Visit: Doximity    Date of last OV: 5/27/21  Reason for Visit: DM      Blood Glucose Log:

## 2021-08-20 DIAGNOSIS — E10.65 TYPE 1 DIABETES MELLITUS WITH HYPERGLYCEMIA (H): ICD-10-CM

## 2021-08-26 ENCOUNTER — VIRTUAL VISIT (OUTPATIENT)
Dept: ENDOCRINOLOGY | Facility: CLINIC | Age: 41
End: 2021-08-26
Payer: COMMERCIAL

## 2021-08-26 DIAGNOSIS — E10.65 UNCONTROLLED TYPE 1 DIABETES MELLITUS WITH HYPERGLYCEMIA (H): Primary | ICD-10-CM

## 2021-08-26 PROBLEM — Z98.890 HISTORY OF ILEOSTOMY: Status: ACTIVE | Noted: 2021-08-26

## 2021-08-26 PROBLEM — R22.9 SKIN MASS: Status: ACTIVE | Noted: 2021-05-04

## 2021-08-26 PROBLEM — K21.9 GERD (GASTROESOPHAGEAL REFLUX DISEASE): Status: ACTIVE | Noted: 2017-01-09

## 2021-08-26 PROBLEM — Z72.0 TOBACCO ABUSE: Status: ACTIVE | Noted: 2021-08-26

## 2021-08-26 PROBLEM — E03.9 HYPOTHYROIDISM, UNSPECIFIED TYPE: Status: ACTIVE | Noted: 2021-08-26

## 2021-08-26 PROCEDURE — 99213 OFFICE O/P EST LOW 20 MIN: CPT | Mod: GT | Performed by: NURSE PRACTITIONER

## 2021-08-26 PROCEDURE — 95251 CONT GLUC MNTR ANALYSIS I&R: CPT | Performed by: NURSE PRACTITIONER

## 2021-08-31 ENCOUNTER — TELEPHONE (OUTPATIENT)
Dept: EDUCATION SERVICES | Facility: CLINIC | Age: 41
End: 2021-08-31

## 2021-08-31 NOTE — TELEPHONE ENCOUNTER
Pt can schedule with next available with CDE or she can call [employer] to get a rep from [employer] to train her.

## 2021-08-31 NOTE — TELEPHONE ENCOUNTER
Date: 9/20/2021 Status: Scheduled   Time: 1:00 PM Length: 120   Visit Type: RETURN [657] Copay: $0.00   Provider: Zakiya Jackson RN

## 2021-09-02 ENCOUNTER — MEDICAL CORRESPONDENCE (OUTPATIENT)
Dept: HEALTH INFORMATION MANAGEMENT | Facility: CLINIC | Age: 41
End: 2021-09-02

## 2021-09-12 DIAGNOSIS — E10.65 TYPE 1 DIABETES MELLITUS WITH HYPERGLYCEMIA (H): ICD-10-CM

## 2021-09-13 RX ORDER — PROCHLORPERAZINE 25 MG/1
SUPPOSITORY RECTAL
Qty: 1 EACH | Refills: 1 | Status: SHIPPED | OUTPATIENT
Start: 2021-09-13 | End: 2021-12-13

## 2021-09-19 ENCOUNTER — HEALTH MAINTENANCE LETTER (OUTPATIENT)
Age: 41
End: 2021-09-19

## 2021-09-20 ENCOUNTER — OFFICE VISIT (OUTPATIENT)
Dept: EDUCATION SERVICES | Facility: CLINIC | Age: 41
End: 2021-09-20
Payer: COMMERCIAL

## 2021-09-20 DIAGNOSIS — E10.9 DIABETES MELLITUS TYPE 1 (H): Primary | ICD-10-CM

## 2021-09-20 PROCEDURE — G0108 DIAB MANAGE TRN  PER INDIV: HCPCS

## 2021-09-20 NOTE — LETTER
"    9/20/2021         RE: Vani Corona  [address] [place] [address]        Dear Colleague,    Thank you for referring your patient, Vani Corona, to the [facility]. Please see a copy of my visit note below.    Diabetes Self-Management Education & Support    Presents for: Insulin Pump Start    SUBJECTIVE/OBJECTIVE:  Presents for: Insulin Pump Start  Accompanied by: Self  Diabetes education in the past 24mo: Yes  Diabetes type: Type 1  Cultural Influences/Ethnic Background:  Not  or       Diabetes Symptoms & Complications:          Patient Problem List and Family Medical History reviewed for relevant medical history, current medical status, and diabetes risk factors.    Vitals:  There were no vitals taken for this visit.  Estimated body mass index is 23.69 kg/m  as calculated from the following:    Height as of 5/13/21: 1.626 m (5' 4\").    Weight as of 6/14/21: 62.6 kg (138 lb).   Last 3 BP:   BP Readings from Last 3 Encounters:   06/14/21 102/58   05/04/21 114/62   04/29/21 120/62       History   Smoking Status     Former Smoker     Packs/day: 0.50     Types: Cigarettes, Cigarettes     Quit date: 7/12/2017   Smokeless Tobacco     Never Used     Comment: No smoked since Jan 2021       Labs:  Lab Results   Component Value Date    A1C 8.3 08/19/2021    A1C 11.3 09/04/2006     Lab Results   Component Value Date     05/20/2021     03/11/2013     Lab Results   Component Value Date     11/18/2020     Direct Measure HDL   Date Value Ref Range Status   02/15/2016 45 (L) >=50 mg/dL Final   ]  GFR Estimate   Date Value Ref Range Status   05/20/2021 >60 >60 mL/min/1.73m2 Final   03/11/2013 >90 >60 mL/min/1.7m2 Final     GFR Estimate If Black   Date Value Ref Range Status   05/20/2021 >60 >60 mL/min/1.73m2 Final   03/11/2013 >90 >60 mL/min/1.7m2 Final     Lab Results   Component Value Date    CR 0.78 05/20/2021    CR 0.65 03/11/2013     No results " found for: MICROALBUMIN    Taking Medications:  Diabetes Medication(s)     Insulin       insulin aspart (NovoLOG) injection    Inject  Subcutaneous See Admin Instructions.     LANTUS 100 U/ML SC SOLN    9 U @ bed time           Patient Activation Measure Survey Score:  No flowsheet data found.    Diabetes knowledge and skills assessment:   Patient is knowledgeable in diabetes management concepts related to: Healthy Eating, Being Active, Monitoring, Taking Medication, Problem Solving and Insulin Pump Concepts Balancing glucose and insulin  Carbohydrate counting  Calculating boluses  Problem solving with insulin pump therapy (BG monitoring; hypoglycemia signs/symptoms, treatment (glucagon) and prevention; hyperglycemia signs/symptoms, treatment and prevention; ketones, DKA signs/symptoms and prevention)  Hands on practice with basic pump button use    Patient needs further education on the following diabetes management concepts: to be determined     Based on learning assessment above, most appropriate setting for further diabetes education would be: Individual setting.      INTERVENTIONS:    Education provided today on:  AADE Self-Care Behaviors:  Healthy Eating: carbohydrate counting  Monitoring: individual blood glucose targets  Taking Medication: action of prescribed medication and proper site selection and rotation for injections  Healthy Coping: utilize support systems    Insulin Pump Training:   T:slim X2 Insulin Delivery System    BG at beginning of appointment: 280 mg/dL  BG at end of appointment: 270 mg/dL  Last basal insulin dose: 24 units  Last bolus insulin dose: correction given during appt at 2 units    Pump overview:  touch screen and general navigation, rechargeable battery    Home screen/ menu:  Status, battery life, CGM icons, units remaining, time/date, IOB (insulin on board),   Options - profiles, settings, suspend/resume insulin, history, temp basal, alerts   Bolus - food and correction  calculator, extended bolus, reverse correction    Personal profile:  Timed settings: basal rate, correction factor, I:C ratio, BG target, edit, duplicate or review  Bolus settings: duration of insulin action, max bolus  Enter carbs into bolus calculator:  Yes     Dexcom CGM:  Entering transmitter ID and sensor code  Urgent Low alert: Fixed at 55 mg/dl  Control IQ feature ON (pump suspends at 70 mg/dl or when predicts 80 mg/dl in 30 min): Yes     Infusion set:  Loading cartridge- minimum and maximum amounts, site selection and prep, tubing and canula fills, change set every 2-3 days    Safety:  troubleshooting, low reservoir, alerts and alarms, importance of back-up plan, hypoglycemia, sick day management, hyperglycemia and DKA prevention    T:Connect: uploading pump: No     Additional topics: 24/7 Customer Care helpline [phone], removal for xray, CT, and MRI, back up plan, when and how to order pump supplies, when to call a healthcare provider.     Education materials provided to patient:  T:Slim Diabetes Care packet , pt had all manuels provided from [employer]     ASSESSMENT:      Insulin pump was programmed according to Pump Start Orders signed by Debbi Dave NP.   Pre-pump TDD: 24 units lantus + avg 15 units novolog = 39 units TDD X 0.75 = 29.25 units  Pump TDD: 29.25 units      Basal rate: 12am-12am: 0.60 units/hr   ICR: 12am-12am: 15   ISF: 12am-12am: 58  Max bolus: 10 units   Max basal: 1.2 units/hr     Pt verbalized understanding of concepts discussed and recommendations provided today. Patient was able to start insulin pump without difficulty. Yes         PLAN  Will call pt on Thursday to get dexcom clarity code to review.   Pt will call sooner w/ any concerns.       Time Spent: 90 minutes  Encounter Type: Individual    Any diabetes medication dose changes were made via the CDE Protocol and Collaborative Practice Agreement with the patient's referring provider. A copy of this encounter was shared with  the provider.

## 2021-09-20 NOTE — PROGRESS NOTES
"Diabetes Self-Management Education & Support    Presents for: Insulin Pump Start    SUBJECTIVE/OBJECTIVE:  Presents for: Insulin Pump Start  Accompanied by: Self  Diabetes education in the past 24mo: Yes  Diabetes type: Type 1  Cultural Influences/Ethnic Background:  Not  or       Diabetes Symptoms & Complications:          Patient Problem List and Family Medical History reviewed for relevant medical history, current medical status, and diabetes risk factors.    Vitals:  There were no vitals taken for this visit.  Estimated body mass index is 23.69 kg/m  as calculated from the following:    Height as of 5/13/21: 1.626 m (5' 4\").    Weight as of 6/14/21: 62.6 kg (138 lb).   Last 3 BP:   BP Readings from Last 3 Encounters:   06/14/21 102/58   05/04/21 114/62   04/29/21 120/62       History   Smoking Status     Former Smoker     Packs/day: 0.50     Types: Cigarettes, Cigarettes     Quit date: 7/12/2017   Smokeless Tobacco     Never Used     Comment: No smoked since Jan 2021       Labs:  Lab Results   Component Value Date    A1C 8.3 08/19/2021    A1C 11.3 09/04/2006     Lab Results   Component Value Date     05/20/2021     03/11/2013     Lab Results   Component Value Date     11/18/2020     Direct Measure HDL   Date Value Ref Range Status   02/15/2016 45 (L) >=50 mg/dL Final   ]  GFR Estimate   Date Value Ref Range Status   05/20/2021 >60 >60 mL/min/1.73m2 Final   03/11/2013 >90 >60 mL/min/1.7m2 Final     GFR Estimate If Black   Date Value Ref Range Status   05/20/2021 >60 >60 mL/min/1.73m2 Final   03/11/2013 >90 >60 mL/min/1.7m2 Final     Lab Results   Component Value Date    CR 0.78 05/20/2021    CR 0.65 03/11/2013     No results found for: MICROALBUMIN    Taking Medications:  Diabetes Medication(s)     Insulin       insulin aspart (NovoLOG) injection    Inject  Subcutaneous See Admin Instructions.     LANTUS 100 U/ML SC SOLN    9 U @ bed time           Patient Activation Measure " Survey Score:  No flowsheet data found.    Diabetes knowledge and skills assessment:   Patient is knowledgeable in diabetes management concepts related to: Healthy Eating, Being Active, Monitoring, Taking Medication, Problem Solving and Insulin Pump Concepts Balancing glucose and insulin  Carbohydrate counting  Calculating boluses  Problem solving with insulin pump therapy (BG monitoring; hypoglycemia signs/symptoms, treatment (glucagon) and prevention; hyperglycemia signs/symptoms, treatment and prevention; ketones, DKA signs/symptoms and prevention)  Hands on practice with basic pump button use    Patient needs further education on the following diabetes management concepts: to be determined     Based on learning assessment above, most appropriate setting for further diabetes education would be: Individual setting.      INTERVENTIONS:    Education provided today on:  AADE Self-Care Behaviors:  Healthy Eating: carbohydrate counting  Monitoring: individual blood glucose targets  Taking Medication: action of prescribed medication and proper site selection and rotation for injections  Healthy Coping: utilize support systems    Insulin Pump Training:   T:slim X2 Insulin Delivery System    BG at beginning of appointment: 280 mg/dL  BG at end of appointment: 270 mg/dL  Last basal insulin dose: 24 units  Last bolus insulin dose: correction given during appt at 2 units    Pump overview:  touch screen and general navigation, rechargeable battery    Home screen/ menu:  Status, battery life, CGM icons, units remaining, time/date, IOB (insulin on board),   Options - profiles, settings, suspend/resume insulin, history, temp basal, alerts   Bolus - food and correction calculator, extended bolus, reverse correction    Personal profile:  Timed settings: basal rate, correction factor, I:C ratio, BG target, edit, duplicate or review  Bolus settings: duration of insulin action, max bolus  Enter carbs into bolus calculator:  Yes      Dexcom CGM:  Entering transmitter ID and sensor code  Urgent Low alert: Fixed at 55 mg/dl  Control IQ feature ON (pump suspends at 70 mg/dl or when predicts 80 mg/dl in 30 min): Yes     Infusion set:  Loading cartridge- minimum and maximum amounts, site selection and prep, tubing and canula fills, change set every 2-3 days    Safety:  troubleshooting, low reservoir, alerts and alarms, importance of back-up plan, hypoglycemia, sick day management, hyperglycemia and DKA prevention    T:Connect: uploading pump: No     Additional topics: 24/7 Customer Care helpline [phone], removal for xray, CT, and MRI, back up plan, when and how to order pump supplies, when to call a healthcare provider.     Education materials provided to patient:  T:Slim Diabetes Care packet , pt had all manuels provided from [employer]     ASSESSMENT:      Insulin pump was programmed according to Pump Start Orders signed by Debbi Dave NP.   Pre-pump TDD: 24 units lantus + avg 15 units novolog = 39 units TDD X 0.75 = 29.25 units  Pump TDD: 29.25 units      Basal rate: 12am-12am: 0.60 units/hr   ICR: 12am-12am: 15   ISF: 12am-12am: 58  Max bolus: 10 units   Max basal: 1.2 units/hr     Pt verbalized understanding of concepts discussed and recommendations provided today. Patient was able to start insulin pump without difficulty. Yes         PLAN  Will call pt on Thursday to get dexcom clarity code to review.   Pt will call sooner w/ any concerns.       Time Spent: 90 minutes  Encounter Type: Individual    Any diabetes medication dose changes were made via the CDE Protocol and Collaborative Practice Agreement with the patient's referring provider. A copy of this encounter was shared with the provider.

## 2021-09-23 ENCOUNTER — ANCILLARY PROCEDURE (OUTPATIENT)
Dept: GENERAL RADIOLOGY | Facility: CLINIC | Age: 41
End: 2021-09-23
Attending: NURSE PRACTITIONER
Payer: COMMERCIAL

## 2021-09-23 ENCOUNTER — OFFICE VISIT (OUTPATIENT)
Dept: FAMILY MEDICINE | Facility: CLINIC | Age: 41
End: 2021-09-23
Payer: COMMERCIAL

## 2021-09-23 VITALS
OXYGEN SATURATION: 99 % | DIASTOLIC BLOOD PRESSURE: 60 MMHG | SYSTOLIC BLOOD PRESSURE: 100 MMHG | HEART RATE: 69 BPM | WEIGHT: 137.8 LBS | BODY MASS INDEX: 23.65 KG/M2

## 2021-09-23 DIAGNOSIS — M54.2 NECK PAIN: Primary | ICD-10-CM

## 2021-09-23 DIAGNOSIS — M54.2 NECK PAIN: ICD-10-CM

## 2021-09-23 PROCEDURE — 99214 OFFICE O/P EST MOD 30 MIN: CPT | Performed by: NURSE PRACTITIONER

## 2021-09-23 PROCEDURE — 72040 X-RAY EXAM NECK SPINE 2-3 VW: CPT | Mod: TC | Performed by: RADIOLOGY

## 2021-09-23 RX ORDER — PANTOPRAZOLE SODIUM 40 MG/1
40 TABLET, DELAYED RELEASE ORAL EVERY MORNING
COMMUNITY
Start: 2021-08-19 | End: 2022-08-05

## 2021-09-23 RX ORDER — INSULIN PUMP CARTRIDGE
CARTRIDGE (EA) SUBCUTANEOUS
COMMUNITY
Start: 2021-09-16 | End: 2022-08-30

## 2021-09-23 RX ORDER — PROCHLORPERAZINE 25 MG/1
SUPPOSITORY RECTAL
COMMUNITY
Start: 2021-09-16 | End: 2021-12-20

## 2021-09-23 RX ORDER — TRAMADOL HYDROCHLORIDE 50 MG/1
50 TABLET ORAL EVERY 6 HOURS PRN
Qty: 20 TABLET | Refills: 0 | Status: SHIPPED | OUTPATIENT
Start: 2021-09-23 | End: 2021-09-26
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
## 2021-09-24 ENCOUNTER — TELEPHONE (OUTPATIENT)
Dept: EDUCATION SERVICES | Facility: CLINIC | Age: 41
End: 2021-09-24

## 2021-09-24 NOTE — TELEPHONE ENCOUNTER
Spoke w/ pt. She started pump on Monday. dexcom readings below. Pt feels things are going well so far. I would agree. She is bolusing before meals and correcting with the control IQ high BG alerts. However, she notes she has had a couple snacks she did not bolus for and she is going to work on that. Site change went well.   We could consider decreasing the ICR, pt would like to give it a little more time. Will call pt again next week to check in. She will call sooner w/ any concerns.

## 2021-09-29 ENCOUNTER — TELEPHONE (OUTPATIENT)
Dept: EDUCATION SERVICES | Facility: CLINIC | Age: 41
End: 2021-09-29

## 2021-09-29 NOTE — TELEPHONE ENCOUNTER
Called pt to check in and get dex share code.   No answer. Asked pt to call back, if not available when she calls back please leave share code.

## 2021-09-30 NOTE — TELEPHONE ENCOUNTER
Patient called. She only wants to work with Zakiya and is aware message will wait until Zakiya is back in the office.  The Code for clarity F is: [id number]

## 2021-10-01 ENCOUNTER — NURSE TRIAGE (OUTPATIENT)
Dept: NURSING | Facility: CLINIC | Age: 41
End: 2021-10-01

## 2021-10-01 ENCOUNTER — OFFICE VISIT (OUTPATIENT)
Dept: FAMILY MEDICINE | Facility: CLINIC | Age: 41
End: 2021-10-01
Payer: COMMERCIAL

## 2021-10-01 VITALS
WEIGHT: 134 LBS | HEART RATE: 74 BPM | DIASTOLIC BLOOD PRESSURE: 70 MMHG | BODY MASS INDEX: 23 KG/M2 | TEMPERATURE: 97 F | SYSTOLIC BLOOD PRESSURE: 100 MMHG

## 2021-10-01 DIAGNOSIS — R11.0 NAUSEA: ICD-10-CM

## 2021-10-01 DIAGNOSIS — E10.65 UNCONTROLLED TYPE 1 DIABETES MELLITUS WITH HYPERGLYCEMIA (H): Primary | ICD-10-CM

## 2021-10-01 DIAGNOSIS — R63.0 LACK OF APPETITE: ICD-10-CM

## 2021-10-01 DIAGNOSIS — R19.7 DIARRHEA, UNSPECIFIED TYPE: ICD-10-CM

## 2021-10-01 LAB
ALBUMIN SERPL-MCNC: 4 G/DL (ref 3.5–5)
ALBUMIN UR-MCNC: 30 MG/DL
ALP SERPL-CCNC: 59 U/L (ref 45–120)
ALT SERPL W P-5'-P-CCNC: 17 U/L (ref 0–45)
ANION GAP SERPL CALCULATED.3IONS-SCNC: 10 MMOL/L (ref 5–18)
APPEARANCE UR: ABNORMAL
AST SERPL W P-5'-P-CCNC: 19 U/L (ref 0–40)
BACTERIA #/AREA URNS HPF: ABNORMAL /HPF
BILIRUB SERPL-MCNC: 0.6 MG/DL (ref 0–1)
BILIRUB UR QL STRIP: NEGATIVE
BUN SERPL-MCNC: 7 MG/DL (ref 8–22)
CALCIUM SERPL-MCNC: 10.2 MG/DL (ref 8.5–10.5)
CHLORIDE BLD-SCNC: 105 MMOL/L (ref 98–107)
CO2 SERPL-SCNC: 27 MMOL/L (ref 22–31)
COLOR UR AUTO: ABNORMAL
CREAT SERPL-MCNC: 0.76 MG/DL (ref 0.6–1.1)
ERYTHROCYTE [DISTWIDTH] IN BLOOD BY AUTOMATED COUNT: 12.3 % (ref 10–15)
GFR SERPL CREATININE-BSD FRML MDRD: >90 ML/MIN/1.73M2
GLUCOSE BLD-MCNC: 122 MG/DL (ref 79–116)
GLUCOSE BLD-MCNC: 131 MG/DL (ref 70–125)
GLUCOSE UR STRIP-MCNC: NEGATIVE MG/DL
HCT VFR BLD AUTO: 42 % (ref 35–47)
HGB BLD-MCNC: 14.2 G/DL (ref 11.7–15.7)
HGB UR QL STRIP: ABNORMAL
KETONES UR STRIP-MCNC: NEGATIVE MG/DL
LEUKOCYTE ESTERASE UR QL STRIP: NEGATIVE
MCH RBC QN AUTO: 31.3 PG (ref 26.5–33)
MCHC RBC AUTO-ENTMCNC: 33.8 G/DL (ref 31.5–36.5)
MCV RBC AUTO: 93 FL (ref 78–100)
NITRATE UR QL: NEGATIVE
PH UR STRIP: 8.5 [PH] (ref 5–8)
PLATELET # BLD AUTO: 363 10E3/UL (ref 150–450)
POTASSIUM BLD-SCNC: 4.3 MMOL/L (ref 3.5–5)
PROT SERPL-MCNC: 7.3 G/DL (ref 6–8)
RBC # BLD AUTO: 4.54 10E6/UL (ref 3.8–5.2)
RBC #/AREA URNS AUTO: ABNORMAL /HPF
SODIUM SERPL-SCNC: 142 MMOL/L (ref 136–145)
SP GR UR STRIP: 1.01 (ref 1–1.03)
SQUAMOUS #/AREA URNS AUTO: ABNORMAL /LPF
UROBILINOGEN UR STRIP-ACNC: 0.2 E.U./DL
WBC # BLD AUTO: 6.9 10E3/UL (ref 4–11)
WBC #/AREA URNS AUTO: ABNORMAL /HPF

## 2021-10-01 PROCEDURE — 85027 COMPLETE CBC AUTOMATED: CPT | Performed by: NURSE PRACTITIONER

## 2021-10-01 PROCEDURE — 82947 ASSAY GLUCOSE BLOOD QUANT: CPT | Performed by: NURSE PRACTITIONER

## 2021-10-01 PROCEDURE — 83690 ASSAY OF LIPASE: CPT | Performed by: NURSE PRACTITIONER

## 2021-10-01 PROCEDURE — 80053 COMPREHEN METABOLIC PANEL: CPT | Performed by: NURSE PRACTITIONER

## 2021-10-01 PROCEDURE — 81001 URINALYSIS AUTO W/SCOPE: CPT | Performed by: NURSE PRACTITIONER

## 2021-10-01 PROCEDURE — 36415 COLL VENOUS BLD VENIPUNCTURE: CPT | Performed by: NURSE PRACTITIONER

## 2021-10-01 PROCEDURE — 99214 OFFICE O/P EST MOD 30 MIN: CPT | Performed by: NURSE PRACTITIONER

## 2021-10-01 RX ORDER — ONDANSETRON 4 MG/1
4 TABLET, FILM COATED ORAL EVERY 8 HOURS PRN
Qty: 30 TABLET | Refills: 0 | Status: SHIPPED | OUTPATIENT
Start: 2021-10-01

## 2021-10-01 RX ORDER — TRAMADOL HYDROCHLORIDE 50 MG/1
TABLET ORAL
COMMUNITY
Start: 2021-09-29 | End: 2021-11-30

## 2021-10-01 NOTE — PROGRESS NOTES
Assessment and Plan:       ICD-10-CM    1. Uncontrolled type 1 diabetes mellitus with hyperglycemia (H)  E10.65 UA Macro with Reflex to Micro and Culture - lab collect     Glucose     CBC with platelets     Comprehensive metabolic panel (BMP + Alb, Alk Phos, ALT, AST, Total. Bili, TP)     Glucose whole blood     Glucose whole blood     CBC with platelets     Comprehensive metabolic panel (BMP + Alb, Alk Phos, ALT, AST, Total. Bili, TP)     UA Macro with Reflex to Micro and Culture - lab collect     Urine Microscopic     CANCELED: Glucose   2. Diarrhea, unspecified type  R19.7 Lipase   3. Nausea  R11.0 ondansetron (ZOFRAN) 4 MG tablet   4. Lack of appetite  R63.0      Hemogram is unremarkable with a white blood count of 6.9.  Glucose was 122.  Urinalysis showed no ketones or glucose.  Patient does not appear to have diabetic ketoacidosis.  Will check CMP.  I encouraged her to continue to monitor her blood sugars.  Patient had diarrhea at the onset of symptoms, but that is improved.  She continues to experience nausea and lack of appetite.  She denies any current abdominal pain or discomfort.  Provided prescription for Zofran to take as needed for nausea.  Educated on indications and side effects.  Differentials include viral illness, diverticulitis, gastritis, gastric ulcer, urinary tract infection, nephrolithiasis, appendicitis.  Less likely ova, parasites or C. difficile as diarrhea has improved.  If fever or abdominal pain develops, will consider CT scan of the abdomen for further evaluation.  Patient symptoms have gradually improved, so discussed possible viral illness.  Discussed the importance of good hydration.  She is content with the plan.      Subjective:     Vani is a 40 year old female presenting to the clinic for concerns for nausea.  Patient states on Monday and Tuesday, she had 5 episodes of diarrhea daily.  Diarrhea was watery without blood or mucus.  She did not have any black, tarry stools.   Wednesday, she had 2 episodes of diarrhea which resolved shortly after.  She had some generalized abdominal cramping when she had the diarrhea.  She continues to experience nausea with eating.  She denies vomiting.  She is eating small amounts throughout the day.  She does drink orange juice when she notices that she has low blood sugars.  She currently denies any abdominal pain or discomfort.  She has not had any acid reflux.  She did have her menstrual period at the beginning of the week.  She has type 1 diabetes which is managed by endocrinology.  Her blood sugars have been under 150 for the last 12 hours, but does admit to her blood sugar as high as 400 last night.  She denies fever or cold symptoms.  She has not had loss of sense of smell or taste.  She denies history of Covid.  She has not received the Covid vaccination.  She has a history of a cholecystectomy in .  She does consume 2-3 twisted tea alcohol beverages per night.  She denies any recent travel or antibiotic use.    [employer]: A complete 14 point review of systems was obtained and is negative or as stated in the history of present illness.    Social History     Socioeconomic History     Marital status: Single     Spouse name: Not on file     Number of children: 1     Years of education: 14     Highest education level: Not on file   Occupational History     Occupation: Pharmacy Tech   Tobacco Use     Smoking status: Former Smoker     Packs/day: 0.50     Types: Cigarettes, Cigarettes     Quit date: 2017     Years since quittin.2     Smokeless tobacco: Never Used     Tobacco comment: No smoked since 2021   Substance and Sexual Activity     Alcohol use: Yes     Comment: Alcoholic Drinks/day: Occ     Drug use: No     Types: Methamphetamines     Comment: Drug use: Off 2 years, 1 month and 19 days as of 3/16/16     Sexual activity: Yes     Partners: Male     Birth control/protection: Pill, Implant   Other Topics Concern       Service Not Asked     Blood Transfusions Not Asked     Caffeine Concern Not Asked     Occupational Exposure Not Asked     Hobby Hazards Not Asked     Sleep Concern Not Asked     Stress Concern Not Asked     Weight Concern Not Asked     Special Diet Not Asked     Back Care Not Asked     Exercise Not Asked     Bike Helmet Not Asked     Seat Belt Not Asked     Self-Exams Not Asked   Social History Narrative     Not on file     Social Determinants of Health     Financial Resource Strain:      Difficulty of Paying Living Expenses:    Food Insecurity:      Worried About Running Out of Food in the Last Year:      Ran Out of Food in the Last Year:    Transportation Needs:      Lack of Transportation (Medical):      Lack of Transportation (Non-Medical):    Physical Activity:      Days of Exercise per Week:      Minutes of Exercise per Session:    Stress:      Feeling of Stress :    Social Connections:      Frequency of Communication with Friends and Family:      Frequency of Social Gatherings with Friends and Family:      Attends [employer] Services:      Active Member of Clubs or Organizations:      Attends Club or Organization Meetings:      Marital Status:    Intimate Partner Violence:      Fear of Current or Ex-Partner:      Emotionally Abused:      Physically Abused:      Sexually Abused:        Active Ambulatory Problems     Diagnosis Date Noted     Uncontrolled type 1 diabetes mellitus (H) 06/15/2004     Anxiety 08/31/2015     Arthralgia of multiple joints 10/18/2016     Depression 08/31/2015     GERD (gastroesophageal reflux disease) 01/09/2017     History of ileostomy 08/26/2021     Hypothyroidism, unspecified type 08/26/2021     Insomnia 02/15/2016     Methamphetamine abuse in remission (H) 04/10/2015     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) 02/13/2019     Non-rheumatic mitral regurgitation 09/22/2016     Skin mass  05/04/2021     Tobacco abuse 08/26/2021     Resolved Ambulatory Problems     Diagnosis Date Noted     DKA (diabetic ketoacidoses) 07/19/2017     Rheumatoid arthritis (H)      Past Medical History:   Diagnosis Date     Asthma      Bronchitis, not specified as acute or chronic      Chest pain      Diabetes (H)      Fainting      Heart disease      IDDM      Mitral valve prolapse      Mitral valve prolapse      Substance abuse (H)      Thyroid disease      Unspecified keratitis        Family History   Problem Relation Age of Onset     Diabetes Paternal Aunt      Diabetes Other         maternal cousin and paternal cousin     Hypertension Paternal Grandmother      Cerebrovascular Disease Paternal Grandmother      Thyroid Disease Mother      Other - See Comments Father         Father abuses multiple drugs.     No Known Problems Sister      No Known Problems Daughter      No Known Problems Son      No Known Problems Other      No Known Problems Other        Objective:     /70   Pulse 74   Temp 97  F (36.1  C)   Wt 60.8 kg (134 lb)   BMI 23.00 kg/m      Patient is alert, in no obvious distress.   Skin: Warm, dry.  No lesions or rashes.  Skin turgor rapid return.   HEENT:  Head normocephalic, atraumatic.  Eyes normal.  Ears normal.  Nose patent, mucosa pink.  Oropharynx mucosa pink.  No lesions or tonsillar enlargement.   Neck: Supple, no lymphadenopathy.   Lungs:  Clear to auscultation. Respirations even and unlabored.  No wheezing or rales noted.   Heart:  Regular rate and rhythm.  No murmurs, S3, S4, gallops, or rubs.    Abdomen: Soft, slight tenderness noted periumbilical and left upper abdomen.  No organomegaly. Bowel sounds normoactive. No guarding or masses noted.

## 2021-10-01 NOTE — TELEPHONE ENCOUNTER
Patient transferred from , as she hasn't been able to eat for one week.  Patient has diabetes type 1 with insulin pump. She states her glucose have been within range.  Denies vomiting and fever.  She currently has period and denies pregnancy.  Her last food intake was 1/2 piece of toast yesterday.  She has drinking a lot of fluids and is urinating normally.  Disposition is to be seen within 3 days. Writer encourages patient to take ASAP appointment due to her diabetes.  Patient verbalized understanding and agrees with plan.     Marjorie Velasco RN  [facility] Nurse Advisor  9:49 AM 10/1/2021    Reason for Disposition    Nausea lasts > 1 week    Additional Information    Negative: Shock suspected (e.g., cold/pale/clammy skin, too weak to stand, low BP, rapid pulse)    Negative: Sounds like a life-threatening emergency to the triager    Negative: Nausea or vomiting and pregnancy < 20 weeks    Negative: Menstrual Period - Missed or Late (i.e., pregnancy suspected)    Negative: Heat exhaustion suspected (i.e., dehydration from heat exposure)    Negative: Anxiety or stress suspected (i.e., nausea with anxiety attacks or stressful situations)    Negative: Traumatic Brain Injury (TBI) suspected    Negative: Vomiting occurs    Negative: Other symptom is present, see that guideline.  (e.g., chest pain, headache, dizziness, abdominal pain, colds, sore throat, etc.).    Negative: Unable to walk, or can only walk with assistance (e.g., requires support)    Negative: Difficulty breathing    Negative: Insulin-dependent diabetes (Type I) and glucose > 400 mg/dL (22 mmol/L)    Negative: Drinking very little and dehydration suspected (e.g., no urine > 12 hours, very dry mouth, very lightheaded)    Negative: Patient sounds very sick or weak to the triager    Negative: Fever > 104 F (40 C)    Negative: Fever > 101 F (38.3 C) and age > 60    Negative: Fever > 100.0 F (37.8 C) and bedridden (e.g., nursing home patient, CVA,  chronic illness, recovering from surgery)    Negative: Fever > 100.0 F (37.8 C) and diabetes mellitus or weak immune system (e.g., HIV positive, cancer chemo, splenectomy, chronic steroids)    Negative: Taking any of the following medications: digoxin (Lanoxin), lithium, theophylline, phenytoin (Dilantin)    Negative: Yellowish color of the skin or white of the eye (i.e., jaundice)    Negative: Fever present > 3 days (72 hours)    Negative: Patient wants to be seen    Negative: Receiving cancer chemotherapy medication    Negative: Taking prescription medication that could cause nausea (e.g., narcotics/opiates, antibiotics, OCPs, many others)    Protocols used: NAUSEA-A-OH  COVID 19 Nurse Triage Plan/Patient Instructions    Please be aware that novel coronavirus (COVID-19) may be circulating in the community. If you develop symptoms such as fever, cough, or SOB or if you have concerns about the presence of another infection including coronavirus (COVID-19), please contact your health care provider or visit [email or web address].     Disposition/Instructions    In-Person Visit with provider recommended. Reference Visit Selection Guide.    Thank you for taking steps to prevent the spread of this virus.  o Limit your contact with others.  o Wear a simple mask to cover your cough.  o Wash your hands well and often.    Resources    [facility]: About COVID-19: [email or web address]    CDC: What to Do If You're Sick: www.cdc.gov/coronavirus/2019-ncov/about/steps-when-sick.html    CDC: Ending Home Isolation: www.cdc.gov/coronavirus/2019-ncov/hcp/disposition-in-home-patients.html     CDC: Caring for Someone: www.cdc.gov/coronavirus/2019-ncov/if-you-are-sick/care-for-someone.html     [facility]: Interim Guidance for Hospital Discharge to Home: www.health.[facility].[place].us/diseases/coronavirus/hcp/hospdischarge.pdf    [facility] clinical trials (COVID-19 research studies):  [email or web address].[email or web address]-clinical-trials     Below are the COVID-19 hotlines at the [place] Department of [facility] ([facility]). Interpreters are available.   o For health questions: Call 953-737-5067 or 1-717.690.2079 (7 a.m. to 7 p.m.)  o For questions about schools and childcare: Call 346-068-6227 or 1-337.529.4741 (7 a.m. to 7 p.m.)

## 2021-10-04 ENCOUNTER — OFFICE VISIT (OUTPATIENT)
Dept: PHYSICAL MEDICINE AND REHAB | Facility: CLINIC | Age: 41
End: 2021-10-04
Attending: NURSE PRACTITIONER
Payer: COMMERCIAL

## 2021-10-04 DIAGNOSIS — M47.812 ARTHROPATHY OF CERVICAL FACET JOINT: ICD-10-CM

## 2021-10-04 DIAGNOSIS — M51.369 DDD (DEGENERATIVE DISC DISEASE), LUMBAR: ICD-10-CM

## 2021-10-04 DIAGNOSIS — M47.816 LUMBAR FACET ARTHROPATHY: ICD-10-CM

## 2021-10-04 DIAGNOSIS — M79.18 MYOFASCIAL PAIN ON LEFT SIDE: ICD-10-CM

## 2021-10-04 DIAGNOSIS — M48.061 LUMBAR FORAMINAL STENOSIS: ICD-10-CM

## 2021-10-04 DIAGNOSIS — M50.30 DDD (DEGENERATIVE DISC DISEASE), CERVICAL: ICD-10-CM

## 2021-10-04 DIAGNOSIS — M54.2 NECK PAIN: Primary | ICD-10-CM

## 2021-10-04 LAB — LIPASE SERPL-CCNC: 32 U/L (ref 73–393)

## 2021-10-04 PROCEDURE — 99215 OFFICE O/P EST HI 40 MIN: CPT | Performed by: NURSE PRACTITIONER

## 2021-10-04 RX ORDER — CYCLOBENZAPRINE HCL 5 MG
5-10 TABLET ORAL 3 TIMES DAILY PRN
Qty: 30 TABLET | Refills: 3 | Status: SHIPPED | OUTPATIENT
Start: 2021-10-04 | End: 2022-05-05

## 2021-10-04 NOTE — PROGRESS NOTES
ASSESSMENT: Vani Corona is a 40 year old female who presents for consultation at the request of PCP Denia Llamas, with a past medical history significant for uncontrolled Type 1 Diabetes Mellitus with diabetic retinopathy, hypothyroidism, GERD, mitral valve regurgitation, history ileostomy, depression, tobacco abuse, insomnia, history of methamphetamine abuse who presents today for new patient evaluation of:    -Ongoing left-sided neck pain mid cervical spine rating to the trapezius region consistent with facet mediated pain.  X-ray with mild to moderate degenerative disc changes with facet arthropathy at C5-6.    Patient is neurologically intact on exam. No myelopathic or red flag symptoms.     OSWESTRY DISABILITY INDEX 10/4/2021   Count 10   Sum 13   Oswestry Score (%) 26   Some recent data might be hidden            Diagnoses and all orders for this visit:  Neck pain  -     Spine Referral  -     Physical Therapy Referral; Future  -     cyclobenzaprine (FLEXERIL) 5 MG tablet; Take 1-2 tablets (5-10 mg) by mouth 3 times daily as needed for muscle spasms  DDD (degenerative disc disease), cervical  -     Physical Therapy Referral; Future  Myofascial pain on left side  -     Physical Therapy Referral; Future  -     cyclobenzaprine (FLEXERIL) 5 MG tablet; Take 1-2 tablets (5-10 mg) by mouth 3 times daily as needed for muscle spasms  DDD (degenerative disc disease), lumbar  Lumbar facet arthropathy  Lumbar foraminal stenosis  Arthropathy of cervical facet joint  -     Physical Therapy Referral; Future    PLAN:  Reviewed spine anatomy and disease process. Discussed diagnosis and treatment options with the patient today. A shared decision making model was used.  The patient's values and choices were respected. The following represents what was discussed and decided upon by the provider and the patient.      -DIAGNOSTIC TESTS:  Images were personally reviewed and interpreted and explained to patient today using spine  model.   --Consider cervical spine MRI if symptoms are not improving with physical therapy or worsening at any time.  --Cervical spine x-ray 9/23/2021 with mild disc space height loss at C5-6 with osteophyte.  Slight rotation and lateral view and straightening of cervical lordosis.  --Lumbar spine MRI 2015 with L5-S1 annular tear.  Moderate to severe facet arthropathy L4-5 and L5-S1 with mild bilateral foraminal stenosis.      -PHYSICAL THERAPY: Referral to physical therapy placed to wilder Nielsen.  Discussed the importance of core strengthening, ROM, stretching exercises with the patient and how each of these entities is important in decreasing pain.  Explained to the patient that the purpose of physical therapy is to teach the patient a home exercise program.  These exercises need to be performed every day in order to decrease pain and prevent future occurrences of pain.        -MEDICATIONS: Prescribed Flexeril/cyclobenzaprine as needed for myofascial pain.  Patient currently not tolerating NSAIDs.  Discussed multiple medication options today with patient. Discussed risks, side effects, and proper use of medications. Patient verbalized understanding.    -INTERVENTIONS:  No recommendations for spine injections at this time, could consider down the road if symptoms or not improving however would need MRI imaging prior.  Discussed risks and benefits of injections with patient today.    -PATIENT EDUCATION:  Total time of 42 minutes spent with the patient, reviewing the chart, placing orders, and documenting.   -Today we also discussed the issues related to the current COVID-19 pandemic, the pros and cons of the current treatment plan, the CDC guidelines such as social distancing, washing hands, masking, and covering the cough.    -FOLLOW-UP:   Follow-up in 4 weeks if symptoms or not improving with physical therapy, sooner if symptoms are worsening or changing.    Advised patient to call the [facility] if  symptoms worsen or you have problems controlling bladder and bowel function.   ______________________________________________________________________    SUBJECTIVE:  HPI:  Vani Corona  Is a 40 year old female who presents today for new patient evaluation of ongoing left cervical spine pain since July 2021 that is constant increased pain with turning her head to the left.  Currently her pain is a 7/10, 10 at its worst, 4 at its best.  Patient denies any radiation of pain into her upper extremities.  Denies constant numbness or tingling sensations.  Denies upper extremity weakness.  Currently denies back pain.  Denies current balance problems.    Patient reports she did have a fall after she was drinking last weekend and sprained her left ankle currently is in a walking boot and seeing orthopedics later this week.    -Treatment to Date: No prior spinal surgery  No recent physical therapy for neck pain.  Chiropractic treatment x1 month neck pain    Left L5-S1 TFESI Dr. Sipple 2015    -Medications:  Aleve/ibuprofen with GI upset  Patient reports methocarbamol recently with minimal benefit    Tramadol 50 mg reported 9/29/2021, not currently taking    Current Outpatient Medications   Medication     cyclobenzaprine (FLEXERIL) 5 MG tablet     cetirizine (ZYRTEC) 10 MG tablet     Continuous Blood Gluc Sensor (DEXCOM G6 SENSOR) MISC     Continuous Blood Gluc Transmit (DEXCOM G6 TRANSMITTER) MISC     escitalopram (LEXAPRO) 10 MG tablet     insulin aspart (NovoLOG) injection     Insulin Infusion Pump Supplies (T:SLIM X2 3ML CARTRIDGE) MISC     ketoconazole (NIZORAL) 2 % external cream     lisinopril (ZESTRIL) 2.5 MG tablet     ondansetron (ZOFRAN) 4 MG tablet     pantoprazole (PROTONIX) 40 MG EC tablet     SYNTHROID 125 MCG OR TABS     traMADol (ULTRAM) 50 MG tablet     No current facility-administered medications for this visit.       Allergies   Allergen Reactions     Augmentin Nausea and Vomiting and Headache      "Varenicline Other (See Comments)     \"I took the Chantix and that made me a (severe) crazy person.\"  Emotional disturbance       Venlafaxine Nausea     Went away when venlafaxine stopped.  Did not rechallenge. 15     No Known Allergies      Fluconazole Rash       Past Medical History:   Diagnosis Date     Anxiety      Asthma      Bronchitis, not specified as acute or chronic      Chest pain      Depression      Diabetes (H)      Fainting      Heart disease     MVP     IDDM     dx      Methamphetamine abuse in remission (H) 4/10/2015    Treatment 2014. Clean since.      Mitral valve prolapse      Mitral valve prolapse      Rheumatoid arthritis (H)      Rheumatoid arthritis (H)      Substance abuse (H)     methamphetamine     Thyroid disease      Unspecified keratitis         Patient Active Problem List   Diagnosis     Uncontrolled type 1 diabetes mellitus (H)     Anxiety     Arthralgia of multiple joints     Depression     GERD (gastroesophageal reflux disease)     History of ileostomy     Hypothyroidism, unspecified type     Insomnia     Methamphetamine abuse in remission (H)     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)     Non-rheumatic mitral regurgitation     Skin mass     Tobacco abuse       Past Surgical History:   Procedure Laterality Date     BIOPSY CERVICAL, LOCAL EXCISION, SINGLE/MULTIPLE       C  DELIVERY ONLY      Description:  Section;  Recorded: 2010;  Comments: 09 - breech      SECTION       EXCISE LESION UPPER EXTREMITY Left 2021    Procedure: EXCISION, LESION, UPPER EXTREMITY;  Surgeon: Babs Leiva MD;  Location: [facility];  Service: General     HC REPAIR UMBILICAL RUTH ANN,<4Y/O,REDUC      Description: Umbilical Hernia Repair;  Recorded: 10/03/2014;     HC REVISE MEDIAN N/CARPAL TUNNEL SURG      Description: Neuroplasty Decompression Median Nerve At " Carpal Tunnel;  Recorded: 10/03/2014;     HERNIA REPAIR  11/2010     HERNIA REPAIR       LAPAROSCOPIC CHOLECYSTECTOMY N/A 12/19/2019    Procedure: CHOLECYSTECTOMY, LAPAROSCOPIC;  Surgeon: Rinku Doran MD;  Location: [facility];  Service: General     LEAP and Coloposcopy   2002     OTHER SURGICAL HISTORY  2002    colposcopyLEAP       Family History   Problem Relation Age of Onset     Diabetes Paternal Aunt      Diabetes Other         maternal cousin and paternal cousin     Hypertension Paternal Grandmother      Cerebrovascular Disease Paternal Grandmother      Thyroid Disease Mother      Other - See Comments Father         Father abuses multiple drugs.     No Known Problems Sister      No Known Problems Daughter      No Known Problems Son      No Known Problems Other      No Known Problems Other        Reviewed past medical, surgical, and family history with patient found on new patient intake packet located in EMR Media tab.     SOCIAL HX: Patient is single works as a  from home.  Patient denies current smoking/tobacco use.  Does report alcohol use, denies history being a heavy drinker, denies current recreational drug use.    ROS: Positive for joint pain, muscle pain, excessive tiredness, anxiety/depression.  Specifically negative for bowel/bladder dysfunction, balance changes, headache, dizziness, foot drop, fevers, chills, appetite changes, nausea/vomiting, unexplained weight loss. Otherwise 13 systems reviewed are negative. Please see the patient's intake questionnaire from today for details.    OBJECTIVE:  There were no vitals taken for this visit.    PHYSICAL EXAMINATION:    --CONSTITUTIONAL:  Vital signs as above.  No acute distress.  The patient is well nourished and well groomed.  --PSYCHIATRIC:  Appropriate mood and affect. The patient is awake, alert, oriented to person, place, time and answering questions appropriately with clear speech.    --SKIN:  Skin over the face,  bilateral lower extremities, and posterior torso is clean, dry, intact without rashes.    --RESPIRATORY: Normal rhythm and effort. No abnormal accessory muscle breathing patterns noted.     CERVICAL:   --HEENT:  Sclera are non-injected.  Extraocular muscles are intact.  Thyroid moves easily upon swallowing.  Moist oral mucosa.  --SKIN:  Skin over the face, bilateral upper extremities, and posterior torso is clean, dry, intact without rashes.  --UPPER EXTREMITY MOTOR TESTING:  Wrist flexion left 5/5, right 5/5  Wrist extension left 5/5, right 5/5  Pronators left 5/5, right 5/5  Biceps left 5/5, right 5/5   Triceps left 5/5, right 5/5   Shoulder abduction left 5/5, right 5/5   left 5/5, right 5/5  --NEUROLOGIC:  CN III-XII are grossly intact.  Bilateral patellar and achilles reflexes are physiologic and symmetric. 2/4 symmetric biceps, brachioradialis, triceps reflexes bilaterally.  Sensation to upper extremities is intact.  Negative Soliz's bilaterally.    --VASCULAR:  2/4 radial pulses bilaterally.  Warm upper limbs bilaterally.  Capillary refill in the upper extremities is less than 1 second. No obvious lower extremity swelling or varicosities.   --CERVICAL SPINE: Inspection reveals no evidence of deformity. Range of motion is not limited in cervical flexion, extension, or lateral rotation. No tenderness to palpation. Spurlings maneuver is negative to radicular pain.    --SHOULDERS: Full range of motion bilaterally. Negative empty can.    RESULTS: Prior medical records from [facility] Everywhere were reviewed today.    Imaging: Lumbar spine Imaging was personally reviewed and interpreted today. The images were shown to the patient and the findings were explained using a spine model.      XR Cervical Spine 2/3 Views  Result Date: 9/23/2021  EXAM: XR CERVICAL SPINE 2/3 VWS LOCATION: [facility] DATE/TIME: 9/23/2021 2:29 PM INDICATION: Neck pain for 3 months status post  injury. COMPARISON: None. TECHNIQUE: CR Cervical Spine.   IMPRESSION: The odontoid is obscured on the odontoid view. The patient is slightly rotated on the lateral view. Straightening of usual cervical lordosis with otherwise normal alignment. Normal vertebral body heights. Mild disc space narrowing at C5-C6 with mild marginal osteophyte. The posterior elements are unremarkable. The prevertebral soft tissues and visualized lung apices are unremarkable.

## 2021-10-04 NOTE — TELEPHONE ENCOUNTER
Spoke w/ pt. dexcom uploaded above.   Discussed lows last night. Pt states she was treating w/ OJ. Discussed trying to get some food w/ lows as well so it does not shoot up then crash down again 1 hour later. Also discussed trying to be accurate w/ cho counting and bolusing for all meals/snacks to help accuracy of control IQ. Pt verbalized understanding.     Discussed significant overall improvement from the last time we spoke 9/24.   Will call pt again in about 2 weeks to see how things are going. Pt will call sooner w/ any concerns.     Pt also mentions she sprained her ankle on Friday and went in this morning for neck pain.

## 2021-10-04 NOTE — PATIENT INSTRUCTIONS
~Please call our [facility] Nurse Navigation line [phone] with any questions or concerns about your treatment plan, if symptoms worsen and you would like to be seen urgently, or if you have problems controlling bladder and bowel function.      ~You have been referred for Physical Therapy to [facility]. They will call you to schedule an appointment.      Scheduling phone number is [phone] for [facility], or [place] location.  If you have not heard from the scheduling office within 2 business days, please call [phone] for ALL other locations.    Discussed the importance of core strengthening, ROM, stretching exercises and how each of these entities is important in decreasing pain and improving long term spine health.  The purpose of physical therapy is to teach you an individualized home exercise program.  These exercises need to be performed every day in order to decrease pain and prevent future occurrences of pain.          A muscle relaxer Flexeril/cyclobenzaprine was prescribed today to take as needed only for muscle tightness and pain.  Pleaseuse with caution with driving due to possible sedation.

## 2021-10-12 ENCOUNTER — TRANSFERRED RECORDS (OUTPATIENT)
Dept: HEALTH INFORMATION MANAGEMENT | Facility: CLINIC | Age: 41
End: 2021-10-12
Payer: COMMERCIAL

## 2021-11-05 ENCOUNTER — DOCUMENTATION ONLY (OUTPATIENT)
Dept: LAB | Facility: CLINIC | Age: 41
End: 2021-11-05

## 2021-11-05 DIAGNOSIS — R80.9 TYPE 1 DIABETES MELLITUS WITH MICROALBUMINURIA (H): Primary | ICD-10-CM

## 2021-11-05 DIAGNOSIS — E10.29 TYPE 1 DIABETES MELLITUS WITH MICROALBUMINURIA (H): Primary | ICD-10-CM

## 2021-11-05 NOTE — PROGRESS NOTES
Patient has an upcoming appointment for Debbi's labs.  No orders are in the chart.    Please place orders or advise pt.    Thanks

## 2021-11-22 ENCOUNTER — LAB (OUTPATIENT)
Dept: LAB | Facility: CLINIC | Age: 41
End: 2021-11-22
Payer: COMMERCIAL

## 2021-11-22 DIAGNOSIS — R80.9 TYPE 1 DIABETES MELLITUS WITH MICROALBUMINURIA (H): ICD-10-CM

## 2021-11-22 DIAGNOSIS — E10.29 TYPE 1 DIABETES MELLITUS WITH MICROALBUMINURIA (H): ICD-10-CM

## 2021-11-22 LAB — HBA1C MFR BLD: 6.5 % (ref 0–5.6)

## 2021-11-22 PROCEDURE — 36415 COLL VENOUS BLD VENIPUNCTURE: CPT

## 2021-11-22 PROCEDURE — 83036 HEMOGLOBIN GLYCOSYLATED A1C: CPT

## 2021-11-24 NOTE — PROGRESS NOTES
Vani is a 41 year old who is being evaluated via a billable video visit.      How would you like to obtain your AVS? MyChart  If the video visit is dropped, the invitation should be resent by: Text to cell phone: [phone]  Will anyone else be joining your video visit? No      Video Start Time: 0830    [facility] END[facility]    Diabetes Note 12/3/2021    Vani Corona, 1980, [id number]          Reason for visit      1. Type 1 diabetes mellitus without complication (H)        HPI     Vani Corona is a very pleasant 41 year old old female who presents for follow up.  SUMMARY:    Vani is contacted today via Video Visit in f/u for DM 1. Her current A1c is 6.5 and much improved from her last at 8.3. She has returned to using an insulin pump and is using the Tandem system. Her Dexcom download shows that she was within range 73% of the time over the last two weeks. The only elevations that occurred were post dinner, and she did come back down into range fairly quickly. She reports that she recently hurt her back and that it is keeping her fairly sedentary.     Blood glucose data:      Past Medical History     Patient Active Problem List   Diagnosis     Uncontrolled type 1 diabetes mellitus (H)     Anxiety     Arthralgia of multiple joints     Depression     GERD (gastroesophageal reflux disease)     History of ileostomy     Hypothyroidism, unspecified type     Insomnia     Methamphetamine abuse in remission (H)     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)     Non-rheumatic mitral regurgitation     Skin mass     Tobacco abuse        Family History       family history includes Cerebrovascular Disease in her paternal grandmother; Diabetes in her paternal aunt and another family member; Hypertension in her paternal grandmother; No Known Problems in her daughter, sister, son, and other family members; Other - See  Comments in her father; Thyroid Disease in her mother.    Social History      reports that she quit smoking about 4 years ago. Her smoking use included cigarettes. She smoked 0.50 packs per day. She has never used smokeless tobacco. She reports current alcohol use. She reports that she does not use drugs.      Review of Systems     Patient has no polyuria or polydipsia, no chest pain, dyspnea or TIA's, no numbness, tingling or pain in extremities  Remainder negative except as noted in HPI.      Vital Signs     There were no vitals taken for this visit.  Wt Readings from Last 3 Encounters:   11/30/21 59 kg (130 lb)   11/30/21 59 kg (130 lb)   10/01/21 60.8 kg (134 lb)       Physical Exam     Constitutional:  Well developed, Well nourished  HENT:  Normocephalic,   Neck: normal in appearance  Eyes:  PERRL, Conjunctiva pink  Respiratory:  No respiratory distress  Skin: No acanthosis nigricans, lipoatrophy or lipodystrophy  Neurologic:  Alert & oriented x 3, nonfocal  Psychiatric:  Affect, Mood, Insight appropriate          Assessment     1. Type 1 diabetes mellitus without complication (H)        Plan     Vani's management has improved significantly with the Tandem system. She reports that this is the lowest A1c that she can ever remember having. No changes recommended today. F/u with me in 6 months.       Anayeli Dave NP  [facility]  12/3/2021  8:40 AM        Lab Results     Microalbumin Urine mg/dL   Date Value Ref Range Status   02/19/2021 3.34 (H) 0.00 - 1.99 mg/dL Final       Cholesterol   Date Value Ref Range Status   02/15/2016 174 <=199 mg/dL Final     Direct Measure HDL   Date Value Ref Range Status   02/15/2016 45 (L) >=50 mg/dL Final     Triglycerides   Date Value Ref Range Status   02/15/2016 122 <=149 mg/dL Final             Current Medications     Outpatient Medications Prior to Visit   Medication Sig Dispense Refill     albuterol (VENTOLIN HFA) 108 (90 Base) MCG/ACT inhaler        chlorhexidine  (PERIDEX) 0.12 % solution SWISH AND SPIT 15 ML BY MOUTH TWICE DAILY       ACCU-CHEK GUIDE test strip USE TO TEST 6 TIMES PER DAY       cetirizine (ZYRTEC) 10 MG tablet TAKE ONE TABLET BY MOUTH ONCE DAILY 90 tablet 3     Continuous Blood Gluc Sensor (DEXCOM G6 SENSOR) MISC        Continuous Blood Gluc Transmit (DEXCOM G6 TRANSMITTER) MISC CHANGE EVERY 3 MONTHS. 1 each 1     cyclobenzaprine (FLEXERIL) 5 MG tablet Take 1-2 tablets (5-10 mg) by mouth 3 times daily as needed for muscle spasms 30 tablet 3     diazepam (VALIUM) 5 MG tablet Valium 5 mg p.o., take 1 tablet prior to MRI as instructed by radiology.  Must have . 1 tablet 0     escitalopram (LEXAPRO) 10 MG tablet Take 1 tablet (10 mg) by mouth daily 90 tablet 1     HYDROcodone-acetaminophen (NORCO) 5-325 MG tablet Take 1 tablet by mouth every 8 hours as needed for severe pain 21 tablet 0     insulin aspart (NovoLOG) injection Inject  Subcutaneous See Admin Instructions.       Insulin Infusion Pump (T: SLIM X2 INS /CONTROL 7.4) JESSICA        Insulin Infusion Pump Supplies (T:SLIM X2 3ML CARTRIDGE) MISC        ketoconazole (NIZORAL) 2 % external cream Apply topically 2 times daily 30 g 0     LANTUS SOLOSTAR 100 UNIT/ML soln        lisinopril (ZESTRIL) 2.5 MG tablet TAKE 1 TABLET (2.5 MG TOTAL) BY MOUTH DAILY. 90 tablet 1     meloxicam (MOBIC) 7.5 MG tablet Take 1 tablet (7.5 mg) by mouth 2 times daily as needed for pain Take with food 30 tablet 3     ondansetron (ZOFRAN) 4 MG tablet Take 1 tablet (4 mg) by mouth every 8 hours as needed for nausea 30 tablet 0     pantoprazole (PROTONIX) 40 MG EC tablet Take 40 mg by mouth every morning       SYNTHROID 125 MCG OR TABS        No facility-administered medications prior to visit.           Video-Visit Details    Type of service:  Video Visit    Video End Time: 0850    Originating Location (pt. Location): Home    Distant Location (provider location):  [facility]     Platform used  for Video Visit: Jose Luis    Date of last OV: 8/26/21  Reason for Visit: DM      Blood Glucose Log: Dexcom

## 2021-11-29 ENCOUNTER — TELEPHONE (OUTPATIENT)
Dept: PHYSICAL MEDICINE AND REHAB | Facility: CLINIC | Age: 41
End: 2021-11-29
Payer: COMMERCIAL

## 2021-11-29 NOTE — TELEPHONE ENCOUNTER
"PSP:  Simin Foster CNP  Last clinic visit:  10/4/2021  Reason for call: Back pain  Clinical information:  Call received from pt. Pt last seen on 10/4/2021 for her neck pain. She reports she is now experiencing low back pain.   Pt states she was doing remodeling at her house over the weekend and while she was sanding the stairs she notices her back pain kept getting worse and worse. She states she cannot stand without pain and it is painful to sit.   She notes when she coughs or sneezes she gets a shooting pain that \"feels like it's all over\". Denies numbness/tingling.   Pt has tried heat/ice, Aleve, ibuprofen, Tylenol, Flexeril 5 mg tablets and lidocaine patches with no relief.   Advice given to patient: Provider has no openings until next week. Informed pt that provider would be updated with her status and she will be called with recommendations. Pharmacy is [facility] in [place].   Provider to address: Please advise    "

## 2021-11-29 NOTE — TELEPHONE ENCOUNTER
Simin had cancellation for tomorrow 11/30. Call placed to this pt and offered her appointment slot. Appointment made.

## 2021-11-29 NOTE — PROGRESS NOTES
Assessment:     Diagnoses and all orders for this visit:  Acute bilateral low back pain without sciatica  -     MR Lumbar Spine w/o Contrast; Future  -     diazepam (VALIUM) 5 MG tablet; Valium 5 mg p.o., take 1 tablet prior to MRI as instructed by radiology.  Must have .  -     HYDROcodone-acetaminophen (NORCO) 5-325 MG tablet; Take 1 tablet by mouth every 8 hours as needed for severe pain  -     meloxicam (MOBIC) 7.5 MG tablet; Take 1 tablet (7.5 mg) by mouth 2 times daily as needed for pain Take with food  Lumbar foraminal stenosis  -     MR Lumbar Spine w/o Contrast; Future  DDD (degenerative disc disease), lumbar  -     MR Lumbar Spine w/o Contrast; Future  Lumbar facet arthropathy  -     MR Lumbar Spine w/o Contrast; Future  Chronic bilateral low back pain without sciatica  -     MR Lumbar Spine w/o Contrast; Future  -     meloxicam (MOBIC) 7.5 MG tablet; Take 1 tablet (7.5 mg) by mouth 2 times daily as needed for pain Take with food  Claustrophobia  -     MR Lumbar Spine w/o Contrast; Future  -     diazepam (VALIUM) 5 MG tablet; Valium 5 mg p.o., take 1 tablet prior to MRI as instructed by radiology.  Must have .  -     HYDROcodone-acetaminophen (NORCO) 5-325 MG tablet; Take 1 tablet by mouth every 8 hours as needed for severe pain  -     meloxicam (MOBIC) 7.5 MG tablet; Take 1 tablet (7.5 mg) by mouth 2 times daily as needed for pain Take with food     Vani Corona is a 41 year old y.o. female with past medical history significant for uncontrolled Type 1 Diabetes Mellitus with diabetic retinopathy, hypothyroidism, GERD, mitral valve regurgitation, history ileostomy, depression, tobacco abuse, insomnia, history of methamphetamine abuse who presents today for follow-up regarding:    -Chronic bilateral low back pain with severe acute flareup onset 11/26/2021 with house remodel activity.     Plan:     A shared decision making plan was used. The patient's values and choices were respected.  Prior medical records were reviewed today. The following represents what was discussed and decided upon by the provider and the patient.        -DIAGNOSTIC TESTS: Images were personally reviewed and interpreted.   --Ordered updated lumbar spine MRI due to acute flareup of back pain.  --Cervical spine x-ray 9/23/2021 with mild disc space height loss at C5-6 with osteophyte.  Slight rotation and lateral view and straightening of cervical lordosis.  --Lumbar spine MRI 2015 with L5-S1 annular tear.  Moderate to severe facet arthropathy L4-5 and L5-S1 with mild bilateral foraminal stenosis.      -INTERVENTIONS: Consider bilateral GARTH pending imaging review, potentially L5-S1.    -MEDICATIONS:   -Prescribe meloxicam 7.5 mg 1 tablet twice daily for pain and inflammation.  Advised patient to avoid OTC NSAIDs while taking this medication take it with full glass water and food.  -Also prescribed hydrocodone 5/325 mg 1 tablet every 8 hours as needed for severe breakthrough pain number 21 tablets given for 7 days worth.  Patient is aware that this medication is not meant for chronic pain medication management and should be taken for severe breakthrough pain only.  She should not drive while taking this medication.  Advised patient that she should not take hydrocodone and Valium together as it could cause respiratory depression and death.  Valium 5 mg prescribed to take prior to MRI for claustrophobia.  [place]  checked. Discussed the risks (eg, addiction, overdose, worsening pain) verses benefit of opioid use with patient today. Explained that this medication will not be a long term solution to ongoing pain. Discussed using lowest effective dose and the importance of other measures for pain management including PT, other non-opioid medications, behavioral treatments, and other procedure options.   Discussed side effects of medications and proper use. Patient verbalized understanding.    -PHYSICAL THERAPY: Previous referral to  physical therapy 10/4/2021 for left neck pain, nothing was scheduled at that time as she reports her symptoms were improving.  We could consider physical therapy for lumbar spine pain pending imaging review.  Discussed the importance of core strengthening, ROM, stretching exercises with the patient and how each of these entities is important in decreasing pain.  Explained to the patient that the purpose of physical therapy is to teach the patient a home exercise program.  These exercises need to be performed every day in order to decrease pain and prevent future occurrences of pain.        -PATIENT EDUCATION:  Total time of 32 minutes, on the day of service, spent with the patient, reviewing the chart, placing orders, and documenting.   -Today we also discussed the issues related to the current COVID-19 pandemic, the pros and cons of the current treatment plan, the CDC guidelines such as social distancing, washing the hands, and covering the cough.    -FOLLOW UP: Follow-up for imaging review, okay to leave my chart message  Advised to contact clinic if symptoms worsen or change.    Subjective:     Vani Corona is a 41 year old female who presents today for follow-up regarding acute flareup of bilateral low back pain the lower lumbar spine onset 11/26/2021 after doing remodel work at her home with her .  She does report that her pain is severe and debilitating at this point specifically with transition from sit to stand as well as any type of generalized activity bending or twisting.  She reports that currently her pain is a 10/10.  She does have occasional moments of severe pain with putting pressure on her legs and had a fall this morning because of the severe pain as well as once in the ER earlier today.  She did go to the ER because of her severe pain and because of her fall earlier today.  She was given IV Valium and Toradol with short-term relief for about an hour she reports but then her pain has  returned.  Patient otherwise denies any radiating lower extremity pain, denies lower extremity numbness or tingling sensations.  Denies bowel or bladder loss control, denies balance problems, denies saddle anesthesia.    History of chronic low back pain that was actually doing quite well up until recent flareup.      Recent left neck pain had resolved prior to onset of low back pain.    -Treatment to Date: No prior spinal surgery  No recent physical therapy  Chiropractic treatment x1 month neck pain     Left L5-S1 TFESI Dr. Sipple 2015     -Medications:  Aleve/ibuprofen with GI upset  Patient reports methocarbamol recently with minimal benefit     Tramadol 50 mg reported 9/29/2021, not currently taking as she reports this as ineffective    Patient Active Problem List   Diagnosis     Uncontrolled type 1 diabetes mellitus (H)     Anxiety     Arthralgia of multiple joints     Depression     GERD (gastroesophageal reflux disease)     History of ileostomy     Hypothyroidism, unspecified type     Insomnia     Methamphetamine abuse in remission (H)     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)     Non-rheumatic mitral regurgitation     Skin mass     Tobacco abuse       Current Outpatient Medications   Medication     diazepam (VALIUM) 5 MG tablet     HYDROcodone-acetaminophen (NORCO) 5-325 MG tablet     meloxicam (MOBIC) 7.5 MG tablet     cetirizine (ZYRTEC) 10 MG tablet     Continuous Blood Gluc Sensor (DEXCOM G6 SENSOR) MISC     Continuous Blood Gluc Transmit (DEXCOM G6 TRANSMITTER) MISC     cyclobenzaprine (FLEXERIL) 5 MG tablet     escitalopram (LEXAPRO) 10 MG tablet     insulin aspart (NovoLOG) injection     Insulin Infusion Pump Supplies (T:SLIM X2 3ML CARTRIDGE) MISC     ketoconazole (NIZORAL) 2 % external cream     lisinopril (ZESTRIL) 2.5 MG tablet     ondansetron (ZOFRAN) 4 MG tablet     pantoprazole (PROTONIX) 40 MG EC tablet      "SYNTHROID 125 MCG OR TABS     No current facility-administered medications for this visit.       Allergies   Allergen Reactions     Augmentin Nausea and Vomiting and Headache     Varenicline Other (See Comments)     \"I took the Chantix and that made me a (severe) crazy person.\"  Emotional disturbance       Venlafaxine Nausea     Went away when venlafaxine stopped.  Did not rechallenge. 1/18/16     No Known Allergies      Fluconazole Rash       Past Medical History:   Diagnosis Date     Anxiety      Asthma      Bronchitis, not specified as acute or chronic      Chest pain      Depression      Diabetes (H)      Fainting      Heart disease     MVP     IDDM     dx 4/04     Methamphetamine abuse in remission (H) 4/10/2015    Treatment 1/2014. Clean since.      Mitral valve prolapse      Mitral valve prolapse      Rheumatoid arthritis (H)      Rheumatoid arthritis (H)      Substance abuse (H)     methamphetamine     Thyroid disease      Unspecified keratitis         Review of Systems  ROS:  Specifically negative for bowel/bladder dysfunction, balance changes, headache, dizziness, foot drop, fevers, chills, appetite changes, nausea/vomiting, unexplained weight loss. Otherwise 13 systems reviewed are negative. Please see the patient's intake questionnaire from today for details.    Reviewed Social, Family, Past Medical and Past Surgical history with patient, no significant changes noted since prior visit.     Objective:     /68 (BP Location: Right arm, Patient Position: Right side)   Ht 5' 4\" (1.626 m)   Wt 130 lb (59 kg)   BMI 22.31 kg/m      PHYSICAL EXAMINATION:    --CONSTITUTIONAL: Well developed, well nourished, healthy appearing individual.  --PSYCHIATRIC: Appropriate mood and affect. No difficulty interacting due to temper, social withdrawal, or memory issues.  --SKIN: Lumbar region is dry and intact.   --RESPIRATORY: Normal rhythm and effort. No abnormal accessory muscle breathing patterns noted. "   --MUSCULOSKELETAL:  Normal lumbar lordosis noted, no lateral shift.  --GROSS MOTOR: Easily arises from a seated position. Gait is non-antalgic  --LUMBAR SPINE:  Inspection reveals no evidence of deformity. Range of motion is limited in all movements: Lumbar flexion, extension, lateral rotation. No tenderness to palpation lumbar spine. Straight leg raising in the seated position is negative to radicular pain, positive to back pain right greater than left. Sciatic notch non-tender.   --SACROILIAC JOINT: One Finger point test negative.  --LOWER EXTREMITY MOTOR TESTING:  Plantar flexion left 5/5, right 5/5   Dorsiflexion left 5/5, right 5/5   Great toe MTP extension left 5/5, right 5/5  Knee flexion left 5/5, right 5/5  Knee extension left 5/5, right 5/5   Hip flexion left 5/5, right 5/5  Hip abduction left 5/5, right 5/5  Hip adduction left 5/5, right 5/5   --HIPS: Full range of motion bilaterally.    --NEUROLOGIC: Bilateral patellar and achilles reflexes are physiologic and symmetric. Sensation to light touch is intact in the bilateral L4, L5, and S1 dermatomes.    RESULTS:   Imaging: spine imaging was reviewed today. The images were shown to the patient and the findings were explained using a spine model.      XR Cervical Spine 2/3 Views  Result Date: 9/23/2021  EXAM: XR CERVICAL SPINE 2/3 VWS LOCATION: [facility] DATE/TIME: 9/23/2021 2:29 PM INDICATION: Neck pain for 3 months status post injury. COMPARISON: None. TECHNIQUE: CR Cervical Spine.   IMPRESSION: The odontoid is obscured on the odontoid view. The patient is slightly rotated on the lateral view. Straightening of usual cervical lordosis with otherwise normal alignment. Normal vertebral body heights. Mild disc space narrowing at C5-C6 with mild marginal osteophyte. The posterior elements are unremarkable. The prevertebral soft tissues and visualized lung apices are unremarkable.

## 2021-11-29 NOTE — TELEPHONE ENCOUNTER
"Per Simin Foster CNP, \"We can keep her on the cancellation list to see if there is further openings for the rest of the week.     I am not feeling 100% yet, therefore trying not to pack my schedule full.   Otherwise would recommend trialing a muscle relaxant she had previously for her neck pain, we can send a refill on this medication if she needs.  Did refer her to physical therapy for her neck pain 10/4/2021 but it does not look like anything was scheduled.  If she would like I could put in a new order for physical therapy again for her neck but also adding on her low back for her current new symptoms which is recommended.\"    Call placed to pt. Pt states she has tried Methocarbamol, Tizadine and Cyclobenzaprine in the past without relief. She states her neck is not bothersome to her anymore just her back. She states she is likely going to go to the ED because she is in so much pain.     Explained Simin would be updated and if she has any further recommendations she will be called back.       "

## 2021-11-30 ENCOUNTER — OFFICE VISIT (OUTPATIENT)
Dept: PHYSICAL MEDICINE AND REHAB | Facility: CLINIC | Age: 41
End: 2021-11-30
Payer: COMMERCIAL

## 2021-11-30 ENCOUNTER — HOSPITAL ENCOUNTER (EMERGENCY)
Facility: HOSPITAL | Age: 41
Discharge: HOME | End: 2021-11-30
Attending: EMERGENCY MEDICINE | Admitting: EMERGENCY MEDICINE
Payer: COMMERCIAL

## 2021-11-30 VITALS
WEIGHT: 130 LBS | HEART RATE: 81 BPM | TEMPERATURE: 97.7 F | OXYGEN SATURATION: 98 % | SYSTOLIC BLOOD PRESSURE: 86 MMHG | RESPIRATION RATE: 18 BRPM | HEIGHT: 64 IN | DIASTOLIC BLOOD PRESSURE: 73 MMHG | BODY MASS INDEX: 22.2 KG/M2

## 2021-11-30 VITALS
DIASTOLIC BLOOD PRESSURE: 68 MMHG | WEIGHT: 130 LBS | BODY MASS INDEX: 22.2 KG/M2 | HEIGHT: 64 IN | SYSTOLIC BLOOD PRESSURE: 122 MMHG

## 2021-11-30 DIAGNOSIS — F40.240 CLAUSTROPHOBIA: ICD-10-CM

## 2021-11-30 DIAGNOSIS — M47.816 LUMBAR FACET ARTHROPATHY: ICD-10-CM

## 2021-11-30 DIAGNOSIS — M48.061 LUMBAR FORAMINAL STENOSIS: ICD-10-CM

## 2021-11-30 DIAGNOSIS — M54.50 ACUTE BILATERAL LOW BACK PAIN WITHOUT SCIATICA: ICD-10-CM

## 2021-11-30 DIAGNOSIS — M54.50 ACUTE BILATERAL LOW BACK PAIN WITHOUT SCIATICA: Primary | ICD-10-CM

## 2021-11-30 DIAGNOSIS — M54.50 CHRONIC BILATERAL LOW BACK PAIN WITHOUT SCIATICA: ICD-10-CM

## 2021-11-30 DIAGNOSIS — M51.369 DDD (DEGENERATIVE DISC DISEASE), LUMBAR: ICD-10-CM

## 2021-11-30 DIAGNOSIS — G89.29 CHRONIC BILATERAL LOW BACK PAIN WITHOUT SCIATICA: ICD-10-CM

## 2021-11-30 PROCEDURE — 99284 EMERGENCY DEPT VISIT MOD MDM: CPT | Mod: 25

## 2021-11-30 PROCEDURE — 96374 THER/PROPH/DIAG INJ IV PUSH: CPT

## 2021-11-30 PROCEDURE — 250N000011 HC RX IP 250 OP 636: Performed by: EMERGENCY MEDICINE

## 2021-11-30 PROCEDURE — 96375 TX/PRO/DX INJ NEW DRUG ADDON: CPT

## 2021-11-30 PROCEDURE — 99214 OFFICE O/P EST MOD 30 MIN: CPT | Performed by: NURSE PRACTITIONER

## 2021-11-30 RX ORDER — MELOXICAM 7.5 MG/1
7.5 TABLET ORAL 2 TIMES DAILY PRN
Qty: 30 TABLET | Refills: 3 | Status: SHIPPED | OUTPATIENT
Start: 2021-11-30 | End: 2021-12-22 | Stop reason: SINTOL

## 2021-11-30 RX ORDER — DIAZEPAM 5 MG
TABLET ORAL
Qty: 1 TABLET | Refills: 0 | Status: SHIPPED | OUTPATIENT
Start: 2021-11-30 | End: 2021-12-06

## 2021-11-30 RX ORDER — HYDROCODONE BITARTRATE AND ACETAMINOPHEN 5; 325 MG/1; MG/1
1 TABLET ORAL EVERY 8 HOURS PRN
Qty: 21 TABLET | Refills: 0 | Status: SHIPPED | OUTPATIENT
Start: 2021-11-30 | End: 2021-12-06

## 2021-11-30 RX ORDER — DIAZEPAM 10 MG/2ML
5 INJECTION, SOLUTION INTRAMUSCULAR; INTRAVENOUS ONCE
Status: COMPLETED | OUTPATIENT
Start: 2021-11-30 | End: 2021-11-30

## 2021-11-30 RX ORDER — KETOROLAC TROMETHAMINE 30 MG/ML
15 INJECTION, SOLUTION INTRAMUSCULAR; INTRAVENOUS ONCE
Status: COMPLETED | OUTPATIENT
Start: 2021-11-30 | End: 2021-11-30

## 2021-11-30 RX ORDER — KETOROLAC TROMETHAMINE 15 MG/ML
15 INJECTION, SOLUTION INTRAMUSCULAR; INTRAVENOUS ONCE
Status: DISCONTINUED | OUTPATIENT
Start: 2021-11-30 | End: 2021-11-30 | Stop reason: CLARIF

## 2021-11-30 RX ADMIN — KETOROLAC TROMETHAMINE 15 MG: 30 INJECTION, SOLUTION INTRAMUSCULAR at 09:38

## 2021-11-30 RX ADMIN — DIAZEPAM 5 MG: 5 INJECTION, SOLUTION INTRAMUSCULAR; INTRAVENOUS at 09:34

## 2021-11-30 ASSESSMENT — MIFFLIN-ST. JEOR
SCORE: 1234.68
SCORE: 1234.68

## 2021-11-30 ASSESSMENT — PAIN SCALES - GENERAL: PAINLEVEL: WORST PAIN (10)

## 2021-11-30 NOTE — ED NOTES
Assessment-Patient arrived via ems for extreme back pain. She states she has been doing some remodeling in her house and believes the back pain is from that. Pain is in the lower back and is worse with movement. Gave Toradol and Valium which have improved pain no rating 7/10. Patient does have appointment with Spine today for pain.

## 2021-11-30 NOTE — LETTER
11/30/2021         RE: Vani Corona  [address] [place] [address]        Dear Colleague,    Thank you for referring your patient, Vani Corona, to the [facility] [place]. Please see a copy of my visit note below.      Assessment:     Diagnoses and all orders for this visit:  Acute bilateral low back pain without sciatica  -     MR Lumbar Spine w/o Contrast; Future  -     diazepam (VALIUM) 5 MG tablet; Valium 5 mg p.o., take 1 tablet prior to MRI as instructed by radiology.  Must have .  -     HYDROcodone-acetaminophen (NORCO) 5-325 MG tablet; Take 1 tablet by mouth every 8 hours as needed for severe pain  -     meloxicam (MOBIC) 7.5 MG tablet; Take 1 tablet (7.5 mg) by mouth 2 times daily as needed for pain Take with food  Lumbar foraminal stenosis  -     MR Lumbar Spine w/o Contrast; Future  DDD (degenerative disc disease), lumbar  -     MR Lumbar Spine w/o Contrast; Future  Lumbar facet arthropathy  -     MR Lumbar Spine w/o Contrast; Future  Chronic bilateral low back pain without sciatica  -     MR Lumbar Spine w/o Contrast; Future  -     meloxicam (MOBIC) 7.5 MG tablet; Take 1 tablet (7.5 mg) by mouth 2 times daily as needed for pain Take with food  Claustrophobia  -     MR Lumbar Spine w/o Contrast; Future  -     diazepam (VALIUM) 5 MG tablet; Valium 5 mg p.o., take 1 tablet prior to MRI as instructed by radiology.  Must have .  -     HYDROcodone-acetaminophen (NORCO) 5-325 MG tablet; Take 1 tablet by mouth every 8 hours as needed for severe pain  -     meloxicam (MOBIC) 7.5 MG tablet; Take 1 tablet (7.5 mg) by mouth 2 times daily as needed for pain Take with food     Vani Corona is a 41 year old y.o. female with past medical history significant for uncontrolled Type 1 Diabetes Mellitus with diabetic retinopathy, hypothyroidism, GERD, mitral valve regurgitation, history ileostomy, depression, tobacco abuse, insomnia, history of methamphetamine abuse who  presents today for follow-up regarding:    -Chronic bilateral low back pain with severe acute flareup onset 11/26/2021 with house remodel activity.     Plan:     A shared decision making plan was used. The patient's values and choices were respected. Prior medical records were reviewed today. The following represents what was discussed and decided upon by the provider and the patient.        -DIAGNOSTIC TESTS: Images were personally reviewed and interpreted.   --Ordered updated lumbar spine MRI due to acute flareup of back pain.  --Cervical spine x-ray 9/23/2021 with mild disc space height loss at C5-6 with osteophyte.  Slight rotation and lateral view and straightening of cervical lordosis.  --Lumbar spine MRI 2015 with L5-S1 annular tear.  Moderate to severe facet arthropathy L4-5 and L5-S1 with mild bilateral foraminal stenosis.      -INTERVENTIONS: Consider bilateral GARTH pending imaging review, potentially L5-S1.    -MEDICATIONS:   -Prescribe meloxicam 7.5 mg 1 tablet twice daily for pain and inflammation.  Advised patient to avoid OTC NSAIDs while taking this medication take it with full glass water and food.  -Also prescribed hydrocodone 5/325 mg 1 tablet every 8 hours as needed for severe breakthrough pain number 21 tablets given for 7 days worth.  Patient is aware that this medication is not meant for chronic pain medication management and should be taken for severe breakthrough pain only.  She should not drive while taking this medication.  Advised patient that she should not take hydrocodone and Valium together as it could cause respiratory depression and death.  Valium 5 mg prescribed to take prior to MRI for claustrophobia.  [place]  checked. Discussed the risks (eg, addiction, overdose, worsening pain) verses benefit of opioid use with patient today. Explained that this medication will not be a long term solution to ongoing pain. Discussed using lowest effective dose and the importance of other measures  for pain management including PT, other non-opioid medications, behavioral treatments, and other procedure options.   Discussed side effects of medications and proper use. Patient verbalized understanding.    -PHYSICAL THERAPY: Previous referral to physical therapy 10/4/2021 for left neck pain, nothing was scheduled at that time as she reports her symptoms were improving.  We could consider physical therapy for lumbar spine pain pending imaging review.  Discussed the importance of core strengthening, ROM, stretching exercises with the patient and how each of these entities is important in decreasing pain.  Explained to the patient that the purpose of physical therapy is to teach the patient a home exercise program.  These exercises need to be performed every day in order to decrease pain and prevent future occurrences of pain.        -PATIENT EDUCATION:  Total time of 32 minutes, on the day of service, spent with the patient, reviewing the chart, placing orders, and documenting.   -Today we also discussed the issues related to the current COVID-19 pandemic, the pros and cons of the current treatment plan, the CDC guidelines such as social distancing, washing the hands, and covering the cough.    -FOLLOW UP: Follow-up for imaging review, okay to leave my chart message  Advised to contact clinic if symptoms worsen or change.    Subjective:     Vani Corona is a 41 year old female who presents today for follow-up regarding acute flareup of bilateral low back pain the lower lumbar spine onset 11/26/2021 after doing remodel work at her home with her .  She does report that her pain is severe and debilitating at this point specifically with transition from sit to stand as well as any type of generalized activity bending or twisting.  She reports that currently her pain is a 10/10.  She does have occasional moments of severe pain with putting pressure on her legs and had a fall this morning because of the  severe pain as well as once in the ER earlier today.  She did go to the ER because of her severe pain and because of her fall earlier today.  She was given IV Valium and Toradol with short-term relief for about an hour she reports but then her pain has returned.  Patient otherwise denies any radiating lower extremity pain, denies lower extremity numbness or tingling sensations.  Denies bowel or bladder loss control, denies balance problems, denies saddle anesthesia.    History of chronic low back pain that was actually doing quite well up until recent flareup.      Recent left neck pain had resolved prior to onset of low back pain.    -Treatment to Date: No prior spinal surgery  No recent physical therapy  Chiropractic treatment x1 month neck pain     Left L5-S1 TFESI Dr. Sipple 2015     -Medications:  Aleve/ibuprofen with GI upset  Patient reports methocarbamol recently with minimal benefit     Tramadol 50 mg reported 9/29/2021, not currently taking as she reports this as ineffective    Patient Active Problem List   Diagnosis     Uncontrolled type 1 diabetes mellitus (H)     Anxiety     Arthralgia of multiple joints     Depression     GERD (gastroesophageal reflux disease)     History of ileostomy     Hypothyroidism, unspecified type     Insomnia     Methamphetamine abuse in remission (H)     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)     Non-rheumatic mitral regurgitation     Skin mass     Tobacco abuse       Current Outpatient Medications   Medication     diazepam (VALIUM) 5 MG tablet     HYDROcodone-acetaminophen (NORCO) 5-325 MG tablet     meloxicam (MOBIC) 7.5 MG tablet     cetirizine (ZYRTEC) 10 MG tablet     Continuous Blood Gluc Sensor (DEXCOM G6 SENSOR) MISC     Continuous Blood Gluc Transmit (DEXCOM G6 TRANSMITTER) MISC     cyclobenzaprine (FLEXERIL) 5 MG tablet     escitalopram (LEXAPRO) 10 MG tablet     insulin aspart  "(NovoLOG) injection     Insulin Infusion Pump Supplies (T:SLIM X2 3ML CARTRIDGE) MISC     ketoconazole (NIZORAL) 2 % external cream     lisinopril (ZESTRIL) 2.5 MG tablet     ondansetron (ZOFRAN) 4 MG tablet     pantoprazole (PROTONIX) 40 MG EC tablet     SYNTHROID 125 MCG OR TABS     No current facility-administered medications for this visit.       Allergies   Allergen Reactions     Augmentin Nausea and Vomiting and Headache     Varenicline Other (See Comments)     \"I took the Chantix and that made me a (severe) crazy person.\"  Emotional disturbance       Venlafaxine Nausea     Went away when venlafaxine stopped.  Did not rechallenge. 1/18/16     No Known Allergies      Fluconazole Rash       Past Medical History:   Diagnosis Date     Anxiety      Asthma      Bronchitis, not specified as acute or chronic      Chest pain      Depression      Diabetes (H)      Fainting      Heart disease     MVP     IDDM     dx 4/04     Methamphetamine abuse in remission (H) 4/10/2015    Treatment 1/2014. Clean since.      Mitral valve prolapse      Mitral valve prolapse      Rheumatoid arthritis (H)      Rheumatoid arthritis (H)      Substance abuse (H)     methamphetamine     Thyroid disease      Unspecified keratitis         Review of Systems  ROS:  Specifically negative for bowel/bladder dysfunction, balance changes, headache, dizziness, foot drop, fevers, chills, appetite changes, nausea/vomiting, unexplained weight loss. Otherwise 13 systems reviewed are negative. Please see the patient's intake questionnaire from today for details.    Reviewed Social, Family, Past Medical and Past Surgical history with patient, no significant changes noted since prior visit.     Objective:     /68 (BP Location: Right arm, Patient Position: Right side)   Ht 5' 4\" (1.626 m)   Wt 130 lb (59 kg)   BMI 22.31 kg/m      PHYSICAL EXAMINATION:    --CONSTITUTIONAL: Well developed, well nourished, healthy appearing individual.  --PSYCHIATRIC: " Appropriate mood and affect. No difficulty interacting due to temper, social withdrawal, or memory issues.  --SKIN: Lumbar region is dry and intact.   --RESPIRATORY: Normal rhythm and effort. No abnormal accessory muscle breathing patterns noted.   --MUSCULOSKELETAL:  Normal lumbar lordosis noted, no lateral shift.  --GROSS MOTOR: Easily arises from a seated position. Gait is non-antalgic  --LUMBAR SPINE:  Inspection reveals no evidence of deformity. Range of motion is limited in all movements: Lumbar flexion, extension, lateral rotation. No tenderness to palpation lumbar spine. Straight leg raising in the seated position is negative to radicular pain, positive to back pain right greater than left. Sciatic notch non-tender.   --SACROILIAC JOINT: One Finger point test negative.  --LOWER EXTREMITY MOTOR TESTING:  Plantar flexion left 5/5, right 5/5   Dorsiflexion left 5/5, right 5/5   Great toe MTP extension left 5/5, right 5/5  Knee flexion left 5/5, right 5/5  Knee extension left 5/5, right 5/5   Hip flexion left 5/5, right 5/5  Hip abduction left 5/5, right 5/5  Hip adduction left 5/5, right 5/5   --HIPS: Full range of motion bilaterally.    --NEUROLOGIC: Bilateral patellar and achilles reflexes are physiologic and symmetric. Sensation to light touch is intact in the bilateral L4, L5, and S1 dermatomes.    RESULTS:   Imaging: spine imaging was reviewed today. The images were shown to the patient and the findings were explained using a spine model.      XR Cervical Spine 2/3 Views  Result Date: 9/23/2021  EXAM: XR CERVICAL SPINE 2/3 VWS LOCATION: [facility] DATE/TIME: 9/23/2021 2:29 PM INDICATION: Neck pain for 3 months status post injury. COMPARISON: None. TECHNIQUE: CR Cervical Spine.   IMPRESSION: The odontoid is obscured on the odontoid view. The patient is slightly rotated on the lateral view. Straightening of usual cervical lordosis with otherwise normal alignment. Normal vertebral body  heights. Mild disc space narrowing at C5-C6 with mild marginal osteophyte. The posterior elements are unremarkable. The prevertebral soft tissues and visualized lung apices are unremarkable.                           Again, thank you for allowing me to participate in the care of your patient.        Sincerely,        Simin Foster, CNP

## 2021-11-30 NOTE — PATIENT INSTRUCTIONS
~[facility] scheduling #[phone].  ~Please call our [facility] Nurse Navigation line [phone] with any questions or concerns about your treatment plan, if symptoms worsen and you would like to be seen urgently, or if you have problems controlling bladder and bowel function.      Imaging has been ordered. Radiology will call you to schedule. Please call below if you do not hear from them in the next couple of days.   [facility] Radiology Scheduling  Please call [phone] to schedule your image(s) (select option #1). There are 2 different locations, see below.     [facility]  [address] [phone]    [place] [id number] [id number]      Prescribed meloxicam today. Please take as prescribed, as needed for pain control as well as to aid in decreasing inflammation. Take medication with a full glass of water and food.   *Do not take Advil, Ibuprofen, Aleve, or Naproxen while taking this medication as it can cause organ failure if taken together*  This medication does have risks if taken long term, these risks include: gastrointestinal irritation, kidney dysfunction, and cardiovascular effects.      You have been prescribed a controlled substance medication hydrocodone/Vicodin today for pain control.   This medication must be taken as prescribed only as it can be very dangerous to your health if taken more than prescribed.  We discussed the risks (eg, addiction, overdose, worsening pain, death) verses the potential benefit of opioid medication use. Explained that this medication will not be a long term solution to ongoing pain. Discussed using lowest effective dose and the importance of other measures for pain management including physical therapy, other non-opioid medications, behavioral treatments, acupuncture and massage, and other procedure options.     For any further refills on opioid pain medication you must come  in to the clinic in person to discuss further in which you may or may not receive refills.

## 2021-11-30 NOTE — ED PROVIDER NOTES
EMERGENCY DEPARTMENT ENCOUNTER      NAME: Vani CASH Blanca  AGE: 41 year old female  YOB: 1980  MRN: [id number]  EVALUATION DATE & TIME: 11/30/2021  9:06 AM    PCP: Denia Llamas    ED PROVIDER: Mihai Canas M.D.      Chief Complaint   Patient presents with     Back Pain         FINAL IMPRESSION:  1. Acute bilateral low back pain without sciatica          ED COURSE & MEDICAL DECISION MAKING:    Pertinent Labs & Imaging studies reviewed. (See chart for details)  41 year old female presents to the Emergency Department for evaluation of low back pain. Patient appears non toxic with stable vitals signs, patient is afebrile with no tachycardia or hypoxia, no increased work of breathing. Overall exam is benign.  Lungs are clear, abdomen is benign, patient is a GCS of 15 with no focal neuro deficits, she denies any bowel or bladder incontinence, saddle anesthesia, recent spinal instrumentation, fevers or other red flags.  She denies any falls or trauma and I cannot reproduce any significant tenderness with palpation along the midline spine, she demonstrates discomfort with movement, history and exam very suggestive of musculoskeletal low back pain likely paraspinal lumbar muscles.  Clinically, given history and exam noted, nothing to suggest spinal cord compromise, cauda equina syndrome, discitis, osteomyelitis, epidural bleed or abscess, vertebral body fracture or dislocation, or other more malicious etiology of symptoms.  With benign exam and well appearance and no reported falls or trauma certainly no indication for emergent imaging studies here today, again normal neurologic exam.  Patient states that she has an appointment with the spine care center later on this afternoon.  She has taken Tylenol and Flexeril for pain today with little improvement, currently wearing a Lidoderm patch, she has been using naproxen at home.  Denies chance that she could be pregnant and we reviewed the allergy list.   Patient was given dose of diazepam and Toradol here.    Reassessment: Repeat exam was benign and following our interventions patient stated that she felt somewhat improved.  With improvement, benign exam and overall well appearance feel she is safe for discharge and close follow-up and again she states that she has follow-up in the spine care clinic this afternoon.  Will recommend that she continue on her home naproxen and Flexeril and otherwise follow-up with the spine care clinic and their recommendations later on today.  Discussed all these recommendations with the patient felt reassured and comfortable discharge.  Return precautions provided.    9:12 AM I met with the patient, obtained history, performed an initial exam, and discussed options and plan for diagnostics and treatment here in the ED. PPE worn including N95 mask, surgical gloves, eye protection.  10:38 AM Repeat exam is benign. Patient is feeling improved, discussed plans for discharge and follow up with the spine clinic later today.    At the conclusion of the encounter I discussed the results of all of the tests and the disposition. The questions were answered and return precautions provided. The patient or family acknowledged understanding and was agreeable with the care plan.         MEDICATIONS GIVEN IN THE EMERGENCY:  Medications   diazepam (VALIUM) injection 5 mg (5 mg Intravenous Given 11/30/21 0934)   ketorolac (TORADOL) injection 15 mg (15 mg Intravenous Given 11/30/21 0938)       NEW PRESCRIPTIONS STARTED AT TODAY'S ER VISIT  Discharge Medication List as of 11/30/2021 10:48 AM               =================================================================    HPI    Patient information was obtained from: Patient    Use of Intrepreter: N/A        Vani CASH [place] is a 41 year old female with a pertinent medical history of scicatia (2015), diabetes mellitus type 1, arthralgia of multiple joints, hypothyroidism, methamphetamine use in  remission, mitral valve prolapse who presents by EMS for the evaluation of back pain. Patient reports she was using a belt charles on some stairs on 11/26 (4 days ago) and then began to feel lower midline back pain later on in the day. She describes her pain as sharp and states it is made worse with movement, position, getting up or sitting down, going up or down stairs. She has been taking ibuprofen, Tylenol, naproxen, flexeril, lidocaine patches, and heat to treat her pain. Today, she has taken 2 flexeril tablets and 2 Tylenol tablets today at 8:00 AM and currently has her lidocaine patch on. She notes she has an upcoming appointment with the spine center later on today.    Denies a history of previous back surgeries or recent back injections. She denies any associated falls or trauma. Denies chance of pregnancy or birth control use. She endorses allergies to Augmentin. Denies blood thinner use. Denies fever, vomiting, changes in bowel or bladder habits, bowel or bladder incontinence, chest pain, shortness of breath.    SHx- Endorses vape use, but denies cigarette use. Endorses alcohol use. Denies street drug use.    REVIEW OF SYSTEMS   Constitutional:  Denies fever, chills  Respiratory:  Denies productive cough or increased work of breathing  Cardiovascular:  Denies chest pain, palpitations  GI:  Denies abdominal pain, nausea, vomiting, or change in bowel or bladder habits, bowel or bladder incontinence.  Musculoskeletal:  Positive for back pain (low, midline back). Denies any new muscle/joint swelling  Skin:  Denies rash   Neurologic:  Denies focal weakness  All systems negative except as marked.     PAST MEDICAL HISTORY:  Past Medical History:   Diagnosis Date     Anxiety      Asthma      Bronchitis, not specified as acute or chronic      Chest pain      Depression      Diabetes (H)      Fainting      Heart disease     MVP     IDDM     dx 4/04     Methamphetamine abuse in remission (H) 4/10/2015    Treatment  2014. Clean since.      Mitral valve prolapse      Mitral valve prolapse      Rheumatoid arthritis (H)      Rheumatoid arthritis (H)      Substance abuse (H)     methamphetamine     Thyroid disease      Unspecified keratitis        PAST SURGICAL HISTORY:  Past Surgical History:   Procedure Laterality Date     BIOPSY CERVICAL, LOCAL EXCISION, SINGLE/MULTIPLE       C  DELIVERY ONLY      Description:  Section;  Recorded: 2010;  Comments: 09 - breech      SECTION       EXCISE LESION UPPER EXTREMITY Left 2021    Procedure: EXCISION, LESION, UPPER EXTREMITY;  Surgeon: Babs Leiva MD;  Location: [facility];  Service: General     HC REPAIR UMBILICAL RUTH ANN,<6Y/O,REDUC      Description: Umbilical Hernia Repair;  Recorded: 10/03/2014;     HC REVISE MEDIAN N/CARPAL TUNNEL SURG      Description: Neuroplasty Decompression Median Nerve At Carpal Tunnel;  Recorded: 10/03/2014;     HERNIA REPAIR  2010     HERNIA REPAIR       LAPAROSCOPIC CHOLECYSTECTOMY N/A 2019    Procedure: CHOLECYSTECTOMY, LAPAROSCOPIC;  Surgeon: Rinku Doran MD;  Location: [facility];  Service: General     LEAP and Coloposcopy        OTHER SURGICAL HISTORY  2002    colposcopyLEAP         CURRENT MEDICATIONS:    Prior to Admission medications    Medication Sig Start Date End Date Taking? Authorizing Provider   cetirizine (ZYRTEC) 10 MG tablet TAKE ONE TABLET BY MOUTH ONCE DAILY 21   Denia Llamas, THERESA   Continuous Blood Gluc Sensor (DEXCOM G6 SENSOR) MISC  21   Reported, Patient   Continuous Blood Gluc Transmit (DEXCOM G6 TRANSMITTER) MISC CHANGE EVERY 3 MONTHS. 21   Anayeli Dave, NP   cyclobenzaprine (FLEXERIL) 5 MG tablet Take 1-2 tablets (5-10 mg) by mouth 3 times daily as needed for muscle spasms 10/4/21   Simin Foster, CNP   escitalopram (LEXAPRO) 10 MG tablet Take 1 tablet (10 mg) by mouth daily 21   Denia Llamas, THERESA   insulin aspart (NovoLOG)  "injection Inject  Subcutaneous See Admin Instructions.    Reported, Patient   Insulin Infusion Pump Supplies (T:SLIM X2 3ML CARTRIDGE) MISC  9/16/21   Reported, Patient   ketoconazole (NIZORAL) 2 % external cream Apply topically 2 times daily 8/10/21   Denia Llamas NP   lisinopril (ZESTRIL) 2.5 MG tablet TAKE 1 TABLET (2.5 MG TOTAL) BY MOUTH DAILY. 9/13/21   Mark Pimentel MD   ondansetron (ZOFRAN) 4 MG tablet Take 1 tablet (4 mg) by mouth every 8 hours as needed for nausea 10/1/21   Aby Ridley APRN CNP   pantoprazole (PROTONIX) 40 MG EC tablet Take 40 mg by mouth every morning 8/19/21   Reported, Patient   SYNTHROID 125 MCG OR TABS        traMADol (ULTRAM) 50 MG tablet  9/29/21   Reported, Patient        ALLERGIES:  Allergies   Allergen Reactions     Augmentin Nausea and Vomiting and Headache     Varenicline Other (See Comments)     \"I took the Chantix and that made me a (severe) crazy person.\"  Emotional disturbance       Venlafaxine Nausea     Went away when venlafaxine stopped.  Did not rechallenge. 1/18/16     No Known Allergies      Fluconazole Rash       FAMILY HISTORY:  Family History   Problem Relation Age of Onset     Diabetes Paternal Aunt      Diabetes Other         maternal cousin and paternal cousin     Hypertension Paternal Grandmother      Cerebrovascular Disease Paternal Grandmother      Thyroid Disease Mother      Other - See Comments Father         Father abuses multiple drugs.     No Known Problems Sister      No Known Problems Daughter      No Known Problems Son      No Known Problems Other      No Known Problems Other        SOCIAL HISTORY:   Social History     Socioeconomic History     Marital status: Single     Spouse name: Not on file     Number of children: 1     Years of education: 14     Highest education level: Not on file   Occupational History     Occupation: Pharmacy Tech   Tobacco Use     Smoking status: Former Smoker     Packs/day: 0.50     Types: Cigarettes     Quit date: " "2017     Years since quittin.3     Smokeless tobacco: Never Used     Tobacco comment: No smoked since 2021   Substance and Sexual Activity     Alcohol use: Yes     Comment: Alcoholic Drinks/day: Occ     Drug use: No     Types: Methamphetamines     Comment: Drug use: Off 2 years, 1 month and 19 days as of 3/16/16     Sexual activity: Yes     Partners: Male     Birth control/protection: Pill, Implant   Other Topics Concern      Service Not Asked     Blood Transfusions Not Asked     Caffeine Concern Not Asked     Occupational Exposure Not Asked     Hobby Hazards Not Asked     Sleep Concern Not Asked     Stress Concern Not Asked     Weight Concern Not Asked     Special Diet Not Asked     Back Care Not Asked     Exercise Not Asked     Bike Helmet Not Asked     Seat Belt Not Asked     Self-Exams Not Asked     Parent/sibling w/ CABG, MI or angioplasty before 65F 55M? Not Asked   Social History Narrative     Not on file     Social Determinants of Health     Financial Resource Strain: Not on file   Food Insecurity: Not on file   Transportation Needs: Not on file   Physical Activity: Not on file   Stress: Not on file   Social Connections: Not on file   Intimate Partner Violence: Not on file   Housing Stability: Not on file       VITALS:  Patient Vitals for the past 24 hrs:   BP Temp Temp src Pulse Resp SpO2 Height Weight   21 1046 (!) 86/73 -- -- 81 18 98 % -- --   21 0953 -- -- -- -- -- 98 % -- --   21 0914 110/70 97.7  F (36.5  C) Oral 81 17 98 % -- --   21 0901 118/74 98.1  F (36.7  C) Temporal 84 17 100 % 1.626 m (5' 4\") 59 kg (130 lb)        PHYSICAL EXAM    Constitutional:  Awake, alert, in no apparent distress  HENT:  Normocephalic, Atraumatic. Bilateral external ears normal. Oropharynx moist. Nose normal. Neck- Normal range of motion with no guarding, No midline cervical tenderness, Supple, No stridor.   Eyes:  PERRL, EOMI with no signs of entrapment, Conjunctiva normal, No " discharge.   Respiratory:  Normal breath sounds, No respiratory distress, No wheezing.    Cardiovascular:  Normal heart rate, Normal rhythm, No appreciable rubs or gallops.   GI:  Soft, No tenderness, No distension, No palpable masses  Musculoskeletal:  Intact distal pulses, No edema. Good range of motion in all major joints. No tenderness to palpation or major deformities noted. Back: Nontender along midline cervical, thoracic and lumbar spine with no step-offs or signs of trauma.  Integument:  Warm, Dry, No erythema, No rash.   Neurologic:  Alert & oriented, Normal motor function, Normal sensory function, No focal deficits noted.   Psychiatric:  Affect normal, Judgment normal, Mood normal.     LAB:  All pertinent labs reviewed and interpreted.       RADIOLOGY:  No orders to display          EKG:      I have independently reviewed and interpreted the EKG(s) documented above.    PROCEDURES:          I, Serge Smith, am serving as a scribe to document services personally performed by Mihai Canas MD, based on my observation and the provider's statements to me. I, Mihai Canas MD attest that Serge Smith is acting in a scribe capacity, has observed my performance of the services and has documented them in accordance with my direction.    Mihai Canas M.D.  Emergency Medicine  [facility] EMERGENCY DEPARTMENT  [facility] [facility] [id number] [phone]  Dept: [phone]     Mihai Canas MD  11/30/21 0423

## 2021-11-30 NOTE — ED TRIAGE NOTES
Started to have lower back pain since Friday radiates all over the body when she is moving.  Took Tylenol and Flexeril this 8AM.  Hx of sciatica 2015.  She has doing house work but denies any trauma. No loss of bowel or bladder

## 2021-12-02 ENCOUNTER — HOSPITAL ENCOUNTER (OUTPATIENT)
Dept: MRI IMAGING | Facility: HOSPITAL | Age: 41
Discharge: HOME | End: 2021-12-02
Attending: NURSE PRACTITIONER | Admitting: NURSE PRACTITIONER
Payer: COMMERCIAL

## 2021-12-02 DIAGNOSIS — M54.50 CHRONIC BILATERAL LOW BACK PAIN WITHOUT SCIATICA: ICD-10-CM

## 2021-12-02 DIAGNOSIS — M48.061 LUMBAR FORAMINAL STENOSIS: ICD-10-CM

## 2021-12-02 DIAGNOSIS — M54.50 ACUTE BILATERAL LOW BACK PAIN WITHOUT SCIATICA: ICD-10-CM

## 2021-12-02 DIAGNOSIS — M51.369 DDD (DEGENERATIVE DISC DISEASE), LUMBAR: ICD-10-CM

## 2021-12-02 DIAGNOSIS — F40.240 CLAUSTROPHOBIA: ICD-10-CM

## 2021-12-02 DIAGNOSIS — M47.816 LUMBAR FACET ARTHROPATHY: ICD-10-CM

## 2021-12-02 DIAGNOSIS — G89.29 CHRONIC BILATERAL LOW BACK PAIN WITHOUT SCIATICA: ICD-10-CM

## 2021-12-02 PROCEDURE — 72148 MRI LUMBAR SPINE W/O DYE: CPT

## 2021-12-03 ENCOUNTER — VIRTUAL VISIT (OUTPATIENT)
Dept: ENDOCRINOLOGY | Facility: CLINIC | Age: 41
End: 2021-12-03
Payer: COMMERCIAL

## 2021-12-03 DIAGNOSIS — E10.9 TYPE 1 DIABETES MELLITUS WITHOUT COMPLICATION (H): Primary | ICD-10-CM

## 2021-12-03 PROCEDURE — 99213 OFFICE O/P EST LOW 20 MIN: CPT | Mod: GT | Performed by: NURSE PRACTITIONER

## 2021-12-03 RX ORDER — ALBUTEROL SULFATE 90 UG/1
AEROSOL, METERED RESPIRATORY (INHALATION)
COMMUNITY
Start: 2021-07-06 | End: 2022-08-12

## 2021-12-03 RX ORDER — BLOOD SUGAR DIAGNOSTIC
STRIP MISCELLANEOUS
COMMUNITY
Start: 2021-02-25 | End: 2024-06-26

## 2021-12-03 RX ORDER — CHLORHEXIDINE GLUCONATE ORAL RINSE 1.2 MG/ML
SOLUTION DENTAL
COMMUNITY
Start: 2021-08-11 | End: 2022-08-12

## 2021-12-03 RX ORDER — SUBCUTANEOUS INSULIN PUMP
EACH MISCELLANEOUS
COMMUNITY
Start: 2021-03-05

## 2021-12-03 RX ORDER — INSULIN GLARGINE 100 [IU]/ML
INJECTION, SOLUTION SUBCUTANEOUS
COMMUNITY
Start: 2021-07-08 | End: 2022-04-21

## 2021-12-06 ENCOUNTER — OFFICE VISIT (OUTPATIENT)
Dept: PHYSICAL MEDICINE AND REHAB | Facility: CLINIC | Age: 41
End: 2021-12-06
Payer: COMMERCIAL

## 2021-12-06 VITALS
BODY MASS INDEX: 22.2 KG/M2 | WEIGHT: 130 LBS | DIASTOLIC BLOOD PRESSURE: 62 MMHG | SYSTOLIC BLOOD PRESSURE: 118 MMHG | HEIGHT: 64 IN

## 2021-12-06 DIAGNOSIS — M54.50 CHRONIC BILATERAL LOW BACK PAIN WITHOUT SCIATICA: ICD-10-CM

## 2021-12-06 DIAGNOSIS — M51.369 BULGING LUMBAR DISC: ICD-10-CM

## 2021-12-06 DIAGNOSIS — M48.061 LUMBAR FORAMINAL STENOSIS: ICD-10-CM

## 2021-12-06 DIAGNOSIS — M51.369 ANNULAR TEAR OF LUMBAR DISC: ICD-10-CM

## 2021-12-06 DIAGNOSIS — M51.369 DDD (DEGENERATIVE DISC DISEASE), LUMBAR: ICD-10-CM

## 2021-12-06 DIAGNOSIS — M79.18 MYOFASCIAL PAIN ON LEFT SIDE: ICD-10-CM

## 2021-12-06 DIAGNOSIS — M54.50 ACUTE BILATERAL LOW BACK PAIN WITHOUT SCIATICA: Primary | ICD-10-CM

## 2021-12-06 DIAGNOSIS — M54.2 NECK PAIN: ICD-10-CM

## 2021-12-06 DIAGNOSIS — M47.812 ARTHROPATHY OF CERVICAL FACET JOINT: ICD-10-CM

## 2021-12-06 DIAGNOSIS — F40.240 CLAUSTROPHOBIA: ICD-10-CM

## 2021-12-06 DIAGNOSIS — G89.29 CHRONIC BILATERAL LOW BACK PAIN WITHOUT SCIATICA: ICD-10-CM

## 2021-12-06 DIAGNOSIS — M47.816 LUMBAR FACET ARTHROPATHY: ICD-10-CM

## 2021-12-06 PROCEDURE — 99214 OFFICE O/P EST MOD 30 MIN: CPT | Performed by: NURSE PRACTITIONER

## 2021-12-06 RX ORDER — HYDROCODONE BITARTRATE AND ACETAMINOPHEN 5; 325 MG/1; MG/1
1 TABLET ORAL EVERY 8 HOURS PRN
Qty: 21 TABLET | Refills: 0 | Status: SHIPPED | OUTPATIENT
Start: 2021-12-06 | End: 2021-12-13

## 2021-12-06 ASSESSMENT — MIFFLIN-ST. JEOR: SCORE: 1234.68

## 2021-12-06 ASSESSMENT — PAIN SCALES - GENERAL: PAINLEVEL: EXTREME PAIN (8)

## 2021-12-06 NOTE — PROGRESS NOTES
Assessment:     Diagnoses and all orders for this visit:  Acute bilateral low back pain without sciatica  -     HYDROcodone-acetaminophen (NORCO) 5-325 MG tablet; Take 1 tablet by mouth every 8 hours as needed for severe pain  -     Physical Therapy Referral; Future  -     PAIN Transforaminal GARTH Inj Lumbosacral Gulshan; Future  Chronic bilateral low back pain without sciatica  -     Physical Therapy Referral; Future  -     PAIN Transforaminal GARTH Inj Lumbosacral Gulshan; Future  Lumbar foraminal stenosis  -     Physical Therapy Referral; Future  -     PAIN Transforaminal GARTH Inj Lumbosacral Gulshan; Future  Lumbar facet arthropathy  -     Physical Therapy Referral; Future  DDD (degenerative disc disease), lumbar  -     Physical Therapy Referral; Future  -     PAIN Transforaminal GARTH Inj Lumbosacral Gulshan; Future  Neck pain  Arthropathy of cervical facet joint  Myofascial pain on left side  Claustrophobia  -     HYDROcodone-acetaminophen (NORCO) 5-325 MG tablet; Take 1 tablet by mouth every 8 hours as needed for severe pain  Annular tear of lumbar disc  -     PAIN Transforaminal GARTH Inj Lumbosacral Gulshan; Future  Bulging lumbar disc  -     PAIN Transforaminal GARTH Inj Lumbosacral Gulshan; Future     Vani Corona is a 41 year old y.o. female with past medical history significant for uncontrolled Type 1 Diabetes Mellitus with diabetic retinopathy, hypothyroidism, GERD, mitral valve regurgitation, history ileostomy, depression, tobacco abuse, insomnia, history of methamphetamine abuse who presents today for follow-up regarding:    -Acute flareup of chronic bilateral low back pain severe and debilitating onset 11/26/2021.  Updated MRI with chronic disc bulge at L5-S1 with annular tear and mild bilateral foraminal stenosis.     Plan:     A shared decision making plan was used. The patient's values and choices were respected. Prior medical records were reviewed today. The following represents what was discussed and decided upon by the  provider and the patient.        -DIAGNOSTIC TESTS: Images were personally reviewed and interpreted.   --Lumbar spine MRI 12/2/2021 with levoscoliotic curvature lower thoracic upper lumbar spine.  Chronic unchanged small disc bulge L5-S1 with mild central and minimal bilateral foraminal stenosis.  Facet arthropathy mild at L4-5.  --Cervical spine x-ray 9/23/2021 with mild disc space height loss at C5-6 with osteophyte.  Slight rotation and lateral view and straightening of cervical lordosis.  --Lumbar spine MRI 2015 with L5-S1 annular tear.  Moderate to severe facet arthropathy L4-5 and L5-S1 with mild bilateral foraminal stenosis.      -INTERVENTIONS: Ordered urgent bilateral L5-S1 transforaminal epidural steroid injection to see if we get further relief of her significant low back pain, she does not feel she would tolerate physical therapy at this time due to her severe pain, she presented in wheelchair today.    -MEDICATIONS: Refilled hydrocodone 5/325 mg 1 tablet every 8 hours as needed for severe breakthrough pain number 21 tablets given for 7 days worth.  Advised patient to continue with meloxicam as needed.  MN  checked. Discussed the risks (eg, addiction, overdose, worsening pain) verses benefit of opioid use with patient today. Explained that this medication will not be a long term solution to ongoing pain. Discussed using lowest effective dose and the importance of other measures for pain management including PT, other non-opioid medications, behavioral treatments, and other procedure options.   Discussed side effects of medications and proper use. Patient verbalized understanding.    -PHYSICAL THERAPY: Referral to physical therapy placed however patient does not feel she would tolerate physical therapy therefore we are trialing the injection first, she is okay with scheduling physical therapy 3 to 4 weeks out which is unfortunately the openings at this time as well.  Discussed the importance of core  strengthening, ROM, stretching exercises with the patient and how each of these entities is important in decreasing pain.  Explained to the patient that the purpose of physical therapy is to teach the patient a home exercise program.  These exercises need to be performed every day in order to decrease pain and prevent future occurrences of pain.        -PATIENT EDUCATION:  Total time of 32 minutes, on the day of service, spent with the patient, reviewing the chart, placing orders, and documenting.   -Today we also discussed the issues related to the current COVID-19 pandemic, the pros and cons of the current treatment plan, the CDC guidelines such as social distancing, washing the hands, and covering the cough.    -FOLLOW UP: Follow-up for injection with Dr. Lin, Dr. Santos guan for first available  Advised to contact clinic if symptoms worsen or change.    Subjective:     Vani Corona is a 41 year old female who presents today for follow-up regarding acute bilateral low back pain that has been severe and debilitating since 11/26/2021 after doing some remodeling work at her home.  She has had chronic low back pain as well in the same pattern but again currently her pain is severe and debilitating at an 8/10 constant type pain worse with transition from sit to stand any bending or twisting, laying down is her only comfortable position at this time, her pain does get to a 10 at its worst a regular basis, she reports that it is a 6 at its best with laying down at this time.  Patient currently denies any radiating lower extremity pain, denies recent trips or falls, denies lower extremity numbness or tingling or lower extremity weakness.  Denies bowel or bladder loss control, denies saddle anesthesia.    -Treatment to Date: No prior spinal surgery  No recent physical therapy  Chiropractic treatment x1 month neck pain     Left L5-S1 TFESI Dr. Sipple 2015     -Medications:  Aleve/ibuprofen with GI upset  Patient  "reports methocarbamol recently with minimal benefit  Meloxicam  Hydrocodone     Tramadol 50 mg reported 9/29/2021, not currently taking as she reports this as ineffective    Patient Active Problem List   Diagnosis     Uncontrolled type 1 diabetes mellitus (H)     Anxiety     Arthralgia of multiple joints     Depression     GERD (gastroesophageal reflux disease)     History of ileostomy     Hypothyroidism, unspecified type     Insomnia     Methamphetamine abuse in remission (H)     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)     Non-rheumatic mitral regurgitation     Skin mass     Tobacco abuse       Current Outpatient Medications   Medication     HYDROcodone-acetaminophen (NORCO) 5-325 MG tablet     meloxicam (MOBIC) 7.5 MG tablet     ACCU-CHEK GUIDE test strip     albuterol (VENTOLIN HFA) 108 (90 Base) MCG/ACT inhaler     cetirizine (ZYRTEC) 10 MG tablet     chlorhexidine (PERIDEX) 0.12 % solution     Continuous Blood Gluc Sensor (DEXCOM G6 SENSOR) MISC     Continuous Blood Gluc Transmit (DEXCOM G6 TRANSMITTER) MISC     cyclobenzaprine (FLEXERIL) 5 MG tablet     escitalopram (LEXAPRO) 10 MG tablet     insulin aspart (NovoLOG) injection     Insulin Infusion Pump (T: SLIM X2 INS /CONTROL 7.4) JESSICA     Insulin Infusion Pump Supplies (T:SLIM X2 3ML CARTRIDGE) MISC     ketoconazole (NIZORAL) 2 % external cream     LANTUS SOLOSTAR 100 UNIT/ML soln     lisinopril (ZESTRIL) 2.5 MG tablet     ondansetron (ZOFRAN) 4 MG tablet     pantoprazole (PROTONIX) 40 MG EC tablet     SYNTHROID 125 MCG OR TABS     No current facility-administered medications for this visit.       Allergies   Allergen Reactions     Augmentin Nausea and Vomiting and Headache     Varenicline Other (See Comments)     \"I took the Chantix and that made me a (severe) crazy person.\"  Emotional disturbance       Venlafaxine Nausea     Went away when venlafaxine stopped.  Did not " "rechallenge. 1/18/16     No Known Allergies      Fluconazole Rash       Past Medical History:   Diagnosis Date     Anxiety      Asthma      Bronchitis, not specified as acute or chronic      Chest pain      Depression      Diabetes (H)      Fainting      Heart disease     MVP     IDDM     dx 4/04     Methamphetamine abuse in remission (H) 4/10/2015    Treatment 1/2014. Clean since.      Mitral valve prolapse      Mitral valve prolapse      Rheumatoid arthritis (H)      Rheumatoid arthritis (H)      Substance abuse (H)     methamphetamine     Thyroid disease      Unspecified keratitis         Review of Systems  ROS:  Specifically negative for bowel/bladder dysfunction, balance changes, headache, dizziness, foot drop, fevers, chills, appetite changes, nausea/vomiting, unexplained weight loss. Otherwise 13 systems reviewed are negative. Please see the patient's intake questionnaire from today for details.    Reviewed Social, Family, Past Medical and Past Surgical history with patient, no significant changes noted since prior visit.     Objective:     /62 (BP Location: Right arm, Patient Position: Sitting)   Ht 5' 4\" (1.626 m)   Wt 130 lb (59 kg)   BMI 22.31 kg/m      PHYSICAL EXAMINATION:    --CONSTITUTIONAL: Well developed, well nourished, healthy appearing individual.  --PSYCHIATRIC: Appropriate mood and affect. No difficulty interacting due to temper, social withdrawal, or memory issues.  --SKIN: Lumbar region is dry and intact.   --RESPIRATORY: Normal rhythm and effort. No abnormal accessory muscle breathing patterns noted.   --MUSCULOSKELETAL:  Normal lumbar lordosis noted, no lateral shift.  --GROSS MOTOR: Easily arises from a seated position. Gait is non-antalgic  --LUMBAR SPINE:  Inspection reveals no evidence of deformity.     RESULTS:   Imaging: Lumbar spine imaging was reviewed today. The images were shown to the patient and the findings were explained using a spine model.      MR Lumbar Spine w/o " Contrast  Result Date: 12/2/2021  EXAM: MR LUMBAR SPINE W/O CONTRAST LOCATION: [facility] DATE/TIME: 12/2/2021 2:13 PM INDICATION: Low back pain, > 6 wks COMPARISON: 03/05/2015 lumbar spine MRI. TECHNIQUE: Routine Lumbar Spine MRI without IV contrast. FINDINGS: Numbering assumes 5 lumbar type vertebrae. Levoscoliotic curvature to the lower thoracic and upper lumbar spine. Otherwise unremarkable alignment. Vertebral body heights are maintained. Osseous hemangioma in the L5 vertebral body with no concerning marrow signal abnormality and no osseous edema. No identifiable pars defect. Normal distal spinal cord and cauda equina with conus medullaris at L1-L2. Mild fatty atrophy of the dorsal paraspinal musculature inferiorly. Unremarkable visualized bony pelvis. T12-L1: Normal disc height and signal. No herniation. Normal facets. No spinal canal or neural foraminal stenosis. L1-L2: Normal disc height and signal with no posterior disc bulge. Mild bilateral facet arthropathy. Spinal canal and neural foramina are widely patent. L2-L3: Normal disc height and signal with no posterior disc bulge. Facet arthropathy is minimal bilaterally. Spinal canal and neural foramina are widely patent. L3-L4: Normal disc height and signal with no posterior disc bulge or significant facet arthropathy. Spinal canal and neural foramina are widely patent. L4-L5: Normal disc height and signal with no posterior disc bulge. Facet arthropathy is mild/minimal bilaterally. Spinal canal and neural foramina are widely patent. L5-S1: Mild loss of disc height. Disc desiccation. Small diffuse posterior disc bulge. No significant facet arthropathy. Mild spinal canal stenosis. Neural foraminal stenosis mild/minimal bilaterally.   IMPRESSION:   1.  Unchanged mild L5-S1 degenerative disc disease with a small diffuse posterior disc bulge that contributes to a mild spinal canal stenosis. This is accompanied by only a mild/minimal  bilateral L5-S1 neural foraminal stenosis.   2.  No high-grade spinal canal or neural foraminal stenosis at any level.   3.  Facet arthropathy in the lumbar spine is overall mild/minimal, as above.      XR Cervical Spine 2/3 Views  Result Date: 9/23/2021  EXAM: XR CERVICAL SPINE 2/3 VWS LOCATION: [facility] DATE/TIME: 9/23/2021 2:29 PM INDICATION: Neck pain for 3 months status post injury. COMPARISON: None. TECHNIQUE: CR Cervical Spine.   IMPRESSION: The odontoid is obscured on the odontoid view. The patient is slightly rotated on the lateral view. Straightening of usual cervical lordosis with otherwise normal alignment. Normal vertebral body heights. Mild disc space narrowing at C5-C6 with mild marginal osteophyte. The posterior elements are unremarkable. The prevertebral soft tissues and visualized lung apices are unremarkable.

## 2021-12-06 NOTE — LETTER
12/6/2021         RE: Vani Corona  [address] [place] [address]        Dear Colleague,    Thank you for referring your patient, Vani Corona, to the [facility] [place]. Please see a copy of my visit note below.      Assessment:     Diagnoses and all orders for this visit:  Acute bilateral low back pain without sciatica  -     HYDROcodone-acetaminophen (NORCO) 5-325 MG tablet; Take 1 tablet by mouth every 8 hours as needed for severe pain  -     Physical Therapy Referral; Future  -     PAIN Transforaminal GARTH Inj Lumbosacral Gulshan; Future  Chronic bilateral low back pain without sciatica  -     Physical Therapy Referral; Future  -     PAIN Transforaminal GARTH Inj Lumbosacral Gulshan; Future  Lumbar foraminal stenosis  -     Physical Therapy Referral; Future  -     PAIN Transforaminal GARTH Inj Lumbosacral Gulshan; Future  Lumbar facet arthropathy  -     Physical Therapy Referral; Future  DDD (degenerative disc disease), lumbar  -     Physical Therapy Referral; Future  -     PAIN Transforaminal GARTH Inj Lumbosacral Gulshan; Future  Neck pain  Arthropathy of cervical facet joint  Myofascial pain on left side  Claustrophobia  -     HYDROcodone-acetaminophen (NORCO) 5-325 MG tablet; Take 1 tablet by mouth every 8 hours as needed for severe pain  Annular tear of lumbar disc  -     PAIN Transforaminal GARTH Inj Lumbosacral Gulshan; Future  Bulging lumbar disc  -     PAIN Transforaminal GARTH Inj Lumbosacral Gulshan; Future     Vani Corona is a 41 year old y.o. female with past medical history significant for uncontrolled Type 1 Diabetes Mellitus with diabetic retinopathy, hypothyroidism, GERD, mitral valve regurgitation, history ileostomy, depression, tobacco abuse, insomnia, history of methamphetamine abuse who presents today for follow-up regarding:    -Acute flareup of chronic bilateral low back pain severe and debilitating onset 11/26/2021.  Updated MRI with chronic disc bulge at L5-S1 with annular tear  and mild bilateral foraminal stenosis.     Plan:     A shared decision making plan was used. The patient's values and choices were respected. Prior medical records were reviewed today. The following represents what was discussed and decided upon by the provider and the patient.        -DIAGNOSTIC TESTS: Images were personally reviewed and interpreted.   --Lumbar spine MRI 12/2/2021 with levoscoliotic curvature lower thoracic upper lumbar spine.  Chronic unchanged small disc bulge L5-S1 with mild central and minimal bilateral foraminal stenosis.  Facet arthropathy mild at L4-5.  --Cervical spine x-ray 9/23/2021 with mild disc space height loss at C5-6 with osteophyte.  Slight rotation and lateral view and straightening of cervical lordosis.  --Lumbar spine MRI 2015 with L5-S1 annular tear.  Moderate to severe facet arthropathy L4-5 and L5-S1 with mild bilateral foraminal stenosis.      -INTERVENTIONS: Ordered urgent bilateral L5-S1 transforaminal epidural steroid injection to see if we get further relief of her significant low back pain, she does not feel she would tolerate physical therapy at this time due to her severe pain, she presented in wheelchair today.    -MEDICATIONS: Refilled hydrocodone 5/325 mg 1 tablet every 8 hours as needed for severe breakthrough pain number 21 tablets given for 7 days worth.  Advised patient to continue with meloxicam as needed.  [place]  checked. Discussed the risks (eg, addiction, overdose, worsening pain) verses benefit of opioid use with patient today. Explained that this medication will not be a long term solution to ongoing pain. Discussed using lowest effective dose and the importance of other measures for pain management including PT, other non-opioid medications, behavioral treatments, and other procedure options.   Discussed side effects of medications and proper use. Patient verbalized understanding.    -PHYSICAL THERAPY: Referral to physical therapy placed however patient  does not feel she would tolerate physical therapy therefore we are trialing the injection first, she is okay with scheduling physical therapy 3 to 4 weeks out which is unfortunately the openings at this time as well.  Discussed the importance of core strengthening, ROM, stretching exercises with the patient and how each of these entities is important in decreasing pain.  Explained to the patient that the purpose of physical therapy is to teach the patient a home exercise program.  These exercises need to be performed every day in order to decrease pain and prevent future occurrences of pain.        -PATIENT EDUCATION:  Total time of 32 minutes, on the day of service, spent with the patient, reviewing the chart, placing orders, and documenting.   -Today we also discussed the issues related to the current COVID-19 pandemic, the pros and cons of the current treatment plan, the CDC guidelines such as social distancing, washing the hands, and covering the cough.    -FOLLOW UP: Follow-up for injection with Dr. Lin, Dr. Santos guan for first available  Advised to contact clinic if symptoms worsen or change.    Subjective:     Vani Corona is a 41 year old female who presents today for follow-up regarding acute bilateral low back pain that has been severe and debilitating since 11/26/2021 after doing some remodeling work at her home.  She has had chronic low back pain as well in the same pattern but again currently her pain is severe and debilitating at an 8/10 constant type pain worse with transition from sit to stand any bending or twisting, laying down is her only comfortable position at this time, her pain does get to a 10 at its worst a regular basis, she reports that it is a 6 at its best with laying down at this time.  Patient currently denies any radiating lower extremity pain, denies recent trips or falls, denies lower extremity numbness or tingling or lower extremity weakness.  Denies bowel or bladder  loss control, denies saddle anesthesia.    -Treatment to Date: No prior spinal surgery  No recent physical therapy  Chiropractic treatment x1 month neck pain     Left L5-S1 TFESI Dr. 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)     Non-rheumatic mitral regurgitation     Skin mass     Tobacco abuse       Current Outpatient Medications   Medication     HYDROcodone-acetaminophen (NORCO) 5-325 MG tablet     meloxicam (MOBIC) 7.5 MG tablet     ACCU-CHEK GUIDE test strip     albuterol (VENTOLIN HFA) 108 (90 Base) MCG/ACT inhaler     cetirizine (ZYRTEC) 10 MG tablet     chlorhexidine (PERIDEX) 0.12 % solution     Continuous Blood Gluc Sensor (DEXCOM G6 SENSOR) MISC     Continuous Blood Gluc Transmit (DEXCOM G6 TRANSMITTER) MISC     cyclobenzaprine (FLEXERIL) 5 MG tablet     escitalopram (LEXAPRO) 10 MG tablet     insulin aspart (NovoLOG) injection     Insulin Infusion Pump (T: SLIM X2 INS /CONTROL 7.4) JESSICA     Insulin Infusion Pump Supplies (T:SLIM X2 3ML CARTRIDGE) MISC     ketoconazole (NIZORAL) 2 % external cream     LANTUS SOLOSTAR 100 UNIT/ML soln     lisinopril (ZESTRIL) 2.5 MG tablet     ondansetron (ZOFRAN) 4 MG tablet     pantoprazole (PROTONIX) 40 MG EC tablet     SYNTHROID 125 MCG OR TABS     No current facility-administered medications for this visit.       Allergies   Allergen  "Reactions     Augmentin Nausea and Vomiting and Headache     Varenicline Other (See Comments)     \"I took the Chantix and that made me a (severe) crazy person.\"  Emotional disturbance       Venlafaxine Nausea     Went away when venlafaxine stopped.  Did not rechallenge. 1/18/16     No Known Allergies      Fluconazole Rash       Past Medical History:   Diagnosis Date     Anxiety      Asthma      Bronchitis, not specified as acute or chronic      Chest pain      Depression      Diabetes (H)      Fainting      Heart disease     MVP     IDDM     dx 4/04     Methamphetamine abuse in remission (H) 4/10/2015    Treatment 1/2014. Clean since.      Mitral valve prolapse      Mitral valve prolapse      Rheumatoid arthritis (H)      Rheumatoid arthritis (H)      Substance abuse (H)     methamphetamine     Thyroid disease      Unspecified keratitis         Review of Systems  ROS:  Specifically negative for bowel/bladder dysfunction, balance changes, headache, dizziness, foot drop, fevers, chills, appetite changes, nausea/vomiting, unexplained weight loss. Otherwise 13 systems reviewed are negative. Please see the patient's intake questionnaire from today for details.    Reviewed Social, Family, Past Medical and Past Surgical history with patient, no significant changes noted since prior visit.     Objective:     /62 (BP Location: Right arm, Patient Position: Sitting)   Ht 5' 4\" (1.626 m)   Wt 130 lb (59 kg)   BMI 22.31 kg/m      PHYSICAL EXAMINATION:    --CONSTITUTIONAL: Well developed, well nourished, healthy appearing individual.  --PSYCHIATRIC: Appropriate mood and affect. No difficulty interacting due to temper, social withdrawal, or memory issues.  --SKIN: Lumbar region is dry and intact.   --RESPIRATORY: Normal rhythm and effort. No abnormal accessory muscle breathing patterns noted.   --MUSCULOSKELETAL:  Normal lumbar lordosis noted, no lateral shift.  --GROSS MOTOR: Easily arises from a seated position. Gait is " non-antalgic  --LUMBAR SPINE:  Inspection reveals no evidence of deformity.     RESULTS:   Imaging: Lumbar spine imaging was reviewed today. The images were shown to the patient and the findings were explained using a spine model.      MR Lumbar Spine w/o Contrast  Result Date: 12/2/2021  EXAM: MR LUMBAR SPINE W/O CONTRAST LOCATION: [facility] DATE/TIME: 12/2/2021 2:13 PM INDICATION: Low back pain, > 6 wks COMPARISON: 03/05/2015 lumbar spine MRI. TECHNIQUE: Routine Lumbar Spine MRI without IV contrast. FINDINGS: Numbering assumes 5 lumbar type vertebrae. Levoscoliotic curvature to the lower thoracic and upper lumbar spine. Otherwise unremarkable alignment. Vertebral body heights are maintained. Osseous hemangioma in the L5 vertebral body with no concerning marrow signal abnormality and no osseous edema. No identifiable pars defect. Normal distal spinal cord and cauda equina with conus medullaris at L1-L2. Mild fatty atrophy of the dorsal paraspinal musculature inferiorly. Unremarkable visualized bony pelvis. T12-L1: Normal disc height and signal. No herniation. Normal facets. No spinal canal or neural foraminal stenosis. L1-L2: Normal disc height and signal with no posterior disc bulge. Mild bilateral facet arthropathy. Spinal canal and neural foramina are widely patent. L2-L3: Normal disc height and signal with no posterior disc bulge. Facet arthropathy is minimal bilaterally. Spinal canal and neural foramina are widely patent. L3-L4: Normal disc height and signal with no posterior disc bulge or significant facet arthropathy. Spinal canal and neural foramina are widely patent. L4-L5: Normal disc height and signal with no posterior disc bulge. Facet arthropathy is mild/minimal bilaterally. Spinal canal and neural foramina are widely patent. L5-S1: Mild loss of disc height. Disc desiccation. Small diffuse posterior disc bulge. No significant facet arthropathy. Mild spinal canal stenosis.  Neural foraminal stenosis mild/minimal bilaterally.   IMPRESSION:   1.  Unchanged mild L5-S1 degenerative disc disease with a small diffuse posterior disc bulge that contributes to a mild spinal canal stenosis. This is accompanied by only a mild/minimal bilateral L5-S1 neural foraminal stenosis.   2.  No high-grade spinal canal or neural foraminal stenosis at any level.   3.  Facet arthropathy in the lumbar spine is overall mild/minimal, as above.      XR Cervical Spine 2/3 Views  Result Date: 9/23/2021  EXAM: XR CERVICAL SPINE 2/3 VWS LOCATION: [facility] DATE/TIME: 9/23/2021 2:29 PM INDICATION: Neck pain for 3 months status post injury. COMPARISON: None. TECHNIQUE: CR Cervical Spine.   IMPRESSION: The odontoid is obscured on the odontoid view. The patient is slightly rotated on the lateral view. Straightening of usual cervical lordosis with otherwise normal alignment. Normal vertebral body heights. Mild disc space narrowing at C5-C6 with mild marginal osteophyte. The posterior elements are unremarkable. The prevertebral soft tissues and visualized lung apices are unremarkable.                       Again, thank you for allowing me to participate in the care of your patient.        Sincerely,        Simin Foster, CNP

## 2021-12-06 NOTE — PATIENT INSTRUCTIONS
~[facility] scheduling #[phone].  ~Please call our [facility] Nurse Navigation line [phone] with any questions or concerns about your treatment plan, if symptoms worsen and you would like to be seen urgently, or if you have problems controlling bladder and bowel function.      ~You have been referred for Physical Therapy to [facility]. They will call you to schedule an appointment.      Scheduling phone number is [phone] [facility], or [place] location.  If you have not heard from the scheduling office within 2 business days, please call [phone] for ALL other locations.    Discussed the importance of core strengthening, ROM, stretching exercises and how each of these entities is important in decreasing pain and improving long term spine health.  The purpose of physical therapy is to teach you an individualized home exercise program.  These exercises need to be performed every day in order to decrease pain and prevent future occurrences of pain.        An injection has been ordered today to potentially help with your pain symptoms. These injections do not fix what is going on in your back, therefore they typically do not take away the pain completely, however they can many times help improve symptoms. Injections should always be completed along with other modalities such as physical therapy for the best long term outcomes. If injections alone are done, then pain will likely return.     [facility] Injection Requirements      A  is required for all fluoroscopically-guided injections.    Injection appointments may be cancelled if there are signs/symptoms of an active infection or if the patient is being actively treated with antibiotics for a diagnosed infection.    Patients may have their steroid injection cancelled if they have had another steroid injection within 2  weeks.    Diabetic patients will have their blood glucose levels checked the day of their injection and the appointment will be rescheduled if the blood glucose level is 300 or higher.    Patients with allergies to cortisone, local anesthetics, iodine, or contrast dye should contact the Spine Center to further discuss these considerations.    Patients scheduled for medial branch block diagnostic injections should refrain from taking pain medication the day of the procedure.  The medial branch block injection appointment will be rescheduled if the patient's pain rating is not 5/10 or greater at the time of the procedure.    Patients taking warfarin/Coumadin will have their INR checked the day of the procedure and the procedure may be rescheduled if the INR is greater than 3.0.    Please contact the Spine Center (#[phone]) if you are taking any prescription blood-thinning medications (warfarin, Plavix, Lovenox, Eliquis, Brilinta, Effient, etc.) as special dosing adjustments may need to be made depending on the type of injection you are scheduled to receive.    It is recommended that you delay having your steroid injection if you have received a flu shot or shingles vaccine within 2 weeks.

## 2021-12-07 ENCOUNTER — ANCILLARY PROCEDURE (OUTPATIENT)
Dept: PHYSICAL MEDICINE AND REHAB | Facility: CLINIC | Age: 41
End: 2021-12-07
Attending: NURSE PRACTITIONER
Payer: COMMERCIAL

## 2021-12-07 VITALS
TEMPERATURE: 97.6 F | OXYGEN SATURATION: 98 % | DIASTOLIC BLOOD PRESSURE: 68 MMHG | HEART RATE: 61 BPM | SYSTOLIC BLOOD PRESSURE: 112 MMHG

## 2021-12-07 DIAGNOSIS — M51.369 ANNULAR TEAR OF LUMBAR DISC: ICD-10-CM

## 2021-12-07 DIAGNOSIS — M48.061 LUMBAR FORAMINAL STENOSIS: ICD-10-CM

## 2021-12-07 DIAGNOSIS — M51.369 DDD (DEGENERATIVE DISC DISEASE), LUMBAR: ICD-10-CM

## 2021-12-07 DIAGNOSIS — M54.50 CHRONIC BILATERAL LOW BACK PAIN WITHOUT SCIATICA: ICD-10-CM

## 2021-12-07 DIAGNOSIS — E10.649 UNCONTROLLED TYPE 1 DIABETES MELLITUS WITH HYPOGLYCEMIA WITHOUT COMA (H): Primary | ICD-10-CM

## 2021-12-07 DIAGNOSIS — G89.29 CHRONIC BILATERAL LOW BACK PAIN WITHOUT SCIATICA: ICD-10-CM

## 2021-12-07 DIAGNOSIS — M51.369 BULGING LUMBAR DISC: ICD-10-CM

## 2021-12-07 DIAGNOSIS — M54.50 ACUTE BILATERAL LOW BACK PAIN WITHOUT SCIATICA: ICD-10-CM

## 2021-12-07 LAB — GLUCOSE SERPL-MCNC: 133 MG/DL (ref 70–99)

## 2021-12-07 PROCEDURE — 36416 COLLJ CAPILLARY BLOOD SPEC: CPT | Performed by: PAIN MEDICINE

## 2021-12-07 PROCEDURE — 82962 GLUCOSE BLOOD TEST: CPT | Performed by: PAIN MEDICINE

## 2021-12-07 PROCEDURE — 64483 NJX AA&/STRD TFRM EPI L/S 1: CPT | Mod: 50 | Performed by: PAIN MEDICINE

## 2021-12-07 RX ORDER — LIDOCAINE HYDROCHLORIDE 10 MG/ML
INJECTION, SOLUTION EPIDURAL; INFILTRATION; INTRACAUDAL; PERINEURAL
Status: COMPLETED | OUTPATIENT
Start: 2021-12-07 | End: 2021-12-07

## 2021-12-07 RX ORDER — DEXAMETHASONE SODIUM PHOSPHATE 10 MG/ML
INJECTION, SOLUTION INTRAMUSCULAR; INTRAVENOUS
Status: COMPLETED | OUTPATIENT
Start: 2021-12-07 | End: 2021-12-07

## 2021-12-07 RX ADMIN — DEXAMETHASONE SODIUM PHOSPHATE 20 MG: 10 INJECTION, SOLUTION INTRAMUSCULAR; INTRAVENOUS at 08:26

## 2021-12-07 RX ADMIN — LIDOCAINE HYDROCHLORIDE 4 ML: 10 INJECTION, SOLUTION EPIDURAL; INFILTRATION; INTRACAUDAL; PERINEURAL at 08:26

## 2021-12-07 ASSESSMENT — PAIN SCALES - GENERAL
PAINLEVEL: EXTREME PAIN (8)
PAINLEVEL: MILD PAIN (2)

## 2021-12-07 NOTE — PATIENT INSTRUCTIONS
Please follow up two weeks post procedure with Simin to evaluate your plan of care.    Please be advised that your blood glucose levels may be increased for the next 5-7 days as a result of the steroid you received with your injection today.  It is recommended that you monitor your blood glucose levels closely over the next week and direct any additional questions regarding your blood glucose management to your primary doctor.       DISCHARGE INSTRUCTIONS    During office hours (8:00 a.m.- 4:00 p.m.) questions or concerns may be answered  by calling Spine Center Navigation Nurses at  [phone].  Messages received after hours will be returned the following business day.      In the case of an emergency, please dial 911 or seek assistance at the nearest Emergency Room/Urgent Care facility.     All Patients:    ? You may experience an increase in your symptoms for the first 2 days (It may take anywhere between 2 days- 2 weeks for the steroid to have maximum effect).    ? You may use ice on the injection site, as frequently as 20 minutes each hour if needed.    ? You may take your pain medicine.    ? You may continue taking your regular medication after your injection. If you have had a Medial Branch Block you may resume pain medication once your pain diary is completed.    ? You may shower. No swimming, tub bath or hot tub for 48 hours.  You may remove your bandaid/bandage as soon as you are home.    ? You may resume light activities, as tolerated.    ? Resume your usual diet as tolerated.    ? It is strongly advised that you do not drive for 1-3 hours post injection.    ? If you have had oral sedation:  Do not drive for 8 hours post injection.      ? If you have had IV sedation:  Do not drive for 24 hours post injection.  Do not operate hazardous machinery or make important personal/business decisions for 24 hours.      POSSIBLE STEROID SIDE EFFECTS (If steroid/cortisone was used for your procedure)    -If you  experience these symptoms, it should only last for a short period      Swelling of the legs                Skin redness (flushing)       Mouth (oral) irritation     Blood sugar (glucose) levels              Sweats                      Mood changes    Headache    Sleeplessness    Weakened immune system for up to 14 days, which could increase the risk of wanda the COVID-19 virus and/or experiencing more severe symptoms of the disease, if exposed.    Decreased effectiveness of the flu vaccine if given within 2 weeks of the steroid.         POSSIBLE PROCEDURE SIDE EFFECTS  -Call the Spine Center if you are concerned    Increased Pain             Increased numbness/tingling        Nausea/Vomiting            Bruising/bleeding at site        Redness or swelling                                                Difficulty walking        Weakness             Fever greater than 100.5    *In the event of a severe headache after an epidural steroid injection that is relieved by lying down, please call the [facility] to speak with a clinical staff member*

## 2021-12-07 NOTE — LETTER
12/7/2021         RE: Vani Corona  [address]  [place] [address]        Dear Colleague,    Thank you for referring your patient, Vani Corona, to the [facility] [place]. Please see a copy of my visit note below.    Patient was here for an injection today.        Again, thank you for allowing me to participate in the care of your patient.        Sincerely,        Fortino Lin, DO

## 2021-12-13 ENCOUNTER — TELEPHONE (OUTPATIENT)
Dept: ENDOCRINOLOGY | Facility: CLINIC | Age: 41
End: 2021-12-13
Payer: COMMERCIAL

## 2021-12-13 ENCOUNTER — TELEPHONE (OUTPATIENT)
Dept: PHYSICAL MEDICINE AND REHAB | Facility: CLINIC | Age: 41
End: 2021-12-13
Payer: COMMERCIAL

## 2021-12-13 DIAGNOSIS — E10.65 TYPE 1 DIABETES MELLITUS WITH HYPERGLYCEMIA (H): ICD-10-CM

## 2021-12-13 RX ORDER — PROCHLORPERAZINE 25 MG/1
SUPPOSITORY RECTAL
Qty: 1 EACH | Refills: 1 | Status: SHIPPED | OUTPATIENT
Start: 2021-12-13 | End: 2022-06-10

## 2021-12-13 NOTE — TELEPHONE ENCOUNTER
PSP:  Simin Foster CNP  Last clinic visit:  12/6/2021  Reason for call: Fall; tailbone pain  Clinical information:  Call received from pt. She reports her pain has been improving since she had bilateral L5-S1 TFESIs on 12/7/2021. However, she reports she slipped and fell down 3 wooden stairs on her tailbone on Saturday. Pt states this is a different pain than her previous pain. It is localized to her tailbone area.   Advice given to patient: Advised pt to use ice to the area. She can use the medications she has to help manage pain but this should hopefully improve. Advised pt to reach out if her pain does not improve or if it worsens. Will update Simin and will call the pt back if she has any different recommendations.   Provider to address: Pt will be called back if you have any further recommendations

## 2021-12-13 NOTE — TELEPHONE ENCOUNTER
[facility] Call Center    Phone Message    May a detailed message be left on voicemail: yes, please call pt back to confirm once refill is sent to pharmacy.     Reason for Call: Medication Refill Request    Has the patient contacted the pharmacy for the refill? Yes   Name of medication being requested: Continuous Blood Gluc Transmit (DEXCOM G6 TRANSMITTER) MISC  Provider who prescribed the medication: [facility]  [facility] [address] [facility]  Date medication is needed: ASAP, per pt she realized yesterday that her transmitter stopped working. Pt is requesting an urgent refill be sent ASAP to her pharmacy.         Action Taken: Message routed to:  Other: endocrine    Travel Screening: Not Applicable

## 2021-12-20 DIAGNOSIS — E10.65 TYPE 1 DIABETES MELLITUS WITH HYPERGLYCEMIA (H): Primary | ICD-10-CM

## 2021-12-20 RX ORDER — PROCHLORPERAZINE 25 MG/1
SUPPOSITORY RECTAL
Qty: 9 EACH | Refills: 1 | Status: SHIPPED | OUTPATIENT
Start: 2021-12-20 | End: 2022-06-10

## 2021-12-22 ENCOUNTER — HOSPITAL ENCOUNTER (OUTPATIENT)
Dept: RADIOLOGY | Facility: HOSPITAL | Age: 41
End: 2021-12-22
Attending: NURSE PRACTITIONER
Payer: COMMERCIAL

## 2021-12-22 ENCOUNTER — OFFICE VISIT (OUTPATIENT)
Dept: PHYSICAL MEDICINE AND REHAB | Facility: CLINIC | Age: 41
End: 2021-12-22
Payer: COMMERCIAL

## 2021-12-22 VITALS — SYSTOLIC BLOOD PRESSURE: 121 MMHG | DIASTOLIC BLOOD PRESSURE: 69 MMHG | OXYGEN SATURATION: 97 % | HEART RATE: 76 BPM

## 2021-12-22 DIAGNOSIS — M51.369 DDD (DEGENERATIVE DISC DISEASE), LUMBAR: ICD-10-CM

## 2021-12-22 DIAGNOSIS — M48.061 LUMBAR FORAMINAL STENOSIS: ICD-10-CM

## 2021-12-22 DIAGNOSIS — M51.369 ANNULAR TEAR OF LUMBAR DISC: ICD-10-CM

## 2021-12-22 DIAGNOSIS — M54.50 CHRONIC BILATERAL LOW BACK PAIN WITHOUT SCIATICA: ICD-10-CM

## 2021-12-22 DIAGNOSIS — M53.3 COCCYDYNIA: ICD-10-CM

## 2021-12-22 DIAGNOSIS — G89.29 CHRONIC BILATERAL LOW BACK PAIN WITHOUT SCIATICA: ICD-10-CM

## 2021-12-22 DIAGNOSIS — M54.50 CHRONIC BILATERAL LOW BACK PAIN WITHOUT SCIATICA: Primary | ICD-10-CM

## 2021-12-22 DIAGNOSIS — M51.369 BULGING LUMBAR DISC: ICD-10-CM

## 2021-12-22 DIAGNOSIS — G89.29 CHRONIC BILATERAL LOW BACK PAIN WITHOUT SCIATICA: Primary | ICD-10-CM

## 2021-12-22 PROCEDURE — 72100 X-RAY EXAM L-S SPINE 2/3 VWS: CPT

## 2021-12-22 PROCEDURE — 72200 X-RAY EXAM SI JOINTS: CPT

## 2021-12-22 PROCEDURE — 99214 OFFICE O/P EST MOD 30 MIN: CPT | Performed by: NURSE PRACTITIONER

## 2021-12-22 RX ORDER — SULINDAC 200 MG/1
200 TABLET ORAL 2 TIMES DAILY PRN
Qty: 30 TABLET | Refills: 1 | Status: SHIPPED | OUTPATIENT
Start: 2021-12-22 | End: 2022-04-21

## 2021-12-22 ASSESSMENT — PAIN SCALES - GENERAL: PAINLEVEL: MODERATE PAIN (5)

## 2021-12-22 NOTE — PATIENT INSTRUCTIONS
~[facility] scheduling #[phone].  ~Please call our [facility] Nurse Navigation line [phone] with any questions or concerns about your treatment plan, if symptoms worsen and you would like to be seen urgently, or if you have problems controlling bladder and bowel function.      ~You have been referred for Physical Therapy to [facility]. They will call you to schedule an appointment.      Scheduling phone number is [phone] for [facility], or [place] location.  If you have not heard from the scheduling office within 2 business days, please call [phone] for ALL other locations.    Discussed the importance of core strengthening, ROM, stretching exercises and how each of these entities is important in decreasing pain and improving long term spine health.  The purpose of physical therapy is to teach you an individualized home exercise program.  These exercises need to be performed every day in order to decrease pain and prevent future occurrences of pain.          Prescribed sulindac today. Please take as prescribed, as needed for pain control as well as to aid in decreasing inflammation. Take medication with a full glass of water and food.   *Do not take Advil, Ibuprofen, Aleve, or Naproxen while taking this medication as it can cause organ failure if taken together*  This medication does have risks if taken long term, these risks include: gastrointestinal irritation, kidney dysfunction, and cardiovascular effects.

## 2021-12-22 NOTE — LETTER
12/22/2021         RE: Vani Corona  [address] [place] [address]        Dear Colleague,    Thank you for referring your patient, Vani Corona, to the [facility] [place]. Please see a copy of my visit note below.      Assessment:     Diagnoses and all orders for this visit:  Chronic bilateral low back pain without sciatica  -     XR Lumbar Spine 2/3 Views; Future  -     XR Sacroiliac Joint 1/2 Views; Future  -     sulindac (CLINORIL) 200 MG tablet; Take 1 tablet (200 mg) by mouth 2 times daily as needed (Pain) Take with food  -     diclofenac (VOLTAREN) 1 % topical gel; Apply 2-4 g topically 4 times daily  Lumbar foraminal stenosis  DDD (degenerative disc disease), lumbar  Annular tear of lumbar disc  Bulging lumbar disc  Coccydynia  -     XR Lumbar Spine 2/3 Views; Future  -     XR Sacroiliac Joint 1/2 Views; Future  -     sulindac (CLINORIL) 200 MG tablet; Take 1 tablet (200 mg) by mouth 2 times daily as needed (Pain) Take with food  -     diclofenac (VOLTAREN) 1 % topical gel; Apply 2-4 g topically 4 times daily     Vani Corona is a 41 year old y.o. female with past medical history significant for uncontrolled Type 1 Diabetes Mellitus with diabetic retinopathy, hypothyroidism, GERD, mitral valve regurgitation, history ileostomy, depression, tobacco abuse, insomnia, history of methamphetamine abuse who presents today for follow-up regarding:    -Chronic bilateral low back pain with acute flareup post fall 12/19/2021.    -Coccydynia post fall from mild tenderness palpation generalized lumbar spine and coccyx region.     Plan:     A shared decision making plan was used. The patient's values and choices were respected. Prior medical records were reviewed today. The following represents what was discussed and decided upon by the provider and the patient.        -DIAGNOSTIC TESTS: Images were personally reviewed and interpreted.   --Ordered lumbar spine x-ray and SI joint  x-ray to further evaluate increase coccydynia post fall.  --Lumbar spine MRI 12/2/2021 with levoscoliotic curvature lower thoracic upper lumbar spine.  Chronic unchanged small disc bulge L5-S1 with mild central and minimal bilateral foraminal stenosis.  Facet arthropathy mild at L4-5.  --Cervical spine x-ray 9/23/2021 with mild disc space height loss at C5-6 with osteophyte.  Slight rotation and lateral view and straightening of cervical lordosis.  --Lumbar spine MRI 2015 with L5-S1 annular tear.  Moderate to severe facet arthropathy L4-5 and L5-S1 with mild bilateral foraminal stenosis.      -INTERVENTIONS: No further injection recommendations at this time.  If no benefit with physical therapy we could consider bilateral SI joint steroid injection as her pain does localize to this area today post fall.    -MEDICATIONS: Advised patient to stop meloxicam due to GI upset.  Recommended trialing sulindac 200 mg 1 tablet twice daily as needed for pain and inflammation.  Advised patient to take this with food as well.  If GI aggravation with sulindac, then would recommend trialing diclofenac gel which also prescribed today.  Advised patient that she should alternate these medications and not do them at the same time however.  Discussed side effects of medications and proper use. Patient verbalized understanding.    -PHYSICAL THERAPY: [facility] nothing is scheduled at this time.  Did advise patient to schedule physical therapy at this time  Discussed the importance of core strengthening, ROM, stretching exercises with the patient and how each of these entities is important in decreasing pain.  Explained to the patient that the purpose of physical therapy is to teach the patient a home exercise program.  These exercises need to be performed every day in order to decrease pain and prevent future occurrences of pain.        -PATIENT EDUCATION:  Total time of 32  minutes, on the day of service, spent with the patient, reviewing the chart, placing orders, and documenting.   -Today we also discussed the issues related to the current COVID-19 pandemic, the pros and cons of the current treatment plan, the CDC guidelines such as social distancing, washing the hands, and covering the cough.    -FOLLOW UP: Follow-up if symptoms or not improving, she is okay with the [employer] message for x-ray results.  Advised to contact clinic if symptoms worsen or change.    Subjective:     Vani Corona is a 41 year old female who presents today for follow-up regarding acute bilateral low back pain that initially became severe 11/26/2021 after doing some remodeling work at her home.  Since then her pain has slightly improved, slightly less sharp pain post injection however 3 days post injection she did have a fall down multiple steps where she landed on her tailbone and has had worsening pain again since then.  Currently her pain is a 5/10, 10 at its worst, 2 at its best.  Overall aggravated with sitting, does improve with laying down.  Patient denies lower extremity radiating pain.  Denies numbness or tingling sensations, denies lower extremity weakness.  Denies bowel or bladder loss control, denies saddle anesthesia.    -Treatment to Date: No prior spinal surgery  No recent physical therapy  Chiropractic treatment x1 month neck pain     Left L5-S1 TFESI Dr. Sipple 2015  Bilateral L5-S1 TFESI 12/7/2021 with 25% relief, however fall post injection at home.  Preprocedure pain 8/10, post 2/10.     -Medications:  Aleve/ibuprofen with GI upset  Patient reports methocarbamol recently with minimal benefit  Meloxicam  Hydrocodone prescribed 12/6/2021     Tramadol 50 mg-ineffective    Patient Active Problem List   Diagnosis     Uncontrolled type 1 diabetes mellitus (H)     Anxiety     Arthralgia of multiple joints     Depression     GERD (gastroesophageal reflux disease)     History of ileostomy      "Hypothyroidism, unspecified type     Insomnia     Methamphetamine abuse in remission (H)     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)     Non-rheumatic mitral regurgitation     Skin mass     Tobacco abuse       Current Outpatient Medications   Medication     cyclobenzaprine (FLEXERIL) 5 MG tablet     diclofenac (VOLTAREN) 1 % topical gel     sulindac (CLINORIL) 200 MG tablet     ACCU-CHEK GUIDE test strip     albuterol (VENTOLIN HFA) 108 (90 Base) MCG/ACT inhaler     cetirizine (ZYRTEC) 10 MG tablet     chlorhexidine (PERIDEX) 0.12 % solution     Continuous Blood Gluc Sensor (DEXCOM G6 SENSOR) MISC     Continuous Blood Gluc Transmit (DEXCOM G6 TRANSMITTER) MISC     escitalopram (LEXAPRO) 10 MG tablet     insulin aspart (NovoLOG) injection     Insulin Infusion Pump (T: SLIM X2 INS /CONTROL 7.4) JESSICA     Insulin Infusion Pump Supplies (T:SLIM X2 3ML CARTRIDGE) MISC     ketoconazole (NIZORAL) 2 % external cream     LANTUS SOLOSTAR 100 UNIT/ML soln     lisinopril (ZESTRIL) 2.5 MG tablet     ondansetron (ZOFRAN) 4 MG tablet     pantoprazole (PROTONIX) 40 MG EC tablet     SYNTHROID 125 MCG OR TABS     No current facility-administered medications for this visit.       Allergies   Allergen Reactions     Augmentin Nausea and Vomiting and Headache     Varenicline Other (See Comments)     \"I took the Chantix and that made me a (severe) crazy person.\"  Emotional disturbance       Venlafaxine Nausea     Went away when venlafaxine stopped.  Did not rechallenge. 1/18/16     No Known Allergies      Fluconazole Rash       Past Medical History:   Diagnosis Date     Anxiety      Asthma      Bronchitis, not specified as acute or chronic      Chest pain      Depression      Diabetes (H)      Fainting      Heart disease     MVP     IDDM     dx 4/04     Methamphetamine abuse in remission (H) 4/10/2015    Treatment 1/2014. Clean since.      Mitral valve " prolapse      Mitral valve prolapse      Rheumatoid arthritis (H)      Rheumatoid arthritis (H)      Substance abuse (H)     methamphetamine     Thyroid disease      Unspecified keratitis         Review of Systems  ROS:  Specifically negative for bowel/bladder dysfunction, balance changes, headache, dizziness, foot drop, fevers, chills, appetite changes, nausea/vomiting, unexplained weight loss. Otherwise 13 systems reviewed are negative. Please see the patient's intake questionnaire from today for details.    Reviewed Social, Family, Past Medical and Past Surgical history with patient, no significant changes noted since prior visit.     Objective:     /69 (BP Location: Right arm, Patient Position: Sitting)   Pulse 76   LMP 11/22/2021   SpO2 97%     PHYSICAL EXAMINATION:    --CONSTITUTIONAL: Well developed, well nourished, healthy appearing individual.  --PSYCHIATRIC: Appropriate mood and affect. No difficulty interacting due to temper, social withdrawal, or memory issues.  --SKIN: Lumbar region is dry and intact.   --RESPIRATORY: Normal rhythm and effort. No abnormal accessory muscle breathing patterns noted.   --MUSCULOSKELETAL:  Normal lumbar lordosis noted, no lateral shift.  --GROSS MOTOR: Easily arises from a seated position. Gait is non-antalgic  --LUMBAR SPINE:  Inspection reveals no evidence of deformity. Range of motion is not limited in lumbar flexion, extension, lateral rotation.  Tenderness to palpation L4 L5-S1 region. Sciatic notch tender bilaterally.   --SACROILIAC JOINT: One Finger point test positive bilateral.   --HIPS: Full range of motion bilaterally.     RESULTS:   Imaging: Lumbar spine imaging was reviewed today. The images were shown to the patient and the findings were explained using a spine model.      MR Lumbar Spine w/o Contrast  Result Date: 12/2/2021  EXAM: MR LUMBAR SPINE W/O CONTRAST LOCATION: [facility] DATE/TIME: 12/2/2021 2:13 PM INDICATION: Low  back pain, > 6 wks COMPARISON: 03/05/2015 lumbar spine MRI. TECHNIQUE: Routine Lumbar Spine MRI without IV contrast. FINDINGS: Numbering assumes 5 lumbar type vertebrae. Levoscoliotic curvature to the lower thoracic and upper lumbar spine. Otherwise unremarkable alignment. Vertebral body heights are maintained. Osseous hemangioma in the L5 vertebral body with no concerning marrow signal abnormality and no osseous edema. No identifiable pars defect. Normal distal spinal cord and cauda equina with conus medullaris at L1-L2. Mild fatty atrophy of the dorsal paraspinal musculature inferiorly. Unremarkable visualized bony pelvis. T12-L1: Normal disc height and signal. No herniation. Normal facets. No spinal canal or neural foraminal stenosis. L1-L2: Normal disc height and signal with no posterior disc bulge. Mild bilateral facet arthropathy. Spinal canal and neural foramina are widely patent. L2-L3: Normal disc height and signal with no posterior disc bulge. Facet arthropathy is minimal bilaterally. Spinal canal and neural foramina are widely patent. L3-L4: Normal disc height and signal with no posterior disc bulge or significant facet arthropathy. Spinal canal and neural foramina are widely patent. L4-L5: Normal disc height and signal with no posterior disc bulge. Facet arthropathy is mild/minimal bilaterally. Spinal canal and neural foramina are widely patent. L5-S1: Mild loss of disc height. Disc desiccation. Small diffuse posterior disc bulge. No significant facet arthropathy. Mild spinal canal stenosis. Neural foraminal stenosis mild/minimal bilaterally.   IMPRESSION: 1.  Unchanged mild L5-S1 degenerative disc disease with a small diffuse posterior disc bulge that contributes to a mild spinal canal stenosis. This is accompanied by only a mild/minimal bilateral L5-S1 neural foraminal stenosis. 2.  No high-grade spinal canal or neural foraminal stenosis at any level. 3.  Facet arthropathy in the lumbar spine is  overall mild/minimal, as above.                        Again, thank you for allowing me to participate in the care of your patient.        Sincerely,        Simin Foster CNP

## 2021-12-22 NOTE — PROGRESS NOTES
Assessment:     Diagnoses and all orders for this visit:  Chronic bilateral low back pain without sciatica  -     XR Lumbar Spine 2/3 Views; Future  -     XR Sacroiliac Joint 1/2 Views; Future  -     sulindac (CLINORIL) 200 MG tablet; Take 1 tablet (200 mg) by mouth 2 times daily as needed (Pain) Take with food  -     diclofenac (VOLTAREN) 1 % topical gel; Apply 2-4 g topically 4 times daily  Lumbar foraminal stenosis  DDD (degenerative disc disease), lumbar  Annular tear of lumbar disc  Bulging lumbar disc  Coccydynia  -     XR Lumbar Spine 2/3 Views; Future  -     XR Sacroiliac Joint 1/2 Views; Future  -     sulindac (CLINORIL) 200 MG tablet; Take 1 tablet (200 mg) by mouth 2 times daily as needed (Pain) Take with food  -     diclofenac (VOLTAREN) 1 % topical gel; Apply 2-4 g topically 4 times daily     Vani Corona is a 41 year old y.o. female with past medical history significant for uncontrolled Type 1 Diabetes Mellitus with diabetic retinopathy, hypothyroidism, GERD, mitral valve regurgitation, history ileostomy, depression, tobacco abuse, insomnia, history of methamphetamine abuse who presents today for follow-up regarding:    -Chronic bilateral low back pain with acute flareup post fall 12/19/2021.    -Coccydynia post fall from mild tenderness palpation generalized lumbar spine and coccyx region.     Plan:     A shared decision making plan was used. The patient's values and choices were respected. Prior medical records were reviewed today. The following represents what was discussed and decided upon by the provider and the patient.        -DIAGNOSTIC TESTS: Images were personally reviewed and interpreted.   --Ordered lumbar spine x-ray and SI joint x-ray to further evaluate increase coccydynia post fall.  --Lumbar spine MRI 12/2/2021 with levoscoliotic curvature lower thoracic upper lumbar spine.  Chronic unchanged small disc bulge L5-S1 with mild central and minimal bilateral foraminal stenosis.  Facet  arthropathy mild at L4-5.  --Cervical spine x-ray 9/23/2021 with mild disc space height loss at C5-6 with osteophyte.  Slight rotation and lateral view and straightening of cervical lordosis.  --Lumbar spine MRI 2015 with L5-S1 annular tear.  Moderate to severe facet arthropathy L4-5 and L5-S1 with mild bilateral foraminal stenosis.      -INTERVENTIONS: No further injection recommendations at this time.  If no benefit with physical therapy we could consider bilateral SI joint steroid injection as her pain does localize to this area today post fall.    -MEDICATIONS: Advised patient to stop meloxicam due to GI upset.  Recommended trialing sulindac 200 mg 1 tablet twice daily as needed for pain and inflammation.  Advised patient to take this with food as well.  If GI aggravation with sulindac, then would recommend trialing diclofenac gel which also prescribed today.  Advised patient that she should alternate these medications and not do them at the same time however.  Discussed side effects of medications and proper use. Patient verbalized understanding.    -PHYSICAL THERAPY: [facility] nothing is scheduled at this time.  Did advise patient to schedule physical therapy at this time  Discussed the importance of core strengthening, ROM, stretching exercises with the patient and how each of these entities is important in decreasing pain.  Explained to the patient that the purpose of physical therapy is to teach the patient a home exercise program.  These exercises need to be performed every day in order to decrease pain and prevent future occurrences of pain.        -PATIENT EDUCATION:  Total time of 32 minutes, on the day of service, spent with the patient, reviewing the chart, placing orders, and documenting.   -Today we also discussed the issues related to the current COVID-19 pandemic, the pros and cons of the current treatment plan, the CDC guidelines  such as social distancing, washing the hands, and covering the cough.    -FOLLOW UP: Follow-up if symptoms or not improving, she is okay with the [employer] message for x-ray results.  Advised to contact clinic if symptoms worsen or change.    Subjective:     Vani Corona is a 41 year old female who presents today for follow-up regarding acute bilateral low back pain that initially became severe 11/26/2021 after doing some remodeling work at her home.  Since then her pain has slightly improved, slightly less sharp pain post injection however 3 days post injection she did have a fall down multiple steps where she landed on her tailbone and has had worsening pain again since then.  Currently her pain is a 5/10, 10 at its worst, 2 at its best.  Overall aggravated with sitting, does improve with laying down.  Patient denies lower extremity radiating pain.  Denies numbness or tingling sensations, denies lower extremity weakness.  Denies bowel or bladder loss control, denies saddle anesthesia.    -Treatment to Date: No prior spinal surgery  No recent physical therapy  Chiropractic treatment x1 month neck pain     Left L5-S1 TFESI Dr. Sipple 2015  Bilateral L5-S1 TFESI 12/7/2021 with 25% relief, however fall post injection at home.  Preprocedure pain 8/10, post 2/10.     -Medications:  Aleve/ibuprofen with GI upset  Patient reports methocarbamol recently with minimal benefit  Meloxicam  Hydrocodone prescribed 12/6/2021     Tramadol 50 mg-ineffective    Patient Active Problem List   Diagnosis     Uncontrolled type 1 diabetes mellitus (H)     Anxiety     Arthralgia of multiple joints     Depression     GERD (gastroesophageal reflux disease)     History of ileostomy     Hypothyroidism, unspecified type     Insomnia     Methamphetamine abuse in remission (H)     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)     Non-rheumatic mitral regurgitation      "Skin mass     Tobacco abuse       Current Outpatient Medications   Medication     cyclobenzaprine (FLEXERIL) 5 MG tablet     diclofenac (VOLTAREN) 1 % topical gel     sulindac (CLINORIL) 200 MG tablet     ACCU-CHEK GUIDE test strip     albuterol (VENTOLIN HFA) 108 (90 Base) MCG/ACT inhaler     cetirizine (ZYRTEC) 10 MG tablet     chlorhexidine (PERIDEX) 0.12 % solution     Continuous Blood Gluc Sensor (DEXCOM G6 SENSOR) MISC     Continuous Blood Gluc Transmit (DEXCOM G6 TRANSMITTER) MISC     escitalopram (LEXAPRO) 10 MG tablet     insulin aspart (NovoLOG) injection     Insulin Infusion Pump (T: SLIM X2 INS /CONTROL 7.4) JESSICA     Insulin Infusion Pump Supplies (T:SLIM X2 3ML CARTRIDGE) MISC     ketoconazole (NIZORAL) 2 % external cream     LANTUS SOLOSTAR 100 UNIT/ML soln     lisinopril (ZESTRIL) 2.5 MG tablet     ondansetron (ZOFRAN) 4 MG tablet     pantoprazole (PROTONIX) 40 MG EC tablet     SYNTHROID 125 MCG OR TABS     No current facility-administered medications for this visit.       Allergies   Allergen Reactions     Augmentin Nausea and Vomiting and Headache     Varenicline Other (See Comments)     \"I took the Chantix and that made me a (severe) crazy person.\"  Emotional disturbance       Venlafaxine Nausea     Went away when venlafaxine stopped.  Did not rechallenge. 1/18/16     No Known Allergies      Fluconazole Rash       Past Medical History:   Diagnosis Date     Anxiety      Asthma      Bronchitis, not specified as acute or chronic      Chest pain      Depression      Diabetes (H)      Fainting      Heart disease     MVP     IDDM     dx 4/04     Methamphetamine abuse in remission (H) 4/10/2015    Treatment 1/2014. Clean since.      Mitral valve prolapse      Mitral valve prolapse      Rheumatoid arthritis (H)      Rheumatoid arthritis (H)      Substance abuse (H)     methamphetamine     Thyroid disease      Unspecified keratitis         Review of Systems  ROS:  Specifically negative for bowel/bladder " dysfunction, balance changes, headache, dizziness, foot drop, fevers, chills, appetite changes, nausea/vomiting, unexplained weight loss. Otherwise 13 systems reviewed are negative. Please see the patient's intake questionnaire from today for details.    Reviewed Social, Family, Past Medical and Past Surgical history with patient, no significant changes noted since prior visit.     Objective:     /69 (BP Location: Right arm, Patient Position: Sitting)   Pulse 76   LMP 11/22/2021   SpO2 97%     PHYSICAL EXAMINATION:    --CONSTITUTIONAL: Well developed, well nourished, healthy appearing individual.  --PSYCHIATRIC: Appropriate mood and affect. No difficulty interacting due to temper, social withdrawal, or memory issues.  --SKIN: Lumbar region is dry and intact.   --RESPIRATORY: Normal rhythm and effort. No abnormal accessory muscle breathing patterns noted.   --MUSCULOSKELETAL:  Normal lumbar lordosis noted, no lateral shift.  --GROSS MOTOR: Easily arises from a seated position. Gait is non-antalgic  --LUMBAR SPINE:  Inspection reveals no evidence of deformity. Range of motion is not limited in lumbar flexion, extension, lateral rotation.  Tenderness to palpation L4 L5-S1 region. Sciatic notch tender bilaterally.   --SACROILIAC JOINT: One Finger point test positive bilateral.   --HIPS: Full range of motion bilaterally.     RESULTS:   Imaging: Lumbar spine imaging was reviewed today. The images were shown to the patient and the findings were explained using a spine model.      MR Lumbar Spine w/o Contrast  Result Date: 12/2/2021  EXAM: MR LUMBAR SPINE W/O CONTRAST LOCATION: [facility] DATE/TIME: 12/2/2021 2:13 PM INDICATION: Low back pain, > 6 wks COMPARISON: 03/05/2015 lumbar spine MRI. TECHNIQUE: Routine Lumbar Spine MRI without IV contrast. FINDINGS: Numbering assumes 5 lumbar type vertebrae. Levoscoliotic curvature to the lower thoracic and upper lumbar spine. Otherwise unremarkable  alignment. Vertebral body heights are maintained. Osseous hemangioma in the L5 vertebral body with no concerning marrow signal abnormality and no osseous edema. No identifiable pars defect. Normal distal spinal cord and cauda equina with conus medullaris at L1-L2. Mild fatty atrophy of the dorsal paraspinal musculature inferiorly. Unremarkable visualized bony pelvis. T12-L1: Normal disc height and signal. No herniation. Normal facets. No spinal canal or neural foraminal stenosis. L1-L2: Normal disc height and signal with no posterior disc bulge. Mild bilateral facet arthropathy. Spinal canal and neural foramina are widely patent. L2-L3: Normal disc height and signal with no posterior disc bulge. Facet arthropathy is minimal bilaterally. Spinal canal and neural foramina are widely patent. L3-L4: Normal disc height and signal with no posterior disc bulge or significant facet arthropathy. Spinal canal and neural foramina are widely patent. L4-L5: Normal disc height and signal with no posterior disc bulge. Facet arthropathy is mild/minimal bilaterally. Spinal canal and neural foramina are widely patent. L5-S1: Mild loss of disc height. Disc desiccation. Small diffuse posterior disc bulge. No significant facet arthropathy. Mild spinal canal stenosis. Neural foraminal stenosis mild/minimal bilaterally.   IMPRESSION: 1.  Unchanged mild L5-S1 degenerative disc disease with a small diffuse posterior disc bulge that contributes to a mild spinal canal stenosis. This is accompanied by only a mild/minimal bilateral L5-S1 neural foraminal stenosis. 2.  No high-grade spinal canal or neural foraminal stenosis at any level. 3.  Facet arthropathy in the lumbar spine is overall mild/minimal, as above.

## 2021-12-30 ENCOUNTER — HOSPITAL ENCOUNTER (OUTPATIENT)
Dept: PHYSICAL THERAPY | Facility: REHABILITATION | Age: 41
End: 2021-12-30
Attending: NURSE PRACTITIONER
Payer: COMMERCIAL

## 2021-12-30 DIAGNOSIS — G89.29 CHRONIC BILATERAL LOW BACK PAIN WITHOUT SCIATICA: ICD-10-CM

## 2021-12-30 DIAGNOSIS — M54.50 CHRONIC BILATERAL LOW BACK PAIN WITHOUT SCIATICA: ICD-10-CM

## 2021-12-30 DIAGNOSIS — M54.50 ACUTE BILATERAL LOW BACK PAIN WITHOUT SCIATICA: ICD-10-CM

## 2021-12-30 DIAGNOSIS — M51.369 DDD (DEGENERATIVE DISC DISEASE), LUMBAR: ICD-10-CM

## 2021-12-30 DIAGNOSIS — M47.816 LUMBAR FACET ARTHROPATHY: ICD-10-CM

## 2021-12-30 DIAGNOSIS — M48.061 LUMBAR FORAMINAL STENOSIS: ICD-10-CM

## 2021-12-30 PROCEDURE — 97161 PT EVAL LOW COMPLEX 20 MIN: CPT | Mod: GP | Performed by: PHYSICAL THERAPIST

## 2021-12-30 PROCEDURE — 97110 THERAPEUTIC EXERCISES: CPT | Mod: GP | Performed by: PHYSICAL THERAPIST

## 2021-12-30 PROCEDURE — 97140 MANUAL THERAPY 1/> REGIONS: CPT | Mod: GP | Performed by: PHYSICAL THERAPIST

## 2021-12-30 NOTE — PROGRESS NOTES
[facility]    OUTPATIENT PHYSICAL THERAPY ORTHOPEDIC EVALUATION  PLAN OF TREATMENT FOR OUTPATIENT REHABILITATION  (COMPLETE FOR INITIAL CLAIMS ONLY)  Patient's Last Name, First Name, M.I.  YOB: 1980  [place]    Provider s Name:  [facility]   Medical Record No.  [id number]   Start of Care Date:  12/30/21   Onset Date:  12/11/21 (Initial back pain from 11/25 but mostly resolved p injection)   Type:     _X__PT   ___OT   ___SLP Medical Diagnosis:  (P) Acute Low Back Pain without sciatic     PT Diagnosis:  Sacral Pain, Coccyx Pain   Visits from SOC:  1      _________________________________________________________________________________  Plan of Treatment/Functional Goals:  joint mobilization,manual therapy,gait training,neuromuscular re-education,strengthening,stretching,ROM           Goals     Goal Description: Pt will be independent with her HEP for ongoing symptom management in 12 weeks.          Goal Description: Pt will improve lumbar ROM to 50 deg to assist with LB dressing in 12 weeks.           Goal Description: Pt will report being able to walk 30 min with normal gait mechanics and c/o 1-2 sacral/coccyx pain in 12 weeks.           Goal Description: Pt will be able to go up/down stairs with reciprocal pattern and single HR and 0-1/10 sacral/coccyx pain in 12 weeks.                                                    Therapy Frequency:  1 time/week (1x/2-3 weeks)  Predicted Duration of Therapy Intervention:  12 weeks; up to 8 sessions    Ashley Milian, PT                 I CERTIFY THE NEED FOR THESE SERVICES FURNISHED UNDER        THIS PLAN OF TREATMENT AND WHILE UNDER MY CARE     (Physician co-signature of this document indicates review and certification of the therapy plan).                       Certification Date From:  12/30/21   Certification  "Date To:  03/30/22    Referring Provider:  Simin Foster CNP    Initial Assessment        See Epic Evaluation Start of Care Date: 12/30/21 12/30/21 0800   General Information   Type of Visit Initial OP Ortho PT Evaluation   Start of Care Date 12/30/21   Referring Physician Simin Foster CNP   Patient/Family Goals Statement To have pain relief   Orders Evaluate and Treat   Date of Order 12/06/21   Certification Required? Yes   Medical Diagnosis Acute Low Back Pain without sciatic   Surgical/Medical history reviewed Yes   Body Part(s)   Body Part(s) Lumbar Spine/SI   Presentation and Etiology   Pertinent history of current problem (include personal factors and/or comorbidities that impact the POC) The day after Thanksgiving the pt had worsening low back pain.  She notes she was sanding the stairs in her home and was likely flexed forward for a period of time.  The pt denies pain prior to this event.  The pt got a low back injection on 12/7/21 and per MD note, \"Bilateral L5-S1 TFESI 12/7/2021 with 25% relief, however fall post injection at home\" 3 days later.   The pt fell down several steps, landing on her buttocks.  The pt now presents with focused coccyx and sacral pain post fall.    Impairments A. Pain;H. Impaired gait;F. Decreased strength and endurance;B. Decreased WB tolerance   Functional Limitations perform activities of daily living;perform required work activities   Symptom Location Sacral and coccyx   How/Where did it occur With a fall   Onset date of current episode/exacerbation 12/11/21  (Initial back pain from 11/25 but mostly resolved p injection)   Chronicity New   Pain rating (0-10 point scale) Best (/10);Worst (/10)  (Current: 4/10)   Best (/10) 2   Worst (/10) 4   Pain quality C. Aching   Frequency of pain/symptoms A. Constant   Pain/symptoms are: The same all the time   Pain/symptoms exacerbated by A. Sitting  (Pt unable to sit >5-10 min due to pain; Walking ) "   Pain/symptoms eased by E. Changing positions  (lying down)   Progression of symptoms since onset: Improved   Prior Level of Function   Functional Level Prior Comment Independent with all mobility; no issues with pain   Current Level of Function   Current Community Support Family/friend caregiver   Patient role/employment history A. Employed   Employment Comments    Living environment House/[facility]   Home/community accessibility 4 JOE with HR; bedroom and office upstairs--pt reports stairs are difficult due to pain   Current equipment-Gait/Locomotion None   Current equipment-ADL None   Fall Risk Screen   Fall screen completed by PT   Have you fallen 2 or more times in the past year? No   Have you fallen and had an injury in the past year? Yes   Is patient a fall risk? No   Fall screen comments Pt's fall down the stairs was due to pt having just refinished the stairs and they had no tread; pt wearing socks and slipped.    Abuse Screen (yes response referral indicated)   Feels Unsafe at Home or Work/School no   Feels Threatened by Someone no   Does Anyone Try to Keep You From Having Contact with Others or Doing Things Outside Your Home? no   Physical Signs of Abuse Present no   Lumbar Spine/SI Objective Findings   Observation Pt has difficulty sitting during PT eval   Flexion ROM 30 deg   Extension ROM 20 deg   Right Side Bending ROM 20 inches   Left Side Bending ROM 20 inches   Hip Flexion (L2) Strength 4 to 4+/5, B'ly   Hip Extension Strength Painful   Knee Flexion Strength WNL   Knee Extension (L3) Strength WNL   Ankle Dorsiflexion (L4) Strength WNL   Hamstring Flexibility limited, B'ly  (contributing to decreased lumbar ROM)   Prone Instability Test NT   Crossover SLR -   Slump Test -   Spring Test -   Segmental Mobility WNL   Palpation Tender at coccyx   Planned Therapy Interventions   Planned Therapy Interventions joint mobilization;manual therapy;gait training;neuromuscular  re-education;strengthening;stretching;ROM   Clinical Impression   Criteria for Skilled Therapeutic Interventions Met yes, treatment indicated   PT Diagnosis Sacral Pain, Coccyx Pain   Influenced by the following impairments Pain in buttocks; proximal weakness; limited ROM   Functional limitations due to impairments Difficulty sitting, difficulty with LB dressing, difficulty walking   Clinical Presentation Stable/Uncomplicated   Clinical Decision Making (Complexity) Low complexity   Therapy Frequency 1 time/week  (1x/2-3 weeks)   Predicted Duration of Therapy Intervention (days/wks) 12 weeks; up to 8 sessions   Risk & Benefits of therapy have been explained Yes   Patient, Family & other staff in agreement with plan of care Yes   Clinical Impression Comments Pt presents with sacral and coccyx pain after a fall down her stairs.  Of note, the pt had just recieved an injection in her low back from back pain stemming from the end of November.  The pt reports, however, that her current pain is mostly from the fall down her stairs and is concentrated in her coccyx and sacrum.  The pt tolerated her HEP [facility] at [facility] and will benefit from a strengthening and stretching program to facilitate healing and pain reduction.     Education Assessment   Barriers to Learning No barriers   ORTHO GOALS   PT Ortho [facility] Goals 1;2;3;4   Ortho Goal 1   Goal Description Pt will be independent with her HEP for ongoing symptom management in 12 weeks.   Ortho Goal 2   Goal Description Pt will improve lumbar ROM to 50 deg to assist with LB dressing in 12 weeks.    Ortho Goal 3   Goal Description Pt will report being able to walk 30 min with normal gait mechanics and c/o 1-2 sacral/coccyx pain in 12 weeks.    Ortho Goal 4   Goal Description Pt will be able to go up/down stairs with reciprocal pattern and single HR and 0-1/10 sacral/coccyx pain in 12 weeks.    Total Evaluation Time   PT Eval, Low Complexity Minutes (33437) 20   Therapy  Certification   Certification date from 12/30/21   Certification date to 03/30/22   Medical Diagnosis Acute Low Back Pain without sciatic

## 2022-01-12 VITALS — WEIGHT: 135 LBS | HEIGHT: 64 IN | BODY MASS INDEX: 23.05 KG/M2

## 2022-01-17 ENCOUNTER — E-VISIT (OUTPATIENT)
Dept: URGENT CARE | Facility: URGENT CARE | Age: 42
End: 2022-01-17
Payer: COMMERCIAL

## 2022-01-17 DIAGNOSIS — Z20.822 SUSPECTED COVID-19 VIRUS INFECTION: ICD-10-CM

## 2022-01-17 DIAGNOSIS — R05.9 COUGH: Primary | ICD-10-CM

## 2022-01-17 PROCEDURE — 99421 OL DIG E/M SVC 5-10 MIN: CPT | Performed by: PHYSICIAN ASSISTANT

## 2022-01-17 NOTE — PATIENT INSTRUCTIONS
Vani,    Your symptoms show that you may have coronavirus (COVID-19). This illness can cause fever, cough and trouble breathing. Many people get a mild case and get better on their own. Some people can get very sick.    Because you reported additional symptoms, I would like to also test you for Influenza.    What should I do?  We would like to test you for COVID-19 virus and influenza. I have placed orders for these tests. To schedule: go to your [employer] home page and scroll down to the section that says  You have an appointment that needs to be scheduled  and click the large green button that says  Schedule Now  and follow the steps to find the next available openings. It is important that when you are asked what the reason for your appointment is that you mention you need BOTH COVID and influenza tests.    If you are unable to complete these [employer] scheduling steps, please call [phone] to schedule your testing.     Return to work/school/ guidance:   Please let your workplace manager and staffing office know when your isolation ends.       If you receive a positive COVID-19 test result, follow the guidance of the those who are giving you the results. Usually the return to work is 10 days from symptom onset or positive test date, whichever comes first (or in some cases 20 days if you are immunocompromised). If your symptoms started after your positive test, the 10 days should start when your symptoms started.   o If you work at [facility], you must also be cleared by Employee Occupational Health and Safety to return to work.      If you receive a negative COVID-19 test result and did not have a high risk exposure to someone with a known positive COVID-19 test, you can return to work once you're free of fever for 24 hours without fever-reducing medication and your symptoms are improving or resolved.    If you receive a negative COVID-19 test and if you had a high risk exposure to someone who  has tested positive for COVID-19 then you can return to work 14 days after your last contact with the positive individual. Follow quarantine guidance given by your doctor or public health officials.    Sign up for [employer].   We know it's scary to hear that you might have COVID-19. We want to track your symptoms to make sure you're okay over the next 2 weeks. Please look for an email from [employer]--this is a free, online program that we'll use to keep in touch. To sign up, follow the link in the email you will receive. Learn more at [email or web address]    How can I take care of myself?  Over the counter medications may help with your symptoms like congestion, cough, chills, or fever.    There are not many effective prescription treatments for early COVID-19. Hydroxychloroquine, ivermectin, and azithromycin are not effective or recommended for COVID-19.    If your symptoms started in the last 10 days, you may be able to receive a treatment with monoclonal antibodies. This treatment can lower your risk of severe illness and going to the hospital. It is given through an IV or under your skin (subcutaneous) and must be given at an infusion center. You must be 12 or older, weight at least 88 pounds, and have a positive COVID-19 test.      If you would like to sign up to be considered to receive the monoclonal antibody medicine, please complete a participation form through the [facility] here:  MNRAP ([email or web address].[email or web address].[email or web address]/diseases/[email or web address]/mnrap.[email or web address]). You may also call the [facility] COVID-19 Public Hotline at 1-230.649.8711 (open Mon-Fri: 9am-7pm and Sat: 10am-6pm).     Not all people who are eligible will receive the medicine, since supply is limited. You will be contacted in the next 1 to 2 business days only if you are selected. If you do not receive a call, you have not been selected to receive the medicine. If you have any questions about this medication, please  contact your primary care provider. For more information, see [email or web address].mn.us/diseases/coronavirus/[email or web address].pdf      Get lots of rest. Drink extra fluids (unless a doctor has told you not to)    Take Tylenol (acetaminophen) or ibuprofen for fever or pain. If you have liver or kidney problems, ask your family doctor if it's okay to take Tylenol or ibuprofen    Take over the counter medications for your symptoms, as directed by your doctor. You may also talk to your pharmacist.      If you have other health problems (like cancer, heart failure, an organ transplant or severe kidney disease): Call your specialty clinic if you don't feel better in the next 2 days.    Know when to call 911. Emergency warning signs include:  o Trouble breathing or shortness of breath  o Pain or pressure in the chest that doesn't go away  o Feeling confused like you haven't felt before, or not being able to wake up  o Bluish-colored lips or face    Where can I get more information?    [facility] - About COVID-19: [email or web address]     CDC - What to Do If You're Sick:   www.cdc.gov/coronavirus/2019-ncov/about/steps-when-sick.html    CDC - Ending Home Isolation:  https://www.cdc.gov/coronavirus/2019-ncov/your-health/quarantine-isolation.html    CDC - Caring for Someone:  www.cdc.gov/coronavirus/2019-ncov/if-you-are-sick/care-for-someone.html    [facility] clinical trials (COVID-19 research studies): clinicalaffairs.[facility]-clinical-trials    Below are the COVID-19 hotlines at the [facility] of Health ([facility]). Interpreters are available.  o For health questions: Call 219-199-4550 or 1-660.228.2351 (7 a.m. to 7 p.m.)  o For questions about schools and childcare: Call 489-782-4200 or 1-563.297.6526 (7 a.m. to 7 p.m.)

## 2022-01-18 VITALS — SYSTOLIC BLOOD PRESSURE: 114 MMHG | DIASTOLIC BLOOD PRESSURE: 62 MMHG | BODY MASS INDEX: 23 KG/M2 | WEIGHT: 134 LBS

## 2022-01-18 VITALS
WEIGHT: 118.7 LBS | BODY MASS INDEX: 20.26 KG/M2 | HEIGHT: 64 IN | DIASTOLIC BLOOD PRESSURE: 68 MMHG | SYSTOLIC BLOOD PRESSURE: 110 MMHG | HEART RATE: 60 BPM

## 2022-01-18 VITALS
SYSTOLIC BLOOD PRESSURE: 102 MMHG | BODY MASS INDEX: 23.69 KG/M2 | DIASTOLIC BLOOD PRESSURE: 58 MMHG | HEART RATE: 60 BPM | WEIGHT: 138 LBS

## 2022-01-18 VITALS
BODY MASS INDEX: 23 KG/M2 | DIASTOLIC BLOOD PRESSURE: 62 MMHG | WEIGHT: 134 LBS | HEART RATE: 64 BPM | SYSTOLIC BLOOD PRESSURE: 120 MMHG

## 2022-01-18 ASSESSMENT — PATIENT HEALTH QUESTIONNAIRE - PHQ9
SUM OF ALL RESPONSES TO PHQ QUESTIONS 1-9: 11
SUM OF ALL RESPONSES TO PHQ QUESTIONS 1-9: 9

## 2022-01-24 ENCOUNTER — TELEPHONE (OUTPATIENT)
Dept: ENDOCRINOLOGY | Facility: CLINIC | Age: 42
End: 2022-01-24
Payer: COMMERCIAL

## 2022-01-24 NOTE — TELEPHONE ENCOUNTER
Spoke w/ pt. She states her tandem pump broke on Friday and they shipped her a new one Saturday. (She has been taking lantus) She needs to know her pump settings as her old pump will not turn on.     Provided pt w/ settings per chart:   Basal rate: 12am-12am: 0.60 units/hr   ICR: 12am-12am: 15   ISF: 12am-12am: 58  Max bolus: 10 units   Max basal: 1.2 units/hr     Walked pt through putting new settings in. Turning on control IQ and connecting dexcom, setting alerts and alarms. Everything is set up. Pt will wait until 10pm tonight to restart pump as she took lantus around 10pm last night.

## 2022-01-24 NOTE — TELEPHONE ENCOUNTER
[facility] Call Center    Phone Message    May a detailed message be left on voicemail: yes     Reason for Call: Other: Pt states that she has a new pump and needs help adjusting the settings. Requesting call back at #[phone].      Action Taken: Other: Endo    Travel Screening: Not Applicable

## 2022-02-11 ENCOUNTER — HOSPITAL ENCOUNTER (OUTPATIENT)
Dept: PHYSICAL THERAPY | Facility: REHABILITATION | Age: 42
End: 2022-02-11
Payer: COMMERCIAL

## 2022-02-11 DIAGNOSIS — M54.50 CHRONIC BILATERAL LOW BACK PAIN WITHOUT SCIATICA: Primary | ICD-10-CM

## 2022-02-11 DIAGNOSIS — G89.29 CHRONIC BILATERAL LOW BACK PAIN WITHOUT SCIATICA: Primary | ICD-10-CM

## 2022-02-11 DIAGNOSIS — M48.061 LUMBAR FORAMINAL STENOSIS: ICD-10-CM

## 2022-02-11 PROCEDURE — 97110 THERAPEUTIC EXERCISES: CPT | Mod: GP | Performed by: PHYSICAL THERAPIST

## 2022-02-11 PROCEDURE — 97140 MANUAL THERAPY 1/> REGIONS: CPT | Mod: GP | Performed by: PHYSICAL THERAPIST

## 2022-02-17 PROBLEM — E11.10 DKA (DIABETIC KETOACIDOSIS) (H): Status: RESOLVED | Noted: 2017-07-19 | Resolved: 2017-09-21

## 2022-03-04 ENCOUNTER — HOSPITAL ENCOUNTER (OUTPATIENT)
Dept: PHYSICAL THERAPY | Facility: REHABILITATION | Age: 42
End: 2022-03-04
Payer: COMMERCIAL

## 2022-03-04 DIAGNOSIS — M54.50 CHRONIC BILATERAL LOW BACK PAIN WITHOUT SCIATICA: Primary | ICD-10-CM

## 2022-03-04 DIAGNOSIS — M48.061 LUMBAR FORAMINAL STENOSIS: ICD-10-CM

## 2022-03-04 DIAGNOSIS — M47.816 LUMBAR FACET ARTHROPATHY: ICD-10-CM

## 2022-03-04 DIAGNOSIS — G89.29 CHRONIC BILATERAL LOW BACK PAIN WITHOUT SCIATICA: Primary | ICD-10-CM

## 2022-03-04 PROCEDURE — 97110 THERAPEUTIC EXERCISES: CPT | Mod: GP | Performed by: PHYSICAL THERAPIST

## 2022-03-04 PROCEDURE — 97140 MANUAL THERAPY 1/> REGIONS: CPT | Mod: GP | Performed by: PHYSICAL THERAPIST

## 2022-03-24 DIAGNOSIS — E03.9 HYPOTHYROIDISM, UNSPECIFIED TYPE: Primary | ICD-10-CM

## 2022-03-28 RX ORDER — LEVOTHYROXINE SODIUM 125 MCG
125 TABLET ORAL DAILY
Qty: 90 TABLET | Refills: 0 | Status: SHIPPED | OUTPATIENT
Start: 2022-03-28 | End: 2022-06-10

## 2022-04-20 ENCOUNTER — MYC MEDICAL ADVICE (OUTPATIENT)
Dept: INTERNAL MEDICINE | Facility: CLINIC | Age: 42
End: 2022-04-20
Payer: COMMERCIAL

## 2022-04-20 DIAGNOSIS — F32.5 MAJOR DEPRESSIVE DISORDER IN FULL REMISSION, UNSPECIFIED WHETHER RECURRENT (H): ICD-10-CM

## 2022-04-20 DIAGNOSIS — E10.29 TYPE 1 DIABETES MELLITUS WITH MICROALBUMINURIA (H): ICD-10-CM

## 2022-04-20 DIAGNOSIS — G44.011 INTRACTABLE EPISODIC CLUSTER HEADACHE: Primary | ICD-10-CM

## 2022-04-20 DIAGNOSIS — R80.9 TYPE 1 DIABETES MELLITUS WITH MICROALBUMINURIA (H): ICD-10-CM

## 2022-04-21 RX ORDER — SUMATRIPTAN 25 MG/1
25 TABLET, FILM COATED ORAL
Qty: 15 TABLET | Refills: 0 | Status: SHIPPED | OUTPATIENT
Start: 2022-04-21 | End: 2024-04-02

## 2022-04-21 NOTE — TELEPHONE ENCOUNTER
Pt requesting refill for Imitrex that is no longer on medication list. She has not been into see PCP since 06/14/2021.    Review and advise.

## 2022-04-23 RX ORDER — ESCITALOPRAM OXALATE 10 MG/1
TABLET ORAL
Qty: 90 TABLET | Refills: 1 | Status: SHIPPED | OUTPATIENT
Start: 2022-04-23 | End: 2022-08-08 | Stop reason: DRUGHIGH

## 2022-04-23 RX ORDER — LISINOPRIL 2.5 MG/1
TABLET ORAL
Qty: 90 TABLET | Refills: 1 | Status: SHIPPED | OUTPATIENT
Start: 2022-04-23 | End: 2023-02-02

## 2022-05-03 ENCOUNTER — MYC MEDICAL ADVICE (OUTPATIENT)
Dept: INTERNAL MEDICINE | Facility: CLINIC | Age: 42
End: 2022-05-03
Payer: COMMERCIAL

## 2022-05-03 NOTE — TELEPHONE ENCOUNTER
"Routing refill request to provider for review/approval because:  A break in medication    Last Written Prescription Date:  2/23/2021  Last Fill Quantity: 90,  # refills: 2   Last office visit provider:  10/1/2021 Aby Ridley CNP     SYNTHROID 125 mcg tablet 90 tablet 2 2/23/2021  Yes   Sig - Route: Take 1 tablet (125 mcg total) by mouth daily. Due for lab work - Oral   Sent to pharmacy as: Synthroid 125 mcg tablet   E-Prescribing Status: Receipt confirmed by pharmacy (2/23/2021  5:05 PM CST       Requested Prescriptions   Pending Prescriptions Disp Refills     SYNTHROID 125 MCG tablet [Pharmacy Med Name: SYNTHROID 125MCG TABS] 90 tablet 2     Sig: TAKE 1 TABLET (125 MCG TOTAL) BY MOUTH DAILY. DUE FOR LAB WORK (DAW1)       Thyroid Protocol Passed - 3/24/2022  3:33 PM        Passed - Patient is 12 years or older        Passed - Recent (12 mo) or future (30 days) visit within the authorizing provider's specialty     Patient has had an office visit with the authorizing provider or a provider within the authorizing providers department within the previous 12 mos or has a future within next 30 days. See \"Patient Info\" tab in inbasket, or \"Choose Columns\" in Meds & Orders section of the refill encounter.              Passed - Medication is active on med list        Passed - Normal TSH on file in past 12 months     Recent Labs   Lab Test 04/29/21  1317   TSH 0.81              Passed - No active pregnancy on record     If patient is pregnant or has had a positive pregnancy test, please check TSH.          Passed - No positive pregnancy test in past 12 months     If patient is pregnant or has had a positive pregnancy test, please check TSH.               Tara Ozuna RN 03/25/22 5:26 PM  "
Call patient: she is due for follow-up in the office and lab work. Please schedule for further refills.     
LMTCB if she patient calls back relay below message and assist with scheduling appointment with PCP  
Sent [employer] message to patient that she is due for follow up visit & labs    
Self

## 2022-05-05 ENCOUNTER — OFFICE VISIT (OUTPATIENT)
Dept: FAMILY MEDICINE | Facility: CLINIC | Age: 42
End: 2022-05-05
Payer: COMMERCIAL

## 2022-05-05 VITALS
WEIGHT: 145.8 LBS | TEMPERATURE: 97.7 F | HEART RATE: 67 BPM | DIASTOLIC BLOOD PRESSURE: 61 MMHG | OXYGEN SATURATION: 100 % | SYSTOLIC BLOOD PRESSURE: 112 MMHG | BODY MASS INDEX: 25.03 KG/M2

## 2022-05-05 DIAGNOSIS — F10.10 ALCOHOL ABUSE: ICD-10-CM

## 2022-05-05 DIAGNOSIS — E10.649 UNCONTROLLED TYPE 1 DIABETES MELLITUS WITH HYPOGLYCEMIA WITHOUT COMA (H): ICD-10-CM

## 2022-05-05 DIAGNOSIS — R10.84 ABDOMINAL PAIN, GENERALIZED: Primary | ICD-10-CM

## 2022-05-05 LAB
ALBUMIN SERPL-MCNC: 3.5 G/DL (ref 3.5–5)
ALBUMIN UR-MCNC: NEGATIVE MG/DL
ALP SERPL-CCNC: 74 U/L (ref 45–120)
ALT SERPL W P-5'-P-CCNC: 40 U/L (ref 0–45)
ANION GAP SERPL CALCULATED.3IONS-SCNC: 10 MMOL/L (ref 5–18)
APPEARANCE UR: CLEAR
AST SERPL W P-5'-P-CCNC: 51 U/L (ref 0–40)
BILIRUB SERPL-MCNC: 0.7 MG/DL (ref 0–1)
BILIRUB UR QL STRIP: NEGATIVE
BUN SERPL-MCNC: 9 MG/DL (ref 8–22)
CALCIUM SERPL-MCNC: 9.1 MG/DL (ref 8.5–10.5)
CHLORIDE BLD-SCNC: 100 MMOL/L (ref 98–107)
CO2 SERPL-SCNC: 27 MMOL/L (ref 22–31)
COLOR UR AUTO: YELLOW
CREAT SERPL-MCNC: 0.74 MG/DL (ref 0.6–1.1)
ERYTHROCYTE [DISTWIDTH] IN BLOOD BY AUTOMATED COUNT: 12.3 % (ref 10–15)
GFR SERPL CREATININE-BSD FRML MDRD: >90 ML/MIN/1.73M2
GLUCOSE BLD-MCNC: 151 MG/DL (ref 70–125)
GLUCOSE UR STRIP-MCNC: NEGATIVE MG/DL
HCT VFR BLD AUTO: 38.2 % (ref 35–47)
HGB BLD-MCNC: 13.2 G/DL (ref 11.7–15.7)
HGB UR QL STRIP: NEGATIVE
KETONES UR STRIP-MCNC: NEGATIVE MG/DL
LEUKOCYTE ESTERASE UR QL STRIP: NEGATIVE
LIPASE SERPL-CCNC: 19 U/L (ref 0–52)
MCH RBC QN AUTO: 31.5 PG (ref 26.5–33)
MCHC RBC AUTO-ENTMCNC: 34.6 G/DL (ref 31.5–36.5)
MCV RBC AUTO: 91 FL (ref 78–100)
NITRATE UR QL: NEGATIVE
PH UR STRIP: 7.5 [PH] (ref 5–8)
PLATELET # BLD AUTO: 255 10E3/UL (ref 150–450)
POTASSIUM BLD-SCNC: 4.9 MMOL/L (ref 3.5–5)
PROT SERPL-MCNC: 6.6 G/DL (ref 6–8)
RBC # BLD AUTO: 4.19 10E6/UL (ref 3.8–5.2)
SODIUM SERPL-SCNC: 137 MMOL/L (ref 136–145)
SP GR UR STRIP: 1.02 (ref 1–1.03)
UROBILINOGEN UR STRIP-ACNC: 0.2 E.U./DL
WBC # BLD AUTO: 6.2 10E3/UL (ref 4–11)

## 2022-05-05 PROCEDURE — 81003 URINALYSIS AUTO W/O SCOPE: CPT | Performed by: NURSE PRACTITIONER

## 2022-05-05 PROCEDURE — 85027 COMPLETE CBC AUTOMATED: CPT | Performed by: NURSE PRACTITIONER

## 2022-05-05 PROCEDURE — 80053 COMPREHEN METABOLIC PANEL: CPT | Performed by: NURSE PRACTITIONER

## 2022-05-05 PROCEDURE — 83690 ASSAY OF LIPASE: CPT | Performed by: NURSE PRACTITIONER

## 2022-05-05 PROCEDURE — 99214 OFFICE O/P EST MOD 30 MIN: CPT | Performed by: NURSE PRACTITIONER

## 2022-05-05 PROCEDURE — 36415 COLL VENOUS BLD VENIPUNCTURE: CPT | Performed by: NURSE PRACTITIONER

## 2022-05-05 ASSESSMENT — ANXIETY QUESTIONNAIRES
GAD7 TOTAL SCORE: 3
2. NOT BEING ABLE TO STOP OR CONTROL WORRYING: NOT AT ALL
IF YOU CHECKED OFF ANY PROBLEMS ON THIS QUESTIONNAIRE, HOW DIFFICULT HAVE THESE PROBLEMS MADE IT FOR YOU TO DO YOUR WORK, TAKE CARE OF THINGS AT HOME, OR GET ALONG WITH OTHER PEOPLE: SOMEWHAT DIFFICULT
1. FEELING NERVOUS, ANXIOUS, OR ON EDGE: SEVERAL DAYS
3. WORRYING TOO MUCH ABOUT DIFFERENT THINGS: NOT AT ALL
7. FEELING AFRAID AS IF SOMETHING AWFUL MIGHT HAPPEN: NOT AT ALL
5. BEING SO RESTLESS THAT IT IS HARD TO SIT STILL: NOT AT ALL
6. BECOMING EASILY ANNOYED OR IRRITABLE: SEVERAL DAYS

## 2022-05-05 ASSESSMENT — PATIENT HEALTH QUESTIONNAIRE - PHQ9
SUM OF ALL RESPONSES TO PHQ QUESTIONS 1-9: 13
5. POOR APPETITE OR OVEREATING: SEVERAL DAYS

## 2022-05-05 ASSESSMENT — ASTHMA QUESTIONNAIRES: ACT_TOTALSCORE: 25

## 2022-05-05 NOTE — PROGRESS NOTES
Assessment and Plan:       ICD-10-CM    1. Abdominal pain, generalized  R10.84 CBC with platelets     Comprehensive metabolic panel (BMP + Alb, Alk Phos, ALT, AST, Total. Bili, TP)     Lipase     UA Macro with Reflex to Micro and Culture - lab collect     US Abdomen Complete   2. Uncontrolled type 1 diabetes mellitus with hypoglycemia without coma (H)  E10.649    3. Alcohol abuse  F10.10        Differentials include recurrence of umbilical hernia, gastritis, gastric ulcer, GERD, cholelithiasis, pancreatitis, nephrolithiasis, urinary tract infection.  We will check hemogram, CMP, lipase, urinalysis.  Will obtain complete abdominal ultrasound for further evaluation and to rule out recurrence of umbilical hernia.  Patient does consume hard alcohol daily.  Will rule out ascites.  She continues to see endocrinology for diabetes.  She is content with the plan.  - CBC with platelets  - Comprehensive metabolic panel (BMP + Alb, Alk Phos, ALT, AST, Total. Bili, TP)  - Lipase  - UA Macro with Reflex to Micro and Culture - lab collect  - US Abdomen Complete          Subjective:     Vani is a 41 year old female presenting to the clinic for concerns for abdominal pain.  Over the past 2 weeks, she has had a constant pressure around her umbilicus.  She is having difficulty wearing pants to do this.  She states the area is painful to touch.  She denies any trauma to the abdomen.  She has not had any nausea or vomiting.  She has diarrhea 1-4 episodes in the morning daily.  Bowel movements are soft without blood or mucus.  This is typical for her.  She does consume 2 hard alcoholic beverages per day.  She vapes regularly.  Her last menstrual period was 2 weeks ago.  Her periods are regular, occurring once per month.  She has occasionally had some urinary urgency.  She denies dysuria, urinary frequency, low back pain, fever.  She does have a history of an umbilical hernia repair.  She has type 1 diabetes and sees  endocrinology.    Reviewof Systems: A complete 14 point review of systems was obtained and is negative or as stated in the history of present illness.    Social History     Socioeconomic History     Marital status: Single     Spouse name: Not on file     Number of children: 1     Years of education: 14     Highest education level: Not on file   Occupational History     Occupation: Pharmacy Tech   Tobacco Use     Smoking status: Former Smoker     Packs/day: 0.50     Types: Cigarettes     Quit date: 2017     Years since quittin.8     Smokeless tobacco: Never Used     Tobacco comment: No smoked since 2021   Substance and Sexual Activity     Alcohol use: Yes     Comment: Alcoholic Drinks/day: Occ     Drug use: No     Types: Methamphetamines     Comment: Drug use: Off 2 years, 1 month and 19 days as of 3/16/16     Sexual activity: Yes     Partners: Male     Birth control/protection: Pill, Implant   Other Topics Concern      Service Not Asked     Blood Transfusions Not Asked     Caffeine Concern Not Asked     Occupational Exposure Not Asked     Hobby Hazards Not Asked     Sleep Concern Not Asked     Stress Concern Not Asked     Weight Concern Not Asked     Special Diet Not Asked     Back Care Not Asked     Exercise Not Asked     Bike Helmet Not Asked     Seat Belt Not Asked     Self-Exams Not Asked     Parent/sibling w/ CABG, MI or angioplasty before 65F 55M? Not Asked   Social History Narrative     Not on file     Social Determinants of Health     Financial Resource Strain: Not on file   Food Insecurity: Not on file   Transportation Needs: Not on file   Physical Activity: Not on file   Stress: Not on file   Social Connections: Not on file   Intimate Partner Violence: Not on file   Housing Stability: Not on file       Active Ambulatory Problems     Diagnosis Date Noted     Uncontrolled type 1 diabetes mellitus (H) 06/15/2004     Anxiety 2015     Arthralgia of multiple joints 10/18/2016      Depression 08/31/2015     GERD (gastroesophageal reflux disease) 01/09/2017     History of ileostomy 08/26/2021     Hypothyroidism, unspecified type 08/26/2021     Insomnia 02/15/2016     Methamphetamine abuse in remission (H) 04/10/2015     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) 02/13/2019     Non-rheumatic mitral regurgitation 09/22/2016     Skin mass 05/04/2021     Tobacco abuse 08/26/2021     Resolved Ambulatory Problems     Diagnosis Date Noted     DKA (diabetic ketoacidoses) 07/19/2017     Rheumatoid arthritis (H)      Past Medical History:   Diagnosis Date     Asthma      Bronchitis, not specified as acute or chronic      Chest pain      Diabetes (H)      Fainting      Heart disease      IDDM      Mitral valve prolapse      Mitral valve prolapse      Substance abuse (H)      Thyroid disease      Unspecified keratitis        Family History   Problem Relation Age of Onset     Diabetes Paternal Aunt      Diabetes Other         maternal cousin and paternal cousin     Hypertension Paternal Grandmother      Cerebrovascular Disease Paternal Grandmother      Thyroid Disease Mother      Other - See Comments Father         Father abuses multiple drugs.     No Known Problems Sister      No Known Problems Daughter      No Known Problems Son      No Known Problems Other      No Known Problems Other        Objective:     /61 (BP Location: Right arm, Patient Position: Sitting, Cuff Size: Adult Large)   Pulse 67   Temp 97.7  F (36.5  C) (Oral)   Wt 66.1 kg (145 lb 12.8 oz)   SpO2 100%   BMI 25.03 kg/m      Patient is alert, in no obvious distress.   Skin: Warm, dry.   Lungs:  Clear to auscultation. Respirations even and unlabored.  No wheezing or rales noted.   Heart:  Regular rate and rhythm.  No murmurs, S3, S4, gallops, or rubs.    Abdomen: Soft, tenderness superior to her umbilicus.  Small bulge is present around her prior  incision.  No organomegaly. Bowel sounds normoactive. No guarding noted.

## 2022-05-06 ASSESSMENT — ANXIETY QUESTIONNAIRES: GAD7 TOTAL SCORE: 3

## 2022-05-10 ENCOUNTER — TELEPHONE (OUTPATIENT)
Dept: ENDOCRINOLOGY | Facility: CLINIC | Age: 42
End: 2022-05-10
Payer: COMMERCIAL

## 2022-05-10 NOTE — TELEPHONE ENCOUNTER
[facility] Call Center    Phone Message    May a detailed message be left on voicemail: yes     Reason for Call: Medication Refill Request    Has the patient contacted the pharmacy for the refill? Yes   Name of medication being requested: Novolog for her pump  Provider who prescribed the medication: THERESA Werner  Pharmacy: [facility]-may need to  because she is going out of town next Wed.  Date medication is needed: ASAP         Action Taken: Other: Endo    Travel Screening: Not Applicable

## 2022-05-25 ENCOUNTER — TELEPHONE (OUTPATIENT)
Dept: ENDOCRINOLOGY | Facility: CLINIC | Age: 42
End: 2022-05-25
Payer: COMMERCIAL

## 2022-05-25 ENCOUNTER — TRANSFERRED RECORDS (OUTPATIENT)
Dept: MULTI SPECIALTY CLINIC | Facility: CLINIC | Age: 42
End: 2022-05-25

## 2022-05-25 DIAGNOSIS — E10.9 TYPE 1 DIABETES MELLITUS WITHOUT COMPLICATION (H): Primary | ICD-10-CM

## 2022-05-25 LAB — RETINOPATHY: NORMAL

## 2022-05-25 NOTE — PROGRESS NOTES
Vani is a 41 year old who is being evaluated via a billable video visit.      How would you like to obtain your AVS? MyChart  If the video visit is dropped, the invitation should be resent by: Text to cell phone: [phone]  Will anyone else be joining your video visit? No      Video Start Time: 0730    [facility] ENDOCRINOLOGY    Diabetes Note 6/3/2022    Vani Corona, 1980, [id number]          Reason for visit      1. Type 1 diabetes mellitus without complication (H)        HPI     Vani Corona is a very pleasant 41 year old old female who presents for follow up.  SUMMARY:    Vani is contacted today in f/u for DM 1. Her current A1c is 6.7 and up just slightly from her last a 6.5. She is currently using the Tandem system with Control IQ. Her download today shows that she was within range 57% of the time over the last two weeks. She did have some hypoglycemia and this sometimes involved giving herself too much insulin for what she ate. She has not required any outside assistance. She recently lost her son to suicide.       Blood glucose data:      Past Medical History     Patient Active Problem List   Diagnosis     Uncontrolled type 1 diabetes mellitus (H)     Anxiety     Arthralgia of multiple joints     Depression     GERD (gastroesophageal reflux disease)     History of ileostomy     Hypothyroidism, unspecified type     Insomnia     Methamphetamine abuse in remission (H)     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)     Non-rheumatic mitral regurgitation     Skin mass     Tobacco abuse     Selective IgA immunodeficiency (H)     Recurrent major depressive disorder, in partial remission (H)        Family History       family history includes Cerebrovascular Disease in her paternal grandmother; Diabetes in her paternal aunt and another family member; Hypertension in her paternal grandmother; No Known Problems  in her daughter, sister, son, and other family members; Other - See Comments in her father; Thyroid Disease in her mother.    Social History      reports that she quit smoking about 4 years ago. Her smoking use included cigarettes. She smoked 0.50 packs per day. She has never used smokeless tobacco. She reports current alcohol use. She reports that she does not use drugs.      Review of Systems     Patient has no polyuria or polydipsia, no chest pain, dyspnea or TIA's, no numbness, tingling or pain in extremities  Remainder negative except as noted in HPI.      Vital Signs     There were no vitals taken for this visit.  Wt Readings from Last 3 Encounters:   05/31/22 66 kg (145 lb 9.6 oz)   05/05/22 66.1 kg (145 lb 12.8 oz)   12/06/21 59 kg (130 lb)       Physical Exam     Constitutional:  Well developed, Well nourished  HENT:  Normocephalic,   Neck: normal in appearance  Eyes:  PERRL, Conjunctiva pink  Respiratory:  No respiratory distress  Skin: No acanthosis nigricans, lipoatrophy or lipodystrophy  Neurologic:  Alert & oriented x 3, nonfocal  Psychiatric:  Affect, Mood, Insight appropriate          Assessment     1. Type 1 diabetes mellitus without complication (H)        Plan     Vani's control remains stable and certainly quite improved with the Tandem system. No changes recommended today. She will f/u with me in 6 months.     Anayeli Dave NP  [facility]  6/3/2022  8:36 AM        Lab Results     Microalbumin Urine mg/dL   Date Value Ref Range Status   05/27/2022 0.77 0.00 - 1.99 mg/dL Final       Cholesterol   Date Value Ref Range Status   02/15/2016 174 <=199 mg/dL Final     Direct Measure HDL   Date Value Ref Range Status   02/15/2016 45 (L) >=50 mg/dL Final     Triglycerides   Date Value Ref Range Status   02/15/2016 122 <=149 mg/dL Final       [unfilled]      Current Medications     Outpatient Medications Prior to Visit   Medication Sig Dispense Refill     ACCU-CHEK GUIDE test strip USE TO TEST 6  TIMES PER DAY       albuterol (PROAIR HFA/PROVENTIL HFA/VENTOLIN HFA) 108 (90 Base) MCG/ACT inhaler        cetirizine (ZYRTEC) 10 MG tablet TAKE ONE TABLET BY MOUTH ONCE DAILY 90 tablet 3     chlorhexidine (PERIDEX) 0.12 % solution SWISH AND SPIT 15 ML BY MOUTH TWICE DAILY       Continuous Blood Gluc Sensor (DEXCOM G6 SENSOR) MISC Change every 10 days 9 each 1     Continuous Blood Gluc Transmit (DEXCOM G6 TRANSMITTER) MISC CHANGE EVERY 3 MONTHS. 1 each 1     escitalopram (LEXAPRO) 10 MG tablet TAKE ONE TABLET BY MOUTH ONCE DAILY 90 tablet 1     insulin aspart (NOVOLOG VIAL) 100 UNITS/ML vial Inject  Subcutaneous See Admin Instructions.       Insulin Infusion Pump (T: SLIM X2 INS /CONTROL 7.4) JESSICA        Insulin Infusion Pump Supplies (T:SLIM X2 3ML CARTRIDGE) MISC        ketoconazole (NIZORAL) 2 % external cream Apply topically 2 times daily 30 g 0     lisinopril (ZESTRIL) 2.5 MG tablet TAKE ONE TABLET BY MOUTH ONCE DAILY 90 tablet 1     ondansetron (ZOFRAN) 4 MG tablet Take 1 tablet (4 mg) by mouth every 8 hours as needed for nausea 30 tablet 0     pantoprazole (PROTONIX) 40 MG EC tablet Take 40 mg by mouth every morning       predniSONE (DELTASONE) 10 MG tablet Take 1 tablet (10 mg) by mouth daily 30 tablet 0     SUMAtriptan (IMITREX) 25 MG tablet Take 1 tablet (25 mg) by mouth at onset of headache for migraine May repeat in 2 hours. Max 8 tablets/24 hours. Due for appointment in Yue 15 tablet 0     SYNTHROID 125 MCG tablet Take 1 tablet (125 mcg) by mouth daily Due for appointment with Denia Llamas and lab work 90 tablet 0     insulin aspart (NOVOLOG VIAL) 100 UNITS/ML vial Use up to 40 units a day with pump 15 mL 1     No facility-administered medications prior to visit.           Video-Visit Details    Type of service:  Video Visit    Video End Time:0750    Originating Location (pt. Location): Home    Distant Location (provider location):  [facility]     Platform used for  Video Visit: Jose Luis    Date of last OV: 12/03/21  Reason for Visit: DM        Blood Glucose Log: Dexcom              Tandem:

## 2022-05-25 NOTE — TELEPHONE ENCOUNTER
Called patient and left voicemail to check her [employer] for the following message:    Phil Ludwig,    I see that you have a virtual appointment with Debbi Dave CNP on 22; we would greatly appreciate it if you could download your insulin pump anytime between now and the night before your upcoming appointment.  This way Debbi can review and have the report accessible for your appointment.  You are welcome to call and set up a time to have your pump downloaded in the clinic in advance of your virtual appointment if that works better for you. Our number is [phone].  Just ask for a nurse only appointment in Endocrinology.    I will need your username and password for your t-connect account as this is how we are able to remotely link and see your most recent download.  You can reply to this [employer] message with the information or share it with me when I call to get you ready for your appointment.  I will call up to 30 minutes early on the day of your appointment to update your chart and confirm how you would like to do your virtual appointment.    The Dexcom share code that we have on file has .  Please send us a updated share code.  I have included instructions below for your reference.  I will download your report and get it ready in the clinic visit note for Debbi Dave's reference and review during your appointment.       REMINDER - please remember to complete your labs prior to your visit.  If you are unable to do so, please reschedule your appointment with a lab appointment 1 week prior per your providers request.    Please call me at [phone] if you have any questions or concerns.          Take care and talk to you next week.    Thank you,  Radha BELL Medical Assistant  Endocrinology  [phone]    To Access a Dexcom Share Code :  1. Install the Dexcom Clarity gareth on your phone if you don't already have it.  NOTE: this is not the Dexcom gareth but the Dexcom CLARITY gareth.  2. Click on  "\"Profile\"  3. Click \"Authorize Sharing\"  4. Click \"Generate Code\".  A 3, 6 and 12 month option come up.  We recommend you choose the \"12 month\" option as the code generated will last 12 months and your provider will not have to ask you for a new codeuntil that time has .  5. You will receive a 12 lettered code.  Provide the code to your Endocrinology team for remote access to your Dexcom report which will allow your provider to review and make treatment planrecommendations.            "

## 2022-05-26 ENCOUNTER — HOSPITAL ENCOUNTER (OUTPATIENT)
Dept: ULTRASOUND IMAGING | Facility: HOSPITAL | Age: 42
Discharge: HOME | End: 2022-05-26
Attending: NURSE PRACTITIONER | Admitting: NURSE PRACTITIONER
Payer: COMMERCIAL

## 2022-05-26 DIAGNOSIS — R10.84 ABDOMINAL PAIN, GENERALIZED: ICD-10-CM

## 2022-05-26 PROCEDURE — 76700 US EXAM ABDOM COMPLETE: CPT

## 2022-05-27 ENCOUNTER — LAB (OUTPATIENT)
Dept: LAB | Facility: CLINIC | Age: 42
End: 2022-05-27
Payer: COMMERCIAL

## 2022-05-27 DIAGNOSIS — E10.9 TYPE 1 DIABETES MELLITUS WITHOUT COMPLICATION (H): ICD-10-CM

## 2022-05-27 LAB
CREAT UR-MCNC: 115 MG/DL
HBA1C MFR BLD: 6.7 % (ref 0–5.6)
LDLC SERPL CALC-MCNC: 73 MG/DL
MICROALBUMIN UR-MCNC: 0.77 MG/DL (ref 0–1.99)
MICROALBUMIN/CREAT UR: 6.7 MG/G CR
TSH SERPL DL<=0.005 MIU/L-ACNC: 8.28 UIU/ML (ref 0.3–5)

## 2022-05-27 PROCEDURE — 84443 ASSAY THYROID STIM HORMONE: CPT

## 2022-05-27 PROCEDURE — 83721 ASSAY OF BLOOD LIPOPROTEIN: CPT

## 2022-05-27 PROCEDURE — 36415 COLL VENOUS BLD VENIPUNCTURE: CPT

## 2022-05-27 PROCEDURE — 83036 HEMOGLOBIN GLYCOSYLATED A1C: CPT

## 2022-05-27 PROCEDURE — 82043 UR ALBUMIN QUANTITATIVE: CPT

## 2022-05-31 ENCOUNTER — LAB (OUTPATIENT)
Dept: LAB | Facility: CLINIC | Age: 42
End: 2022-05-31
Payer: COMMERCIAL

## 2022-05-31 ENCOUNTER — HOSPITAL ENCOUNTER (OUTPATIENT)
Dept: GENERAL RADIOLOGY | Facility: HOSPITAL | Age: 42
Discharge: HOME | End: 2022-05-31
Attending: INTERNAL MEDICINE
Payer: COMMERCIAL

## 2022-05-31 ENCOUNTER — ALLIED HEALTH/NURSE VISIT (OUTPATIENT)
Dept: LAB | Facility: CLINIC | Age: 42
End: 2022-05-31

## 2022-05-31 ENCOUNTER — OFFICE VISIT (OUTPATIENT)
Dept: RHEUMATOLOGY | Facility: CLINIC | Age: 42
End: 2022-05-31

## 2022-05-31 VITALS
BODY MASS INDEX: 24.86 KG/M2 | DIASTOLIC BLOOD PRESSURE: 60 MMHG | SYSTOLIC BLOOD PRESSURE: 106 MMHG | WEIGHT: 145.6 LBS | HEIGHT: 64 IN | HEART RATE: 60 BPM

## 2022-05-31 DIAGNOSIS — M25.50 ARTHRALGIA OF MULTIPLE JOINTS: ICD-10-CM

## 2022-05-31 DIAGNOSIS — K43.9 VENTRAL HERNIA WITHOUT OBSTRUCTION OR GANGRENE: Primary | ICD-10-CM

## 2022-05-31 DIAGNOSIS — M06.4 INFLAMMATORY POLYARTHRITIS (H): ICD-10-CM

## 2022-05-31 DIAGNOSIS — M25.50 ARTHRALGIA OF MULTIPLE JOINTS: Primary | ICD-10-CM

## 2022-05-31 DIAGNOSIS — Z53.9 ERRONEOUS ENCOUNTER--DISREGARD: Primary | ICD-10-CM

## 2022-05-31 DIAGNOSIS — Z84.0 FAMILY HISTORY OF PSORIASIS IN MOTHER: ICD-10-CM

## 2022-05-31 LAB
ALBUMIN SERPL-MCNC: 3.5 G/DL (ref 3.5–5)
ALT SERPL W P-5'-P-CCNC: 42 U/L (ref 0–45)
BASOPHILS # BLD AUTO: 0.1 10E3/UL (ref 0–0.2)
BASOPHILS NFR BLD AUTO: 1 %
C REACTIVE PROTEIN LHE: <0.1 MG/DL (ref 0–0.8)
CCP AB SER IA-ACNC: 0.6 U/ML
CREAT SERPL-MCNC: 0.71 MG/DL (ref 0.6–1.1)
EOSINOPHIL # BLD AUTO: 0.2 10E3/UL (ref 0–0.7)
EOSINOPHIL NFR BLD AUTO: 3 %
ERYTHROCYTE [DISTWIDTH] IN BLOOD BY AUTOMATED COUNT: 12.4 % (ref 10–15)
ERYTHROCYTE [SEDIMENTATION RATE] IN BLOOD BY WESTERGREN METHOD: 8 MM/HR (ref 0–20)
GFR SERPL CREATININE-BSD FRML MDRD: >90 ML/MIN/1.73M2
HCT VFR BLD AUTO: 37.4 % (ref 35–47)
HGB BLD-MCNC: 12.8 G/DL (ref 11.7–15.7)
IMM GRANULOCYTES # BLD: 0 10E3/UL
IMM GRANULOCYTES NFR BLD: 0 %
LYMPHOCYTES # BLD AUTO: 1.9 10E3/UL (ref 0.8–5.3)
LYMPHOCYTES NFR BLD AUTO: 31 %
MCH RBC QN AUTO: 31.8 PG (ref 26.5–33)
MCHC RBC AUTO-ENTMCNC: 34.2 G/DL (ref 31.5–36.5)
MCV RBC AUTO: 93 FL (ref 78–100)
MONOCYTES # BLD AUTO: 0.4 10E3/UL (ref 0–1.3)
MONOCYTES NFR BLD AUTO: 6 %
NEUTROPHILS # BLD AUTO: 3.7 10E3/UL (ref 1.6–8.3)
NEUTROPHILS NFR BLD AUTO: 60 %
PLATELET # BLD AUTO: 284 10E3/UL (ref 150–450)
RBC # BLD AUTO: 4.02 10E6/UL (ref 3.8–5.2)
RHEUMATOID FACT SER NEPH-ACNC: <15 IU/ML (ref 0–30)
URATE SERPL-MCNC: 5.2 MG/DL (ref 2–7.5)
WBC # BLD AUTO: 6.2 10E3/UL (ref 4–11)

## 2022-05-31 PROCEDURE — 73130 X-RAY EXAM OF HAND: CPT | Mod: 50,FY

## 2022-05-31 PROCEDURE — 84550 ASSAY OF BLOOD/URIC ACID: CPT

## 2022-05-31 PROCEDURE — 84460 ALANINE AMINO (ALT) (SGPT): CPT

## 2022-05-31 PROCEDURE — 85025 COMPLETE CBC W/AUTO DIFF WBC: CPT

## 2022-05-31 PROCEDURE — 82040 ASSAY OF SERUM ALBUMIN: CPT

## 2022-05-31 PROCEDURE — 99204 OFFICE O/P NEW MOD 45 MIN: CPT | Performed by: INTERNAL MEDICINE

## 2022-05-31 PROCEDURE — 86431 RHEUMATOID FACTOR QUANT: CPT

## 2022-05-31 PROCEDURE — 82565 ASSAY OF CREATININE: CPT

## 2022-05-31 PROCEDURE — 86200 CCP ANTIBODY: CPT

## 2022-05-31 PROCEDURE — 73080 X-RAY EXAM OF ELBOW: CPT | Mod: 50,FY

## 2022-05-31 PROCEDURE — 36415 COLL VENOUS BLD VENIPUNCTURE: CPT

## 2022-05-31 PROCEDURE — 85652 RBC SED RATE AUTOMATED: CPT

## 2022-05-31 PROCEDURE — 86140 C-REACTIVE PROTEIN: CPT

## 2022-05-31 RX ORDER — PREDNISONE 10 MG/1
10 TABLET ORAL DAILY
Qty: 30 TABLET | Refills: 0 | Status: SHIPPED | OUTPATIENT
Start: 2022-05-31 | End: 2022-06-30

## 2022-05-31 NOTE — PROGRESS NOTES
"Patient came to lab after her appt with Dr. Mtz with Rheumatology.  Patient had episode of dizziness during blood draw.  \"Nurse to lab\" was paged.  RN assisted patient in lab by laying patient down with feet elevated.  Interventions placed: cold packs, apple juice.  Vital signs taken: BP: 124/70 P: 54 O2: 100%.  Patient rested for 10 minutes with RN by side.  Patient states she felt \"better.\" RN assisted patient into sitting and standing position with no concerns.  RN assisted patient to imaging.   "

## 2022-05-31 NOTE — PROGRESS NOTES
This document was created using a software with less than 100% fidelity, at times resulting in unintended, even erroneous syntax and grammar.  The reader is advised to keep this under consideration while reviewing, interpreting this note.      Rheumatology Consult Note      Vani CASH Arnold     YOB: 1980 Age: 41 year old    Date of visit: 5/31/22    PCP: Denia Llamas    Chief Complaint   Patient presents with:  Consult: Inflammatory polyarthritis       Assessment and Plan     Vani was seen today for consult.    Diagnoses and all orders for this visit:    Arthralgia of multiple joints  -     XR Hand Bilateral G/E 3 Views; Future  -     XR Elbow Bilateral G/E 3 Views; Future  -     Albumin level; Future  -     ALT; Future  -     CBC with Platelets & Differential; Future  -     Creatinine; Future  -     CRP inflammation; Future  -     Cyclic Citrullinated Peptide Antibody IgG; Future  -     Erythrocyte sedimentation rate auto; Future  -     Rheumatoid factor; Future  -     Uric acid; Future  -     predniSONE (DELTASONE) 10 MG tablet; Take 1 tablet (10 mg) by mouth daily    Inflammatory polyarthritis (H)  -     XR Hand Bilateral G/E 3 Views; Future  -     XR Elbow Bilateral G/E 3 Views; Future  -     Albumin level; Future  -     ALT; Future  -     CBC with Platelets & Differential; Future  -     Creatinine; Future  -     CRP inflammation; Future  -     Cyclic Citrullinated Peptide Antibody IgG; Future  -     Erythrocyte sedimentation rate auto; Future  -     Rheumatoid factor; Future  -     Uric acid; Future  -     predniSONE (DELTASONE) 10 MG tablet; Take 1 tablet (10 mg) by mouth daily    Family history of psoriasis in mother           This patient with  pain in the right elbow area likely due to lateral epicondylitis has disparity in her symptoms and examination findings, she has little or no arthralgias in the small joints of the hands yet on examination tenderness as noted below.  She has family  history of psoriasis.  Further work-up including labs as noted.  We discussed the disparity in findings on examination and her symptoms.  She is going to take prednisone for 3 weeks 10 mg daily major side effect especially in the context of psychotropic effects of prednisone, weight gain were outlined.  We will meet here in the next few weeks.  She will start prednisone count in 3 weeks back from her next appointment.         TATYANA Corona is a 41 year old female  is seen today for evaluation of her worsening pain in her joints.  She noted worst of the pain is in her right elbow.  This is troubled her for approximately 12 months.  She points to the lateral epicondylar area, radiating more distally and more proximally in that region, no swelling, no history of trauma, taking Tylenol arthritis for this.  She is not woken up from sleep because of this she noted no other joint areas are troublesome.  She used to follow-up here in rheumatology for inflammatory joint disease at one point she was on methotrexate and hydroxychloroquine, this was approximately 4 years ago.  The methotrexate was discontinued as she had plans for pregnancy.  That did not transpire.  She did not also stopped taking hydroxychloroquine and did well.  She has noted little or no pain in her other joints such as hands wrists left elbow shoulders hips knees ankles feet and toes.  In the morning she has noted no sustained stiffness.  She rated pain at the right elbow moderately severe and right shoulder mild.  He is able to do all her day-to-day activities without difficulty.  She works as a  in the medical billing.  She vapes, 2 drinks of alcohol a day.  She has several comorbidities including diabetes.  Her mom is noted to have psoriasis.  She lost her 20-year-old son the other day to suicide..           Active Problem List     Patient Active Problem List   Diagnosis     Uncontrolled type 1 diabetes mellitus (H)     Anxiety      Arthralgia of multiple joints     Depression     GERD (gastroesophageal reflux disease)     History of ileostomy     Hypothyroidism, unspecified type     Insomnia     Methamphetamine abuse in remission (H)     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)     Non-rheumatic mitral regurgitation     Skin mass     Tobacco abuse     Past Medical History     Past Medical History:   Diagnosis Date     Anxiety      Asthma      Bronchitis, not specified as acute or chronic      Chest pain      Depression      Diabetes (H)      Fainting      Heart disease     MVP     IDDM     dx      Methamphetamine abuse in remission (H) 4/10/2015    Treatment 2014. Clean since.      Mitral valve prolapse      Mitral valve prolapse      Rheumatoid arthritis (H)      Rheumatoid arthritis (H)      Substance abuse (H)     methamphetamine     Thyroid disease      Unspecified keratitis      Past Surgical History     Past Surgical History:   Procedure Laterality Date     BIOPSY CERVICAL, LOCAL EXCISION, SINGLE/MULTIPLE        SECTION       EXCISE LESION UPPER EXTREMITY Left 2021    Procedure: EXCISION, LESION, UPPER EXTREMITY;  Surgeon: Babs Leiva MD;  Location: [facility];  Service: General     HC REPAIR UMBILICAL RUTH ANN,<4Y/O,REDUC      Description: Umbilical Hernia Repair;  Recorded: 10/03/2014;     HC REVISE MEDIAN N/CARPAL TUNNEL SURG      Description: Neuroplasty Decompression Median Nerve At Carpal Tunnel;  Recorded: 10/03/2014;     HERNIA REPAIR  2010     HERNIA REPAIR       LAPAROSCOPIC CHOLECYSTECTOMY N/A 2019    Procedure: CHOLECYSTECTOMY, LAPAROSCOPIC;  Surgeon: Rinku Doran MD;  Location: [facility];  Service: General     LEAP and Coloposcopy        [facility]      Description:  Section;  Recorded: 2010;   "Comments: 12/28/09 - breech     Allergy     Allergies   Allergen Reactions     Augmentin Nausea and Vomiting and Headache     Varenicline Other (See Comments)     \"I took the Chantix and that made me a (severe) crazy person.\"  Emotional disturbance       Venlafaxine Nausea     Went away when venlafaxine stopped.  Did not rechallenge. 1/18/16     No Known Allergies      Fluconazole Rash     Current Medication List     Current Outpatient Medications   Medication Sig     ACCU-CHEK GUIDE test strip USE TO TEST 6 TIMES PER DAY     albuterol (PROAIR HFA/PROVENTIL HFA/VENTOLIN HFA) 108 (90 Base) MCG/ACT inhaler      cetirizine (ZYRTEC) 10 MG tablet TAKE ONE TABLET BY MOUTH ONCE DAILY     chlorhexidine (PERIDEX) 0.12 % solution SWISH AND SPIT 15 ML BY MOUTH TWICE DAILY     Continuous Blood Gluc Sensor (DEXCOM G6 SENSOR) MISC Change every 10 days     Continuous Blood Gluc Transmit (DEXCOM G6 TRANSMITTER) MISC CHANGE EVERY 3 MONTHS.     escitalopram (LEXAPRO) 10 MG tablet TAKE ONE TABLET BY MOUTH ONCE DAILY     insulin aspart (NOVOLOG VIAL) 100 UNITS/ML vial Use up to 40 units a day with pump     insulin aspart (NOVOLOG VIAL) 100 UNITS/ML vial Inject  Subcutaneous See Admin Instructions.     Insulin Infusion Pump (T: SLIM X2 INS /CONTROL 7.4) JESSICA      Insulin Infusion Pump Supplies (T:SLIM X2 3ML CARTRIDGE) MISC      ketoconazole (NIZORAL) 2 % external cream Apply topically 2 times daily     lisinopril (ZESTRIL) 2.5 MG tablet TAKE ONE TABLET BY MOUTH ONCE DAILY     ondansetron (ZOFRAN) 4 MG tablet Take 1 tablet (4 mg) by mouth every 8 hours as needed for nausea     pantoprazole (PROTONIX) 40 MG EC tablet Take 40 mg by mouth every morning     SUMAtriptan (IMITREX) 25 MG tablet Take 1 tablet (25 mg) by mouth at onset of headache for migraine May repeat in 2 hours. Max 8 tablets/24 hours. Due for appointment in June     SYNTHROID 125 MCG tablet Take 1 tablet (125 mcg) by mouth daily Due for appointment with Denia Llamas and " "lab work     No current facility-administered medications for this visit.            Family History     Family History   Problem Relation Age of Onset     Diabetes Paternal Aunt      Diabetes Other         maternal cousin and paternal cousin     Hypertension Paternal Grandmother      Cerebrovascular Disease Paternal Grandmother      Thyroid Disease Mother      Other - See Comments Father         Father abuses multiple drugs.     No Known Problems Sister      No Known Problems Daughter      No Known Problems Son      No Known Problems Other      No Known Problems Other          Physical Exam     COMPREHENSIVE EXAMINATION:  Vitals:    05/31/22 0826   BP: 106/60   BP Location: Right arm   Patient Position: Sitting   Cuff Size: Adult Regular   Pulse: 60   Weight: 66 kg (145 lb 9.6 oz)   Height: 1.626 m (5' 4\")     A well appearing alert oriented female. Vital data as noted above. Her eyes examined for inflammation/scleromalacia. ENT examined for oral mucositis, moisture, thrush, nasal deformity, external ear redness, deformity. Her neck is examined for suppleness and lymphadenopathy.  Cardiopulmonary examination without dyspnea at rest, use of accessory muscles of breathing, wheezing, edema, peripheral or central cyanosis.  Abdomen examined for softness, tenderness, obvious organomegaly.  Skin examined for heliotrope, malar area eruption, lupus pernio, periungual erythema, sclerodactyly, papules, erythema nodosum, purpura, nail pitting, onycholysis, and obvious psoriasis lesion. Neurological examination done for alertness, speech, facial symmetry,  tone and power in upper and lower extremities, and gait. The joint examination is performed for swelling, tenderness, warmth, erythema, and range of motion in the following joints: DIPs, PIPs, MCPs, wrists, first CMC's, elbows, shoulders, hips, knees, ankles, feet; spine for range of motion and paraspinal muscles for tenderness. The salient normal / abnormal findings are " appended.  She is tender in the lateral epicondylar area on the right side without involvement of the elbow joint itself, she has only mild tenderness of the left lateral epicondyle.  However she is also tender in multiple metacarpophalangeal joints and metatarsophalangeal joints, she may have subtle swelling there.  She does not have dactylitis of digits or toes.  There is no enthesopathy at the Achilles.  She does not have tenderness in the trochanteric area.  Range of motion hips shoulders are full.  There is no rash on her face no stomatitis.  She does not have sclerodactyly.    Labs / Imaging (select studies)     C3 Complement   Date Value Ref Range Status   03/19/2014 82 (L) 83 - 177 mg/dL Final     C4 Complement   Date Value Ref Range Status   03/19/2014 10 (L) 19 - 59 mg/dL Final      Hemoglobin   Date Value Ref Range Status   05/05/2022 13.2 11.7 - 15.7 g/dL Final   10/01/2021 14.2 11.7 - 15.7 g/dL Final   04/29/2021 13.5 12.0 - 16.0 g/dL Final   03/11/2013 14.6 11.7 - 15.7 g/dL Final   03/12/2010 12.9 11.7 - 15.7 g/dL Final   09/08/2007 13.2 11.7 - 15.7 g/dL Final     Urea Nitrogen   Date Value Ref Range Status   05/05/2022 9 8 - 22 mg/dL Final   10/01/2021 7 (L) 8 - 22 mg/dL Final   05/20/2021 17 8 - 22 mg/dL Final   03/11/2013 11 5 - 24 mg/dL Final   03/12/2010 12 5 - 24 mg/dL Final   09/08/2007 14 5 - 24 mg/dL Final     AST   Date Value Ref Range Status   05/05/2022 51 (H) 0 - 40 U/L Final   10/01/2021 19 0 - 40 U/L Final   12/19/2019 14 0 - 40 U/L Final   03/11/2013 50 (H) 0 - 45 U/L Final   09/08/2007 24 0 - 45 U/L Final   01/06/2007 87 (H) 0 - 45 U/L Final     Albumin   Date Value Ref Range Status   05/05/2022 3.5 3.5 - 5.0 g/dL Final   10/01/2021 4.0 3.5 - 5.0 g/dL Final   12/19/2019 3.0 (L) 3.5 - 5.0 g/dL Final   03/11/2013 3.9 3.9 - 5.1 g/dL Final   09/08/2007 3.5 3.3 - 4.6 g/dL Final   01/06/2007 2.6 (L) 3.3 - 4.6 g/dL Final     Alkaline Phosphatase   Date Value Ref Range Status   05/05/2022 74  45 - 120 U/L Final   10/01/2021 59 45 - 120 U/L Final   12/19/2019 32 (L) 45 - 120 U/L Final   03/11/2013 67 40 - 150 U/L Final   09/08/2007 79 40 - 150 U/L Final   01/06/2007 65 40 - 150 U/L Final     ALT   Date Value Ref Range Status   05/05/2022 40 0 - 45 U/L Final   10/01/2021 17 0 - 45 U/L Final   12/19/2019 14 0 - 45 U/L Final   03/11/2013 58 (H) 0 - 50 U/L Final   09/08/2007 31 0 - 50 U/L Final   01/06/2007 83 (H) 0 - 50 U/L Final          Immunization History     Immunization History   Administered Date(s) Administered     FLU 6-35 months 11/07/2017     Flu, Unspecified 11/08/2010, 10/01/2019, 09/12/2020     HepA, Unspecified 05/06/2010     HepA-Adult 05/06/2010     HepB, Unspecified 05/06/2010     HepB-Adult 05/06/2010, 04/14/2016     Historical DTP/aP 01/09/1986     Influenza (IIV3) PF 11/08/2010, 11/01/2011     Influenza Vaccine IM > 6 months Valent IIV4 (Alfuria,Fluzone) 10/03/2014, 10/03/2019     Influenza Vaccine, 6+MO IM (QUADRIVALENT W/PRESERVATIVES) 11/08/2010, 11/01/2011, 08/31/2015, 01/24/2019     MMR 06/10/1992, 04/14/2016     OPV, trivalent, live 04/17/1985     Pneumococcal 23 valent 10/03/2014     Td,adult,historic,unspecified 12/31/2009     Tdap (Adacel,Boostrix) 12/30/2009, 08/31/2015

## 2022-06-02 ENCOUNTER — MYC MEDICAL ADVICE (OUTPATIENT)
Dept: ENDOCRINOLOGY | Facility: CLINIC | Age: 42
End: 2022-06-02
Payer: COMMERCIAL

## 2022-06-03 ENCOUNTER — VIRTUAL VISIT (OUTPATIENT)
Dept: ENDOCRINOLOGY | Facility: CLINIC | Age: 42
End: 2022-06-03
Payer: COMMERCIAL

## 2022-06-03 DIAGNOSIS — E10.9 TYPE 1 DIABETES MELLITUS WITHOUT COMPLICATION (H): Primary | ICD-10-CM

## 2022-06-03 PROBLEM — F33.41 RECURRENT MAJOR DEPRESSIVE DISORDER, IN PARTIAL REMISSION (H): Status: ACTIVE | Noted: 2022-06-03

## 2022-06-03 PROBLEM — D80.2 SELECTIVE IGA IMMUNODEFICIENCY (H): Status: ACTIVE | Noted: 2022-06-03

## 2022-06-03 PROCEDURE — 99213 OFFICE O/P EST LOW 20 MIN: CPT | Mod: GT | Performed by: NURSE PRACTITIONER

## 2022-06-10 DIAGNOSIS — E10.65 TYPE 1 DIABETES MELLITUS WITH HYPERGLYCEMIA (H): ICD-10-CM

## 2022-06-10 DIAGNOSIS — E03.9 HYPOTHYROIDISM, UNSPECIFIED TYPE: ICD-10-CM

## 2022-06-10 RX ORDER — PROCHLORPERAZINE 25 MG/1
SUPPOSITORY RECTAL
Qty: 9 EACH | Refills: 1 | Status: SHIPPED | OUTPATIENT
Start: 2022-06-10 | End: 2022-12-08

## 2022-06-10 RX ORDER — LEVOTHYROXINE SODIUM 125 MCG
TABLET ORAL
Qty: 90 TABLET | Refills: 0 | Status: SHIPPED | OUTPATIENT
Start: 2022-06-10 | End: 2022-08-08 | Stop reason: DRUGHIGH

## 2022-06-10 RX ORDER — PROCHLORPERAZINE 25 MG/1
SUPPOSITORY RECTAL
Qty: 1 EACH | Refills: 1 | Status: SHIPPED | OUTPATIENT
Start: 2022-06-10 | End: 2023-01-06

## 2022-06-21 ENCOUNTER — OFFICE VISIT (OUTPATIENT)
Dept: SURGERY | Facility: CLINIC | Age: 42
End: 2022-06-21
Attending: NURSE PRACTITIONER
Payer: COMMERCIAL

## 2022-06-21 VITALS
DIASTOLIC BLOOD PRESSURE: 60 MMHG | BODY MASS INDEX: 24.63 KG/M2 | SYSTOLIC BLOOD PRESSURE: 100 MMHG | WEIGHT: 144.3 LBS | HEIGHT: 64 IN

## 2022-06-21 DIAGNOSIS — K43.9 VENTRAL HERNIA WITHOUT OBSTRUCTION OR GANGRENE: ICD-10-CM

## 2022-06-21 DIAGNOSIS — K42.9 RECURRENT UMBILICAL HERNIA: Primary | ICD-10-CM

## 2022-06-21 PROCEDURE — 99214 OFFICE O/P EST MOD 30 MIN: CPT | Performed by: SURGERY

## 2022-06-21 RX ORDER — HEPARIN SODIUM 5000 [USP'U]/.5ML
5000 INJECTION, SOLUTION INTRAVENOUS; SUBCUTANEOUS
Status: CANCELLED | OUTPATIENT
Start: 2022-08-18

## 2022-06-21 RX ORDER — INSULIN ASPART 100 [IU]/ML
INJECTION, SOLUTION INTRAVENOUS; SUBCUTANEOUS
COMMUNITY
Start: 2022-06-06 | End: 2024-01-15

## 2022-06-21 NOTE — PROGRESS NOTES
General Surgery H&P  Vani Corona MRN# [id number]   Age/Sex: 41 year old female YOB: 1980     Reason for visit: 1. Recurrent umbilical hernia    2. Ventral hernia without obstruction or gangrene            Referring physician: Aby Ridley CNP                   Assessment and Plan:   Assessment:  1.  Recurrent umbilical hernia  2.  History of diabetes    Plan:  -To the OR for robotic assisted repair of the recurrent umbilical hernia with mesh, possible mesh explantation, possible open  - The risks and benefits of the procedure were explained detail to the patient. The risks include infection, bleeding, damage to the surrounding structures. Patient verbalized understanding provided consent to undergo the procedure above.  -I discussed in detail with the patient regarding mesh placement.  I explained the different options of repairing the hernia.  If the hernia was repaired primarily without mesh, there is a higher chance of recurrence.  We discussed in detail using synthetic versus biologic mesh.  I explained to the different scenarios in which each mesh would be more appropriate.  Patient was allowed time to ask questions and concerns regarding mesh placement.  The patient is accepting of the use of mesh with the hernia repair.            Chief Complaint:     Chief Complaint   Patient presents with     Hernia     Ventral hernia [facility] 5/26        History is obtained from the patient    HPI:   Vani Corona is a 41 year old female who presents to the surgical team today with complaints of abdominal pain.  Patient states that this has been happening for couple months now.  Patient noticed that there is a swelling over the umbilical region.  Patient states that it is tender to palpation.  She has no nausea no vomiting.  Patient has no difficulties with bowel movements.  Patient does admit to having multiple abdominal surgeries in the past.  In the past, the patient has had her gallbladder out,  umbilical hernia repair and inguinal hernia repair.  Patient has no further complaints at this time.          Past Medical History:     Past Medical History:   Diagnosis Date     Anxiety      Asthma      Bronchitis, not specified as acute or chronic      Chest pain      Depression      Diabetes (H)      Fainting      Heart disease     MVP     IDDM     dx      Methamphetamine abuse in remission (H) 4/10/2015    Treatment 2014. Clean since.      Mitral valve prolapse      Mitral valve prolapse      Rheumatoid arthritis (H)      Rheumatoid arthritis (H)      Substance abuse (H)     methamphetamine     Thyroid disease      Unspecified keratitis               Past Surgical History:     Past Surgical History:   Procedure Laterality Date     BIOPSY CERVICAL, LOCAL EXCISION, SINGLE/MULTIPLE        SECTION       EXCISE LESION UPPER EXTREMITY Left 2021    Procedure: EXCISION, LESION, UPPER EXTREMITY;  Surgeon: Babs Leiva MD;  Location: [facility];  Service: General     HC REPAIR UMBILICAL RUTH ANN,<4Y/O,REDUC      Description: Umbilical Hernia Repair;  Recorded: 10/03/2014;     HC REVISE MEDIAN N/CARPAL TUNNEL SURG      Description: Neuroplasty Decompression Median Nerve At Carpal Tunnel;  Recorded: 10/03/2014;     HERNIA REPAIR  2010     HERNIA REPAIR       LAPAROSCOPIC CHOLECYSTECTOMY N/A 2019    Procedure: CHOLECYSTECTOMY, LAPAROSCOPIC;  Surgeon: Rinku Doran MD;  [facility];  Service: General     LEAP and Coloposcopy        [facility]GICAL HISTORY      [facility]      Description:  Section;  Recorded: 2010;  Comments: 09 - breech             Social History:    reports that she quit smoking about 4 years ago. Her smoking use included cigarettes. She smoked 0.50 packs per day. She has never used smokeless tobacco. She reports current alcohol use. She reports that she does not use drugs.          "  Family History:     Family History   Problem Relation Age of Onset     Diabetes Paternal Aunt      Diabetes Other         maternal cousin and paternal cousin     Hypertension Paternal Grandmother      Cerebrovascular Disease Paternal Grandmother      Thyroid Disease Mother      Other - See Comments Father         Father abuses multiple drugs.     No Known Problems Sister      No Known Problems Daughter      No Known Problems Son      No Known Problems Other      No Known Problems Other               Allergies:     Allergies   Allergen Reactions     Augmentin Nausea and Vomiting and Headache     Varenicline Other (See Comments)     \"I took the Chantix and that made me a (severe) crazy person.\"  Emotional disturbance       Venlafaxine Nausea     Went away when venlafaxine stopped.  Did not rechallenge. 1/18/16     No Known Allergies      Fluconazole Rash              Medications:     Prior to Admission medications    Medication Sig Start Date End Date Taking? Authorizing Provider   ACCU-CHEK GUIDE test strip USE TO TEST 6 TIMES PER DAY 2/25/21  Yes Reported, Patient   albuterol (PROAIR HFA/PROVENTIL HFA/VENTOLIN HFA) 108 (90 Base) MCG/ACT inhaler  7/6/21  Yes Reported, Patient   cetirizine (ZYRTEC) 10 MG tablet TAKE ONE TABLET BY MOUTH ONCE DAILY 8/9/21  Yes Denia Llamas NP   chlorhexidine (PERIDEX) 0.12 % solution SWISH AND SPIT 15 ML BY MOUTH TWICE DAILY 8/11/21  Yes Reported, Patient   Continuous Blood Gluc Sensor (DEXCOM G6 SENSOR) MISC Change every 10 days 6/10/22  Yes Anayeli Dave NP   Continuous Blood Gluc Transmit (DEXCOM G6 TRANSMITTER) MISC CHANGE EVERY 3 MONTHS. 6/10/22  Yes Anayeli Dave NP   escitalopram (LEXAPRO) 10 MG tablet TAKE ONE TABLET BY MOUTH ONCE DAILY 4/23/22  Yes Denia Llamas NP   insulin aspart (NOVOLOG PEN) 100 UNIT/ML pen Use daily in pump, up to 50 units 6/3/22  Yes Anayeli Dave NP   Insulin Infusion Pump (T: SLIM X2 INS /CONTROL 7.4) JESSICA  3/5/21  Yes Reported, Patient " "  Insulin Infusion Pump Supplies (T:SLIM X2 3ML CARTRIDGE) MISC  9/16/21  Yes Reported, Patient   ketoconazole (NIZORAL) 2 % external cream Apply topically 2 times daily 8/10/21  Yes Denia Llamas NP   lisinopril (ZESTRIL) 2.5 MG tablet TAKE ONE TABLET BY MOUTH ONCE DAILY 4/23/22  Yes Denia Llamas NP   NOVOLOG VIAL 100 UNIT/ML soln  6/6/22  Yes Reported, Patient   ondansetron (ZOFRAN) 4 MG tablet Take 1 tablet (4 mg) by mouth every 8 hours as needed for nausea 10/1/21  Yes Aby Ridley APRN CNP   pantoprazole (PROTONIX) 40 MG EC tablet Take 40 mg by mouth every morning 8/19/21  Yes Reported, Patient   predniSONE (DELTASONE) 10 MG tablet Take 1 tablet (10 mg) by mouth daily 5/31/22 6/30/22 Yes Julio Mtz MBBS   SUMAtriptan (IMITREX) 25 MG tablet Take 1 tablet (25 mg) by mouth at onset of headache for migraine May repeat in 2 hours. Max 8 tablets/24 hours. Due for appointment in June 4/21/22  Yes Denia Llamas NP   SYNTHROID 125 MCG tablet TAKE ONE TABLET BY MOUTH ONCE DAILY (DAW1) DUE FOR APPOINTMENT AND LABS. 6/10/22  Yes Sandro Singleton MD              Review of Systems:   A 12 point Review of Systems is negative other than noted in the HPI            Physical Exam:     Patient Vitals for the past 24 hrs:   BP Height Weight   06/21/22 0824 100/60 1.626 m (5' 4\") 65.5 kg (144 lb 4.8 oz)        [unfilled]   Constitutional:   awake, alert, cooperative, no apparent distress, and appears stated age       Eyes:   PERRL, conjunctiva/corneas clear, EOM's intact; no scleral edema or icterus noted        ENT:   Normocephalic, without obvious abnormality, atraumatic, Lips, mucosa, and tongue normal        Hematologic / Lymphatic:   No lymphadenopathy       Lungs:   Normal respiratory effort, no accessory muscle use, breath sounds bilaterally on auscultation       Cardiovascular:   Regular rate and rhythm       Abdomen:   Soft, nondistended, mild tenderness to palpation supraumbilical corresponding to the site " of her pain.  There appears to be abdominal wall defect that can be palpated.  Reducible       Musculoskeletal:   No obvious swelling, bruising or deformity       Skin:   Skin color and texture normal for patient, no rashes or lesions              Data:         All imaging studies reviewed by me. I reviewed the images, and I agree with supraumbilical hernia    DO Jordan Morel DO  General Surgeon  [facility]  Surgery [facility] - [address] [address]?  Office: [phone]  Employed by - [employer]  Pager: [phone]

## 2022-06-21 NOTE — LETTER
6/21/2022         RE: Vani Corona  [address] [place] [address]        Dear Colleague,    Thank you for referring your patient, Vani Corona, to the [facility] [place]. Please see a copy of my visit note below.    General Surgery H&P  Vani Corona MRN# [id number]   Age/Sex: 41 year old female YOB: 1980     Reason for visit: 1. Recurrent umbilical hernia    2. Ventral hernia without obstruction or gangrene            Referring physician: Aby Ridley CNP                   Assessment and Plan:   Assessment:  1.  Recurrent umbilical hernia  2.  History of diabetes    Plan:  -To the OR for robotic assisted repair of the recurrent umbilical hernia with mesh, possible mesh explantation, possible open  - The risks and benefits of the procedure were explained detail to the patient. The risks include infection, bleeding, damage to the surrounding structures. Patient verbalized understanding provided consent to undergo the procedure above.  -I discussed in detail with the patient regarding mesh placement.  I explained the different options of repairing the hernia.  If the hernia was repaired primarily without mesh, there is a higher chance of recurrence.  We discussed in detail using synthetic versus biologic mesh.  I explained to the different scenarios in which each mesh would be more appropriate.  Patient was allowed time to ask questions and concerns regarding mesh placement.  The patient is accepting of the use of mesh with the hernia repair.            Chief Complaint:     Chief Complaint   Patient presents with     Hernia     Ventral hernia [facility] 5/26        History is obtained from the patient    HPI:   Vani Corona is a 41 year old female who presents to the surgical team today with complaints of abdominal pain.  Patient states that this has been happening for couple months now.  Patient noticed that there is a swelling over the  umbilical region.  Patient states that it is tender to palpation.  She has no nausea no vomiting.  Patient has no difficulties with bowel movements.  Patient does admit to having multiple abdominal surgeries in the past.  In the past, the patient has had her gallbladder out, umbilical hernia repair and inguinal hernia repair.  Patient has no further complaints at this time.          Past Medical History:     Past Medical History:   Diagnosis Date     Anxiety      Asthma      Bronchitis, not specified as acute or chronic      Chest pain      Depression      Diabetes (H)      Fainting      Heart disease     MVP     IDDM     dx      Methamphetamine abuse in remission (H) 4/10/2015    Treatment 2014. Clean since.      Mitral valve prolapse      Mitral valve prolapse      Rheumatoid arthritis (H)      Rheumatoid arthritis (H)      Substance abuse (H)     methamphetamine     Thyroid disease      Unspecified keratitis               Past Surgical History:     Past Surgical History:   Procedure Laterality Date     BIOPSY CERVICAL, LOCAL EXCISION, SINGLE/MULTIPLE        SECTION       EXCISE LESION UPPER EXTREMITY Left 2021    Procedure: EXCISION, LESION, UPPER EXTREMITY;  Surgeon: Babs Leiva MD;  Location: [facility];  Service: General     HC REPAIR UMBILICAL RUTH ANN,<4Y/O,REDUC      Description: Umbilical Hernia Repair;  Recorded: 10/03/2014;     HC REVISE MEDIAN N/CARPAL TUNNEL SURG      Description: Neuroplasty Decompression Median Nerve At Carpal Tunnel;  Recorded: 10/03/2014;     HERNIA REPAIR  2010     HERNIA REPAIR       LAPAROSCOPIC CHOLECYSTECTOMY N/A 2019    Procedure: CHOLECYSTECTOMY, LAPAROSCOPIC;  Surgeon: Rinku Doran MD;  Location: [facility];  Service: General     LEAP and Coloposcopy        [facility]      Description:  Section;  Recorded: 2010;  Comments: 09  "- [facility]             Social History:    reports that she quit smoking about 4 years ago. Her smoking use included cigarettes. She smoked 0.50 packs per day. She has never used smokeless tobacco. She reports current alcohol use. She reports that she does not use drugs.           Family History:     Family History   Problem Relation Age of Onset     Diabetes Paternal Aunt      Diabetes Other         maternal cousin and paternal cousin     Hypertension Paternal Grandmother      Cerebrovascular Disease Paternal Grandmother      Thyroid Disease Mother      Other - See Comments Father         Father abuses multiple drugs.     No Known Problems Sister      No Known Problems Daughter      No Known Problems Son      No Known Problems Other      No Known Problems Other               Allergies:     Allergies   Allergen Reactions     Augmentin Nausea and Vomiting and Headache     Varenicline Other (See Comments)     \"I took the Chantix and that made me a (severe) crazy person.\"  Emotional disturbance       Venlafaxine Nausea     Went away when venlafaxine stopped.  Did not rechallenge. 1/18/16     No Known Allergies      Fluconazole Rash              Medications:     Prior to Admission medications    Medication Sig Start Date End Date Taking? Authorizing Provider   ACCU-CHEK GUIDE test strip USE TO TEST 6 TIMES PER DAY 2/25/21  Yes Reported, Patient   albuterol (PROAIR HFA/PROVENTIL HFA/VENTOLIN HFA) 108 (90 Base) MCG/ACT inhaler  7/6/21  Yes Reported, Patient   cetirizine (ZYRTEC) 10 MG tablet TAKE ONE TABLET BY MOUTH ONCE DAILY 8/9/21  Yes Denia Llamas, THERESA   chlorhexidine (PERIDEX) 0.12 % solution SWISH AND SPIT 15 ML BY MOUTH TWICE DAILY 8/11/21  Yes Reported, Patient   Continuous Blood Gluc Sensor (DEXCOM G6 SENSOR) MISC Change every 10 days 6/10/22  Yes Anayeli Dave NP   Continuous Blood Gluc Transmit (DEXCOM G6 TRANSMITTER) MISC CHANGE EVERY 3 MONTHS. 6/10/22  Yes Anayeli Dave NP   escitalopram (LEXAPRO) 10 MG tablet " "TAKE ONE TABLET BY MOUTH ONCE DAILY 4/23/22  Yes Denia Llamas NP   insulin aspart (NOVOLOG PEN) 100 UNIT/ML pen Use daily in pump, up to 50 units 6/3/22  Yes Anayeli Dave NP   Insulin Infusion Pump (T: SLIM X2 INS /CONTROL 7.4) JESSICA  3/5/21  Yes Reported, Patient   Insulin Infusion Pump Supplies (T:SLIM X2 3ML CARTRIDGE) MISC  9/16/21  Yes Reported, Patient   ketoconazole (NIZORAL) 2 % external cream Apply topically 2 times daily 8/10/21  Yes Denia Llamas NP   lisinopril (ZESTRIL) 2.5 MG tablet TAKE ONE TABLET BY MOUTH ONCE DAILY 4/23/22  Yes Denia Llamas NP   NOVOLOG VIAL 100 UNIT/ML soln  6/6/22  Yes Reported, Patient   ondansetron (ZOFRAN) 4 MG tablet Take 1 tablet (4 mg) by mouth every 8 hours as needed for nausea 10/1/21  Yes Aby Ridley APRN CNP   pantoprazole (PROTONIX) 40 MG EC tablet Take 40 mg by mouth every morning 8/19/21  Yes Reported, Patient   predniSONE (DELTASONE) 10 MG tablet Take 1 tablet (10 mg) by mouth daily 5/31/22 6/30/22 Yes Julio Mtz MBBS   SUMAtriptan (IMITREX) 25 MG tablet Take 1 tablet (25 mg) by mouth at onset of headache for migraine May repeat in 2 hours. Max 8 tablets/24 hours. Due for appointment in June 4/21/22  Yes Denia Llamas NP   SYNTHROID 125 MCG tablet TAKE ONE TABLET BY MOUTH ONCE DAILY (DAW1) DUE FOR APPOINTMENT AND LABS. 6/10/22  Yes Sandro Singleton MD              Review of Systems:   A 12 point Review of Systems is negative other than noted in the HPI            Physical Exam:     Patient Vitals for the past 24 hrs:   BP Height Weight   06/21/22 0824 100/60 1.626 m (5' 4\") 65.5 kg (144 lb 4.8 oz)        [unfilled]   Constitutional:   awake, alert, cooperative, no apparent distress, and appears stated age       Eyes:   PERRL, conjunctiva/corneas clear, EOM's intact; no scleral edema or icterus noted        ENT:   Normocephalic, without obvious abnormality, atraumatic, Lips, mucosa, and tongue normal        Hematologic / Lymphatic:   No " lymphadenopathy       Lungs:   Normal respiratory effort, no accessory muscle use, breath sounds bilaterally on auscultation       Cardiovascular:   Regular rate and rhythm       Abdomen:   Soft, nondistended, mild tenderness to palpation supraumbilical corresponding to the site of her pain.  There appears to be abdominal wall defect that can be palpated.  Reducible       Musculoskeletal:   No obvious swelling, bruising or deformity       Skin:   Skin color and texture normal for patient, no rashes or lesions              Data:         All imaging studies reviewed by me. I reviewed the images, and I agree with supraumbilical hernia    DO Jordan Morel DO  General Surgeon  [facility]  Surgery [address] [address]?  Office: [phone]  Employed by - [facility] [facility]  Pager: [phone]             Again, thank you for allowing me to participate in the care of your patient.        Sincerely,        Jordan Horvath DO

## 2022-06-23 DIAGNOSIS — E03.9 HYPOTHYROIDISM, UNSPECIFIED TYPE: Primary | ICD-10-CM

## 2022-06-26 ENCOUNTER — HEALTH MAINTENANCE LETTER (OUTPATIENT)
Age: 42
End: 2022-06-26

## 2022-07-01 ENCOUNTER — TELEPHONE (OUTPATIENT)
Dept: SURGERY | Facility: CLINIC | Age: 42
End: 2022-07-01

## 2022-07-01 NOTE — TELEPHONE ENCOUNTER
Spoke with Vani who needed to reschedule her surgery due to vacation. Case has been moved from 7/8 to 8/18 with Dr. Horvath at [facility].  New letter sent out via [employer]

## 2022-07-01 NOTE — LETTER
We've received instruction to get you scheduled for Umbilical Hernia Repair with Dr Jordan Horvath. We have that arranged as follows:     Pre-op Physical:  Please schedule a pre op physical with your primary doctor.    Surgery Date: 8/18/2022    Location: [facility].  [address] [place] [address]    Approximate Arrival Time: 12:00  p.m.  (Unless instructed differently by the pre-op call nurse)     Post op Appointment: 09/01/2022 at 10:30 a.m. at our office in [place].  [facility] [facility] [address], [place] [address].    Prep Tasks and Info:     1. Review your medications with your primary care or prescribing physician; they will advise you which meds to stop and when, and when you can resume taking.  Certain medications needs to be stopped in advance of surgery to proceed safely.    2. You must get tested for COVID-19, even if you are vaccinated.    Outpatient Surgery:  If you are going home the same day of surgery, a home rapid antigen Covid-19 test is required 1-2 days before surgery- regardless of your vaccination status.  Take a photo of the negative result and show to the nurse on the day of surgery. If you test positive, contact our office right away to reschedule surgery. You can buy a home Covid-19 Rapid Antigen test at many local pharmacies, or you can order for free at covid.gov/tests.    Admits after Surgery:  If you are staying overnight or longer following surgery, a PCR test is required 4 days before surgery instead of the rapid antigen test.   Please schedule a PCR test with [facility] by calling [phone] or visit [email or web address].  You are permitted to have this done outside of our system but must fax the result to [phone].     3. Please shower the evening before and morning of surgery with Hibiclens or Exidine soap.  This can be found at your local  pharmacy.    4. Fasting instructions will be provided by the pre-op nurse who will call you 1-3 days before surgery.  Typically we advise normal food up to 8 hours before surgery then clear liquids only up to 2 hours before surgery then nothing at all by mouth for 2 hours including no gum or candy.  The nurse will review your specific instructions with you at the call.      5. Smoking impacts your body's ability to heal properly.  If you are a smoker, we strongly urge you to stop smoking 4-6 weeks before surgery.    6. You will need an adult to drive you home and stay with you 24 hours after surgery. Public transportation or Medical Van Services are not permitted.    7. You may have one family member wait in the lobby at the surgery center during your surgery. Visitor restrictions are subject to change, please verify with the pre-op nurse when they call.    8. If the community sees a new COVID19 surge, your procedure may need to be postponed. We will contact you if this happens. You will be screened for high-risk exposure to Covid-19 during the pre-op call.  We encourage you to quarantine yourself away from any Covid-19 people for 10 days before surgery to avoid possible last minute cancellations.   When you arrive to the surgery center, you will again be screened for COVID19 symptoms. If you screen positive, your surgery will need to be postponed.    9. We always encourage you to notify your insurance any time you have medical tests or procedures scheduled including surgery. The number is usually right on the back of your insurance card. Please call [facility] Cost of Care at [phone] if you'd like a surgery quote.       Call our office if you have any questions! Thank you!

## 2022-07-06 DIAGNOSIS — E03.9 HYPOTHYROIDISM, UNSPECIFIED TYPE: Primary | ICD-10-CM

## 2022-07-06 RX ORDER — LEVOTHYROXINE SODIUM 137 UG/1
TABLET ORAL
Qty: 90 TABLET | Refills: 3 | Status: SHIPPED | OUTPATIENT
Start: 2022-07-06 | End: 2023-09-22

## 2022-07-13 ENCOUNTER — OFFICE VISIT (OUTPATIENT)
Dept: RHEUMATOLOGY | Facility: CLINIC | Age: 42
End: 2022-07-13
Payer: COMMERCIAL

## 2022-07-13 VITALS
SYSTOLIC BLOOD PRESSURE: 116 MMHG | WEIGHT: 149 LBS | DIASTOLIC BLOOD PRESSURE: 62 MMHG | BODY MASS INDEX: 25.44 KG/M2 | HEIGHT: 64 IN | HEART RATE: 72 BPM

## 2022-07-13 DIAGNOSIS — Z84.0 FAMILY HISTORY OF PSORIASIS IN MOTHER: ICD-10-CM

## 2022-07-13 DIAGNOSIS — M25.50 ARTHRALGIA OF MULTIPLE JOINTS: ICD-10-CM

## 2022-07-13 DIAGNOSIS — M77.11 RIGHT LATERAL EPICONDYLITIS: Primary | ICD-10-CM

## 2022-07-13 PROCEDURE — 99214 OFFICE O/P EST MOD 30 MIN: CPT | Performed by: INTERNAL MEDICINE

## 2022-07-13 RX ORDER — PREDNISONE 10 MG/1
10 TABLET ORAL DAILY
COMMUNITY
End: 2022-08-08

## 2022-07-13 NOTE — PROGRESS NOTES
Rheumatology follow-up visit note     Vani is a 41 year old female presents today for follow-up.    Vani was seen today for recheck.    Diagnoses and all orders for this visit:    Right lateral epicondylitis  -     Physical Therapy Referral; Future  -     diclofenac (VOLTAREN) 1 % topical gel; Apply 2 g topically 4 times daily for 30 days    Family history of psoriasis in mother    Arthralgia of multiple joints        This patient with arthralgia predominantly right elbow area pain consistent with lateral epicondylitis, has few if any signals to suggest a definite inflammatory arthropathy, background of family history of psoriasis UNDER consideration going forward, the elbow pain management in the context of the lateral epicondylitis is discussed, would defer offering her a corticosteroid injection at this stage, suggested physical therapy local brace and diclofenac topical.  We will meet in the next 2 to 3 months.    HPI    Vani Corona is a 41 year old female is here for follow-up of worsening pain in her joints.  She noted worst of the pain is in her right elbow.  This is troubled her for approximately 12 months.  She points to the lateral epicondylar area, radiating more distally and more proximally in that region, no swelling, no history of trauma, taking Tylenol arthritis for this.  She is not woken up from sleep because of this she noted no other joint areas are troublesome.  Prednisone on her previous visit provided her no relief.  As noted she has family history of psoriasis      Following is the excerpt from a previous note:    She used to follow-up here in rheumatology for inflammatory joint disease at one point she was on methotrexate and hydroxychloroquine, this was approximately 4 years ago.  The methotrexate was discontinued as she had plans for pregnancy.  That did not transpire.  She did not also stopped taking hydroxychloroquine and did well.  She has noted little or no pain in her  "other joints such as hands wrists left elbow shoulders hips knees ankles feet and toes.  In the morning she has noted no sustained stiffness.  She rated pain at the right elbow moderately severe and right shoulder mild.  He is able to do all her day-to-day activities without difficulty.  She works as a  in the medical billing.  She vapes, 2 drinks of alcohol a day.  She has several comorbidities including diabetes.  Her mom is noted to have psoriasis.  She lost her 20-year-old son the other day to suicide..        DETAILED EXAMINATION  07/13/22  :    Vitals:    07/13/22 1508   BP: 116/62   BP Location: Right arm   Patient Position: Sitting   Cuff Size: Adult Regular   Pulse: 72   Weight: 67.6 kg (149 lb)   Height: 1.626 m (5' 4\")     Alert oriented. Head including the face is examined for malar rash, heliotropes, scarring, lupus pernio. Eyes examined for redness such as in episcleritis/scleritis, periorbital lesions.   Neck/ Face examined for parotid gland swelling, range of motion of neck.  Left upper and lower and right upper and lower extremities examined for tenderness, swelling, warmth of the appendicular joints, range of motion, edema, rash.  Some of the important findings included: she does not have evidence of synovitis in the palpable joints of the upper extremities.  She has marked tenderness in the lateral epicondyles and little more distally on the right side not on the left this reproduces her symptoms.  There is no dactylitis of digits or toes.  Patient Active Problem List    Diagnosis Date Noted     Selective IgA immunodeficiency (H) 06/03/2022     Priority: Medium     Recurrent major depressive disorder, in partial remission (H) 06/03/2022     Priority: Medium     History of ileostomy 08/26/2021     Priority: Medium     Hypothyroidism, unspecified type 08/26/2021     Priority: Medium     Formatting of this note might be different from the original.  Created by Conversion    Replacement " Utility updated for latest IMO load       Tobacco abuse 2021     Priority: Medium     Skin mass 2021     Priority: Medium     Formatting of this note might be different from the original.  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  Created by Conversion       Non-rheumatic mitral regurgitation 2016     Priority: Medium     Insomnia 02/15/2016     Priority: Medium     Anxiety 2015     Priority: Medium     Depression 2015     Priority: Medium     Methamphetamine abuse in remission (H) 04/10/2015     Priority: Medium     Formatting of this note might be different from the original.  Treatment 2014. Clean since.       Uncontrolled type 1 diabetes mellitus (H) 06/15/2004     Priority: Medium     Problem list name updated by automated process. Provider to review       Past Surgical History:   Procedure Laterality Date     BIOPSY CERVICAL, LOCAL EXCISION, SINGLE/MULTIPLE        SECTION       EXCISE LESION UPPER EXTREMITY Left 2021    Procedure: EXCISION, LESION, UPPER EXTREMITY;  Surgeon: Babs Leiva MD;  Location: [facility];  Service: General     HC REPAIR UMBILICAL RUTH ANN,<6Y/O,REDUC      Description: Umbilical Hernia Repair;  Recorded: 10/03/2014;     HC REVISE MEDIAN N/CARPAL TUNNEL SURG      Description: Neuroplasty Decompression Median Nerve At Carpal Tunnel;  Recorded: 10/03/2014;     HERNIA REPAIR  2010     HERNIA REPAIR       LAPAROSCOPIC CHOLECYSTECTOMY N/A 2019    Procedure: CHOLECYSTECTOMY, LAPAROSCOPIC;  Surgeon: Rinku Doran MD;  [place] [facility] [place]   "Service: General     LEAP and Coloposcopy        OTHER SURGICAL HISTORY      colposcopyLEAP     Z  DELIVERY ONLY      Description:  Section;  Recorded: 2010;  Comments: 09 - breech      Past Medical History:   Diagnosis Date     Anxiety      Asthma      Bronchitis, not specified as acute or chronic      Chest pain      Depression      Diabetes (H)      Fainting      Heart disease     MVP     IDDM     dx      Methamphetamine abuse in remission (H) 4/10/2015    Treatment 2014. Clean since.      Mitral valve prolapse      Mitral valve prolapse      Rheumatoid arthritis (H)      Rheumatoid arthritis (H)      Substance abuse (H)     methamphetamine     Thyroid disease      Unspecified keratitis      Allergies   Allergen Reactions     Augmentin Nausea and Vomiting and Headache     Varenicline Other (See Comments)     \"I took the Chantix and that made me a (severe) crazy person.\"  Emotional disturbance       Venlafaxine Nausea     Went away when venlafaxine stopped.  Did not rechallenge. 16     No Known Allergies      Fluconazole Rash     Current Outpatient Medications   Medication Sig Dispense Refill     ACCU-CHEK GUIDE test strip USE TO TEST 6 TIMES PER DAY       albuterol (PROAIR HFA/PROVENTIL HFA/VENTOLIN HFA) 108 (90 Base) MCG/ACT inhaler        cetirizine (ZYRTEC) 10 MG tablet TAKE ONE TABLET BY MOUTH ONCE DAILY 90 tablet 3     chlorhexidine (PERIDEX) 0.12 % solution SWISH AND SPIT 15 ML BY MOUTH TWICE DAILY       Continuous Blood Gluc Sensor (DEXCOM G6 SENSOR) MISC Change every 10 days 9 each 1     Continuous Blood Gluc Transmit (DEXCOM G6 TRANSMITTER) MISC CHANGE EVERY 3 MONTHS. 1 each 1     escitalopram (LEXAPRO) 10 MG tablet TAKE ONE TABLET BY MOUTH ONCE DAILY 90 tablet 1     insulin aspart (NOVOLOG PEN) 100 UNIT/ML pen Use daily in pump, up to 50 units 60 mL 3     Insulin Infusion Pump (T: SLIM X2 INS /CONTROL 7.4) JESSICA        Insulin Infusion Pump Supplies " (T:SLIM X2 3ML CARTRIDGE) MISC        ketoconazole (NIZORAL) 2 % external cream Apply topically 2 times daily 30 g 0     levothyroxine (SYNTHROID/LEVOTHROID) 137 MCG tablet One tablet daily. 90 tablet 3     lisinopril (ZESTRIL) 2.5 MG tablet TAKE ONE TABLET BY MOUTH ONCE DAILY 90 tablet 1     NOVOLOG VIAL 100 UNIT/ML soln        ondansetron (ZOFRAN) 4 MG tablet Take 1 tablet (4 mg) by mouth every 8 hours as needed for nausea 30 tablet 0     pantoprazole (PROTONIX) 40 MG EC tablet Take 40 mg by mouth every morning       predniSONE (DELTASONE) 10 MG tablet Take 10 mg by mouth daily       SUMAtriptan (IMITREX) 25 MG tablet Take 1 tablet (25 mg) by mouth at onset of headache for migraine May repeat in 2 hours. Max 8 tablets/24 hours. Due for appointment in Yue 15 tablet 0     SYNTHROID 125 MCG tablet TAKE ONE TABLET BY MOUTH ONCE DAILY (DAW1) DUE FOR APPOINTMENT AND LABS. 90 tablet 0     family history includes Cerebrovascular Disease in her paternal grandmother; Diabetes in her paternal aunt and another family member; Hypertension in her paternal grandmother; No Known Problems in her daughter, sister, son, and other family members; Other - See Comments in her father; Thyroid Disease in her mother.  Social Connections: Not on file          WBC   Date Value Ref Range Status   03/11/2013 8.2 4.0 - 11.0 10e9/L Final     WBC Count   Date Value Ref Range Status   05/31/2022 6.2 4.0 - 11.0 10e3/uL Final     RBC Count   Date Value Ref Range Status   05/31/2022 4.02 3.80 - 5.20 10e6/uL Final   03/11/2013 4.75 3.8 - 5.2 10e12/L Final     Hemoglobin   Date Value Ref Range Status   05/31/2022 12.8 11.7 - 15.7 g/dL Final   03/11/2013 14.6 11.7 - 15.7 g/dL Final     Hematocrit   Date Value Ref Range Status   05/31/2022 37.4 35.0 - 47.0 % Final   03/11/2013 42.4 35.0 - 47.0 % Final     MCV   Date Value Ref Range Status   05/31/2022 93 78 - 100 fL Final   03/11/2013 89 78 - 100 fl Final     MCH   Date Value Ref Range Status    05/31/2022 31.8 26.5 - 33.0 pg Final   03/11/2013 30.7 26.5 - 33.0 pg Final     Platelet Count   Date Value Ref Range Status   05/31/2022 284 150 - 450 10e3/uL Final   03/11/2013 247 150 - 450 10e9/L Final     % Lymphocytes   Date Value Ref Range Status   05/31/2022 31 % Final   03/11/2013 22.4 20 - 48 % Final     AST   Date Value Ref Range Status   05/05/2022 51 (H) 0 - 40 U/L Final   03/11/2013 50 (H) 0 - 45 U/L Final     ALT   Date Value Ref Range Status   05/31/2022 42 0 - 45 U/L Final   03/11/2013 58 (H) 0 - 50 U/L Final     Albumin   Date Value Ref Range Status   05/31/2022 3.5 3.5 - 5.0 g/dL Final   03/11/2013 3.9 3.9 - 5.1 g/dL Final     Alkaline Phosphatase   Date Value Ref Range Status   05/05/2022 74 45 - 120 U/L Final   03/11/2013 67 40 - 150 U/L Final     Creatinine   Date Value Ref Range Status   05/31/2022 0.71 0.60 - 1.10 mg/dL Final   03/11/2013 0.65 0.52 - 1.04 mg/dL Final     GFR Estimate   Date Value Ref Range Status   05/31/2022 >90 >60 mL/min/1.73m2 Final     Comment:     Effective December 21, 2021 eGFRcr in adults is calculated using the 2021 CKD-EPI creatinine equation which includes age and gender (Jamia et al., NEJM, DOI: 10.1056/DMCPlm7055179)   05/20/2021 >60 >60 mL/min/1.73m2 Final   03/11/2013 >90 >60 mL/min/1.7m2 Final     GFR Estimate If Black   Date Value Ref Range Status   05/20/2021 >60 >60 mL/min/1.73m2 Final   03/11/2013 >90 >60 mL/min/1.7m2 Final     Creatinine Urine   Date Value Ref Range Status   06/15/2004 19 mg/dL Final     Creatinine Urine mg/dL   Date Value Ref Range Status   05/27/2022 115 mg/dL Final     Erythrocyte Sedimentation Rate   Date Value Ref Range Status   05/31/2022 8 0 - 20 mm/hr Final     CRP   Date Value Ref Range Status   05/31/2022 <0.1 0.0 - 0.8 mg/dL Final

## 2022-08-03 RX ORDER — PANTOPRAZOLE SODIUM 40 MG/1
40 TABLET, DELAYED RELEASE ORAL EVERY MORNING
Status: CANCELLED | OUTPATIENT
Start: 2022-08-03

## 2022-08-04 ENCOUNTER — MYC MEDICAL ADVICE (OUTPATIENT)
Dept: INTERNAL MEDICINE | Facility: CLINIC | Age: 42
End: 2022-08-04

## 2022-08-04 NOTE — TELEPHONE ENCOUNTER
"Routing refill request to provider for review/approval because:  Medication is reported/historical  Patient needs to be seen because it has been more than 1 year since last office visit.    Last Written Prescription Date:    Last Fill Quantity: ,  # refills:    Last office visit provider:  6/14/21     Requested Prescriptions   Pending Prescriptions Disp Refills     pantoprazole (PROTONIX) 40 MG EC tablet       Sig: Take 1 tablet (40 mg) by mouth every morning       PPI Protocol Failed - 8/3/2022  1:57 PM        Failed - Recent (12 mo) or future (30 days) visit within the authorizing provider's specialty     Patient has had an office visit with the authorizing provider or a provider within the authorizing providers department within the previous 12 mos or has a future within next 30 days. See \"Patient Info\" tab in inbasket, or \"Choose Columns\" in Meds & Orders section of the refill encounter.              Passed - Not on Clopidogrel (unless Pantoprazole ordered)        Passed - No diagnosis of osteoporosis on record        Passed - Medication is active on med list        Passed - Patient is age 18 or older        Passed - No active pregnacy on record        Passed - No positive pregnancy test in past 12 months             Samantha Guerra, RN 08/04/22 2:12 PM  "

## 2022-08-05 DIAGNOSIS — K21.9 GASTROESOPHAGEAL REFLUX DISEASE, UNSPECIFIED WHETHER ESOPHAGITIS PRESENT: Primary | ICD-10-CM

## 2022-08-05 RX ORDER — PANTOPRAZOLE SODIUM 40 MG/1
40 TABLET, DELAYED RELEASE ORAL EVERY MORNING
Qty: 10 TABLET | Refills: 0 | Status: CANCELLED | OUTPATIENT
Start: 2022-08-05

## 2022-08-05 RX ORDER — PANTOPRAZOLE SODIUM 40 MG/1
40 TABLET, DELAYED RELEASE ORAL EVERY MORNING
Qty: 10 TABLET | Refills: 0 | Status: SHIPPED | OUTPATIENT
Start: 2022-08-05 | End: 2022-08-08

## 2022-08-05 NOTE — TELEPHONE ENCOUNTER
Spoke with Daphne Olguin who is covering PCP today about med refill request, Daphne agreed to a short-term refill until pt could see PCP.    Called pt to notify her of med refill. Also, scheduled pt for a VV on Monday with PCP since PCP has not seen pt in over a year, but pt is currently positive for COVID-19.

## 2022-08-08 ENCOUNTER — LAB (OUTPATIENT)
Dept: LAB | Facility: CLINIC | Age: 42
End: 2022-08-08

## 2022-08-08 ENCOUNTER — VIRTUAL VISIT (OUTPATIENT)
Dept: INTERNAL MEDICINE | Facility: CLINIC | Age: 42
End: 2022-08-08
Payer: COMMERCIAL

## 2022-08-08 DIAGNOSIS — F15.11 METHAMPHETAMINE ABUSE IN REMISSION (H): ICD-10-CM

## 2022-08-08 DIAGNOSIS — R05.9 COUGH: Primary | ICD-10-CM

## 2022-08-08 DIAGNOSIS — E03.9 HYPOTHYROIDISM, UNSPECIFIED TYPE: ICD-10-CM

## 2022-08-08 DIAGNOSIS — E10.9 TYPE 1 DIABETES MELLITUS WITHOUT COMPLICATION (H): ICD-10-CM

## 2022-08-08 DIAGNOSIS — F33.2 SEVERE EPISODE OF RECURRENT MAJOR DEPRESSIVE DISORDER, WITHOUT PSYCHOTIC FEATURES (H): ICD-10-CM

## 2022-08-08 DIAGNOSIS — D80.2 SELECTIVE IGA IMMUNODEFICIENCY (H): ICD-10-CM

## 2022-08-08 DIAGNOSIS — K21.9 GASTROESOPHAGEAL REFLUX DISEASE, UNSPECIFIED WHETHER ESOPHAGITIS PRESENT: ICD-10-CM

## 2022-08-08 LAB
ANION GAP SERPL CALCULATED.3IONS-SCNC: 11 MMOL/L (ref 7–15)
BUN SERPL-MCNC: 9.7 MG/DL (ref 6–20)
CALCIUM SERPL-MCNC: 9.1 MG/DL (ref 8.6–10)
CHLORIDE SERPL-SCNC: 103 MMOL/L (ref 98–107)
CREAT SERPL-MCNC: 0.55 MG/DL (ref 0.51–0.95)
CREAT UR-MCNC: 178 MG/DL
DEPRECATED HCO3 PLAS-SCNC: 25 MMOL/L (ref 22–29)
GFR SERPL CREATININE-BSD FRML MDRD: >90 ML/MIN/1.73M2
GLUCOSE SERPL-MCNC: 192 MG/DL (ref 70–99)
HBA1C MFR BLD: 7 % (ref 0–5.6)
LDLC SERPL DIRECT ASSAY-MCNC: 46 MG/DL
MICROALBUMIN UR-MCNC: <12 MG/L
MICROALBUMIN/CREAT UR: NORMAL MG/G{CREAT}
POTASSIUM SERPL-SCNC: 4.1 MMOL/L (ref 3.4–5.3)
SODIUM SERPL-SCNC: 139 MMOL/L (ref 136–145)
T4 FREE SERPL-MCNC: 2.05 NG/DL (ref 0.9–1.7)
TSH SERPL DL<=0.005 MIU/L-ACNC: 0.17 UIU/ML (ref 0.3–4.2)

## 2022-08-08 PROCEDURE — 36415 COLL VENOUS BLD VENIPUNCTURE: CPT

## 2022-08-08 PROCEDURE — 80048 BASIC METABOLIC PNL TOTAL CA: CPT

## 2022-08-08 PROCEDURE — 84443 ASSAY THYROID STIM HORMONE: CPT

## 2022-08-08 PROCEDURE — 82043 UR ALBUMIN QUANTITATIVE: CPT

## 2022-08-08 PROCEDURE — 83721 ASSAY OF BLOOD LIPOPROTEIN: CPT

## 2022-08-08 PROCEDURE — 99214 OFFICE O/P EST MOD 30 MIN: CPT | Mod: GT | Performed by: NURSE PRACTITIONER

## 2022-08-08 PROCEDURE — 83036 HEMOGLOBIN GLYCOSYLATED A1C: CPT

## 2022-08-08 PROCEDURE — 84439 ASSAY OF FREE THYROXINE: CPT

## 2022-08-08 RX ORDER — ALBUTEROL SULFATE 90 UG/1
2 AEROSOL, METERED RESPIRATORY (INHALATION) EVERY 6 HOURS
Qty: 18 G | Refills: 0 | Status: SHIPPED | OUTPATIENT
Start: 2022-08-08 | End: 2024-06-26

## 2022-08-08 RX ORDER — PANTOPRAZOLE SODIUM 40 MG/1
40 TABLET, DELAYED RELEASE ORAL EVERY MORNING
Qty: 90 TABLET | Refills: 3 | Status: SHIPPED | OUTPATIENT
Start: 2022-08-08 | End: 2023-09-20

## 2022-08-08 RX ORDER — ESCITALOPRAM OXALATE 20 MG/1
20 TABLET ORAL DAILY
Qty: 45 TABLET | Refills: 0 | Status: SHIPPED | OUTPATIENT
Start: 2022-08-08 | End: 2022-12-27

## 2022-08-08 ASSESSMENT — PATIENT HEALTH QUESTIONNAIRE - PHQ9
SUM OF ALL RESPONSES TO PHQ QUESTIONS 1-9: 16
10. IF YOU CHECKED OFF ANY PROBLEMS, HOW DIFFICULT HAVE THESE PROBLEMS MADE IT FOR YOU TO DO YOUR WORK, TAKE CARE OF THINGS AT HOME, OR GET ALONG WITH OTHER PEOPLE: VERY DIFFICULT
SUM OF ALL RESPONSES TO PHQ QUESTIONS 1-9: 16

## 2022-08-08 NOTE — ASSESSMENT & PLAN NOTE
Recommended OTC cough suppressants such as Delsym or Robitussin. PRN albuterol. Follow up in office if cough is worsening or fails to improve by the 6 week irene after diagnosis of COVID.

## 2022-08-08 NOTE — ASSESSMENT & PLAN NOTE
Not in remission. She is coping with her son's recent suicide. Continues psychotherapy. INCREASE escitalopram to 20 mg daily. Follow up in 6 weeks to recheck

## 2022-08-08 NOTE — PROGRESS NOTES
"Vani is a 41 year old who is being evaluated via a billable video visit.      How would you like to obtain your AVS? [facility]      Assessment & Plan   Problem List Items Addressed This Visit        Nervous and Auditory    Methamphetamine abuse in remission (H)     In remission               Respiratory    Cough, r/t COVID-19 infection 7/2022 - Primary     Recommended OTC cough suppressants such as Delsym or Robitussin. PRN albuterol. Follow up in office if cough is worsening or fails to improve by the 6 week irene after diagnosis of COVID.            Relevant Medications    albuterol (PROAIR HFA/PROVENTIL HFA/VENTOLIN HFA) 108 (90 Base) MCG/ACT inhaler       Digestive    GERD (gastroesophageal reflux disease)     Stable with pantoprazole. Continue            Relevant Medications    escitalopram (LEXAPRO) 20 MG tablet    pantoprazole (PROTONIX) 40 MG EC tablet       Endocrine    Hypothyroidism, unspecified type     Recent levothyroxine dose increase. Repeat TSH is scheduled for this evening through endocrinology               Immune    Selective IgA immunodeficiency (H)     Recent COVID-19 infection but otherwise no recurrent URIs            Relevant Medications    albuterol (PROAIR HFA/PROVENTIL HFA/VENTOLIN HFA) 108 (90 Base) MCG/ACT inhaler       Behavioral    Severe episode of recurrent major depressive disorder, without psychotic features (H)     Not in remission. She is coping with her son's recent suicide. Continues psychotherapy. INCREASE escitalopram to 20 mg daily. Follow up in 6 weeks to recheck           Relevant Medications    escitalopram (LEXAPRO) 20 MG tablet              BMI:   Estimated body mass index is 25.58 kg/m  as calculated from the following:    Height as of 7/13/22: 1.626 m (5' 4\").    Weight as of 7/13/22: 67.6 kg (149 lb).       Return in about 6 weeks (around 9/19/2022) for Follow up depression .    Denia Llamas NP  [facility]    Subjective   Vani is a 41 year " old, presenting for the following health issues:  Refill Request (pantoprazole)    She has been feeling very tired. Her TSH was elevated when checked by endocrinologist and she was started on a bigger dose of levothyroxine which she has been taking for about a month at this time without improvement in energy levels.     Mood was reviewed. She lost her son to suicide recently and she is grieving. She is struggling. Continues to see a therapist regularly. She is presently taking escitalopram 10 mg daily. H/o methamphetamine abuse, this remains in remission.     She has an umbilical hernia and is scheduled for repair later this month. She has pain with direct pressure on the area.     No migraine headaches in the past few months. She has plenty of sumitriptan remaining at this time.     She had COVID at the end of July. She is still coughing. Asks about a refill of albuterol.      History of Present Illness       Reason for visit:  Medication refills    She eats 2-3 servings of fruits and vegetables daily.She consumes 1 sweetened beverage(s) daily.She exercises with enough effort to increase her heart rate 9 or less minutes per day.  She exercises with enough effort to increase her heart rate 3 or less days per week.   She is taking medications regularly.    Today's PHQ-9         PHQ-9 Total Score: 16    PHQ-9 Q9 Thoughts of better off dead/self-harm past 2 weeks :   Not at all    How difficult have these problems made it for you to do your work, take care of things at home, or get along with other people: Very difficult             Objective           Vitals:  No vitals were obtained today due to virtual visit.    Physical Exam   GENERAL: Healthy, alert and no distress  RESP: No audible wheeze, cough, or visible cyanosis.  No visible retractions or increased work of breathing.   NEURO: Cranial nerves grossly intact.  Mentation and speech appropriate for age.  PSYCH: Mentation appears normal, affect normal/bright,  judgement and insight intact, normal speech and appearance well-groomed.          Video-Visit Details    Video Start Time: 9:55 AM     Type of service:  Video Visit    Video End Time:10:13 AM    Originating Location (pt. Location): Home    Distant Location (provider location):  [facility]     Platform used for Video Visit: [employer]    .  ..

## 2022-08-08 NOTE — ASSESSMENT & PLAN NOTE
Recent levothyroxine dose increase. Repeat TSH is scheduled for this evening through endocrinology

## 2022-08-08 NOTE — Clinical Note
Please abstract the following data from this visit with this patient into the appropriate field in Epic:  Tests that can be patient reported without a hard copy:  Eye exam with ophthalmology on this date: May 25, 2022. [facility] in [place]   Other Tests found in the patient's chart through Chart Review/Care Everywhere:    Note to Abstraction: If this section is blank, no results were found via Chart Review/Care Everywhere.

## 2022-08-09 DIAGNOSIS — E03.9 HYPOTHYROIDISM, UNSPECIFIED TYPE: Primary | ICD-10-CM

## 2022-08-12 ENCOUNTER — OFFICE VISIT (OUTPATIENT)
Dept: FAMILY MEDICINE | Facility: CLINIC | Age: 42
End: 2022-08-12
Payer: COMMERCIAL

## 2022-08-12 VITALS
TEMPERATURE: 99.1 F | DIASTOLIC BLOOD PRESSURE: 72 MMHG | SYSTOLIC BLOOD PRESSURE: 120 MMHG | HEART RATE: 68 BPM | OXYGEN SATURATION: 99 % | WEIGHT: 144.8 LBS | BODY MASS INDEX: 24.85 KG/M2

## 2022-08-12 DIAGNOSIS — F33.2 SEVERE EPISODE OF RECURRENT MAJOR DEPRESSIVE DISORDER, WITHOUT PSYCHOTIC FEATURES (H): ICD-10-CM

## 2022-08-12 DIAGNOSIS — F41.9 ANXIETY: ICD-10-CM

## 2022-08-12 DIAGNOSIS — K21.9 GASTROESOPHAGEAL REFLUX DISEASE, UNSPECIFIED WHETHER ESOPHAGITIS PRESENT: ICD-10-CM

## 2022-08-12 DIAGNOSIS — E03.9 HYPOTHYROIDISM, UNSPECIFIED TYPE: ICD-10-CM

## 2022-08-12 DIAGNOSIS — Z01.818 PRE-OP EXAM: Primary | ICD-10-CM

## 2022-08-12 DIAGNOSIS — F15.11 METHAMPHETAMINE ABUSE IN REMISSION (H): ICD-10-CM

## 2022-08-12 DIAGNOSIS — D80.2 SELECTIVE IGA IMMUNODEFICIENCY (H): ICD-10-CM

## 2022-08-12 DIAGNOSIS — K43.2 INCISIONAL HERNIA, WITHOUT OBSTRUCTION OR GANGRENE: ICD-10-CM

## 2022-08-12 DIAGNOSIS — E10.649 UNCONTROLLED TYPE 1 DIABETES MELLITUS WITH HYPOGLYCEMIA WITHOUT COMA (H): ICD-10-CM

## 2022-08-12 LAB
HCG UR QL: NEGATIVE
HGB BLD-MCNC: 14.1 G/DL (ref 11.7–15.7)
T4 FREE SERPL-MCNC: 2.12 NG/DL (ref 0.9–1.7)
TSH SERPL DL<=0.005 MIU/L-ACNC: 0.12 UIU/ML (ref 0.3–4.2)

## 2022-08-12 PROCEDURE — 36415 COLL VENOUS BLD VENIPUNCTURE: CPT | Performed by: NURSE PRACTITIONER

## 2022-08-12 PROCEDURE — 96127 BRIEF EMOTIONAL/BEHAV ASSMT: CPT | Performed by: NURSE PRACTITIONER

## 2022-08-12 PROCEDURE — 99214 OFFICE O/P EST MOD 30 MIN: CPT | Performed by: NURSE PRACTITIONER

## 2022-08-12 PROCEDURE — 85018 HEMOGLOBIN: CPT | Performed by: NURSE PRACTITIONER

## 2022-08-12 PROCEDURE — 81025 URINE PREGNANCY TEST: CPT | Performed by: NURSE PRACTITIONER

## 2022-08-12 PROCEDURE — 84443 ASSAY THYROID STIM HORMONE: CPT | Performed by: NURSE PRACTITIONER

## 2022-08-12 PROCEDURE — 84439 ASSAY OF FREE THYROXINE: CPT | Performed by: NURSE PRACTITIONER

## 2022-08-12 ASSESSMENT — PAIN SCALES - GENERAL: PAINLEVEL: NO PAIN (0)

## 2022-08-12 ASSESSMENT — PATIENT HEALTH QUESTIONNAIRE - PHQ9
SUM OF ALL RESPONSES TO PHQ QUESTIONS 1-9: 13
10. IF YOU CHECKED OFF ANY PROBLEMS, HOW DIFFICULT HAVE THESE PROBLEMS MADE IT FOR YOU TO DO YOUR WORK, TAKE CARE OF THINGS AT HOME, OR GET ALONG WITH OTHER PEOPLE: VERY DIFFICULT
SUM OF ALL RESPONSES TO PHQ QUESTIONS 1-9: 13

## 2022-08-12 NOTE — PROGRESS NOTES
[facility]  [address] [facility] [address]  [facility] [address]  Phone: [phone]  Fax: [phone]  Primary Provider: Denia Llamas  Pre-op Performing Provider: SEVERO ORELLANA    PREOPERATIVE EVALUATION:  Today's date: 8/12/2022    Vani CASH Hadley is a 41 year old female who presents for a preoperative evaluation.    Surgical Information:  Surgery/Procedure: Herniorrhapy  Surgery Location: [place]  Surgeon: Dr. Horvath  Surgery Date: 8/18/22  Time of Surgery: tbd  Where patient plans to recover: At home with family  Fax number for surgical facility: Note does not need to be faxed, will be available electronically in Epic.    Type of Anesthesia Anticipated: to be determined    Assessment & Plan     The proposed surgical procedure is considered INTERMEDIATE risk.    Pre-op exam  Preop exam performed.  There are no contraindications for surgery.  Patient will hold NSAIDs for 7 days prior to surgery.  - Hemoglobin  - HCG qualitative urine  - Hemoglobin  - HCG qualitative urine    Incisional hernia, without obstruction or gangrene  Discussed taking over-the-counter acetaminophen as needed for pain.    Uncontrolled type 1 diabetes mellitus with hypoglycemia without coma (H)  Endocrinology will provide insight into insulin use prior to surgery.    Hypothyroidism, unspecified type  TSH was slightly low at her last visit.  Levothyroxine was adjusted.  She is seeing endocrinology.  - TSH  - T4, free    Anxiety  Severe episode of recurrent major depressive disorder, without psychotic features (H)  She continues escitalopram.    Gastroesophageal reflux disease, unspecified whether esophagitis present  She continues pantoprazole.    Methamphetamine abuse in remission (H)  She is in remission.    Selective IgA immunodeficiency (H)  This is stable.      RECOMMENDATION:  APPROVAL GIVEN to proceed with proposed procedure, without further diagnostic evaluation.  56}    Subjective     HPI related to upcoming  procedure: Patient has been experiencing constant pressure around her umbilicus since May.  She has difficulty wearing pants due to this.  Area is painful to touch.  She complains of occasional nausea.  She experiences diarrhea daily which is typical for her.  She denies any blood or mucus in the stool.    Patient takes sumatriptan as needed for headaches.  Acid reflux symptoms are controlled with pantoprazole.  Patient has type 1 diabetes and sees endocrinology.  She has an insulin pump.  She takes lisinopril for renal protection.  She takes escitalopram 20 mg daily for depression and anxiety.  She has a history of COVID in July.  She has been using albuterol as needed since.  She takes cetirizine for allergies.  She has hypothyroidism and is taking levothyroxine.  Last TSH was 0.17 on 8/8/2022.    Preop Questions 8/12/2022   1. Have you ever had a heart attack or stroke? No   2. Have you ever had surgery on your heart or blood vessels, such as a stent placement, a coronary artery bypass, or surgery on an artery in your head, neck, heart, or legs? No   3. Do you have chest pain with activity? No   4. Do you have a history of  heart failure? No   5. Do you currently have a cold, bronchitis or symptoms of other infection? No   6. Do you have a cough, shortness of breath, or wheezing? No   7. Do you or anyone in your family have previous history of blood clots? No   8. Do you or does anyone in your family have a serious bleeding problem such as prolonged bleeding following surgeries or cuts? No   9. Have you ever had problems with anemia or been told to take iron pills? No   10. Have you had any abnormal blood loss such as black, tarry or bloody stools, or abnormal vaginal bleeding? No   11. Have you ever had a blood transfusion? No   12. Are you willing to have a blood transfusion if it is medically needed before, during, or after your surgery? Yes   13. Have you or any of your relatives ever had problems with  anesthesia? No   14. Do you have sleep apnea, excessive snoring or daytime drowsiness? YES - excessive snoring    14a. Do you have a CPAP machine? No   15. Do you have any artifical heart valves or other implanted medical devices like a pacemaker, defibrillator, or continuous glucose monitor? No   16. Do you have artificial joints? No   17. Are you allergic to latex? No   18. Is there any chance that you may be pregnant? No       Health Care Directive:  Patient does not have a Health Care Directive or Living Will: Discussed advance care planning with patient; however, patient declined at this time.    Preoperative Review of :   reviewed - controlled substances prescribed by other outside provider(s).      Status of Chronic Conditions:  See problem list for active medical problems.  Problems all longstanding and stable, except as noted/documented.  See ROS for pertinent symptoms related to these conditions.      Review of Systems  Constitutional, neuro, ENT, endocrine, pulmonary, cardiac, gastrointestinal, genitourinary, musculoskeletal, integument and psychiatric systems are negative, except as otherwise noted.    Patient Active Problem List    Diagnosis Date Noted     Cough, r/t COVID-19 infection 7/2022 08/08/2022     Priority: Medium     Selective IgA immunodeficiency (H) 06/03/2022     Priority: Medium     Noted on celiac screening test.       Severe episode of recurrent major depressive disorder, without psychotic features (H) 06/03/2022     Priority: Medium     History of ileostomy 08/26/2021     Priority: Medium     Hypothyroidism, unspecified type 08/26/2021     Priority: Medium     Formatting of this note might be different from the original.  Created by Conversion    Replacement Utility updated for latest IMO load       Tobacco abuse 08/26/2021     Priority: Medium     Skin mass 05/04/2021     Priority: Medium     Formatting of this note might be different from the original.  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  Created by Conversion       Non-rheumatic mitral regurgitation 2016     Priority: Medium     Insomnia 02/15/2016     Priority: Medium     Anxiety 2015     Priority: Medium     Methamphetamine abuse in remission (H) 04/10/2015     Priority: Medium     Formatting of this note might be different from the original.  Treatment 2014. Clean since.       Uncontrolled type 1 diabetes mellitus (H) 06/15/2004     Priority: Medium     Problem list name updated by automated process. Provider to review        Past Medical History:   Diagnosis Date     Anxiety      Asthma      Bronchitis, not specified as acute or chronic      Chest pain      Depression      Diabetes (H)      Fainting      Heart disease     MVP     IDDM     dx      Methamphetamine abuse in remission (H) 4/10/2015    Treatment 2014. Clean since.      Mitral valve prolapse      Mitral valve prolapse      Rheumatoid arthritis (H)      Rheumatoid arthritis (H)      Substance abuse (H)     methamphetamine     Thyroid disease      Unspecified keratitis      Past Surgical History:   Procedure Laterality Date     BIOPSY CERVICAL, LOCAL EXCISION, SINGLE/MULTIPLE        SECTION       EXCISE LESION UPPER EXTREMITY Left 2021    Procedure: EXCISION, LESION, UPPER EXTREMITY;  Surgeon: Babs Leiva MD;  Location: [facility];  Service: General     HC REPAIR UMBILICAL RUTH ANN,<6Y/O,REDUC      Description: Umbilical Hernia Repair;  Recorded: 10/03/2014;     HC REVISE MEDIAN N/CARPAL TUNNEL SURG      Description: Neuroplasty Decompression Median  Nerve At Carpal Tunnel;  Recorded: 10/03/2014;     HERNIA REPAIR  2010     HERNIA REPAIR       LAPAROSCOPIC CHOLECYSTECTOMY N/A 2019    Procedure: CHOLECYSTECTOMY, LAPAROSCOPIC;  Surgeon: Rinku Doran MD;  Location: [facility];  Service: General     LEAP and Coloposcopy        OTHER SURGICAL HISTORY      colposcopyLEAP     ZZC  DELIVERY ONLY      Description:  Section;  Recorded: 2010;  Comments: 09 - breech     Current Outpatient Medications   Medication Sig Dispense Refill     ACCU-CHEK GUIDE test strip USE TO TEST 6 TIMES PER DAY       albuterol (PROAIR HFA/PROVENTIL HFA/VENTOLIN HFA) 108 (90 Base) MCG/ACT inhaler Inhale 2 puffs into the lungs every 6 hours 18 g 0     cetirizine (ZYRTEC) 10 MG tablet TAKE ONE TABLET BY MOUTH ONCE DAILY 90 tablet 3     Continuous Blood Gluc Sensor (DEXCOM G6 SENSOR) MISC Change every 10 days 9 each 1     Continuous Blood Gluc Transmit (DEXCOM G6 TRANSMITTER) MISC CHANGE EVERY 3 MONTHS. 1 each 1     diclofenac (VOLTAREN) 1 % topical gel Apply 2 g topically 4 times daily for 30 days 240 g 0     escitalopram (LEXAPRO) 20 MG tablet Take 1 tablet (20 mg) by mouth daily 45 tablet 0     insulin aspart (NOVOLOG PEN) 100 UNIT/ML pen Use daily in pump, up to 50 units 60 mL 3     Insulin Infusion Pump (T: SLIM X2 INS /CONTROL 7.4) JESSICA        Insulin Infusion Pump Supplies (T:SLIM X2 3ML CARTRIDGE) MISC        ketoconazole (NIZORAL) 2 % external cream Apply topically 2 times daily 30 g 0     levothyroxine (SYNTHROID/LEVOTHROID) 137 MCG tablet One tablet daily. 90 tablet 3     lisinopril (ZESTRIL) 2.5 MG tablet TAKE ONE TABLET BY MOUTH ONCE DAILY 90 tablet 1     NOVOLOG VIAL 100 UNIT/ML soln        ondansetron (ZOFRAN) 4 MG tablet Take 1 tablet (4 mg) by mouth every 8 hours as needed for nausea 30 tablet 0     pantoprazole (PROTONIX) 40 MG EC tablet Take 1 tablet (40 mg) by mouth every morning 90 tablet 3     SUMAtriptan (IMITREX)  "25 MG tablet Take 1 tablet (25 mg) by mouth at onset of headache for migraine May repeat in 2 hours. Max 8 tablets/24 hours. Due for appointment in Yue 15 tablet 0     albuterol (PROAIR HFA/PROVENTIL HFA/VENTOLIN HFA) 108 (90 Base) MCG/ACT inhaler  (Patient not taking: Reported on 2022)       chlorhexidine (PERIDEX) 0.12 % solution SWISH AND SPIT 15 ML BY MOUTH TWICE DAILY (Patient not taking: Reported on 2022)         Allergies   Allergen Reactions     Augmentin Nausea and Vomiting and Headache     Varenicline Other (See Comments)     \"I took the Chantix and that made me a (severe) crazy person.\"  Emotional disturbance       Venlafaxine Nausea     Went away when venlafaxine stopped.  Did not rechallenge. 16     No Known Allergies      Fluconazole Rash        Social History     Tobacco Use     Smoking status: Former Smoker     Packs/day: 0.50     Types: Cigarettes     Quit date: 2017     Years since quittin.0     Smokeless tobacco: Never Used     Tobacco comment: No smoked since 2021   Substance Use Topics     Alcohol use: Yes     Comment: Alcoholic Drinks/day: Occ     Family History   Problem Relation Age of Onset     Diabetes Paternal Aunt      Diabetes Other         maternal cousin and paternal cousin     Hypertension Paternal Grandmother      Cerebrovascular Disease Paternal Grandmother      Thyroid Disease Mother      Other - See Comments Father         Father abuses multiple drugs.     No Known Problems Sister      No Known Problems Daughter      No Known Problems Son      No Known Problems Other      No Known Problems Other      History   Drug Use No     Comment: Drug use: Off 2 years, 1 month and 19 days as of 3/16/16         Objective     LMP 07/15/2022 (Within Weeks)     Physical Exam    GENERAL APPEARANCE: healthy, alert and no distress     EYES: EOMI, PERRL     HENT: ear canals and TM's normal and nose and mouth without ulcers or lesions     NECK: no adenopathy, no asymmetry, " masses, or scars and thyroid normal to palpation     RESP: lungs clear to auscultation - no rales, rhonchi or wheezes     CV: regular rates and rhythm, normal S1 S2, no S3 or S4 and no murmur, click or rub     ABDOMEN:  soft, nontender, no HSM or masses and bowel sounds normal     MS: extremities normal- no gross deformities noted, no evidence of inflammation in joints, FROM in all extremities.     SKIN: no suspicious lesions or rashes     NEURO: Normal strength and tone, sensory exam grossly normal, mentation intact and speech normal     PSYCH: mentation appears normal. and affect normal/bright     LYMPHATICS: No cervical adenopathy    Recent Labs   Lab Test 08/08/22  1642 05/31/22  0914 05/27/22  0733 05/05/22  0946   HGB  --  12.8  --  13.2   PLT  --  284  --  255     --   --  137   POTASSIUM 4.1  --   --  4.9   CR 0.55 0.71  --  0.74   A1C 7.0*  --  6.7*  --         Diagnostics:  Labs pending at this time.  Results will be reviewed when available.   No EKG required, no history of coronary heart disease, significant arrhythmia, peripheral arterial disease or other structural heart disease.    Revised Cardiac Risk Index (RCRI):  The patient has the following serious cardiovascular risks for perioperative complications:   - No serious cardiac risks = 0 points     RCRI Interpretation: 0 points: Class I (very low risk - 0.4% complication rate)           Signed Electronically by: JUSTIN Montano CNP  Copy of this evaluation report is provided to requesting physician.      Answers for HPI/ROS submitted by the patient on 8/12/2022  If you checked off any problems, how difficult have these problems made it for you to do your work, take care of things at home, or get along with other people?: Very difficult  PHQ9 TOTAL SCORE: 13

## 2022-08-17 ENCOUNTER — ANESTHESIA EVENT (OUTPATIENT)
Dept: SURGERY | Facility: HOSPITAL | Age: 42
End: 2022-08-17
Payer: COMMERCIAL

## 2022-08-18 ENCOUNTER — HOSPITAL ENCOUNTER (OUTPATIENT)
Facility: HOSPITAL | Age: 42
Discharge: HOME | End: 2022-08-18
Attending: SURGERY | Admitting: SURGERY
Payer: COMMERCIAL

## 2022-08-18 ENCOUNTER — ANESTHESIA (OUTPATIENT)
Dept: SURGERY | Facility: HOSPITAL | Age: 42
End: 2022-08-18
Payer: COMMERCIAL

## 2022-08-18 VITALS
WEIGHT: 145.2 LBS | BODY MASS INDEX: 24.92 KG/M2 | SYSTOLIC BLOOD PRESSURE: 127 MMHG | DIASTOLIC BLOOD PRESSURE: 61 MMHG | HEART RATE: 67 BPM | OXYGEN SATURATION: 97 % | TEMPERATURE: 98.2 F | RESPIRATION RATE: 16 BRPM

## 2022-08-18 DIAGNOSIS — K43.2 RECURRENT INCISIONAL HERNIA: Primary | ICD-10-CM

## 2022-08-18 LAB
GLUCOSE BLDC GLUCOMTR-MCNC: 168 MG/DL (ref 70–99)
GLUCOSE BLDC GLUCOMTR-MCNC: 218 MG/DL (ref 70–99)

## 2022-08-18 PROCEDURE — 370N000017 HC ANESTHESIA TECHNICAL FEE, PER MIN: Performed by: SURGERY

## 2022-08-18 PROCEDURE — 710N000009 HC RECOVERY PHASE 1, LEVEL 1, PER MIN: Performed by: SURGERY

## 2022-08-18 PROCEDURE — 96372 THER/PROPH/DIAG INJ SC/IM: CPT | Performed by: SURGERY

## 2022-08-18 PROCEDURE — 250N000009 HC RX 250

## 2022-08-18 PROCEDURE — 258N000003 HC RX IP 258 OP 636: Performed by: ANESTHESIOLOGY

## 2022-08-18 PROCEDURE — 250N000011 HC RX IP 250 OP 636: Performed by: SURGERY

## 2022-08-18 PROCEDURE — 49655 PR LAP, INCISIONAL HERNIA REPAIR,INCARCERATED: CPT | Performed by: SURGERY

## 2022-08-18 PROCEDURE — 250N000011 HC RX IP 250 OP 636

## 2022-08-18 PROCEDURE — 250N000011 HC RX IP 250 OP 636: Performed by: ANESTHESIOLOGY

## 2022-08-18 PROCEDURE — S2900 ROBOTIC SURGICAL SYSTEM: HCPCS | Performed by: SURGERY

## 2022-08-18 PROCEDURE — 250N000009 HC RX 250: Performed by: ANESTHESIOLOGY

## 2022-08-18 PROCEDURE — 250N000025 HC SEVOFLURANE, PER MIN: Performed by: SURGERY

## 2022-08-18 PROCEDURE — 96374 THER/PROPH/DIAG INJ IV PUSH: CPT | Performed by: SURGERY

## 2022-08-18 PROCEDURE — 360N000080 HC SURGERY LEVEL 7, PER MIN: Performed by: SURGERY

## 2022-08-18 PROCEDURE — 250N000013 HC RX MED GY IP 250 OP 250 PS 637: Performed by: ANESTHESIOLOGY

## 2022-08-18 PROCEDURE — 250N000009 HC RX 250: Performed by: SURGERY

## 2022-08-18 PROCEDURE — 272N000001 HC OR GENERAL SUPPLY STERILE: Performed by: SURGERY

## 2022-08-18 PROCEDURE — 710N000012 HC RECOVERY PHASE 2, PER MINUTE: Performed by: SURGERY

## 2022-08-18 PROCEDURE — 999N000141 HC STATISTIC PRE-PROCEDURE NURSING ASSESSMENT: Performed by: SURGERY

## 2022-08-18 PROCEDURE — 82962 GLUCOSE BLOOD TEST: CPT

## 2022-08-18 RX ORDER — LIDOCAINE HYDROCHLORIDE 20 MG/ML
INJECTION, SOLUTION INFILTRATION; PERINEURAL PRN
Status: DISCONTINUED | OUTPATIENT
Start: 2022-08-18 | End: 2022-08-18

## 2022-08-18 RX ORDER — KETAMINE HYDROCHLORIDE 10 MG/ML
INJECTION INTRAMUSCULAR; INTRAVENOUS PRN
Status: DISCONTINUED | OUTPATIENT
Start: 2022-08-18 | End: 2022-08-18

## 2022-08-18 RX ORDER — MEPERIDINE HYDROCHLORIDE 25 MG/ML
12.5 INJECTION INTRAMUSCULAR; INTRAVENOUS; SUBCUTANEOUS
Status: DISCONTINUED | OUTPATIENT
Start: 2022-08-18 | End: 2022-08-18 | Stop reason: HOSPADM

## 2022-08-18 RX ORDER — OXYCODONE HYDROCHLORIDE 5 MG/1
5 TABLET ORAL EVERY 4 HOURS PRN
Status: DISCONTINUED | OUTPATIENT
Start: 2022-08-18 | End: 2022-08-18 | Stop reason: HOSPADM

## 2022-08-18 RX ORDER — PROPOFOL 10 MG/ML
INJECTION, EMULSION INTRAVENOUS PRN
Status: DISCONTINUED | OUTPATIENT
Start: 2022-08-18 | End: 2022-08-18

## 2022-08-18 RX ORDER — ONDANSETRON 4 MG/1
4 TABLET, ORALLY DISINTEGRATING ORAL EVERY 30 MIN PRN
Status: DISCONTINUED | OUTPATIENT
Start: 2022-08-18 | End: 2022-08-18 | Stop reason: HOSPADM

## 2022-08-18 RX ORDER — MAGNESIUM SULFATE HEPTAHYDRATE 40 MG/ML
2 INJECTION, SOLUTION INTRAVENOUS
Status: DISCONTINUED | OUTPATIENT
Start: 2022-08-18 | End: 2022-08-18 | Stop reason: HOSPADM

## 2022-08-18 RX ORDER — KETOROLAC TROMETHAMINE 30 MG/ML
15 INJECTION, SOLUTION INTRAMUSCULAR; INTRAVENOUS EVERY 6 HOURS PRN
Status: DISCONTINUED | OUTPATIENT
Start: 2022-08-18 | End: 2022-08-18 | Stop reason: HOSPADM

## 2022-08-18 RX ORDER — ONDANSETRON 2 MG/ML
4 INJECTION INTRAMUSCULAR; INTRAVENOUS EVERY 30 MIN PRN
Status: DISCONTINUED | OUTPATIENT
Start: 2022-08-18 | End: 2022-08-18 | Stop reason: HOSPADM

## 2022-08-18 RX ORDER — LIDOCAINE 40 MG/G
CREAM TOPICAL
Status: DISCONTINUED | OUTPATIENT
Start: 2022-08-18 | End: 2022-08-18 | Stop reason: HOSPADM

## 2022-08-18 RX ORDER — HYDROCODONE BITARTRATE AND ACETAMINOPHEN 5; 325 MG/1; MG/1
1 TABLET ORAL EVERY 6 HOURS PRN
Qty: 18 TABLET | Refills: 0 | Status: SHIPPED | OUTPATIENT
Start: 2022-08-18 | End: 2022-08-18

## 2022-08-18 RX ORDER — HYDROCODONE BITARTRATE AND ACETAMINOPHEN 5; 325 MG/1; MG/1
1 TABLET ORAL EVERY 6 HOURS PRN
Qty: 18 TABLET | Refills: 0 | Status: SHIPPED | OUTPATIENT
Start: 2022-08-18 | End: 2022-12-13

## 2022-08-18 RX ORDER — MAGNESIUM SULFATE 4 G/50ML
4 INJECTION INTRAVENOUS ONCE
Status: COMPLETED | OUTPATIENT
Start: 2022-08-18 | End: 2022-08-18

## 2022-08-18 RX ORDER — DOCUSATE SODIUM 100 MG/1
100 CAPSULE, LIQUID FILLED ORAL 2 TIMES DAILY
Qty: 30 CAPSULE | Refills: 0 | Status: SHIPPED | OUTPATIENT
Start: 2022-08-18 | End: 2022-12-13

## 2022-08-18 RX ORDER — CEFAZOLIN SODIUM/WATER 2 G/20 ML
2 SYRINGE (ML) INTRAVENOUS
Status: COMPLETED | OUTPATIENT
Start: 2022-08-18 | End: 2022-08-18

## 2022-08-18 RX ORDER — HEPARIN SODIUM 5000 [USP'U]/.5ML
5000 INJECTION, SOLUTION INTRAVENOUS; SUBCUTANEOUS
Status: COMPLETED | OUTPATIENT
Start: 2022-08-18 | End: 2022-08-18

## 2022-08-18 RX ORDER — ONDANSETRON 2 MG/ML
INJECTION INTRAMUSCULAR; INTRAVENOUS PRN
Status: DISCONTINUED | OUTPATIENT
Start: 2022-08-18 | End: 2022-08-18

## 2022-08-18 RX ORDER — LIDOCAINE HYDROCHLORIDE AND EPINEPHRINE 10; 10 MG/ML; UG/ML
INJECTION, SOLUTION INFILTRATION; PERINEURAL PRN
Status: DISCONTINUED | OUTPATIENT
Start: 2022-08-18 | End: 2022-08-18 | Stop reason: HOSPADM

## 2022-08-18 RX ORDER — PROPOFOL 10 MG/ML
INJECTION, EMULSION INTRAVENOUS CONTINUOUS PRN
Status: DISCONTINUED | OUTPATIENT
Start: 2022-08-18 | End: 2022-08-18

## 2022-08-18 RX ORDER — SODIUM CHLORIDE, SODIUM LACTATE, POTASSIUM CHLORIDE, CALCIUM CHLORIDE 600; 310; 30; 20 MG/100ML; MG/100ML; MG/100ML; MG/100ML
INJECTION, SOLUTION INTRAVENOUS CONTINUOUS PRN
Status: DISCONTINUED | OUTPATIENT
Start: 2022-08-18 | End: 2022-08-18

## 2022-08-18 RX ORDER — SODIUM CHLORIDE, SODIUM LACTATE, POTASSIUM CHLORIDE, CALCIUM CHLORIDE 600; 310; 30; 20 MG/100ML; MG/100ML; MG/100ML; MG/100ML
INJECTION, SOLUTION INTRAVENOUS CONTINUOUS
Status: DISCONTINUED | OUTPATIENT
Start: 2022-08-18 | End: 2022-08-18 | Stop reason: HOSPADM

## 2022-08-18 RX ORDER — HYDROMORPHONE HYDROCHLORIDE 1 MG/ML
0.2 INJECTION, SOLUTION INTRAMUSCULAR; INTRAVENOUS; SUBCUTANEOUS EVERY 5 MIN PRN
Status: DISCONTINUED | OUTPATIENT
Start: 2022-08-18 | End: 2022-08-18 | Stop reason: HOSPADM

## 2022-08-18 RX ORDER — CEFAZOLIN SODIUM/WATER 2 G/20 ML
2 SYRINGE (ML) INTRAVENOUS SEE ADMIN INSTRUCTIONS
Status: DISCONTINUED | OUTPATIENT
Start: 2022-08-18 | End: 2022-08-18 | Stop reason: HOSPADM

## 2022-08-18 RX ORDER — FENTANYL CITRATE 50 UG/ML
25 INJECTION, SOLUTION INTRAMUSCULAR; INTRAVENOUS
Status: CANCELLED | OUTPATIENT
Start: 2022-08-18

## 2022-08-18 RX ORDER — FENTANYL CITRATE 50 UG/ML
INJECTION, SOLUTION INTRAMUSCULAR; INTRAVENOUS PRN
Status: DISCONTINUED | OUTPATIENT
Start: 2022-08-18 | End: 2022-08-18

## 2022-08-18 RX ORDER — FENTANYL CITRATE 50 UG/ML
25 INJECTION, SOLUTION INTRAMUSCULAR; INTRAVENOUS EVERY 5 MIN PRN
Status: DISCONTINUED | OUTPATIENT
Start: 2022-08-18 | End: 2022-08-18 | Stop reason: HOSPADM

## 2022-08-18 RX ORDER — DOCUSATE SODIUM 100 MG/1
100 CAPSULE, LIQUID FILLED ORAL 2 TIMES DAILY
Qty: 30 CAPSULE | Refills: 0 | Status: SHIPPED | OUTPATIENT
Start: 2022-08-18 | End: 2022-08-18

## 2022-08-18 RX ADMIN — HEPARIN SODIUM 5000 UNITS: 10000 INJECTION, SOLUTION INTRAVENOUS; SUBCUTANEOUS at 10:47

## 2022-08-18 RX ADMIN — Medication 2 G: at 11:00

## 2022-08-18 RX ADMIN — HYDROMORPHONE HYDROCHLORIDE 0.5 MG: 1 INJECTION, SOLUTION INTRAMUSCULAR; INTRAVENOUS; SUBCUTANEOUS at 12:39

## 2022-08-18 RX ADMIN — KETAMINE HYDROCHLORIDE 20 MG: 10 INJECTION, SOLUTION INTRAMUSCULAR; INTRAVENOUS at 11:40

## 2022-08-18 RX ADMIN — KETAMINE HYDROCHLORIDE 30 MG: 10 INJECTION, SOLUTION INTRAMUSCULAR; INTRAVENOUS at 11:04

## 2022-08-18 RX ADMIN — LIDOCAINE HYDROCHLORIDE 40 MG: 20 INJECTION, SOLUTION INFILTRATION; PERINEURAL at 11:04

## 2022-08-18 RX ADMIN — MIDAZOLAM 2 MG: 1 INJECTION INTRAMUSCULAR; INTRAVENOUS at 10:55

## 2022-08-18 RX ADMIN — PROPOFOL 150 MG: 10 INJECTION, EMULSION INTRAVENOUS at 11:04

## 2022-08-18 RX ADMIN — FENTANYL CITRATE 25 MCG: 50 INJECTION, SOLUTION INTRAMUSCULAR; INTRAVENOUS at 13:05

## 2022-08-18 RX ADMIN — FENTANYL CITRATE 25 MCG: 50 INJECTION, SOLUTION INTRAMUSCULAR; INTRAVENOUS at 13:13

## 2022-08-18 RX ADMIN — FENTANYL CITRATE 25 MCG: 50 INJECTION, SOLUTION INTRAMUSCULAR; INTRAVENOUS at 13:21

## 2022-08-18 RX ADMIN — SUGAMMADEX 200 MG: 100 INJECTION, SOLUTION INTRAVENOUS at 12:30

## 2022-08-18 RX ADMIN — SODIUM CHLORIDE, POTASSIUM CHLORIDE, SODIUM LACTATE AND CALCIUM CHLORIDE: 600; 310; 30; 20 INJECTION, SOLUTION INTRAVENOUS at 10:55

## 2022-08-18 RX ADMIN — FENTANYL CITRATE 100 MCG: 50 INJECTION, SOLUTION INTRAMUSCULAR; INTRAVENOUS at 11:04

## 2022-08-18 RX ADMIN — ONDANSETRON 4 MG: 2 INJECTION INTRAMUSCULAR; INTRAVENOUS at 12:16

## 2022-08-18 RX ADMIN — PROPOFOL 30 MCG/KG/MIN: 10 INJECTION, EMULSION INTRAVENOUS at 11:04

## 2022-08-18 RX ADMIN — HYDROMORPHONE HYDROCHLORIDE 0.5 MG: 1 INJECTION, SOLUTION INTRAMUSCULAR; INTRAVENOUS; SUBCUTANEOUS at 12:30

## 2022-08-18 RX ADMIN — OXYCODONE HYDROCHLORIDE 5 MG: 5 TABLET ORAL at 14:44

## 2022-08-18 RX ADMIN — KETOROLAC TROMETHAMINE 15 MG: 30 INJECTION, SOLUTION INTRAMUSCULAR at 12:56

## 2022-08-18 RX ADMIN — FENTANYL CITRATE 25 MCG: 50 INJECTION, SOLUTION INTRAMUSCULAR; INTRAVENOUS at 12:56

## 2022-08-18 RX ADMIN — SODIUM CHLORIDE, POTASSIUM CHLORIDE, SODIUM LACTATE AND CALCIUM CHLORIDE: 600; 310; 30; 20 INJECTION, SOLUTION INTRAVENOUS at 10:24

## 2022-08-18 RX ADMIN — MAGNESIUM SULFATE HEPTAHYDRATE 4 G: 80 INJECTION, SOLUTION INTRAVENOUS at 10:24

## 2022-08-18 RX ADMIN — ROCURONIUM BROMIDE 40 MG: 50 INJECTION, SOLUTION INTRAVENOUS at 11:04

## 2022-08-18 RX ADMIN — SODIUM CHLORIDE, POTASSIUM CHLORIDE, SODIUM LACTATE AND CALCIUM CHLORIDE: 600; 310; 30; 20 INJECTION, SOLUTION INTRAVENOUS at 12:23

## 2022-08-18 ASSESSMENT — ACTIVITIES OF DAILY LIVING (ADL)
ADLS_ACUITY_SCORE: 35

## 2022-08-18 ASSESSMENT — LIFESTYLE VARIABLES: TOBACCO_USE: 1

## 2022-08-18 NOTE — OR NURSING
patient has insulin pump catheter on LLQ which was tegedermed and prepped over;;Dexcom monitor on right abdomen was removed and placed in bag with patient label and clipped to chart

## 2022-08-18 NOTE — ANESTHESIA CARE TRANSFER NOTE
Patient: Vani CASH Comfrey    Procedure: Procedure(s):  HERNIORRHAPHY, INCISIONAL UMBILICAL, ROBOT-ASSISTED, LAPAROSCOPIC, USING DA STANISLAW XI       Diagnosis: Recurrent umbilical hernia [K42.9]  Diagnosis Additional Information: No value filed.    Anesthesia Type:   General     Note:    Oropharynx: oropharynx clear of all foreign objects  Level of Consciousness: drowsy  Oxygen Supplementation: face mask  Level of Supplemental Oxygen (L/min / FiO2): 10  Independent Airway: airway patency satisfactory and stable  Dentition: dentition unchanged  Vital Signs Stable: post-procedure vital signs reviewed and stable  Report to RN Given: handoff report given  Patient transferred to: PACU    Handoff Report: Identifed the Patient, Identified the Reponsible Provider, Reviewed the pertinent medical history, Discussed the surgical course, Reviewed Intra-OP anesthesia mangement and issues during anesthesia, Set expectations for post-procedure period and Allowed opportunity for questions and acknowledgement of understanding      Vitals:  Vitals Value Taken Time   /63 08/18/22 1249   Temp 37  C (98.6  F) 08/18/22 1249   Pulse 77 08/18/22 1251   Resp 18 08/18/22 1251   SpO2 100 % 08/18/22 1251   Vitals shown include unvalidated device data.    Electronically Signed By: JUSTIN Armstrong CRNA  August 18, 2022  12:53 PM

## 2022-08-18 NOTE — H&P
General Surgery H&P  Vani Corona MRN# [id number]   Age/Sex: 41 year old female YOB: 1980     Reason for visit: Recurrent incisional hernia       Referring physician: Aby Ridley CNP                   Assessment and Plan:   Assessment:  1.  Recurrent incisional hernia the ventral abdomen -patient has had multiple abdominal surgeries in the past.  Patient has had a ventral hernia that was repaired.  Ultrasound did find that the patient had recurrent fat-containing incisional hernia where her previous hernia repair was.    Plan:  -To the OR today for robotic assisted laparoscopic repair of the incisional ventral hernia with possible mesh explantation and mesh placement.  - The risks and benefits of the procedure were explained detail to the patient. The risks include infection, bleeding, damage to the surrounding structures. Patient verbalized understanding provided consent to undergo the procedure above.            Chief Complaint:   For surgery     History is obtained from the patient    HPI:   Vani Corona is a 41 year old female who presents to [facility] today for robotic assisted laparoscopic repair of a recurrent incisional ventral hernia.  The patient was seen by myself on 6/21/2022.  The patient states that she still continues to have abdominal pain where the hernia is.  Patient has no nausea or vomiting.  She has no further complaints at this time.          Past Medical History:     Past Medical History:   Diagnosis Date     Anxiety      Asthma      Bronchitis, not specified as acute or chronic      Chest pain      Depression      Diabetes (H)      Fainting      Heart disease     MVP     IDDM     dx 4/04     Methamphetamine abuse in remission (H) 4/10/2015    Treatment 1/2014. Clean since.      Mitral valve prolapse      Mitral valve prolapse      Rheumatoid arthritis (H)      Rheumatoid arthritis (H)      Substance abuse (H)     methamphetamine     Thyroid disease       Unspecified keratitis               Past Surgical History:     Past Surgical History:   Procedure Laterality Date     BIOPSY CERVICAL, LOCAL EXCISION, SINGLE/MULTIPLE        SECTION       EXCISE LESION UPPER EXTREMITY Left 2021    Procedure: EXCISION, LESION, UPPER EXTREMITY;  Surgeon: Babs Leiva MD;  Location: [facility];  Service: General     HC REPAIR UMBILICAL RUTH ANN,<4Y/O,REDUC      Description: Umbilical Hernia Repair;  Recorded: 10/03/2014;     HC REVISE MEDIAN N/CARPAL TUNNEL SURG      Description: Neuroplasty Decompression Median Nerve At Carpal Tunnel;  Recorded: 10/03/2014;     HERNIA REPAIR  2010     HERNIA REPAIR       LAPAROSCOPIC CHOLECYSTECTOMY N/A 2019    Procedure: CHOLECYSTECTOMY, LAPAROSCOPIC;  Surgeon: Rinku Doran MD;  Location: [facility];  Service: General     LEAP and Coloposcopy        [facility]      Description:  Section;  Recorded: 2010;  Comments: 09 - breech             Social History:    reports that she quit smoking about 5 years ago. Her smoking use included cigarettes. She smoked 0.50 packs per day. She has never used smokeless tobacco. She reports current alcohol use. She reports that she does not use drugs.           Family History:     Family History   Problem Relation Age of Onset     Diabetes Paternal Aunt      Diabetes Other         maternal cousin and paternal cousin     Hypertension Paternal Grandmother      Cerebrovascular Disease Paternal Grandmother      Thyroid Disease Mother      Other - See Comments Father         Father abuses multiple drugs.     No Known Problems Sister      No Known Problems Daughter      No Known Problems Son      No Known Problems Other      No Known Problems Other               Allergies:     Allergies   Allergen Reactions     Augmentin Nausea and Vomiting and Headache     Varenicline Other (See  "Comments)     \"I took the Chantix and that made me a (severe) crazy person.\"  Emotional disturbance       Venlafaxine Nausea     Went away when venlafaxine stopped.  Did not rechallenge. 1/18/16     No Known Allergies      Fluconazole Rash              Medications:     Prior to Admission medications    Medication Sig Start Date End Date Taking? Authorizing Provider   ACCU-CHEK GUIDE test strip USE TO TEST 6 TIMES PER DAY 2/25/21  Yes Reported, Patient   albuterol (PROAIR HFA/PROVENTIL HFA/VENTOLIN HFA) 108 (90 Base) MCG/ACT inhaler Inhale 2 puffs into the lungs every 6 hours 8/8/22  Yes Denia Llamas NP   cetirizine (ZYRTEC) 10 MG tablet TAKE ONE TABLET BY MOUTH ONCE DAILY 8/9/21  Yes Denia Llamas NP   Continuous Blood Gluc Sensor (DEXCOM G6 SENSOR) MISC Change every 10 days 6/10/22  Yes Anayeli Dave NP   Continuous Blood Gluc Transmit (DEXCOM G6 TRANSMITTER) MISC CHANGE EVERY 3 MONTHS. 6/10/22  Yes Anayeli Dave NP   escitalopram (LEXAPRO) 20 MG tablet Take 1 tablet (20 mg) by mouth daily 8/8/22  Yes Denia Llamas NP   ketoconazole (NIZORAL) 2 % external cream Apply topically 2 times daily 8/10/21  Yes Denia Llamas NP   lisinopril (ZESTRIL) 2.5 MG tablet TAKE ONE TABLET BY MOUTH ONCE DAILY 4/23/22  Yes Denia Llamas NP   insulin aspart (NOVOLOG PEN) 100 UNIT/ML pen Use daily in pump, up to 50 units 6/3/22   Anayeli Dave NP   Insulin Infusion Pump (T: SLIM X2 INS /CONTROL 7.4) JESSICA  3/5/21   Reported, Patient   Insulin Infusion Pump Supplies (T:SLIM X2 3ML CARTRIDGE) MISC  9/16/21   Reported, Patient   levothyroxine (SYNTHROID/LEVOTHROID) 137 MCG tablet One tablet daily. 7/6/22   Anayeli Dave NP   NOVOLOG VIAL 100 UNIT/ML soln  6/6/22   Reported, Patient   ondansetron (ZOFRAN) 4 MG tablet Take 1 tablet (4 mg) by mouth every 8 hours as needed for nausea 10/1/21   Aby Ridley APRN CNP   pantoprazole (PROTONIX) 40 MG EC tablet Take 1 tablet (40 mg) by mouth every morning 8/8/22   Denia Llamas, NP "   SUMAtriptan (IMITREX) 25 MG tablet Take 1 tablet (25 mg) by mouth at onset of headache for migraine May repeat in 2 hours. Max 8 tablets/24 hours. Due for appointment in June 4/21/22   Denia Llamas NP              Review of Systems:   A 12 point Review of Systems is negative other than noted in the HPI            Physical Exam:     Patient Vitals for the past 24 hrs:   BP Temp Temp src Pulse Resp SpO2 Weight   08/18/22 0952 120/72 98.3  F (36.8  C) Oral 65 18 98 % 65.9 kg (145 lb 3.2 oz)        No intake or output data in the 24 hours ending 08/18/22 1003   Constitutional:   awake, alert, cooperative, no apparent distress, and appears stated age       Eyes:   PERRL, conjunctiva/corneas clear, EOM's intact; no scleral edema or icterus noted        ENT:   Normocephalic, without obvious abnormality, atraumatic, Lips, mucosa, and tongue normal        Hematologic / Lymphatic:   No lymphadenopathy       Lungs:   Normal respiratory effort, no accessory muscle use, breath sounds bilaterally on auscultation       Cardiovascular:   Regular rate and rhythm       Abdomen:   Soft, nondistended, mildly tender to palpation, reducible ventral hernia.       Musculoskeletal:   No obvious swelling, bruising or deformity       Skin:   Skin color and texture normal for patient, no rashes or lesions              Data:       DO Jordan Morel DO  General Surgeon  [facility]  Surgery [address]  [place] [address]?  Office: [phone]  Employed by - [employer]  Pager: [phone]

## 2022-08-18 NOTE — ANESTHESIA PROCEDURE NOTES
Airway       Patient location during procedure: OR  Staff -        Anesthesiologist:  Marquise Valdovinos MD       CRNA: Ar Patel APRN CRNA       Performed By: CRNA  Consent for Airway        Urgency: elective  Indications and Patient Condition       Indications for airway management: benitez-procedural         Mask difficulty assessment: 2 - vent by mask + OA or adjuvant +/- NMBA    Final Airway Details       Final airway type: endotracheal airway       Successful airway: ETT - single  Endotracheal Airway Details        ETT size (mm): 7.5       Cuffed: yes       Successful intubation technique: direct laryngoscopy       DL Blade Type: Kaur 2       Grade View of Cords: 2       Adjucts: stylet and tooth guard       Position: Right       Measured from: gums/teeth       Secured at (cm): 21    Post intubation assessment        Placement verified by: capnometry, equal breath sounds and chest rise        Number of attempts at approach: 1       Number of other approaches attempted: 0       Secured with: silk tape       Ease of procedure: easy       Dentition: Intact and Unchanged

## 2022-08-18 NOTE — OP NOTE
[facility]    Operative Note    Pre-operative diagnosis: Recurrent umbilical hernia [K42.9]  Post-operative diagnosis Same as pre-operative diagnosis    Procedure: Procedure(s):  HERNIORRHAPHY, INCISIONAL UMBILICAL, ROBOT-ASSISTED, LAPAROSCOPIC, USING DA STANISLAW XI  Surgeon: Surgeon(s) and Role:     * Jordan Horvath, DO - Primary  Anesthesia: General   Estimated Blood Loss: 5 mL    Drains: None  Specimens: * No specimens in log *  Findings:     - very small incisional hernia containing incarcerated fat.    -Surrounding adhesions of the liver onto the anterior abdominal wall    Complications: None.  Implants: * No implants in log *    Indication: 41-year-old female presenting with abdominal pain supraumbilical at her previous incision site.  The patient had an ultrasound finding that showed that the patient had a incisional hernia slightly cephalad to where her previous mesh was placed for her ventral hernia repair in the past.  Offered the patient procedure of robotic assisted laparoscopic repair of the ventral hernia with mesh. The risks and benefits of the procedure were explained detail to the patient. The risks include infection, bleeding, damage to the surrounding structures. Patient verbalized understanding provided consent to undergo the procedure above.    Procedure: The patient was brought to the by operating room placed on the operating table in a supine position.  The patient had bilateral upper extremities padded and tucked in the usual fashion.  The patient then underwent sedation and intubation by the anesthesia team.  The patient's abdomen was prepped and draped in usual sterile fashion.  Prior to initial procedure, timeout was completed.  All present were in agreement.    1% lidocaine with epinephrine was instilled over Valenzuela's point in the left upper quadrant.  11 blade was then used to make a small skin incision over the site.  The Veress needle was then inserted into the  abdomen.  A saline drop test was then completed.  Insufflation was then initiated.  An 8 mm port was then placed in the right upper quadrant in the lateral most area using Visiport.  Once inside the abdomen, a general survey was completed.  I could identify that there was no injury upon entering the abdomen.  The Veress needle was then removed.  An 8 mm port was then inserted into the abdomen and the right lateral quadrants in a vertical fashion.  A 12 mm port was then inserted  In the RUQ.  Both were completed under direct visualization.  The robot was then docked.    I then evaluated the incisional ventral abdominal hernia.  I could see that the patient had omentum that was incarcerated in the hernia cephalad of the previous mesh.  This omentum was carefully reduced using a combination of gentle traction and monopolar scissors.  The peritoneum around the hernia was then taken down using a combination of electrocautery as well as sharp dissection using the robotic monopolar scissors.  I could identify that the patient had a very small incisional ventral hernia.  The incisional hernia measured 1 cm at best.  The hernia was also covered by the most cephalad portion of the mesh as well.  I made a decision to keep the previous mesh in its position since it was adherent onto the anterior abdominal wall fairly well.  I then closed the small defect.  The closed defect was then covered up with the previous synthetic mesh.  I did evaluate the rest of the fascia to ensure that there was no other hernias that we had missed.  The patient did have a permanent stitch in the anterior abdominal fascia at the middle portion of the previous synthetic mesh.  I did not appreciate a defect at this site.    I used the 2-0 PDS suture to further anchor the previous mesh onto the anterior abdominal wall.  This was done circumferentially around the hernia.  Once the mesh was properly anchored, a final general survey was completed.  I could  identify there was good hemostasis.    All the surgical ports were then removed under direct visualization.  I could identify that there was no bleeding at all of the port sites.  A suture passer and an 0 Vicryl suture was then used to close the right upper quadrant 12 mm port site.  A 3-0 Vicryl stitch was then used to perform deep dermal stitches at all port sites.  All of the skin port sites were then closed using a 4-0 Vicryl stitch in a subcuticular fashion.  All of the skin incisions were then reinforced using surgical glue.  At the end of the procedure, a final count was completed.  I was told that all sharps, sponges, instruments were accounted for.  The patient was then extubated and brought back to the PACU in stable condition.                            Jordan Horvath DO  General Surgeon  [facility]  Surgery [address] [address]?  Office: [phone]  Employed by - [employer] [facility] [employer]  Pager: [phone]

## 2022-08-18 NOTE — ANESTHESIA POSTPROCEDURE EVALUATION
Patient: Vani Corona    Procedure: Procedure(s):  HERNIORRHAPHY, INCISIONAL UMBILICAL, ROBOT-ASSISTED, LAPAROSCOPIC, USING DA STANISLAW XI       Anesthesia Type:  General    Note:  Disposition: Outpatient   Postop Pain Control: Uneventful            Sign Out: Well controlled pain   PONV: No   Neuro/Psych: Uneventful            Sign Out: Acceptable/Baseline neuro status   Airway/Respiratory: Uneventful            Sign Out: Acceptable/Baseline resp. status   CV/Hemodynamics: Uneventful            Sign Out: Acceptable CV status; No obvious hypovolemia; No obvious fluid overload   Other NRE: NONE   DID A NON-ROUTINE EVENT OCCUR? No           Last vitals:  Vitals Value Taken Time   /63 08/18/22 1345   Temp 36.7  C (98.1  F) 08/18/22 1330   Pulse 73 08/18/22 1353   Resp 27 08/18/22 1347   SpO2 96 % 08/18/22 1353   Vitals shown include unvalidated device data.    Electronically Signed By: Marquise Valdovinos MD  August 18, 2022  2:06 PM

## 2022-08-18 NOTE — ANESTHESIA PREPROCEDURE EVALUATION
"Anesthesia Pre-Procedure Evaluation    Patient: Vani Corona   MRN: [id number] : 1980        Procedure : Procedure(s):  HERNIORRHAPHY, INCISIONAL UMBILICAL, ROBOT-ASSISTED, LAPAROSCOPIC, USING DA STANISLAW XI, POSSIBLE MESH EXPLANTATION          Past Medical History:   Diagnosis Date     Anxiety      Asthma      Bronchitis, not specified as acute or chronic      Chest pain      Depression      Diabetes (H)      Fainting      Heart disease     MVP     IDDM     dx      Methamphetamine abuse in remission (H) 4/10/2015    Treatment 2014. Clean since.      Mitral valve prolapse      Mitral valve prolapse      Rheumatoid arthritis (H)      Rheumatoid arthritis (H)      Substance abuse (H)     methamphetamine     Thyroid disease      Unspecified keratitis       Past Surgical History:   Procedure Laterality Date     BIOPSY CERVICAL, LOCAL EXCISION, SINGLE/MULTIPLE        SECTION       EXCISE LESION UPPER EXTREMITY Left 2021    Procedure: EXCISION, LESION, UPPER EXTREMITY;  Surgeon: Babs Leiva MD;  Location: [facility];  Service: General     HC REPAIR UMBILICAL RUTH ANN,<6Y/O,REDUC      Description: Umbilical Hernia Repair;  Recorded: 10/03/2014;     HC REVISE MEDIAN N/CARPAL TUNNEL SURG      Description: Neuroplasty Decompression Median Nerve At Carpal Tunnel;  Recorded: 10/03/2014;     HERNIA REPAIR  2010     HERNIA REPAIR       LAPAROSCOPIC CHOLECYSTECTOMY N/A 2019    Procedure: CHOLECYSTECTOMY, LAPAROSCOPIC;  Surgeon: Rinku Doran MD;  Location: [facility];  Service: General     LEAP and Coloposcopy        [facility]      Description:  Section;  Recorded: 2010;  Comments: 09 - breech      Allergies   Allergen Reactions     Augmentin Nausea and Vomiting and Headache     Varenicline Other (See Comments)     \"I took the Chantix and that made me a (severe) crazy " "person.\"  Emotional disturbance       Venlafaxine Nausea     Went away when venlafaxine stopped.  Did not rechallenge. 15     No Known Allergies      Fluconazole Rash      Social History     Tobacco Use     Smoking status: Former Smoker     Packs/day: 0.50     Types: Cigarettes     Quit date: 2017     Years since quittin.1     Smokeless tobacco: Never Used     Tobacco comment: No smoked since 2021   Substance Use Topics     Alcohol use: Yes     Comment: Alcoholic Drinks/day: Occ      Wt Readings from Last 1 Encounters:   22 65.9 kg (145 lb 3.2 oz)        Anesthesia Evaluation   Pt has had prior anesthetic. Type: General.    No history of anesthetic complications       ROS/MED HX  ENT/Pulmonary:     (+) tobacco use, asthma     Neurologic:       Cardiovascular:     (+) -----valvular problems/murmurs type: MR     METS/Exercise Tolerance:     Hematologic:       Musculoskeletal:       GI/Hepatic:     (+) GERD,     Renal/Genitourinary:       Endo:     (+) type I DM, Last HgA1c: 6.8?, Using insulin, Diabetic complications: retinopathy. thyroid problem, hypothyroidism not well controlled,     Psychiatric/Substance Use:     (+) Recreational drug usage: Meth (remission).    Infectious Disease:  - neg infectious disease ROS     Malignancy:       Other:  - neg other ROS          Physical Exam    Airway  airway exam normal      Mallampati: II   TM distance: > 3 FB   Neck ROM: full   Mouth opening: > 3 cm    Respiratory Devices and Support         Dental  no notable dental history         Cardiovascular   cardiovascular exam normal       Rhythm and rate: regular and normal     Pulmonary   pulmonary exam normal        breath sounds clear to auscultation           OUTSIDE LABS:  CBC:   Lab Results   Component Value Date    WBC 6.2 2022    WBC 6.2 2022    HGB 14.1 2022    HGB 12.8 2022    HCT 37.4 2022    HCT 38.2 2022     2022     2022     BMP: "   Lab Results   Component Value Date     08/08/2022     05/05/2022    POTASSIUM 4.1 08/08/2022    POTASSIUM 4.9 05/05/2022    CHLORIDE 103 08/08/2022    CHLORIDE 100 05/05/2022    CO2 25 08/08/2022    CO2 27 05/05/2022    BUN 9.7 08/08/2022    BUN 9 05/05/2022    CR 0.55 08/08/2022    CR 0.71 05/31/2022     (H) 08/18/2022     (H) 08/08/2022     COAGS:   Lab Results   Component Value Date    INR 1.34 (H) 12/19/2019     POC:   Lab Results   Component Value Date     (H) 03/11/2013    HCG Negative 08/12/2022    HCGS Negative 03/27/2019     HEPATIC:   Lab Results   Component Value Date    ALBUMIN 3.5 05/31/2022    PROTTOTAL 6.6 05/05/2022    ALT 42 05/31/2022    AST 51 (H) 05/05/2022    ALKPHOS 74 05/05/2022    BILITOTAL 0.7 05/05/2022     OTHER:   Lab Results   Component Value Date    PH 7.19 (LL) 01/05/2007    LACT 2.0 12/18/2019    A1C 7.0 (H) 08/08/2022    TEMITOPE 9.1 08/08/2022    PHOS 6.5 (H) 12/02/2006    MAG 1.7 01/06/2007    LIPASE 19 05/05/2022    TSH 0.12 (L) 08/12/2022    T4 2.12 (H) 08/12/2022    CRP <0.1 05/31/2022    SED 8 05/31/2022       Anesthesia Plan    ASA Status:  3   NPO Status:  NPO Appropriate    Anesthesia Type: General.     - Airway: ETT   Induction: RSI.   Maintenance: Balanced.        Consents    Anesthesia Plan(s) and associated risks, benefits, and realistic alternatives discussed. Questions answered and patient/representative(s) expressed understanding.    - Discussed:     - Discussed with:  Patient      - Extended Intubation/Ventilatory Support Discussed: No.      - Patient is DNR/DNI Status: No    Use of blood products discussed: No .     Postoperative Care    Pain management: IV analgesics, Oral pain medications.   PONV prophylaxis: Ondansetron (or other 5HT-3), Dexamethasone or Solumedrol, Background Propofol Infusion     Comments:                Marquise Valdovinos MD

## 2022-08-18 NOTE — DISCHARGE INSTRUCTIONS
Follow up: It is our practice to have all patients follow up with us 2-3 weeks after their surgery to ensure they are recovering well.  For straightforwardlaparoscopic procedures, this can be done either in clinic as a scheduled follow up appointment, or over the phone.  If you would like a scheduled follow up appointment in clinic, please call us at [phone] to schedule an appointment at your convenience.  If you would prefer to follow up with us by phone please let us know so that we may contact you 2-3 weeks following your procedure.         Diet: Regular diet. Patients can have difficulty with constipation following surgery, due in part to the administration of narcotic medications.  If you are suffering with constipation, you should avoid foods such as hard cheeses or red meat.  Foods high infiber are recommended.       Activity: You should continue to be active at home, including ambulating frequently.  If possible try to limit the amount of time spent in bed.     Restrictions: You have no liftingrestrictions post operatively, but may wish to avoid strenuous physical activity for 1-2 weeks.  You should limit your physical activity if it causes you discomfort; however, this should resolve within 1-2 weeks.   Walking does not count as strenuous physical activity.  You are safe to walk up and down stairs.  Following 2 weeks you may resume all normal physical activity.     Wound / drain care: Your incisions are closed using absorbale sutures.  The skin is sealed with a surgical glue.  Do not peal the glue off.  Please allow the glue to peal off on its own.      It is normal to have a smallrim of red present around the incisions. This should not, however, extend beyond 1/4 inch from the incision.  If your incisions become increasingly tender, red, or draining, please contact us.        Bathing: Youmay shower after 24 hours from surgery.  It is ok to get your incisions wet, but avoid rubbing them.  Avoid  soaking in bath tubs, or swimming in lakes, pools, or streams for 4 weeks following surgery.

## 2022-08-25 ENCOUNTER — APPOINTMENT (OUTPATIENT)
Dept: CT IMAGING | Facility: HOSPITAL | Age: 42
End: 2022-08-25
Payer: COMMERCIAL

## 2022-08-25 ENCOUNTER — HOSPITAL ENCOUNTER (EMERGENCY)
Facility: HOSPITAL | Age: 42
Discharge: HOME | End: 2022-08-25
Admitting: PHYSICIAN ASSISTANT
Payer: COMMERCIAL

## 2022-08-25 ENCOUNTER — TELEPHONE (OUTPATIENT)
Dept: SURGERY | Facility: CLINIC | Age: 42
End: 2022-08-25

## 2022-08-25 VITALS
HEIGHT: 64 IN | HEART RATE: 57 BPM | RESPIRATION RATE: 20 BRPM | OXYGEN SATURATION: 100 % | BODY MASS INDEX: 24.75 KG/M2 | SYSTOLIC BLOOD PRESSURE: 155 MMHG | DIASTOLIC BLOOD PRESSURE: 81 MMHG | TEMPERATURE: 98.7 F | WEIGHT: 145 LBS

## 2022-08-25 DIAGNOSIS — G89.18 POSTOPERATIVE PAIN: ICD-10-CM

## 2022-08-25 LAB
ALBUMIN SERPL-MCNC: 3.7 G/DL (ref 3.5–5)
ALBUMIN UR-MCNC: NEGATIVE MG/DL
ALP SERPL-CCNC: 91 U/L (ref 45–120)
ALT SERPL W P-5'-P-CCNC: 23 U/L (ref 0–45)
ANION GAP SERPL CALCULATED.3IONS-SCNC: 9 MMOL/L (ref 5–18)
APPEARANCE UR: ABNORMAL
AST SERPL W P-5'-P-CCNC: 37 U/L (ref 0–40)
BACTERIA #/AREA URNS HPF: ABNORMAL /HPF
BASOPHILS # BLD AUTO: 0.1 10E3/UL (ref 0–0.2)
BASOPHILS NFR BLD AUTO: 1 %
BILIRUB SERPL-MCNC: 0.5 MG/DL (ref 0–1)
BILIRUB UR QL STRIP: NEGATIVE
BUN SERPL-MCNC: 8 MG/DL (ref 8–22)
CALCIUM SERPL-MCNC: 9.1 MG/DL (ref 8.5–10.5)
CHLORIDE BLD-SCNC: 101 MMOL/L (ref 98–107)
CO2 SERPL-SCNC: 26 MMOL/L (ref 22–31)
COLOR UR AUTO: COLORLESS
CREAT SERPL-MCNC: 0.71 MG/DL (ref 0.6–1.1)
EOSINOPHIL # BLD AUTO: 0.5 10E3/UL (ref 0–0.7)
EOSINOPHIL NFR BLD AUTO: 7 %
ERYTHROCYTE [DISTWIDTH] IN BLOOD BY AUTOMATED COUNT: 12.4 % (ref 10–15)
GFR SERPL CREATININE-BSD FRML MDRD: >90 ML/MIN/1.73M2
GLUCOSE BLD-MCNC: 205 MG/DL (ref 70–125)
GLUCOSE UR STRIP-MCNC: >1000 MG/DL
HCG SERPL QL: NEGATIVE
HCT VFR BLD AUTO: 41.3 % (ref 35–47)
HGB BLD-MCNC: 14.1 G/DL (ref 11.7–15.7)
HGB UR QL STRIP: ABNORMAL
IMM GRANULOCYTES # BLD: 0 10E3/UL
IMM GRANULOCYTES NFR BLD: 0 %
KETONES UR STRIP-MCNC: NEGATIVE MG/DL
LEUKOCYTE ESTERASE UR QL STRIP: NEGATIVE
LIPASE SERPL-CCNC: 14 U/L (ref 0–52)
LYMPHOCYTES # BLD AUTO: 2 10E3/UL (ref 0.8–5.3)
LYMPHOCYTES NFR BLD AUTO: 28 %
MCH RBC QN AUTO: 32.3 PG (ref 26.5–33)
MCHC RBC AUTO-ENTMCNC: 34.1 G/DL (ref 31.5–36.5)
MCV RBC AUTO: 95 FL (ref 78–100)
MONOCYTES # BLD AUTO: 0.4 10E3/UL (ref 0–1.3)
MONOCYTES NFR BLD AUTO: 6 %
MUCOUS THREADS #/AREA URNS LPF: PRESENT /LPF
NEUTROPHILS # BLD AUTO: 4.2 10E3/UL (ref 1.6–8.3)
NEUTROPHILS NFR BLD AUTO: 58 %
NITRATE UR QL: NEGATIVE
NRBC # BLD AUTO: 0 10E3/UL
NRBC BLD AUTO-RTO: 0 /100
PH UR STRIP: 6.5 [PH] (ref 5–7)
PLATELET # BLD AUTO: 306 10E3/UL (ref 150–450)
POTASSIUM BLD-SCNC: 3.9 MMOL/L (ref 3.5–5)
PROT SERPL-MCNC: 7.3 G/DL (ref 6–8)
RBC # BLD AUTO: 4.36 10E6/UL (ref 3.8–5.2)
RBC URINE: <1 /HPF
SODIUM SERPL-SCNC: 136 MMOL/L (ref 136–145)
SP GR UR STRIP: 1.01 (ref 1–1.03)
SQUAMOUS EPITHELIAL: 14 /HPF
UROBILINOGEN UR STRIP-MCNC: <2 MG/DL
WBC # BLD AUTO: 7.2 10E3/UL (ref 4–11)
WBC URINE: 2 /HPF

## 2022-08-25 PROCEDURE — 250N000011 HC RX IP 250 OP 636: Performed by: PHYSICIAN ASSISTANT

## 2022-08-25 PROCEDURE — 36415 COLL VENOUS BLD VENIPUNCTURE: CPT | Performed by: EMERGENCY MEDICINE

## 2022-08-25 PROCEDURE — 80053 COMPREHEN METABOLIC PANEL: CPT | Performed by: EMERGENCY MEDICINE

## 2022-08-25 PROCEDURE — 84703 CHORIONIC GONADOTROPIN ASSAY: CPT | Performed by: EMERGENCY MEDICINE

## 2022-08-25 PROCEDURE — 96374 THER/PROPH/DIAG INJ IV PUSH: CPT | Mod: 59

## 2022-08-25 PROCEDURE — 81003 URINALYSIS AUTO W/O SCOPE: CPT | Performed by: EMERGENCY MEDICINE

## 2022-08-25 PROCEDURE — 85004 AUTOMATED DIFF WBC COUNT: CPT | Performed by: EMERGENCY MEDICINE

## 2022-08-25 PROCEDURE — 250N000011 HC RX IP 250 OP 636: Performed by: EMERGENCY MEDICINE

## 2022-08-25 PROCEDURE — 83690 ASSAY OF LIPASE: CPT | Performed by: EMERGENCY MEDICINE

## 2022-08-25 PROCEDURE — 99285 EMERGENCY DEPT VISIT HI MDM: CPT | Mod: 25

## 2022-08-25 PROCEDURE — 74177 CT ABD & PELVIS W/CONTRAST: CPT

## 2022-08-25 RX ORDER — ONDANSETRON 2 MG/ML
4 INJECTION INTRAMUSCULAR; INTRAVENOUS ONCE
Status: COMPLETED | OUTPATIENT
Start: 2022-08-25 | End: 2022-08-25

## 2022-08-25 RX ORDER — MORPHINE SULFATE 4 MG/ML
4 INJECTION, SOLUTION INTRAMUSCULAR; INTRAVENOUS EVERY 30 MIN PRN
Status: DISCONTINUED | OUTPATIENT
Start: 2022-08-25 | End: 2022-08-25

## 2022-08-25 RX ORDER — IOPAMIDOL 755 MG/ML
75 INJECTION, SOLUTION INTRAVASCULAR ONCE
Status: COMPLETED | OUTPATIENT
Start: 2022-08-25 | End: 2022-08-25

## 2022-08-25 RX ORDER — OXYCODONE HYDROCHLORIDE 5 MG/1
5 TABLET ORAL EVERY 6 HOURS PRN
Qty: 6 TABLET | Refills: 0 | Status: SHIPPED | OUTPATIENT
Start: 2022-08-25 | End: 2022-08-28

## 2022-08-25 RX ADMIN — IOPAMIDOL 75 ML: 755 INJECTION, SOLUTION INTRAVENOUS at 17:35

## 2022-08-25 RX ADMIN — ONDANSETRON 4 MG: 2 INJECTION INTRAMUSCULAR; INTRAVENOUS at 14:21

## 2022-08-25 ASSESSMENT — ENCOUNTER SYMPTOMS
ABDOMINAL PAIN: 1
ACTIVITY CHANGE: 0
DIZZINESS: 0
COUGH: 0
WHEEZING: 0
SHORTNESS OF BREATH: 0
VOICE CHANGE: 0
WOUND: 0
COLOR CHANGE: 1
NAUSEA: 0
FEVER: 0
FLANK PAIN: 0
HEMATURIA: 0
DIFFICULTY URINATING: 0
HEADACHES: 0
CHEST TIGHTNESS: 0
FATIGUE: 0
VOMITING: 0
CONFUSION: 0
AGITATION: 0
PALPITATIONS: 0
DYSURIA: 0
MYALGIAS: 0
ARTHRALGIAS: 0

## 2022-08-25 NOTE — ED PROVIDER NOTES
EMERGENCY DEPARTMENT ENCOUNTER      NAME: Vani CASH Bylas  AGE: 41 year old female  YOB: 1980  MRN: [id number]  EVALUATION DATE & TIME: No admission date for patient encounter.    PCP: Denia Llamas    ED PROVIDER: Yamilka Hernandez PA-C      Chief Complaint   Patient presents with     Incisional Pain       FINAL IMPRESSION:  1. Postoperative pain          MEDICAL DECISION MAKING:    Pertinent Labs & Imaging studies reviewed. (See chart for details)  41 year old female with a h/o recent recurrent umbilical hernia repair 6 days ago presents to the Emergency Department for evaluation of surgical incision pain x 1.5 days.  Until yesterday she was doing well from a postsurgical standpoint and then she developed sudden umbilical abdominal pain which has been persistent.  On exam she is alert, uncomfortable appearing though nontoxic.  Vital signs are WNL.  She does have bruising surrounding the umbilicus, laparoscopic surgical incisions are well-healing without any evidence of erythema, warmth or purulent drainage.  Surgical glue is in place.  Abdomen is overall soft.    Differential diagnosis includes incisional infection, postoperative hematoma, postoperative seroma, mesh migration, surgical incision dehiscence, other intra-abdominal pathology such as constipation, bowel obstruction, volvulus, UTI, pyelonephritis, gastroenteritis.  CBC and BMP are WNL.  Beta-hCG is negative.  LFTs are WNL.  UA does show turbid appearing urine with few bacteria, this is contaminated with squamous epithelial cells and she is not having any urinary symptoms.  Doubt that this represents infection.  Urine culture is in process.  CT of the abdomen with contrast shows small fat containing ventral hernia, unchanged from prior US in 5/2022, normal postoperative findings.     Discussed ongoing pain control, will give oxycodone and recommend she follow up with surgical team. There is no evidence of acute or emergent process requiring  intervention at this time. Pt is appropriate for outpatient management. Provisional nature of today's diagnosis was discussed and strict return precautions were given. Pt expressed understanding and She was discharged to home in good condition.     CRITICAL CARE: None    ED COURSE  4:40 PM  Met and evaluated patient in triage. Discussed ED plan.   6:20 PM discharged to home in good condition by RN.     MEDICATIONS GIVEN IN THE EMERGENCY:  Medications   ondansetron (ZOFRAN) injection 4 mg (4 mg Intravenous Given 8/25/22 6651)   iopamidol (ISOVUE-370) solution 75 mL (75 mLs Intravenous Given 8/25/22 0346)       NEW PRESCRIPTIONS STARTED AT TODAY'S ER VISIT  New Prescriptions    OXYCODONE (ROXICODONE) 5 MG TABLET    Take 1 tablet (5 mg) by mouth every 6 hours as needed for pain          =================================================================    HPI    Patient information was obtained from: Patient    Use of Intrepreter: N/A       Vani Corona is a 41 year old female who presents 7 days postop of a laparoscopic umbilical hernia revision with new mesh.  She was feeling well from a postoperative standpoint until yesterday when she suddenly developed periumbilical pain, this increases with any pressure to the abdomen such as cough, sneeze and activation of the abdominal muscles.  She is not had any fever.  No chest pain or shortness of breath.  She notes that the surgical incisions are well-healing and nontender.  There is a small amount of bruising around her bellybutton.  Denies any nausea or vomiting.  No urinary symptoms.      REVIEW OF SYSTEMS   Review of Systems   Constitutional: Negative for activity change, fatigue and fever.   HENT: Negative for voice change.    Respiratory: Negative for cough, chest tightness, shortness of breath and wheezing.    Cardiovascular: Negative for chest pain and palpitations.   Gastrointestinal: Positive for abdominal pain (periumbilical). Negative for nausea and vomiting.    Genitourinary: Negative for difficulty urinating, dysuria, flank pain and hematuria.   Musculoskeletal: Negative for arthralgias, gait problem and myalgias.   Skin: Positive for color change (bruising at umbilicus). Negative for pallor, rash and wound.   Neurological: Negative for dizziness and headaches.   Psychiatric/Behavioral: Negative for agitation and confusion.   All other systems reviewed and are negative.      PAST MEDICAL HISTORY:  Past Medical History:   Diagnosis Date     Anxiety      Asthma      Bronchitis, not specified as acute or chronic      Chest pain      Depression      Diabetes (H)      Fainting      Heart disease     MVP     IDDM     dx      Methamphetamine abuse in remission (H) 4/10/2015    Treatment 2014. Clean since.      Mitral valve prolapse      Mitral valve prolapse      Rheumatoid arthritis (H)      Rheumatoid arthritis (H)      Substance abuse (H)     methamphetamine     Thyroid disease      Unspecified keratitis        PAST SURGICAL HISTORY:  Past Surgical History:   Procedure Laterality Date     BIOPSY CERVICAL, LOCAL EXCISION, SINGLE/MULTIPLE        SECTION       EXCISE LESION UPPER EXTREMITY Left 2021    Procedure: EXCISION, LESION, UPPER EXTREMITY;  Surgeon: Babs Leiva MD;  Location: [facility];  Service: General     HC REPAIR UMBILICAL RUTH ANN,<4Y/O,REDUC      Description: Umbilical Hernia Repair;  Recorded: 10/03/2014;     HC REVISE MEDIAN N/CARPAL TUNNEL SURG      Description: Neuroplasty Decompression Median Nerve At Carpal Tunnel;  Recorded: 10/03/2014;     HERNIA REPAIR  2010     HERNIA REPAIR       HERNIORRHAPHY, UMBILICAL, ROBOT-ASSISTED, LAPAROSCOPIC, USING DA STANISLAW XI N/A 2022    Procedure: HERNIORRHAPHY, INCISIONAL UMBILICAL, ROBOT-ASSISTED, LAPAROSCOPIC, USING DA STANISLAW XI;  Surgeon: Jordan Horvath DO;  Location: [facility]     LAPAROSCOPIC CHOLECYSTECTOMY N/A 2019    Procedure: CHOLECYSTECTOMY, LAPAROSCOPIC;   "Surgeon: Rinku Doran MD;  Location: [facility] OR;  Service: General     LEAP and Coloposcopy        OTHER SURGICAL HISTORY  2002    colposcopyLEAP     ZZC  DELIVERY ONLY      Description:  Section;  Recorded: 2010;  Comments: 09 - breech           CURRENT MEDICATIONS:    oxyCODONE (ROXICODONE) 5 MG tablet  ACCU-CHEK GUIDE test strip  albuterol (PROAIR HFA/PROVENTIL HFA/VENTOLIN HFA) 108 (90 Base) MCG/ACT inhaler  cetirizine (ZYRTEC) 10 MG tablet  Continuous Blood Gluc Sensor (DEXCOM G6 SENSOR) MISC  Continuous Blood Gluc Transmit (DEXCOM G6 TRANSMITTER) MISC  docusate sodium (COLACE) 100 MG capsule  escitalopram (LEXAPRO) 20 MG tablet  HYDROcodone-acetaminophen (NORCO) 5-325 MG tablet  insulin aspart (NOVOLOG PEN) 100 UNIT/ML pen  Insulin Infusion Pump (T: SLIM X2 INS /CONTROL 7.4) JESSICA  Insulin Infusion Pump Supplies (T:SLIM X2 3ML CARTRIDGE) MISC  ketoconazole (NIZORAL) 2 % external cream  levothyroxine (SYNTHROID/LEVOTHROID) 137 MCG tablet  lisinopril (ZESTRIL) 2.5 MG tablet  NOVOLOG VIAL 100 UNIT/ML soln  ondansetron (ZOFRAN) 4 MG tablet  pantoprazole (PROTONIX) 40 MG EC tablet  SUMAtriptan (IMITREX) 25 MG tablet        ALLERGIES:  Allergies   Allergen Reactions     Augmentin Nausea and Vomiting and Headache     Varenicline Other (See Comments)     \"I took the Chantix and that made me a (severe) crazy person.\"  Emotional disturbance       Venlafaxine Nausea     Went away when venlafaxine stopped.  Did not rechallenge. 15     No Known Allergies      Fluconazole Rash       FAMILY HISTORY:  Family History   Problem Relation Age of Onset     Diabetes Paternal Aunt      Diabetes Other         maternal cousin and paternal cousin     Hypertension Paternal Grandmother      Cerebrovascular Disease Paternal Grandmother      Thyroid Disease Mother      Other - See Comments Father         Father abuses multiple drugs.     No Known Problems Sister      No Known Problems " "Daughter      No Known Problems Son      No Known Problems Other      No Known Problems Other        SOCIAL HISTORY:   Social History     Socioeconomic History     Marital status: Single     Spouse name: Not on file     Number of children: 1     Years of education: 14     Highest education level: Not on file   Occupational History     Occupation: Pharmacy Tech   Tobacco Use     Smoking status: Former Smoker     Packs/day: 0.50     Types: Cigarettes     Quit date: 2017     Years since quittin.1     Smokeless tobacco: Never Used     Tobacco comment: No smoked since 2021   Substance and Sexual Activity     Alcohol use: Yes     Comment: Alcoholic Drinks/day: Occ     Drug use: No     Types: Methamphetamines     Comment: Drug use: Off 2 years, 1 month and 19 days as of 3/16/16     Sexual activity: Yes     Partners: Male     Birth control/protection: Pill, Implant   Other Topics Concern      Service Not Asked     Blood Transfusions Not Asked     Caffeine Concern Not Asked     Occupational Exposure Not Asked     Hobby Hazards Not Asked     Sleep Concern Not Asked     Stress Concern Not Asked     Weight Concern Not Asked     Special Diet Not Asked     Back Care Not Asked     Exercise Not Asked     Bike Helmet Not Asked     Seat Belt Not Asked     Self-Exams Not Asked     Parent/sibling w/ CABG, MI or angioplasty before 65F 55M? Not Asked   Social History Narrative     Not on file     Social Determinants of Health     Financial Resource Strain: Not on file   Food Insecurity: Not on file   Transportation Needs: Not on file   Physical Activity: Not on file   Stress: Not on file   Social Connections: Not on file   Intimate Partner Violence: Not on file   Housing Stability: Not on file         VITALS:  Patient Vitals for the past 24 hrs:   BP Temp Temp src Pulse Resp SpO2 Height Weight   22 1634 -- -- -- -- -- 100 % -- --   22 1239 (!) 155/81 98.7  F (37.1  C) Temporal 57 20 100 % 1.626 m (5' 4\") " 65.8 kg (145 lb)       PHYSICAL EXAM    Physical Exam  Vitals reviewed.   Constitutional:       General: She is not in acute distress.     Appearance: She is not ill-appearing, toxic-appearing or diaphoretic.      Comments: Uncomfortable appearing   HENT:      Head: Normocephalic and atraumatic.      Nose: No congestion.      Mouth/Throat:      Mouth: Mucous membranes are moist.      Pharynx: Oropharynx is clear.   Eyes:      General: No scleral icterus.        Right eye: No discharge.         Left eye: No discharge.   Cardiovascular:      Rate and Rhythm: Normal rate and regular rhythm.      Heart sounds: No murmur heard.  Pulmonary:      Effort: Pulmonary effort is normal. No respiratory distress.      Breath sounds: Normal breath sounds. No stridor. No wheezing or rales.   Abdominal:      General: Abdomen is flat. There is no distension.      Palpations: Abdomen is soft.      Tenderness: There is abdominal tenderness (umbilicus). There is no right CVA tenderness, left CVA tenderness, guarding or rebound.      Comments: Multiple laparoscopic surgical incisions   Musculoskeletal:         General: No swelling or deformity. Normal range of motion.      Cervical back: Normal range of motion and neck supple.      Right lower leg: No edema.      Left lower leg: No edema.   Skin:     General: Skin is warm.      Capillary Refill: Capillary refill takes less than 2 seconds.      Coloration: Skin is not jaundiced.      Findings: No bruising, erythema, lesion or rash.   Neurological:      General: No focal deficit present.      Mental Status: She is alert and oriented to person, place, and time.   Psychiatric:         Mood and Affect: Mood normal.         Behavior: Behavior normal.          LAB:  All pertinent labs reviewed and interpreted.    Labs Ordered and Resulted from Time of ED Arrival to Time of ED Departure   COMPREHENSIVE METABOLIC PANEL - Abnormal       Result Value    Sodium 136      Potassium 3.9      Chloride  101      Carbon Dioxide (CO2) 26      Anion Gap 9      Urea Nitrogen 8      Creatinine 0.71      Calcium 9.1      Glucose 205 (*)     Alkaline Phosphatase 91      AST 37      ALT 23      Protein Total 7.3      Albumin 3.7      Bilirubin Total 0.5      GFR Estimate >90     ROUTINE UA WITH MICROSCOPIC REFLEX TO CULTURE - Abnormal    Color Urine Colorless      Appearance Urine Turbid (*)     Glucose Urine >1000 (*)     Bilirubin Urine Negative      Ketones Urine Negative      Specific Gravity Urine 1.011      Blood Urine 0.03 mg/dL (*)     pH Urine 6.5      Protein Albumin Urine Negative      Urobilinogen Urine <2.0      Nitrite Urine Negative      Leukocyte Esterase Urine Negative      Bacteria Urine Few (*)     Mucus Urine Present (*)     RBC Urine <1      WBC Urine 2      Squamous Epithelials Urine 14 (*)    LIPASE - Normal    Lipase 14     HCG QUALITATIVE PREGNANCY - Normal    hCG Serum Qualitative Negative     CBC WITH PLATELETS AND DIFFERENTIAL    WBC Count 7.2      RBC Count 4.36      Hemoglobin 14.1      Hematocrit 41.3      MCV 95      MCH 32.3      MCHC 34.1      RDW 12.4      Platelet Count 306      % Neutrophils 58      % Lymphocytes 28      % Monocytes 6      % Eosinophils 7      % Basophils 1      % Immature Granulocytes 0      NRBCs per 100 WBC 0      Absolute Neutrophils 4.2      Absolute Lymphocytes 2.0      Absolute Monocytes 0.4      Absolute Eosinophils 0.5      Absolute Basophils 0.1      Absolute Immature Granulocytes 0.0      Absolute NRBCs 0.0           RADIOLOGY:  Reviewed all pertinent imaging. Please see official radiology report    CT Abdomen Pelvis w Contrast   Final Result   IMPRESSION:    1.  Small fat-containing supraumbilical ventral hernia containing only fat.   2.  Additional expected postoperative findings as detailed above, without fluid collections..              Yamilka Hernandez PA-C  Emergency Medicine  [facility] EMERGENCY  DEPARTMENT  [address] [place] [id number]  [phone]  Dept: [phone]    This note has in part been created with speech recognition technology and may create an occasional, unintended word/grammar substitution. Errors are generally corrected in real time. Please message me via [employer] In Basket if you note any errors requiring clarification.       Yamilka Hernandez PA-C  08/25/22 0579

## 2022-08-25 NOTE — ED NOTES
ED Provider In Triage Note  [facility]  Encounter Date: Aug 25, 2022    Abdominal pain.    Brief HPI:   Vani Corona is a 41 year old female presenting to the Emergency Department with a chief complaint of abdominal pain status post 1 week ago ventral hernia repair.  Patient notes a sharp pain in the upper and right half of the abdomen.    Brief Physical Exam:  LMP 07/15/2022 (Within Weeks) Blood pressure 155/81.  Afebrile.  General: Non-toxic appearing  HEENT: Atraumatic  Resp: No respiratory distress  Neuro: Alert, oriented, answers questions appropriately  Psych: Behavior appropriate      Plan Initiated in Triage:  Orders Placed This Encounter     Abd/pelvis CT no contrast - Stone Protocol     Comprehensive metabolic panel     Lipase     UA with Microscopic reflex to Culture     HCG QUALitative pregnancy (blood)     ondansetron (ZOFRAN) injection 4 mg     morphine (PF) injection 4 mg       PIT Dispo:   Return to [facility] while awaiting workup and ED bed availability    Nick Farr MD on 8/25/2022 at 12:38 PM    Patient was evaluated by the Physician in Triage due to a limitation of available rooms in the Emergency Department. A plan of care was discussed based on the information obtained on the initial evaluation and patient was consuled to return back to the Emergency Department lob after this initial evalutaiton until results were obtained or a room became available in the Emergency Department. Patient was counseled not to leave prior to receiving the results of their workup.     Nick Farr MD  [facility] EMERGENCY DEPARTMENT  [address] [place] [id number]  [phone]     Nick Farr MD  08/25/22 1239       Nick Farr MD  08/25/22 9181

## 2022-08-25 NOTE — DISCHARGE INSTRUCTIONS
You are seen in the emergency department for pain around your bellybutton following your umbilical hernia repair.  Thankfully, your labs and CT scan do not show any infection. You do have a small ventral hernia which is unchanged from your ultrasound in 5/2022. This is likely post surgical pain. We will send you home with additional pain management. You may take the oxycodone 5mg every 6 hours as needed for breakthrough pain. Adding 1000mg of tylenol to this can be helpful. Do not take more than 4000mg tylenol in 24 hours.    Please call your surgical team in the morning to alert them of this pain and also your work up from the emergency department.     Return to the ER with any inability to eat/drink, shortness of breath, chest pain or fever.

## 2022-08-25 NOTE — TELEPHONE ENCOUNTER
I attempted to call this pt back but received her voicemail. Pt has also gone to the ED for this issue.

## 2022-08-25 NOTE — TELEPHONE ENCOUNTER
Patient had surgery last week and is experiencing sharp pain on right side. Not sure if this is normal.    Please call and advise.    Thanks!

## 2022-08-25 NOTE — ED TRIAGE NOTES
Pt reports abd. Hernia repair 7 days ago, now with increased abd pain for 2 days.     Triage Assessment     Row Name 08/25/22 1241       Triage Assessment (Adult)    Airway WDL WDL       Respiratory WDL    Respiratory WDL WDL       Skin Circulation/Temperature WDL    Skin Circulation/Temperature WDL WDL       Cardiac WDL    Cardiac WDL WDL       Peripheral/Neurovascular WDL    Peripheral Neurovascular WDL WDL       Cognitive/Neuro/Behavioral WDL    Cognitive/Neuro/Behavioral WDL WDL

## 2022-08-30 DIAGNOSIS — E10.65 TYPE 1 DIABETES MELLITUS WITH HYPERGLYCEMIA (H): ICD-10-CM

## 2022-08-30 DIAGNOSIS — E03.9 HYPOTHYROIDISM, UNSPECIFIED TYPE: Primary | ICD-10-CM

## 2022-08-30 RX ORDER — INSULIN PUMP CARTRIDGE
CARTRIDGE (EA) SUBCUTANEOUS
Qty: 30 EACH | Refills: 0 | Status: SHIPPED | OUTPATIENT
Start: 2022-08-30 | End: 2022-10-14

## 2022-09-01 ENCOUNTER — VIRTUAL VISIT (OUTPATIENT)
Dept: SURGERY | Facility: CLINIC | Age: 42
End: 2022-09-01
Payer: COMMERCIAL

## 2022-09-01 DIAGNOSIS — Z48.89 ENCOUNTER FOR POSTOPERATIVE CARE: Primary | ICD-10-CM

## 2022-09-01 PROCEDURE — 99024 POSTOP FOLLOW-UP VISIT: CPT | Performed by: PHYSICIAN ASSISTANT

## 2022-09-01 NOTE — LETTER
"    9/1/2022        RE: Vani CASH Broussard  [address] [place] [address]        DEXA definitely needs to have edema position assessment Dr. Horvath will call and check on you to see how you are doing see that you are doing so great there the patient has been notified of following:     \"This telephone visit will be conducted via a call between you and your physician/provider. We have found that certain health care needs can be provided without the need for a physical exam.  This service lets us provide the care you need with a short phone conversation.  If a prescription is necessary we can send it directly to your pharmacy.  If lab work is needed we can place an order for that and you can then stop by our lab to have the test done at a later time.    Telephone visits are billed at different rates depending on your insurance coverage. During this emergency period, for some insurers they may be billed the same as an in-person visit.  Please reach out to your insurance provider with any questions.    If during the course of the call the physician/provider feels a telephone visit is not appropriate, you will not be charged for this service.\"    Patient has given verbal consent for Telephone visit?  Yes    What phone number would you like to be contacted at? mobile    How would you like to obtain your AVS? MyChart   HPI: 41-year-old female underwent robotic repair of recurrent incisional hernia on 8/18/2022 with Dr. Horvath.  She had a very small 1 cm incisional hernia that was not covered by previous mesh and he was able to repair the incision as well as use the old mesh to cover the defect.  She had not been doing well and had increase in pain and did have an emergency room visit on the 25th.  CT imaging as well as laboratory tests without any remarkable findings.  She still has pain on side. It is mid right side but not near incsion.  Pain is mainly lateral and just above on the right side of the bellybutton.  She does " feel like this past couple of days things have improved.  Pain only with movement.  She denies any fever, chills, nausea, vomiting or diarrhea.  She denies any redness or swelling.  She does have some bruising around the bellybutton.                                                                     IMPRESSION:   1.  Small fat-containing supraumbilical ventral hernia containing only fat.  2.  Additional expected postoperative findings as detailed above, without fluid collections..  I reviewed the CT images and do not see any significant defect and only see what looks like a little bit of fat that is retained in between the abdominal wall and skin inside the subcutaneous tissue.  LMP 07/15/2022 (Within Weeks)     EXAM:  N/A      Assessment/Plan: . Doing well after surgery and should follow up as needed.  Did discuss with the patient her CT findings and it looks like everything is intact and do not see anything amiss.  She is improving.  I think that she probably just has a small seroma possible hematoma.  Reassurance given at this time.  If her pain persists or worsens to let us know.  Patient likes idea of calling us if there is a problem.    Tamia LEIJA                Sincerely,        Tamia Quintana PA-C

## 2022-09-01 NOTE — LETTER
"    9/1/2022         RE: Vani Corona  [address] [place] [address]        Dear Colleague,    Thank you for referring your patient, Vani Corona, to the [facility] [place]. Please see a copy of my visit note below.    DEXA definitely needs to have edema position assessment Dr. Horvath will call and check on you to see how you are doing see that you are doing so great there the patient has been notified of following:     \"This telephone visit will be conducted via a call between you and your physician/provider. We have found that certain health care needs can be provided without the need for a physical exam.  This service lets us provide the care you need with a short phone conversation.  If a prescription is necessary we can send it directly to your pharmacy.  If lab work is needed we can place an order for that and you can then stop by our lab to have the test done at a later time.    Telephone visits are billed at different rates depending on your insurance coverage. During this emergency period, for some insurers they may be billed the same as an in-person visit.  Please reach out to your insurance provider with any questions.    If during the course of the call the physician/provider feels a telephone visit is not appropriate, you will not be charged for this service.\"    Patient has given verbal consent for Telephone visit?  Yes    What phone number would you like to be contacted at? mobile    How would you like to obtain your AVS? [place]   HPI: 41-year-old female underwent robotic repair of recurrent incisional hernia on 8/18/2022 with Dr. Horvath.  She had a very small 1 cm incisional hernia that was not covered by previous mesh and he was able to repair the incision as well as use the old mesh to cover the defect.  She had not been doing well and had increase in pain and did have an emergency room visit on the 25th.  CT imaging as well as laboratory tests " without any remarkable findings.  She still has pain on side. It is mid right side but not near incsion.  Pain is mainly lateral and just above on the right side of the bellybutton.  She does feel like this past couple of days things have improved.  Pain only with movement.  She denies any fever, chills, nausea, vomiting or diarrhea.  She denies any redness or swelling.  She does have some bruising around the bellybutton.                                                                     IMPRESSION:   1.  Small fat-containing supraumbilical ventral hernia containing only fat.  2.  Additional expected postoperative findings as detailed above, without fluid collections..  I reviewed the CT images and do not see any significant defect and only see what looks like a little bit of fat that is retained in between the abdominal wall and skin inside the subcutaneous tissue.  LMP 07/15/2022 (Within Weeks)     EXAM:  N/A      Assessment/Plan: . Doing well after surgery and should follow up as needed.  Did discuss with the patient her CT findings and it looks like everything is intact and do not see anything amiss.  She is improving.  I think that she probably just has a small seroma possible hematoma.  Reassurance given at this time.  If her pain persists or worsens to let us know.  Patient likes idea of calling us if there is a problem.    Tamia LEIJA              Again, thank you for allowing me to participate in the care of your patient.        Sincerely,        Tamia Quintana PA-C

## 2022-09-01 NOTE — PROGRESS NOTES
"DEXA definitely needs to have edema position assessment Dr. Horvath will call and check on you to see how you are doing see that you are doing so great there the patient has been notified of following:     \"This telephone visit will be conducted via a call between you and your physician/provider. We have found that certain health care needs can be provided without the need for a physical exam.  This service lets us provide the care you need with a short phone conversation.  If a prescription is necessary we can send it directly to your pharmacy.  If lab work is needed we can place an order for that and you can then stop by our lab to have the test done at a later time.    Telephone visits are billed at different rates depending on your insurance coverage. During this emergency period, for some insurers they may be billed the same as an in-person visit.  Please reach out to your insurance provider with any questions.    If during the course of the call the physician/provider feels a telephone visit is not appropriate, you will not be charged for this service.\"    Patient has given verbal consent for Telephone visit?  Yes    What phone number would you like to be contacted at? mobile    How would you like to obtain your AVS? [place]   HPI: 41-year-old female underwent robotic repair of recurrent incisional hernia on 8/18/2022 with Dr. Horvath.  She had a very small 1 cm incisional hernia that was not covered by previous mesh and he was able to repair the incision as well as use the old mesh to cover the defect.  She had not been doing well and had increase in pain and did have an emergency room visit on the 25th.  CT imaging as well as laboratory tests without any remarkable findings.  She still has pain on side. It is mid right side but not near incsion.  Pain is mainly lateral and just above on the right side of the bellybutton.  She does feel like this past couple of days things have improved.  Pain only with movement.  " She denies any fever, chills, nausea, vomiting or diarrhea.  She denies any redness or swelling.  She does have some bruising around the bellybutton.                                                                     IMPRESSION:   1.  Small fat-containing supraumbilical ventral hernia containing only fat.  2.  Additional expected postoperative findings as detailed above, without fluid collections..  I reviewed the CT images and do not see any significant defect and only see what looks like a little bit of fat that is retained in between the abdominal wall and skin inside the subcutaneous tissue.  LMP 07/15/2022 (Within Weeks)     EXAM:  N/A      Assessment/Plan: . Doing well after surgery and should follow up as needed.  Did discuss with the patient her CT findings and it looks like everything is intact and do not see anything amiss.  She is improving.  I think that she probably just has a small seroma possible hematoma.  Reassurance given at this time.  If her pain persists or worsens to let us know.  Patient likes idea of calling us if there is a problem.    Tamia LEIJA

## 2022-09-06 ENCOUNTER — TELEPHONE (OUTPATIENT)
Dept: ENDOCRINOLOGY | Facility: CLINIC | Age: 42
End: 2022-09-06

## 2022-09-06 ENCOUNTER — HOSPITAL ENCOUNTER (EMERGENCY)
Facility: HOSPITAL | Age: 42
Discharge: HOME | End: 2022-09-06
Admitting: PHYSICIAN ASSISTANT
Payer: COMMERCIAL

## 2022-09-06 ENCOUNTER — TELEPHONE (OUTPATIENT)
Dept: SURGERY | Facility: CLINIC | Age: 42
End: 2022-09-06

## 2022-09-06 ENCOUNTER — APPOINTMENT (OUTPATIENT)
Dept: CT IMAGING | Facility: HOSPITAL | Age: 42
End: 2022-09-06
Attending: EMERGENCY MEDICINE
Payer: COMMERCIAL

## 2022-09-06 VITALS
OXYGEN SATURATION: 99 % | TEMPERATURE: 98.5 F | HEART RATE: 59 BPM | RESPIRATION RATE: 16 BRPM | HEIGHT: 64 IN | WEIGHT: 144.5 LBS | DIASTOLIC BLOOD PRESSURE: 71 MMHG | SYSTOLIC BLOOD PRESSURE: 138 MMHG | BODY MASS INDEX: 24.67 KG/M2

## 2022-09-06 DIAGNOSIS — G89.18 POSTOPERATIVE PAIN: ICD-10-CM

## 2022-09-06 DIAGNOSIS — R82.998 LEUKOCYTES IN URINE: ICD-10-CM

## 2022-09-06 DIAGNOSIS — E10.65 TYPE 1 DIABETES MELLITUS WITH HYPERGLYCEMIA (H): Primary | ICD-10-CM

## 2022-09-06 LAB
ALBUMIN SERPL-MCNC: 3.6 G/DL (ref 3.5–5)
ALBUMIN UR-MCNC: 30 MG/DL
ALP SERPL-CCNC: 78 U/L (ref 45–120)
ALT SERPL W P-5'-P-CCNC: 32 U/L (ref 0–45)
ANION GAP SERPL CALCULATED.3IONS-SCNC: 9 MMOL/L (ref 5–18)
APPEARANCE UR: ABNORMAL
AST SERPL W P-5'-P-CCNC: 50 U/L (ref 0–40)
BILIRUB DIRECT SERPL-MCNC: 0.2 MG/DL
BILIRUB SERPL-MCNC: 0.7 MG/DL (ref 0–1)
BILIRUB UR QL STRIP: NEGATIVE
BUN SERPL-MCNC: 15 MG/DL (ref 8–22)
CALCIUM SERPL-MCNC: 8.9 MG/DL (ref 8.5–10.5)
CHLORIDE BLD-SCNC: 102 MMOL/L (ref 98–107)
CO2 SERPL-SCNC: 24 MMOL/L (ref 22–31)
COLOR UR AUTO: YELLOW
CREAT SERPL-MCNC: 0.71 MG/DL (ref 0.6–1.1)
D DIMER PPP FEU-MCNC: <0.27 UG/ML FEU (ref 0–0.5)
ERYTHROCYTE [DISTWIDTH] IN BLOOD BY AUTOMATED COUNT: 12.5 % (ref 10–15)
GFR SERPL CREATININE-BSD FRML MDRD: >90 ML/MIN/1.73M2
GLUCOSE BLD-MCNC: 116 MG/DL (ref 70–125)
GLUCOSE UR STRIP-MCNC: NEGATIVE MG/DL
HCT VFR BLD AUTO: 38.7 % (ref 35–47)
HGB BLD-MCNC: 13 G/DL (ref 11.7–15.7)
HGB UR QL STRIP: NEGATIVE
KETONES UR STRIP-MCNC: 10 MG/DL
LEUKOCYTE ESTERASE UR QL STRIP: ABNORMAL
LIPASE SERPL-CCNC: 21 U/L (ref 0–52)
MCH RBC QN AUTO: 32 PG (ref 26.5–33)
MCHC RBC AUTO-ENTMCNC: 33.6 G/DL (ref 31.5–36.5)
MCV RBC AUTO: 95 FL (ref 78–100)
NITRATE UR QL: NEGATIVE
PH UR STRIP: 8.5 [PH] (ref 5–7)
PLATELET # BLD AUTO: 317 10E3/UL (ref 150–450)
POTASSIUM BLD-SCNC: 3.8 MMOL/L (ref 3.5–5)
PROT SERPL-MCNC: 7 G/DL (ref 6–8)
RBC # BLD AUTO: 4.06 10E6/UL (ref 3.8–5.2)
RBC URINE: ABNORMAL
SODIUM SERPL-SCNC: 135 MMOL/L (ref 136–145)
SP GR UR STRIP: 1.01 (ref 1–1.03)
UROBILINOGEN UR STRIP-MCNC: NORMAL MG/DL
WBC # BLD AUTO: 7.4 10E3/UL (ref 4–11)
WBC URINE: ABNORMAL

## 2022-09-06 PROCEDURE — 250N000011 HC RX IP 250 OP 636: Performed by: EMERGENCY MEDICINE

## 2022-09-06 PROCEDURE — 96376 TX/PRO/DX INJ SAME DRUG ADON: CPT

## 2022-09-06 PROCEDURE — 250N000011 HC RX IP 250 OP 636: Performed by: PHYSICIAN ASSISTANT

## 2022-09-06 PROCEDURE — 96375 TX/PRO/DX INJ NEW DRUG ADDON: CPT

## 2022-09-06 PROCEDURE — 85027 COMPLETE CBC AUTOMATED: CPT | Performed by: EMERGENCY MEDICINE

## 2022-09-06 PROCEDURE — 36415 COLL VENOUS BLD VENIPUNCTURE: CPT | Performed by: PHYSICIAN ASSISTANT

## 2022-09-06 PROCEDURE — 83690 ASSAY OF LIPASE: CPT | Performed by: EMERGENCY MEDICINE

## 2022-09-06 PROCEDURE — 96374 THER/PROPH/DIAG INJ IV PUSH: CPT | Mod: 59

## 2022-09-06 PROCEDURE — 85379 FIBRIN DEGRADATION QUANT: CPT | Performed by: PHYSICIAN ASSISTANT

## 2022-09-06 PROCEDURE — 36415 COLL VENOUS BLD VENIPUNCTURE: CPT | Performed by: EMERGENCY MEDICINE

## 2022-09-06 PROCEDURE — 258N000003 HC RX IP 258 OP 636: Performed by: EMERGENCY MEDICINE

## 2022-09-06 PROCEDURE — 87086 URINE CULTURE/COLONY COUNT: CPT | Performed by: EMERGENCY MEDICINE

## 2022-09-06 PROCEDURE — 81003 URINALYSIS AUTO W/O SCOPE: CPT | Performed by: EMERGENCY MEDICINE

## 2022-09-06 PROCEDURE — 99285 EMERGENCY DEPT VISIT HI MDM: CPT | Mod: 25

## 2022-09-06 PROCEDURE — 74177 CT ABD & PELVIS W/CONTRAST: CPT

## 2022-09-06 PROCEDURE — 82248 BILIRUBIN DIRECT: CPT | Performed by: EMERGENCY MEDICINE

## 2022-09-06 PROCEDURE — 96361 HYDRATE IV INFUSION ADD-ON: CPT

## 2022-09-06 RX ORDER — HYDROMORPHONE HYDROCHLORIDE 1 MG/ML
0.5 INJECTION, SOLUTION INTRAMUSCULAR; INTRAVENOUS; SUBCUTANEOUS ONCE
Status: COMPLETED | OUTPATIENT
Start: 2022-09-06 | End: 2022-09-06

## 2022-09-06 RX ORDER — HYDROMORPHONE HYDROCHLORIDE 1 MG/ML
0.5 INJECTION, SOLUTION INTRAMUSCULAR; INTRAVENOUS; SUBCUTANEOUS ONCE
Status: DISCONTINUED | OUTPATIENT
Start: 2022-09-06 | End: 2022-09-06

## 2022-09-06 RX ORDER — ONDANSETRON 2 MG/ML
4 INJECTION INTRAMUSCULAR; INTRAVENOUS ONCE
Status: COMPLETED | OUTPATIENT
Start: 2022-09-06 | End: 2022-09-06

## 2022-09-06 RX ORDER — INSULIN INFUSION SET/CARTRIDGE
1 COMBINATION PACKAGE (EA) MISCELLANEOUS
Qty: 30 EACH | Refills: 0 | Status: SHIPPED | OUTPATIENT
Start: 2022-09-06 | End: 2022-12-08

## 2022-09-06 RX ORDER — KETOROLAC TROMETHAMINE 15 MG/ML
15 INJECTION, SOLUTION INTRAMUSCULAR; INTRAVENOUS ONCE
Status: COMPLETED | OUTPATIENT
Start: 2022-09-06 | End: 2022-09-06

## 2022-09-06 RX ORDER — IOPAMIDOL 755 MG/ML
100 INJECTION, SOLUTION INTRAVASCULAR ONCE
Status: COMPLETED | OUTPATIENT
Start: 2022-09-06 | End: 2022-09-06

## 2022-09-06 RX ORDER — NITROFURANTOIN 25; 75 MG/1; MG/1
100 CAPSULE ORAL 2 TIMES DAILY
Qty: 10 CAPSULE | Refills: 0 | Status: SHIPPED | OUTPATIENT
Start: 2022-09-06 | End: 2022-09-11

## 2022-09-06 RX ADMIN — HYDROMORPHONE HYDROCHLORIDE 0.5 MG: 1 INJECTION, SOLUTION INTRAMUSCULAR; INTRAVENOUS; SUBCUTANEOUS at 19:07

## 2022-09-06 RX ADMIN — HYDROMORPHONE HYDROCHLORIDE 0.5 MG: 1 INJECTION, SOLUTION INTRAMUSCULAR; INTRAVENOUS; SUBCUTANEOUS at 21:41

## 2022-09-06 RX ADMIN — SODIUM CHLORIDE 500 ML: 9 INJECTION, SOLUTION INTRAVENOUS at 19:05

## 2022-09-06 RX ADMIN — KETOROLAC TROMETHAMINE 15 MG: 15 INJECTION, SOLUTION INTRAMUSCULAR; INTRAVENOUS at 19:09

## 2022-09-06 RX ADMIN — IOPAMIDOL 100 ML: 755 INJECTION, SOLUTION INTRAVENOUS at 22:17

## 2022-09-06 RX ADMIN — ONDANSETRON 4 MG: 2 INJECTION INTRAMUSCULAR; INTRAVENOUS at 19:04

## 2022-09-06 ASSESSMENT — ACTIVITIES OF DAILY LIVING (ADL)
ADLS_ACUITY_SCORE: 35

## 2022-09-06 ASSESSMENT — ENCOUNTER SYMPTOMS
FEVER: 0
HEMATURIA: 0
NAUSEA: 0
BACK PAIN: 1
DYSURIA: 0
VOMITING: 1
CONSTIPATION: 0
COUGH: 0
FREQUENCY: 0
ANAL BLEEDING: 0
SHORTNESS OF BREATH: 0
DIARRHEA: 1
ABDOMINAL PAIN: 1
CHILLS: 0

## 2022-09-06 NOTE — ED NOTES
ED Provider In Triage Note  [facility]  Encounter Date: Sep 6, 2022    Chief Complaint   Patient presents with     Abdominal Pain       Brief HPI:   Vani Corona is a 41 year old female presenting to the Emergency Department with a chief complaint of abdominal pain. Was here on 8/25 for the same pain, workup was normal and they didn't do anything for her. Pain has not improved. Got worse 2 days ago vomited yesterday. No relief with NSAIDs.     Pain is worse with breathing, but does not radiate to chest.    Brief Physical Exam:  BP (!) 141/65   Pulse 60   Temp 98.5  F (36.9  C) (Oral)   Resp 16   LMP 07/15/2022 (Within Weeks)   SpO2 100%   General: Non-toxic appearing  HEENT: Atraumatic  Resp: No respiratory distress  Abdomen: Non-peritoneal, pain to RUQ  Neuro: Alert, oriented, answers questions appropriately  Psych: Behavior appropriate      Plan Initiated in Triage:  Ddx includes: bile leak, post operative infection, hernia    PIT Dispo:   Return to lobby while awaiting workup and ED bed availability    Celio Carmona MD on 9/6/2022 at 5:08 PM    Patient was evaluated by the Physician in Triage due to a limitation of available rooms in the Emergency Department. A plan of care was discussed based on the information obtained on the initial evaluation and patient was consuled to return back to the Emergency Department lobby after this initial evalutaiton until results were obtained or a room became available in the Emergency Department. Patient was counseled not to leave prior to receiving the results of their workup.     Celio Carmona MD  [facility] EMERGENCY DEPARTMENT  [address] [place] [id number]  [phone]     Celio Carmona MD  09/06/22 0080

## 2022-09-06 NOTE — TELEPHONE ENCOUNTER
Calling had surgery 8/18 with Dr. jefferson she had pain between incision went to ED  8/25 and now she has pain wrapping around into  her back.    Please call and advise.    Thanks!

## 2022-09-06 NOTE — TELEPHONE ENCOUNTER
"Patient is requesting a refill of:    Autosoft XC Inf Set 6mm 23\" Grey   Change every 3 days    Did not see on active med list. Please verify and send new rx. Thank you     [facility]  [phone]    "

## 2022-09-06 NOTE — ED PROVIDER NOTES
Emergency Department Encounter   NAME: Vani Corona ; AGE: 41 year old female ; YOB: 1980 ; MRN: [id number] ; PCP: Denia Llamas   ED PROVIDER: Heaven Oseguera PA-C    Evaluation Date & Time:   No admission date for patient encounter.    CHIEF COMPLAINT:  Abdominal Pain      Impression and Plan   MDM: Vani Corona is a 41 year old female with a pertinent history of methamphetamine abuse in remission, mitral valve prolapse, rheumatoid arthritis, diabetes, depression, anxiety, asthma, who presents to the ED by private vehicle for evaluation of postoperative abdominal pain.  Patient underwent repair of incisional hernia after old ventral hernia repair 3 weeks ago.  This is her second visit to the ER for ongoing abdominal pain since her procedure.  She did have telehealth follow-up 5 days ago, and has a scheduled appointment in the general surgery clinic tomorrow at 2 PM.  Here in the ER, she is afebrile and vitally stable.  On my initial exam, she is in no acute distress and nontoxic-appearing.  Her incision sites appear to be well-healing without any evidence of wound infection, dehiscence, or seroma.  Tenderness to palpation present over the right side of her abdomen into her right posterior chest wall.  Despite this abdomen is soft without distention and there is no rebound, guarding, or evidence of peritonitis.  Considered a broad differential, and we discussed plan for blood work, urine, repeat CT, and pain control.    Considered an atypical presentation of PE.  She does report some pleuritic nature to the back pain though it is also exacerbated by movements, and I suspect musculoskeletal source.  She is not tachycardic or hypoxic, however with her recent surgery did obtain a D-dimer which returned within normal limits.  As my suspicion for PE is low, no further work-up for PE is indicated at this time.  Patient only provided a small amount of urine for the sample, does show 250 leukocytes  though not enough urine for the microscopic she makes diagnostics more difficult.  No urinary symptoms, fever or true CVA tenderness to suggest pyelonephritis.  Urine culture sent and pending.  Discussed urine sample with patient, she would like to trial a course of antibiotics for possible UTI.  Macrobid provided.  No fevers, abnormal vitals, or leukocytosis to suggest developing infectious process or SIRS.  Patient is status postcholecystectomy, and her alk phos and bilirubin are within normal limits.  Minimal elevation in LFT present though nonspecific.  No findings to suggest retained biliary stone.  No concern for pancreatitis with a normal lipase.  Repeat CT today is still rather unremarkable.  No evidence of postoperative infection, hematoma, ileus or SBO, recurrent hernia, or any acute or emergent findings.  She does have benign cysts in both ovaries, however patient is not having any pelvic pain or symptoms to suggest cyst rupture.  Patient appears quite comfortable after Toradol and a total of 1 mg of Dilaudid.  At this time, she is well-appearing, pain is controlled, and she has an unremarkable work-up.  Discussed the case with on-call general surgeon, who did not recommend any further ER work-up at this time.  She will follow-up with general surgery in clinic tomorrow at 2 PM as scheduled, and I reviewed with patient concerning signs and symptoms to return to the ED. she verbalized understanding is comfortable with the plan.  Discharged home in good condition.      *Patient examination and workup was initiated in triage due to inpatient hospital and Emergency Department bed shortage resulting in long waiting room wait times. Patient and/or guardian's consent was obtained.       ED COURSE:  5:32 PM I met and introduced myself to the patient. I gathered initial history and performed my physical exam. We discussed plan for initial workup.   8:40 PM rechecked the patient.  She feels improved after IV  medications.  Still has some dull pain.  Awaiting CT.  8:50 PM RN attempting to contact lab about delayed d-dimer result, but they are not answering.   9:22 PM After long delay in d-dimer result, called lab again - they stated they had to change the assay, they are running d-dimer now and should be resulted in 5 minutes.    10:26 PM  Rechecked the patient who is back from CT. Awaiting CT results.   10:48 PM Paged general surgery.   11:02 PM I spoke with Dr. Leong, general surgery, who has no further ED workup recommendations. Advises no narcotic prescriptions at discharge. Patient can follow up with general surgery or PCP.    11:26 PM rechecked the patient and discussed discharge, follow-up, and reasons to return to the ER.    At the conclusion of the encounter I discussed the results of all the tests and the disposition. The questions were answered. The patient or family acknowledged understanding and was agreeable with the care plan.    FINAL IMPRESSION:    ICD-10-CM    1. Postoperative pain  G89.18    2. Leukocytes in urine  R82.998          MEDICATIONS GIVEN IN THE EMERGENCY DEPARTMENT:  Medications   ketorolac (TORADOL) injection 15 mg (15 mg Intravenous Given 9/6/22 1909)   HYDROmorphone (PF) (DILAUDID) injection 0.5 mg (0.5 mg Intravenous Given 9/6/22 1907)   ondansetron (ZOFRAN) injection 4 mg (4 mg Intravenous Given 9/6/22 1904)   0.9% sodium chloride BOLUS (0 mLs Intravenous Stopped 9/6/22 1956)   HYDROmorphone (PF) (DILAUDID) injection 0.5 mg (0.5 mg Intravenous Given 9/6/22 2141)   iopamidol (ISOVUE-370) solution 100 mL (100 mLs Intravenous Given 9/6/22 2217)         NEW PRESCRIPTIONS STARTED AT TODAY'S ED VISIT:  Discharge Medication List as of 9/6/2022 11:33 PM      START taking these medications    Details   nitroFURantoin macrocrystal-monohydrate (MACROBID) 100 MG capsule Take 1 capsule (100 mg) by mouth 2 times daily for 5 days, Disp-10 capsule, R-0, Local Print               HPI   Patient  information was obtained from: Patient    Use of Intrepreter: N/A    Vani Jenkinsr is a 41 year old female with a pertinent history of methamphetamine abuse in remission, mitral valve prolapse, rheumatoid arthritis, diabetes, depression, anxiety, asthma, who presents to the ED by private vehicle for evaluation of postoperative abdominal pain.     Per chart review, patient has a recurrent incisional hernia which was repaired on  by Dr. Horvath laparoscopically.  She returned to the emergency department on  with worsening pain along her incision site.  CT and blood work at that time were nonrevealing.  She was discharged home with oxycodone and plan for close follow-up with her surgeon.    Per patient, she has had ongoing abdominal discomfort for the past 3 weeks after a hernia repair.  She states over the last several days, pain has been more consistently over her right upper quadrant with radiation around to her right back.  It is exacerbated by certain movement, deep inspiration, and sitting down.  Not exacerbated by food.  She did have an episode of vomiting yesterday though has not been overly nauseated.  She has chronic loose stools which have been unchanged since her surgery.  No constipation.  She is status post cholecystectomy, 2 hernia repairs, and a .  Denies chance of pregnancy.  She states that pain feels similar to when she had a previous kidney infection she has not had any urinary symptoms.  No fevers or chills.  She has been using Tylenol and ibuprofen for pain relief at home with minimal relief.  She states she had a follow-up with general surgery clinic on  (~5 days ago), and that they had no further thoughts on her pain.    REVIEW OF SYSTEMS:  Review of Systems   Constitutional: Negative for chills and fever.   Respiratory: Negative for cough and shortness of breath.    Cardiovascular: Negative for chest pain.   Gastrointestinal: Positive for abdominal pain, diarrhea (chronic)  and vomiting (x1). Negative for anal bleeding, constipation and nausea.   Genitourinary: Negative for dysuria, frequency and hematuria.   Musculoskeletal: Positive for back pain.   All other systems reviewed and are negative.        Medical History     Past Medical History:   Diagnosis Date     Anxiety      Asthma      Bronchitis, not specified as acute or chronic      Chest pain      Depression      Diabetes (H)      Fainting      Heart disease      IDDM      Methamphetamine abuse in remission (H) 4/10/2015     Mitral valve prolapse      Mitral valve prolapse      Rheumatoid arthritis (H)      Rheumatoid arthritis (H)      Substance abuse (H)      Thyroid disease      Unspecified keratitis        Past Surgical History:   Procedure Laterality Date     BIOPSY CERVICAL, LOCAL EXCISION, SINGLE/MULTIPLE        SECTION       EXCISE LESION UPPER EXTREMITY Left 2021    Procedure: EXCISION, LESION, UPPER EXTREMITY;  Surgeon: Babs Leiva MD;  Location: [facility];  Service: General     HC REPAIR UMBILICAL RUTH ANN,<6Y/O,REDUC      Description: Umbilical Hernia Repair;  Recorded: 10/03/2014;     HC REVISE MEDIAN N/CARPAL TUNNEL SURG      Description: Neuroplasty Decompression Median Nerve At Carpal Tunnel;  Recorded: 10/03/2014;     HERNIA REPAIR  2010     HERNIA REPAIR       HERNIORRHAPHY, UMBILICAL, ROBOT-ASSISTED, LAPAROSCOPIC, USING DA STANISLAW XI N/A 2022    Procedure: HERNIORRHAPHY, INCISIONAL UMBILICAL, ROBOT-ASSISTED, LAPAROSCOPIC, USING DA STANISLAW XI;  Surgeon: Jordan Horvath DO;  Location[facility]     LAPAROSCOPIC CHOLECYSTECTOMY N/A 2019    Procedure: CHOLECYSTECTOMY, LAPAROSCOPIC;  Surgeon: Rinku Doran MD;  Location: [facility];  Service: General     LEAP and Coloposcopy        [facility] H[facility]      Description:  Section;  Recorded: 2010;  Comments: 09 - breech        Family History   Problem Relation Age of Onset     Diabetes Paternal Aunt      Diabetes Other         maternal cousin and paternal cousin     Hypertension Paternal Grandmother      Cerebrovascular Disease Paternal Grandmother      Thyroid Disease Mother      Other - See Comments Father         Father abuses multiple drugs.     No Known Problems Sister      No Known Problems Daughter      No Known Problems Son      No Known Problems Other      No Known Problems Other        Social History     Tobacco Use     Smoking status: Former Smoker     Packs/day: 0.50     Types: Cigarettes     Quit date: 2017     Years since quittin.1     Smokeless tobacco: Never Used     Tobacco comment: No smoked since 2021   Substance Use Topics     Alcohol use: Yes     Comment: Alcoholic Drinks/day: Occ     Drug use: No     Types: Methamphetamines     Comment: Drug use: Off 2 years, 1 month and 19 days as of 3/16/16       nitroFURantoin macrocrystal-monohydrate (MACROBID) 100 MG capsule  ACCU-CHEK GUIDE test strip  albuterol (PROAIR HFA/PROVENTIL HFA/VENTOLIN HFA) 108 (90 Base) MCG/ACT inhaler  cetirizine (ZYRTEC) 10 MG tablet  Continuous Blood Gluc Sensor (DEXCOM G6 SENSOR) MISC  Continuous Blood Gluc Transmit (DEXCOM G6 TRANSMITTER) MISC  docusate sodium (COLACE) 100 MG capsule  escitalopram (LEXAPRO) 20 MG tablet  HYDROcodone-acetaminophen (NORCO) 5-325 MG tablet  insulin aspart (NOVOLOG PEN) 100 UNIT/ML pen  Insulin Infusion Pump (T: SLIM X2 INS /CONTROL 7.4) JESSICA  Insulin Infusion Pump Supplies (AUTOSOFT XC INFUSION SET) MISC  Insulin Infusion Pump Supplies (T:SLIM X2 3ML CARTRIDGE) MISC  ketoconazole (NIZORAL) 2 % external cream  levothyroxine (SYNTHROID/LEVOTHROID) 137 MCG tablet  lisinopril (ZESTRIL) 2.5 MG tablet  NOVOLOG VIAL 100 UNIT/ML soln  ondansetron (ZOFRAN) 4 MG tablet  pantoprazole (PROTONIX) 40 MG EC tablet  SUMAtriptan (IMITREX) 25 MG tablet          Physical Exam     First Vitals:  Patient Vitals  "for the past 24 hrs:   BP Temp Temp src Pulse Resp SpO2 Height Weight   09/06/22 2332 138/71 -- -- 59 16 99 % -- --   09/06/22 2140 -- -- -- -- -- -- 1.626 m (5' 4\") 65.5 kg (144 lb 8 oz)   09/06/22 2138 134/66 -- -- 55 16 100 % -- --   09/06/22 1914 (!) 165/82 -- -- 55 16 95 % -- --   09/06/22 1709 (!) 141/65 98.5  F (36.9  C) Oral 60 16 100 % -- --         PHYSICAL EXAM:   Physical Exam  Vitals and nursing note reviewed.   Constitutional:       General: She is not in acute distress.     Appearance: She is well-developed. She is not ill-appearing or toxic-appearing.   HENT:      Head: Normocephalic and atraumatic.   Cardiovascular:      Rate and Rhythm: Normal rate and regular rhythm.      Heart sounds: Normal heart sounds. No murmur heard.  Pulmonary:      Effort: Pulmonary effort is normal.      Breath sounds: Normal breath sounds.   Chest:      Chest wall: No tenderness.   Abdominal:      General: Abdomen is flat. Bowel sounds are normal. There is no distension.      Palpations: Abdomen is soft.      Tenderness: There is abdominal tenderness (right lower and right upper tenderness). There is no right CVA tenderness, left CVA tenderness, guarding or rebound.      Comments: Laparoscopic incision sites appear to be well-healing.  No surrounding erythema or purulent drainage.  No wound dehiscence.   Musculoskeletal:      Comments: Tenderness to palpation over right lower posterior rib cage.   Skin:     General: Skin is warm and dry.   Neurological:      Mental Status: She is alert.             Results     LAB:  All pertinent labs reviewed and interpreted  Labs Ordered and Resulted from Time of ED Arrival to Time of ED Departure   BASIC METABOLIC PANEL - Abnormal       Result Value    Sodium 135 (*)     Potassium 3.8      Chloride 102      Carbon Dioxide (CO2) 24      Anion Gap 9      Urea Nitrogen 15      Creatinine 0.71      Calcium 8.9      Glucose 116      GFR Estimate >90     HEPATIC FUNCTION PANEL - Abnormal    " Bilirubin Total 0.7      Bilirubin Direct 0.2      Protein Total 7.0      Albumin 3.6      Alkaline Phosphatase 78      AST 50 (*)     ALT 32     ROUTINE UA WITH MICROSCOPIC REFLEX TO CULTURE - Abnormal    Color Urine Yellow      Appearance Urine Cloudy (*)     Glucose Urine Negative      Bilirubin Urine Negative      Ketones Urine 10  (*)     Specific Gravity Urine 1.015      Blood Urine Negative      pH Urine 8.5 (*)     Protein Albumin Urine 30  (*)     Urobilinogen Urine Normal      Nitrite Urine Negative      Leukocyte Esterase Urine 250 Georges/uL (*)     RBC Urine        WBC Urine       CBC WITH PLATELETS - Normal    WBC Count 7.4      RBC Count 4.06      Hemoglobin 13.0      Hematocrit 38.7      MCV 95      MCH 32.0      MCHC 33.6      RDW 12.5      Platelet Count 317     LIPASE - Normal    Lipase 21     D DIMER QUANTITATIVE - Normal    D-Dimer Quantitative <0.27     URINE CULTURE       RADIOLOGY:  CT Abdomen Pelvis w Contrast   Final Result   IMPRESSION:    1.  No complications status post cholecystectomy.      2.  Benign cysts in both ovaries with the largest measuring 1.7-1.9 cm in the left ovary.      3.  Mild presumed physiologic free fluid in the pelvis.      4.  Benign hemangioma left lobe of the liver.      5.  No hernia is seen.          Heaven Oseguera PA-C   Emergency Medicine   [facility] EMERGENCY DEPARTMENT       Heaven Oseguera PA-C  09/07/22 0000

## 2022-09-06 NOTE — ED TRIAGE NOTES
Patient having abdominal pain that wraps around to her back.  Was here and evaluated on 8/25 for the same pain. Had a CT at that time and did not find cause for the pain. Had  gallbladder surgery mid August. She says now the pain is worse, hurts when she breaths.

## 2022-09-07 ENCOUNTER — OFFICE VISIT (OUTPATIENT)
Dept: SURGERY | Facility: CLINIC | Age: 42
End: 2022-09-07
Payer: COMMERCIAL

## 2022-09-07 DIAGNOSIS — Z48.89 ENCOUNTER FOR POSTOPERATIVE CARE: ICD-10-CM

## 2022-09-07 DIAGNOSIS — G89.18 ACUTE POST-OPERATIVE PAIN: Primary | ICD-10-CM

## 2022-09-07 PROCEDURE — 99213 OFFICE O/P EST LOW 20 MIN: CPT | Mod: 24 | Performed by: PHYSICIAN ASSISTANT

## 2022-09-07 RX ORDER — GABAPENTIN 100 MG/1
300 CAPSULE ORAL 3 TIMES DAILY
Qty: 90 CAPSULE | Refills: 0 | Status: SHIPPED | OUTPATIENT
Start: 2022-09-07 | End: 2022-12-13

## 2022-09-07 RX ORDER — CYCLOBENZAPRINE HCL 10 MG
10 TABLET ORAL 2 TIMES DAILY PRN
Qty: 20 TABLET | Refills: 0 | Status: SHIPPED | OUTPATIENT
Start: 2022-09-07 | End: 2022-12-13

## 2022-09-07 NOTE — LETTER
9/7/2022         RE: Vani Corona  [address] [place] [address]        Dear Colleague,    Thank you for referring your patient, Vani Corona, to the [facility] [place]. Please see a copy of my visit note below.    HPI:     41-year-old female underwent robotic repair of recurrent incisional hernia on 8/18/2022 with Dr. Horvath.  She had a very small 1 cm incisional hernia that was not covered by previous mesh and he was able to repair the incision as well as use the old mesh to cover the defect.  tShe had not been doing well since surgery  and had increase in pain and did have an emergency room visit on the 25th.  CT imaging as well as laboratory tests without any remarkable findings.  She still has pain on side. It is mid right side but not near incsion.  Pain is mainly lateral and just above on the right side of the bellybutton.  Patient can continue to do slightly better but had very minimal improvement.  Has been taking over-the-counter medications routinely.  She then developed pain that was radiating around her back that was severe.  Patient went back to the emergency room on 9/6/2022 yesterday.  She had a complete work-up of lab and urine as well as a repeat CT.  She was given antibiotics for UTI but she has not started it yet.  She denies any nausea, vomiting, diarrhea, fevers, dysuria, frequency, chest pain, shortness of breath or headaches.      LMP 07/15/2022 (Within Weeks)     EXAM:  GENERAL:Appears well in mild distress due to pain she is actually pacing a little bit in the room.  ABDOMEN:  Soft, +BS  SURGICAL WOUNDS:  Incisions healing well, no enduration or drainage.  Back examination tender mid thoracic which is about the same level of pain that she is experiencing on her abdomen that is radiating to her back.    Assessment/Plan: Abdominal pain/back pain that does not correlate well with her surgery particularly that she had a very small hernia  defect.  She has no rashes or anything that would suggest shingles.  My concern is that she has had a back injury in the past and had lumbar problems and I am wondering if she is actually having some issues with her thoracic spine.  With 2 significant work-ups in the emergency room and evaluation today I do not see any complications from her surgery.  Unfortunately she is in quite a bit of pain and she has tolerated gabapentin in the past so I started her on 300 mg to take 3 times a day.  I also told patient that it will be very important for her to follow-up with her primary care provider she may need further work-up from her pain.  From a surgical standpoint I cannot explain this pain and it does not correlate with her surgery well.  She should probably at this point needs to just follow-up with her primary care and at this point I do not think she needs a follow-up with us.  If she has any further concerns particularly with the pain and no other findings with primary care I encouraged her that she can always make a follow-up appointment with Dr. Horvath.  I also reviewed her urine results and told her she does not need to start any antibiotics for UTI.  Along with the gabapentin I did give her some Flexeril for muscle relaxant to see if this also helps with her pain.    Tamia LEIJA              Again, thank you for allowing me to participate in the care of your patient.        Sincerely,        Tamia Quintana PA-C

## 2022-09-07 NOTE — DISCHARGE INSTRUCTIONS
As we discussed, please follow-up with general surgery as scheduled tomorrow at 2 PM and follow their recommendations.  We will proactively start you on the antibiotic Macrobid for possible urinary tract infection and we will wait for urine culture results to come back in the next 2 days.    If it anytime you develop fever, vomiting, black or bloody stools, inability to defecate, or severe pain please return to the ER for further evaluation.

## 2022-09-07 NOTE — LETTER
9/7/2022        RE: Vani CASH Baxter  [address] [place] [address]        HPI:     41-year-old female underwent robotic repair of recurrent incisional hernia on 8/18/2022 with Dr. Horvath.  She had a very small 1 cm incisional hernia that was not covered by previous mesh and he was able to repair the incision as well as use the old mesh to cover the defect.  tShe had not been doing well since surgery  and had increase in pain and did have an emergency room visit on the 25th.  CT imaging as well as laboratory tests without any remarkable findings.  She still has pain on side. It is mid right side but not near incsion.  Pain is mainly lateral and just above on the right side of the bellybutton.  Patient can continue to do slightly better but had very minimal improvement.  Has been taking over-the-counter medications routinely.  She then developed pain that was radiating around her back that was severe.  Patient went back to the emergency room on 9/6/2022 yesterday.  She had a complete work-up of lab and urine as well as a repeat CT.  She was given antibiotics for UTI but she has not started it yet.  She denies any nausea, vomiting, diarrhea, fevers, dysuria, frequency, chest pain, shortness of breath or headaches.      LMP 07/15/2022 (Within Weeks)     EXAM:  GENERAL:Appears well in mild distress due to pain she is actually pacing a little bit in the room.  ABDOMEN:  Soft, +BS  SURGICAL WOUNDS:  Incisions healing well, no enduration or drainage.  Back examination tender mid thoracic which is about the same level of pain that she is experiencing on her abdomen that is radiating to her back.    Assessment/Plan: Abdominal pain/back pain that does not correlate well with her surgery particularly that she had a very small hernia defect.  She has no rashes or anything that would suggest shingles.  My concern is that she has had a back injury in the past and had lumbar problems and I am wondering if she is actually  having some issues with her thoracic spine.  With 2 significant work-ups in the emergency room and evaluation today I do not see any complications from her surgery.  Unfortunately she is in quite a bit of pain and she has tolerated gabapentin in the past so I started her on 300 mg to take 3 times a day.  I also told patient that it will be very important for her to follow-up with her primary care provider she may need further work-up from her pain.  From a surgical standpoint I cannot explain this pain and it does not correlate with her surgery well.  She should probably at this point needs to just follow-up with her primary care and at this point I do not think she needs a follow-up with us.  If she has any further concerns particularly with the pain and no other findings with primary care I encouraged her that she can always make a follow-up appointment with Dr. Horvath.  I also reviewed her urine results and told her she does not need to start any antibiotics for UTI.  Along with the gabapentin I did give her some Flexeril for muscle relaxant to see if this also helps with her pain.    Tamia LIEJA                Sincerely,        Tamia Quintana PA-C

## 2022-09-08 LAB — BACTERIA UR CULT: NORMAL

## 2022-09-09 NOTE — PROGRESS NOTES
HPI:     41-year-old female underwent robotic repair of recurrent incisional hernia on 8/18/2022 with Dr. Horvath.  She had a very small 1 cm incisional hernia that was not covered by previous mesh and he was able to repair the incision as well as use the old mesh to cover the defect.  tShe had not been doing well since surgery  and had increase in pain and did have an emergency room visit on the 25th.  CT imaging as well as laboratory tests without any remarkable findings.  She still has pain on side. It is mid right side but not near incsion.  Pain is mainly lateral and just above on the right side of the bellybutton.  Patient can continue to do slightly better but had very minimal improvement.  Has been taking over-the-counter medications routinely.  She then developed pain that was radiating around her back that was severe.  Patient went back to the emergency room on 9/6/2022 yesterday.  She had a complete work-up of lab and urine as well as a repeat CT.  She was given antibiotics for UTI but she has not started it yet.  She denies any nausea, vomiting, diarrhea, fevers, dysuria, frequency, chest pain, shortness of breath or headaches.      LMP 07/15/2022 (Within Weeks)     EXAM:  GENERAL:Appears well in mild distress due to pain she is actually pacing a little bit in the room.  ABDOMEN:  Soft, +BS  SURGICAL WOUNDS:  Incisions healing well, no enduration or drainage.  Back examination tender mid thoracic which is about the same level of pain that she is experiencing on her abdomen that is radiating to her back.    Assessment/Plan: Abdominal pain/back pain that does not correlate well with her surgery particularly that she had a very small hernia defect.  She has no rashes or anything that would suggest shingles.  My concern is that she has had a back injury in the past and had lumbar problems and I am wondering if she is actually having some issues with her thoracic spine.  With 2 significant work-ups in the  emergency room and evaluation today I do not see any complications from her surgery.  Unfortunately she is in quite a bit of pain and she has tolerated gabapentin in the past so I started her on 300 mg to take 3 times a day.  I also told patient that it will be very important for her to follow-up with her primary care provider she may need further work-up from her pain.  From a surgical standpoint I cannot explain this pain and it does not correlate with her surgery well.  She should probably at this point needs to just follow-up with her primary care and at this point I do not think she needs a follow-up with us.  If she has any further concerns particularly with the pain and no other findings with primary care I encouraged her that she can always make a follow-up appointment with Dr. Horvath.  I also reviewed her urine results and told her she does not need to start any antibiotics for UTI.  Along with the gabapentin I did give her some Flexeril for muscle relaxant to see if this also helps with her pain.    Tamia TOUREC

## 2022-10-14 DIAGNOSIS — E10.9 TYPE 1 DIABETES MELLITUS WITHOUT COMPLICATION (H): ICD-10-CM

## 2022-10-14 DIAGNOSIS — E10.65 TYPE 1 DIABETES MELLITUS WITH HYPERGLYCEMIA (H): ICD-10-CM

## 2022-10-14 RX ORDER — INSULIN PUMP CARTRIDGE
CARTRIDGE (EA) SUBCUTANEOUS
Qty: 30 EACH | Refills: 0 | Status: SHIPPED | OUTPATIENT
Start: 2022-10-14 | End: 2023-05-08

## 2022-10-14 RX ORDER — INSULIN ASPART 100 [IU]/ML
INJECTION, SOLUTION INTRAVENOUS; SUBCUTANEOUS
Qty: 45 ML | Refills: 0 | Status: SHIPPED | OUTPATIENT
Start: 2022-10-14 | End: 2023-04-12

## 2022-11-09 DIAGNOSIS — E10.9 TYPE 1 DIABETES MELLITUS WITHOUT COMPLICATION (H): ICD-10-CM

## 2022-11-10 RX ORDER — INSULIN ASPART 100 [IU]/ML
INJECTION, SOLUTION INTRAVENOUS; SUBCUTANEOUS
Qty: 50 ML | Refills: 0 | OUTPATIENT
Start: 2022-11-10

## 2022-11-21 ENCOUNTER — HEALTH MAINTENANCE LETTER (OUTPATIENT)
Age: 42
End: 2022-11-21

## 2022-11-23 ENCOUNTER — DOCUMENTATION ONLY (OUTPATIENT)
Dept: LAB | Facility: CLINIC | Age: 42
End: 2022-11-23

## 2022-11-23 DIAGNOSIS — E10.9 TYPE 1 DIABETES MELLITUS WITHOUT COMPLICATION (H): Primary | ICD-10-CM

## 2022-11-23 DIAGNOSIS — E03.9 HYPOTHYROIDISM, UNSPECIFIED TYPE: ICD-10-CM

## 2022-11-23 NOTE — PROGRESS NOTES
Vani Corona has an upcoming appointment for Endocrinology labs.  No orders are in the chart.    Please place orders or advise patient.    Thanks,     Nicolasa Hoffman

## 2022-12-01 ENCOUNTER — E-VISIT (OUTPATIENT)
Dept: URGENT CARE | Facility: CLINIC | Age: 42
End: 2022-12-01
Payer: COMMERCIAL

## 2022-12-01 DIAGNOSIS — J01.90 ACUTE BACTERIAL SINUSITIS: Primary | ICD-10-CM

## 2022-12-01 DIAGNOSIS — B96.89 ACUTE BACTERIAL SINUSITIS: Primary | ICD-10-CM

## 2022-12-01 PROCEDURE — 99421 OL DIG E/M SVC 5-10 MIN: CPT | Performed by: EMERGENCY MEDICINE

## 2022-12-01 RX ORDER — DOXYCYCLINE HYCLATE 100 MG
100 TABLET ORAL 2 TIMES DAILY
Qty: 14 TABLET | Refills: 0 | Status: SHIPPED | OUTPATIENT
Start: 2022-12-01 | End: 2022-12-08

## 2022-12-01 NOTE — PATIENT INSTRUCTIONS
Sinusitis (Antibiotic Treatment)    The sinuses are air-filled spaces within the bones of the face. They connect to the inside of the nose. Sinusitis is an inflammation of the tissue that lines the sinuses. Sinusitis can occur during a cold. It can also happen due to allergies to pollens and other particles in the air. Sinusitis can cause symptoms of sinus congestion and a feeling of fullness. A sinus infection causes fever, headache, and facial pain. There is often green or yellow fluid draining from the nose or into the back of the throat (post-nasal drip). You have been given antibiotics to treat this condition.   Home care    Take the full course of antibiotics as instructed. Don't stop taking them, even when you feel better.    Drink plenty of water, hot tea, and other liquids as directed by the healthcare provider. This may help thin nasal mucus. It also may help your sinuses drain fluids.    Heat may help soothe painful areas of your face. Use a towel soaked in hot water. Or,  the shower and direct the warm spray onto your face. Using a vaporizer along with a menthol rub at night may also help soothe symptoms.     An expectorant with guaifenesin may help thin nasal mucus and help your sinuses drain fluids. Talk with your provider or pharmacists before taking an over-the-counter (OTC) medicine if you have any questions about it or its side effects..    You can use an OTC decongestant, unless a similar medicine was prescribed to you. Nasal sprays work the fastest. Use one that contains phenylephrine or oxymetazoline. First blow your nose gently. Then use the spray. Don't use these medicines more often than directed on the label. If you do, your symptoms may get worse. You may also take pills that contain pseudoephedrine. Don t use products that combine multiple medicines. This is because side effects may be increased. Read labels. You can also ask the pharmacist for help. (People with high blood  pressure should not use decongestants. They can raise blood pressure.) Talk with your provider or pharmacist if you have any questions about the medicine..    OTC antihistamines may help if allergies contributed to your sinusitis. Talk with your provider or pharmacist if you have any questions about the medicine..    Don't use nasal rinses or irrigation during an acute sinus infection, unless your healthcare provider tells you to. Rinsing may spread the infection to other areas in your sinuses.    Use acetaminophen or ibuprofen to control pain, unless another pain medicine was prescribed to you. If you have chronic liver or kidney disease or ever had a stomach ulcer, talk with your healthcare provider before using these medicines. Never give aspirin to anyone under age 18 who is ill with a fever. It may cause severe liver damage.    Don't smoke. This can make symptoms worse.    Follow-up care  Follow up with your healthcare provider, or as advised.   When to seek medical advice  Call your healthcare provider if any of these occur:     Facial pain or headache that gets worse    Stiff neck    Unusual drowsiness or confusion    Swelling of your forehead or eyelids    Symptoms don't go away in 10 days    Vision problems, such as blurred or double vision    Fever of 100.4 F (38 C) or higher, or as directed by your healthcare provider  Call 911  Call 911 if any of these occur:     Seizure    Trouble breathing    Feeling dizzy or faint    Fingernails, skin or lips look blue, purple , or gray  Prevention  Here are steps you can take to help prevent an infection:     Keep good hand washing habits.    Don t have close contact with people who have sore throats, colds, or other upper respiratory infections.    Don t smoke, and stay away from secondhand smoke.    Stay up to date with of your vaccines.  [employer] last reviewed this educational content on 12/1/2019 2000-2021 [employer]. All rights reserved. This  information is not intended as a substitute for professional medical care. Always follow your healthcare professional's instructions.        Dear Vani Corona         Based on your responses and diagnosis, I have prescribed doxycycline. to treat your symptoms. I have sent this to your pharmacy.?     It is also important to stay well hydrated, get lots of rest and take over-the-counter decongestants,?tylenol?or ibuprofen if you?are able to?take those medications per your primary care provider to help relieve discomfort.?     It is important that you take?all of?your prescribed medication even if your symptoms are improving after a few doses.? Taking?all of?your medicine helps prevent the symptoms from returning.?     If your symptoms worsen, you develop severe headache, vomiting, high fever (>102), or are not improving in 7 days, please contact your primary care provider for an appointment or visit any of our convenient Walk-in Care or Urgent Care Centers to be seen which can be found on our website?here.?     Thanks again for choosing?us?as your health care partner,?   ?  Bassem Manuel MD?

## 2022-12-02 ENCOUNTER — LAB (OUTPATIENT)
Dept: LAB | Facility: CLINIC | Age: 42
End: 2022-12-02
Payer: COMMERCIAL

## 2022-12-02 DIAGNOSIS — E03.9 HYPOTHYROIDISM, UNSPECIFIED TYPE: ICD-10-CM

## 2022-12-02 DIAGNOSIS — E10.9 TYPE 1 DIABETES MELLITUS WITHOUT COMPLICATION (H): ICD-10-CM

## 2022-12-02 LAB — HBA1C MFR BLD: 7.2 % (ref 0–5.6)

## 2022-12-02 PROCEDURE — 83036 HEMOGLOBIN GLYCOSYLATED A1C: CPT

## 2022-12-02 PROCEDURE — 84439 ASSAY OF FREE THYROXINE: CPT

## 2022-12-02 PROCEDURE — 36415 COLL VENOUS BLD VENIPUNCTURE: CPT

## 2022-12-02 PROCEDURE — 84443 ASSAY THYROID STIM HORMONE: CPT

## 2022-12-03 LAB
T4 FREE SERPL-MCNC: 1.76 NG/DL (ref 0.9–1.7)
TSH SERPL DL<=0.005 MIU/L-ACNC: 2.34 UIU/ML (ref 0.3–4.2)

## 2022-12-06 ENCOUNTER — TELEPHONE (OUTPATIENT)
Dept: ENDOCRINOLOGY | Facility: CLINIC | Age: 42
End: 2022-12-06

## 2022-12-06 NOTE — TELEPHONE ENCOUNTER
The following mychart sent:    García Jenkinsr,    Just a reminder to download your insulin pump anytime between now and the night before your upcoming appointment scheduled with Debbi Dave CNP in Endocrinology for 12/13/22.  See you next week.  Take care.    Thank you,    Radha CARRILLO, Medical Assistant for  Debbi Dave, LUIS FERNANDO  Endocrinology  [phone]

## 2022-12-08 DIAGNOSIS — E10.65 TYPE 1 DIABETES MELLITUS WITH HYPERGLYCEMIA (H): ICD-10-CM

## 2022-12-08 RX ORDER — INSULIN INFUSION SET/CARTRIDGE
COMBINATION PACKAGE (EA) MISCELLANEOUS
Qty: 10 EACH | Refills: 0 | Status: SHIPPED | OUTPATIENT
Start: 2022-12-08 | End: 2023-05-08

## 2022-12-08 RX ORDER — PROCHLORPERAZINE 25 MG/1
SUPPOSITORY RECTAL
Qty: 3 EACH | Refills: 0 | Status: SHIPPED | OUTPATIENT
Start: 2022-12-08 | End: 2023-01-06

## 2022-12-13 ENCOUNTER — OFFICE VISIT (OUTPATIENT)
Dept: ENDOCRINOLOGY | Facility: CLINIC | Age: 42
End: 2022-12-13
Payer: COMMERCIAL

## 2022-12-13 VITALS
HEART RATE: 68 BPM | WEIGHT: 146 LBS | SYSTOLIC BLOOD PRESSURE: 122 MMHG | BODY MASS INDEX: 25.06 KG/M2 | DIASTOLIC BLOOD PRESSURE: 74 MMHG

## 2022-12-13 DIAGNOSIS — E03.9 HYPOTHYROIDISM, UNSPECIFIED TYPE: ICD-10-CM

## 2022-12-13 DIAGNOSIS — E10.9 TYPE 1 DIABETES MELLITUS WITHOUT COMPLICATION (H): Primary | ICD-10-CM

## 2022-12-13 PROCEDURE — 99213 OFFICE O/P EST LOW 20 MIN: CPT | Performed by: NURSE PRACTITIONER

## 2022-12-13 NOTE — PROGRESS NOTES
[facility] ENDOCRINOLOGY    Diabetes Note 12/13/2022    Vani Corona, 1980, [id number]          Reason for visit      1. Type 1 diabetes mellitus without complication (H)    2. Hypothyroidism, unspecified type        HPI     Vani Corona is a very pleasant 42 year old old female who presents for follow up.  SUMMARY:    Vani is here today in f/u for DM 1. She is reporting that recently she notes that if she brushes her teeth in the morning, it triggers a gag reflex, and then anything that she eats or drinks after it tends to come right back up. She continues to use the Tandem Pump system with Dexcom. Download shows that she is running higher after dinner. She was within range 56% of the time.  She only has one CHO ratio for the whole day of 1:15. She is really good overnight, but once she starts eating, well, up they go. Current A1c is 7.2 and up slightly from her last at 7.0. She continues to deal with the loss of her son.     Current TSH is 2.34 and fT4 is 1.76. She is feeling well. She notes that sometimes she forgets to skip Saturday, but not consistently. She is having no problems referable to her neck. She is taking Levothyroxine 137 mcg 6 days a week.         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    Social History      reports that she quit smoking about 5 years ago. Her smoking use included cigarettes. She smoked an average of .5 packs per day. She has never used smokeless tobacco. She reports current alcohol use. She reports that she does not use drugs.      Review of Systems     Patient has no polyuria or polydipsia, no chest pain, dyspnea or TIA's, no numbness, tingling or pain in extremities  Remainder negative except as noted in HPI.      Vital Signs     /74 (BP Location: Right arm, Patient Position: Sitting, Cuff Size: Adult Large)   Pulse 68   Wt 66.2 kg (146 lb)   BMI 25.06 kg/m    Wt Readings from Last 3 Encounters:   12/13/22 66.2 kg (146 lb)   09/06/22 65.5 kg (144 lb 8 oz)   08/18/22 65.9 kg (145 lb 3.2 oz)       Physical Exam     Constitutional:  Well developed, Well nourished  HENT:  Normocephalic,   Neck: Thyroid normal, No lymph nodes, Supple  Eyes:  PERRL, Conjunctiva pink  Respiratory:  Normal breath sounds, No respiratory distress  Cardiovascular:  Normal heart rate, Normal rhythm, No murmurs  GI:  Bowel sounds normal, Soft, No tenderness  Musculoskeletal:  No gross deformity or lesions, normal dorsalis pedis pulses  Skin: No acanthosis nigricans, lipoatrophy or lipodystrophy  Neurologic:  Alert & oriented x 3, nonfocal  Psychiatric:  Affect, Mood, Insight appropriate  Diabetic foot exam: no ulcers, charcot's or high risk calluses,       Assessment     1. Type 1 diabetes mellitus without complication (H)    2. Hypothyroidism, unspecified type        Plan       We changed her CHO ratio at 1800 to 1:12. It will remain 1:15 the rest of the day. No other changes. She  will f/u with me in 6 months.    She is bio-chemically and physically euthyroid. She will remain on her current dost of Levothyroxine.     Anayeli Dave NP   Endocrinology  12/13/2022  8:19 AM        Lab Results     Microalbumin Urine mg/dL   Date Value Ref Range Status   05/27/2022 0.77 0.00 - 1.99 mg/dL Final       Cholesterol   Date Value Ref Range Status   02/15/2016 174 <=199 mg/dL Final     Direct Measure HDL   Date Value Ref Range Status   02/15/2016 45 (L) >=50 mg/dL Final     Triglycerides   Date Value Ref Range Status   02/15/2016 122 <=149 mg/dL Final             Current Medications     Outpatient Medications Prior to Visit   Medication Sig Dispense Refill     albuterol (PROAIR HFA/PROVENTIL HFA/VENTOLIN HFA) 108 (90 Base) MCG/ACT inhaler Inhale 2 puffs into the lungs every 6 hours 18 g 0     cetirizine (ZYRTEC) 10 MG tablet TAKE ONE TABLET BY MOUTH ONCE DAILY 90 tablet 3     Continuous Blood Gluc Sensor (DEXCOM G6 SENSOR) MISC CHANGE EVERY 10 DAYS 3 each 0     Continuous Blood Gluc Transmit (DEXCOM G6 TRANSMITTER) MISC CHANGE EVERY 3 MONTHS. 1 each 1     escitalopram (LEXAPRO) 20 MG tablet Take 1 tablet (20 mg) by mouth daily 45 tablet 0     insulin aspart (NOVOLOG FLEXPEN) 100 UNIT/ML pen USE DAILY IN PUMP, UP TO 50 UNITS PER DAY 45 mL 0     Insulin Infusion Pump (T: SLIM X2 INS /CONTROL 7.4) JESSICA        ketoconazole (NIZORAL) 2 % external cream Apply topically 2 times daily 30 g 0     levothyroxine (SYNTHROID/LEVOTHROID) 137 MCG tablet One tablet daily. 90 tablet 3     lisinopril (ZESTRIL) 2.5 MG tablet TAKE ONE TABLET BY MOUTH ONCE DAILY 90 tablet 1     NOVOLOG VIAL 100 UNIT/ML soln        ondansetron (ZOFRAN) 4 MG tablet Take 1 tablet (4 mg) by mouth every 8 hours as needed for nausea 30 tablet 0     pantoprazole (PROTONIX) 40 MG EC tablet Take 1 tablet (40 mg) by mouth every morning 90 tablet 3     SUMAtriptan (IMITREX) 25 MG tablet Take 1 tablet (25 mg) by mouth at onset of headache for  migraine May repeat in 2 hours. Max 8 tablets/24 hours. Due for appointment in Yue 15 tablet 0     ACCU-CHEK GUIDE test strip USE TO TEST 6 TIMES PER DAY       Insulin Infusion Pump Supplies (AUTOSOFT XC INFUSION SET) MISC CHANGE EVERY 3 DAYS 10 each 0     Insulin Infusion Pump Supplies (T:SLIM X2 3ML CARTRIDGE) MISC CHANGE EVERY 3 DAYS 30 each 0     cyclobenzaprine (FLEXERIL) 10 MG tablet Take 1 tablet (10 mg) by mouth 2 times daily as needed for muscle spasms (Patient not taking: Reported on 12/13/2022) 20 tablet 0     docusate sodium (COLACE) 100 MG capsule Take 1 capsule (100 mg) by mouth 2 times daily 30 capsule 0     gabapentin (NEURONTIN) 100 MG capsule Take 3 capsules (300 mg) by mouth 3 times daily 90 capsule 0     HYDROcodone-acetaminophen (NORCO) 5-325 MG tablet Take 1 tablet by mouth every 6 hours as needed for pain (Patient not taking: Reported on 9/7/2022) 18 tablet 0     No facility-administered medications prior to visit.

## 2022-12-26 DIAGNOSIS — K21.9 GASTROESOPHAGEAL REFLUX DISEASE, UNSPECIFIED WHETHER ESOPHAGITIS PRESENT: ICD-10-CM

## 2022-12-27 RX ORDER — ESCITALOPRAM OXALATE 20 MG/1
20 TABLET ORAL DAILY
Qty: 90 TABLET | Refills: 1 | Status: SHIPPED | OUTPATIENT
Start: 2022-12-27 | End: 2023-07-13

## 2022-12-28 NOTE — TELEPHONE ENCOUNTER
"Last Written Prescription Date:  8/8/22  Last Fill Quantity: 45,  # refills: 0   Last office visit provider:  8/12/22     Requested Prescriptions   Pending Prescriptions Disp Refills     escitalopram (LEXAPRO) 20 MG tablet 45 tablet 0     Sig: Take 1 tablet (20 mg) by mouth daily       SSRIs Protocol Passed - 12/26/2022  3:31 PM        Passed - Recent (12 mo) or future (30 days) visit within the authorizing provider's specialty     Patient has had an office visit with the authorizing provider or a provider within the authorizing providers department within the previous 12 mos or has a future within next 30 days. See \"Patient Info\" tab in inbasket, or \"Choose Columns\" in Meds & Orders section of the refill encounter.              Passed - Medication is active on med list        Passed - Patient is age 18 or older        Passed - No active pregnancy on record        Passed - No positive pregnancy test in last 12 months             Samantha Guerra, RN 12/27/22 8:22 PM  "

## 2023-01-04 ENCOUNTER — MYC MEDICAL ADVICE (OUTPATIENT)
Dept: INTERNAL MEDICINE | Facility: CLINIC | Age: 43
End: 2023-01-04

## 2023-01-05 ENCOUNTER — VIRTUAL VISIT (OUTPATIENT)
Dept: INTERNAL MEDICINE | Facility: CLINIC | Age: 43
End: 2023-01-05
Payer: COMMERCIAL

## 2023-01-05 DIAGNOSIS — E10.3299 TYPE 1 DIABETES MELLITUS WITH MILD NONPROLIFERATIVE RETINOPATHY WITHOUT MACULAR EDEMA, UNSPECIFIED LATERALITY (H): ICD-10-CM

## 2023-01-05 DIAGNOSIS — D80.2 SELECTIVE IGA IMMUNODEFICIENCY (H): ICD-10-CM

## 2023-01-05 DIAGNOSIS — F41.9 ANXIETY: ICD-10-CM

## 2023-01-05 DIAGNOSIS — F15.11 METHAMPHETAMINE ABUSE IN REMISSION (H): ICD-10-CM

## 2023-01-05 DIAGNOSIS — F33.2 SEVERE EPISODE OF RECURRENT MAJOR DEPRESSIVE DISORDER, WITHOUT PSYCHOTIC FEATURES (H): Primary | ICD-10-CM

## 2023-01-05 PROCEDURE — 99213 OFFICE O/P EST LOW 20 MIN: CPT | Performed by: NURSE PRACTITIONER

## 2023-01-05 RX ORDER — TRAZODONE HYDROCHLORIDE 50 MG/1
25-50 TABLET, FILM COATED ORAL AT BEDTIME
Qty: 90 TABLET | Refills: 0 | Status: SHIPPED | OUTPATIENT
Start: 2023-01-05 | End: 2023-06-20

## 2023-01-05 RX ORDER — BUPROPION HYDROCHLORIDE 150 MG/1
150 TABLET ORAL EVERY MORNING
Qty: 30 TABLET | Refills: 1 | Status: SHIPPED | OUTPATIENT
Start: 2023-01-05 | End: 2023-02-02

## 2023-01-05 ASSESSMENT — PATIENT HEALTH QUESTIONNAIRE - PHQ9
10. IF YOU CHECKED OFF ANY PROBLEMS, HOW DIFFICULT HAVE THESE PROBLEMS MADE IT FOR YOU TO DO YOUR WORK, TAKE CARE OF THINGS AT HOME, OR GET ALONG WITH OTHER PEOPLE: EXTREMELY DIFFICULT
SUM OF ALL RESPONSES TO PHQ QUESTIONS 1-9: 20
SUM OF ALL RESPONSES TO PHQ QUESTIONS 1-9: 20

## 2023-01-05 NOTE — ASSESSMENT & PLAN NOTE
Not at goal, in exacerbation  - I suspect that some of the emotional lability may be in part related to running out of her Lexapro medication with the sudden discontinuation. We will plan to restart this. In additional, will augment depression medication with the addition of Wellbutrin 150 mg daily. She is going to call today to schedule an appointment with psychology. She is advised to follow up in 4 weeks (virtual is okay) for a recheck.

## 2023-01-05 NOTE — PROGRESS NOTES
Vani is a 42 year old who is being evaluated via a billable video visit.      How would you like to obtain your AVS? MyChart  If the video visit is dropped, the invitation should be resent by: Text to cell phone: [phone]  Will anyone else be joining your video visit? 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 Followed by endocrinology             Immune    Selective IgA immunodeficiency (H)     No recent/recurrent URIs             Behavioral    Severe episode of recurrent major depressive disorder, without psychotic features (H) - Primary     Not at goal, in exacerbation  - I suspect that some of the emotional lability may be in part related to running out of her Lexapro medication with the sudden discontinuation. We will plan to restart this. In additional, will augment depression medication with the addition of Wellbutrin 150 mg daily. She is going to call today to schedule an appointment with psychology. She is advised to follow up in 4 weeks (virtual is okay) for a recheck.            Relevant Medications    buPROPion (WELLBUTRIN XL) 150 MG 24 hr tablet    traZODone (DESYREL) 50 MG tablet       Other    Anxiety    Relevant Medications    buPROPion (WELLBUTRIN XL) 150 MG 24 hr tablet    traZODone (DESYREL) 50 MG tablet          Return in about 4 weeks (around 2/2/2023) for Follow up.    Denia Llamas NP  [facility]   Vani is a 42 year old, presenting for the following health issues:   Follow Up    Pt is here to follow up on her mental health. She is having marital issues, her  blatantly disregards the work that she does both for the household and with her work-from-home position. They  have had fights about how she spends her time and she feels very unappreciated and misunderstood. At the same time, she does not have another place to go and must stay living in their shared home. Her  is not interested in counseling and doesn't take her threats to leave him seriously. She is safe, does not have concerns for physical abuse. She is also mourning the loss of her son. She has been tearful for the past 3 days. Has a hard time getting to sleep and then feels drowsy during the day. She ran out of Lexapro 4 days ago.       History of Present Illness       Reason for visit:  Mental health    She eats 0-1 servings of fruits and vegetables daily.She consumes 0 sweetened beverage(s) daily.She exercises with enough effort to increase her heart rate 9 or less minutes per day.  She exercises with enough effort to increase her heart rate 3 or less days per week. She is missing 4 dose(s) of medications per week.  She is not taking prescribed medications regularly due to other.    Today's PHQ-9         PHQ-9 Total Score: 20    PHQ-9 Q9 Thoughts of better off dead/self-harm past 2 weeks :   Several days  Thoughts of suicide or self harm: (P) No  Self-harm Plan:     Self-harm Action:       Safety concerns for self or others: (P) No    How difficult have these problems made it for you to do your work, take care of things at home, or get along with other people: Extremely difficult           Objective           Vitals:  No vitals were obtained today due to virtual visit.    Physical Exam   GENERAL: Alert and tearful   RESP: No audible wheeze, cough, or visible cyanosis.  No visible retractions or increased work of breathing.    PSYCH: Mentation appears normal, mood is depressed, judgement and insight intact, normal speech            Video-Visit Details    Type of service:  Video Visit   Video Start Time: 9:18AM  Video End Time:9:33AM    Originating Location (pt. Location): Home  Distant Location (provider  location):  On-site  Platform used for Video Visit: Jose Luis

## 2023-01-05 NOTE — TELEPHONE ENCOUNTER
I have some open appointments this morning and afternoon that I could do a virtual visit. Please see if she is available for one of these appointment slots or else I can see her in a reserved slot next week.

## 2023-01-06 DIAGNOSIS — E10.65 TYPE 1 DIABETES MELLITUS WITH HYPERGLYCEMIA (H): ICD-10-CM

## 2023-01-06 RX ORDER — PROCHLORPERAZINE 25 MG/1
SUPPOSITORY RECTAL
Qty: 9 EACH | Refills: 1 | Status: SHIPPED | OUTPATIENT
Start: 2023-01-06 | End: 2023-07-14

## 2023-01-06 RX ORDER — PROCHLORPERAZINE 25 MG/1
SUPPOSITORY RECTAL
Qty: 1 EACH | Refills: 1 | Status: SHIPPED | OUTPATIENT
Start: 2023-01-06 | End: 2023-08-16

## 2023-01-30 ENCOUNTER — OFFICE VISIT (OUTPATIENT)
Dept: FAMILY MEDICINE | Facility: CLINIC | Age: 43
End: 2023-01-30
Payer: COMMERCIAL

## 2023-01-30 VITALS
OXYGEN SATURATION: 100 % | BODY MASS INDEX: 24.89 KG/M2 | SYSTOLIC BLOOD PRESSURE: 121 MMHG | DIASTOLIC BLOOD PRESSURE: 68 MMHG | WEIGHT: 145 LBS | HEART RATE: 46 BPM | TEMPERATURE: 98.2 F | RESPIRATION RATE: 16 BRPM

## 2023-01-30 DIAGNOSIS — J01.90 ACUTE SINUSITIS WITH SYMPTOMS > 10 DAYS: ICD-10-CM

## 2023-01-30 DIAGNOSIS — R13.10 PAINFUL SWALLOWING: Primary | ICD-10-CM

## 2023-01-30 LAB
DEPRECATED S PYO AG THROAT QL EIA: NEGATIVE
GROUP A STREP BY PCR: NOT DETECTED
MONOCYTES NFR BLD AUTO: NEGATIVE %

## 2023-01-30 PROCEDURE — 87651 STREP A DNA AMP PROBE: CPT | Performed by: FAMILY MEDICINE

## 2023-01-30 PROCEDURE — 86308 HETEROPHILE ANTIBODY SCREEN: CPT | Performed by: FAMILY MEDICINE

## 2023-01-30 PROCEDURE — 36415 COLL VENOUS BLD VENIPUNCTURE: CPT | Performed by: FAMILY MEDICINE

## 2023-01-30 PROCEDURE — 99213 OFFICE O/P EST LOW 20 MIN: CPT | Performed by: FAMILY MEDICINE

## 2023-01-30 RX ORDER — CEFDINIR 300 MG/1
300 CAPSULE ORAL 2 TIMES DAILY
Qty: 20 CAPSULE | Refills: 0 | Status: SHIPPED | OUTPATIENT
Start: 2023-01-30 | End: 2023-02-09

## 2023-01-30 NOTE — PROGRESS NOTES
Assessment & Plan       ICD-10-CM    1. Painful swallowing  R13.10 Streptococcus A Rapid Screen w/Reflex to PCR - Clinic Collect     Mononucleosis screen     Group A Streptococcus PCR Throat Swab     Mononucleosis screen      2. Acute sinusitis with symptoms > 10 days  J01.90 cefdinir (OMNICEF) 300 MG capsule        Patient Instructions   Rapid strep is negative, will send it off for culture.    We will check a monotest today.    For your sinus infection I am prescribing Omnicef which is cefdinir 300 mg twice a day for 10 days.  I did not see any son warnings that would you please asked the pharmacist if this could make you sun sensitive and if so please ask the pharmacist for an alternative.        20 minutes spent on the date of the encounter doing chart review, history and exam, documentation and further activities per the note       Nicotine/Tobacco Cessation:  She reports that she has been smoking cigarettes. She has been smoking an average of .5 packs per day. She has never used smokeless tobacco.  Nicotine/Tobacco Cessation Plan:             No follow-ups on file.    Trista Mcmullen MD  [facility]    Deborah Ludwig is a 42 year old, presenting for the following health issues:  Throat Problem (Painful to swallow x 3 days)      History of Present Illness       Reason for visit:  Difficulty swallowing  Symptom onset:  1-3 days ago  Symptoms include:  Painful when I swallow  Symptom intensity:  Moderate  Symptom progression:  Worsening  Had these symptoms before:  Yes  Has tried/received treatment for these symptoms:  Yes  Previous treatment was successful:  Yes  Prior treatment description:  Strep throat    She eats 0-1 servings of fruits and vegetables daily.She consumes 0 sweetened beverage(s) daily.She exercises with enough effort to increase her heart rate 9 or less minutes per day.  She exercises with enough effort to increase her heart rate 3 or less days per week.  She is missing 1 dose(s) of medications per week.  She is not taking prescribed medications regularly due to remembering to take.     Sore throat:  When waking up for 2 days and then yesterday all day.  Felt like hard to breath yesterday.,  No fever bu feels cold.  No ear pain.  No cough.  No congestion. No swollen lymph nodes.  She is very fatigued.  No mono exposure that she know of.     Sinus pain and congestion: Post nasal drip in the am for a week and a half. Does have some sinus pain in [facility] for a week and a hslf.  Last week had runny nose, sneezing and eye irritation, Took allergy pil land seemed to help.          Review of Systems         Objective    /68 (BP Location: Left arm, Patient Position: Sitting, Cuff Size: Adult Regular)   Pulse (!) 46   Temp 98.2  F (36.8  C) (Oral)   Resp 16   Wt 65.8 kg (145 lb)   LMP 12/20/2022 (Approximate)   SpO2 100%   BMI 24.89 kg/m    Body mass index is 24.89 kg/m .  Physical Exam   GENERAL: healthy, alert and mild distress  HEENT: Oropharynx is erythematous no tonsil hypertrophy or exudate, tympanic membranes gray good light reflex, pupils equal round and reactive  NECK: no adenopathy, no asymmetry, masses, or scars and thyroid normal to palpation

## 2023-01-30 NOTE — PATIENT INSTRUCTIONS
Rapid strep is negative, will send it off for culture.    We will check a monotest today.    For your sinus infection I am prescribing Omnicef which is cefdinir 300 mg twice a day for 10 days.  I did not see any son warnings that would you please asked the pharmacist if this could make you sun sensitive and if so please ask the pharmacist for an alternative.

## 2023-02-02 ENCOUNTER — VIRTUAL VISIT (OUTPATIENT)
Dept: INTERNAL MEDICINE | Facility: CLINIC | Age: 43
End: 2023-02-02
Payer: COMMERCIAL

## 2023-02-02 DIAGNOSIS — F33.2 SEVERE EPISODE OF RECURRENT MAJOR DEPRESSIVE DISORDER, WITHOUT PSYCHOTIC FEATURES (H): Primary | ICD-10-CM

## 2023-02-02 DIAGNOSIS — F41.9 ANXIETY: ICD-10-CM

## 2023-02-02 DIAGNOSIS — R80.9 TYPE 1 DIABETES MELLITUS WITH MICROALBUMINURIA (H): ICD-10-CM

## 2023-02-02 DIAGNOSIS — R06.83 SNORING: ICD-10-CM

## 2023-02-02 DIAGNOSIS — E10.29 TYPE 1 DIABETES MELLITUS WITH MICROALBUMINURIA (H): ICD-10-CM

## 2023-02-02 PROCEDURE — 99213 OFFICE O/P EST LOW 20 MIN: CPT | Performed by: NURSE PRACTITIONER

## 2023-02-02 RX ORDER — LISINOPRIL 2.5 MG/1
2.5 TABLET ORAL DAILY
Qty: 90 TABLET | Refills: 3 | Status: SHIPPED | OUTPATIENT
Start: 2023-02-02 | End: 2024-01-15

## 2023-02-02 RX ORDER — BUPROPION HYDROCHLORIDE 150 MG/1
150 TABLET ORAL EVERY MORNING
Qty: 90 TABLET | Refills: 1 | Status: SHIPPED | OUTPATIENT
Start: 2023-02-02 | End: 2023-05-09

## 2023-02-02 ASSESSMENT — PATIENT HEALTH QUESTIONNAIRE - PHQ9: SUM OF ALL RESPONSES TO PHQ QUESTIONS 1-9: 7

## 2023-02-02 NOTE — LETTER
My Depression Action Plan  Name: Vani Corona   Date of Birth 1980  Date: 2/2/2023    My doctor: Denia Llamas   My clinic: [address] [address]  [phone]          GREEN    ZONE   Good Control    What it looks like:     Things are going generally well. You have normal ups and downs. You may even feel depressed from time to time, but bad moods usually last less than a day.   What you need to do:  1. Continue to care for yourself (see self care plan)  2. Check your depression survival kit and update it as needed  3. Follow your physician s recommendations including any medication.  4. Do not stop taking medication unless you consult with your physician first.           YELLOW         ZONE Getting Worse    What it looks like:     Depression is starting to interfere with your life.     It may be hard to get out of bed; you may be starting to isolate yourself from others.    Symptoms of depression are starting to last most all day and this has happened for several days.     You may have suicidal thoughts but they are not constant.   What you need to do:     1. Call your care team. Your response to treatment will improve if you keep your care team informed of your progress. Yellow periods are signs an adjustment may need to be made.     2. Continue your self-care.  Just get dressed and ready for the day.  Don't give yourself time to talk yourself out of it.    3. Talk to someone in your support network.    4. Open up your Depression Self-Care Plan/Wellness Kit.           RED    ZONE Medical Alert - Get Help    What it looks like:     Depression is seriously interfering with your life.     You may experience these or other symptoms: You can t get out of bed most days, can t work or engage in other necessary activities, you have trouble taking care of basic hygiene, or basic responsibilities, thoughts of suicide or death that  will not go away, self-injurious behavior.     What you need to do:  1. Call your care team and request a same-day appointment. If they are not available (weekends or after hours) call your local crisis line, emergency room or 911.          Depression Self-Care Plan / Wellness Kit    Many people find that medication and therapy are helpful treatments for managing depression. In addition, making small changes to your everyday life can help to boost your mood and improve your wellbeing. Below are some tips for you to consider. Be sure to talk with your medical provider and/or behavioral health consultant if your symptoms are worsening or not improving.     Sleep   Sleep hygiene  means all of the habits that support good, restful sleep. It includes maintaining a consistent bedtime and wake time, using your bedroom only for sleeping or sex, and keeping the bedroom dark and free of distractions like a computer, smartphone, or television.     Develop a Healthy Routine  Maintain good hygiene. Get out of bed in the morning, make your bed, brush your teeth, take a shower, and get dressed. Don t spend too much time viewing media that makes you feel stressed. Find time to relax each day.    Exercise  Get some form of exercise every day. This will help reduce pain and release endorphins, the  feel good  chemicals in your brain. It can be as simple as just going for a walk or doing some gardening, anything that will get you moving.      Diet  Strive to eat healthy foods, including fruits and vegetables. Drink plenty of water. Avoid excessive sugar, caffeine, alcohol, and other mood-altering substances.     Stay Connected with Others  Stay in touch with friends and family members.    Manage Your Mood  Try deep breathing, massage therapy, biofeedback, or meditation. Take part in fun activities when you can. Try to find something to smile about each day.     Psychotherapy  Be open to working with a therapist if your provider  recommends it.     Medication  Be sure to take your medication as prescribed. Most anti-depressants need to be taken every day. It usually takes several weeks for medications to work. Not all medicines work for all people. It is important to follow-up with your provider to make sure you have a treatment plan that is working for you. Do not stop your medication abruptly without first discussing it with your provider.    Crisis Resources   These hotlines are for both adults and children. They and are open 24 hours a day, 7 days a week unless noted otherwise.      National Suicide Prevention Lifeline   988 or 0-755-263-RYVD (3015)      Crisis Text Line    www.crisistextline.org  Text HOME to 000363 from anywhere in the [place], anytime, about any type of crisis. A live, trained crisis counselor will receive the text and respond quickly.      [place] Lifeline for LGBTQ Youth  A national crisis intervention and suicide lifeline for LGBTQ youth under 25. Provides a safe place to talk without judgement. Call 1-506.860.2822; text START to 118762 or visit www.thetrevorproject.org to talk to a trained counselor.      For [facility] crisis numbers, visit the [facility] website at:  https://[place].gov/dhs/people-we-serve/adults/health-care/mental-health/resources/crisis-contacts.jsp

## 2023-02-02 NOTE — PROGRESS NOTES
Vani is a 42 year old who is being evaluated via a billable video visit.      How would you like to obtain your AVS? MyChart  If the video visit is dropped, the invitation should be resent by: Text to cell phone: [phone]  Will anyone else be joining your video visit? No      Assessment & Plan   Problem List Items Addressed This Visit        Behavioral    Severe episode of recurrent major depressive disorder, without psychotic features (H) - Primary    Relevant Medications    buPROPion (WELLBUTRIN XL) 150 MG 24 hr tablet       Other    Anxiety    Relevant Medications    buPROPion (WELLBUTRIN XL) 150 MG 24 hr tablet   Other Visit Diagnoses     Type 1 diabetes mellitus with microalbuminuria (H)        Relevant Medications    lisinopril (ZESTRIL) 2.5 MG tablet    Snoring        Relevant Orders    Adult Sleep Eval & Management  Referral         - Depression symptoms have improved with the resumption of escitalopram and recent addition of bupropion.  We will plan to continue with the 2 medications as prescribed.  She will follow-up in 2 to 3 months for annual physical and recheck.  - Continue follow-up with psychology  - She is encouraged to schedule a physical and Pap smear in 2 to 3 months  - She continues to have daytime fatigue despite getting 8 hours of sleep most nights and loud snoring. Referral to sleep medicine for ADELINA evaluation recommended          Return in about 3 months (around 5/2/2023) for Routine preventive.    Denia Llamas NP  [facility]   Vani is a 42 year old, presenting for the following health issues:  Recheck Medication (Hasn't been taking Wellbutrin.)    Depression- she has been under a lot of stress regarding the loss of her son to suicide as well as marital issues. She is seeing a therapist. We restarted Lexapro which she ran out of for a few days and added bupropion. She acknowledges that when she works from home she tends to gets sleepy.  Her mood has been better with the addition of bupropion and she is tolerating it well.  She and her  have cut back on alcohol and have started to sleep in the same room again which has had a positive impact on their relationship. She is no longer as tearful as she was previously.     She was seen on 1//30 for a sore throat. She was started on cefdinir for rhinosinusitis. The sore throat is improving.     PHQ 8/12/2022 1/5/2023 2/2/2023   PHQ-9 Total Score 13 20 7   Q9: Thoughts of better off dead/self-harm past 2 weeks Not at all Several days Not at all   F/U: Thoughts of suicide or self-harm - No -   F/U: Safety concerns - No -         History of Present Illness       Reason for visit:  Difficulty swallowing  Symptom onset:  1-3 days ago  Symptoms include:  Painful when I swallow  Symptom intensity:  Moderate  Symptom progression:  Worsening  Had these symptoms before:  Yes  Has tried/received treatment for these symptoms:  Yes  Previous treatment was successful:  Yes  Prior treatment description:  Strep throat    She eats 0-1 servings of fruits and vegetables daily.She consumes 0 sweetened beverage(s) daily.She exercises with enough effort to increase her heart rate 9 or less minutes per day.  She exercises with enough effort to increase her heart rate 3 or less days per week. She is missing 1 dose(s) of medications per week.  She is not taking prescribed medications regularly due to remembering to take.     ______________________________________________________________________    STOPBANG ADELINA ASSESSMENT    1.  Do you snore loudly (louder than talking or loud enough to be heard through closed doors):  yes    2.  Do you often feel tired, fatigued, or sleepy during the day:  yes    3.  Has anyone observed you stop breathing during sleep:  no    4.  Do you have or are you being treated for high blood pressure:  No     5.  Body mass index > 35:  There is no height or weight on file to calculate BMI. no    6.   Age > 50:  42 year old     7.  Neck circumference greater than 40 cm:  Unable to assess     8.  Gender male:  female     Total score:  2    A score of 3 or greater indicates a risk for sleep apnea (84% sensitivity).  A score of 5 or greater is more predictive of clinically relevant moderate to severe obstructive sleep apnea.    ______________________________________________________________________            Objective         Vitals:  No vitals were obtained today due to virtual visit.    Physical Exam   GENERAL: Healthy, alert and no distress  EYES: Eyes grossly normal to inspection.  No discharge or erythema, or obvious scleral/conjunctival abnormalities.  RESP: No audible wheeze, cough, or visible cyanosis.  No visible retractions or increased work of breathing.    SKIN: Visible skin clear. No significant rash, abnormal pigmentation or lesions.  NEURO: Cranial nerves grossly intact.  Mentation and speech appropriate for age.  PSYCH: Mentation appears normal, affect normal/bright, judgement and insight intact, normal speech and appearance well-groomed.            Video-Visit Details    Type of service:  Video Visit   Video Start Time: 7:59 AM  Video End Time:8:12 AM    Originating Location (pt. Location): Home  Distant Location (provider location):  On-site  Platform used for Video Visit: Agrican

## 2023-02-06 ENCOUNTER — TELEPHONE (OUTPATIENT)
Dept: INTERNAL MEDICINE | Facility: CLINIC | Age: 43
End: 2023-02-06
Payer: COMMERCIAL

## 2023-04-12 DIAGNOSIS — E10.9 TYPE 1 DIABETES MELLITUS WITHOUT COMPLICATION (H): ICD-10-CM

## 2023-04-12 RX ORDER — INSULIN ASPART 100 [IU]/ML
INJECTION, SOLUTION INTRAVENOUS; SUBCUTANEOUS
Qty: 45 ML | Refills: 0 | Status: SHIPPED | OUTPATIENT
Start: 2023-04-12 | End: 2023-08-07

## 2023-04-12 NOTE — TELEPHONE ENCOUNTER
"Requested Prescriptions   Pending Prescriptions Disp Refills     insulin aspart (NOVOLOG FLEXPEN) 100 UNIT/ML pen [Pharmacy Med Name: NOVOLOG FLEXPEN 100UNIT/ML SOPN] 45 mL 0     Sig: USE DAILY IN PUMP, UP TO 50 UNITS ONCE DAILY       Short Acting Insulin Protocol Passed - 4/12/2023  7:23 AM        Passed - Serum creatinine on file in past 12 months     Recent Labs   Lab Test 09/06/22  1843   CR 0.71       Ok to refill medication if creatinine is low          Passed - HgbA1C in past 3 or 6 months     If HgbA1C is 8 or greater, it needs to be on file within the past 3 months.  If less than 8, must be on file within the past 6 months.     Recent Labs   Lab Test 12/02/22  1614   A1C 7.2*             Passed - Medication is active on med list        Passed - Patient is age 18 or older        Passed - Recent (6 mo) or future (30 days) visit within the authorizing provider's specialty     Patient had office visit in the last 6 months or has a visit in the next 30 days with authorizing provider or within the authorizing provider's specialty.  See \"Patient Info\" tab in inbasket, or \"Choose Columns\" in Meds & Orders section of the refill encounter.                 "

## 2023-05-08 DIAGNOSIS — E10.65 TYPE 1 DIABETES MELLITUS WITH HYPERGLYCEMIA (H): ICD-10-CM

## 2023-05-08 DIAGNOSIS — F33.2 SEVERE EPISODE OF RECURRENT MAJOR DEPRESSIVE DISORDER, WITHOUT PSYCHOTIC FEATURES (H): ICD-10-CM

## 2023-05-08 DIAGNOSIS — F41.9 ANXIETY: ICD-10-CM

## 2023-05-08 RX ORDER — INSULIN PUMP CARTRIDGE
CARTRIDGE (EA) SUBCUTANEOUS
Qty: 30 EACH | Refills: 0 | Status: SHIPPED | OUTPATIENT
Start: 2023-05-08 | End: 2023-11-17

## 2023-05-08 RX ORDER — INSULIN INFUSION SET/CARTRIDGE
COMBINATION PACKAGE (EA) MISCELLANEOUS
Qty: 10 EACH | Refills: 0 | Status: SHIPPED | OUTPATIENT
Start: 2023-05-08 | End: 2023-07-14

## 2023-05-08 NOTE — TELEPHONE ENCOUNTER
"Requested Prescriptions   Pending Prescriptions Disp Refills     Insulin Infusion Pump Supplies (AUTOSOFT XC INFUSION SET) MISC [Pharmacy Med Name: AUTOSOFT XC INF SET 6MM 23\" GREY]  0     Sig: CHANGE EVERY 3 DAYS       There is no refill protocol information for this order        Insulin Infusion Pump Supplies (T:SLIM X2 3ML CARTRIDGE) MISC [Pharmacy Med Name: TSLIM X2 3ML CARTRIDGE MISC]  0     Sig: CHANGE EVERY 3 DAYS       There is no refill protocol information for this order          "
I have reviewed and confirmed nurses' notes regarding past medical, surgical, and family history.

## 2023-05-08 NOTE — TELEPHONE ENCOUNTER
"Routing refill request to provider for review/approval because:PHQ-9    Last Written Prescription Date:  2/2/2023  Last Fill Quantity: 90,  # refills: 1   Last office visit provider:  1/30/2023     Requested Prescriptions   Pending Prescriptions Disp Refills     buPROPion (WELLBUTRIN XL) 150 MG 24 hr tablet [Pharmacy Med Name: BUPROPION HCL ER (XL) 150MG TB24] 90 tablet 1     Sig: TAKE ONE TABLET BY MOUTH EVERY MORNING       SSRIs Protocol Failed - 5/8/2023  2:19 PM        Failed - PHQ-9 score less than 5 in past 6 months     Please review last PHQ-9 score.           Passed - Medication is Bupropion     If the medication is Bupropion (Wellbutrin), and the patient is taking for smoking cessation; OK to refill.          Passed - Medication is active on med list        Passed - Patient is age 18 or older        Passed - No active pregnancy on record        Passed - No positive pregnancy test in last 12 months        Passed - Recent (6 mo) or future (30 days) visit within the authorizing provider's specialty     Patient had office visit in the last 6 months or has a visit in the next 30 days with authorizing provider or within the authorizing provider's specialty.  See \"Patient Info\" tab in inbasket, or \"Choose Columns\" in Meds & Orders section of the refill encounter.                 Barby Olguin RN 05/08/23 2:28 PM      "

## 2023-05-09 RX ORDER — BUPROPION HYDROCHLORIDE 150 MG/1
TABLET ORAL
Qty: 90 TABLET | Refills: 1 | Status: SHIPPED | OUTPATIENT
Start: 2023-05-09 | End: 2024-01-15

## 2023-05-23 ENCOUNTER — OFFICE VISIT (OUTPATIENT)
Dept: SLEEP MEDICINE | Facility: CLINIC | Age: 43
End: 2023-05-23
Attending: NURSE PRACTITIONER
Payer: COMMERCIAL

## 2023-05-23 VITALS
OXYGEN SATURATION: 98 % | DIASTOLIC BLOOD PRESSURE: 70 MMHG | WEIGHT: 140.31 LBS | BODY MASS INDEX: 23.38 KG/M2 | HEIGHT: 65 IN | HEART RATE: 60 BPM | SYSTOLIC BLOOD PRESSURE: 108 MMHG

## 2023-05-23 DIAGNOSIS — G47.30 SLEEP-RELATED BREATHING DISORDER: Primary | ICD-10-CM

## 2023-05-23 DIAGNOSIS — R06.83 SNORING: ICD-10-CM

## 2023-05-23 PROBLEM — F33.2 SEVERE EPISODE OF RECURRENT MAJOR DEPRESSIVE DISORDER, WITHOUT PSYCHOTIC FEATURES (H): Status: ACTIVE | Noted: 2018-12-04

## 2023-05-23 PROBLEM — Z63.4 GRIEF AT LOSS OF CHILD: Status: ACTIVE | Noted: 2023-01-06

## 2023-05-23 PROBLEM — E10.3293 MILD NONPROLIFERATIVE DIABETIC RETINOPATHY OF BOTH EYES WITHOUT MACULAR EDEMA ASSOCIATED WITH TYPE 1 DIABETES MELLITUS (H): Status: ACTIVE | Noted: 2019-02-13

## 2023-05-23 PROBLEM — F43.21 GRIEF AT LOSS OF CHILD: Status: ACTIVE | Noted: 2023-01-06

## 2023-05-23 PROCEDURE — 99215 OFFICE O/P EST HI 40 MIN: CPT | Performed by: NURSE PRACTITIONER

## 2023-05-23 PROCEDURE — 99417 PROLNG OP E/M EACH 15 MIN: CPT | Performed by: NURSE PRACTITIONER

## 2023-05-23 ASSESSMENT — SLEEP AND FATIGUE QUESTIONNAIRES
HOW LIKELY ARE YOU TO NOD OFF OR FALL ASLEEP WHILE SITTING INACTIVE IN A PUBLIC PLACE: WOULD NEVER DOZE
HOW LIKELY ARE YOU TO NOD OFF OR FALL ASLEEP WHEN YOU ARE A PASSENGER IN A CAR FOR AN HOUR WITHOUT A BREAK: MODERATE CHANCE OF DOZING
HOW LIKELY ARE YOU TO NOD OFF OR FALL ASLEEP WHILE SITTING AND TALKING TO SOMEONE: WOULD NEVER DOZE
HOW LIKELY ARE YOU TO NOD OFF OR FALL ASLEEP IN A CAR, WHILE STOPPED FOR A FEW MINUTES IN TRAFFIC: SLIGHT CHANCE OF DOZING
HOW LIKELY ARE YOU TO NOD OFF OR FALL ASLEEP WHILE SITTING QUIETLY AFTER LUNCH WITHOUT ALCOHOL: MODERATE CHANCE OF DOZING
HOW LIKELY ARE YOU TO NOD OFF OR FALL ASLEEP WHILE WATCHING TV: SLIGHT CHANCE OF DOZING
HOW LIKELY ARE YOU TO NOD OFF OR FALL ASLEEP WHILE SITTING AND READING: MODERATE CHANCE OF DOZING
HOW LIKELY ARE YOU TO NOD OFF OR FALL ASLEEP WHILE LYING DOWN TO REST IN THE AFTERNOON WHEN CIRCUMSTANCES PERMIT: HIGH CHANCE OF DOZING

## 2023-05-23 NOTE — PATIENT INSTRUCTIONS
"Medicare and Medicaid patients starting on CPAP or biPAP on or after 5/12/2023  Medicare patients should schedule an IN PERSON CLINIC appointment to provide your CPAP/biPAP usage data to a provider within 90 days of starting CPAP  Virtual visits are no longer allowed for this visit for Medicare                MY TREATMENT INFORMATION FOR SLEEP APNEA-  Vani Corona    DOCTOR : JUSTIN Pan CNP    Am I having a sleep study at a sleep center?  --->Due to normal delays, you will be contacted within 2-4 weeks to schedule    Am I having a home sleep study?  --->Watch the video for the device you are using:    -/drop off device-   https://www.AQH.com/watch?v=yGGFBdELGhk    -Disposable device sent out require phone/computer application-   https://www.AQH.com/watch?v=BCce_vbiwxE      Frequently asked questions:  1. What is Obstructive Sleep Apnea (ADELINA)? ADELINA is the most common type of sleep apnea. Apnea means, \"without breath.\"  Apnea is most often caused by narrowing or collapse of the upper airway as muscles relax during sleep.   Almost everyone has occasional apneas. Most people with sleep apnea have had brief interruptions at night frequently for many years.  The severity of sleep apnea is related to how frequent and severe the events are.   2. What are the consequences of ADELINA? Symptoms include: feeling sleepy during the day, snoring loudly, gasping or stopping of breathing, trouble sleeping, and occasionally morning headaches or heartburn at night.  Sleepiness can be serious and even increase the risk of falling asleep while driving. Other health consequences may include development of high blood pressure and other cardiovascular disease in persons who are susceptible. Untreated ADELINA  can contribute to heart disease, stroke and diabetes.   3. What are the treatment options? In most situations, sleep apnea is a lifelong disease that must be managed with daily therapy. Medications are not " effective for sleep apnea and surgery is generally not considered until other therapies have been tried. Your treatment is your choice . Continuous Positive Airway (CPAP) works right away and is the therapy that is effective in nearly everyone. An oral device to hold your jaw forward is usually the next most reliable option. Other options include postioning devices (to keep you off your back), weight loss, and surgery including a tongue pacing device. There is more detail about some of these options below.  4. Are my sleep studies covered by insurance? Although we will request verification of coverage, we advise you also check in advance of the study to ensure there is coverage.    Important tips for those choosing CPAP and similar devices  For new devices, sign up for device GARETH to monitor your device for your followup visits  We encourage you to utilize the [employer] gareth or website (myAir web (resmed.com) ) to monitor your therapy progress and share the data with your healthcare team when you discuss your sleep apnea.                                                    Know your equipment:  CPAP is continuous positive airway pressure that prevents obstructive sleep apnea by keeping the throat from collapsing while you are sleeping. In most cases, the device is  smart  and can slowly self-adjusts if your throat collapses and keeps a record every day of how well you are treated-this information is available to you and your care team.  BPAP is bilevel positive airway pressure that keeps your throat open and also assists each breath with a pressure boost to maintain adequate breathing.  Special kinds of BPAP are used in patients who have inadequate breathing from lung or heart disease. In most cases, the device is  smart  and can slowly self-adjusts to assist breathing. Like CPAP, the device keeps a record of how well you are treated.  Your mask is your connection to the device. You get to choose what feels most  comfortable and the staff will help to make sure if fits. Here: are some examples of the different masks that are available:       Key points to remember on your journey with sleep apnea:  Sleep study.  PAP devices often need to be adjusted during a sleep study to show that they are effective and adjusted right.  Good tips to remember: Try wearing just the mask during a quiet time during the day so your body adapts to wearing it. A humidifier is recommended for comfort in most cases to prevent drying of your nose and throat. Allergy medication from your provider may help you if you are having nasal congestion.  Getting settled-in. It takes more than one night for most of us to get used to wearing a mask. Try wearing just the mask during a quiet time during the day so your body adapts to wearing it. A humidifier is recommended for comfort in most cases. Our team will work with you carefully on the first day and will be in contact within 4 days and again at 2 and 4 weeks for advice and remote device adjustments. Your therapy is evaluated by the device each day.   Use it every night. The more you are able to sleep naturally for 7-8 hours, the more likely you will have good sleep and to prevent health risks or symptoms from sleep apnea. Even if you use it 4 hours it helps. Occasionally all of us are unable to use a medical therapy, in sleep apnea, it is not dangerous to miss one night.   Communicate. Call our skilled team on the number provided on the first day if your visit for problems that make it difficult to wear the device. Over 2 out of 3 patients can learn to wear the device long-term with help from our team. Remember to call our team or your sleep providers if you are unable to wear the device as we may have other solutions for those who cannot adapt to mask CPAP therapy. It is recommended that you sleep your sleep provider within the first 3 months and yearly after that if you are not having problems.   Use it  for your health. We encourage use of CPAP masks during daytime quiet periods to allow your face and brain to adapt to the sensation of CPAP so that it will be a more natural sensation to awaken to at night or during naps. This can be very useful during the first few weeks or months of adapting to CPAP though it does not help medically to wear CPAP during wakefulness and  should not be used as a strategy just to meet guidelines.  Take care of your equipment. Make sure you clean your mask and tubing using directions every day and that your filter and mask are replaced as recommended or if they are not working.     BESIDES CPAP, WHAT OTHER THERAPIES ARE THERE?    Positioning Device  Positioning devices are generally used when sleep apnea is mild and only occurs on your back.This example shows a pillow that straps around the waist. It may be appropriate for those whose sleep study shows milder sleep apnea that occurs primarily when lying flat on one's back. Preliminary studies have shown benefit but effectiveness at home may need to be verified by a home sleep test. These devices are generally not covered by medical insurance.  Examples of devices that maintain sleeping on the back to prevent snoring and mild sleep apnea.    Belt type body positioner  http://Hyperic[facility]/    Electronic reminder  http://nightshifttherapy.com/            Oral Appliance  What is oral appliance therapy?  An oral appliance device fits on your teeth at night like a retainer used after having braces. The device is made by a specialized dentist and requires several visits over 1-2 months before a manufactured device is made to fit your teeth and is adjusted to prevent your sleep apnea. Once an oral device is working properly, snoring should be improved. A home sleep test may be recommended at that time if to determine whether the sleep apnea is adequately treated.       Some things to remember:  -Oral devices are often, but not always, covered  by your medical insurance. Be sure to check with your insurance provider.   -If you are referred for oral therapy, you will be given a list of specialized dentists to consider or you may choose to visit the Web site of the American Academy of Dental Sleep Medicine  -Oral devices are less likely to work if you have severe sleep apnea or are extremely overweight.     More detailed information  An oral appliance is a small acrylic device that fits over the upper and lower teeth  (similar to a retainer or a mouth guard). This device slightly moves jaw forward, which moves the base of the tongue forward, opens the airway, improves breathing for effective treat snoring and obstructive sleep apnea in perhaps 7 out of 10 people .  The best working devices are custom-made by a dental device  after a mold is made of the teeth 1, 2, 3.  When is an oral appliance indicated?  Oral appliance therapy is recommended as a first-line treatment for patients with primary snoring, mild sleep apnea, and for patients with moderate sleep apnea who prefer appliance therapy to use of CPAP4, 5. Severity of sleep apnea is determined by sleep testing and is based on the number of respiratory events per hour of sleep.   How successful is oral appliance therapy?  The success rate of oral appliance therapy in patients with mild sleep apnea is 75-80% while in patients with moderate sleep apnea it is 50-70%. The chance of success in patients with severe sleep apnea is 40-50%. The research also shows that oral appliances have a beneficial effect on the cardiovascular health of ADELINA patients at the same magnitude as CPAP therapy7.  Oral appliances should be a second-line treatment in cases of severe sleep apnea, but if not completely successful then a combination therapy utilizing CPAP plus oral appliance therapy may be effective. Oral appliances tend to be effective in a broad range of patients although studies show that the patients who  have the highest success are females, younger patients, those with milder disease, and less severe obesity. 3, 6.   Finding a dentist that practices dental sleep medicine  Specific training is available through the American Academy of Dental Sleep Medicine for dentists interested in working in the field of sleep. To find a dentist who is educated in the field of sleep and the use of oral appliances, near you, visit the Web site of the American Academy of Dental Sleep Medicine.    References  1. Sussy, et al. Objectively measured vs self-reported compliance during oral appliance therapy for sleep-disordered breathing. Chest 2013; 144(5): 6968-7411.  2. Natalie, et al. Objective measurement of compliance during oral appliance therapy for sleep-disordered breathing. Thorax 2013; 68(1): 91-96.  3. Ariela et al. Mandibular advancement devices in 620 men and women with ADELINA and snoring: tolerability and predictors of treatment success. Chest 2004; 125: 1123-5147.  4. Rin, et al. Oral appliances for snoring and ADELINA: a review. Sleep 2006; 29: 244-262.  5. Phil et al. Oral appliance treatment for ADELINA: an update. J Clin Sleep Med 2014; 10(2): 215-227.  6. Henrietta et al. Predictors of OSAH treatment outcome. J Dent Res 2007; 86: 7947-0408.      Weight Loss:    Weight loss is a long-term strategy that may improve sleep apnea in some patients.    Weight management is a personal decision and the decision should be based on your interest and the potential benefits.  If you are interested in exploring weight loss strategies, the following discussion covers the impact on weight loss on sleep apnea and the approaches that may be successful.    Being overweight does not necessarily mean you will have health consequences.  Those who have BMI over 35 or over 27 with existing medical conditions carries greater risk.   Weight loss decreases severity of sleep apnea in most people with obesity. For those with mild  obesity who have developed snoring with weight gain, even 15-30 pound weight loss can improve and occasionally eliminate sleep apnea.  Structured and life-long dietary and health habits are necessary to lose weight and keep healthier weight levels.     Though there may be significant health benefits from weight loss, long-term weight loss is very difficult to achieve- studies show success with dietary management in less than 10% of people. In addition, substantial weight loss may require years of dietary control and may be difficult if patients have severe obesity. In these cases, surgical management may be considered.  Finally, older individuals who have tolerated obesity without health complications may be less likely to benefit from weight loss strategies.      [unfilled]    Surgery:    Surgery for obstructive sleep apnea is considered generally only when other therapies fail to work. Surgery may be discussed with you if you are having a difficult time tolerating CPAP and or when there is an abnormal structure that requires surgical correction.  Nose and throat surgeries often enlarge the airway to prevent collapse.  Most of these surgeries create pain for 1-2 weeks and up to half of the most common surgeries are not effective throughout life.  You should carefully discuss the benefits and drawbacks to surgery with your sleep provider and surgeon to determine if it is the best solution for you.   More information  Surgery for ADELINA is directed at areas that are responsible for narrowing or complete obstruction of the airway during sleep.  There are a wide range of procedures available to enlarge and/or stabilize the airway to prevent blockage of breathing in the three major areas where it can occur: the palate, tongue, and nasal regions.  Successful surgical treatment depends on the accurate identification of the factors responsible for obstructive sleep apnea in each person.  A personalized approach is required  because there is no single treatment that works well for everyone.  Because of anatomic variation, consultation with an examination by a sleep surgeon is a critical first step in determining what surgical options are best for each patient.  In some cases, examination during sedation may be recommended in order to guide the selection of procedures.  Patients will be counseled about risks and benefits as well as the typical recovery course after surgery. Surgery is typically not a cure for a person s ADELINA.  However, surgery will often significantly improve one s ADELINA severity (termed  success rate ).  Even in the absence of a cure, surgery will decrease the cardiovascular risk associated with OSA7; improve overall quality of life8 (sleepiness, functionality, sleep quality, etc).      Palate Procedures:  Patients with ADELINA often have narrowing of their airway in the region of their tonsils and uvula.  The goals of palate procedures are to widen the airway in this region as well as to help the tissues resist collapse.  Modern palate procedure techniques focus on tissue conservation and soft tissue rearrangement, rather than tissue removal.  Often the uvula is preserved in this procedure. Residual sleep apnea is common in patient after pharyngoplasty with an average reduction in sleep apnea events of 33%2.      Tongue Procedures:  ExamWhile patients are awake, the muscles that surround the throat are active and keep this region open for breathing. These muscles relax during sleep, allowing the tongue and other structures to collapse and block breathing.  There are several different tongue procedures available.  Selection of a tongue base procedure depends on characteristics seen on physical exam.  Generally, procedures are aimed at removing bulky tissues in this area or preventing the back of the tongue from falling back during sleep.  Success rates for tongue surgery range from 50-62%3.    Hypoglossal Nerve  Stimulation:  Hypoglossal nerve stimulation has recently received approval from the [place] Food and Drug Administration for the treatment of obstructive sleep apnea.  This is based on research showing that the system was safe and effective in treating sleep apnea6.  Results showed that the median AHI score decreased 68%, from 29.3 to 9.0. This therapy uses an implant system that senses breathing patterns and delivers mild stimulation to airway muscles, which keeps the airway open during sleep.  The system consists of three fully implanted components: a small generator (similar in size to a pacemaker), a breathing sensor, and a stimulation lead.  Using a small handheld remote, a patient turns the therapy on before bed and off upon awakening.    Candidates for this device must be greater than 18 years of age, have moderate to severe ADELINA (AHI between 15-65), BMI less than 35, have tried CPAP/oral appliance for at least 8 weeks without success, and have appropriate upper airway anatomy (determined by a sleep endoscopy performed by Dr. Reynaldo Ferrer).    Hypoglossal Nerve Stimulation Pathway:    The sleep surgeon s office will work with the patient through the insurance prior-authorization process (including communications and appeals).    Nasal Procedures:  Nasal obstruction can interfere with nasal breathing during the day and night.  Studies have shown that relief of nasal obstruction can improve the ability of some patients to tolerate positive airway pressure therapy for obstructive sleep apnea1.  Treatment options include medications such as nasal saline, topical corticosteroid and antihistamine sprays, and oral medications such as antihistamines or decongestants. Non-surgical treatments can include external nasal dilators for selected patients. If these are not successful by themselves, surgery can improve the nasal airway either alone or in combination with these other options.      Combination  Procedures:  Combination of surgical procedures and other treatments may be recommended, particularly if patients have more than one area of narrowing or persistent positional disease.  The success rate of combination surgery ranges from 66-80%2,3.    References  Selwyn HARRIS. The Role of the Nose in Snoring and Obstructive Sleep Apnoea: An Update.  Eur Arch Otorhinolaryngol. 2011; 268: 1365-73.   Ralf SM; Alison JA; Aliyah JR; Pallanch JF; Debra MB; Orin SG; Linda ALMAGUER. Surgical modifications of the upper airway for obstructive sleep apnea in adults: a systematic review and meta-analysis. SLEEP 2010;33(10):0935-3003. Deepali GRANGER. Hypopharyngeal surgery in obstructive sleep apnea: an evidence-based medicine review.  Arch Otolaryngol Head Neck Surg. 2006 Feb;132(2):206-13.  Alber YH1, Phyllis Y, Junior SAMANTHA. The efficacy of anatomically based multilevel surgery for obstructive sleep apnea. Otolaryngol Head Neck Surg. 2003 Oct;129(4):327-35.  Kezirian E, Goldberg A. Hypopharyngeal Surgery in Obstructive Sleep Apnea: An Evidence-Based Medicine Review. Arch Otolaryngol Head Neck Surg. 2006 Feb;132(2):206-13.  Radha PJ et al. Upper-Airway Stimulation for Obstructive Sleep Apnea.  N Engl J Med. 2014 Jan 9;370(2):139-49.  Anuel Y et al. Increased Incidence of Cardiovascular Disease in Middle-aged Men with Obstructive Sleep Apnea. Am J Respir Crit Care Med; 2002 166: 159-165  Walker EM et al. Studying Life Effects and Effectiveness of Palatopharyngoplasty (SLEEP) study: Subjective Outcomes of Isolated Uvulopalatopharyngoplasty. Otolaryngol Head Neck Surg. 2011; 144: 623-631.        WHAT IF I ONLY HAVE SNORING?    Mandibular advancement devices, lateral sleep positioning, long-term weight loss and treatment of nasal allergies have been shown to improve snoring.  Exercising tongue muscles with a game (https://apps.LocalBanya/us/gareth/soundly-reduce-snoring/ka4517465253) or stimulating the tongue during the day with a device  (https://doi.org/10.3390/pij95734771) have improved snoring in some individuals.    Remember to Drive Safe... Drive Alive     Sleep health profoundly affects your health, mood, and your safety.  Thirty three percent of the population (one in three of us) is not getting enough sleep and many have a sleep disorder. Not getting enough sleep or having an untreated / undertreated sleep condition may make us sleepy without even knowing it. In fact, our driving could be dramatically impaired due to our sleep health. As your provider, here are some things I would like you to know about driving:     Here are some warning signs for impairment and dangerous drowsy driving:              -Having been awake more than 16 hours               -Looking tired               -Eyelid drooping              -Head nodding (it could be too late at this point)              -Driving for more than 30 minutes     Some things you could do to make the driving safer if you are experiencing some drowsiness:              -Stop driving and rest              -Call for transportation              -Make sure your sleep disorder is adequately treated     Some things that have been shown NOT to work when experiencing drowsiness while driving:              -Turning on the radio              -Opening windows              -Eating any  distracting  /  entertaining  foods (e.g., sunflower seeds, candy, or any other)              -Talking on the phone      Your decision may not only impact your life, but also the life of others. Please, remember to drive safe for yourself and all of us.

## 2023-05-23 NOTE — NURSING NOTE
"Chief Complaint   Patient presents with     Consult     Sleep Problem     Sleeping too much      Fatigue       Initial /70   Pulse 60   Ht 1.639 m (5' 4.53\")   Wt 63.6 kg (140 lb 5 oz)   SpO2 98%   BMI 23.69 kg/m   Estimated body mass index is 23.69 kg/m  as calculated from the following:    Height as of this encounter: 1.639 m (5' 4.53\").    Weight as of this encounter: 63.6 kg (140 lb 5 oz).    Medication Reconciliation: complete    Neck circumference: 14 inches / 35 centimeters.    DME: n/a    Tracey Nichols ANNIE  [facility]      "

## 2023-05-23 NOTE — NURSING NOTE
Home sleep test and return visit has been scheduled. AVS handout given to patient.     Tracey Nichols [facility]

## 2023-05-23 NOTE — PROGRESS NOTES
Outpatient Sleep Medicine Consultation:      Name: Vani Corona MRN# [id number]   Age: 42 year old YOB: 1980     Date of Consultation: May 23, 2023  Consultation is requested by: Denia Llamas NP  [address]  [place] [address] Denia Llamas  Primary care provider: Denia Llamas       Reason for Sleep Consult:     Vani Corona is sent by Denia Llamas for a sleep consultation regarding snoring    Patient s Reason for visit  Vani Corona main reason for visit: Fatigue  Patient states problem(s) started: A year ago  Vani Corona's goals for this visit:             Assessment and Plan:     Summary Sleep Diagnoses:  Sleep-related breathing disorder  Snort arousals  Nonrestorative sleep  Fatigue  Excessive daytime sleepiness  Nocturnal GERD    Comorbid Diagnoses:  Anxiety  Diabetes mellitus, type I for albuminuria  Recurrent major depressive disorder  Hypothyroidism  GERD   Selective IgA immunodeficiency  Anxiety  Nonrheumatic mitral regurgitation  Methamphetamine abuse in remission since       Summary Recommendations:  Orders Placed This Encounter   Procedures     HST-Home Sleep Apnea Test - Noxturnal Returnable   1.  Recommend patient undergo sleep study.  Patient has a STOP-BANG score of 4/8 indicating a higher pretest probability for obstructive sleep apnea.  2.  Recommend patient return to clinic approximately 2 weeks after completion of sleep study to discuss results as well as to plan her care collaboratively going forward.  3.  Recommend patient review her sleep hygiene practices for areas/methods of improvement to mitigate any further sleep disturbances.  4.  Recommend patient employ safe driving practices including not driving the car if she is drowsy.      Summary Counselin.  Discussed pathophysiology of central sleep apnea as well as pathophysiology of obstructive sleep apnea  2.  Discussed implications of untreated sleep apnea with reference to her medical  diagnoses  3.  Discussed process of obtaining HST  4.  Discussed all treatment measures of sleep apnea including PAP therapy, mandibular advancement device, surgical options.  5.  Discussed positive sleep hygiene practices and methods of improvement she may employ to mitigate any further sleep disturbances    Medical Decision-making:   Educational materials provided in instructions    Total time spent reviewing medical records, history and physical examination, review of previous testing and interpretation as well as documentation on this date: 60 minutes    CC: Denia Llamas          History of Present Illness:     Vani Corona is a very pleasant premenopausal female who presents to the sleep medicine clinic upon the advice of her medical provider, Denia Llamas.  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  She does not feel she has insomnia per se, but she is not happy with her sleep pattern.  She feels that her fatigue began approximately a year ago.  She admits that she is able to obtain with a very short sleep latency time, but does awaken shortly afterward with snort arousals, and the need to eliminate.  Although she does not have much issue with returning to sleep she does suffer from excessive daytime sleepiness.  She naps every day if she is able with each nap lasting between 1-2 hours.  She admits she does not feel better after napping.  She does awaken with morning headaches, dry mouth, nasal congestion, suffers from nocturnal GERD, frequent nocturnal leg movements.    We did discuss her sleep hygiene habits and that her last caffeine intake and at 2 PM and she is likely to have alcohol up to bedtime.  Suggested that she cut caffeine out of her diet however that she cut the time back a few hours.  Also that she consume alcohol at least 2 hours prior to bedtime.  Those simple adjustments may help her  obtain better quality sleep.    Vani has a STOP-BANG score of 4/8 indicating an increased pretest probability for obstructive sleep apnea.  For that reason we will obtain a home sleep test.  This will be followed by a 2-week follow-up appointment in clinic or virtually per her choice where we will discuss her results and plan her care collaboratively.      Past Sleep Evaluations: None    SLEEP-WAKE SCHEDULE:     Work/School Days: Patient goes to school/work: Yes   Usually gets into bed at 10pm  Takes patient about Right away to fall asleep  Has trouble falling asleep I don t nights per week  Wakes up in the middle of the night Once times.  Wakes up due to Snorting self awake;External stimuli (bed partner, pets, noise, etc);Use the bathroom  She has trouble falling back asleep 0 times a week.   It usually takes 0 to get back to sleep  Patient is usually up at 6:20  Uses alarm: Yes    Weekends/Non-work Days/All Other Days:  Usually gets into bed at 10   Takes patient about NA to fall asleep  Patient is usually up at Noon  Uses alarm: No    Sleep Need  Patient gets  too much sleep on average   Patient thinks she needs about 7 hours sleep    Vani Corona prefers to sleep in this position(s): Side   Patient states they do the following activities in bed:      Naps  Patient takes a purposeful nap everyday if possible times a week and naps are usually 1 hour or 2 in duration  She feels better after a nap: No  She dozes off unintentionally na days per week  Patient has had a driving accident or near-miss due to sleepiness/drowsiness: No      SLEEP DISRUPTIONS:    Breathing/Snoring  Patient snores:Yes  Other people complain about her snoring: Yes  Patient has been told she stops breathing in her sleep:No  She has issues with the following: Morning headaches;Morning mouth dryness;Stuffy nose when you wake up;Heartburn or reflux at night    Movement:  Patient gets pain, discomfort, with an urge to move:  Yes  It happens  when she is resting:  Yes  It happens more at night:  Yes  Patient has been told she kicks her legs at night:  No     Behaviors in Sleep:  Vani Corona has experienced the following behaviors while sleeping:    She has experienced sudden muscle weakness during the day: No      Is there anything else you would like your sleep provider to know:          CAFFEINE AND OTHER SUBSTANCES:    Patient consumes caffeinated beverages per day:  1 every other day  Last caffeine use is usually: 2pm  List of any prescribed or over the counter stimulants that patient takes:    List of any prescribed or over the counter sleep medication patient takes:    List of previous sleep medications that patient has tried: trazodone  Patient drinks alcohol to help them sleep: No  Patient drinks alcohol near bedtime: Yes    Family History:  Patient has a family member been diagnosed with a sleep disorder: No            SCALES:    EPWORTH SLEEPINESS SCALE         5/23/2023     8:45 AM    [place] Sleepiness Scale ( ZEKE Garcia  [id number]<br>ESS - [place]/English - Final version - 21 Nov 07 - [facility] [place].)   Sitting and reading Moderate chance of dozing   Watching TV Slight chance of dozing   Sitting, inactive in a public place (e.g. a theatre or a meeting) Would never doze   As a passenger in a car for an hour without a break Moderate chance of dozing   Lying down to rest in the afternoon when circumstances permit High chance of dozing   Sitting and talking to someone Would never doze   Sitting quietly after a lunch without alcohol Moderate chance of dozing   In a car, while stopped for a few minutes in traffic Slight chance of dozing   K[place]en Score (MC) 11   Killeen Score (Sleep) 11         INSOMNIA SEVERITY INDEX (APRIL)          5/23/2023     8:31 AM   Insomnia Severity Index (APRIL)   Difficulty falling asleep 0   Difficulty staying asleep 0   Problems waking up too early 0   How SATISFIED/DISSATISFIED are you with your CURRENT sleep  pattern? 3   How NOTICEABLE to others do you think your sleep problem is in terms of impairing the quality of your life? 4   How WORRIED/DISTRESSED are you about your current sleep problem? 2   To what extent do you consider your sleep problem to INTERFERE with your daily functioning (e.g. daytime fatigue, mood, ability to function at work/daily chores, concentration, memory, mood, etc.) CURRENTLY? 3   APRIL Total Score 12       Guidelines for Scoring/Interpretation:  Total score categories:  0-7 = No clinically significant insomnia   8-14 = Subthreshold insomnia   15-21 = Clinical insomnia (moderate severity)  22-28 = Clinical insomnia (severe)  Used via courtesy of www.[place].va.gov with permission from Ken Garrett PhD., [facility]      STOP BANG         5/23/2023     8:46 AM   STOP BANG Questionnaire (  2008, the American Society of Anesthesiologists, Inc. [employer]   1. Snoring - Do you snore loudly (louder than talking or loud enough to be heard through closed doors)? Yes   2. Tired - Do you often feel tired, fatigued, or sleepy during daytime? Yes   3. Observed - Has anyone observed you stop breathing during your sleep? No   4. Blood pressure - Do you have or are you being treated for high blood pressure? No   5. BMI - BMI more than 35 kg/m2? No   6. Age - Age over 50 yr old? No   7. Neck circumference - Neck circumference greater than 40 cm? No   8. Gender - Gender male? No   STOP BANG Score (MC): 3 (High risk of ADELINA)         GAD7        5/5/2022     3:24 PM   PHOEBE-7    1. Feeling nervous, anxious, or on edge 1   2. Not being able to stop or control worrying 0   3. Worrying too much about different things 0   4. Trouble relaxing 1   5. Being so restless that it is hard to sit still 0   6. Becoming easily annoyed or irritable 1   7. Feeling afraid, as if something awful might happen 0   PHOEBE-7 Total Score 3   If you checked any problems, how difficult have they made it for  "you to do your work, take care of things at home, or get along with other people? Somewhat difficult         CAGE-AID         View : No data to display.                CAGE-AID reprinted with permission from the [facility] Journal, REMBERTO Jamil. and CARMEN King, \"Conjoint screening questionnaires for alcohol and drug abuse\" [place] Medical Journal 94: 135-140, 1995.      PATIENT HEALTH QUESTIONNAIRE-9 (PHQ - 9)        2/2/2023     8:04 AM   PHQ-9 ([employer])   1.  Little interest or pleasure in doing things 1   2.  Feeling down, depressed, or hopeless 1   3.  Trouble falling or staying asleep, or sleeping too much 1   4.  Feeling tired or having little energy 1   5.  Poor appetite or overeating 3   6.  Feeling bad about yourself - or that you are a failure or have let yourself or your family down 0   7.  Trouble concentrating on things, such as reading the newspaper or watching television 0   8.  Moving or speaking so slowly that other people could have noticed. Or the opposite - being so fidgety or restless that you have been moving around a lot more than usual 0   9.  Thoughts that you would be better off dead, or of hurting yourself in some way 0   PHQ-9 Total Score 7   If you checked off any problems, how difficult have these problems made it for you to do your work, take care of things at home, or get along with other people? Somewhat difficult   6.  Feeling bad about yourself 0   7.  Trouble concentrating 0   8.  Moving slowly or restless 0   9.  Suicidal or self-harm thoughts 0   Difficulty at work, home, or with people Somewhat difficult       Developed by Hayley Gupta, Carol Jaimes, David Abel and colleagues, with an educational kishore from [employer] No permission required to reproduce, translate, display or distribute.        Allergies:    Allergies   Allergen Reactions     Amoxicillin-Pot Clavulanate Nausea and Vomiting and Headache     Varenicline Other (See Comments)     \"I took " "the Chantix and that made me a (severe) crazy person.\"  Emotional disturbance       Venlafaxine Nausea     Went away when venlafaxine stopped.  Did not rechallenge. 1/18/16     No Known Allergies      Fluconazole Rash       Medications:    Current Outpatient Medications   Medication Sig Dispense Refill     ACCU-CHEK GUIDE test strip USE TO TEST 6 TIMES PER DAY       albuterol (PROAIR HFA/PROVENTIL HFA/VENTOLIN HFA) 108 (90 Base) MCG/ACT inhaler Inhale 2 puffs into the lungs every 6 hours 18 g 0     buPROPion (WELLBUTRIN XL) 150 MG 24 hr tablet TAKE ONE TABLET BY MOUTH EVERY MORNING 90 tablet 1     cetirizine (ZYRTEC) 10 MG tablet TAKE ONE TABLET BY MOUTH ONCE DAILY 90 tablet 3     Continuous Blood Gluc Sensor (DEXCOM G6 SENSOR) MISC CHANGE EVERY 10 DAYS 9 each 1     Continuous Blood Gluc Transmit (DEXCOM G6 TRANSMITTER) MISC CHANGE EVERY 3 MONTHS. 1 each 1     escitalopram (LEXAPRO) 20 MG tablet Take 1 tablet (20 mg) by mouth daily 90 tablet 1     insulin aspart (NOVOLOG FLEXPEN) 100 UNIT/ML pen USE DAILY IN PUMP, UP TO 50 UNITS ONCE DAILY 45 mL 0     Insulin Infusion Pump (T: SLIM X2 INS /CONTROL 7.4) JESSICA        Insulin Infusion Pump Supplies (AUTOSOFT XC INFUSION SET) MISC CHANGE EVERY 3 DAYS 10 each 0     Insulin Infusion Pump Supplies (T:SLIM X2 3ML CARTRIDGE) MISC CHANGE EVERY 3 DAYS 30 each 0     ketoconazole (NIZORAL) 2 % external cream Apply topically 2 times daily 30 g 0     levothyroxine (SYNTHROID/LEVOTHROID) 137 MCG tablet One tablet daily. 90 tablet 3     lisinopril (ZESTRIL) 2.5 MG tablet Take 1 tablet (2.5 mg) by mouth daily 90 tablet 3     pantoprazole (PROTONIX) 40 MG EC tablet Take 1 tablet (40 mg) by mouth every morning 90 tablet 3     SUMAtriptan (IMITREX) 25 MG tablet Take 1 tablet (25 mg) by mouth at onset of headache for migraine May repeat in 2 hours. Max 8 tablets/24 hours. Due for appointment in [place] 15 tablet 0     NOVOLOG VIAL 100 UNIT/ML soln  (Patient not taking: Reported on " 5/23/2023)       ondansetron (ZOFRAN) 4 MG tablet Take 1 tablet (4 mg) by mouth every 8 hours as needed for nausea (Patient not taking: Reported on 5/23/2023) 30 tablet 0     traZODone (DESYREL) 50 MG tablet Take 0.5-1 tablets (25-50 mg) by mouth At Bedtime (Patient not taking: Reported on 5/23/2023) 90 tablet 0       Problem List:  Patient Active Problem List    Diagnosis Date Noted     Cough, r/t COVID-19 infection 7/2022 08/08/2022     Priority: Medium     Selective IgA immunodeficiency (H) 06/03/2022     Priority: Medium     Noted on celiac screening test.       Severe episode of recurrent major depressive disorder, without psychotic features (H) 06/03/2022     Priority: Medium     History of ileostomy 08/26/2021     Priority: Medium     Hypothyroidism, unspecified type 08/26/2021     Priority: Medium     Formatting of this note might be different from the original.  Created by Conversion    Replacement Utility updated for latest IMO load       Tobacco abuse 08/26/2021     Priority: Medium     Skin mass 05/04/2021     Priority: Medium     Formatting of this note might be different from the original.  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  Created by Conversion       Non-rheumatic mitral regurgitation 09/22/2016     Priority: Medium     Insomnia 02/15/2016     Priority: Medium     Anxiety 08/31/2015     Priority: Medium     Methamphetamine abuse in remission (H) 04/10/2015     Priority: Medium     Formatting of this note might be different from the original.  Treatment 1/2014. Clean since.       Type 1 diabetes mellitus with mild  nonproliferative retinopathy without macular edema (H) 06/15/2004     Priority: Medium        Past Medical/Surgical History:  Past Medical History:   Diagnosis Date     Anxiety      Asthma      Bronchitis, not specified as acute or chronic      Chest pain      Depression      Diabetes (H)      Fainting      Heart disease     MVP     IDDM     dx      Methamphetamine abuse in remission (H) 4/10/2015    Treatment 2014. Clean since.      Mitral valve prolapse      Mitral valve prolapse      Rheumatoid arthritis (H)      Rheumatoid arthritis (H)      Substance abuse (H)     methamphetamine     Thyroid disease      Unspecified keratitis      Past Surgical History:   Procedure Laterality Date     BIOPSY CERVICAL, LOCAL EXCISION, SINGLE/MULTIPLE        SECTION       EXCISE LESION UPPER EXTREMITY Left 2021    Procedure: EXCISION, LESION, UPPER EXTREMITY;  Surgeon: Babs Leiva MD;  Location: [facility];  Service: General     HC REVISE MEDIAN N/CARPAL TUNNEL SURG      Description: Neuroplasty Decompression Median Nerve At Carpal Tunnel;  Recorded: 10/03/2014;     HERNIA REPAIR  2010     HERNIA REPAIR       HERNIORRHAPHY, UMBILICAL, ROBOT-ASSISTED, LAPAROSCOPIC, USING DA STANISLAW XI N/A 2022    Procedure: HERNIORRHAPHY, INCISIONAL UMBILICAL, ROBOT-ASSISTED, LAPAROSCOPIC, USING DA STANISLAW XI;  Surgeon: Jordan Horvath DO;  Location: [facility] [place]     LAPAROSCOPIC CHOLECYSTECTOMY N/A 2019    Procedure: CHOLECYSTECTOMY, LAPAROSCOPIC;  Surgeon: Rinku Doran MD;  Location: [facility] [place];  Service: General     LEAP and Coloposcopy        [facility]      Description:  Section;  Recorded: 2010;  Comments: 09 - breech     ZZHC REPAIR UMBILICAL RUTH ANN,<4Y/O,REDUC      Description: Umbilical Hernia Repair;  Recorded: 10/03/2014;       Social History:  Social History     Socioeconomic History      Marital status: Single     Spouse name: Not on file     Number of children: 1     Years of education: 14     Highest education level: Not on file   Occupational History     Occupation: Pharmacy Tech   Tobacco Use     Smoking status: Every Day     Packs/day: 0.50     Types: Cigarettes     Last attempt to quit: 2017     Years since quittin.8     Smokeless tobacco: Never     Tobacco comments:     No smoked since 2021   Vaping Use     Vaping status: Not on file   Substance and Sexual Activity     Alcohol use: Yes     Comment: Alcoholic Drinks/day: Occ     Drug use: No     Types: Methamphetamines     Comment: Drug use: Off 2 years, 1 month and 19 days as of 3/16/16     Sexual activity: Yes     Partners: Male     Birth control/protection: Pill, Implant   Other Topics Concern      Service Not Asked     Blood Transfusions Not Asked     Caffeine Concern Not Asked     Occupational Exposure Not Asked     Hobby Hazards Not Asked     Sleep Concern Not Asked     Stress Concern Not Asked     Weight Concern Not Asked     Special Diet Not Asked     Back Care Not Asked     Exercise Not Asked     Bike Helmet Not Asked     Seat Belt Not Asked     Self-Exams Not Asked     Parent/sibling w/ CABG, MI or angioplasty before 65F 55M? Not Asked   Social History Narrative     Not on file     Social Determinants of Health     Financial Resource Strain: Not on file   Food Insecurity: Not on file   Transportation Needs: Not on file   Physical Activity: Not on file   Stress: Not on file   Social Connections: Not on file   Intimate Partner Violence: Not on file   Housing Stability: Not on file       Family History:  Family History   Problem Relation Age of Onset     Diabetes Paternal Aunt      Diabetes Other         maternal cousin and paternal cousin     Hypertension Paternal Grandmother      Cerebrovascular Disease Paternal Grandmother      Thyroid Disease Mother      Other - See Comments Father         Father abuses  "multiple drugs.     No Known Problems Sister      No Known Problems Daughter      No Known Problems Son      No Known Problems Other      No Known Problems Other        Review of Systems:  A complete review of systems reviewed by me is negative with the exeption of what has been mentioned in the history of present illness.  In the last TWO WEEKS have you experienced any of the following symptoms?  Fevers: No  Night Sweats: Yes  Weight Gain: Yes  Pain at Night: No  Double Vision: No  Changes in Vision: Yes  Difficulty Breathing through Nose: No  Sore Throat in Morning: Yes  Dry Mouth in the Morning: Yes  Shortness of Breath Lying Flat: No  Shortness of Breath With Activity: Yes  Awakening with Shortness of Breath: No  Increased Cough: No  Heart Racing at Night: No  Swelling in Feet or Legs: No  Diarrhea at Night: No  Heartburn at Night: Yes  Urinating More than Once at Night: No  Losing Control of Urine at Night: No  Joint Pains at Night: No  Headaches in Morning: Yes  Weakness in Arms or Legs: No  Depressed Mood: Yes  Anxiety: Yes     Physical Examination:  Vitals: /70   Pulse 60   Ht 1.639 m (5' 4.53\")   Wt 63.6 kg (140 lb 5 oz)   SpO2 98%   BMI 23.69 kg/m    BMI= Body mass index is 23.69 kg/m .       Physical Exam  Vitals and nursing note reviewed.   Constitutional:       General: She is not in acute distress.     Appearance: Normal appearance. She is normal weight. She is not ill-appearing or toxic-appearing.   HENT:      Head: Normocephalic and atraumatic.      Right Ear: External ear normal.      Left Ear: External ear normal.      Nose: Nose normal.      Mouth/Throat:      Mouth: Mucous membranes are moist.      Pharynx: Oropharynx is clear.   Eyes:      Conjunctiva/sclera: Conjunctivae normal.   Neck:      Thyroid: Thyroid normal. No thyromegaly.      Vascular: No JVD.   Cardiovascular:      Rate and Rhythm: Normal rate and regular rhythm.      Pulses: Normal pulses.      Heart sounds: Normal heart " sounds.   Pulmonary:      Effort: Pulmonary effort is normal.      Breath sounds: Normal breath sounds. No wheezing or rales.   Chest:      Chest wall: No tenderness.   Musculoskeletal:         General: Normal range of motion.      Cervical back: Normal range of motion and neck supple.   Lymphadenopathy:      Cervical: No neck adenopathy.   Skin:     General: Skin is warm and dry.      Capillary Refill: Capillary refill takes less than 2 seconds.   Neurological:      General: No focal deficit present.      Mental Status: She is alert and oriented to person, place, and time.   Psychiatric:         Mood and Affect: Mood normal.         Behavior: Behavior normal.         Thought Content: Thought content normal.         Judgment: Judgment normal.       Physical Exam   Nursing note and vitals reviewed.  Constitutional: She appears healthy.  Non-toxic appearance. No distress.   HENT:   Head: Normocephalic and atraumatic.   Right Ear: External ear normal.   Left Ear: External ear normal.   Nose: Nose normal.   Mouth/Throat: Mucous membranes are moist. Oropharynx is clear.   Eyes: Conjunctivae are normal.   Neck: Thyroid normal. No JVD present. No neck adenopathy. No thyromegaly present.   Cardiovascular: Normal rate, regular rhythm, normal heart sounds and normal pulses.   Pulmonary/Chest: Effort normal and breath sounds normal. She has no wheezes. She has no rales. She exhibits no tenderness.   Abdominal: Normal appearance.   Musculoskeletal:         General: Normal range of motion.      Cervical back: Normal range of motion and neck supple.   Neurological: She is alert and oriented to person, place, and time.   Skin: Skin is warm and dry. Capillary refill takes less than 2 seconds.   Psychiatric: Her behavior is normal. Mood, judgment and thought content normal.         Mallampati Class: III.  Tonsillar Stage: 1  hidden by pillars.         Data: All pertinent previous laboratory data reviewed     Recent Labs   Lab Test  09/06/22  1843 08/25/22  1420   * 136   POTASSIUM 3.8 3.9   CHLORIDE 102 101   CO2 24 26   ANIONGAP 9 9    205*   BUN 15 8   CR 0.71 0.71   TEMITOPE 8.9 9.1       Recent Labs   Lab Test 09/06/22 1843   WBC 7.4   RBC 4.06   HGB 13.0   HCT 38.7   MCV 95   MCH 32.0   MCHC 33.6   RDW 12.5          Recent Labs   Lab Test 09/06/22 1843   PROTTOTAL 7.0   ALBUMIN 3.6   BILITOTAL 0.7   ALKPHOS 78   AST 50*   ALT 32       TSH   Date Value   12/02/2022 2.34 uIU/mL   08/12/2022 0.12 uIU/mL (L)   05/27/2022 8.28 uIU/mL (H)   04/29/2021 0.81 uIU/mL   09/08/2007 7.66 mU/L (H)   01/05/2007 2.12 mU/L       Amphetamine Qual Urine (no units)   Date Value   09/08/2007 Positive (A)     Barbiturates Qual Urine (no units)   Date Value   09/08/2007 Negative     Benzodiazepine Qual Urine (no units)   Date Value   09/08/2007 Negative     Cannabinoids Qual Urine (no units)   Date Value   09/08/2007 Negative     Cocaine Qual Urine (no units)   Date Value   09/08/2007 Negative     Opiates Qualitative Urine (no units)   Date Value   09/08/2007 Negative     PCP Qual Urine (no units)   Date Value   09/08/2007 Negative       No results found for: DEL, [id number], DEJAH    pH Arterial (pH)   Date Value   01/05/2007 7.19 (LL)   12/02/2006 7.31 (L)     pO2 Arterial (mm Hg)   Date Value   01/05/2007 112 (H)   12/02/2006 110 (H)     pCO2 Arterial (mm Hg)   Date Value   01/05/2007 19 (LL)   12/02/2006 32 (L)     Bicarbonate Arterial (mmol/L)   Date Value   01/05/2007 7 (LL)   12/02/2006 16 (L)       @LABRCNTIPR(phv:4,pco2v:4,po2v:4,hco3v:4,jovani:4,o2per:4)@    Echocardiology: No results found for this or any previous visit (from the past 4320 hour(s)).    Chest x-ray: No results found for this or any previous visit from the past 365 days.      Chest CT: No results found for this or any previous visit from the past 365 days.      PFT: Most Recent [facility] Pulmonary Function Testing    No results found for: 20001  No results found for:  20002  No results found for: 20003  No results found for: 20015  No results found for: 20016  No results found for: 20027  No results found for: 20028  No results found for: 20029  No results found for: 20079  No results found for: 20080  No results found for: 20081  No results found for: 20335  No results found for: 20105  No results found for: 20053  No results found for: 20054  No results found for: 20055      JUSTIN Pan CNP 5/23/2023   Sleep Medicine    This note was written with the assistance of the Dragon voice-Rage Frameworksation technology software. The final document, although reviewed, may contain errors. For corrections, please contact the office.

## 2023-05-24 ENCOUNTER — TELEPHONE (OUTPATIENT)
Dept: SLEEP MEDICINE | Facility: CLINIC | Age: 43
End: 2023-05-24
Payer: COMMERCIAL

## 2023-05-24 ENCOUNTER — TELEPHONE (OUTPATIENT)
Dept: LAB | Facility: CLINIC | Age: 43
End: 2023-05-24

## 2023-05-24 DIAGNOSIS — G47.30 SLEEP-RELATED BREATHING DISORDER: Primary | ICD-10-CM

## 2023-05-24 NOTE — TELEPHONE ENCOUNTER
This patient's insurance does not cover home sleep studies. Would you like to order an overnight sleep study instead and we can schedule that?

## 2023-06-05 DIAGNOSIS — E10.9 TYPE 1 DIABETES MELLITUS WITHOUT COMPLICATION (H): Primary | ICD-10-CM

## 2023-06-05 DIAGNOSIS — E03.9 HYPOTHYROIDISM, UNSPECIFIED TYPE: ICD-10-CM

## 2023-06-07 ENCOUNTER — LAB (OUTPATIENT)
Dept: LAB | Facility: CLINIC | Age: 43
End: 2023-06-07
Payer: COMMERCIAL

## 2023-06-07 DIAGNOSIS — E10.9 TYPE 1 DIABETES MELLITUS WITHOUT COMPLICATION (H): ICD-10-CM

## 2023-06-07 DIAGNOSIS — E03.9 HYPOTHYROIDISM, UNSPECIFIED TYPE: ICD-10-CM

## 2023-06-07 LAB
ANION GAP SERPL CALCULATED.3IONS-SCNC: 13 MMOL/L (ref 7–15)
BUN SERPL-MCNC: 14.4 MG/DL (ref 6–20)
CALCIUM SERPL-MCNC: 8.9 MG/DL (ref 8.6–10)
CHLORIDE SERPL-SCNC: 100 MMOL/L (ref 98–107)
CREAT SERPL-MCNC: 0.64 MG/DL (ref 0.51–0.95)
DEPRECATED HCO3 PLAS-SCNC: 24 MMOL/L (ref 22–29)
GFR SERPL CREATININE-BSD FRML MDRD: >90 ML/MIN/1.73M2
GLUCOSE SERPL-MCNC: 269 MG/DL (ref 70–99)
HBA1C MFR BLD: 7.5 % (ref 0–5.6)
POTASSIUM SERPL-SCNC: 4.5 MMOL/L (ref 3.4–5.3)
SODIUM SERPL-SCNC: 137 MMOL/L (ref 136–145)
T4 FREE SERPL-MCNC: 1.64 NG/DL (ref 0.9–1.7)
TSH SERPL DL<=0.005 MIU/L-ACNC: 2.81 UIU/ML (ref 0.3–4.2)

## 2023-06-07 PROCEDURE — 36415 COLL VENOUS BLD VENIPUNCTURE: CPT

## 2023-06-07 PROCEDURE — 83036 HEMOGLOBIN GLYCOSYLATED A1C: CPT

## 2023-06-07 PROCEDURE — 80048 BASIC METABOLIC PNL TOTAL CA: CPT

## 2023-06-07 PROCEDURE — 84439 ASSAY OF FREE THYROXINE: CPT

## 2023-06-07 PROCEDURE — 84443 ASSAY THYROID STIM HORMONE: CPT

## 2023-06-20 ENCOUNTER — OFFICE VISIT (OUTPATIENT)
Dept: ENDOCRINOLOGY | Facility: CLINIC | Age: 43
End: 2023-06-20
Payer: COMMERCIAL

## 2023-06-20 VITALS
OXYGEN SATURATION: 96 % | BODY MASS INDEX: 23.64 KG/M2 | WEIGHT: 140 LBS | HEART RATE: 64 BPM | DIASTOLIC BLOOD PRESSURE: 66 MMHG | SYSTOLIC BLOOD PRESSURE: 122 MMHG

## 2023-06-20 DIAGNOSIS — E10.3299 TYPE 1 DIABETES MELLITUS WITH MILD NONPROLIFERATIVE RETINOPATHY WITHOUT MACULAR EDEMA, UNSPECIFIED LATERALITY (H): Primary | ICD-10-CM

## 2023-06-20 DIAGNOSIS — E03.9 HYPOTHYROIDISM, UNSPECIFIED TYPE: ICD-10-CM

## 2023-06-20 DIAGNOSIS — J31.0 RHINITIS, UNSPECIFIED TYPE: ICD-10-CM

## 2023-06-20 PROCEDURE — 99214 OFFICE O/P EST MOD 30 MIN: CPT | Performed by: NURSE PRACTITIONER

## 2023-06-20 RX ORDER — INSULIN GLARGINE 100 [IU]/ML
INJECTION, SOLUTION SUBCUTANEOUS
COMMUNITY

## 2023-06-20 RX ORDER — CETIRIZINE HYDROCHLORIDE 10 MG/1
10 TABLET ORAL DAILY
Qty: 90 TABLET | Refills: 3 | Status: SHIPPED | OUTPATIENT
Start: 2023-06-20 | End: 2024-07-16

## 2023-06-20 RX ORDER — POLYETHYLENE GLYCOL 3350 17 G
POWDER IN PACKET (EA) ORAL
COMMUNITY
End: 2024-06-26

## 2023-06-20 RX ORDER — NICOTINE 21 MG/24HR
PATCH, TRANSDERMAL 24 HOURS TRANSDERMAL
COMMUNITY
End: 2024-06-26

## 2023-06-20 NOTE — PROGRESS NOTES
[facility] ENDOCRINOLOGY    Diabetes Note 6/20/2023    Vani Corona, 1980, [id number]          Reason for visit      1. Type 1 diabetes mellitus with mild nonproliferative retinopathy without macular edema, unspecified laterality (H)    2. Hypothyroidism, unspecified type    3. Rhinitis, unspecified type        HPI     Vani Corona is a very pleasant 42 year old old female who presents for follow up.  SUMMARY:    Vani is seen today in f/u for DM 1 and hypothyroidism. Her current A1c is 7.5 and up again from her previous. Download shows a range between 45 an 400. She reports that she has been having some lows, fairly consistently in the middle of the night. She is using Control IQ and this should be helping, so unsure if this is dinner timing or not. I do not have the Dexcom download available, unfortunately. She only had a working CGM just under 7 days during this time period. Certainly, this could be contributory to the poor management. TIR was only 18%, and high the rest of the time. She did not take an Boluses during this time period, but the Auto bolus was active.      Current TSH is 2.81 and fT4 was 1.64.  She is taking Levothyroxine 137 mcg daily. She is having no problems referable to her neck.    Pt needs a refill of her Cetirizine.     Insulin Pump Settings     Basal Rate  TIME Units/HR   0000 - 0000 0.60                         Bolus: Insulin : CHO ratio  Time Units Grams CHO   0000 - 1800 1 15   1800 - 0000 1 12                     Correction  #Units Blood glucose >Target BG   1 58 110           Past Medical History     Patient Active Problem List   Diagnosis     Type 1 diabetes mellitus with mild nonproliferative retinopathy without macular edema (H)     Anxiety     Arthralgia of multiple joints     GERD (gastroesophageal reflux disease)     History of ileostomy     Hypothyroidism, unspecified type     Insomnia     Methamphetamine abuse in remission (H)     Mild nonproliferative  diabetic retinopathy of both eyes without macular edema associated with type 1 diabetes mellitus (H)     Non-rheumatic mitral regurgitation     Skin mass     Tobacco abuse     Selective IgA immunodeficiency (H)     Severe episode of recurrent major depressive disorder, without psychotic features (H)     Cough, r/t COVID-19 infection 7/2022     Grief at loss of child        Family History       family history includes Cerebrovascular Disease in her paternal grandmother; Diabetes in her paternal aunt and another family member; Hypertension in her paternal grandmother; No Known Problems in her daughter, sister, son, and other family members; Other - See Comments in her father; Thyroid Disease in her mother.    Social History      reports that she has been smoking cigarettes. She has been smoking an average of .5 packs per day. She has never used smokeless tobacco. She reports current alcohol use. She reports that she does not use drugs.      Review of Systems     Patient has no polyuria or polydipsia, no chest pain, dyspnea or TIA's, no numbness, tingling or pain in extremities  Remainder negative except as noted in HPI.      Vital Signs     /66 (BP Location: Right arm, Patient Position: Sitting, Cuff Size: Adult Regular)   Pulse 64   Wt 63.5 kg (140 lb)   SpO2 96%   BMI 23.64 kg/m    Wt Readings from Last 3 Encounters:   06/20/23 63.5 kg (140 lb)   05/23/23 63.6 kg (140 lb 5 oz)   01/30/23 65.8 kg (145 lb)       Physical Exam     Constitutional:  Well developed, Well nourished  HENT:  Normocephalic,   Neck: Thyroid normal, No lymph nodes, Supple  Eyes:  PERRL, Conjunctiva pink  Respiratory:  Normal breath sounds, No respiratory distress  Cardiovascular:  Normal heart rate, Normal rhythm, No murmurs  GI:  Bowel sounds normal, Soft, No tenderness  Musculoskeletal:  No gross deformity or lesions, normal dorsalis pedis pulses  Skin: No acanthosis nigricans, lipoatrophy or lipodystrophy  Neurologic:  Alert &  oriented x 3, nonfocal  Psychiatric:  Affect, Mood, Insight appropriate  Diabetic foot exam: no ulcers, charcot's or high risk calluses,        Assessment     1. Type 1 diabetes mellitus with mild nonproliferative retinopathy without macular edema, unspecified laterality (H)    2. Hypothyroidism, unspecified type    3. Rhinitis, unspecified type        Plan       Instructed to drop her Basal setting from 0.60 to 0.50. Also encouraged to work on taking her insulin prior to meals rather than relying on the pump to do corrections to bring down her BG.    Pt is bio-chemically and physically euthyroid. She will remain on her current dose of Levothyroxine.    F/u in 6 months.         Anayeli Dave NP   Endocrinology  6/20/2023  10:53 AM          Lab Results     Microalbumin Urine mg/dL   Date Value Ref Range Status   05/27/2022 0.77 0.00 - 1.99 mg/dL Final       Cholesterol   Date Value Ref Range Status   02/15/2016 174 <=199 mg/dL Final     Direct Measure HDL   Date Value Ref Range Status   02/15/2016 45 (L) >=50 mg/dL Final     Triglycerides   Date Value Ref Range Status   02/15/2016 122 <=149 mg/dL Final             Current Medications     Outpatient Medications Prior to Visit   Medication Sig Dispense Refill     ACCU-CHEK GUIDE test strip USE TO TEST 6 TIMES PER DAY       albuterol (PROAIR HFA/PROVENTIL HFA/VENTOLIN HFA) 108 (90 Base) MCG/ACT inhaler Inhale 2 puffs into the lungs every 6 hours 18 g 0     buPROPion (WELLBUTRIN XL) 150 MG 24 hr tablet TAKE ONE TABLET BY MOUTH EVERY MORNING 90 tablet 1     Continuous Blood Gluc Sensor (DEXCOM G6 SENSOR) MISC CHANGE EVERY 10 DAYS 9 each 1     Continuous Blood Gluc Transmit (DEXCOM G6 TRANSMITTER) MISC CHANGE EVERY 3 MONTHS. 1 each 1     escitalopram (LEXAPRO) 20 MG tablet Take 1 tablet (20 mg) by mouth daily 90 tablet 1     insulin aspart (NOVOLOG FLEXPEN) 100 UNIT/ML pen USE DAILY IN PUMP, UP TO 50 UNITS ONCE DAILY 45 mL 0     Insulin Infusion Pump (T: SLIM X2 INS  /CONTROL 7.4) JESSICA        Insulin Infusion Pump Supplies (AUTOSOFT XC INFUSION SET) MISC CHANGE EVERY 3 DAYS 10 each 0     Insulin Infusion Pump Supplies (T:SLIM X2 3ML CARTRIDGE) MISC CHANGE EVERY 3 DAYS 30 each 0     ketoconazole (NIZORAL) 2 % external cream Apply topically 2 times daily 30 g 0     levothyroxine (SYNTHROID/LEVOTHROID) 137 MCG tablet One tablet daily. 90 tablet 3     lisinopril (ZESTRIL) 2.5 MG tablet Take 1 tablet (2.5 mg) by mouth daily 90 tablet 3     NOVOLOG VIAL 100 UNIT/ML soln        ondansetron (ZOFRAN) 4 MG tablet Take 1 tablet (4 mg) by mouth every 8 hours as needed for nausea 30 tablet 0     pantoprazole (PROTONIX) 40 MG EC tablet Take 1 tablet (40 mg) by mouth every morning 90 tablet 3     SUMAtriptan (IMITREX) 25 MG tablet Take 1 tablet (25 mg) by mouth at onset of headache for migraine May repeat in 2 hours. Max 8 tablets/24 hours. Due for appointment in Yue 15 tablet 0     insulin glargine (LANTUS SOLOSTAR) 100 UNIT/ML pen INJECT 13 UNITS SUBCUTANEOUSLY 2 TIMES A DAY AS DIRECTED       nicotine (COMMIT) 2 MG lozenge APPLY 1 LOZENGE (2 MG TOTAL) TO THE MOUTH OR THROAT AS NEEDED FOR SMOKING CESSATION.       nicotine (NICODERM CQ) 14 MG/24HR 24 hr patch APPLY 1 PATCH EVERY DAY       nicotine (NICODERM CQ) 7 MG/24HR 24 hr patch APPLY 1 PATCH EVERY DAY       nicotine (NICORETTE) 2 MG gum CHEW 1 PIECE AND PLACE INSIDE CHEEK EVERY HOUR AS NEEDED FOR NICOTINE CRAVINGS       cetirizine (ZYRTEC) 10 MG tablet TAKE ONE TABLET BY MOUTH ONCE DAILY 90 tablet 3     traZODone (DESYREL) 50 MG tablet Take 0.5-1 tablets (25-50 mg) by mouth At Bedtime (Patient not taking: Reported on 5/23/2023) 90 tablet 0     No facility-administered medications prior to visit.

## 2023-06-20 NOTE — LETTER
6/20/2023         RE: Vani Corona  [address] [place] [address]        Dear Colleague,    Thank you for referring your patient, Vani Corona, to the [facility]. Please see a copy of my visit note below.    [facility] ENDOCRINOLOGY    Diabetes Note 6/20/2023    Vani Corona, 1980, [id number]          Reason for visit      1. Type 1 diabetes mellitus with mild nonproliferative retinopathy without macular edema, unspecified laterality (H)    2. Hypothyroidism, unspecified type    3. Rhinitis, unspecified type        HPI     Vani Corona is a very pleasant 42 year old old female who presents for follow up.  SUMMARY:    Vani is seen today in f/u for DM 1 and hypothyroidism. Her current A1c is 7.5 and up again from her previous. Download shows a range between 45 an 400. She reports that she has been having some lows, fairly consistently in the middle of the night. She is using Control IQ and this should be helping, so unsure if this is dinner timing or not. I do not have the Dexcom download available, unfortunately. She only had a working CGM just under 7 days during this time period. Certainly, this could be contributory to the poor management. TIR was only 18%, and high the rest of the time. She did not take an Boluses during this time period, but the Auto bolus was active.      Current TSH is 2.81 and fT4 was 1.64.  She is taking Levothyroxine 137 mcg daily. She is having no problems referable to her neck.    Pt needs a refill of her Cetirizine.     Insulin Pump Settings     Basal Rate  TIME Units/HR   0000 - 0000 0.60                         Bolus: Insulin : CHO ratio  Time Units Grams CHO   0000 - 1800 1 15   1800 - 0000 1 12                     Correction  #Units Blood glucose >Target BG   1 58 110           Past Medical History     Patient Active Problem List   Diagnosis     Type 1 diabetes mellitus with mild nonproliferative retinopathy without  macular edema (H)     Anxiety     Arthralgia of multiple joints     GERD (gastroesophageal reflux disease)     History of ileostomy     Hypothyroidism, unspecified type     Insomnia     Methamphetamine abuse in remission (H)     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)     Non-rheumatic mitral regurgitation     Skin mass     Tobacco abuse     Selective IgA immunodeficiency (H)     Severe episode of recurrent major depressive disorder, without psychotic features (H)     Cough, r/t COVID-19 infection 7/2022     Grief at loss of child        Family History       family history includes Cerebrovascular Disease in her paternal grandmother; Diabetes in her paternal aunt and another family member; Hypertension in her paternal grandmother; No Known Problems in her daughter, sister, son, and other family members; Other - See Comments in her father; Thyroid Disease in her mother.    Social History      reports that she has been smoking cigarettes. She has been smoking an average of .5 packs per day. She has never used smokeless tobacco. She reports current alcohol use. She reports that she does not use drugs.      Review of Systems     Patient has no polyuria or polydipsia, no chest pain, dyspnea or TIA's, no numbness, tingling or pain in extremities  Remainder negative except as noted in HPI.      Vital Signs     /66 (BP Location: Right arm, Patient Position: Sitting, Cuff Size: Adult Regular)   Pulse 64   Wt 63.5 kg (140 lb)   SpO2 96%   BMI 23.64 kg/m    Wt Readings from Last 3 Encounters:   06/20/23 63.5 kg (140 lb)   05/23/23 63.6 kg (140 lb 5 oz)   01/30/23 65.8 kg (145 lb)       Physical Exam     Constitutional:  Well developed, Well nourished  HENT:  Normocephalic,   Neck: Thyroid normal, No lymph nodes, Supple  Eyes:  PERRL, Conjunctiva pink  Respiratory:  Normal breath sounds, No respiratory distress  Cardiovascular:  Normal  heart rate, Normal rhythm, No murmurs  GI:  Bowel sounds normal, Soft, No tenderness  Musculoskeletal:  No gross deformity or lesions, normal dorsalis pedis pulses  Skin: No acanthosis nigricans, lipoatrophy or lipodystrophy  Neurologic:  Alert & oriented x 3, nonfocal  Psychiatric:  Affect, Mood, Insight appropriate  Diabetic foot exam: no ulcers, charcot's or high risk calluses,        Assessment     1. Type 1 diabetes mellitus with mild nonproliferative retinopathy without macular edema, unspecified laterality (H)    2. Hypothyroidism, unspecified type    3. Rhinitis, unspecified type        Plan       Instructed to drop her Basal setting from 0.60 to 0.50. Also encouraged to work on taking her insulin prior to meals rather than relying on the pump to do corrections to bring down her BG.    Pt is bio-chemically and physically euthyroid. She will remain on her current dose of Levothyroxine.    F/u in 6 months.         Anayeli Dave NP   Endocrinology  6/20/2023  10:53 AM          Lab Results     Microalbumin Urine mg/dL   Date Value Ref Range Status   05/27/2022 0.77 0.00 - 1.99 mg/dL Final       Cholesterol   Date Value Ref Range Status   02/15/2016 174 <=199 mg/dL Final     Direct Measure HDL   Date Value Ref Range Status   02/15/2016 45 (L) >=50 mg/dL Final     Triglycerides   Date Value Ref Range Status   02/15/2016 122 <=149 mg/dL Final             Current Medications     Outpatient Medications Prior to Visit   Medication Sig Dispense Refill     ACCU-CHEK GUIDE test strip USE TO TEST 6 TIMES PER DAY       albuterol (PROAIR HFA/PROVENTIL HFA/VENTOLIN HFA) 108 (90 Base) MCG/ACT inhaler Inhale 2 puffs into the lungs every 6 hours 18 g 0     buPROPion (WELLBUTRIN XL) 150 MG 24 hr tablet TAKE ONE TABLET BY MOUTH EVERY MORNING 90 tablet 1     Continuous Blood Gluc Sensor (DEXCOM G6 SENSOR) MISC CHANGE EVERY 10 DAYS 9 each 1     Continuous Blood Gluc Transmit (DEXCOM G6 TRANSMITTER) MISC CHANGE EVERY 3 MONTHS. 1  each 1     escitalopram (LEXAPRO) 20 MG tablet Take 1 tablet (20 mg) by mouth daily 90 tablet 1     insulin aspart (NOVOLOG FLEXPEN) 100 UNIT/ML pen USE DAILY IN PUMP, UP TO 50 UNITS ONCE DAILY 45 mL 0     Insulin Infusion Pump (T: SLIM X2 INS /CONTROL 7.4) JESSICA        Insulin Infusion Pump Supplies (AUTOSOFT XC INFUSION SET) MISC CHANGE EVERY 3 DAYS 10 each 0     Insulin Infusion Pump Supplies (T:SLIM X2 3ML CARTRIDGE) MISC CHANGE EVERY 3 DAYS 30 each 0     ketoconazole (NIZORAL) 2 % external cream Apply topically 2 times daily 30 g 0     levothyroxine (SYNTHROID/LEVOTHROID) 137 MCG tablet One tablet daily. 90 tablet 3     lisinopril (ZESTRIL) 2.5 MG tablet Take 1 tablet (2.5 mg) by mouth daily 90 tablet 3     NOVOLOG VIAL 100 UNIT/ML soln        ondansetron (ZOFRAN) 4 MG tablet Take 1 tablet (4 mg) by mouth every 8 hours as needed for nausea 30 tablet 0     pantoprazole (PROTONIX) 40 MG EC tablet Take 1 tablet (40 mg) by mouth every morning 90 tablet 3     SUMAtriptan (IMITREX) 25 MG tablet Take 1 tablet (25 mg) by mouth at onset of headache for migraine May repeat in 2 hours. Max 8 tablets/24 hours. Due for appointment in Yue 15 tablet 0     insulin glargine (LANTUS SOLOSTAR) 100 UNIT/ML pen INJECT 13 UNITS SUBCUTANEOUSLY 2 TIMES A DAY AS DIRECTED       nicotine (COMMIT) 2 MG lozenge APPLY 1 LOZENGE (2 MG TOTAL) TO THE MOUTH OR THROAT AS NEEDED FOR SMOKING CESSATION.       nicotine (NICODERM CQ) 14 MG/24HR 24 hr patch APPLY 1 PATCH EVERY DAY       nicotine (NICODERM CQ) 7 MG/24HR 24 hr patch APPLY 1 PATCH EVERY DAY       nicotine (NICORETTE) 2 MG gum CHEW 1 PIECE AND PLACE INSIDE CHEEK EVERY HOUR AS NEEDED FOR NICOTINE CRAVINGS       cetirizine (ZYRTEC) 10 MG tablet TAKE ONE TABLET BY MOUTH ONCE DAILY 90 tablet 3     traZODone (DESYREL) 50 MG tablet Take 0.5-1 tablets (25-50 mg) by mouth At Bedtime (Patient not taking: Reported on 5/23/2023) 90 tablet 0     No facility-administered medications prior to visit.                Again, thank you for allowing me to participate in the care of your patient.        Sincerely,        Anayeli Dave NP

## 2023-06-26 ENCOUNTER — E-VISIT (OUTPATIENT)
Dept: URGENT CARE | Facility: CLINIC | Age: 43
End: 2023-06-26
Payer: COMMERCIAL

## 2023-06-26 ENCOUNTER — OFFICE VISIT (OUTPATIENT)
Dept: FAMILY MEDICINE | Facility: CLINIC | Age: 43
End: 2023-06-26
Payer: COMMERCIAL

## 2023-06-26 VITALS
BODY MASS INDEX: 23.64 KG/M2 | TEMPERATURE: 97.4 F | WEIGHT: 140 LBS | OXYGEN SATURATION: 100 % | SYSTOLIC BLOOD PRESSURE: 133 MMHG | DIASTOLIC BLOOD PRESSURE: 77 MMHG | RESPIRATION RATE: 16 BRPM | HEART RATE: 58 BPM

## 2023-06-26 DIAGNOSIS — J01.90 ACUTE BACTERIAL RHINOSINUSITIS: Primary | ICD-10-CM

## 2023-06-26 DIAGNOSIS — B96.89 ACUTE BACTERIAL RHINOSINUSITIS: Primary | ICD-10-CM

## 2023-06-26 DIAGNOSIS — R05.9 COUGH, UNSPECIFIED TYPE: Primary | ICD-10-CM

## 2023-06-26 PROCEDURE — 99213 OFFICE O/P EST LOW 20 MIN: CPT | Performed by: NURSE PRACTITIONER

## 2023-06-26 PROCEDURE — 99207 PR NON-BILLABLE SERV PER CHARTING: CPT | Performed by: FAMILY MEDICINE

## 2023-06-26 ASSESSMENT — ENCOUNTER SYMPTOMS
WHEEZING: 0
FEVER: 0
CHILLS: 0

## 2023-06-26 NOTE — PATIENT INSTRUCTIONS
Take augmentin with probiotics    OK for zofran if very nauseated .     If can't tolerate, can contact your primary care provider before you go.

## 2023-06-26 NOTE — PATIENT INSTRUCTIONS
Dear Vani Corona,    We are sorry you are not feeling well. Based on the responses you provided, it is recommended that you be seen in-person in urgent care so we can better evaluate your symptoms. Please click here to find the nearest urgent care location to you.   You will not be charged for this Visit. Thank you for trusting us with your care.    Mikaela Sawyer MD

## 2023-06-26 NOTE — PROGRESS NOTES
Assessment & Plan     Acute bacterial rhinosinusitis    - amoxicillin-clavulanate (AUGMENTIN) 875-125 MG tablet  Dispense: 20 tablet; Refill: 0  - Symptomatic COVID-19 Virus (Coronavirus) by PCR     Patient with persistent congestion symptoms ongoing for about a month. Treated with 7-day course of doxycycline with some improvement, but subtotal improvement in symptoms and then drainage and congestion much worse about 5 days ago.  + Green drainage.  Is a type I diabetic.  No fevers and normal lung sounds today.    Patient has a nausea vomiting allergy to Augmentin.  No true allergy.  Patient does have nausea tablets at home.  Offered additional course of doxycycline or Levaquin.  Additionally, explained that we could try Augmentin again if it was just nausea and vomiting as that is a common side effect Augmentin is usually the first-line treatment of choice.     Patient would like to try Augmentin again with the understanding that she may have nausea and vomiting and will contact her primary care provider if this happens.    Continue OTC cold medication.    Recheck in 4 to 5 days if not much better.          Return in about 4 days (around 6/30/2023) for If no better.    Aruna Barksdale, [facility] [place]    Deborah Ludwig is a 42 year old female who presents to clinic today for the following health issues:  Chief Complaint   Patient presents with     Cough     X 4 week. Cough. Runny nose. No fever, chills, or sore throat.     HPI    Cough for about 4 weeks with green and yellow sputum.  Was mostly clear and yellow to start.  Coughing fits. Got a bit better and then it came back a bit worse.  Got worse about 5 days ago.      Feeling a little short of breath with exertion.    History of type 1 diabetes.  Blood sugars okay.      No fevers.   No asthma, but uses inhalers with sickness.  Doesn't feel  Better with inhaler.      +Muffled ears.    Taking jasmnie seltzer cold and cough.     Had  doxycycline for same 2 weeks ago.      Going to [place] in 4 days.        Review of Systems   Constitutional: Negative for chills and fever.   Respiratory: Negative for wheezing.            Objective    /77   Pulse 58   Temp 97.4  F (36.3  C) (Oral)   Resp 16   Wt 63.5 kg (140 lb)   SpO2 100%   BMI 23.64 kg/m    Physical Exam  Constitutional:       General: She is not in acute distress.     Appearance: She is well-developed.   HENT:      Nose: Congestion present. No rhinorrhea.   Eyes:      General:         Right eye: No discharge.         Left eye: No discharge.      Conjunctiva/sclera: Conjunctivae normal.   Pulmonary:      Effort: Pulmonary effort is normal.      Breath sounds: Normal breath sounds.      Comments: Wet cough noted  Musculoskeletal:         General: Normal range of motion.   Skin:     General: Skin is warm and dry.      Capillary Refill: Capillary refill takes less than 2 seconds.   Neurological:      Mental Status: She is alert and oriented to person, place, and time.   Psychiatric:         Mood and Affect: Mood normal.         Behavior: Behavior normal.         Thought Content: Thought content normal.         Judgment: Judgment normal.

## 2023-07-09 ENCOUNTER — HEALTH MAINTENANCE LETTER (OUTPATIENT)
Age: 43
End: 2023-07-09

## 2023-07-12 ENCOUNTER — THERAPY VISIT (OUTPATIENT)
Dept: SLEEP MEDICINE | Facility: CLINIC | Age: 43
End: 2023-07-12
Payer: COMMERCIAL

## 2023-07-12 DIAGNOSIS — G47.30 SLEEP-RELATED BREATHING DISORDER: ICD-10-CM

## 2023-07-12 PROCEDURE — 95810 POLYSOM 6/> YRS 4/> PARAM: CPT | Performed by: INTERNAL MEDICINE

## 2023-07-12 ASSESSMENT — SLEEP AND FATIGUE QUESTIONNAIRES
HOW LIKELY ARE YOU TO NOD OFF OR FALL ASLEEP WHILE SITTING AND READING: SLIGHT CHANCE OF DOZING
HOW LIKELY ARE YOU TO NOD OFF OR FALL ASLEEP WHILE SITTING AND TALKING TO SOMEONE: WOULD NEVER DOZE
HOW LIKELY ARE YOU TO NOD OFF OR FALL ASLEEP WHEN YOU ARE A PASSENGER IN A CAR FOR AN HOUR WITHOUT A BREAK: SLIGHT CHANCE OF DOZING
HOW LIKELY ARE YOU TO NOD OFF OR FALL ASLEEP WHILE SITTING QUIETLY AFTER LUNCH WITHOUT ALCOHOL: SLIGHT CHANCE OF DOZING
HOW LIKELY ARE YOU TO NOD OFF OR FALL ASLEEP WHILE WATCHING TV: SLIGHT CHANCE OF DOZING
HOW LIKELY ARE YOU TO NOD OFF OR FALL ASLEEP WHILE SITTING QUIETLY AFTER LUNCH WITHOUT ALCOHOL: SLIGHT CHANCE OF DOZING
HOW LIKELY ARE YOU TO NOD OFF OR FALL ASLEEP WHILE SITTING AND TALKING TO SOMEONE: WOULD NEVER DOZE
HOW LIKELY ARE YOU TO NOD OFF OR FALL ASLEEP WHEN YOU ARE A PASSENGER IN A CAR FOR AN HOUR WITHOUT A BREAK: SLIGHT CHANCE OF DOZING
HOW LIKELY ARE YOU TO NOD OFF OR FALL ASLEEP WHILE SITTING AND READING: SLIGHT CHANCE OF DOZING
HOW LIKELY ARE YOU TO NOD OFF OR FALL ASLEEP WHILE LYING DOWN TO REST IN THE AFTERNOON WHEN CIRCUMSTANCES PERMIT: HIGH CHANCE OF DOZING
HOW LIKELY ARE YOU TO NOD OFF OR FALL ASLEEP WHILE SITTING INACTIVE IN A PUBLIC PLACE: WOULD NEVER DOZE
HOW LIKELY ARE YOU TO NOD OFF OR FALL ASLEEP WHILE SITTING INACTIVE IN A PUBLIC PLACE: WOULD NEVER DOZE
HOW LIKELY ARE YOU TO NOD OFF OR FALL ASLEEP IN A CAR, WHILE STOPPED FOR A FEW MINUTES IN TRAFFIC: WOULD NEVER DOZE
HOW LIKELY ARE YOU TO NOD OFF OR FALL ASLEEP IN A CAR, WHILE STOPPED FOR A FEW MINUTES IN TRAFFIC: WOULD NEVER DOZE
HOW LIKELY ARE YOU TO NOD OFF OR FALL ASLEEP WHILE WATCHING TV: SLIGHT CHANCE OF DOZING
HOW LIKELY ARE YOU TO NOD OFF OR FALL ASLEEP WHILE LYING DOWN TO REST IN THE AFTERNOON WHEN CIRCUMSTANCES PERMIT: HIGH CHANCE OF DOZING

## 2023-07-13 DIAGNOSIS — K21.9 GASTROESOPHAGEAL REFLUX DISEASE, UNSPECIFIED WHETHER ESOPHAGITIS PRESENT: ICD-10-CM

## 2023-07-13 DIAGNOSIS — E10.65 TYPE 1 DIABETES MELLITUS WITH HYPERGLYCEMIA (H): ICD-10-CM

## 2023-07-13 RX ORDER — ESCITALOPRAM OXALATE 20 MG/1
TABLET ORAL
Qty: 90 TABLET | Refills: 3 | Status: SHIPPED | OUTPATIENT
Start: 2023-07-13 | End: 2024-01-15

## 2023-07-14 RX ORDER — INSULIN INFUSION SET/CARTRIDGE
COMBINATION PACKAGE (EA) MISCELLANEOUS
Qty: 30 EACH | Refills: 1 | Status: SHIPPED | OUTPATIENT
Start: 2023-07-14 | End: 2024-01-15

## 2023-07-14 RX ORDER — PROCHLORPERAZINE 25 MG/1
SUPPOSITORY RECTAL
Qty: 9 EACH | Refills: 1 | Status: SHIPPED | OUTPATIENT
Start: 2023-07-14 | End: 2024-02-26

## 2023-07-14 NOTE — TELEPHONE ENCOUNTER
"Last Written Prescription Date:  12/27/2022  Last Fill Quantity: 90,  # refills: 1   Last office visit provider:  6/26/2023 with MODESTO Barksdale CNP      Requested Prescriptions   Pending Prescriptions Disp Refills    escitalopram (LEXAPRO) 20 MG tablet [Pharmacy Med Name: ESCITALOPRAM OXALATE 20MG TABS] 90 tablet 0     Sig: TAKE ONE TABLET BY MOUTH ONCE DAILY       SSRIs Protocol Passed - 7/13/2023  5:48 PM        Passed - Recent (12 mo) or future (30 days) visit within the authorizing provider's specialty     Patient has had an office visit with the authorizing provider or a provider within the authorizing providers department within the previous 12 mos or has a future within next 30 days. See \"Patient Info\" tab in inbasket, or \"Choose Columns\" in Meds & Orders section of the refill encounter.              Passed - Medication is active on med list        Passed - Patient is age 18 or older        Passed - No active pregnancy on record        Passed - No positive pregnancy test in last 12 months             Shasta Doherty RN 07/13/23 11:01 PM  "

## 2023-07-14 NOTE — TELEPHONE ENCOUNTER
"Requested Prescriptions   Pending Prescriptions Disp Refills     Continuous Blood Gluc Sensor (DEXCOM G6 SENSOR) MISC [Pharmacy Med Name: DEXCOM G6 SENSOR  MISC] 9 each 1     Sig: CHANGE EVERY 10 DAYS       There is no refill protocol information for this order        Insulin Infusion Pump Supplies (AUTOSOFT XC INFUSION SET) MISC [Pharmacy Med Name: AUTOSOFT XC INF SET 6MM 23\" GREY] 30 each 1     Sig: CHANGE EVERY 3 DAYS       There is no refill protocol information for this order          "

## 2023-07-17 LAB — SLPCOMP: NORMAL

## 2023-07-18 ENCOUNTER — OFFICE VISIT (OUTPATIENT)
Dept: SLEEP MEDICINE | Facility: CLINIC | Age: 43
End: 2023-07-18
Payer: COMMERCIAL

## 2023-07-18 VITALS
WEIGHT: 141.13 LBS | BODY MASS INDEX: 23.83 KG/M2 | SYSTOLIC BLOOD PRESSURE: 125 MMHG | DIASTOLIC BLOOD PRESSURE: 77 MMHG | HEART RATE: 55 BPM | OXYGEN SATURATION: 98 %

## 2023-07-18 DIAGNOSIS — G47.33 OSA (OBSTRUCTIVE SLEEP APNEA): Primary | ICD-10-CM

## 2023-07-18 PROCEDURE — 99214 OFFICE O/P EST MOD 30 MIN: CPT | Performed by: NURSE PRACTITIONER

## 2023-07-18 NOTE — PROGRESS NOTES
Sleep Study Follow-Up Visit:    Date on this visit: 7/18/2023    Vani Corona comes in today for follow-up of her diagnostic sleep study done on 7/12/2020.  At the [facility] for possible sleep apnea.    Sleep latency 19.3 minutes without Ambien.  REM achieved.   REM latency 115.0 minutes.  Sleep efficiency 93.4%. Total sleep time 396.5 minutes.    Sleep architecture:  Stage 1, 4.3% (5%), stage 2, 49.2% (45-55%), stage 3, 26.5% (15-20%), stage REM, 20.1% (20-25%).  AHI was 9.1 events/hour, without desaturations. RDI 14.1 events/hour.  REM 8.3 events/hour, consistent with mild REM ADELINA.  Supine 17.9 events/hour, consistent with moderate SUPINE ADELINA.  Periodic Limb Movement Index 1.4/hour.       These findings were reviewed with patient.     Past medical/surgical history, family history, social history, medications and allergies were reviewed.      Problem List:  Patient Active Problem List    Diagnosis Date Noted     Grief at loss of child 01/06/2023     Priority: Medium     Cough, r/t COVID-19 infection 7/2022 08/08/2022     Priority: Medium     Selective IgA immunodeficiency (H) 06/03/2022     Priority: Medium     Noted on celiac screening test.       History of ileostomy 08/26/2021     Priority: Medium     Hypothyroidism, unspecified type 08/26/2021     Priority: Medium     Formatting of this note might be different from the original.  Created by Conversion    Replacement Utility updated for latest IMO load       Tobacco abuse 08/26/2021     Priority: Medium     Skin mass 05/04/2021     Priority: Medium     Formatting of this note might be different from the original.  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  Created by Conversion       Non-rheumatic mitral regurgitation 09/22/2016     Priority: Medium     Insomnia 02/15/2016     Priority: Medium     Anxiety 08/31/2015     Priority: Medium     Methamphetamine abuse in remission (H) 04/10/2015     Priority: Medium     Formatting of this note might be different from the original.  Treatment 1/2014. Clean since.       Type 1 diabetes mellitus with mild nonproliferative retinopathy without macular edema (H) 06/15/2004     Priority: Medium        Impression/Plan:    Mild obstructive sleep apnea   Will begin auto titrate CPAP 5-15 cm of water pressure.  Instructed patient to use minimum of 4 hours/day, 70% of the time, but optimally patient should use 100% of her sleep time in order to gain maximum benefit from therapy.   Sleep Therapy Management   Patient to return to clinic in approximately 4 to 6 weeks after she obtains her sleep apnea machine to assess for efficacy and compliance   Recommend patient optimize her sleep schedule as well as her sleep hygiene practices to mitigate any future sleep intrusion   Recommend patient practice safe driving practices including not driving a motor vehicle should she become drowsy      30 minutes spent with patient, all of which were spent face-to-face counseling, consulting, coordinating plan of care.      JUSTIN Corrigan, CNP  Sleep Medicine    This note was written with the assistance of the Dragon voice-dictation technology software. The final document, although reviewed, may contain errors. For corrections, please contact the office.      CC: Denia Llamas

## 2023-07-18 NOTE — PATIENT INSTRUCTIONS
Your Body mass index is 23.83 kg/m .  Weight management is a personal decision.  If you are interested in exploring weight loss strategies, the following discussion covers the approaches that may be successful. Body mass index (BMI) is one way to tell whether you are at a healthy weight, overweight, or obese. It measures your weight in relation to your height.  A BMI of 18.5 to 24.9 is in the healthy range. A person with a BMI of 25 to 29.9 is considered overweight, and someone with a BMI of 30 or greater is considered obese. More than two-thirds of American adults are considered overweight or obese.  Being overweight or obese increases the risk for further weight gain. Excess weight may lead to heart disease and diabetes.  Creating and following plans for healthy eating and physical activity may help you improve your health.  Weight control is part of healthy lifestyle and includes exercise, emotional health, and healthy eating habits. Careful eating habits lifelong are the mainstay of weight control. Though there are significant health benefits from weight loss, long-term weight loss with diet alone may be very difficult to achieve- studies show long-term success with dietary management in less than 10% of people. Attaining a healthy weight may be especially difficult to achieve in those with severe obesity. In some cases, medications, devices and surgical management might be considered.  What can you do?  If you are overweight or obese and are interested in methods for weight loss, you should discuss this with your provider.     Consider reducing daily calorie intake by 500 calories.     Keep a food journal.     Avoiding skipping meals, consider cutting portions instead.    Diet combined with exercise helps maintain muscle while optimizing fat loss. Strength training is particularly important for building and maintaining muscle mass. Exercise helps reduce stress, increase energy, and improves fitness.  Increasing exercise without diet control, however, may not burn enough calories to loose weight.       Start walking three days a week 10-20 minutes at a time    Work towards walking thirty minutes five days a week     Eventually, increase the speed of your walking for 1-2 minutes at time    In addition, we recommend that you review healthy lifestyles and methods for weight loss available through the National Institutes of Health patient information sites:  http://win.niddk.nih.gov/publications/index.htm    And look into health and wellness programs that may be available through your health insurance provider, employer, local community center, or starr club.

## 2023-07-18 NOTE — NURSING NOTE
"Chief Complaint   Patient presents with     Study Results       Initial /77   Pulse 55   Wt 64 kg (141 lb 2 oz)   SpO2 98%   BMI 23.83 kg/m   Estimated body mass index is 23.83 kg/m  as calculated from the following:    Height as of 5/23/23: 1.639 m (5' 4.53\").    Weight as of this encounter: 64 kg (141 lb 2 oz).    Medication Reconciliation: complete    Neck circumference:     DME:     RAF Davis  [facility]      "

## 2023-08-06 DIAGNOSIS — E10.9 TYPE 1 DIABETES MELLITUS WITHOUT COMPLICATION (H): ICD-10-CM

## 2023-08-07 RX ORDER — INSULIN ASPART 100 [IU]/ML
INJECTION, SOLUTION INTRAVENOUS; SUBCUTANEOUS
Qty: 45 ML | Refills: 1 | Status: SHIPPED | OUTPATIENT
Start: 2023-08-07

## 2023-08-07 NOTE — TELEPHONE ENCOUNTER
"Requested Prescriptions   Pending Prescriptions Disp Refills    NOVOLOG FLEXPEN 100 UNIT/ML soln [Pharmacy Med Name: NOVOLOG FLEXPEN 100UNIT/ML SOPN] 45 mL 0     Sig: USE DAILY IN PUMP, UP TO 50 UNITS ONCE DAILY       Short Acting Insulin Protocol Passed - 8/6/2023  1:11 PM        Passed - Serum creatinine on file in past 12 months     Recent Labs   Lab Test 06/07/23  1514   CR 0.64       Ok to refill medication if creatinine is low          Passed - HgbA1C in past 3 or 6 months     If HgbA1C is 8 or greater, it needs to be on file within the past 3 months.  If less than 8, must be on file within the past 6 months.     Recent Labs   Lab Test 06/07/23  1514   A1C 7.5*             Passed - Medication is active on med list        Passed - Patient is age 18 or older        Passed - Recent (6 mo) or future (30 days) visit within the authorizing provider's specialty     Patient had office visit in the last 6 months or has a visit in the next 30 days with authorizing provider or within the authorizing provider's specialty.  See \"Patient Info\" tab in inbasket, or \"Choose Columns\" in Meds & Orders section of the refill encounter.                 "

## 2023-08-16 DIAGNOSIS — E10.65 TYPE 1 DIABETES MELLITUS WITH HYPERGLYCEMIA (H): ICD-10-CM

## 2023-08-16 RX ORDER — PROCHLORPERAZINE 25 MG/1
SUPPOSITORY RECTAL
Qty: 1 EACH | Refills: 1 | Status: SHIPPED | OUTPATIENT
Start: 2023-08-16 | End: 2024-02-26

## 2023-08-16 NOTE — TELEPHONE ENCOUNTER
"Requested Prescriptions   Pending Prescriptions Disp Refills    Continuous Blood Gluc Transmit (DEXCOM G6 TRANSMITTER) MISC [Pharmacy Med Name: DEXCOM G6 TRANSMITTER  MISC] 1 each 1     Sig: CHANGE EVERY 3 MONTHS       Diabetic Supplies Protocol Passed - 8/16/2023  2:38 PM        Passed - Medication is active on med list        Passed - Patient is 18 years of age or older        Passed - Recent (6 mo) or future (30 days) visit within the authorizing provider's specialty     Patient had office visit in the last 6 months or has a visit in the next 30 days with authorizing provider.  See \"Patient Info\" tab in inbasket, or \"Choose Columns\" in Meds & Orders section of the refill encounter.                 "

## 2023-08-18 ENCOUNTER — DOCUMENTATION ONLY (OUTPATIENT)
Dept: SLEEP MEDICINE | Facility: CLINIC | Age: 43
End: 2023-08-18
Payer: COMMERCIAL

## 2023-08-18 DIAGNOSIS — G47.33 OBSTRUCTIVE SLEEP APNEA (ADULT) (PEDIATRIC): Primary | ICD-10-CM

## 2023-08-18 DIAGNOSIS — G47.14 HYPERSOMNIA DUE TO ANOTHER MEDICAL CONDITION: ICD-10-CM

## 2023-08-18 NOTE — PROGRESS NOTES
Patient was offered choice of vendor and chose [facility].  Patient Vani Corona was set up at [address] on August 18, 2023. Patient received a Resmed Airsense 10 Pressures were set at  5-15 cm H2O.   Patient s ramp is 5 cm H2O for Auto and FLEX/EPR is 2.  Patient received a Safe [employer]od[employer] Mask name: ESON 2  Nasal mask size Small, heated tubing and heated humidifier.  Patient has the following compliance requirements: usage only    Luz Maria Koehler

## 2023-08-21 ENCOUNTER — DOCUMENTATION ONLY (OUTPATIENT)
Dept: SLEEP MEDICINE | Facility: CLINIC | Age: 43
End: 2023-08-21
Payer: COMMERCIAL

## 2023-08-21 NOTE — PROGRESS NOTES
3 day Sleep therapy management telephone visit    Diagnostic AHI: 9.1  PSG    Confirmed with patient at time of call- Yes Patient is still interested in STM service       Subjective measures:  Patient states she is doing well with CPAP but is having trouble with her CPAP because she can't get her  to stop snoring.          Objective data     Order Settings for PAP  CPAP min     CPAP max              Device settings from machine CPAP min 5.0     CPAP max 15.0            EPR Setting TWO    RESMED soft response  OFF     Assessment: Nighty usage under four hours      Action plan: Patient to have 14 day STM visit. Patient has a follow up visit scheduled:   no    Replacement device: No  STM ordered by provider: Yes     Total time spent on accessing and  interpreting remote patient PAP therapy data  10 minutes    Total time spent counseling, coaching  and reviewing PAP therapy data with patient  2 minutes    [id number] no

## 2023-08-25 ENCOUNTER — TRANSFERRED RECORDS (OUTPATIENT)
Dept: MULTI SPECIALTY CLINIC | Facility: CLINIC | Age: 43
End: 2023-08-25
Payer: COMMERCIAL

## 2023-08-25 LAB — RETINOPATHY: NORMAL

## 2023-09-07 ENCOUNTER — DOCUMENTATION ONLY (OUTPATIENT)
Dept: SLEEP MEDICINE | Facility: CLINIC | Age: 43
End: 2023-09-07
Payer: COMMERCIAL

## 2023-09-07 NOTE — PROGRESS NOTES
14  DAY STM VISIT    Diagnostic AHI: 9.1  PSG    Message left for patient to return call     Assessment: Pt not meeting objective benchmarks for AHI and leak     Action plan: waiting for patient to return call.  and pt to have 30 day STM visit.      Device type: Auto-CPAP    PAP settings:  CPAP MIN CPAP MAX 95TH % PRESSURE EPR RESMED SOFT RESPONSE SETTING   5.0 cm  H20 15.0 cm  H20 14.2 cm  H20  TWO OFF     Mask type:  Nasal Mask    Objective measures: 14 day rolling measures   COMPLIANCE LEAK AHI AVERAGE USE IN MINUTES   78 % 28.46 8.51 293   GOAL >70% GOAL < 24 LPM GOAL <5 GOAL >240      Patient has the following upcoming sleep appts:      Total time spent on accessing and interpreting remote patient PAP therapy data  10 minutes    Total time spent counseling, coaching  and reviewing PAP therapy data with patient  1 minute    [id number]  [id number]  no (3 day STM)

## 2023-09-12 ENCOUNTER — LAB REQUISITION (OUTPATIENT)
Dept: LAB | Facility: CLINIC | Age: 43
End: 2023-09-12

## 2023-09-12 DIAGNOSIS — Z01.419 ENCOUNTER FOR GYNECOLOGICAL EXAMINATION (GENERAL) (ROUTINE) WITHOUT ABNORMAL FINDINGS: ICD-10-CM

## 2023-09-12 PROCEDURE — 87624 HPV HI-RISK TYP POOLED RSLT: CPT | Performed by: OBSTETRICS & GYNECOLOGY

## 2023-09-12 PROCEDURE — G0145 SCR C/V CYTO,THINLAYER,RESCR: HCPCS | Performed by: OBSTETRICS & GYNECOLOGY

## 2023-09-14 LAB
BKR LAB AP GYN ADEQUACY: NORMAL
BKR LAB AP GYN INTERPRETATION: NORMAL
BKR LAB AP HPV REFLEX: NORMAL
BKR LAB AP LMP: NORMAL
BKR LAB AP PREVIOUS ABNL DX: NORMAL
BKR LAB AP PREVIOUS ABNORMAL: NORMAL
PATH REPORT.COMMENTS IMP SPEC: NORMAL
PATH REPORT.COMMENTS IMP SPEC: NORMAL
PATH REPORT.RELEVANT HX SPEC: NORMAL

## 2023-09-16 LAB
HUMAN PAPILLOMA VIRUS 16 DNA: NEGATIVE
HUMAN PAPILLOMA VIRUS 18 DNA: NEGATIVE
HUMAN PAPILLOMA VIRUS FINAL DIAGNOSIS: NORMAL
HUMAN PAPILLOMA VIRUS OTHER HR: NEGATIVE

## 2023-09-20 DIAGNOSIS — K21.9 GASTROESOPHAGEAL REFLUX DISEASE, UNSPECIFIED WHETHER ESOPHAGITIS PRESENT: ICD-10-CM

## 2023-09-20 RX ORDER — PANTOPRAZOLE SODIUM 40 MG/1
40 TABLET, DELAYED RELEASE ORAL EVERY MORNING
Qty: 90 TABLET | Refills: 2 | Status: SHIPPED | OUTPATIENT
Start: 2023-09-20 | End: 2024-01-15

## 2023-09-22 DIAGNOSIS — E03.9 HYPOTHYROIDISM, UNSPECIFIED TYPE: ICD-10-CM

## 2023-09-22 RX ORDER — LEVOTHYROXINE SODIUM 137 UG/1
TABLET ORAL
Qty: 90 TABLET | Refills: 1 | Status: SHIPPED | OUTPATIENT
Start: 2023-09-22 | End: 2024-01-15

## 2023-09-22 NOTE — TELEPHONE ENCOUNTER
"    Requested Prescriptions   Pending Prescriptions Disp Refills    levothyroxine (SYNTHROID) 137 MCG tablet [Pharmacy Med Name: SYNTHROID 137MCG TABS] 90 tablet 2     Sig: TAKE ONE TABLET BY MOUTH ONCE DAILY       Thyroid Protocol Passed - 9/22/2023  1:57 PM        Passed - Patient is 12 years or older        Passed - Recent (12 mo) or future (30 days) visit within the authorizing provider's specialty     Patient has had an office visit with the authorizing provider or a provider within the authorizing providers department within the previous 12 mos or has a future within next 30 days. See \"Patient Info\" tab in inbasket, or \"Choose Columns\" in Meds & Orders section of the refill encounter.              Passed - Medication is active on med list        Passed - Normal TSH on file in past 12 months     Recent Labs   Lab Test 06/07/23  1514   TSH 2.81              Passed - No active pregnancy on record     If patient is pregnant or has had a positive pregnancy test, please check TSH.          Passed - No positive pregnancy test in past 12 months     If patient is pregnant or has had a positive pregnancy test, please check TSH.               "

## 2023-09-29 ENCOUNTER — ANCILLARY ORDERS (OUTPATIENT)
Dept: MAMMOGRAPHY | Facility: CLINIC | Age: 43
End: 2023-09-29

## 2023-09-29 DIAGNOSIS — Z12.31 VISIT FOR SCREENING MAMMOGRAM: Primary | ICD-10-CM

## 2023-10-02 ENCOUNTER — ANCILLARY PROCEDURE (OUTPATIENT)
Dept: MAMMOGRAPHY | Facility: HOSPITAL | Age: 43
End: 2023-10-02
Attending: OBSTETRICS & GYNECOLOGY
Payer: COMMERCIAL

## 2023-10-02 ENCOUNTER — ANCILLARY ORDERS (OUTPATIENT)
Dept: MAMMOGRAPHY | Facility: CLINIC | Age: 43
End: 2023-10-02

## 2023-10-02 DIAGNOSIS — Z12.31 VISIT FOR SCREENING MAMMOGRAM: Primary | ICD-10-CM

## 2023-10-02 DIAGNOSIS — Z12.31 VISIT FOR SCREENING MAMMOGRAM: ICD-10-CM

## 2023-10-02 PROCEDURE — 77067 SCR MAMMO BI INCL CAD: CPT

## 2023-10-18 NOTE — PROGRESS NOTES
Patient reviewed note on mychart   Hatchet Flap Text: The defect edges were debeveled with a #15 scalpel blade.  Given the location of the defect, shape of the defect and the proximity to free margins a hatchet flap was deemed most appropriate.  Using a sterile surgical marker, an appropriate hatchet flap was drawn incorporating the defect and placing the expected incisions within the relaxed skin tension lines where possible.    The area thus outlined was incised deep to adipose tissue with a #15 scalpel blade.  The skin margins were undermined to an appropriate distance in all directions utilizing iris scissors.

## 2023-10-30 ENCOUNTER — VIRTUAL VISIT (OUTPATIENT)
Dept: FAMILY MEDICINE | Facility: CLINIC | Age: 43
End: 2023-10-30
Payer: COMMERCIAL

## 2023-10-30 DIAGNOSIS — R59.1 LYMPHADENOPATHY: ICD-10-CM

## 2023-10-30 DIAGNOSIS — J02.9 SORE THROAT: Primary | ICD-10-CM

## 2023-10-30 PROCEDURE — 99213 OFFICE O/P EST LOW 20 MIN: CPT | Mod: VID | Performed by: STUDENT IN AN ORGANIZED HEALTH CARE EDUCATION/TRAINING PROGRAM

## 2023-10-30 ASSESSMENT — PATIENT HEALTH QUESTIONNAIRE - PHQ9
10. IF YOU CHECKED OFF ANY PROBLEMS, HOW DIFFICULT HAVE THESE PROBLEMS MADE IT FOR YOU TO DO YOUR WORK, TAKE CARE OF THINGS AT HOME, OR GET ALONG WITH OTHER PEOPLE: SOMEWHAT DIFFICULT
SUM OF ALL RESPONSES TO PHQ QUESTIONS 1-9: 17
SUM OF ALL RESPONSES TO PHQ QUESTIONS 1-9: 17

## 2023-10-30 NOTE — PROGRESS NOTES
Vani is a 43 year old who is being evaluated via a billable video visit.      How would you like to obtain your AVS? MyChart  If the video visit is dropped, the invitation should be resent by: Text to cell phone: [phone]  Will anyone else be joining your video visit? No          Assessment & Plan     Sore throat  Vani Seattle is a 43-year-old female who presents today with sore throat for 2 days with left-sided neck tenderness.  She also has intermittent nonproductive cough.  She has been concerned that she has strep due to having exposure to someone with positive strep testing approximately 1 week ago with whom she went on a vacation to [place].  Her  and her daughter both have had similar symptoms, however, their symptoms started early last week.    Given video format, physical examination is not possible as far as examining for swollen lymph nodes however we can assume that what patient is feeling on the left side of her neck are tender anterior cervical lymph nodes.  She is not having fever, however cannot measure temperature today.  She states she does have a way to measure her temperature at home.  She has not had a fever.  Centor score 2 (assuming exudate or swelling on tonsils as I am not able to examine these today ) estimates a 11 to 17% risk of streptococcal pharyngitis.  We discussed optional rapid strep testing.  Patient wishes to not be tested at this time, however if she were to change her mind she could get on the MA schedule for pharyngeal swab.  We discussed over-the-counter treatments that would be effective at reducing pain to include Cepacol, NSAIDs.  We discussed alarm symptoms to include difficulty swallowing or breathing, temperature over 102 Fahrenheit, or not improving after 7 days.  She should be seen in clinic if not improving after 7 days, go to the emergency room if having severe fevers or difficulty swallowing or breathing.    This visit was conducted over video  chat.    Lymphadenopathy        Follow-up in 7 days if not improved    Ricardo Rodriguez MD  [facility] SHERYL Ludwig is a 43 year old, presenting for the following health issues:  Strep Test        10/30/2023     1:15 PM   Additional Questions   Roomed by Shanna SHEA       History of Present Illness       Reason for visit:  Strep test    She eats 0-1 servings of fruits and vegetables daily.She consumes 0 sweetened beverage(s) daily.She exercises with enough effort to increase her heart rate 9 or less minutes per day.  She exercises with enough effort to increase her heart rate 3 or less days per week.   She is taking medications regularly.     She was traveling in [place] with someone who tested positive for strep.  Symptoms for her started on Sunday morning, sore throat is the only symptom.  Denies fever.    Has some coughing. Has had some tenderness to the left neck on the lateral side.     Was on the trip to [place] until last Tuesday.             Review of Systems   Constitutional, HEENT, cardiovascular, pulmonary, GI, , musculoskeletal, neuro, skin, endocrine and psych systems are negative, except as otherwise noted.      Objective           Vitals:  No vitals were obtained today due to virtual visit.    Physical Exam   GENERAL: Healthy, alert and no distress  EYES: Eyes grossly normal to inspection.  No discharge or erythema, or obvious scleral/conjunctival abnormalities.  RESP: No audible wheeze, cough, or visible cyanosis.  No visible retractions or increased work of breathing.    SKIN: Visible skin clear. No significant rash, abnormal pigmentation or lesions.  NEURO: Cranial nerves grossly intact.  Mentation and speech appropriate for age.  PSYCH: Mentation appears normal, affect normal/bright, judgement and insight intact, normal speech and appearance well-groomed.                Video-Visit Details    Type of service:  Video Visit   Video Start Time:  4523  Video  End Time:2:16 PM    Originating Location (pt. Location): Home    Distant Location (provider location):  On-site  Platform used for Video Visit: Jose Luis

## 2023-10-31 ENCOUNTER — OFFICE VISIT (OUTPATIENT)
Dept: INTERNAL MEDICINE | Facility: CLINIC | Age: 43
End: 2023-10-31
Payer: COMMERCIAL

## 2023-10-31 VITALS
OXYGEN SATURATION: 98 % | RESPIRATION RATE: 16 BRPM | SYSTOLIC BLOOD PRESSURE: 118 MMHG | DIASTOLIC BLOOD PRESSURE: 62 MMHG | HEART RATE: 64 BPM | TEMPERATURE: 98.8 F

## 2023-10-31 DIAGNOSIS — J02.9 VIRAL PHARYNGITIS: Primary | ICD-10-CM

## 2023-10-31 LAB
DEPRECATED S PYO AG THROAT QL EIA: NEGATIVE
GROUP A STREP BY PCR: NOT DETECTED

## 2023-10-31 PROCEDURE — 99213 OFFICE O/P EST LOW 20 MIN: CPT | Performed by: NURSE PRACTITIONER

## 2023-10-31 PROCEDURE — 87651 STREP A DNA AMP PROBE: CPT | Performed by: NURSE PRACTITIONER

## 2023-10-31 PROCEDURE — 87635 SARS-COV-2 COVID-19 AMP PRB: CPT | Performed by: NURSE PRACTITIONER

## 2023-10-31 NOTE — PROGRESS NOTES
Assessment & Plan   Problem List Items Addressed This Visit    None  Visit Diagnoses       Viral pharyngitis    -  Primary    Relevant Orders    Streptococcus A Rapid Screen w/Reflex to PCR - Clinic Collect (Completed)    Symptomatic COVID-19 Virus (Coronavirus) by PCR (Completed)    Group A Streptococcus PCR Throat Swab (Completed)          - Strep test is negative, suspect viral cause of pharyngitis. Encouraged supportive cares including OTC pain relievers, soft/liquid diet and good hydration. If symptoms worsen or fail to improve in 7-10 days, follow up   - COVID test is still pending     Denia Llamas NP  [facility] BELLA Ludwig is a 43 year old, presenting for the following health issues:  strep (Pt states having hard time swollen ) and Blister (Pt states I got blister on my right toe, I pop it on saturday night, now it looks like it fill with blood )        10/31/2023    12:43 PM   Additional Questions   Roomed by Petra Hector   Accompanied by N/A       HPI   Concern - Blister toe  Onset: start on saturdays morning   Description: blister on the side of the toe nail   Intensity: mild  Progression of Symptoms:  same  Accompanying Signs & Symptoms: fill with blood   Previous history of similar problem: n/a  Precipitating factors:        Worsened by: when walk   Alleviating factors:        Improved by: n/a  Therapies tried and outcome: None    Her throat has become more painful to swallow. She felt feverish last night, her temperature fluctuates. During the night she has night sweats but this is usual for her. She took a COVID test last Wednesday, her result was negative. She did not have these symptoms at the time.     Minimal tickle in her throat but not a lot of coughing.           Objective    /62 (BP Location: Right arm, Patient Position: Sitting, Cuff Size: Adult Regular)   Pulse 64   Temp 98.8  F (37.1  C) (Oral)   Resp 16   LMP 09/10/2023 (Approximate)   SpO2 98%    There is no height or weight on file to calculate BMI.  Physical Exam   GENERAL: Alert and no distress. Does not appear toxic   ENT:  pharynx is erythematous with mild hypertrophy of the tonsils. No exudate   NECK: shotty bilateral adenopathy   RESP: lungs clear to auscultation - no rales, rhonchi or wheezes  CV: regular rate and rhythm, normal S1 S2

## 2023-11-01 LAB — SARS-COV-2 RNA RESP QL NAA+PROBE: NEGATIVE

## 2023-11-02 ENCOUNTER — MYC MEDICAL ADVICE (OUTPATIENT)
Dept: INTERNAL MEDICINE | Facility: CLINIC | Age: 43
End: 2023-11-02

## 2023-11-02 ENCOUNTER — OFFICE VISIT (OUTPATIENT)
Dept: INTERNAL MEDICINE | Facility: CLINIC | Age: 43
End: 2023-11-02
Payer: COMMERCIAL

## 2023-11-02 VITALS
HEART RATE: 67 BPM | RESPIRATION RATE: 20 BRPM | BODY MASS INDEX: 25.14 KG/M2 | TEMPERATURE: 98.2 F | HEIGHT: 65 IN | WEIGHT: 150.9 LBS | DIASTOLIC BLOOD PRESSURE: 73 MMHG | SYSTOLIC BLOOD PRESSURE: 130 MMHG | OXYGEN SATURATION: 99 %

## 2023-11-02 DIAGNOSIS — J02.9 ACUTE PHARYNGITIS, UNSPECIFIED ETIOLOGY: ICD-10-CM

## 2023-11-02 DIAGNOSIS — J03.90 TONSILLITIS: Primary | ICD-10-CM

## 2023-11-02 PROCEDURE — 99213 OFFICE O/P EST LOW 20 MIN: CPT | Performed by: NURSE PRACTITIONER

## 2023-11-02 RX ORDER — AMOXICILLIN 875 MG
875 TABLET ORAL 2 TIMES DAILY
Qty: 20 TABLET | Refills: 0 | Status: SHIPPED | OUTPATIENT
Start: 2023-11-02 | End: 2023-11-12

## 2023-11-02 RX ORDER — PREDNISONE 20 MG/1
40 TABLET ORAL ONCE
Qty: 2 TABLET | Refills: 0 | Status: SHIPPED | OUTPATIENT
Start: 2023-11-02 | End: 2023-11-02

## 2023-11-02 ASSESSMENT — PAIN SCALES - GENERAL: PAINLEVEL: WORST PAIN (10)

## 2023-11-02 NOTE — TELEPHONE ENCOUNTER
Can you double book her somewhere on my schedule today? It can be anywhere. I want to double check that she hasn't developed an abscess and then let her know we will probably have to do an antibiotic and maybe something for inflammation depending on how her throat looks.

## 2023-11-02 NOTE — PROGRESS NOTES
"  Assessment & Plan   Problem List Items Addressed This Visit    None  Visit Diagnoses       Tonsillitis    -  Primary    Acute pharyngitis, unspecified etiology        Relevant Medications    amoxicillin (AMOXIL) 875 MG tablet    predniSONE (DELTASONE) 20 MG tablet           Due to worsening symptoms and medical history of T1DM and tobacco use, will treat her with a one-time dose of prednisone 40 mg and amoxicillin. Return precautions were reviewed including fever/chills after 48 hours on the antibiotic, inability to swallow, difficulty breathing.        BMI:   Estimated body mass index is 25.5 kg/m  as calculated from the following:    Height as of this encounter: 1.638 m (5' 4.5\").    Weight as of this encounter: 68.4 kg (150 lb 14.4 oz).     Denia Llamas NP  [facility] BELLA Ludwig is a 43 year old, presenting for the following health issues:  Pharyngitis        11/2/2023    12:39 PM   Additional Questions   Roomed by RAHEL Puente   Accompanied by YUNG       [facility]     Patient seen a few days ago for sore throat.  Strep and COVID test was negative at that time so supportive cares were recommended.  Her throat is increasingly painful, throughout the night she awoke several times due to the pain.  She had a hard time swallowing her medication this morning.  No high fevers, chills.        Objective    /73 (BP Location: Right arm, Patient Position: Sitting, Cuff Size: Adult Regular)   Pulse 67   Temp 98.2  F (36.8  C) (Oral)   Resp 20   Ht 1.638 m (5' 4.5\")   Wt 68.4 kg (150 lb 14.4 oz)   LMP 09/10/2023 (Approximate)   SpO2 99%   BMI 25.50 kg/m    Body mass index is 25.5 kg/m .    Physical Exam   GENERAL: Appears ill but not toxic  HENT: ear canals normal, right TM is retracted.  Pharynx is erythematous and edematous, tonsils are hypertrophic, right > left. She is handling secretions appropriately  NECK: Shotty bilateral cervical adenopathy                      "

## 2023-11-10 ENCOUNTER — TELEPHONE (OUTPATIENT)
Dept: PHARMACY | Facility: CLINIC | Age: 43
End: 2023-11-10
Payer: COMMERCIAL

## 2023-11-10 NOTE — TELEPHONE ENCOUNTER
MTM referral from: [facility]    MTM referral outreach attempt #1 on November 10, 2023 at 1:25 PM      Outcome: Spoke with patient - opt out    Shirley Head, Pharm.D.,[facility]  Board Certified Geriatric Pharmacist  Medication Therapy Management Pharmacist

## 2023-11-16 DIAGNOSIS — E10.65 TYPE 1 DIABETES MELLITUS WITH HYPERGLYCEMIA (H): ICD-10-CM

## 2023-11-17 RX ORDER — INSULIN PUMP CARTRIDGE
CARTRIDGE (EA) SUBCUTANEOUS
Qty: 30 EACH | Refills: 0 | Status: SHIPPED | OUTPATIENT
Start: 2023-11-17 | End: 2024-01-15

## 2023-11-17 NOTE — TELEPHONE ENCOUNTER
Requested Prescriptions   Pending Prescriptions Disp Refills    Insulin Infusion Pump Supplies (T:SLIM X2 3ML CARTRIDGE) MISC [Pharmacy Med Name: T:SLIM X2 3ML CARTRIDGE MISC]  0     Sig: CHANGE EVERY 3 DAYS       There is no refill protocol information for this order

## 2023-12-06 ENCOUNTER — E-VISIT (OUTPATIENT)
Dept: INTERNAL MEDICINE | Facility: CLINIC | Age: 43
End: 2023-12-06
Payer: COMMERCIAL

## 2023-12-06 DIAGNOSIS — L60.8 CHANGE IN NAIL APPEARANCE: Primary | ICD-10-CM

## 2023-12-06 PROCEDURE — 99207 PR NON-BILLABLE SERV PER CHARTING: CPT | Performed by: NURSE PRACTITIONER

## 2023-12-07 ENCOUNTER — TELEPHONE (OUTPATIENT)
Dept: INTERNAL MEDICINE | Facility: CLINIC | Age: 43
End: 2023-12-07
Payer: COMMERCIAL

## 2023-12-07 NOTE — TELEPHONE ENCOUNTER
Received an e-visit related to nail changes.  I would recommend a face-to-face visit for this complaint if she needs to be seen.  I will cancel the e-visit and she will not receive a charge.

## 2023-12-07 NOTE — PATIENT INSTRUCTIONS
Thank you for choosing us for your care. I think an in-clinic visit would be best next steps based on your symptoms. Please schedule a clinic appointment; you won t be charged for this eVisit.      You can schedule an appointment right here in [facility], or call [phone]

## 2023-12-11 ENCOUNTER — LAB (OUTPATIENT)
Dept: LAB | Facility: CLINIC | Age: 43
End: 2023-12-11
Payer: COMMERCIAL

## 2023-12-11 DIAGNOSIS — E10.3299 TYPE 1 DIABETES MELLITUS WITH MILD NONPROLIFERATIVE RETINOPATHY WITHOUT MACULAR EDEMA, UNSPECIFIED LATERALITY (H): ICD-10-CM

## 2023-12-11 LAB — HBA1C MFR BLD: 6.7 % (ref 0–5.6)

## 2023-12-11 PROCEDURE — 83036 HEMOGLOBIN GLYCOSYLATED A1C: CPT

## 2023-12-11 PROCEDURE — 80048 BASIC METABOLIC PNL TOTAL CA: CPT

## 2023-12-11 PROCEDURE — 36415 COLL VENOUS BLD VENIPUNCTURE: CPT

## 2023-12-12 LAB
ANION GAP SERPL CALCULATED.3IONS-SCNC: 10 MMOL/L (ref 7–15)
BUN SERPL-MCNC: 11.8 MG/DL (ref 6–20)
CALCIUM SERPL-MCNC: 9.5 MG/DL (ref 8.6–10)
CHLORIDE SERPL-SCNC: 100 MMOL/L (ref 98–107)
CREAT SERPL-MCNC: 0.74 MG/DL (ref 0.51–0.95)
DEPRECATED HCO3 PLAS-SCNC: 29 MMOL/L (ref 22–29)
EGFRCR SERPLBLD CKD-EPI 2021: >90 ML/MIN/1.73M2
GLUCOSE SERPL-MCNC: 167 MG/DL (ref 70–99)
POTASSIUM SERPL-SCNC: 4.1 MMOL/L (ref 3.4–5.3)
SODIUM SERPL-SCNC: 139 MMOL/L (ref 135–145)

## 2023-12-18 ENCOUNTER — OFFICE VISIT (OUTPATIENT)
Dept: INTERNAL MEDICINE | Facility: CLINIC | Age: 43
End: 2023-12-18
Payer: COMMERCIAL

## 2023-12-18 VITALS
HEART RATE: 60 BPM | DIASTOLIC BLOOD PRESSURE: 80 MMHG | BODY MASS INDEX: 25.71 KG/M2 | WEIGHT: 154.3 LBS | RESPIRATION RATE: 20 BRPM | HEIGHT: 65 IN | TEMPERATURE: 97.6 F | SYSTOLIC BLOOD PRESSURE: 129 MMHG | OXYGEN SATURATION: 98 %

## 2023-12-18 DIAGNOSIS — L60.3 DYSTROPHIC NAIL: Primary | ICD-10-CM

## 2023-12-18 PROCEDURE — 99213 OFFICE O/P EST LOW 20 MIN: CPT | Performed by: NURSE PRACTITIONER

## 2023-12-18 PROCEDURE — 87101 SKIN FUNGI CULTURE: CPT | Performed by: NURSE PRACTITIONER

## 2023-12-18 PROCEDURE — 87106 FUNGI IDENTIFICATION YEAST: CPT | Performed by: NURSE PRACTITIONER

## 2023-12-18 ASSESSMENT — PAIN SCALES - GENERAL: PAINLEVEL: MILD PAIN (2)

## 2023-12-18 ASSESSMENT — PATIENT HEALTH QUESTIONNAIRE - PHQ9
SUM OF ALL RESPONSES TO PHQ QUESTIONS 1-9: 15
SUM OF ALL RESPONSES TO PHQ QUESTIONS 1-9: 15
10. IF YOU CHECKED OFF ANY PROBLEMS, HOW DIFFICULT HAVE THESE PROBLEMS MADE IT FOR YOU TO DO YOUR WORK, TAKE CARE OF THINGS AT HOME, OR GET ALONG WITH OTHER PEOPLE: VERY DIFFICULT

## 2023-12-18 NOTE — PROGRESS NOTES
"  Assessment & Plan   Problem List Items Addressed This Visit    None  Visit Diagnoses       Dystrophic nail    -  Primary    Relevant Orders    Fungus Culture,  skin, hair, or nail           Nail trauma vs possible fungal infection. Will notify patient of results of culture. If positive for fungal infection, will have her return for hepatic profile if beginning an oral medication     BMI:   Estimated body mass index is 26.08 kg/m  as calculated from the following:    Height as of this encounter: 1.638 m (5' 4.5\").    Weight as of this encounter: 70 kg (154 lb 4.8 oz).       Denia Llamas NP  [facility] BELLA Ludwig is a 43 year old, presenting for the following health issues:  Nail Problem (Left ring finger nail. She believes it's from wearing fake nails. Shape is strange. Painful to the touch. It gets stuck on things. )        12/18/2023     5:08 PM   Additional Questions   Roomed by RAHEL Puente   Accompanied by YUNG       History of Present Illness       Reason for visit:  Finger nail    She eats 0-1 servings of fruits and vegetables daily.She consumes 0 sweetened beverage(s) daily.She exercises with enough effort to increase her heart rate 9 or less minutes per day.  She exercises with enough effort to increase her heart rate 3 or less days per week. She is missing 1 dose(s) of medications per week.  She is not taking prescribed medications regularly due to remembering to take.     For the past 2 months approximately the left ring finger is thickened and has a jagged edge. She thinks it may have started after using press-on nails and removing them by pulling them off.           Objective    /80 (BP Location: Right arm, Patient Position: Sitting, Cuff Size: Adult Regular)   Pulse 60   Temp 97.6  F (36.4  C) (Oral)   Resp 20   Ht 1.638 m (5' 4.5\")   Wt 70 kg (154 lb 4.8 oz)   LMP 12/10/2023 (Exact Date)   SpO2 98%   BMI 26.08 kg/m    Body mass index is 26.08 " kg/m .  Physical Exam   Skin: left hand ring finger nail is jagged on the medial border and has a 2 mm of white colored nail which is thickened and lifted from the nail bed. No erythema along the medial border of nail. The rest of the nails on both hands are normal in appearance.     Procedure: a small nail nail sample was acquired from the lifted and white colored area of the nail. Will send for culture.

## 2023-12-20 ENCOUNTER — OFFICE VISIT (OUTPATIENT)
Dept: ENDOCRINOLOGY | Facility: CLINIC | Age: 43
End: 2023-12-20
Payer: COMMERCIAL

## 2023-12-20 ENCOUNTER — E-VISIT (OUTPATIENT)
Dept: INTERNAL MEDICINE | Facility: CLINIC | Age: 43
End: 2023-12-20
Payer: COMMERCIAL

## 2023-12-20 VITALS
SYSTOLIC BLOOD PRESSURE: 130 MMHG | WEIGHT: 151.8 LBS | BODY MASS INDEX: 25.65 KG/M2 | OXYGEN SATURATION: 97 % | HEART RATE: 66 BPM | DIASTOLIC BLOOD PRESSURE: 72 MMHG

## 2023-12-20 DIAGNOSIS — F33.2 SEVERE EPISODE OF RECURRENT MAJOR DEPRESSIVE DISORDER, WITHOUT PSYCHOTIC FEATURES (H): Primary | ICD-10-CM

## 2023-12-20 DIAGNOSIS — E10.3299 TYPE 1 DIABETES MELLITUS WITH MILD NONPROLIFERATIVE RETINOPATHY WITHOUT MACULAR EDEMA, UNSPECIFIED LATERALITY (H): Primary | ICD-10-CM

## 2023-12-20 PROCEDURE — 99421 OL DIG E/M SVC 5-10 MIN: CPT | Performed by: NURSE PRACTITIONER

## 2023-12-20 PROCEDURE — 99214 OFFICE O/P EST MOD 30 MIN: CPT | Performed by: NURSE PRACTITIONER

## 2023-12-20 ASSESSMENT — PATIENT HEALTH QUESTIONNAIRE - PHQ9
10. IF YOU CHECKED OFF ANY PROBLEMS, HOW DIFFICULT HAVE THESE PROBLEMS MADE IT FOR YOU TO DO YOUR WORK, TAKE CARE OF THINGS AT HOME, OR GET ALONG WITH OTHER PEOPLE: VERY DIFFICULT
SUM OF ALL RESPONSES TO PHQ QUESTIONS 1-9: 17
SUM OF ALL RESPONSES TO PHQ QUESTIONS 1-9: 17

## 2023-12-20 NOTE — PROGRESS NOTES
"[facility] ENDOCRINOLOGY    Diabetes Note 2023    Vani Corona, 1980, [id number]          Reason for visit      1. Type 1 diabetes mellitus with mild nonproliferative retinopathy without macular edema, unspecified laterality (H)        HPI     Vani Corona is a very pleasant 43 year old old female who presents for follow up.  SUMMARY:    Vani returns in follow-up for DM 1. Her current A1c is 6.7 and improved from her previous of 7.5.  She continues to use the Tandem system with Dexcom CGM. Unfortunately, her share code is .  We were able to download her pump, however, and data was reviewed.     She has had a bad couple of weeks. They were informed this week that her Niece, whom had lived with them for a while, had overdosed and  while in [place]. She was involved with going there to retreive the body. She did not do her usual due diligence in self care. TIR was only 12% of the time, and above, 87%.     She is having no problems referable to her feet at present. She did have what she called a \"blood blister\" at the end of her L great toe. This has resolved and she lost the top layer of skin as well. No residual problems    Renal function remains within normal limits.       Blood glucose data:      Past Medical History     Patient Active Problem List   Diagnosis    Type 1 diabetes mellitus with mild nonproliferative retinopathy without macular edema (H)    Anxiety    Arthralgia of multiple joints    GERD (gastroesophageal reflux disease)    History of ileostomy    Hypothyroidism, unspecified type    Insomnia    Methamphetamine abuse in remission (H)    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)    Non-rheumatic mitral regurgitation    Skin mass    Tobacco abuse    Selective IgA immunodeficiency (H)    Severe episode of recurrent major depressive disorder, without psychotic features (H)    Cough, r/t " COVID-19 infection 7/2022    Grief at loss of child        Family History       family history includes Cerebrovascular Disease in her paternal grandmother; Diabetes in her paternal aunt and another family member; Hypertension in her paternal grandmother; No Known Problems in her daughter, sister, son, and other family members; Other - See Comments in her father; Thyroid Disease in her mother.    Social History      reports that she quit smoking about 6 years ago. Her smoking use included cigarettes. She smoked an average of 0.5 packs per day. She has been exposed to tobacco smoke. She has never used smokeless tobacco. She reports current alcohol use. She reports that she does not use drugs.      Review of Systems     Patient has no polyuria or polydipsia, no chest pain, dyspnea or TIA's, no numbness, tingling or pain in extremities  Remainder negative except as noted in HPI.    Vital Signs     /72 (BP Location: Right arm, Patient Position: Sitting, Cuff Size: Adult Regular)   Pulse 66   Wt 68.9 kg (151 lb 12.8 oz)   LMP 12/10/2023 (Exact Date)   SpO2 97%   BMI 25.65 kg/m    Wt Readings from Last 3 Encounters:   12/20/23 68.9 kg (151 lb 12.8 oz)   12/18/23 70 kg (154 lb 4.8 oz)   11/02/23 68.4 kg (150 lb 14.4 oz)       Physical Exam     Constitutional:  Well developed, Well nourished  HENT:  Normocephalic,   Neck: Thyroid normal, No lymph nodes, Supple  Eyes:  PERRL, Conjunctiva pink  Respiratory:  Normal breath sounds, No respiratory distress  Cardiovascular:  Normal heart rate, Normal rhythm, No murmurs  GI:  Bowel sounds normal, Soft, No tenderness  Musculoskeletal:  No gross deformity or lesions, normal dorsalis pedis pulses  Skin: No acanthosis nigricans, lipoatrophy or lipodystrophy  Neurologic:  Alert & oriented x 3, nonfocal  Psychiatric:  Affect, Mood, Insight appropriate  Diabetic foot exam: no ulcers, charcot's or high risk calluses,      Assessment     1. Type 1 diabetes mellitus with mild  nonproliferative retinopathy without macular edema, unspecified laterality (H)        Plan     Vani will return to better self care as she works through her recent experience. She is working with her Mental healthcare provider and her PCP on Therapy and medication.     Follow-up with me in 6 months.       Anayeli Dave NP  [facility]  12/20/2023  7:27 AM        Lab Results     Microalbumin Urine mg/dL   Date Value Ref Range Status   05/27/2022 0.77 0.00 - 1.99 mg/dL Final       Cholesterol   Date Value Ref Range Status   02/15/2016 174 <=199 mg/dL Final     Direct Measure HDL   Date Value Ref Range Status   02/15/2016 45 (L) >=50 mg/dL Final     Triglycerides   Date Value Ref Range Status   02/15/2016 122 <=149 mg/dL Final       [unfilled]      Current Medications     Outpatient Medications Prior to Visit   Medication Sig Dispense Refill    ACCU-CHEK GUIDE test strip USE TO TEST 6 TIMES PER DAY      albuterol (PROAIR HFA/PROVENTIL HFA/VENTOLIN HFA) 108 (90 Base) MCG/ACT inhaler Inhale 2 puffs into the lungs every 6 hours 18 g 0    buPROPion (WELLBUTRIN XL) 150 MG 24 hr tablet TAKE ONE TABLET BY MOUTH EVERY MORNING 90 tablet 1    cetirizine (ZYRTEC) 10 MG tablet Take 1 tablet (10 mg) by mouth daily 90 tablet 3    Continuous Blood Gluc Sensor (DEXCOM G6 SENSOR) MISC CHANGE EVERY 10 DAYS 9 each 1    Continuous Blood Gluc Transmit (DEXCOM G6 TRANSMITTER) MISC CHANGE EVERY 3 MONTHS 1 each 1    escitalopram (LEXAPRO) 20 MG tablet TAKE ONE TABLET BY MOUTH ONCE DAILY 90 tablet 3    insulin glargine (LANTUS SOLOSTAR) 100 UNIT/ML pen INJECT 13 UNITS SUBCUTANEOUSLY 2 TIMES A DAY AS DIRECTED      Insulin Infusion Pump (T: SLIM X2 INS /CONTROL 7.4) JESSICA       Insulin Infusion Pump Supplies (AUTOSOFT XC INFUSION SET) MISC CHANGE EVERY 3 DAYS 30 each 1    Insulin Infusion Pump Supplies (T:SLIM X2 3ML CARTRIDGE) MISC CHANGE EVERY 3 DAYS 30 each 0    ketoconazole (NIZORAL) 2 % external cream Apply topically 2 times daily  30 g 0    levothyroxine (SYNTHROID) 137 MCG tablet TAKE ONE TABLET BY MOUTH ONCE DAILY 90 tablet 1    lisinopril (ZESTRIL) 2.5 MG tablet Take 1 tablet (2.5 mg) by mouth daily 90 tablet 3    nicotine (COMMIT) 2 MG lozenge APPLY 1 LOZENGE (2 MG TOTAL) TO THE MOUTH OR THROAT AS NEEDED FOR SMOKING CESSATION.      nicotine (NICODERM CQ) 14 MG/24HR 24 hr patch APPLY 1 PATCH EVERY DAY      nicotine (NICODERM CQ) 7 MG/24HR 24 hr patch APPLY 1 PATCH EVERY DAY      nicotine (NICORETTE) 2 MG gum CHEW 1 PIECE AND PLACE INSIDE CHEEK EVERY HOUR AS NEEDED FOR NICOTINE CRAVINGS      NOVOLOG FLEXPEN 100 UNIT/ML soln USE DAILY IN PUMP, UP TO 50 UNITS ONCE DAILY 45 mL 1    NOVOLOG VIAL 100 UNIT/ML soln       ondansetron (ZOFRAN) 4 MG tablet Take 1 tablet (4 mg) by mouth every 8 hours as needed for nausea 30 tablet 0    pantoprazole (PROTONIX) 40 MG EC tablet Take 1 tablet (40 mg) by mouth every morning 90 tablet 2    SUMAtriptan (IMITREX) 25 MG tablet Take 1 tablet (25 mg) by mouth at onset of headache for migraine May repeat in 2 hours. Max 8 tablets/24 hours. Due for appointment in Yue 15 tablet 0     No facility-administered medications prior to visit.

## 2023-12-20 NOTE — LETTER
"    2023         RE: Vani Corona  [address] [place] [address]        Dear Colleague,    Thank you for referring your patient, Vani Corona, to the [facility]. Please see a copy of my visit note below.    [facility] ENDOCRINOLOGY    Diabetes Note 2023    Vani Corona, 1980, [id number]          Reason for visit      1. Type 1 diabetes mellitus with mild nonproliferative retinopathy without macular edema, unspecified laterality (H)        HPI     Vani Corona is a very pleasant 43 year old old female who presents for follow up.  SUMMARY:    Vani returns in follow-up for DM 1. Her current A1c is 6.7 and improved from her previous of 7.5.  She continues to use the Tandem system with Dexcom CGM. Unfortunately, her share code is .  We were able to download her pump, however, and data was reviewed.     She has had a bad couple of weeks. They were informed this week that her Niece, whom had lived with them for a while, had overdosed and  while in [place]. She was involved with going there to retreive the body. She did not do her usual due diligence in self care. TIR was only 12% of the time, and above, 87%.     She is having no problems referable to her feet at present. She did have what she called a \"blood blister\" at the end of her L great toe. This has resolved and she lost the top layer of skin as well. No residual problems    Renal function remains within normal limits.       Blood glucose data:      Past Medical History     Patient Active Problem List   Diagnosis     Type 1 diabetes mellitus with mild nonproliferative retinopathy without macular edema (H)     Anxiety     Arthralgia of multiple joints     GERD (gastroesophageal reflux disease)     History of ileostomy     Hypothyroidism, unspecified type     Insomnia     Methamphetamine abuse in remission (H)     Mild nonproliferative diabetic retinopathy of both eyes without macular edema " associated with type 1 diabetes mellitus (H)     Non-rheumatic mitral regurgitation     Skin mass     Tobacco abuse     Selective IgA immunodeficiency (H)     Severe episode of recurrent major depressive disorder, without psychotic features (H)     Cough, r/t COVID-19 infection 7/2022     Grief at loss of child        Family History       family history includes Cerebrovascular Disease in her paternal grandmother; Diabetes in her paternal aunt and another family member; Hypertension in her paternal grandmother; No Known Problems in her daughter, sister, son, and other family members; Other - See Comments in her father; Thyroid Disease in her mother.    Social History      reports that she quit smoking about 6 years ago. Her smoking use included cigarettes. She smoked an average of 0.5 packs per day. She has been exposed to tobacco smoke. She has never used smokeless tobacco. She reports current alcohol use. She reports that she does not use drugs.      Review of Systems     Patient has no polyuria or polydipsia, no chest pain, dyspnea or TIA's, no numbness, tingling or pain in extremities  Remainder negative except as noted in HPI.    Vital Signs     /72 (BP Location: Right arm, Patient Position: Sitting, Cuff Size: Adult Regular)   Pulse 66   Wt 68.9 kg (151 lb 12.8 oz)   LMP 12/10/2023 (Exact Date)   SpO2 97%   BMI 25.65 kg/m    Wt Readings from Last 3 Encounters:   12/20/23 68.9 kg (151 lb 12.8 oz)   12/18/23 70 kg (154 lb 4.8 oz)   11/02/23 68.4 kg (150 lb 14.4 oz)       Physical Exam     Constitutional:  Well developed, Well nourished  HENT:  Normocephalic,   Neck: Thyroid normal, No lymph nodes, Supple  Eyes:  PERRL, Conjunctiva pink  Respiratory:  Normal breath sounds, No respiratory distress  Cardiovascular:  Normal heart rate, Normal rhythm, No murmurs  GI:  Bowel sounds normal, Soft, No tenderness  Musculoskeletal:  No gross deformity or lesions, normal dorsalis pedis pulses  Skin: No acanthosis  nigricans, lipoatrophy or lipodystrophy  Neurologic:  Alert & oriented x 3, nonfocal  Psychiatric:  Affect, Mood, Insight appropriate  Diabetic foot exam: no ulcers, charcot's or high risk calluses,      Assessment     1. Type 1 diabetes mellitus with mild nonproliferative retinopathy without macular edema, unspecified laterality (H)        Plan     Vani will return to better self care as she works through her recent experience. She is working with her Mental healthcare provider and her PCP on Therapy and medication.     Follow-up with me in 6 months.       Anayeli Dave NP  [facility]  12/20/2023  7:27 AM        Lab Results     Microalbumin Urine mg/dL   Date Value Ref Range Status   05/27/2022 0.77 0.00 - 1.99 mg/dL Final       Cholesterol   Date Value Ref Range Status   02/15/2016 174 <=199 mg/dL Final     Direct Measure HDL   Date Value Ref Range Status   02/15/2016 45 (L) >=50 mg/dL Final     Triglycerides   Date Value Ref Range Status   02/15/2016 122 <=149 mg/dL Final       [unfilled]      Current Medications     Outpatient Medications Prior to Visit   Medication Sig Dispense Refill     ACCU-CHEK GUIDE test strip USE TO TEST 6 TIMES PER DAY       albuterol (PROAIR HFA/PROVENTIL HFA/VENTOLIN HFA) 108 (90 Base) MCG/ACT inhaler Inhale 2 puffs into the lungs every 6 hours 18 g 0     buPROPion (WELLBUTRIN XL) 150 MG 24 hr tablet TAKE ONE TABLET BY MOUTH EVERY MORNING 90 tablet 1     cetirizine (ZYRTEC) 10 MG tablet Take 1 tablet (10 mg) by mouth daily 90 tablet 3     Continuous Blood Gluc Sensor (DEXCOM G6 SENSOR) MISC CHANGE EVERY 10 DAYS 9 each 1     Continuous Blood Gluc Transmit (DEXCOM G6 TRANSMITTER) MISC CHANGE EVERY 3 MONTHS 1 each 1     escitalopram (LEXAPRO) 20 MG tablet TAKE ONE TABLET BY MOUTH ONCE DAILY 90 tablet 3     insulin glargine (LANTUS SOLOSTAR) 100 UNIT/ML pen INJECT 13 UNITS SUBCUTANEOUSLY 2 TIMES A DAY AS DIRECTED       Insulin Infusion Pump (T: SLIM X2 INS /CONTROL 7.4) JESSICA         Insulin Infusion Pump Supplies (AUTOSOFT XC INFUSION SET) MISC CHANGE EVERY 3 DAYS 30 each 1     Insulin Infusion Pump Supplies (T:SLIM X2 3ML CARTRIDGE) MISC CHANGE EVERY 3 DAYS 30 each 0     ketoconazole (NIZORAL) 2 % external cream Apply topically 2 times daily 30 g 0     levothyroxine (SYNTHROID) 137 MCG tablet TAKE ONE TABLET BY MOUTH ONCE DAILY 90 tablet 1     lisinopril (ZESTRIL) 2.5 MG tablet Take 1 tablet (2.5 mg) by mouth daily 90 tablet 3     nicotine (COMMIT) 2 MG lozenge APPLY 1 LOZENGE (2 MG TOTAL) TO THE MOUTH OR THROAT AS NEEDED FOR SMOKING CESSATION.       nicotine (NICODERM CQ) 14 MG/24HR 24 hr patch APPLY 1 PATCH EVERY DAY       nicotine (NICODERM CQ) 7 MG/24HR 24 hr patch APPLY 1 PATCH EVERY DAY       nicotine (NICORETTE) 2 MG gum CHEW 1 PIECE AND PLACE INSIDE CHEEK EVERY HOUR AS NEEDED FOR NICOTINE CRAVINGS       NOVOLOG FLEXPEN 100 UNIT/ML soln USE DAILY IN PUMP, UP TO 50 UNITS ONCE DAILY 45 mL 1     NOVOLOG VIAL 100 UNIT/ML soln        ondansetron (ZOFRAN) 4 MG tablet Take 1 tablet (4 mg) by mouth every 8 hours as needed for nausea 30 tablet 0     pantoprazole (PROTONIX) 40 MG EC tablet Take 1 tablet (40 mg) by mouth every morning 90 tablet 2     SUMAtriptan (IMITREX) 25 MG tablet Take 1 tablet (25 mg) by mouth at onset of headache for migraine May repeat in 2 hours. Max 8 tablets/24 hours. Due for appointment in Yue 15 tablet 0     No facility-administered medications prior to visit.                 Again, thank you for allowing me to participate in the care of your patient.        Sincerely,        Anayeli Dave NP

## 2023-12-21 RX ORDER — BUPROPION HYDROCHLORIDE 300 MG/1
300 TABLET ORAL EVERY MORNING
Qty: 30 TABLET | Refills: 1 | Status: SHIPPED | OUTPATIENT
Start: 2023-12-21 | End: 2024-01-15

## 2023-12-21 ASSESSMENT — PATIENT HEALTH QUESTIONNAIRE - PHQ9: SUM OF ALL RESPONSES TO PHQ QUESTIONS 1-9: 17

## 2023-12-21 NOTE — PATIENT INSTRUCTIONS
My Depression Action Plan  Name: Vani Corona   Date of Birth 1980  Date: 12/21/2023    My doctor: Denia Llamas   My clinic: [address] [address]  [phone]          GREEN    ZONE   Good Control    What it looks like:   Things are going generally well. You have normal ups and downs. You may even feel depressed from time to time, but bad moods usually last less than a day.   What you need to do:  Continue to care for yourself (see self care plan)  Check your depression survival kit and update it as needed  Follow your physician s recommendations including any medication.  Do not stop taking medication unless you consult with your physician first.           YELLOW         ZONE Getting Worse    What it looks like:   Depression is starting to interfere with your life.   It may be hard to get out of bed; you may be starting to isolate yourself from others.  Symptoms of depression are starting to last most all day and this has happened for several days.   You may have suicidal thoughts but they are not constant.   What you need to do:     Call your care team. Your response to treatment will improve if you keep your care team informed of your progress. Yellow periods are signs an adjustment may need to be made.     Continue your self-care.  Just get dressed and ready for the day.  Don't give yourself time to talk yourself out of it.    Talk to someone in your support network.    Open up your Depression Self-Care Plan/Wellness Kit.           RED    ZONE Medical Alert - Get Help    What it looks like:   Depression is seriously interfering with your life.   You may experience these or other symptoms: You can t get out of bed most days, can t work or engage in other necessary activities, you have trouble taking care of basic hygiene, or basic responsibilities, thoughts of suicide or death that will not go away, self-injurious  behavior.     What you need to do:  Call your care team and request a same-day appointment. If they are not available (weekends or after hours) call your local crisis line, emergency room or 911.          Depression Self-Care Plan / Wellness Kit    Many people find that medication and therapy are helpful treatments for managing depression. In addition, making small changes to your everyday life can help to boost your mood and improve your wellbeing. Below are some tips for you to consider. Be sure to talk with your medical provider and/or behavioral health consultant if your symptoms are worsening or not improving.     Sleep   Sleep hygiene  means all of the habits that support good, restful sleep. It includes maintaining a consistent bedtime and wake time, using your bedroom only for sleeping or sex, and keeping the bedroom dark and free of distractions like a computer, smartphone, or television.     Develop a Healthy Routine  Maintain good hygiene. Get out of bed in the morning, make your bed, brush your teeth, take a shower, and get dressed. Don t spend too much time viewing media that makes you feel stressed. Find time to relax each day.    Exercise  Get some form of exercise every day. This will help reduce pain and release endorphins, the  feel good  chemicals in your brain. It can be as simple as just going for a walk or doing some gardening, anything that will get you moving.      Diet  Strive to eat healthy foods, including fruits and vegetables. Drink plenty of water. Avoid excessive sugar, caffeine, alcohol, and other mood-altering substances.     Stay Connected with Others  Stay in touch with friends and family members.    Manage Your Mood  Try deep breathing, massage therapy, biofeedback, or meditation. Take part in fun activities when you can. Try to find something to smile about each day.     Psychotherapy  Be open to working with a therapist if your provider recommends it.     Medication  Be sure to  take your medication as prescribed. Most anti-depressants need to be taken every day. It usually takes several weeks for medications to work. Not all medicines work for all people. It is important to follow-up with your provider to make sure you have a treatment plan that is working for you. Do not stop your medication abruptly without first discussing it with your provider.    Crisis Resources   These hotlines are for both adults and children. They and are open 24 hours a day, 7 days a week unless noted otherwise.    National Suicide Prevention Lifeline   988 or 7-187-946-NAJB (4933)    Crisis Text Line    www.crisistextline.org  Text HOME to 568151 from anywhere in the [place], anytime, about any type of crisis. A live, trained crisis counselor will receive the text and respond quickly.    [place] Lifeline for LGBTQ Youth  A national crisis intervention and suicide lifeline for LGBTQ youth under 25. Provides a safe place to talk without judgement. Call 1-153.453.4280; text START to 437726 or visit www.theLawPivotvorproject.org to talk to a trained counselor.    For [facility] crisis numbers, visit the [facility] website at:  https://[place].gov/dhs/people-we-serve/adults/health-care/mental-health/resources/crisis-contacts.jsp    Terri Gautam for reaching out about depression. I know this can be a hard time of the year. I'm okay increasing the Wellbutrin to 300 mg daily and sent this to your pharmacy. I would like to see you back in 4 weeks to know how you're doing. This could be a video visit but I would prefer that it's not an e-visit (like this one) just so we can talk through how things are going.   Denia

## 2024-01-15 ENCOUNTER — MYC MEDICAL ADVICE (OUTPATIENT)
Dept: INTERNAL MEDICINE | Facility: CLINIC | Age: 44
End: 2024-01-15
Payer: COMMERCIAL

## 2024-01-15 DIAGNOSIS — K21.9 GASTROESOPHAGEAL REFLUX DISEASE, UNSPECIFIED WHETHER ESOPHAGITIS PRESENT: ICD-10-CM

## 2024-01-15 DIAGNOSIS — R80.9 TYPE 1 DIABETES MELLITUS WITH MICROALBUMINURIA (H): ICD-10-CM

## 2024-01-15 DIAGNOSIS — F41.9 ANXIETY: ICD-10-CM

## 2024-01-15 DIAGNOSIS — E10.29 TYPE 1 DIABETES MELLITUS WITH MICROALBUMINURIA (H): ICD-10-CM

## 2024-01-15 DIAGNOSIS — F33.2 SEVERE EPISODE OF RECURRENT MAJOR DEPRESSIVE DISORDER, WITHOUT PSYCHOTIC FEATURES (H): ICD-10-CM

## 2024-01-15 LAB — BACTERIA SPEC CULT: ABNORMAL

## 2024-01-15 RX ORDER — ESCITALOPRAM OXALATE 20 MG/1
20 TABLET ORAL DAILY
Qty: 90 TABLET | Refills: 3 | Status: SHIPPED | OUTPATIENT
Start: 2024-01-15

## 2024-01-15 RX ORDER — BUPROPION HYDROCHLORIDE 150 MG/1
150 TABLET ORAL EVERY MORNING
Qty: 90 TABLET | Refills: 1 | Status: SHIPPED | OUTPATIENT
Start: 2024-01-15 | End: 2024-01-22 | Stop reason: DRUGHIGH

## 2024-01-15 RX ORDER — PANTOPRAZOLE SODIUM 40 MG/1
40 TABLET, DELAYED RELEASE ORAL EVERY MORNING
Qty: 90 TABLET | Refills: 2 | Status: SHIPPED | OUTPATIENT
Start: 2024-01-15

## 2024-01-15 RX ORDER — LISINOPRIL 2.5 MG/1
2.5 TABLET ORAL DAILY
Qty: 90 TABLET | Refills: 3 | Status: SHIPPED | OUTPATIENT
Start: 2024-01-15

## 2024-01-15 RX ORDER — BUPROPION HYDROCHLORIDE 300 MG/1
300 TABLET ORAL EVERY MORNING
Qty: 90 TABLET | Refills: 1 | Status: SHIPPED | OUTPATIENT
Start: 2024-01-15 | End: 2024-09-16

## 2024-01-15 NOTE — TELEPHONE ENCOUNTER
Patient requesting 90 day supply for meds since she will be losing her insurance at the end of the month.    Please review

## 2024-01-16 ENCOUNTER — MYC MEDICAL ADVICE (OUTPATIENT)
Dept: INTERNAL MEDICINE | Facility: CLINIC | Age: 44
End: 2024-01-16
Payer: COMMERCIAL

## 2024-01-16 DIAGNOSIS — B35.1 ONYCHOMYCOSIS: Primary | ICD-10-CM

## 2024-01-17 ENCOUNTER — LAB (OUTPATIENT)
Dept: LAB | Facility: CLINIC | Age: 44
End: 2024-01-17
Payer: COMMERCIAL

## 2024-01-17 DIAGNOSIS — B35.1 ONYCHOMYCOSIS: ICD-10-CM

## 2024-01-17 PROCEDURE — 80076 HEPATIC FUNCTION PANEL: CPT

## 2024-01-17 PROCEDURE — 36415 COLL VENOUS BLD VENIPUNCTURE: CPT

## 2024-01-18 LAB
ALBUMIN SERPL BCG-MCNC: 4 G/DL (ref 3.5–5.2)
ALP SERPL-CCNC: 69 U/L (ref 40–150)
ALT SERPL W P-5'-P-CCNC: 26 U/L (ref 0–50)
AST SERPL W P-5'-P-CCNC: 29 U/L (ref 0–45)
BILIRUB DIRECT SERPL-MCNC: <0.2 MG/DL (ref 0–0.3)
BILIRUB SERPL-MCNC: 0.5 MG/DL
PROT SERPL-MCNC: 6.9 G/DL (ref 6.4–8.3)

## 2024-01-19 ENCOUNTER — TELEPHONE (OUTPATIENT)
Dept: INTERNAL MEDICINE | Facility: CLINIC | Age: 44
End: 2024-01-19
Payer: COMMERCIAL

## 2024-01-19 RX ORDER — ITRACONAZOLE 100 MG/1
200 CAPSULE ORAL DAILY
Qty: 28 CAPSULE | Refills: 0 | Status: SHIPPED | OUTPATIENT
Start: 2024-01-19 | End: 2024-01-22

## 2024-01-19 NOTE — TELEPHONE ENCOUNTER
Prior Authorization Retail Medication Request    Medication/Dose: Itraconazole 100mg capsules  Diagnosis and ICD code (if different than what is on RX):  N/A  New/renewal/insurance change PA/secondary ins. PA:  Previously Tried and Failed:  N/A  Rationale:  N/A    Insurance   Primary: [facility]  Insurance ID:  [id number]    Secondary (if applicable):N/A  Insurance ID:  N/A    Pharmacy Information (if different than what is on RX)  Name:  [facility] [place]  Phone:  [phone]  Fax:[phone]

## 2024-01-22 ENCOUNTER — VIRTUAL VISIT (OUTPATIENT)
Dept: INTERNAL MEDICINE | Facility: CLINIC | Age: 44
End: 2024-01-22
Payer: COMMERCIAL

## 2024-01-22 DIAGNOSIS — E10.3299 TYPE 1 DIABETES MELLITUS WITH MILD NONPROLIFERATIVE RETINOPATHY WITHOUT MACULAR EDEMA, UNSPECIFIED LATERALITY (H): ICD-10-CM

## 2024-01-22 DIAGNOSIS — B35.1 ONYCHOMYCOSIS: Primary | ICD-10-CM

## 2024-01-22 DIAGNOSIS — F15.11 METHAMPHETAMINE ABUSE IN REMISSION (H): ICD-10-CM

## 2024-01-22 DIAGNOSIS — F33.2 SEVERE EPISODE OF RECURRENT MAJOR DEPRESSIVE DISORDER, WITHOUT PSYCHOTIC FEATURES (H): ICD-10-CM

## 2024-01-22 PROBLEM — D80.2 SELECTIVE IGA IMMUNODEFICIENCY (H): Status: RESOLVED | Noted: 2022-06-03 | Resolved: 2024-01-22

## 2024-01-22 PROCEDURE — 99213 OFFICE O/P EST LOW 20 MIN: CPT | Mod: 95 | Performed by: NURSE PRACTITIONER

## 2024-01-22 RX ORDER — TERBINAFINE HYDROCHLORIDE 250 MG/1
250 TABLET ORAL DAILY
Qty: 42 TABLET | Refills: 0 | Status: SHIPPED | OUTPATIENT
Start: 2024-01-22 | End: 2024-03-04

## 2024-01-22 ASSESSMENT — ANXIETY QUESTIONNAIRES
IF YOU CHECKED OFF ANY PROBLEMS ON THIS QUESTIONNAIRE, HOW DIFFICULT HAVE THESE PROBLEMS MADE IT FOR YOU TO DO YOUR WORK, TAKE CARE OF THINGS AT HOME, OR GET ALONG WITH OTHER PEOPLE: SOMEWHAT DIFFICULT
5. BEING SO RESTLESS THAT IT IS HARD TO SIT STILL: SEVERAL DAYS
3. WORRYING TOO MUCH ABOUT DIFFERENT THINGS: SEVERAL DAYS
GAD7 TOTAL SCORE: 7
8. IF YOU CHECKED OFF ANY PROBLEMS, HOW DIFFICULT HAVE THESE MADE IT FOR YOU TO DO YOUR WORK, TAKE CARE OF THINGS AT HOME, OR GET ALONG WITH OTHER PEOPLE?: SOMEWHAT DIFFICULT
4. TROUBLE RELAXING: SEVERAL DAYS
GAD7 TOTAL SCORE: 7
7. FEELING AFRAID AS IF SOMETHING AWFUL MIGHT HAPPEN: NOT AT ALL
1. FEELING NERVOUS, ANXIOUS, OR ON EDGE: MORE THAN HALF THE DAYS
2. NOT BEING ABLE TO STOP OR CONTROL WORRYING: SEVERAL DAYS
6. BECOMING EASILY ANNOYED OR IRRITABLE: SEVERAL DAYS
7. FEELING AFRAID AS IF SOMETHING AWFUL MIGHT HAPPEN: NOT AT ALL

## 2024-01-22 ASSESSMENT — PATIENT HEALTH QUESTIONNAIRE - PHQ9
SUM OF ALL RESPONSES TO PHQ QUESTIONS 1-9: 5
10. IF YOU CHECKED OFF ANY PROBLEMS, HOW DIFFICULT HAVE THESE PROBLEMS MADE IT FOR YOU TO DO YOUR WORK, TAKE CARE OF THINGS AT HOME, OR GET ALONG WITH OTHER PEOPLE: SOMEWHAT DIFFICULT
SUM OF ALL RESPONSES TO PHQ QUESTIONS 1-9: 5

## 2024-01-22 NOTE — PROGRESS NOTES
"Vani is a 43 year old who is being evaluated via a billable video visit.      How would you like to obtain your AVS? MyChart  If the video visit is dropped, the invitation should be resent by: Text to cell phone: [phone]  Will anyone else be joining your video visit? No      Assessment & Plan   Problem List Items Addressed This Visit       Type 1 diabetes mellitus with mild nonproliferative retinopathy without macular edema (H)     A1C at goal of 6.7%. Followed by endocrinology          Methamphetamine abuse in remission (H)     Remission          Severe episode of recurrent major depressive disorder, without psychotic features (H)     Doing better with addition of bupropion   Continue escitalopram and bupropion           Other Visit Diagnoses       Onychomycosis    -  Primary    Relevant Medications    terbinafine (LAMISIL) 250 MG tablet           - Start oral terbinafine   - Continue bupropion 300 mg daily  - Follow up in 6 months      BMI  Estimated body mass index is 25.65 kg/m  as calculated from the following:    Height as of 12/18/23: 1.638 m (5' 4.5\").    Weight as of 12/20/23: 68.9 kg (151 lb 12.8 oz).         Subjective   Vani is a 43 year old, presenting for the following health issues:  Follow Up        1/22/2024     8:55 AM   Additional Questions   Roomed by Petra Hector   Accompanied by N/A     Via the Health Maintenance questionnaire, the patient has reported the following services have been completed -Foot Exam, this information has been sent to the abstraction team.    History of Present Illness       Mental Health Follow-up:  Patient presents to follow-up on Depression & Anxiety.Patient's depression since last visit has been:  Better  The patient is not having other symptoms associated with depression.  Patient's anxiety since last visit has been:  Better  The patient is not having other symptoms associated with anxiety.  Any significant life events: financial concerns and health " concerns  Patient is not feeling anxious or having panic attacks.  Patient has no concerns about alcohol or drug use.    She eats 0-1 servings of fruits and vegetables daily.She consumes 1 sweetened beverage(s) daily.She exercises with enough effort to increase her heart rate 9 or less minutes per day.  She exercises with enough effort to increase her heart rate 3 or less days per week.   She is taking medications regularly.         12/18/2023     5:02 PM 12/20/2023     9:02 AM 1/22/2024     8:32 AM   PHQ   PHQ-9 Total Score 15 17 5   Q9: Thoughts of better off dead/self-harm past 2 weeks Not at all Not at all Not at all   She is doing better from a mood standpoint, tolerating bigger dose of bupropion well. She has some stress about changing insurance plans at the end of the month.           Objective    Vitals - Patient Reported  Weight (Patient Reported): 68.5 kg (151 lb)      Vitals:  No vitals were obtained today due to virtual visit.    Physical Exam   GENERAL: alert and no distress  EYES: Eyes grossly normal to inspection.  No discharge or erythema, or obvious scleral/conjunctival abnormalities.  RESP: No audible wheeze, cough, or visible cyanosis.    SKIN: left ring finger with white dystrophic nail   NEURO: Cranial nerves grossly intact.  Mentation and speech appropriate for age.  PSYCH: Appropriate affect, tone, and pace of words          Video-Visit Details    Type of service:  Video Visit   Video Start Time: 9:07 AM  Video End Time:9:13 AM  Originating Location (pt. Location): Home  Distant Location (provider location):  On-site  Platform used for Video Visit: Jose Luis  Signed Electronically by: Denia Llamas NP

## 2024-02-26 ENCOUNTER — OFFICE VISIT (OUTPATIENT)
Dept: FAMILY MEDICINE | Facility: CLINIC | Age: 44
End: 2024-02-26
Payer: COMMERCIAL

## 2024-02-26 VITALS
HEART RATE: 86 BPM | HEIGHT: 64 IN | TEMPERATURE: 97.1 F | RESPIRATION RATE: 20 BRPM | OXYGEN SATURATION: 100 % | SYSTOLIC BLOOD PRESSURE: 116 MMHG | BODY MASS INDEX: 26.12 KG/M2 | DIASTOLIC BLOOD PRESSURE: 75 MMHG | WEIGHT: 153 LBS

## 2024-02-26 DIAGNOSIS — E10.3293 MILD NONPROLIFERATIVE DIABETIC RETINOPATHY OF BOTH EYES WITHOUT MACULAR EDEMA ASSOCIATED WITH TYPE 1 DIABETES MELLITUS (H): ICD-10-CM

## 2024-02-26 DIAGNOSIS — F15.11 METHAMPHETAMINE ABUSE IN REMISSION (H): ICD-10-CM

## 2024-02-26 DIAGNOSIS — H66.91 RIGHT OTITIS MEDIA, UNSPECIFIED OTITIS MEDIA TYPE: ICD-10-CM

## 2024-02-26 DIAGNOSIS — J02.9 ACUTE PHARYNGITIS, UNSPECIFIED ETIOLOGY: Primary | ICD-10-CM

## 2024-02-26 LAB
DEPRECATED S PYO AG THROAT QL EIA: NEGATIVE
GROUP A STREP BY PCR: NOT DETECTED

## 2024-02-26 PROCEDURE — 87651 STREP A DNA AMP PROBE: CPT | Performed by: FAMILY MEDICINE

## 2024-02-26 PROCEDURE — 99214 OFFICE O/P EST MOD 30 MIN: CPT | Performed by: FAMILY MEDICINE

## 2024-02-26 ASSESSMENT — ENCOUNTER SYMPTOMS: SORE THROAT: 1

## 2024-02-26 ASSESSMENT — PAIN SCALES - GENERAL: PAINLEVEL: SEVERE PAIN (6)

## 2024-02-26 NOTE — PROGRESS NOTES
"  Assessment & Plan     Acute pharyngitis, unspecified etiology  Advised symptomatic care, gargle with salt and water, good hydration, take medication as prescribed, possible side effect discussed, follow with PCP if not improving within a week.  - Streptococcus A Rapid Screen w/Reflex to PCR - Clinic Collect  - Group A Streptococcus PCR Throat Swab  - amoxicillin-clavulanate (AUGMENTIN) 875-125 MG tablet; Take 1 tablet by mouth 2 times daily for 10 days    Methamphetamine abuse in remission (H)  Appears in remission's.    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    Right otitis media, unspecified otitis media type    - amoxicillin-clavulanate (AUGMENTIN) 875-125 MG tablet; Take 1 tablet by mouth 2 times daily for 10 days    Review of external notes as documented elsewhere in note  30 minutes spent by me on the date of the encounter doing chart review, review of outside records, review of test results, interpretation of tests, patient visit, and documentation       BMI  Estimated body mass index is 26.12 kg/m  as calculated from the following:    Height as of this encounter: 1.63 m (5' 4.17\").    Weight as of this encounter: 69.4 kg (153 lb).   Weight management plan: Discussed healthy diet and exercise guidelines          Deborah Ludwig is a 43 year old, presenting for the following health issues:  Pharyngitis      2/26/2024     4:00 PM   Additional Questions   Roomed by Meredith     Pharyngadalberto   This is a new problem. The current episode started more than 1 week ago. The problem has been gradually worsening. There has been no fever. Associated symptoms include ear pain.   History of Present Illness       Reason for visit:  Trouble swallowing  Symptom onset:  3-7 days ago  Symptoms include:  Sore/scratchy throat  Symptom intensity:  Moderate  Symptom progression:  Worsening  Had these symptoms " "before:  Yes  Has tried/received treatment for these symptoms:  Yes  Previous treatment was successful:  Yes  Prior treatment description:  Antibiotics  What makes it worse:  Swallowing  What makes it better:  No    She eats 2-3 servings of fruits and vegetables daily.She consumes 0 sweetened beverage(s) daily.She exercises with enough effort to increase her heart rate 10 to 19 minutes per day.  She exercises with enough effort to increase her heart rate 3 or less days per week. She is missing 1 dose(s) of medications per week.  She is not taking prescribed medications regularly due to remembering to take.       Sore throat for about a week, sensation of scratchy throat, unable to swallow and will liquid and solid.  Try gargling with salt and water with no improvement.  Right ear pain for the past 2 to 3 days mild to moderate throbbing pain, no drainage, no hearing loss.  She has diabetes type 1, followed by endocrinologist.    Review of Systems  Constitutional, HEENT, cardiovascular, pulmonary, gi and gu systems are negative, except as otherwise noted.      Objective    /75 (BP Location: Right arm, Patient Position: Sitting, Cuff Size: Adult Regular)   Pulse 86   Temp 97.1  F (36.2  C) (Temporal)   Resp 20   Ht 1.63 m (5' 4.17\")   Wt 69.4 kg (153 lb)   LMP 01/26/2024 (Approximate)   SpO2 100%   BMI 26.12 kg/m    Body mass index is 26.12 kg/m . Prior to immunization administration, verified patients identity using patient s name and date of birth. Please see Immunization Activity for additional information.     Screening Questionnaire for Adult Immunization    Are you sick today?   Don't Know   Do you have allergies to medications, food, a vaccine component or latex?   Yes   Have you ever had a serious reaction after receiving a vaccination?   No   Do you have a long-term health problem with heart, lung, kidney, or metabolic disease (e.g., diabetes), asthma, a blood disorder, no spleen, complement " component deficiency, a cochlear implant, or a spinal fluid leak?  Are you on long-term aspirin therapy?   Yes   Do you have cancer, leukemia, HIV/AIDS, or any other immune system problem?   No   Do you have a parent, brother, or sister with an immune system problem?   No   In the past 3 months, have you taken medications that affect  your immune system, such as prednisone, other steroids, or anticancer drugs; drugs for the treatment of rheumatoid arthritis, Crohn s disease, or psoriasis; or have you had radiation treatments?   No   Have you had a seizure, or a brain or other nervous system problem?   No   During the past year, have you received a transfusion of blood or blood    products, or been given immune (gamma) globulin or antiviral drug?   No   For women: Are you pregnant or is there a chance you could become       pregnant during the next month?   No   Have you received any vaccinations in the past 4 weeks?   No     Immunization questionnaire was positive for at least one answer.  Notified .           Screening performed by Meredith Cardona MA on 2/26/2024 at 4:06 PM.       Physical Exam   GENERAL: alert and no distress  HENT: ear canals Normal, mildly erythematous right TM, nose and mouth without ulcers or lesions  NECK: no adenopathy, no asymmetry, masses, or scars  RESP: lungs clear to auscultation - no rales, rhonchi or wheezes  CV: regular rate and rhythm, normal S1 S2, no S3 or S4, no murmur, click or rub, no peripheral edema  ABDOMEN: soft, nontender, no hepatosplenomegaly, no masses and bowel sounds normal  MS: no gross musculoskeletal defects noted, no edema    Results for orders placed or performed in visit on 02/26/24   Streptococcus A Rapid Screen w/Reflex to PCR - Clinic Collect     Status: Normal    Specimen: Throat; Swab   Result Value Ref Range    Group A Strep antigen Negative Negative   Group A Streptococcus PCR Throat Swab     Status: Normal    Specimen: Throat; Swab   Result Value Ref  Range    Group A strep by PCR Not Detected Not Detected    Narrative    The Xpert Xpress Strep A test, performed on the [employer], is a rapid, qualitative in vitro diagnostic test for the detection of Streptococcus pyogenes (Group A ß-hemolytic Streptococcus, Strep A) in throat swab specimens from patients with signs and symptoms of pharyngitis. The Xpert Xpress Strep A test can be used as an aid in the diagnosis of Group A Streptococcal pharyngitis. The assay is not intended to monitor treatment for Group A Streptococcus infections. The Xpert Xpress Strep A test utilizes an automated real-time polymerase chain reaction (PCR) to detect Streptococcus pyogenes DNA.           Signed Electronically by: Reza Graf MD

## 2024-03-14 ENCOUNTER — TELEPHONE (OUTPATIENT)
Dept: INTERNAL MEDICINE | Facility: CLINIC | Age: 44
End: 2024-03-14

## 2024-03-14 ENCOUNTER — OFFICE VISIT (OUTPATIENT)
Dept: INTERNAL MEDICINE | Facility: CLINIC | Age: 44
End: 2024-03-14
Payer: COMMERCIAL

## 2024-03-14 VITALS
WEIGHT: 149.9 LBS | BODY MASS INDEX: 25.59 KG/M2 | TEMPERATURE: 97.9 F | HEART RATE: 74 BPM | DIASTOLIC BLOOD PRESSURE: 78 MMHG | OXYGEN SATURATION: 100 % | HEIGHT: 64 IN | SYSTOLIC BLOOD PRESSURE: 119 MMHG | RESPIRATION RATE: 24 BRPM

## 2024-03-14 DIAGNOSIS — K21.9 GASTROESOPHAGEAL REFLUX DISEASE, UNSPECIFIED WHETHER ESOPHAGITIS PRESENT: Primary | ICD-10-CM

## 2024-03-14 DIAGNOSIS — H69.91 DYSFUNCTION OF RIGHT EUSTACHIAN TUBE: ICD-10-CM

## 2024-03-14 DIAGNOSIS — J02.9 SORE THROAT: ICD-10-CM

## 2024-03-14 PROCEDURE — 90471 IMMUNIZATION ADMIN: CPT | Performed by: NURSE PRACTITIONER

## 2024-03-14 PROCEDURE — 90746 HEPB VACCINE 3 DOSE ADULT IM: CPT | Performed by: NURSE PRACTITIONER

## 2024-03-14 PROCEDURE — 99214 OFFICE O/P EST MOD 30 MIN: CPT | Mod: 25 | Performed by: NURSE PRACTITIONER

## 2024-03-14 RX ORDER — PANTOPRAZOLE SODIUM 40 MG/1
40 TABLET, DELAYED RELEASE ORAL 2 TIMES DAILY
Qty: 28 TABLET | Refills: 0 | Status: SHIPPED | OUTPATIENT
Start: 2024-03-14 | End: 2024-03-28

## 2024-03-14 ASSESSMENT — PAIN SCALES - GENERAL: PAINLEVEL: MODERATE PAIN (4)

## 2024-03-14 NOTE — ASSESSMENT & PLAN NOTE
No s/s of viral or bacterial pharyngitis. She was prescribed Augmentin in February for similar symptoms without resolution  - Suspect reflux may be the cause due to associated symptoms of a dry cough, throat clearing  - START: pantoprazole 40 mg BID x 14 days then resume daily dosing thereafter  - Follow up if sore throat persists

## 2024-03-14 NOTE — TELEPHONE ENCOUNTER
Prior Authorization Retail Medication Request    Medication/Dose: Pantoprazole   Diagnosis and ICD code (if different than what is on RX):  n/a  New/renewal/insurance change PA/secondary ins. PA:  Previously Tried and Failed:  n/a  Rationale:  n/a    Insurance   Primary: [employer] [place]  Insurance ID:  [id number]    Secondary (if applicable):n/a  Insurance ID:  n/a    Pharmacy Information (if different than what is on RX)  Name:  [facility]  Phone:  [phone]  Fax:[phone]

## 2024-03-14 NOTE — ASSESSMENT & PLAN NOTE
Suspect exacerbation leading to sore throat, throat clearing, and dry cough  - INCREASE pantoprazole to 40 mg BID x 14 days then resume daily dosing thereafter

## 2024-03-14 NOTE — PROGRESS NOTES
Assessment & Plan   Problem List Items Addressed This Visit       GERD (gastroesophageal reflux disease) - Primary     Suspect exacerbation leading to sore throat, throat clearing, and dry cough  - INCREASE pantoprazole to 40 mg BID x 14 days then resume daily dosing thereafter          Relevant Medications    pantoprazole (PROTONIX) 40 MG EC tablet    Sore throat     No s/s of viral or bacterial pharyngitis. She was prescribed Augmentin in February for similar symptoms without resolution  - Suspect reflux may be the cause due to associated symptoms of a dry cough, throat clearing  - START: pantoprazole 40 mg BID x 14 days then resume daily dosing thereafter  - Follow up if sore throat persists          Dysfunction of right eustachian tube     No specific treatment advised. Monitor at this time and follow up if worsening or no improvement                Subjective   Vani is a 43 year old, presenting for the following health issues:  Nail Problem (Nail fungal follow up- nail is growing out and looking better. ) and Pharyngitis (Follow up- waking up in the middle of the night in pain and not being able to swallow. The pain is at a 4. She finished the Augmentin. )        3/14/2024    11:20 AM   Additional Questions   Roomed by RAHEL Puente   Accompanied by YUNG     History of Present Illness       Reason for visit:  Throat and nail fungal    She eats 2-3 servings of fruits and vegetables daily.She consumes 0 sweetened beverage(s) daily.She exercises with enough effort to increase her heart rate 9 or less minutes per day.  She exercises with enough effort to increase her heart rate 3 or less days per week. She is missing 1 dose(s) of medications per week.  She is not taking prescribed medications regularly due to remembering to take.     She has had intermittent right ear pain, shooting pains that wake her up. She also has a sore throat to the extent that it hurts to swallow. She was just seen at the end of  "February for pharyngitis but she had the sore throat throughout the time she took the prescribed Augmentin and then it has persisted intermittently since then. No fevers. No known exposure to strep. No heart burn and she is taking pantoprazole regularly. She does endorse an occasional dry cough and throat clearing.     The fingernail with onychomycosis is nearly grown out and appears much improved.           Objective    /78 (BP Location: Right arm, Patient Position: Sitting, Cuff Size: Adult Regular)   Pulse 74   Temp 97.9  F (36.6  C) (Oral)   Resp 24   Ht 1.631 m (5' 4.2\")   Wt 68 kg (149 lb 14.4 oz)   LMP 03/04/2024 (Exact Date)   SpO2 100%   BMI 25.57 kg/m    Body mass index is 25.57 kg/m .    Physical Exam   GENERAL: alert and no distress  EYES: Eyes grossly normal to inspection, conjunctivae and sclerae normal  HENT: ear canals and TM's normal, nose and mouth without ulcers or lesions. Pharynx is mildly erythematous but not edematous and no exudate   NECK: no adenopathy, no asymmetry, masses, or scars  RESP: lungs clear to auscultation - no rales, rhonchi or wheezes  CV: regular rate and rhythm, normal S1 S2, no S3 or S4              Signed Electronically by: Denia Llamas NP    "

## 2024-03-26 NOTE — TELEPHONE ENCOUNTER
Prior Authorization Approval    Medication: PANTOPRAZOLE SODIUM 40 MG PO [facility]  Authorization Effective Date: 2/25/2024  Authorization Expiration Date: 3/26/2025  Approved Dose/Quantity: 2 tablets/ day  Reference #:     Insurance Company: [employer] - Phone [phone] Fax [phone]  Expected CoPay: $    CoPay Card Available:      Financial Assistance Needed:   Which Pharmacy is filling the prescription: [facility] [address]  Pharmacy Notified: Yes  Patient Notified: **Instructed pharmacy to notify patient when script is ready to /ship.**

## 2024-03-26 NOTE — TELEPHONE ENCOUNTER
Note: Due to record-high volumes, our turn-around time is taking longer than usual . We are currently 10 business days behind in the pools.   We are working diligently to submit all requests in a timely manner and in the order they are received. Please only flag TRUE URGENT requests as high priority to the pool at this time.   If you have questions - please send a note/message in the active PA encounter and send back to the RPPA (Retail Pharmacy Prior Authorization) team [[id number]].    If you have more specific questions about our process please reach out to our supervisor Vani Yanes.   Thank you!     Central Prior Authorization Team  Phone: [phone]    PA Initiation    Medication: PANTOPRAZOLE SODIUM 40 MG PO [facility]  Insurance Company: [employer] - Phone [phone] Fax [phone]  Pharmacy Filling the Rx: [facility] [place], [place] - [address]  Filling Pharmacy Phone: [phone]  Filling Pharmacy Fax:    Start Date: 3/26/2024

## 2024-04-02 ENCOUNTER — OFFICE VISIT (OUTPATIENT)
Dept: INTERNAL MEDICINE | Facility: CLINIC | Age: 44
End: 2024-04-02
Payer: COMMERCIAL

## 2024-04-02 VITALS
SYSTOLIC BLOOD PRESSURE: 126 MMHG | RESPIRATION RATE: 18 BRPM | HEIGHT: 64 IN | DIASTOLIC BLOOD PRESSURE: 78 MMHG | BODY MASS INDEX: 26.19 KG/M2 | WEIGHT: 153.4 LBS | OXYGEN SATURATION: 100 % | HEART RATE: 59 BPM | TEMPERATURE: 98.3 F

## 2024-04-02 DIAGNOSIS — E10.3293 MILD NONPROLIFERATIVE DIABETIC RETINOPATHY OF BOTH EYES WITHOUT MACULAR EDEMA ASSOCIATED WITH TYPE 1 DIABETES MELLITUS (H): ICD-10-CM

## 2024-04-02 DIAGNOSIS — G43.E11 INTRACTABLE CHRONIC MIGRAINE WITH AURA WITH STATUS MIGRAINOSUS: Primary | ICD-10-CM

## 2024-04-02 DIAGNOSIS — G44.011 INTRACTABLE EPISODIC CLUSTER HEADACHE: ICD-10-CM

## 2024-04-02 DIAGNOSIS — J32.9 RHINOSINUSITIS: ICD-10-CM

## 2024-04-02 DIAGNOSIS — F15.11 METHAMPHETAMINE ABUSE IN REMISSION (H): ICD-10-CM

## 2024-04-02 PROCEDURE — 99214 OFFICE O/P EST MOD 30 MIN: CPT | Mod: 25 | Performed by: NURSE PRACTITIONER

## 2024-04-02 PROCEDURE — 96372 THER/PROPH/DIAG INJ SC/IM: CPT | Performed by: NURSE PRACTITIONER

## 2024-04-02 RX ORDER — DOXYCYCLINE 100 MG/1
100 CAPSULE ORAL 2 TIMES DAILY
Qty: 20 CAPSULE | Refills: 0 | Status: SHIPPED | OUTPATIENT
Start: 2024-04-02 | End: 2024-04-12

## 2024-04-02 RX ORDER — SUMATRIPTAN 25 MG/1
25 TABLET, FILM COATED ORAL
Qty: 15 TABLET | Refills: 2 | Status: SHIPPED | OUTPATIENT
Start: 2024-04-02

## 2024-04-02 RX ORDER — ACYCLOVIR 400 MG/1
TABLET ORAL
Qty: 9 EACH | Refills: 0 | Status: CANCELLED | OUTPATIENT
Start: 2024-04-02

## 2024-04-02 RX ORDER — KETOROLAC TROMETHAMINE 30 MG/ML
60 INJECTION, SOLUTION INTRAMUSCULAR; INTRAVENOUS ONCE
Status: COMPLETED | OUTPATIENT
Start: 2024-04-02 | End: 2024-04-02

## 2024-04-02 RX ADMIN — KETOROLAC TROMETHAMINE 60 MG: 30 INJECTION, SOLUTION INTRAMUSCULAR; INTRAVENOUS at 15:43

## 2024-04-02 ASSESSMENT — PAIN SCALES - GENERAL: PAINLEVEL: SEVERE PAIN (7)

## 2024-04-02 NOTE — PROGRESS NOTES
"  Assessment & Plan   Problem List Items Addressed This Visit       Methamphetamine abuse in remission (H)    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)     Other Visit Diagnoses       Intractable chronic migraine with aura with status migrainosus    -  Primary    Relevant Medications    SUMAtriptan (IMITREX) 25 MG tablet    ketorolac (TORADOL) injection 60 mg (Completed)    Intractable episodic cluster headache        Relevant Medications    SUMAtriptan (IMITREX) 25 MG tablet    ketorolac (TORADOL) injection 60 mg (Completed)    Rhinosinusitis        Relevant Medications    doxycycline monohydrate (MONODOX) 100 MG capsule           - Treat sinusitis with doxycycline as noted above   - For migraine, ketorolac injection given today in the office  - Refilled sumatriptan for use as needed  - Follow up if symptoms worsen or fail to improve       Return in about 5 months (around 9/2/2024) for annual physical .      Deborah Ludwig is a 43 year old, presenting for the following health issues:  Sinus Problem and Migraine        4/2/2024     2:52 PM   Additional Questions   Roomed by RAHEL Cotton   Accompanied by Self     History of Present Illness       Headaches:   Since the patient's last clinic visit, headaches are: worsened  The patient is getting headaches:  Everyday  She is not able to do normal daily activities when she has a migraine.  The patient is taking the following rescue/relief medications:  Naproxyn (Aleve) and sumatriptan (Imitrex)   Patient states \"I get some relief\" from the rescue/relief medications.   The patient is taking the following medications to prevent migraines:  No medications to prevent migraines  In the past 4 weeks, the patient has gone to an Urgent Care or Emergency Room 0 times times due to headaches.    Reason for visit:  Headaches  Symptom onset:  1-3 days ago  Symptom intensity:  Severe  Symptom " "progression:  Worsening  Had these symptoms before:  No  What makes it worse:  Lights, noise  What makes it better:  Imitrex    She eats 2-3 servings of fruits and vegetables daily.She consumes 0 sweetened beverage(s) daily.She exercises with enough effort to increase her heart rate 9 or less minutes per day.  She exercises with enough effort to increase her heart rate 3 or less days per week. She is missing 1 dose(s) of medications per week.  She is not taking prescribed medications regularly due to remembering to take.     She was seen on 3/14 with a sore throat. Since that time she has started to have nasal congestion with green colored phlegm. She has pain and pressure behind the eye. She does not think she has been febrile. She has had some sneezing and coughing. Her ears have been painful.     She had a migraine last weekend. This past weekend she took Imitrex on Sunday and slept for 8 hours. Today she had a migraine again, she took naproxen.         Objective    /78 (BP Location: Right arm, Patient Position: Sitting, Cuff Size: Adult Regular)   Pulse 59   Temp 98.3  F (36.8  C) (Oral)   Resp 18   Ht 1.631 m (5' 4.2\")   Wt 69.6 kg (153 lb 6.4 oz)   LMP 03/31/2024 (Exact Date)   SpO2 100%   BMI 26.17 kg/m    Body mass index is 26.17 kg/m .    Physical Exam   GENERAL: alert and no distress  EYES: Eyes grossly normal to inspection, conjunctivae and sclerae normal  HENT: ear canals and TM's normal, nose and mouth without ulcers or lesions  NECK: no adenopathy, no asymmetry, masses, or scars  RESP: lungs clear to auscultation - no rales, rhonchi or wheezes  CV: regular rate and rhythm, normal S1 S2  NEURO: Normal strength and tone, mentation intact and speech normal              Signed Electronically by: Denia Llamas NP    "

## 2024-04-12 ENCOUNTER — MEDICAL CORRESPONDENCE (OUTPATIENT)
Dept: HEALTH INFORMATION MANAGEMENT | Facility: CLINIC | Age: 44
End: 2024-04-12

## 2024-04-16 ENCOUNTER — TELEPHONE (OUTPATIENT)
Dept: ENDOCRINOLOGY | Facility: CLINIC | Age: 44
End: 2024-04-16
Payer: COMMERCIAL

## 2024-04-16 NOTE — TELEPHONE ENCOUNTER
Called pt to let pt know that her patient access support enrollment form is completed and she can pick it up at the [facility]. I also faxed it to [employer] at [phone].

## 2024-04-19 ENCOUNTER — TELEPHONE (OUTPATIENT)
Dept: ENDOCRINOLOGY | Facility: CLINIC | Age: 44
End: 2024-04-19
Payer: COMMERCIAL

## 2024-04-24 ENCOUNTER — MEDICAL CORRESPONDENCE (OUTPATIENT)
Dept: HEALTH INFORMATION MANAGEMENT | Facility: CLINIC | Age: 44
End: 2024-04-24

## 2024-06-18 ENCOUNTER — LAB (OUTPATIENT)
Dept: LAB | Facility: CLINIC | Age: 44
End: 2024-06-18
Payer: COMMERCIAL

## 2024-06-18 DIAGNOSIS — E10.3299 TYPE 1 DIABETES MELLITUS WITH MILD NONPROLIFERATIVE RETINOPATHY WITHOUT MACULAR EDEMA, UNSPECIFIED LATERALITY (H): ICD-10-CM

## 2024-06-18 LAB
ANION GAP SERPL CALCULATED.3IONS-SCNC: 10 MMOL/L (ref 7–15)
BUN SERPL-MCNC: 12.7 MG/DL (ref 6–20)
CALCIUM SERPL-MCNC: 9.3 MG/DL (ref 8.6–10)
CHLORIDE SERPL-SCNC: 100 MMOL/L (ref 98–107)
CREAT SERPL-MCNC: 0.93 MG/DL (ref 0.51–0.95)
DEPRECATED HCO3 PLAS-SCNC: 26 MMOL/L (ref 22–29)
EGFRCR SERPLBLD CKD-EPI 2021: 77 ML/MIN/1.73M2
GLUCOSE SERPL-MCNC: 212 MG/DL (ref 70–99)
HBA1C MFR BLD: 6.7 % (ref 0–5.6)
POTASSIUM SERPL-SCNC: 4.4 MMOL/L (ref 3.4–5.3)
SODIUM SERPL-SCNC: 136 MMOL/L (ref 135–145)
T4 FREE SERPL-MCNC: 1.28 NG/DL (ref 0.9–1.7)
TSH SERPL DL<=0.005 MIU/L-ACNC: 10 UIU/ML (ref 0.3–4.2)

## 2024-06-18 PROCEDURE — 83036 HEMOGLOBIN GLYCOSYLATED A1C: CPT

## 2024-06-18 PROCEDURE — 80048 BASIC METABOLIC PNL TOTAL CA: CPT

## 2024-06-18 PROCEDURE — 84443 ASSAY THYROID STIM HORMONE: CPT

## 2024-06-18 PROCEDURE — 84439 ASSAY OF FREE THYROXINE: CPT

## 2024-06-18 PROCEDURE — 36415 COLL VENOUS BLD VENIPUNCTURE: CPT

## 2024-06-26 ENCOUNTER — OFFICE VISIT (OUTPATIENT)
Dept: ENDOCRINOLOGY | Facility: CLINIC | Age: 44
End: 2024-06-26
Payer: COMMERCIAL

## 2024-06-26 VITALS
BODY MASS INDEX: 26.92 KG/M2 | DIASTOLIC BLOOD PRESSURE: 70 MMHG | HEART RATE: 58 BPM | WEIGHT: 157.8 LBS | OXYGEN SATURATION: 97 % | SYSTOLIC BLOOD PRESSURE: 114 MMHG

## 2024-06-26 DIAGNOSIS — E10.3299 TYPE 1 DIABETES MELLITUS WITH MILD NONPROLIFERATIVE RETINOPATHY WITHOUT MACULAR EDEMA, UNSPECIFIED LATERALITY (H): Primary | ICD-10-CM

## 2024-06-26 DIAGNOSIS — E03.9 HYPOTHYROIDISM, UNSPECIFIED TYPE: ICD-10-CM

## 2024-06-26 PROCEDURE — 99214 OFFICE O/P EST MOD 30 MIN: CPT | Performed by: NURSE PRACTITIONER

## 2024-06-26 RX ORDER — AZELASTINE 1 MG/ML
SPRAY, METERED NASAL
COMMUNITY
Start: 2024-04-26

## 2024-06-26 RX ORDER — FLUTICASONE PROPIONATE 50 MCG
SPRAY, SUSPENSION (ML) NASAL
COMMUNITY
Start: 2024-04-26

## 2024-06-26 RX ORDER — BLOOD SUGAR DIAGNOSTIC
STRIP MISCELLANEOUS
Qty: 100 STRIP | Refills: 2 | Status: SHIPPED | OUTPATIENT
Start: 2024-06-26 | End: 2024-06-27

## 2024-06-26 ASSESSMENT — PATIENT HEALTH QUESTIONNAIRE - PHQ9: SUM OF ALL RESPONSES TO PHQ QUESTIONS 1-9: 11

## 2024-06-26 NOTE — PROGRESS NOTES
[facility] ENDOCRINOLOGY    Diabetes Note 6/26/2024    Vani Corona, 1980, [id number]          Reason for visit      1. Type 1 diabetes mellitus with mild nonproliferative retinopathy without macular edema, unspecified laterality (H)    2. Hypothyroidism, unspecified type        HPI     Vani Corona is a very pleasant 43 year old old female who presents for follow up.  SUMMARY:    Vani is seen today in follow-up for DM 1 and Hypothyroidism. She is using the Tandem insulin pump system with Dexcom CGM. Pump was downloaded today and data was reviewed.     Her pump download showed TIR of only 14% over the last 14 days. Above target was 85% and below, 1 %. Report shows that she is not regularly Bolusing for her meals. Her ave daily Bolus is only 1.09 units at a time, which looks like correction only. Further investigation of her download confirms this.  She does a lot of chasing. She reports that one day she had been swimming and didn't attach the set after doing so.     Pt has undergone a lot of antibiotic use over the last several months. She is noting some changes in her gut. I believe that this has been contributory to her recent TSH of 10. She is reporting some significant fatigue. She states that she has been taking it consistently and compliance software confirms this. fT4 was 1.28. We are going to recheck her TSH in two days.     Blood glucose data:      Past Medical History     Patient Active Problem List   Diagnosis    Type 1 diabetes mellitus with mild nonproliferative retinopathy without macular edema (H)    Anxiety    Arthralgia of multiple joints    GERD (gastroesophageal reflux disease)    History of ileostomy    Hypothyroidism, unspecified type    Insomnia    Methamphetamine abuse in remission (H)    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)    Non-rheumatic mitral regurgitation    Skin mass    Tobacco  abuse    Severe episode of recurrent major depressive disorder, without psychotic features (H)    Cough, r/t COVID-19 infection 7/2022    Grief at loss of child    Sore throat    Dysfunction of right eustachian tube        Family History       family history includes Cerebrovascular Disease in her paternal grandmother; Diabetes in her paternal aunt and another family member; Hypertension in her paternal grandmother; No Known Problems in her daughter, sister, son, and other family members; Other - See Comments in her father; Thyroid Disease in her mother.    Social History      reports that she quit smoking about 6 years ago. Her smoking use included cigarettes. She has been exposed to tobacco smoke. She has never used smokeless tobacco. She reports current alcohol use. She reports that she does not use drugs.      Review of Systems     Patient has no polyuria or polydipsia, no chest pain, dyspnea or TIA's, no numbness, tingling or pain in extremities  Remainder negative except as noted in HPI.    Vital Signs     /70 (BP Location: Right arm, Patient Position: Sitting, Cuff Size: Adult Regular)   Pulse 58   Wt 71.6 kg (157 lb 12.8 oz)   SpO2 97%   BMI 26.92 kg/m    Wt Readings from Last 3 Encounters:   06/26/24 71.6 kg (157 lb 12.8 oz)   04/02/24 69.6 kg (153 lb 6.4 oz)   03/14/24 68 kg (149 lb 14.4 oz)       Physical Exam     Constitutional:  Well developed, Well nourished  HENT:  Normocephalic,   Neck: Thyroid normal, No lymph nodes, Supple  Eyes:  PERRL, Conjunctiva pink  Respiratory:  Normal breath sounds, No respiratory distress  Cardiovascular:  Normal heart rate, Normal rhythm, No murmurs  GI:  Bowel sounds normal, Soft, No tenderness  Musculoskeletal:  No gross deformity or lesions, normal dorsalis pedis pulses  Skin: No acanthosis nigricans, lipoatrophy or lipodystrophy  Neurologic:  Alert & oriented x 3, nonfocal  Psychiatric:  Affect, Mood, Insight appropriate  Diabetic foot exam: no ulcers,  charcot's or high risk calluses,     Assessment     1. Type 1 diabetes mellitus with mild nonproliferative retinopathy without macular edema, unspecified laterality (H)    2. Hypothyroidism, unspecified type        Plan     Strongly urged to start taking Bolus insulin instead of leaving it up to her pump to try and manage by itself.    Will recheck her TSH and fT4 in a couple of days. I strongly suspect that she has had changes in her gut because of all of the antibiotics and that she isn't absorbing as she should be doing.     I will see her back in 6 months.         Anayeli Dave NP   Endocrinology  6/26/2024  7:59 AM      Lab Results     Microalbumin Urine mg/dL   Date Value Ref Range Status   05/27/2022 0.77 0.00 - 1.99 mg/dL Final       Cholesterol   Date Value Ref Range Status   02/15/2016 174 <=199 mg/dL Final     Direct Measure HDL   Date Value Ref Range Status   02/15/2016 45 (L) >=50 mg/dL Final     Triglycerides   Date Value Ref Range Status   02/15/2016 122 <=149 mg/dL Final       [unfilled]      Current Medications     Outpatient Medications Prior to Visit   Medication Sig Dispense Refill    azelastine (ASTELIN) 0.1 % nasal spray       buPROPion (WELLBUTRIN XL) 300 MG 24 hr tablet Take 1 tablet (300 mg) by mouth every morning 90 tablet 1    cetirizine (ZYRTEC) 10 MG tablet Take 1 tablet (10 mg) by mouth daily 90 tablet 3    Continuous Blood Gluc Sensor (DEXCOM G7 SENSOR) MISC Change every 10 days. 9 each 0    escitalopram (LEXAPRO) 20 MG tablet Take 1 tablet (20 mg) by mouth daily 90 tablet 3    fluticasone (FLONASE) 50 MCG/ACT nasal spray       Insulin Infusion Pump (T: SLIM X2 INS /CONTROL 7.4) JESSICA       Insulin Infusion Pump Supplies (AUTOSOFT XC INFUSION SET) MISC 1 each every 3 days 30 each 0    Insulin Infusion Pump Supplies (T:SLIM X2 3ML CARTRIDGE) MISC 1 each every 3 days 30 each 0    ketoconazole (NIZORAL) 2 % external cream Apply topically 2 times daily 30 g 0    levothyroxine  (SYNTHROID) 137 MCG tablet TAKE ONE TABLET BY MOUTH ONCE DAILY 90 tablet 0    lisinopril (ZESTRIL) 2.5 MG tablet Take 1 tablet (2.5 mg) by mouth daily 90 tablet 3    NOVOLOG FLEXPEN 100 UNIT/ML soln USE DAILY IN PUMP, UP TO 50 UNITS ONCE DAILY 45 mL 1    NOVOLOG VIAL 100 UNIT/ML soln USE DAILY IN PUMP, UP TO 50 UNITS ONCE DAILY 50 mL 0    ondansetron (ZOFRAN) 4 MG tablet Take 1 tablet (4 mg) by mouth every 8 hours as needed for nausea 30 tablet 0    pantoprazole (PROTONIX) 40 MG EC tablet Take 1 tablet (40 mg) by mouth every morning 90 tablet 2    SUMAtriptan (IMITREX) 25 MG tablet Take 1 tablet (25 mg) by mouth at onset of headache for migraine May repeat in 2 hours. Max 8 tablets/24 hours 15 tablet 2    insulin glargine (LANTUS SOLOSTAR) 100 UNIT/ML pen INJECT 13 UNITS SUBCUTANEOUSLY 2 TIMES A DAY AS DIRECTED (Patient not taking: Reported on 2/26/2024)      ACCU-CHEK GUIDE test strip USE TO TEST 6 TIMES PER DAY      albuterol (PROAIR HFA/PROVENTIL HFA/VENTOLIN HFA) 108 (90 Base) MCG/ACT inhaler Inhale 2 puffs into the lungs every 6 hours (Patient not taking: Reported on 2/26/2024) 18 g 0    nicotine (COMMIT) 2 MG lozenge APPLY 1 LOZENGE (2 MG TOTAL) TO THE MOUTH OR THROAT AS NEEDED FOR SMOKING CESSATION.      nicotine (NICODERM CQ) 14 MG/24HR 24 hr patch APPLY 1 PATCH EVERY DAY (Patient not taking: Reported on 2/26/2024)      nicotine (NICODERM CQ) 7 MG/24HR 24 hr patch APPLY 1 PATCH EVERY DAY (Patient not taking: Reported on 2/26/2024)      nicotine (NICORETTE) 2 MG gum CHEW 1 PIECE AND PLACE INSIDE CHEEK EVERY HOUR AS NEEDED FOR NICOTINE CRAVINGS (Patient not taking: Reported on 2/26/2024)       No facility-administered medications prior to visit.

## 2024-06-26 NOTE — LETTER
6/26/2024      Vani Corona  [address] [place] [address]      Dear Colleague,    Thank you for referring your patient, Vani Corona, to the [facility]. Please see a copy of my visit note below.    [facility] ENDOCRINOLOGY    Diabetes Note 6/26/2024    Vani Corona, 1980, [id number]          Reason for visit      1. Type 1 diabetes mellitus with mild nonproliferative retinopathy without macular edema, unspecified laterality (H)    2. Hypothyroidism, unspecified type        HPI     Vani Corona is a very pleasant 43 year old old female who presents for follow up.  SUMMARY:    Vani is seen today in follow-up for DM 1 and Hypothyroidism. She is using the Tandem insulin pump system with Dexcom CGM. Pump was downloaded today and data was reviewed.     Her pump download showed TIR of only 14% over the last 14 days. Above target was 85% and below, 1 %. Report shows that she is not regularly Bolusing for her meals. Her ave daily Bolus is only 1.09 units at a time, which looks like correction only. Further investigation of her download confirms this.  She does a lot of chasing. She reports that one day she had been swimming and didn't attach the set after doing so.     Pt has undergone a lot of antibiotic use over the last several months. She is noting some changes in her gut. I believe that this has been contributory to her recent TSH of 10. She is reporting some significant fatigue. She states that she has been taking it consistently and compliance software confirms this. fT4 was 1.28. We are going to recheck her TSH in two days.     Blood glucose data:      Past Medical History     Patient Active Problem List   Diagnosis     Type 1 diabetes mellitus with mild nonproliferative retinopathy without macular edema (H)     Anxiety     Arthralgia of multiple joints     GERD (gastroesophageal reflux disease)     History of ileostomy     Hypothyroidism, unspecified type      Insomnia     Methamphetamine abuse in remission (H)     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)     Non-rheumatic mitral regurgitation     Skin mass     Tobacco abuse     Severe episode of recurrent major depressive disorder, without psychotic features (H)     Cough, r/t COVID-19 infection 7/2022     Grief at loss of child     Sore throat     Dysfunction of right eustachian tube        Family History       family history includes Cerebrovascular Disease in her paternal grandmother; Diabetes in her paternal aunt and another family member; Hypertension in her paternal grandmother; No Known Problems in her daughter, sister, son, and other family members; Other - See Comments in her father; Thyroid Disease in her mother.    Social History      reports that she quit smoking about 6 years ago. Her smoking use included cigarettes. She has been exposed to tobacco smoke. She has never used smokeless tobacco. She reports current alcohol use. She reports that she does not use drugs.      Review of Systems     Patient has no polyuria or polydipsia, no chest pain, dyspnea or TIA's, no numbness, tingling or pain in extremities  Remainder negative except as noted in HPI.    Vital Signs     /70 (BP Location: Right arm, Patient Position: Sitting, Cuff Size: Adult Regular)   Pulse 58   Wt 71.6 kg (157 lb 12.8 oz)   SpO2 97%   BMI 26.92 kg/m    Wt Readings from Last 3 Encounters:   06/26/24 71.6 kg (157 lb 12.8 oz)   04/02/24 69.6 kg (153 lb 6.4 oz)   03/14/24 68 kg (149 lb 14.4 oz)       Physical Exam     Constitutional:  Well developed, Well nourished  HENT:  Normocephalic,   Neck: Thyroid normal, No lymph nodes, Supple  Eyes:  PERRL, Conjunctiva pink  Respiratory:  Normal breath sounds, No respiratory distress  Cardiovascular:  Normal heart rate, Normal rhythm, No murmurs  GI:  Bowel sounds normal, Soft, No tenderness  Musculoskeletal:   No gross deformity or lesions, normal dorsalis pedis pulses  Skin: No acanthosis nigricans, lipoatrophy or lipodystrophy  Neurologic:  Alert & oriented x 3, nonfocal  Psychiatric:  Affect, Mood, Insight appropriate  Diabetic foot exam: no ulcers, charcot's or high risk calluses,     Assessment     1. Type 1 diabetes mellitus with mild nonproliferative retinopathy without macular edema, unspecified laterality (H)    2. Hypothyroidism, unspecified type        Plan     Strongly urged to start taking Bolus insulin instead of leaving it up to her pump to try and manage by itself.    Will recheck her TSH and fT4 in a couple of days. I strongly suspect that she has had changes in her gut because of all of the antibiotics and that she isn't absorbing as she should be doing.     I will see her back in 6 months.         Anayeli Dave NP  [facility] Endocrinology  6/26/2024  7:59 AM      Lab Results     Microalbumin Urine mg/dL   Date Value Ref Range Status   05/27/2022 0.77 0.00 - 1.99 mg/dL Final       Cholesterol   Date Value Ref Range Status   02/15/2016 174 <=199 mg/dL Final     Direct Measure HDL   Date Value Ref Range Status   02/15/2016 45 (L) >=50 mg/dL Final     Triglycerides   Date Value Ref Range Status   02/15/2016 122 <=149 mg/dL Final       [unfilled]      Current Medications     Outpatient Medications Prior to Visit   Medication Sig Dispense Refill     azelastine (ASTELIN) 0.1 % nasal spray        buPROPion (WELLBUTRIN XL) 300 MG 24 hr tablet Take 1 tablet (300 mg) by mouth every morning 90 tablet 1     cetirizine (ZYRTEC) 10 MG tablet Take 1 tablet (10 mg) by mouth daily 90 tablet 3     Continuous Blood Gluc Sensor (DEXCOM G7 SENSOR) MISC Change every 10 days. 9 each 0     escitalopram (LEXAPRO) 20 MG tablet Take 1 tablet (20 mg) by mouth daily 90 tablet 3     fluticasone (FLONASE) 50 MCG/ACT nasal spray        Insulin Infusion Pump (T: SLIM X2 INS /CONTROL 7.4) JESSICA        Insulin Infusion Pump Supplies (AUTOSOFT  XC INFUSION SET) MISC 1 each every 3 days 30 each 0     Insulin Infusion Pump Supplies (T:SLIM X2 3ML CARTRIDGE) MISC 1 each every 3 days 30 each 0     ketoconazole (NIZORAL) 2 % external cream Apply topically 2 times daily 30 g 0     levothyroxine (SYNTHROID) 137 MCG tablet TAKE ONE TABLET BY MOUTH ONCE DAILY 90 tablet 0     lisinopril (ZESTRIL) 2.5 MG tablet Take 1 tablet (2.5 mg) by mouth daily 90 tablet 3     NOVOLOG FLEXPEN 100 UNIT/ML soln USE DAILY IN PUMP, UP TO 50 UNITS ONCE DAILY 45 mL 1     NOVOLOG VIAL 100 UNIT/ML soln USE DAILY IN PUMP, UP TO 50 UNITS ONCE DAILY 50 mL 0     ondansetron (ZOFRAN) 4 MG tablet Take 1 tablet (4 mg) by mouth every 8 hours as needed for nausea 30 tablet 0     pantoprazole (PROTONIX) 40 MG EC tablet Take 1 tablet (40 mg) by mouth every morning 90 tablet 2     SUMAtriptan (IMITREX) 25 MG tablet Take 1 tablet (25 mg) by mouth at onset of headache for migraine May repeat in 2 hours. Max 8 tablets/24 hours 15 tablet 2     insulin glargine (LANTUS SOLOSTAR) 100 UNIT/ML pen INJECT 13 UNITS SUBCUTANEOUSLY 2 TIMES A DAY AS DIRECTED (Patient not taking: Reported on 2/26/2024)       ACCU-CHEK GUIDE test strip USE TO TEST 6 TIMES PER DAY       albuterol (PROAIR HFA/PROVENTIL HFA/VENTOLIN HFA) 108 (90 Base) MCG/ACT inhaler Inhale 2 puffs into the lungs every 6 hours (Patient not taking: Reported on 2/26/2024) 18 g 0     nicotine (COMMIT) 2 MG lozenge APPLY 1 LOZENGE (2 MG TOTAL) TO THE MOUTH OR THROAT AS NEEDED FOR SMOKING CESSATION.       nicotine (NICODERM CQ) 14 MG/24HR 24 hr patch APPLY 1 PATCH EVERY DAY (Patient not taking: Reported on 2/26/2024)       nicotine (NICODERM CQ) 7 MG/24HR 24 hr patch APPLY 1 PATCH EVERY DAY (Patient not taking: Reported on 2/26/2024)       nicotine (NICORETTE) 2 MG gum CHEW 1 PIECE AND PLACE INSIDE CHEEK EVERY HOUR AS NEEDED FOR NICOTINE CRAVINGS (Patient not taking: Reported on 2/26/2024)       No facility-administered medications prior to visit.                    Again, thank you for allowing me to participate in the care of your patient.        Sincerely,        Anayeli Dave NP

## 2024-06-27 ENCOUNTER — TELEPHONE (OUTPATIENT)
Dept: ENDOCRINOLOGY | Facility: CLINIC | Age: 44
End: 2024-06-27
Payer: COMMERCIAL

## 2024-06-27 DIAGNOSIS — E10.3299 TYPE 1 DIABETES MELLITUS WITH MILD NONPROLIFERATIVE RETINOPATHY WITHOUT MACULAR EDEMA, UNSPECIFIED LATERALITY (H): ICD-10-CM

## 2024-06-27 RX ORDER — BLOOD SUGAR DIAGNOSTIC
STRIP MISCELLANEOUS
Qty: 400 STRIP | Refills: 0 | Status: SHIPPED | OUTPATIENT
Start: 2024-06-27

## 2024-06-28 ENCOUNTER — LAB (OUTPATIENT)
Dept: LAB | Facility: CLINIC | Age: 44
End: 2024-06-28
Payer: COMMERCIAL

## 2024-06-28 DIAGNOSIS — E10.3299 TYPE 1 DIABETES MELLITUS WITH MILD NONPROLIFERATIVE RETINOPATHY WITHOUT MACULAR EDEMA, UNSPECIFIED LATERALITY (H): ICD-10-CM

## 2024-06-28 DIAGNOSIS — E03.9 HYPOTHYROIDISM, UNSPECIFIED TYPE: ICD-10-CM

## 2024-06-28 LAB
LDLC SERPL DIRECT ASSAY-MCNC: 75 MG/DL
T4 FREE SERPL-MCNC: 1.39 NG/DL (ref 0.9–1.7)
TSH SERPL DL<=0.005 MIU/L-ACNC: 8.03 UIU/ML (ref 0.3–4.2)

## 2024-06-28 PROCEDURE — 84443 ASSAY THYROID STIM HORMONE: CPT

## 2024-06-28 PROCEDURE — 84439 ASSAY OF FREE THYROXINE: CPT

## 2024-06-28 PROCEDURE — 83721 ASSAY OF BLOOD LIPOPROTEIN: CPT

## 2024-06-28 PROCEDURE — 36415 COLL VENOUS BLD VENIPUNCTURE: CPT

## 2024-06-29 DIAGNOSIS — E10.3299 TYPE 1 DIABETES MELLITUS WITH MILD NONPROLIFERATIVE RETINOPATHY WITHOUT MACULAR EDEMA, UNSPECIFIED LATERALITY (H): Primary | ICD-10-CM

## 2024-07-01 ENCOUNTER — VIRTUAL VISIT (OUTPATIENT)
Dept: INTERNAL MEDICINE | Facility: CLINIC | Age: 44
End: 2024-07-01
Payer: COMMERCIAL

## 2024-07-01 DIAGNOSIS — L50.9 URTICARIA: Primary | ICD-10-CM

## 2024-07-01 PROCEDURE — 99213 OFFICE O/P EST LOW 20 MIN: CPT | Mod: 95 | Performed by: NURSE PRACTITIONER

## 2024-07-01 PROCEDURE — G2211 COMPLEX E/M VISIT ADD ON: HCPCS | Mod: 95 | Performed by: NURSE PRACTITIONER

## 2024-07-01 RX ORDER — HYDROXYZINE HYDROCHLORIDE 25 MG/1
25 TABLET, FILM COATED ORAL 3 TIMES DAILY PRN
Qty: 21 TABLET | Refills: 0 | Status: SHIPPED | OUTPATIENT
Start: 2024-07-01 | End: 2024-07-08

## 2024-07-01 NOTE — PROGRESS NOTES
Vani is a 43 year old who is being evaluated via a billable video visit.    How would you like to obtain your AVS? MyChart  If the video visit is dropped, the invitation should be resent by: Text to cell phone: [phone]  Will anyone else be joining your video visit? No      Assessment & Plan   Problem List Items Addressed This Visit    None  Visit Diagnoses       Urticaria    -  Primary    Relevant Medications    hydrOXYzine HCl (ATARAX) 25 MG tablet           - Stop the cetirizine while taking hydroxyzine TID x 7 days  - May take a diphenhydramine at bedtime   - Follow up if symptoms worsen or fail to improve in the next 7 days         Subjective   Vani is a 43 year old, presenting for the following health issues:  Rash (Arms and chest. Spreading. Extremely itchy)        7/1/2024     4:29 PM   Additional Questions   Roomed by RAHEL Puente   Accompanied by YUNG     History of Present Illness       Reason for visit:  Rash  Symptom onset:  1-3 days ago  Symptoms include:  Hives and itching  Symptom progression:  Worsening  Had these symptoms before:  No  Prior treatment description:  Benedryl- not helpfulShefren consumes 0 sweetened beverage(s) daily.She exercises with enough effort to increase her heart rate 9 or less minutes per day.  She exercises with enough effort to increase her heart rate 3 or less days per week. She is missing 1 dose(s) of medications per week.  She is not taking prescribed medications regularly due to remembering to take.     Saturday night (2 nights ago) she developed an itchy raised rash on the shoulders and chest. She had used Off bug spray over the weekend and shortly thereafter developed a pruritic rash on the chest, back, arms, and hands. She is still itching and has new lesions on the hands. No difficulty speaking, breathing, or swallowing. No lesions on the breasts, genitals, or webs of fingers.         Objective    Vitals - Patient Reported  Pain Score: No Pain  (0)      Vitals:  No vitals were obtained today due to virtual visit.    Physical Exam   GENERAL: alert and no distress  EYES: Eyes grossly normal to inspection.  No discharge or erythema, or obvious scleral/conjunctival abnormalities.  RESP: No audible wheeze, cough, or visible cyanosis.    SKIN: mildly erythematous, raised areas are visualized on the arms and dorsum of the hands. Face is clear and without rash or swelling.   NEURO: Cranial nerves grossly intact.  Mentation and speech appropriate for age.  PSYCH: Appropriate affect, tone, and pace of words          Video-Visit Details    Type of service:  Video Visit   Originating Location (pt. Location): Home  Distant Location (provider location):  On-site  Platform used for Video Visit: Jose Luis  The longitudinal plan of care for the diagnosis(es)/condition(s) as documented were addressed during this visit. Due to the added complexity in care, I will continue to support Vani in the subsequent management and with ongoing continuity of care.    Signed Electronically by: Denia Llamas NP

## 2024-07-03 ENCOUNTER — OFFICE VISIT (OUTPATIENT)
Dept: FAMILY MEDICINE | Facility: CLINIC | Age: 44
End: 2024-07-03
Payer: COMMERCIAL

## 2024-07-03 VITALS
HEIGHT: 64 IN | OXYGEN SATURATION: 100 % | SYSTOLIC BLOOD PRESSURE: 137 MMHG | WEIGHT: 155.8 LBS | DIASTOLIC BLOOD PRESSURE: 80 MMHG | BODY MASS INDEX: 26.6 KG/M2 | TEMPERATURE: 97.6 F | HEART RATE: 70 BPM

## 2024-07-03 DIAGNOSIS — L50.9 URTICARIA: Primary | ICD-10-CM

## 2024-07-03 LAB
ALBUMIN SERPL BCG-MCNC: 4 G/DL (ref 3.5–5.2)
ALP SERPL-CCNC: 74 U/L (ref 40–150)
ALT SERPL W P-5'-P-CCNC: 17 U/L (ref 0–50)
ANION GAP SERPL CALCULATED.3IONS-SCNC: 8 MMOL/L (ref 7–15)
AST SERPL W P-5'-P-CCNC: 30 U/L (ref 0–45)
BASOPHILS # BLD AUTO: 0.1 10E3/UL (ref 0–0.2)
BASOPHILS NFR BLD AUTO: 1 %
BILIRUB SERPL-MCNC: 0.5 MG/DL
BUN SERPL-MCNC: 12.9 MG/DL (ref 6–20)
CALCIUM SERPL-MCNC: 9.4 MG/DL (ref 8.6–10)
CHLORIDE SERPL-SCNC: 101 MMOL/L (ref 98–107)
CREAT SERPL-MCNC: 0.94 MG/DL (ref 0.51–0.95)
DEPRECATED HCO3 PLAS-SCNC: 27 MMOL/L (ref 22–29)
EGFRCR SERPLBLD CKD-EPI 2021: 76 ML/MIN/1.73M2
EOSINOPHIL # BLD AUTO: 0.5 10E3/UL (ref 0–0.7)
EOSINOPHIL NFR BLD AUTO: 7 %
ERYTHROCYTE [DISTWIDTH] IN BLOOD BY AUTOMATED COUNT: 12.1 % (ref 10–15)
GLUCOSE SERPL-MCNC: 173 MG/DL (ref 70–99)
HCT VFR BLD AUTO: 39.4 % (ref 35–47)
HGB BLD-MCNC: 13 G/DL (ref 11.7–15.7)
IMM GRANULOCYTES # BLD: 0 10E3/UL
IMM GRANULOCYTES NFR BLD: 0 %
LYMPHOCYTES # BLD AUTO: 1.9 10E3/UL (ref 0.8–5.3)
LYMPHOCYTES NFR BLD AUTO: 27 %
MCH RBC QN AUTO: 32.3 PG (ref 26.5–33)
MCHC RBC AUTO-ENTMCNC: 33 G/DL (ref 31.5–36.5)
MCV RBC AUTO: 98 FL (ref 78–100)
MONOCYTES # BLD AUTO: 0.4 10E3/UL (ref 0–1.3)
MONOCYTES NFR BLD AUTO: 5 %
NEUTROPHILS # BLD AUTO: 4.2 10E3/UL (ref 1.6–8.3)
NEUTROPHILS NFR BLD AUTO: 60 %
PLATELET # BLD AUTO: 327 10E3/UL (ref 150–450)
POTASSIUM SERPL-SCNC: 4.5 MMOL/L (ref 3.4–5.3)
PROT SERPL-MCNC: 7.2 G/DL (ref 6.4–8.3)
RBC # BLD AUTO: 4.02 10E6/UL (ref 3.8–5.2)
SODIUM SERPL-SCNC: 136 MMOL/L (ref 135–145)
WBC # BLD AUTO: 7.1 10E3/UL (ref 4–11)

## 2024-07-03 PROCEDURE — 36415 COLL VENOUS BLD VENIPUNCTURE: CPT | Performed by: NURSE PRACTITIONER

## 2024-07-03 PROCEDURE — 85025 COMPLETE CBC W/AUTO DIFF WBC: CPT | Performed by: NURSE PRACTITIONER

## 2024-07-03 PROCEDURE — 99213 OFFICE O/P EST LOW 20 MIN: CPT | Performed by: NURSE PRACTITIONER

## 2024-07-03 PROCEDURE — 80053 COMPREHEN METABOLIC PANEL: CPT | Performed by: NURSE PRACTITIONER

## 2024-07-03 RX ORDER — PREDNISONE 20 MG/1
TABLET ORAL
Qty: 12 TABLET | Refills: 0 | Status: SHIPPED | OUTPATIENT
Start: 2024-07-03 | End: 2024-07-03

## 2024-07-03 RX ORDER — PREDNISONE 20 MG/1
TABLET ORAL
Qty: 12 TABLET | Refills: 0 | Status: SHIPPED | OUTPATIENT
Start: 2024-07-03

## 2024-07-03 ASSESSMENT — PAIN SCALES - GENERAL: PAINLEVEL: NO PAIN (0)

## 2024-07-03 NOTE — LETTER
July 5, 2024    Vani CASH Beeson  [address] [place] [address]          Dear r,    We are writing to inform you of your test results.    I hope this message finds you well. I wanted to inform you that your recent blood test results showed elevated blood sugar levels, which we anticipated. Fortunately, all of your other test results were within the expected range.       If you have any questions or concerns, please do not hesitate to contact the clinic. We are here to support you.         Resulted Orders   Comprehensive metabolic panel (BMP + Alb, Alk Phos, ALT, AST, Total. Bili, TP)   Result Value Ref Range    Sodium 136 135 - 145 mmol/L    Potassium 4.5 3.4 - 5.3 mmol/L    Carbon Dioxide (CO2) 27 22 - 29 mmol/L    Anion Gap 8 7 - 15 mmol/L    Urea Nitrogen 12.9 6.0 - 20.0 mg/dL    Creatinine 0.94 0.51 - 0.95 mg/dL    GFR Estimate 76 >60 mL/min/1.73m2      Comment:      eGFR calculated using 2021 CKD-EPI equation.    Calcium 9.4 8.6 - 10.0 mg/dL    Chloride 101 98 - 107 mmol/L    Glucose 173 (H) 70 - 99 mg/dL    Alkaline Phosphatase 74 40 - 150 U/L    AST 30 0 - 45 U/L      Comment:      Reference intervals for this test were updated on 6/12/2023 to more accurately reflect our healthy population. There may be differences in the flagging of prior results with similar values performed with this method. Interpretation of those prior results can be made in the context of the updated reference intervals.    ALT 17 0 - 50 U/L      Comment:      Reference intervals for this test were updated on 6/12/2023 to more accurately reflect our healthy population. There may be differences in the flagging of prior results with similar values performed with this method. Interpretation of those prior results can be made in the context of the updated reference intervals.      Protein Total 7.2 6.4 - 8.3 g/dL    Albumin 4.0 3.5 - 5.2 g/dL    Bilirubin Total 0.5 <=1.2 mg/dL   CBC with platelets and differential   Result Value  Ref Range    WBC Count 7.1 4.0 - 11.0 10e3/uL    RBC Count 4.02 3.80 - 5.20 10e6/uL    Hemoglobin 13.0 11.7 - 15.7 g/dL    Hematocrit 39.4 35.0 - 47.0 %    MCV 98 78 - 100 fL    MCH 32.3 26.5 - 33.0 pg    MCHC 33.0 31.5 - 36.5 g/dL    RDW 12.1 10.0 - 15.0 %    Platelet Count 327 150 - 450 10e3/uL    % Neutrophils 60 %    % Lymphocytes 27 %    % Monocytes 5 %    % Eosinophils 7 %    % Basophils 1 %    % Immature Granulocytes 0 %    Absolute Neutrophils 4.2 1.6 - 8.3 10e3/uL    Absolute Lymphocytes 1.9 0.8 - 5.3 10e3/uL    Absolute Monocytes 0.4 0.0 - 1.3 10e3/uL    Absolute Eosinophils 0.5 0.0 - 0.7 10e3/uL    Absolute Basophils 0.1 0.0 - 0.2 10e3/uL    Absolute Immature Granulocytes 0.0 <=0.4 10e3/uL       If you have any questions or concerns, please call the clinic at the number listed above.       Sincerely,      JUSTIN Allred CNP

## 2024-07-03 NOTE — PROGRESS NOTES
Assessment & Plan     (L50.9) Urticaria  (primary encounter diagnosis)  Comment: The differential diagnosis for urticaria includes allergic reactions, infections, autoimmune diseases, contact dermatitis, and idiopathic causes.   Plan: predniSONE (DELTASONE) 20 MG tablet  -Ordered a CBC and CMP to assess for underlying infections, autoimmune markers, and overall metabolic status.  -Continue hydroxyzine.    Patient Education:  -Discussed the risks and benefits of prednisone, including the potential for increased blood glucose levels.  -Informed the patient that close monitoring of blood glucose is necessary.    Follow-Up:  - She agrees to  schedule a follow-up appointment to reassess her condition and response to treatment with me or her PCP.      The patient understands the treatment plan and the necessity of monitoring blood glucose levels due to the use of prednisone.               Deborah Ludwig is a 43 year old, presenting for the following health issues:  Derm Problem        7/3/2024     9:43 AM   Additional Questions   Roomed by Anne Bartlett MA   Accompanied by Self     HPI     Vani Corona is  a 43-year-old female with a history of Type 1 diabetes who presents with a 5-day history of a raised, red rash that is spreading all over her body, sparing the mouth and genital areas. The rash is associated with significant itching and has been progressively worsening. She denies any fever, body aches, joint pain, sore throat, or recent cold symptoms. Notably, she was recently seen by one of our colleagues, diagnosed with urticaria, and prescribed hydroxyzine, which has provided mild relief. She has no history of a similar rash and has not been exposed to anyone with a similar condition. There have been no new exposures or recent travel. Benadryl also provides some relief, but the symptoms persist, causing concern for systemic urticaria.              Review of Systems  Constitutional, HEENT, cardiovascular,  "pulmonary, GI, , musculoskeletal, neuro, skin, endocrine and psych systems are negative, except as otherwise noted.      Objective    /80 (BP Location: Right arm, Patient Position: Chair, Cuff Size: Adult Regular)   Pulse 70   Temp 97.6  F (36.4  C) (Oral)   Ht 1.626 m (5' 4\")   Wt 70.7 kg (155 lb 12.8 oz)   LMP 06/28/2024 (Approximate)   SpO2 100%   BMI 26.74 kg/m    Body mass index is 26.74 kg/m .  Physical Exam   GENERAL: alert and no distress  NECK: no adenopathy, no asymmetry, masses, or scars  RESP: lungs clear to auscultation - no rales, rhonchi or wheezes  CV: regular rate and rhythm, normal S1 S2, no S3 or S4, no murmur, click or rub, no peripheral edema  ABDOMEN: soft, nontender, no hepatosplenomegaly, no masses and bowel sounds normal  MS: no gross musculoskeletal defects noted, no edema  SKIN:  raised, erythematous, well-demarcated, and pruritic  wheals sparing the mouth, head and genital areas.  NEURO: Normal strength and tone, mentation intact and speech normal  PSYCH: mentation appears normal, affect normal/bright            Signed Electronically by: JUSTIN Allred CNP    "

## 2024-07-16 ENCOUNTER — MYC REFILL (OUTPATIENT)
Dept: ENDOCRINOLOGY | Facility: CLINIC | Age: 44
End: 2024-07-16
Payer: COMMERCIAL

## 2024-07-16 DIAGNOSIS — J31.0 RHINITIS, UNSPECIFIED TYPE: ICD-10-CM

## 2024-07-16 DIAGNOSIS — E10.65 TYPE 1 DIABETES MELLITUS WITH HYPERGLYCEMIA (H): ICD-10-CM

## 2024-07-16 RX ORDER — INSULIN ASPART 100 [IU]/ML
INJECTION, SOLUTION INTRAVENOUS; SUBCUTANEOUS
Qty: 50 ML | Refills: 0 | Status: SHIPPED | OUTPATIENT
Start: 2024-07-16

## 2024-07-16 RX ORDER — CETIRIZINE HYDROCHLORIDE 10 MG/1
10 TABLET ORAL DAILY
Qty: 90 TABLET | Refills: 0 | Status: SHIPPED | OUTPATIENT
Start: 2024-07-16

## 2024-07-16 NOTE — TELEPHONE ENCOUNTER
Requested Prescriptions   Pending Prescriptions Disp Refills    cetirizine (ZYRTEC) 10 MG tablet 90 tablet 3     Sig: Take 1 tablet (10 mg) by mouth daily       Antihistamines Protocol Passed - 7/16/2024  8:45 AM        Passed - Patient is 3-64 years of age     Apply weight-based dosing for peds patients age 3 - 12 years of age.    Forward request to provider for patients under the age of 3 or over the age of 64.          Passed - Recent (12 mo) or future (30 days) visit within the authorizing provider's specialty     The patient must have completed an in-person or virtual visit within the past 12 months or has a future visit scheduled within the next 90 days with the authorizing provider s specialty.  Urgent care and e-visits do not quality as an office visit for this protocol.          Passed - Medication is active on med list        Passed - Medication indicated for associated diagnosis     The medication is associated with one or more of the following diagnoses:  Allergies  Rhinitis  Upper respiratory tract allergy  Urticaria            NOVOLOG VIAL 100 UNIT/ML soln 50 mL 0     Sig: USE DAILY IN PUMP, UP TO 50 UNITS ONCE DAILY       Insulin Protocol Failed - 7/16/2024  8:45 AM        Failed - Chart Review Required     Review Chart.    Do not approve if insulin is used in a pump.  Instead, direct refill request to the patient's endocrinologist.  If the patient doesn't have an endocrinologist, then send the refill to the patient's PCP for review            Passed - Medication is active on med list        Passed - Has GFR on file in past 12 months and most recent value is normal        Passed - Recent (6 mo) or future (90 days) visit within the authorizing provider's specialty     The patient must have completed an in-person or virtual visit within the past 6 months or has a future visit scheduled within the next 90 days with the authorizing provider s specialty.  Urgent care and e-visits do not quality as an  office visit for this protocol.          Passed - Medication indicated for associated diagnosis     Medication is associated with one or more of the following diagnoses:   - Type 1 diabetes mellitus  - Type 2 diabetes mellitus  - Diabetic nephropathy; Prophylaxis  - Neuropathy due to diabetes mellitus; Prophylaxis  - Retinopathy due to diabetes mellitus; Prophylaxis  - Diabetes mellitus associated with cystic fibrosis  - Disorder of cardiovascular system; Prophylaxis - Type 1 diabetes mellitus   - Disorder of cardiovascular system; Prophylaxis - Type 2 diabetes mellitus            Passed - Patient is 18 years of age or older

## 2024-07-30 DIAGNOSIS — E03.9 HYPOTHYROIDISM, UNSPECIFIED TYPE: ICD-10-CM

## 2024-07-30 RX ORDER — LEVOTHYROXINE SODIUM 137 UG/1
TABLET ORAL
Qty: 90 TABLET | Refills: 0 | Status: SHIPPED | OUTPATIENT
Start: 2024-07-30

## 2024-07-30 NOTE — TELEPHONE ENCOUNTER
Requested Prescriptions   Pending Prescriptions Disp Refills    levothyroxine (SYNTHROID) 137 MCG tablet [Pharmacy Med Name: SYNTHROID    TAB 137MCG] 90 tablet 0     Sig: TAKE 1 TABLET 1 TIME DAILY FOR HYPOTHYROIDISM       Thyroid Protocol Failed - 7/30/2024  4:49 AM        Failed - Normal TSH on file in past 12 months     Recent Labs   Lab Test 06/28/24  1627   TSH 8.03*              Passed - Patient is 12 years or older        Passed - Recent (12 mo) or future (30 days) visit within the authorizing provider's specialty     The patient must have completed an in-person or virtual visit within the past 12 months or has a future visit scheduled within the next 90 days with the authorizing provider s specialty.  Urgent care and e-visits do not quality as an office visit for this protocol.          Passed - Medication is active on med list        Passed - Medication indicated for associated diagnosis     Medication is associated with one or more of the following diagnoses:  Hypothyroidism  Thyroid stimulating hormone suppression therapy  Thyroid cancer  Acquired atrophy of thyroid          Passed - No active pregnancy on record     If patient is pregnant or has had a positive pregnancy test, please check TSH.          Passed - No positive pregnancy test in past 12 months     If patient is pregnant or has had a positive pregnancy test, please check TSH.

## 2024-08-13 ENCOUNTER — TRANSFERRED RECORDS (OUTPATIENT)
Dept: MULTI SPECIALTY CLINIC | Facility: CLINIC | Age: 44
End: 2024-08-13

## 2024-08-13 ENCOUNTER — PATIENT OUTREACH (OUTPATIENT)
Dept: CARE COORDINATION | Facility: CLINIC | Age: 44
End: 2024-08-13
Payer: COMMERCIAL

## 2024-08-13 LAB — RETINOPATHY: NORMAL

## 2024-08-27 ENCOUNTER — PATIENT OUTREACH (OUTPATIENT)
Dept: CARE COORDINATION | Facility: CLINIC | Age: 44
End: 2024-08-27
Payer: COMMERCIAL

## 2024-09-15 DIAGNOSIS — F33.2 SEVERE EPISODE OF RECURRENT MAJOR DEPRESSIVE DISORDER, WITHOUT PSYCHOTIC FEATURES (H): ICD-10-CM

## 2024-09-16 RX ORDER — BUPROPION HYDROCHLORIDE 300 MG/1
300 TABLET ORAL EVERY MORNING
Qty: 90 TABLET | Refills: 1 | Status: SHIPPED | OUTPATIENT
Start: 2024-09-16

## 2024-10-02 DIAGNOSIS — E10.65 TYPE 1 DIABETES MELLITUS WITH HYPERGLYCEMIA (H): ICD-10-CM

## 2024-10-02 RX ORDER — ACYCLOVIR 400 MG/1
TABLET ORAL
Qty: 9 EACH | Refills: 0 | Status: SHIPPED | OUTPATIENT
Start: 2024-10-02

## 2024-10-02 NOTE — TELEPHONE ENCOUNTER
Requested Prescriptions   Pending Prescriptions Disp Refills    Continuous Glucose Sensor (DEXCOM G7 SENSOR) MISC [Pharmacy Med Name: DEXCOM G7 SENSOR  MISC]  0     Sig: CHANGE EVERY 10 DAYS       There is no refill protocol information for this order

## 2024-10-08 ENCOUNTER — PATIENT OUTREACH (OUTPATIENT)
Dept: CARE COORDINATION | Facility: CLINIC | Age: 44
End: 2024-10-08
Payer: COMMERCIAL

## 2024-10-23 ENCOUNTER — E-VISIT (OUTPATIENT)
Dept: INTERNAL MEDICINE | Facility: CLINIC | Age: 44
End: 2024-10-23
Payer: COMMERCIAL

## 2024-10-23 DIAGNOSIS — F41.9 ANXIETY: Primary | ICD-10-CM

## 2024-10-23 PROCEDURE — 99422 OL DIG E/M SVC 11-20 MIN: CPT | Performed by: NURSE PRACTITIONER

## 2024-10-23 RX ORDER — HYDROXYZINE HYDROCHLORIDE 25 MG/1
25 TABLET, FILM COATED ORAL 3 TIMES DAILY PRN
Qty: 30 TABLET | Refills: 2 | Status: SHIPPED | OUTPATIENT
Start: 2024-10-23

## 2024-10-23 ASSESSMENT — ANXIETY QUESTIONNAIRES
2. NOT BEING ABLE TO STOP OR CONTROL WORRYING: SEVERAL DAYS
6. BECOMING EASILY ANNOYED OR IRRITABLE: MORE THAN HALF THE DAYS
5. BEING SO RESTLESS THAT IT IS HARD TO SIT STILL: NEARLY EVERY DAY
4. TROUBLE RELAXING: SEVERAL DAYS
IF YOU CHECKED OFF ANY PROBLEMS ON THIS QUESTIONNAIRE, HOW DIFFICULT HAVE THESE PROBLEMS MADE IT FOR YOU TO DO YOUR WORK, TAKE CARE OF THINGS AT HOME, OR GET ALONG WITH OTHER PEOPLE: VERY DIFFICULT
GAD7 TOTAL SCORE: 14
7. FEELING AFRAID AS IF SOMETHING AWFUL MIGHT HAPPEN: SEVERAL DAYS
GAD7 TOTAL SCORE: 14
3. WORRYING TOO MUCH ABOUT DIFFERENT THINGS: NEARLY EVERY DAY
1. FEELING NERVOUS, ANXIOUS, OR ON EDGE: NEARLY EVERY DAY
7. FEELING AFRAID AS IF SOMETHING AWFUL MIGHT HAPPEN: SEVERAL DAYS
8. IF YOU CHECKED OFF ANY PROBLEMS, HOW DIFFICULT HAVE THESE MADE IT FOR YOU TO DO YOUR WORK, TAKE CARE OF THINGS AT HOME, OR GET ALONG WITH OTHER PEOPLE?: VERY DIFFICULT

## 2024-10-23 NOTE — PATIENT INSTRUCTIONS
Thank you for choosing us for your care. I have placed an order for your treatment: Hydroxyzine 25 mg take 1 tablet up to 3 times daily as needed for anxiety.    View your full visit summary for details by clicking on the link below. Your pharmacist will able to address any questions you may have about the medication.      If you're not feeling better within 4-6 weeks please schedule an appointment.  You can schedule an appointment right here in [facility], or call [phone]  If the visit is for the same symptoms as your eVisit, we'll refund the cost of your eVisit if seen within seven days.    Generalized Anxiety Disorder: Care Instructions  Overview     We all worry. It's a normal part of life. But when you have generalized anxiety disorder, you worry about lots of things. You have a hard time not worrying. This worry or anxiety interferes with your relationships, work or school, and other areas of your life.  You may worry most days about things like money, health, work, or friends. That may make you feel tired, tense, or cranky. It can make it hard to think. It may get in the way of healthy sleep.  Counseling and medicine can both work to treat anxiety. They are often used together with lifestyle changes, such as getting enough sleep. Treatment can include a type of counseling called cognitive behavioral therapy, or CBT. It helps you notice and replace thoughts that make you worry. You also might have counseling along with those closest to you so that they can help.  Follow-up care is a key part of your treatment and safety. Be sure to make and go to all appointments, and call your doctor if you are having problems. It's also a good idea to know your test results and keep a list of the medicines you take.  How can you care for yourself at home?  Get at least 30 minutes of exercise on most days of the week. Walking is a good choice. You also may want to do other activities, such as running, swimming, cycling, or  "playing tennis or team sports.  Learn and do relaxation exercises, such as deep breathing.  Go to bed at the same time every night. Try for 8 to 10 hours of sleep a night.  Avoid alcohol, marijuana, and illegal drugs.  Find a counselor who uses cognitive behavioral therapy (CBT).  Don't isolate yourself. Let those closest to you help you. Find someone you can trust and confide in. Talk to that person.  Be safe with medicines. Take your medicines exactly as prescribed. Call your doctor if you think you are having a problem with your medicine.  Practice healthy thinking. How you think can affect how you feel and act. Ask yourself if your thoughts are helpful or unhelpful. If they are unhelpful, you can learn how to change them.  Recognize and accept your anxiety. When you feel anxious, say to yourself, \"This is not an emergency. I feel uncomfortable, but I am not in danger. I can keep going even if I feel anxious.\"  When should you call for help?   Call 911  anytime you think you may need emergency care. For example, call if:    You feel you can't stop from hurting yourself or someone else.   Where to get help 24 hours a day, 7 days a week   If you or someone you know talks about suicide, self-harm, a mental health crisis, a substance use crisis, or any other kind of emotional distress, get help right away. You can:    Call the Suicide and Crisis Lifeline at 207.     Call 0-050-422-TALK (1-454.618.6494).     Text HOME to 868967 to access the Crisis Text Line.   Consider saving these numbers in your phone.  Go to [email or web address] for more information or to chat online.  Call your doctor or counselor now or seek immediate medical care if:    You have new anxiety, or your anxiety gets worse.     You have been feeling sad, depressed, or hopeless or have lost interest in things that you usually enjoy.     You do not get better as expected.   Where can you learn more?  Go to https://www.healthwise.net/patiented  Enter G123 " "in the search box to learn more about \"Generalized Anxiety Disorder: Care Instructions.\"  Current as of: June 24, 2023  Content Version: 14.2 2024 [facility] [employer], [facility].   Care instructions adapted under license by your healthcare professional. If you have questions about a medical condition or this instruction, always ask your healthcare professional. [employer] disclaims any warranty or liability for your use of this information.    "

## 2024-10-25 ENCOUNTER — TELEPHONE (OUTPATIENT)
Dept: ENDOCRINOLOGY | Facility: CLINIC | Age: 44
End: 2024-10-25
Payer: COMMERCIAL

## 2024-10-25 DIAGNOSIS — E03.9 HYPOTHYROIDISM, UNSPECIFIED TYPE: ICD-10-CM

## 2024-10-25 NOTE — TELEPHONE ENCOUNTER
Pt needs Thyroid levels recheck now.     Requested Prescriptions   Pending Prescriptions Disp Refills    levothyroxine (SYNTHROID) 137 MCG tablet 90 tablet 0     Sig: TAKE 1 TABLET 1 TIME DAILY FOR HYPOTHYROIDISM       Thyroid Protocol Failed - 10/25/2024  3:49 PM        Failed - Normal TSH on file in past 12 months     Recent Labs   Lab Test 06/28/24  1627   TSH 8.03*              Passed - Patient is 12 years or older        Passed - Medication is active on med list        Passed - Recent (12 mo) or future (90 days) visit within the authorizing provider's specialty     The patient must have completed an in-person or virtual visit within the past 12 months or has a future visit scheduled within the next 90 days with the authorizing provider s specialty.  Urgent care and e-visits do not quality as an office visit for this protocol.          Passed - Medication indicated for associated diagnosis     Medication is associated with one or more of the following diagnoses:  Hypothyroidism  Thyroid stimulating hormone suppression therapy  Thyroid cancer  Acquired atrophy of thyroid          Passed - No active pregnancy on record     If patient is pregnant or has had a positive pregnancy test, please check TSH.          Passed - No positive pregnancy test in past 12 months     If patient is pregnant or has had a positive pregnancy test, please check TSH.

## 2024-10-25 NOTE — TELEPHONE ENCOUNTER
Refill request received from [facility] for synthroid.     Last OV: 06/26/24  Last lab: 6/28/24  Future appt:  lab 12/30/24, OV 1/8/25    Rx sent for review.

## 2024-10-28 ENCOUNTER — ORDERS ONLY (AUTO-RELEASED) (OUTPATIENT)
Dept: INTERNAL MEDICINE | Facility: CLINIC | Age: 44
End: 2024-10-28

## 2024-10-28 ENCOUNTER — OFFICE VISIT (OUTPATIENT)
Dept: INTERNAL MEDICINE | Facility: CLINIC | Age: 44
End: 2024-10-28
Payer: COMMERCIAL

## 2024-10-28 VITALS
TEMPERATURE: 97.5 F | HEIGHT: 64 IN | RESPIRATION RATE: 16 BRPM | BODY MASS INDEX: 26.53 KG/M2 | WEIGHT: 155.4 LBS | DIASTOLIC BLOOD PRESSURE: 70 MMHG | HEART RATE: 62 BPM | SYSTOLIC BLOOD PRESSURE: 116 MMHG | OXYGEN SATURATION: 100 %

## 2024-10-28 DIAGNOSIS — E10.3299 TYPE 1 DIABETES MELLITUS WITH MILD NONPROLIFERATIVE RETINOPATHY WITHOUT MACULAR EDEMA, UNSPECIFIED LATERALITY (H): ICD-10-CM

## 2024-10-28 DIAGNOSIS — J31.0 RHINITIS, UNSPECIFIED TYPE: ICD-10-CM

## 2024-10-28 DIAGNOSIS — Z12.11 SCREEN FOR COLON CANCER: ICD-10-CM

## 2024-10-28 DIAGNOSIS — Z72.0 TOBACCO ABUSE: ICD-10-CM

## 2024-10-28 DIAGNOSIS — E10.3293 MILD NONPROLIFERATIVE DIABETIC RETINOPATHY OF BOTH EYES WITHOUT MACULAR EDEMA ASSOCIATED WITH TYPE 1 DIABETES MELLITUS (H): ICD-10-CM

## 2024-10-28 DIAGNOSIS — Z00.00 ROUTINE GENERAL MEDICAL EXAMINATION AT A HEALTH CARE FACILITY: Primary | ICD-10-CM

## 2024-10-28 DIAGNOSIS — G43.009 MIGRAINE WITHOUT AURA AND WITHOUT STATUS MIGRAINOSUS, NOT INTRACTABLE: ICD-10-CM

## 2024-10-28 DIAGNOSIS — F15.11 METHAMPHETAMINE ABUSE IN REMISSION (H): ICD-10-CM

## 2024-10-28 DIAGNOSIS — E03.9 HYPOTHYROIDISM, UNSPECIFIED TYPE: ICD-10-CM

## 2024-10-28 DIAGNOSIS — F41.9 ANXIETY: ICD-10-CM

## 2024-10-28 LAB
CREAT UR-MCNC: 73.3 MG/DL
EST. AVERAGE GLUCOSE BLD GHB EST-MCNC: 131 MG/DL
HBA1C MFR BLD: 6.2 % (ref 0–5.6)
MICROALBUMIN UR-MCNC: <12 MG/L
MICROALBUMIN/CREAT UR: NORMAL MG/G{CREAT}

## 2024-10-28 PROCEDURE — 83036 HEMOGLOBIN GLYCOSYLATED A1C: CPT | Performed by: NURSE PRACTITIONER

## 2024-10-28 PROCEDURE — 99396 PREV VISIT EST AGE 40-64: CPT | Performed by: NURSE PRACTITIONER

## 2024-10-28 PROCEDURE — 80048 BASIC METABOLIC PNL TOTAL CA: CPT | Performed by: NURSE PRACTITIONER

## 2024-10-28 PROCEDURE — 99213 OFFICE O/P EST LOW 20 MIN: CPT | Mod: 25 | Performed by: NURSE PRACTITIONER

## 2024-10-28 PROCEDURE — 84439 ASSAY OF FREE THYROXINE: CPT | Performed by: NURSE PRACTITIONER

## 2024-10-28 PROCEDURE — 36415 COLL VENOUS BLD VENIPUNCTURE: CPT | Performed by: NURSE PRACTITIONER

## 2024-10-28 PROCEDURE — 80061 LIPID PANEL: CPT | Performed by: NURSE PRACTITIONER

## 2024-10-28 PROCEDURE — 84443 ASSAY THYROID STIM HORMONE: CPT | Performed by: NURSE PRACTITIONER

## 2024-10-28 PROCEDURE — 82043 UR ALBUMIN QUANTITATIVE: CPT | Performed by: NURSE PRACTITIONER

## 2024-10-28 PROCEDURE — 82570 ASSAY OF URINE CREATININE: CPT | Performed by: NURSE PRACTITIONER

## 2024-10-28 RX ORDER — NICOTINE 21 MG/24HR
1 PATCH, TRANSDERMAL 24 HOURS TRANSDERMAL EVERY 24 HOURS
Qty: 30 PATCH | Refills: 0 | Status: SHIPPED | OUTPATIENT
Start: 2024-10-28

## 2024-10-28 SDOH — HEALTH STABILITY: PHYSICAL HEALTH: ON AVERAGE, HOW MANY DAYS PER WEEK DO YOU ENGAGE IN MODERATE TO STRENUOUS EXERCISE (LIKE A BRISK WALK)?: 5 DAYS

## 2024-10-28 ASSESSMENT — ANXIETY QUESTIONNAIRES
GAD7 TOTAL SCORE: 20
2. NOT BEING ABLE TO STOP OR CONTROL WORRYING: NEARLY EVERY DAY
6. BECOMING EASILY ANNOYED OR IRRITABLE: NEARLY EVERY DAY
IF YOU CHECKED OFF ANY PROBLEMS ON THIS QUESTIONNAIRE, HOW DIFFICULT HAVE THESE PROBLEMS MADE IT FOR YOU TO DO YOUR WORK, TAKE CARE OF THINGS AT HOME, OR GET ALONG WITH OTHER PEOPLE: VERY DIFFICULT
7. FEELING AFRAID AS IF SOMETHING AWFUL MIGHT HAPPEN: MORE THAN HALF THE DAYS
GAD7 TOTAL SCORE: 20
5. BEING SO RESTLESS THAT IT IS HARD TO SIT STILL: NEARLY EVERY DAY
1. FEELING NERVOUS, ANXIOUS, OR ON EDGE: NEARLY EVERY DAY
GAD7 TOTAL SCORE: 20
3. WORRYING TOO MUCH ABOUT DIFFERENT THINGS: NEARLY EVERY DAY
4. TROUBLE RELAXING: NEARLY EVERY DAY
8. IF YOU CHECKED OFF ANY PROBLEMS, HOW DIFFICULT HAVE THESE MADE IT FOR YOU TO DO YOUR WORK, TAKE CARE OF THINGS AT HOME, OR GET ALONG WITH OTHER PEOPLE?: VERY DIFFICULT
7. FEELING AFRAID AS IF SOMETHING AWFUL MIGHT HAPPEN: MORE THAN HALF THE DAYS

## 2024-10-28 ASSESSMENT — PATIENT HEALTH QUESTIONNAIRE - PHQ9
10. IF YOU CHECKED OFF ANY PROBLEMS, HOW DIFFICULT HAVE THESE PROBLEMS MADE IT FOR YOU TO DO YOUR WORK, TAKE CARE OF THINGS AT HOME, OR GET ALONG WITH OTHER PEOPLE: VERY DIFFICULT
SUM OF ALL RESPONSES TO PHQ QUESTIONS 1-9: 18
SUM OF ALL RESPONSES TO PHQ QUESTIONS 1-9: 18

## 2024-10-28 ASSESSMENT — SOCIAL DETERMINANTS OF HEALTH (SDOH): HOW OFTEN DO YOU GET TOGETHER WITH FRIENDS OR RELATIVES?: ONCE A WEEK

## 2024-10-28 ASSESSMENT — PAIN SCALES - GENERAL: PAINLEVEL_OUTOF10: NO PAIN (0)

## 2024-10-28 NOTE — PROGRESS NOTES
"Preventive Care Visit  [facility]  Denia Llamas NP  Oct 28, 2024    Assessment & Plan   Problem List Items Addressed This Visit       Type 1 diabetes mellitus with mild nonproliferative retinopathy without macular edema (H)     Last A1c 6.2% indicating good control.  Followed by endocrinology  - Last eye exam 8/16/24, [facility]          Relevant Medications    atorvastatin (LIPITOR) 10 MG tablet    Other Relevant Orders    Albumin Random Urine Quantitative with Creat Ratio    Lipid panel reflex to direct LDL Fasting    ALT    Anxiety     Currently going through separation from her long-term partner.  Notes increased anxiety.  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 She may try to quit with her boss.   - Nicoderm patches sent to her pharmacy          Relevant Medications    nicotine (NICODERM CQ) 14 MG/24HR 24 hr patch    nicotine (NICODERM CQ) 7 MG/24HR 24 hr patch    Migraine without aura and without status migrainosus, not intractable     Currently managed with sumatriptan as needed          Other Visit Diagnoses       Routine general medical examination at a health care facility    -  Primary    Screen for colon cancer        Reviewed screening options.  She would like to proceed with Cologuard.    Relevant Orders    COLOGUARD(EXACT SCIENCES)           She declines influenza and COVID-19 vaccines       BMI  Estimated body mass index is 26.67 kg/m  as calculated from the following:    Height as of this encounter: 1.626 m (5' 4\").    " Weight as of this encounter: 70.5 kg (155 lb 6.4 oz).       Counseling  Appropriate preventive services were addressed with this patient via screening, questionnaire, or discussion as appropriate for fall prevention, nutrition, physical activity, Tobacco-use cessation, social engagement, weight loss and cognition.  Checklist reviewing preventive services available has been given to the patient.  Reviewed patient's diet, addressing concerns and/or questions.   The patient reports drinking more than 3 alcoholic drinks per day and/or more than 7 drhnks per week. The patient was counseled and given information about possible harmful effects of excessive alcohol intake.The patient's PHQ-9 score is consistent with moderate depression. She was provided with information regarding depression.     Return in about 1 year (around 10/28/2025) for annual physical .      Deborah Ludwig is a 44 year old, presenting for the following:  Physical        10/28/2024     4:15 PM   Additional Questions   Roomed by Isra SMALL MA          HPI  She is  from her boyfriend. She has felt some intermittent anxiety and was prescribed hydroxyzine.     Itching- resolved. She did see dermatology for a rash on the left arm and the rash is better. She uses triamcinolone as needed.     GERD- doing well with pantoprazole. She met with a dietician about gut health and has been trying some dietary changes.       Health Care Directive  Patient does not have a Health Care Directive: Patient states has Advance Directive and will bring in a copy to clinic.      10/28/2024   General Health   How would you rate your overall physical health? Good   Feel stress (tense, anxious, or unable to sleep) To some extent            10/28/2024   Nutrition   Three or more servings of calcium each day? (!) NO   Diet: Regular (no restrictions)   How many servings of fruit and vegetables per day? (!) 2-3   How many sweetened beverages each day? 0-1             10/28/2024   Exercise   Days per week of moderate/strenous exercise 5 days            10/28/2024   Social Factors   Frequency of gathering with friends or relatives Once a week            10/28/2024   Dental   Dentist two times every year? Yes             Today's PHQ-9 Score:       10/28/2024     3:43 PM   PHQ-9 SCORE   PHQ-9 Total Score MyChart 18 (Moderately severe depression)   PHQ-9 Total Score 18        Patient-reported         10/28/2024   Substance Use   Alcohol more than 3/day or more than 7/wk Yes   How often do you have a drink containing alcohol 4 or more times a week   How many alcohol drinks on typical day 3 or 4   How often do you have 5+ drinks at one occasion Daily or almost daily   Audit 2/3 Score 5   How often not able to stop drinking once started Less than monthly   How often failed to do what normally expected Never   How often needed first drink in am after a heavy drinking session Never   How often feeling of guilt or remorse after drinking Less than monthly   How often unable to remember what happened the night before Never   Have you or someone else been injured because of your drinking No   Has anyone been concerned or suggested you cut down on drinking Yes, during the last year   TOTAL SCORE - AUDIT 15   Do you use any other substances recreationally? No        Social History     Tobacco Use    Smoking status: Former     Current packs/day: 0.00     Types: Cigarettes     Quit date: 2017     Years since quittin.3     Passive exposure: Current    Smokeless tobacco: Never    Tobacco comments:     No smoked since 2021   Vaping Use    Vaping status: Every Day    Passive vaping exposure: Yes   Substance Use Topics    Alcohol use: Yes     Comment: Alcoholic Drinks/day: Occ    Drug use: No     Types: Methamphetamines     Comment: Drug use: Off 2 years, 1 month and 19 days as of 3/16/16           10/2/2023   LAST FHS-7 RESULTS   1st degree relative breast or ovarian cancer No   Any  "relative bilateral breast cancer No   Any male have breast cancer No   Any ONE woman have BOTH breast AND ovarian cancer No   Any woman with breast cancer before 50yrs No   2 or more relatives with breast AND/OR ovarian cancer No   2 or more relatives with breast AND/OR bowel cancer No              Latest Ref Rng & Units 9/12/2023     3:49 PM   PAP / HPV   PAP  Negative for Intraepithelial Lesion or Malignancy (NILM)    HPV 16 DNA Negative Negative    HPV 18 DNA Negative Negative    Other HR HPV Negative Negative      ASCVD Risk   The 10-year ASCVD risk score (Cornelius HOLCOMB, et al., 2019) is: 0.6%    Values used to calculate the score:      Age: 44 years      Sex: Female      Is Non- : No      Diabetic: Yes      Tobacco smoker: No      Systolic Blood Pressure: 116 mmHg      Is BP treated: No      HDL Cholesterol: 80 mg/dL      Total Cholesterol: 171 mg/dL        10/28/2024   Contraception/Family Planning   Questions about contraception or family planning No        Reviewed and updated as needed this visit by Provider   Tobacco  Allergies  Meds  Problems  Med Hx  Surg Hx  Fam Hx                 Objective    Exam  /70 (BP Location: Right arm, Patient Position: Sitting, Cuff Size: Adult Regular)   Pulse 62   Temp 97.5  F (36.4  C) (Oral)   Resp 16   Ht 1.626 m (5' 4\")   Wt 70.5 kg (155 lb 6.4 oz)   LMP 10/14/2024 (Exact Date)   SpO2 100%   BMI 26.67 kg/m     Estimated body mass index is 26.67 kg/m  as calculated from the following:    Height as of this encounter: 1.626 m (5' 4\").    Weight as of this encounter: 70.5 kg (155 lb 6.4 oz).    Physical Exam  GENERAL: alert and no distress  EYES: Eyes grossly normal to inspection, conjunctivae and sclerae normal  HENT: ear canals and TM's normal, mouth without ulcers or lesions  RESP: lungs clear to auscultation - no rales, rhonchi or wheezes  CV: regular rate and rhythm, normal S1 S2, no S3 or S4, no murmur, click or rub, no " peripheral edema  ABDOMEN: soft, nontender, no hepatosplenomegaly, no masses and bowel sounds normal  NEURO: Normal strength and tone, mentation intact and speech normal  PSYCH: mentation appears normal, affect normal/bright        Signed Electronically by: Denia Llamas NP

## 2024-10-28 NOTE — ASSESSMENT & PLAN NOTE
Last A1c 6.2% indicating good control.  Followed by endocrinology  - Last eye exam 8/16/24, [facility]

## 2024-10-28 NOTE — PATIENT INSTRUCTIONS
Patient Education   Preventive Care Advice   This is general advice given by our system to help you stay healthy. However, your care team may have specific advice just for you. Please talk to your care team about your preventive care needs.  Nutrition  Eat 5 or more servings of fruits and vegetables each day.  Try wheat bread, brown rice and whole grain pasta (instead of white bread, rice, and pasta).  Get enough calcium and vitamin D. Check the label on foods and aim for 100% of the RDA (recommended daily allowance).  Lifestyle  Exercise at least 150 minutes each week  (30 minutes a day, 5 days a week).  Do muscle strengthening activities 2 days a week. These help control your weight and prevent disease.  No smoking.  Wear sunscreen to prevent skin cancer.  Have a dental exam and cleaning every 6 months.  Yearly exams  See your health care team every year to talk about:  Any changes in your health.  Any medicines your care team has prescribed.  Preventive care, family planning, and ways to prevent chronic diseases.  Shots (vaccines)   HPV shots (up to age 26), if you've never had them before.  Hepatitis B shots (up to age 59), if you've never had them before.  COVID-19 shot: Get this shot when it's due.  Flu shot: Get a flu shot every year.  Tetanus shot: Get a tetanus shot every 10 years.  Pneumococcal, hepatitis A, and RSV shots: Ask your care team if you need these based on your risk.  Shingles shot (for age 50 and up)  General health tests  Diabetes screening:  Starting at age 35, Get screened for diabetes at least every 3 years.  If you are younger than age 35, ask your care team if you should be screened for diabetes.  Cholesterol test: At age 39, start having a cholesterol test every 5 years, or more often if advised.  Bone density scan (DEXA): At age 50, ask your care team if you should have this scan for osteoporosis (brittle bones).  Hepatitis C: Get tested at least once in your life.  STIs (sexually  transmitted infections)  Before age 24: Ask your care team if you should be screened for STIs.  After age 24: Get screened for STIs if you're at risk. You are at risk for STIs (including HIV) if:  You are sexually active with more than one person.  You don't use condoms every time.  You or a partner was diagnosed with a sexually transmitted infection.  If you are at risk for HIV, ask about PrEP medicine to prevent HIV.  Get tested for HIV at least once in your life, whether you are at risk for HIV or not.  Cancer screening tests  Cervical cancer screening: If you have a cervix, begin getting regular cervical cancer screening tests starting at age 21.  Breast cancer scan (mammogram): If you've ever had breasts, begin having regular mammograms starting at age 40. This is a scan to check for breast cancer.  Colon cancer screening: It is important to start screening for colon cancer at age 45.  Have a colonoscopy test every 10 years (or more often if you're at risk) Or, ask your provider about stool tests like a FIT test every year or Cologuard test every 3 years.  To learn more about your testing options, visit:   .  For help making a decision, visit:   https://bit.ly/ub48198.  Prostate cancer screening test: If you have a prostate, ask your care team if a prostate cancer screening test (PSA) at age 55 is right for you.  Lung cancer screening: If you are a current or former smoker ages 50 to 80, ask your care team if ongoing lung cancer screenings are right for you.  For informational purposes only. Not to replace the advice of your health care provider. Copyright   2023 [facility]. All rights reserved. Clinically reviewed by the [facility]. [employer] [id number] - REV 01/24.  Learning About Depression Screening  What is depression screening?  Depression screening is a way to see if you have depression symptoms. It may be done by a doctor or counselor. It's often part of a routine  "checkup. That's because your mental health is just as important as your physical health.  Depression is a mental health condition that affects how you feel, think, and act. You may:  Have less energy.  Lose interest in your daily activities.  Feel sad and grouchy for a long time.  Depression is very common. It affects people of all ages.  Many things can lead to depression. Some people become depressed after they have a stroke or find out they have a major illness like cancer or heart disease. The death of a loved one or a breakup may lead to depression. It can run in families. Most experts believe that a combination of inherited genes and stressful life events can cause it.  What happens during screening?  You may be asked to fill out a form about your depression symptoms. You and the doctor will discuss your answers. The doctor may ask you more questions to learn more about how you think, act, and feel.  What happens after screening?  If you have symptoms of depression, your doctor will talk to you about your options.  Doctors usually treat depression with medicines or counseling. Often, combining the two works best. Many people don't get help because they think that they'll get over the depression on their own. But people with depression may not get better unless they get treatment.  The cause of depression is not well understood. There may be many factors involved. But if you have depression, it's not your fault.  A serious symptom of depression is thinking about death or suicide. If you or someone you care about talks about this or about feeling hopeless, get help right away.  It's important to know that depression can be treated. Medicine, counseling, and self-care may help.  Where can you learn more?  Go to https://www.[employer].net/patiented  Enter T185 in the search box to learn more about \"Learning About Depression Screening.\"  Current as of: June 24, 2023  Content Version: 14.2 2024 [employer]   Care instructions adapted under license by your healthcare professional. If you have questions about a medical condition or this instruction, always ask your healthcare professional. [employer] disclaims any warranty or liability for your use of this information.    9 Ways to Cut Back on Drinking  Maybe you've found yourself drinking more alcohol than you'd prefer. If you want to cut back, here are some ideas to try.    Think before you drink.  Do you really want a drink, or is it just a habit? If you're used to having a drink at a certain time, try doing something else then.     Look for substitutes.  Find some no-alcohol drinks that you enjoy, like flavored seltzer water, tea with honey, or tonic with a slice of lime. Or try alcohol-free beer or \"virgin\" cocktails (without the alcohol).     Drink more water.  Use water to quench your thirst. Drink a glass of water before you have any alcohol. Have another glass along with every drink or between drinks.     Shrink your drink.  For example, have a bottle of beer instead of a pint. Use a smaller glass for wine. Choose drinks with lower alcohol content (ABV%). Or use less liquor and more mixer in cocktails.     Slow down.  It's easy to drink quickly and without thinking about it. Pay attention, and make each drink last longer.     Do the math.  Total up how much you spend on alcohol each month. How much is that a year? If you cut back, what could you do with the money you save?     Take a break.  Choose a day or two each week when you won't drink at all. Notice how you feel on those days, physically and emotionally. How did you sleep? Do you feel better? Over time, add more break days.     Count calories.  Would you like to lose some weight? For some people that's a good motivator for cutting back. Figure out how many calories are in each drink. How many does that add up to in a day? In a week? In a month?     Practice saying no.  Be ready when " "someone offers you a drink. Try: \"Thanks, I've had enough.\" Or \"Thanks, but I'm cutting back.\" Or \"No, thanks. I feel better when I drink less.\"   Current as of: November 15, 2023  Content Version: 14.2 2024 [facility] [employer] [facility].   Care instructions adapted under license by your healthcare professional. If you have questions about a medical condition or this instruction, always ask your healthcare professional. [employer] disclaims any warranty or liability for your use of this information.     "

## 2024-10-28 NOTE — ASSESSMENT & PLAN NOTE
- She is encouraged to quit smoking. She may try to quit with her boss.   - Nicoderm patches sent to her pharmacy

## 2024-10-28 NOTE — TELEPHONE ENCOUNTER
Requested Prescriptions   Pending Prescriptions Disp Refills    cetirizine (ZYRTEC) 10 MG tablet [Pharmacy Med Name: CETIRIZINE HCL 10MG TABS] 90 tablet 0     Sig: TAKE ONE TABLET BY MOUTH ONCE DAILY       Antihistamines Protocol Passed - 10/28/2024  2:04 PM        Passed - Patient is 3-64 years of age     Apply weight-based dosing for peds patients age 3 - 12 years of age.    Forward request to provider for patients under the age of 3 or over the age of 64.          Passed - Recent (12 mo) or future (30 days) visit within the authorizing provider's specialty     The patient must have completed an in-person or virtual visit within the past 12 months or has a future visit scheduled within the next 90 days with the authorizing provider s specialty.  Urgent care and e-visits do not quality as an office visit for this protocol.          Passed - Medication is active on med list        Passed - Medication indicated for associated diagnosis     The medication is associated with one or more of the following diagnoses:  Allergies  Rhinitis  Upper respiratory tract allergy  Urticaria  Itching

## 2024-10-28 NOTE — ASSESSMENT & PLAN NOTE
Currently going through separation from her long-term partner.  Notes increased anxiety.  - Increase hydroxyzine to 50 mg   - If not effective, try buspirone

## 2024-10-28 NOTE — TELEPHONE ENCOUNTER
Spoke w/patient, she has an appointment this afternoon with her PCP and will plan to have labs done during her visit. Please make sure orders are in her chart.

## 2024-10-29 LAB
ANION GAP SERPL CALCULATED.3IONS-SCNC: 19 MMOL/L (ref 7–15)
BUN SERPL-MCNC: 11.8 MG/DL (ref 6–20)
CALCIUM SERPL-MCNC: 9.4 MG/DL (ref 8.8–10.4)
CHLORIDE SERPL-SCNC: 100 MMOL/L (ref 98–107)
CHOLEST SERPL-MCNC: 171 MG/DL
CREAT SERPL-MCNC: 0.74 MG/DL (ref 0.51–0.95)
EGFRCR SERPLBLD CKD-EPI 2021: >90 ML/MIN/1.73M2
FASTING STATUS PATIENT QL REPORTED: ABNORMAL
FASTING STATUS PATIENT QL REPORTED: NORMAL
GLUCOSE SERPL-MCNC: 127 MG/DL (ref 70–99)
HCO3 SERPL-SCNC: 17 MMOL/L (ref 22–29)
HDLC SERPL-MCNC: 80 MG/DL
LDLC SERPL CALC-MCNC: 66 MG/DL
NONHDLC SERPL-MCNC: 91 MG/DL
POTASSIUM SERPL-SCNC: 3.7 MMOL/L (ref 3.4–5.3)
SODIUM SERPL-SCNC: 136 MMOL/L (ref 135–145)
T4 FREE SERPL-MCNC: 1.45 NG/DL (ref 0.9–1.7)
TRIGL SERPL-MCNC: 127 MG/DL
TSH SERPL DL<=0.005 MIU/L-ACNC: 3.68 UIU/ML (ref 0.3–4.2)

## 2024-10-29 RX ORDER — CETIRIZINE HYDROCHLORIDE 10 MG/1
10 TABLET ORAL DAILY
Qty: 90 TABLET | Refills: 0 | Status: SHIPPED | OUTPATIENT
Start: 2024-10-29

## 2024-10-29 RX ORDER — ATORVASTATIN CALCIUM 10 MG/1
10 TABLET, FILM COATED ORAL DAILY
Qty: 90 TABLET | Refills: 3 | Status: SHIPPED | OUTPATIENT
Start: 2024-10-29

## 2024-10-30 PROBLEM — H69.91 DYSFUNCTION OF RIGHT EUSTACHIAN TUBE: Status: RESOLVED | Noted: 2024-03-14 | Resolved: 2024-10-30

## 2024-10-30 RX ORDER — LEVOTHYROXINE SODIUM 137 UG/1
TABLET ORAL
Qty: 90 TABLET | Refills: 0 | Status: SHIPPED | OUTPATIENT
Start: 2024-10-30

## 2024-10-30 NOTE — TELEPHONE ENCOUNTER
Requested Prescriptions   Pending Prescriptions Disp Refills    levothyroxine (SYNTHROID) 137 MCG tablet 90 tablet 0     Sig: TAKE 1 TABLET 1 TIME DAILY FOR HYPOTHYROIDISM       Thyroid Protocol Passed - 10/30/2024  7:44 AM        Passed - Patient is 12 years or older        Passed - Medication is active on med list        Passed - Recent (12 mo) or future (90 days) visit within the authorizing provider's specialty     The patient must have completed an in-person or virtual visit within the past 12 months or has a future visit scheduled within the next 90 days with the authorizing provider s specialty.  Urgent care and e-visits do not quality as an office visit for this protocol.          Passed - Medication indicated for associated diagnosis     Medication is associated with one or more of the following diagnoses:  Hypothyroidism  Thyroid stimulating hormone suppression therapy  Thyroid cancer  Acquired atrophy of thyroid          Passed - Normal TSH on file in past 12 months     Recent Labs   Lab Test 10/28/24  1829   TSH 3.68              Passed - No active pregnancy on record     If patient is pregnant or has had a positive pregnancy test, please check TSH.          Passed - No positive pregnancy test in past 12 months     If patient is pregnant or has had a positive pregnancy test, please check TSH.

## 2024-11-30 ENCOUNTER — HOSPITAL ENCOUNTER (EMERGENCY)
Facility: HOSPITAL | Age: 44
Discharge: HOME | End: 2024-11-30
Attending: EMERGENCY MEDICINE | Admitting: EMERGENCY MEDICINE
Payer: COMMERCIAL

## 2024-11-30 ENCOUNTER — APPOINTMENT (OUTPATIENT)
Dept: CT IMAGING | Facility: HOSPITAL | Age: 44
End: 2024-11-30
Attending: EMERGENCY MEDICINE
Payer: COMMERCIAL

## 2024-11-30 VITALS
SYSTOLIC BLOOD PRESSURE: 127 MMHG | WEIGHT: 149.91 LBS | DIASTOLIC BLOOD PRESSURE: 59 MMHG | OXYGEN SATURATION: 93 % | HEART RATE: 85 BPM | BODY MASS INDEX: 25.59 KG/M2 | RESPIRATION RATE: 31 BRPM | TEMPERATURE: 97.2 F | HEIGHT: 64 IN

## 2024-11-30 DIAGNOSIS — R11.2 NAUSEA VOMITING AND DIARRHEA: ICD-10-CM

## 2024-11-30 DIAGNOSIS — R19.7 NAUSEA VOMITING AND DIARRHEA: ICD-10-CM

## 2024-11-30 LAB
ALBUMIN SERPL BCG-MCNC: 4.6 G/DL (ref 3.5–5.2)
ALP SERPL-CCNC: 88 U/L (ref 40–150)
ALT SERPL W P-5'-P-CCNC: 23 U/L (ref 0–50)
ANION GAP SERPL CALCULATED.3IONS-SCNC: 15 MMOL/L (ref 7–15)
AST SERPL W P-5'-P-CCNC: 31 U/L (ref 0–45)
B-OH-BUTYR SERPL-SCNC: <0.18 MMOL/L
BASE EXCESS BLDV CALC-SCNC: 0.8 MMOL/L (ref -3–3)
BASOPHILS # BLD AUTO: 0 10E3/UL (ref 0–0.2)
BASOPHILS NFR BLD AUTO: 0 %
BILIRUB SERPL-MCNC: 1.2 MG/DL
BUN SERPL-MCNC: 18.3 MG/DL (ref 6–20)
CALCIUM SERPL-MCNC: 9 MG/DL (ref 8.8–10.4)
CHLORIDE SERPL-SCNC: 98 MMOL/L (ref 98–107)
CREAT SERPL-MCNC: 1.38 MG/DL (ref 0.51–0.95)
EGFRCR SERPLBLD CKD-EPI 2021: 48 ML/MIN/1.73M2
EOSINOPHIL # BLD AUTO: 0 10E3/UL (ref 0–0.7)
EOSINOPHIL NFR BLD AUTO: 0 %
ERYTHROCYTE [DISTWIDTH] IN BLOOD BY AUTOMATED COUNT: 12.4 % (ref 10–15)
GLUCOSE BLDC GLUCOMTR-MCNC: 173 MG/DL (ref 70–99)
GLUCOSE SERPL-MCNC: 194 MG/DL (ref 70–99)
HCG SERPL QL: NEGATIVE
HCO3 BLDV-SCNC: 24 MMOL/L (ref 21–28)
HCO3 SERPL-SCNC: 20 MMOL/L (ref 22–29)
HCT VFR BLD AUTO: 43.9 % (ref 35–47)
HGB BLD-MCNC: 14.8 G/DL (ref 11.7–15.7)
IMM GRANULOCYTES # BLD: 0.1 10E3/UL
IMM GRANULOCYTES NFR BLD: 1 %
LIPASE SERPL-CCNC: 8 U/L (ref 13–60)
LYMPHOCYTES # BLD AUTO: 0.6 10E3/UL (ref 0.8–5.3)
LYMPHOCYTES NFR BLD AUTO: 4 %
MAGNESIUM SERPL-MCNC: 1.4 MG/DL (ref 1.7–2.3)
MCH RBC QN AUTO: 31.8 PG (ref 26.5–33)
MCHC RBC AUTO-ENTMCNC: 33.7 G/DL (ref 31.5–36.5)
MCV RBC AUTO: 94 FL (ref 78–100)
MONOCYTES # BLD AUTO: 0.4 10E3/UL (ref 0–1.3)
MONOCYTES NFR BLD AUTO: 3 %
NEUTROPHILS # BLD AUTO: 14.7 10E3/UL (ref 1.6–8.3)
NEUTROPHILS NFR BLD AUTO: 93 %
NRBC # BLD AUTO: 0 10E3/UL
NRBC BLD AUTO-RTO: 0 /100
O2/TOTAL GAS SETTING VFR VENT: 21 %
OXYHGB MFR BLDV: 26 % (ref 70–75)
PCO2 BLDV: 33 MM HG (ref 40–50)
PH BLDV: 7.47 [PH] (ref 7.32–7.43)
PLATELET # BLD AUTO: 344 10E3/UL (ref 150–450)
PO2 BLDV: 18 MM HG (ref 25–47)
POTASSIUM SERPL-SCNC: 4.6 MMOL/L (ref 3.4–5.3)
PROT SERPL-MCNC: 7.8 G/DL (ref 6.4–8.3)
RBC # BLD AUTO: 4.65 10E6/UL (ref 3.8–5.2)
SAO2 % BLDV: 25.9 % (ref 70–75)
SODIUM SERPL-SCNC: 133 MMOL/L (ref 135–145)
WBC # BLD AUTO: 15.8 10E3/UL (ref 4–11)

## 2024-11-30 PROCEDURE — 250N000013 HC RX MED GY IP 250 OP 250 PS 637: Performed by: EMERGENCY MEDICINE

## 2024-11-30 PROCEDURE — 96361 HYDRATE IV INFUSION ADD-ON: CPT

## 2024-11-30 PROCEDURE — 82805 BLOOD GASES W/O2 SATURATION: CPT | Performed by: EMERGENCY MEDICINE

## 2024-11-30 PROCEDURE — 250N000011 HC RX IP 250 OP 636: Performed by: EMERGENCY MEDICINE

## 2024-11-30 PROCEDURE — 82962 GLUCOSE BLOOD TEST: CPT

## 2024-11-30 PROCEDURE — 258N000003 HC RX IP 258 OP 636: Performed by: EMERGENCY MEDICINE

## 2024-11-30 PROCEDURE — 83735 ASSAY OF MAGNESIUM: CPT | Performed by: EMERGENCY MEDICINE

## 2024-11-30 PROCEDURE — 85048 AUTOMATED LEUKOCYTE COUNT: CPT | Performed by: STUDENT IN AN ORGANIZED HEALTH CARE EDUCATION/TRAINING PROGRAM

## 2024-11-30 PROCEDURE — 80053 COMPREHEN METABOLIC PANEL: CPT | Performed by: STUDENT IN AN ORGANIZED HEALTH CARE EDUCATION/TRAINING PROGRAM

## 2024-11-30 PROCEDURE — 96374 THER/PROPH/DIAG INJ IV PUSH: CPT | Mod: 59

## 2024-11-30 PROCEDURE — 82010 KETONE BODYS QUAN: CPT | Performed by: EMERGENCY MEDICINE

## 2024-11-30 PROCEDURE — 85004 AUTOMATED DIFF WBC COUNT: CPT | Performed by: STUDENT IN AN ORGANIZED HEALTH CARE EDUCATION/TRAINING PROGRAM

## 2024-11-30 PROCEDURE — 84703 CHORIONIC GONADOTROPIN ASSAY: CPT | Performed by: EMERGENCY MEDICINE

## 2024-11-30 PROCEDURE — 99285 EMERGENCY DEPT VISIT HI MDM: CPT | Mod: 25

## 2024-11-30 PROCEDURE — 74177 CT ABD & PELVIS W/CONTRAST: CPT

## 2024-11-30 PROCEDURE — 85014 HEMATOCRIT: CPT | Performed by: STUDENT IN AN ORGANIZED HEALTH CARE EDUCATION/TRAINING PROGRAM

## 2024-11-30 PROCEDURE — 83690 ASSAY OF LIPASE: CPT | Performed by: STUDENT IN AN ORGANIZED HEALTH CARE EDUCATION/TRAINING PROGRAM

## 2024-11-30 PROCEDURE — 36415 COLL VENOUS BLD VENIPUNCTURE: CPT | Performed by: EMERGENCY MEDICINE

## 2024-11-30 PROCEDURE — 250N000011 HC RX IP 250 OP 636: Performed by: STUDENT IN AN ORGANIZED HEALTH CARE EDUCATION/TRAINING PROGRAM

## 2024-11-30 RX ORDER — IOPAMIDOL 755 MG/ML
74 INJECTION, SOLUTION INTRAVASCULAR ONCE
Status: COMPLETED | OUTPATIENT
Start: 2024-11-30 | End: 2024-11-30

## 2024-11-30 RX ORDER — DICYCLOMINE HCL 20 MG
20 TABLET ORAL 4 TIMES DAILY PRN
Qty: 28 TABLET | Refills: 0 | Status: SHIPPED | OUTPATIENT
Start: 2024-11-30 | End: 2024-11-30

## 2024-11-30 RX ORDER — DICYCLOMINE HCL 20 MG
20 TABLET ORAL ONCE
Status: COMPLETED | OUTPATIENT
Start: 2024-11-30 | End: 2024-11-30

## 2024-11-30 RX ORDER — METOCLOPRAMIDE HYDROCHLORIDE 5 MG/ML
10 INJECTION INTRAMUSCULAR; INTRAVENOUS ONCE
Status: COMPLETED | OUTPATIENT
Start: 2024-11-30 | End: 2024-11-30

## 2024-11-30 RX ORDER — ONDANSETRON 4 MG/1
4 TABLET, ORALLY DISINTEGRATING ORAL EVERY 6 HOURS PRN
Qty: 30 TABLET | Refills: 0 | Status: SHIPPED | OUTPATIENT
Start: 2024-11-30 | End: 2024-11-30

## 2024-11-30 RX ORDER — MAGNESIUM OXIDE 400 MG/1
400 TABLET ORAL ONCE
Status: COMPLETED | OUTPATIENT
Start: 2024-11-30 | End: 2024-11-30

## 2024-11-30 RX ORDER — ONDANSETRON 4 MG/1
4 TABLET, ORALLY DISINTEGRATING ORAL ONCE
Status: COMPLETED | OUTPATIENT
Start: 2024-11-30 | End: 2024-11-30

## 2024-11-30 RX ORDER — ONDANSETRON 4 MG/1
4 TABLET, ORALLY DISINTEGRATING ORAL EVERY 6 HOURS PRN
Qty: 30 TABLET | Refills: 0 | Status: SHIPPED | OUTPATIENT
Start: 2024-11-30

## 2024-11-30 RX ORDER — DICYCLOMINE HCL 20 MG
20 TABLET ORAL 4 TIMES DAILY PRN
Qty: 28 TABLET | Refills: 0 | Status: SHIPPED | OUTPATIENT
Start: 2024-11-30

## 2024-11-30 RX ADMIN — IOPAMIDOL 74 ML: 755 INJECTION, SOLUTION INTRAVENOUS at 18:51

## 2024-11-30 RX ADMIN — MAGNESIUM OXIDE TAB 400 MG (241.3 MG ELEMENTAL MG) 400 MG: 400 (241.3 MG) TAB at 19:28

## 2024-11-30 RX ADMIN — ONDANSETRON 4 MG: 4 TABLET, ORALLY DISINTEGRATING ORAL at 17:11

## 2024-11-30 RX ADMIN — DICYCLOMINE HYDROCHLORIDE 20 MG: 20 TABLET ORAL at 18:20

## 2024-11-30 RX ADMIN — SODIUM CHLORIDE 1000 ML: 9 INJECTION, SOLUTION INTRAVENOUS at 18:14

## 2024-11-30 RX ADMIN — METOCLOPRAMIDE 10 MG: 5 INJECTION, SOLUTION INTRAMUSCULAR; INTRAVENOUS at 18:16

## 2024-11-30 ASSESSMENT — ACTIVITIES OF DAILY LIVING (ADL)
ADLS_ACUITY_SCORE: 41

## 2024-11-30 NOTE — ED PROVIDER NOTES
Emergency Department Encounter     Evaluation Date & Time:   No admission date for patient encounter.    CHIEF COMPLAINT:  Nausea, Vomiting, & Diarrhea      Triage Note:Pt Has had nausea, vomiting and diarrhea for the last 16 hours, spouse with same symptoms. Pt is a type 1 diabetic.             ED COURSE & MEDICAL DECISION MAKING:     Pt here for n/v/d that started yesterday afternoon.  Pt is an IDDM, reports sugars mainly 200s.  States  with n/v/d as well. Denies known fevers, urinary changes or bleeding.  Pt appears a little dehydrated and tried zofran at home with some relief. She also reports RLQ pain for a few days prior to this starting, but intermittent for minutes at a time.  Pt is TTP along RLQ. Will get labs, CT abd/pelvis, hydrate, treat symptomatically. Given her IDDM history, will need DKA rule out with labs.      ED Course as of 11/30/24 2128   Sat Nov 30, 2024 1716 I met with the patient for the initial interview and physical examination. Discussed plan for treatment and workup in the ED.    1821 VBG pH reassuring at 7.47, no signs of DKA.     1849 WBC 15.8.  Cr slightly up at 1.38.  Mag 1.4.  Ketones wnl.  Lipase unremarkable.    Awaiting CT abd/pelvis.   1955 CT abd/pelvis neg for any sort of acute pathology.   1956 I updated the patient on results from workup.   She's feeling much better, benign abdomen. Discussed results, Rx for zofran, bentyl, instructions on hydration, outpatient follow up and return precautions.   2037 The patient was discharged from  emergency department.          Medical Decision Making    History:  Supplemental history from: N/A  External Record(s) reviewed: Documented in chart    Work Up:  Chart documentation includes differential considered and any EKGs or imaging independently interpreted by provider, where specified.  In additional to work up documented, I considered the following work up: Documented in chart, if applicable.    External  consultation:  Discussion of management with another provider: Documented in chart, if applicable    Complicating factors:  Care impacted by chronic illness: Diabetes and Smoking / Nicotine Use  Care affected by social determinants of health: Other: Weekend, access to primary care    Disposition considerations: Discharge. I prescribed additional prescription strength medication(s) as charted. I considered admission, but ultimately discharged patient after reassuring workup, outpatient treatment plan.    Not Applicable     At the conclusion of the encounter I discussed the results of all the tests and the disposition. The questions were answered. The patient or family acknowledged understanding and was agreeable with the care plan.      MEDICATIONS GIVEN IN THE EMERGENCY DEPARTMENT:  Medications   ondansetron (ZOFRAN ODT) ODT tab 4 mg (4 mg Oral $Given 11/30/24 1711)   sodium chloride 0.9% BOLUS 1,000 mL (0 mLs Intravenous Stopped 11/30/24 2028)   metoclopramide (REGLAN) injection 10 mg (10 mg Intravenous $Given 11/30/24 1816)   dicyclomine (BENTYL) tablet 20 mg (20 mg Oral $Given 11/30/24 1820)   iopamidol (ISOVUE-370) solution 74 mL (74 mLs Intravenous $Given 11/30/24 1851)   magnesium oxide (MAG-OX) tablet 400 mg (400 mg Oral $Given 11/30/24 1928)       NEW PRESCRIPTIONS STARTED AT TODAY'S ED VISIT:  Discharge Medication List as of 11/30/2024  8:28 PM        START taking these medications    Details   dicyclomine (BENTYL) 20 MG tablet Take 1 tablet (20 mg) by mouth 4 times daily as needed (abdominal pain/cramping)., Disp-28 tablet, R-0, E-Prescribe      ondansetron (ZOFRAN ODT) 4 MG ODT tab Take 1 tablet (4 mg) by mouth every 6 hours as needed for vomiting or nausea., Disp-30 tablet, R-0, E-Prescribe             HPI   The history is provided by the patient. No  was used.        Vani Corona is a 44 year old female with a pertinent history of ADELINA, anxiety, depression, GERD, s/p ileostomy,  "hypothyroidism, migraines, mitral regurgitation, tobacco abuse, type 1 diabetes who presents to this ED by walk-in for evaluation of nausea, vomiting, and diarrhea.     Patient reports ongoing \"sharp, crampy\" lower right quadrant abdominal pain for the past few days; her significant other is having similar n/v/d, which leads her to wonder whether this might have been due to food poisoning. Last evening at ~11 PM, she states that she had an onset of diarrhea and vomiting; she has since taken Zofran with mild relief of her symptoms. Patient notes that her symptoms are causing her difficulty while walking. She has type 1 diabetes; her blood glucose monitor is reading low 200s at this time.     Patient notes that her gallbladder, uterus, and ovaries are all intact. Denies recent antibx use or known c-diff history.      Patient denies fever, urinary symptoms, bloody stools, bloody vomit, or any other symptoms at this time. She also denies any chance of pregnancy.     REVIEW OF SYSTEMS:  See HPI      Medical History     Past Medical History:   Diagnosis Date    Anxiety     Asthma     Bronchitis, not specified as acute or chronic     Chest pain     Depression     Diabetes (H)     Fainting     Heart disease     IDDM     Methamphetamine abuse in remission (H) 4/10/2015    Mitral valve prolapse     Mitral valve prolapse     Rheumatoid arthritis (H)     Rheumatoid arthritis (H)     Substance abuse (H)     Thyroid disease     Unspecified keratitis        Past Surgical History:   Procedure Laterality Date    BIOPSY CERVICAL, LOCAL EXCISION, SINGLE/MULTIPLE       SECTION      EXCISE LESION UPPER EXTREMITY Left 2021    Procedure: EXCISION, LESION, UPPER EXTREMITY;  Surgeon: Babs Leiva MD;  Location: [facility];  Service: General    HC REVISE MEDIAN N/CARPAL TUNNEL SURG      Description: Neuroplasty Decompression Median Nerve At Carpal Tunnel;  Recorded: 10/03/2014;    HERNIA REPAIR  2010    " HERNIA REPAIR      HERNIORRHAPHY, UMBILICAL, ROBOT-ASSISTED, LAPAROSCOPIC, USING DA STANISLAW XI N/A 2022    Procedure: HERNIORRHAPHY, INCISIONAL UMBILICAL, ROBOT-ASSISTED, LAPAROSCOPIC, USING DA STANISLAW XI;  Surgeon: Jordan Horvath DO;  Location: [facility] [place]    LAPAROSCOPIC CHOLECYSTECTOMY N/A 2019    Procedure: CHOLECYSTECTOMY, LAPAROSCOPIC;  Surgeon: Rinku Doran MD;  Location: [facility] - [place];  Service: General    LEAP and Coloposcopy       OTHER SURGICAL HISTORY      colposcopyLEAP    ZZC  DELIVERY ONLY      Description:  Section;  Recorded: 2010;  Comments: 09 - breech    ZZHC REPAIR UMBILICAL RUTH ANN,<6Y/O,REDUC      Description: Umbilical Hernia Repair;  Recorded: 10/03/2014;       Family History   Problem Relation Age of Onset    Diabetes Paternal Aunt     Diabetes Other         maternal cousin and paternal cousin    Hypertension Paternal Grandmother     Cerebrovascular Disease Paternal Grandmother     Thyroid Disease Mother     Other - See Comments Father         Father abuses multiple drugs.    No Known Problems Sister     No Known Problems Daughter     No Known Problems Son     No Known Problems Other     No Known Problems Other        Social History     Tobacco Use    Smoking status: Former     Current packs/day: 0.00     Types: Cigarettes     Quit date: 2017     Years since quittin.3     Passive exposure: Current    Smokeless tobacco: Never    Tobacco comments:     No smoked since 2021   Vaping Use    Vaping status: Every Day    Passive vaping exposure: Yes   Substance Use Topics    Alcohol use: Yes     Comment: Alcoholic Drinks/day: Occ    Drug use: No     Types: Methamphetamines     Comment: Drug use: Off 2 years, 1 month and 19 days as of 3/16/16       ACCU-CHEK GUIDE test strip  atorvastatin (LIPITOR) 10 MG tablet  azelastine (ASTELIN) 0.1 % nasal spray  blood glucose (NO BRAND SPECIFIED) test strip  blood glucose monitoring (NO BRAND  "SPECIFIED) meter device kit  buPROPion (WELLBUTRIN XL) 300 MG 24 hr tablet  cetirizine (ZYRTEC) 10 MG tablet  Continuous Glucose Sensor (DEXCOM G7 SENSOR) MISC  dicyclomine (BENTYL) 20 MG tablet  escitalopram (LEXAPRO) 20 MG tablet  fluticasone (FLONASE) 50 MCG/ACT nasal spray  hydrOXYzine HCl (ATARAX) 25 MG tablet  insulin glargine (LANTUS SOLOSTAR) 100 UNIT/ML pen  Insulin Infusion Pump (T: SLIM X2 INS /CONTROL 7.4) JESSICA  Insulin Infusion Pump Supplies (AUTOSOFT XC INFUSION SET) MISC  Insulin Infusion Pump Supplies (T:SLIM X2 3ML CARTRIDGE) MISC  ketoconazole (NIZORAL) 2 % external cream  levothyroxine (SYNTHROID) 137 MCG tablet  lisinopril (ZESTRIL) 2.5 MG tablet  nicotine (NICODERM CQ) 14 MG/24HR 24 hr patch  nicotine (NICODERM CQ) 7 MG/24HR 24 hr patch  NOVOLOG FLEXPEN 100 UNIT/ML soln  NOVOLOG VIAL 100 UNIT/ML soln  ondansetron (ZOFRAN ODT) 4 MG ODT tab  ondansetron (ZOFRAN) 4 MG tablet  pantoprazole (PROTONIX) 40 MG EC tablet  SUMAtriptan (IMITREX) 25 MG tablet        Physical Exam     Vitals:  /59   Pulse 85   Temp 97.2  F (36.2  C) (Temporal)   Resp (!) 31   Ht 1.626 m (5' 4\")   Wt 68 kg (149 lb 14.6 oz)   LMP 10/14/2024 (Exact Date)   SpO2 93%   BMI 25.73 kg/m      PHYSICAL EXAM:   Physical Exam  Vitals and nursing note reviewed.   Constitutional:       General: She is not in acute distress.     Appearance: Normal appearance.   HENT:      Head: Normocephalic and atraumatic.      Nose: Nose normal.      Mouth/Throat:      Mouth: Mucous membranes are dry.   Eyes:      Pupils: Pupils are equal, round, and reactive to light.   Cardiovascular:      Rate and Rhythm: Regular rhythm. Tachycardia present.      Pulses: Normal pulses.           Radial pulses are 2+ on the right side and 2+ on the left side.        Dorsalis pedis pulses are 2+ on the right side and 2+ on the left side.   Pulmonary:      Effort: Pulmonary effort is normal. No respiratory distress.      Breath sounds: Normal breath " sounds.   Abdominal:      Palpations: Abdomen is soft.      Tenderness: There is abdominal tenderness (to palpation) in the right lower quadrant. Negative signs include Mclean's sign, Rovsing's sign and McBurney's sign.   Musculoskeletal:      Cervical back: Full passive range of motion without pain and neck supple.      Comments: No calf tenderness or swelling b/l   Skin:     General: Skin is warm.      Findings: No rash.   Neurological:      General: No focal deficit present.      Mental Status: She is alert. Mental status is at baseline.      Comments: Fluent speech, no acute lateralizing deficits   Psychiatric:         Mood and Affect: Mood normal.         Behavior: Behavior normal.         Results     LAB:  All pertinent labs reviewed and interpreted  Labs Ordered and Resulted from Time of ED Arrival to Time of ED Departure   COMPREHENSIVE METABOLIC PANEL - Abnormal       Result Value    Sodium 133 (*)     Potassium 4.6      Carbon Dioxide (CO2) 20 (*)     Anion Gap 15      Urea Nitrogen 18.3      Creatinine 1.38 (*)     GFR Estimate 48 (*)     Calcium 9.0      Chloride 98      Glucose 194 (*)     Alkaline Phosphatase 88      AST 31      ALT 23      Protein Total 7.8      Albumin 4.6      Bilirubin Total 1.2     LIPASE - Abnormal    Lipase 8 (*)    GLUCOSE BY METER - Abnormal    GLUCOSE BY METER POCT 173 (*)    MAGNESIUM - Abnormal    Magnesium 1.4 (*)    BLOOD GAS VENOUS - Abnormal    pH Venous 7.47 (*)     pCO2 Venous 33 (*)     pO2 Venous 18 (*)     Bicarbonate Venous 24      Base Excess/Deficit Venous 0.8      FIO2 21      Oxyhemoglobin Venous 26 (*)     O2 Sat, Venous 25.9 (*)    CBC WITH PLATELETS AND DIFFERENTIAL - Abnormal    WBC Count 15.8 (*)     RBC Count 4.65      Hemoglobin 14.8      Hematocrit 43.9      MCV 94      MCH 31.8      MCHC 33.7      RDW 12.4      Platelet Count 344      % Neutrophils 93      % Lymphocytes 4      % Monocytes 3      % Eosinophils 0      % Basophils 0      % Immature  Granulocytes 1      NRBCs per 100 WBC 0      Absolute Neutrophils 14.7 (*)     Absolute Lymphocytes 0.6 (*)     Absolute Monocytes 0.4      Absolute Eosinophils 0.0      Absolute Basophils 0.0      Absolute Immature Granulocytes 0.1      Absolute NRBCs 0.0     KETONE BETA-HYDROXYBUTYRATE QUANTITATIVE, RAPID - Normal    Ketone (Beta-Hydroxybutyrate) Quantitative <0.18     HCG QUALITATIVE PREGNANCY - Normal    hCG Serum Qualitative Negative     GLUCOSE MONITOR NURSING POCT       RADIOLOGY:  CT Abdomen Pelvis w Contrast   Final Result   IMPRESSION:    1.  No definite etiology for symptoms. Nothing obstructive or inflammatory evident involving bowel.                   ECG:  none    PROCEDURES:  Procedures:  None      FINAL IMPRESSION:    ICD-10-CM    1. Nausea vomiting and diarrhea  R11.2     R19.7           0 minutes of critical care time      I, Tab Delgadillo, am serving as a scribe to document services personally performed by Dr. Nick Watson, based on my observations and the provider's statements to me. I, Nick Watson, DO attest that Tab Delgadillo is acting in a scribe capacity, has observed my performance of the services and has documented them in accordance with my direction.      Nick Watson DO  Emergency Medicine  [facility] EMERGENCY DEPARTMENT  11/30/2024  5:26 PM         Nick Watson MD  11/30/24 1039

## 2024-11-30 NOTE — ED TRIAGE NOTES
Pt Has had nausea, vomiting and diarrhea for the last 16 hours, spouse with same symptoms. Pt is a type 1 diabetic.          Triage Assessment (Adult)       Row Name 11/30/24 1707          Triage Assessment    Airway WDL WDL        Respiratory WDL    Respiratory WDL X;rhythm/pattern     Rhythm/Pattern, Respiratory shortness of breath;tachypneic        Skin Circulation/Temperature WDL    Skin Circulation/Temperature WDL WDL        Cardiac WDL    Cardiac WDL X     Cardiac Rhythm ST        Peripheral/Neurovascular WDL    Peripheral Neurovascular WDL WDL        Cognitive/Neuro/Behavioral WDL    Cognitive/Neuro/Behavioral WDL WDL

## 2024-12-01 NOTE — ED NOTES
Pt has no questions upon departure and they will follow up as necessary.  Discharge information discussed and teach back was appropriate.

## 2024-12-01 NOTE — DISCHARGE INSTRUCTIONS
Hydrate well with electrolyte fluids and water. Take zofran for nausea, bentyl for abdominal pain/cramping. Follow up with primary clinic.

## 2024-12-02 ENCOUNTER — PATIENT OUTREACH (OUTPATIENT)
Dept: INTERNAL MEDICINE | Facility: CLINIC | Age: 44
End: 2024-12-02
Payer: COMMERCIAL

## 2024-12-02 NOTE — TELEPHONE ENCOUNTER
Transitions of Care Outreach  Chief Complaint   Patient presents with    Hospital F/U              Most Recent Admission Date: 11/30/2024   Most Recent Admission Diagnosis:      Most Recent Discharge Date: 11/30/2024   Most Recent Discharge Diagnosis: Nausea vomiting and diarrhea - R11.2, R19.7     Transitions of Care Assessment         Follow up Plan          Future Appointments   Date Time Provider Department Center   12/30/2024  4:45 PM [facility] LAB [facility]   1/8/2025  7:00 AM Anayeli Dave, NP [facility]END[facility]       Outpatient Plan as outlined on AVS reviewed with patient.    For any urgent concerns, please contact our 24 hour nurse triage line: [phone] ([phone])       Katarzyna Masterson RN       Not applicable

## 2024-12-03 LAB — NONINV COLON CA DNA+OCC BLD SCRN STL QL: NEGATIVE

## 2024-12-08 NOTE — PROGRESS NOTES
Problem: Prexisting or High Potential for Compromised Skin Integrity  Goal: Skin integrity is maintained or improved  Description: INTERVENTIONS:  - Identify patients at risk for skin breakdown  - Assess and monitor skin integrity  - Assess and monitor nutrition and hydration status  - Monitor labs   - Assess for incontinence   - Turn and reposition patient  - Assist with mobility/ambulation  - Relieve pressure over bony prominences  - Avoid friction and shearing  - Provide appropriate hygiene as needed including keeping skin clean and dry  - Evaluate need for skin moisturizer/barrier cream  - Collaborate with interdisciplinary team   - Patient/family teaching  - Consider wound care consult   Outcome: Progressing     Problem: PAIN - ADULT  Goal: Verbalizes/displays adequate comfort level or baseline comfort level  Description: Interventions:  - Encourage patient to monitor pain and request assistance  - Assess pain using appropriate pain scale  - Administer analgesics based on type and severity of pain and evaluate response  - Implement non-pharmacological measures as appropriate and evaluate response  - Consider cultural and social influences on pain and pain management  - Notify physician/advanced practitioner if interventions unsuccessful or patient reports new pain  Outcome: Progressing     Problem: SAFETY ADULT  Goal: Patient will remain free of falls  Description: INTERVENTIONS:  - Educate patient/family on patient safety including physical limitations  - Instruct patient to call for assistance with activity   - Consult OT/PT to assist with strengthening/mobility   - Keep Call bell within reach  - Keep bed low and locked with side rails adjusted as appropriate  - Keep care items and personal belongings within reach  - Initiate and maintain comfort rounds  - Make Fall Risk Sign visible to staff  - Offer Toileting every 2 Hours, in advance of need  - Initiate/Maintain bed/chair alarm  - Obtain necessary fall  Clinic Care Coordination Contact     Patient has completed all goals with Clinic Care Coordination.     Please review the chart and confirm if Graduation is approved.    For any future billing concerns patient will need to contact the billing department directly at [phone]. [facility] is not able to negotiate or call the billing department on her behalf to initiate a payment plan or advocate for her regarding a payment plan as it is based on Vani's own financial concerns and ability to pay.         risk management equipment:   - Apply yellow socks and bracelet for high fall risk patients  - Consider moving patient to room near nurses station  Outcome: Progressing  Goal: Maintain or return to baseline ADL function  Description: INTERVENTIONS:  -  Assess patient's ability to carry out ADLs; assess patient's baseline for ADL function and identify physical deficits which impact ability to perform ADLs (bathing, care of mouth/teeth, toileting, grooming, dressing, etc.)  - Assess/evaluate cause of self-care deficits   - Assess range of motion  - Assess patient's mobility; develop plan if impaired  - Assess patient's need for assistive devices and provide as appropriate  - Encourage maximum independence but intervene and supervise when necessary  - Involve family in performance of ADLs  - Assess for home care needs following discharge   - Consider OT consult to assist with ADL evaluation and planning for discharge  - Provide patient education as appropriate  Outcome: Progressing  Goal: Maintains/Returns to pre admission functional level  Description: INTERVENTIONS:  - Perform AM-PAC 6 Click Basic Mobility/ Daily Activity assessment daily.  - Set and communicate daily mobility goal to care team and patient/family/caregiver.   - Collaborate with rehabilitation services on mobility goals if consulted  - Perform Range of Motion 3 times a day.  - Reposition patient every 2 hours.  - Dangle patient 3 times a day  - Stand patient 3 times a day  - Ambulate patient 3 times a day  - Out of bed to chair 3 times a day   - Out of bed for meals 3 times a day  - Out of bed for toileting  - Record patient progress and toleration of activity level   Outcome: Progressing     Problem: DISCHARGE PLANNING  Goal: Discharge to home or other facility with appropriate resources  Description: INTERVENTIONS:  - Identify barriers to discharge w/patient and caregiver  - Arrange for needed discharge resources and transportation as appropriate  -  Identify discharge learning needs (meds, wound care, etc.)  - Arrange for interpretive services to assist at discharge as needed  - Refer to Case Management Department for coordinating discharge planning if the patient needs post-hospital services based on physician/advanced practitioner order or complex needs related to functional status, cognitive ability, or social support system  Outcome: Progressing     Problem: Knowledge Deficit  Goal: Patient/family/caregiver demonstrates understanding of disease process, treatment plan, medications, and discharge instructions  Description: Complete learning assessment and assess knowledge base.  Interventions:  - Provide teaching at level of understanding  - Provide teaching via preferred learning methods  Outcome: Progressing     Problem: NEUROSENSORY - ADULT  Goal: Achieves stable or improved neurological status  Description: INTERVENTIONS  - Monitor and report changes in neurological status  - Monitor vital signs such as temperature, blood pressure, glucose, and any other labs ordered   - Initiate measures to prevent increased intracranial pressure  - Monitor for seizure activity and implement precautions if appropriate      Outcome: Progressing  Goal: Remains free of injury related to seizures activity  Description: INTERVENTIONS  - Maintain airway, patient safety  and administer oxygen as ordered  - Monitor patient for seizure activity, document and report duration and description of seizure to physician/advanced practitioner  - If seizure occurs,  ensure patient safety during seizure  - Reorient patient post seizure  - Seizure pads on all 4 side rails  - Instruct patient/family to notify RN of any seizure activity including if an aura is experienced  - Instruct patient/family to call for assistance with activity based on nursing assessment  - Administer anti-seizure medications if ordered    Outcome: Progressing  Goal: Achieves maximal functionality and self  care  Description: INTERVENTIONS  - Monitor swallowing and airway patency with patient fatigue and changes in neurological status  - Encourage and assist patient to increase activity and self care.   - Encourage visually impaired, hearing impaired and aphasic patients to use assistive/communication devices  Outcome: Progressing     Problem: SKIN/TISSUE INTEGRITY - ADULT  Goal: Skin Integrity remains intact(Skin Breakdown Prevention)  Description: Assess:  -Perform Ashish assessment every shift   -Clean and moisturize skin   -Inspect skin when repositioning, toileting, and assisting with ADLS  -Assess under medical devices   -Assess extremities for adequate circulation and sensation     Bed Management:  -Have minimal linens on bed & keep smooth, unwrinkled  -Change linens as needed when moist or perspiring  -Avoid sitting or lying in one position for more than 2 hours while in bed  -Keep HOB at 30 degrees     Toileting:  -Offer bedside commode  -Assess for incontinence every 2 hours   -Use incontinent care products after each incontinent episode     Activity:  -Mobilize patient 3 times a day  -Encourage activity and walks on unit  -Encourage or provide ROM exercises   -Turn and reposition patient every 2 Hours  -Use appropriate equipment to lift or move patient in bed  -Instruct/ Assist with weight shifting every 20 minutes when out of bed in chair  -Consider limitation of chair time 2 hour intervals    Skin Care:  -Avoid use of baby powder, tape, friction and shearing, hot water or constrictive clothing  -Relieve pressure over bony prominences  -Do not massage red bony areas    Next Steps:  -Teach patient strategies to minimize risks    -Consider consults to  interdisciplinary teams   Outcome: Progressing  Goal: Incision(s), wounds(s) or drain site(s) healing without S/S of infection  Description: INTERVENTIONS  - Assess and document dressing, incision, wound bed, drain sites and surrounding tissue  - Provide  patient and family education  - Perform skin care/dressing changes every shift   Outcome: Progressing  Goal: Pressure injury heals and does not worsen  Description: Interventions:  - Implement low air loss mattress or specialty surface (Criteria met)  - Apply silicone foam dressing  - Instruct/assist with weight shifting every 20 minutes when in chair   - Limit chair time to 2 hour intervals  - Use special pressure reducing interventions when in chair   - Apply fecal or urinary incontinence containment device   - Perform passive or active ROM  - Turn and reposition patient & offload bony prominences every 2 hours   - Utilize friction reducing device or surface for transfers   - Consider consults to  interdisciplinary teams   - Use incontinent care products after each incontinent episode   - Consider nutrition services referral as needed  Outcome: Progressing     Problem: Nutrition/Hydration-ADULT  Goal: Nutrient/Hydration intake appropriate for improving, restoring or maintaining nutritional needs  Description: Monitor and assess patient's nutrition/hydration status for malnutrition. Collaborate with interdisciplinary team and initiate plan and interventions as ordered.  Monitor patient's weight and dietary intake as ordered or per policy. Utilize nutrition screening tool and intervene as necessary. Determine patient's food preferences and provide high-protein, high-caloric foods as appropriate.     INTERVENTIONS:  - Monitor oral intake, urinary output, labs, and treatment plans  - Assess nutrition and hydration status and recommend course of action  - Evaluate amount of meals eaten  - Assist patient with eating if necessary   - Allow adequate time for meals  - Recommend/ encourage appropriate diets, oral nutritional supplements, and vitamin/mineral supplements  - Order, calculate, and assess calorie counts as needed  - Recommend, monitor, and adjust tube feedings and TPN/PPN based on assessed needs  - Assess need  for intravenous fluids  - Provide specific nutrition/hydration education as appropriate  - Include patient/family/caregiver in decisions related to nutrition  Outcome: Progressing

## 2024-12-17 DIAGNOSIS — E10.65 TYPE 1 DIABETES MELLITUS WITH HYPERGLYCEMIA (H): ICD-10-CM

## 2024-12-17 RX ORDER — INSULIN ASPART 100 [IU]/ML
INJECTION, SOLUTION INTRAVENOUS; SUBCUTANEOUS
Qty: 50 ML | Refills: 0 | Status: SHIPPED | OUTPATIENT
Start: 2024-12-17

## 2024-12-17 NOTE — TELEPHONE ENCOUNTER
Requested Prescriptions   Pending Prescriptions Disp Refills    NOVOLOG VIAL 100 UNIT/ML soln [Pharmacy Med Name: NOVOLOG 100UNIT/ML SOLN] 50 mL 0     Sig: USE DAILY WITH PUMP, UP TO 50 UNITS ONCE DAILY       Insulin Protocol Failed - 12/17/2024 11:29 AM        Failed - Chart review required     Review Chart.    Do not approve if insulin is used in a pump.  Instead, direct refill request to the patient's endocrinologist.  If the patient doesn't have an endocrinologist, then send the refill to the patient's PCP for review            Passed - HgbA1C in past 3 or 6 months     If HgbA1C is 8 or greater, it needs to be on file within the past 3 months.  If less than 8, must be on file within the past 6 months.     Recent Labs   Lab Test 10/28/24  2006   A1C 6.2*             Passed - Medication is active on med list        Passed - Recent (6 mo) or future (90 days) visit within the authorizing provider's specialty     The patient must have completed an in-person or virtual visit within the past 6 months or has a future visit scheduled within the next 90 days with the authorizing provider s specialty.  Urgent care and e-visits do not quality as an office visit for this protocol.          Passed - Medication indicated for associated diagnosis     Medication is associated with one or more of the following diagnoses:   - Type 1 diabetes mellitus  - Type 2 diabetes mellitus  - Diabetic nephropathy; Prophylaxis  - Neuropathy due to diabetes mellitus; Prophylaxis  - Retinopathy due to diabetes mellitus; Prophylaxis  - Diabetes mellitus associated with cystic fibrosis  - Disorder of cardiovascular system; Prophylaxis - Type 1 diabetes mellitus   - Disorder of cardiovascular system; Prophylaxis - Type 2 diabetes mellitus            Passed - Patient is 18 years of age or older

## 2025-01-08 ENCOUNTER — OFFICE VISIT (OUTPATIENT)
Dept: ENDOCRINOLOGY | Facility: CLINIC | Age: 45
End: 2025-01-08
Payer: COMMERCIAL

## 2025-01-08 VITALS
WEIGHT: 154.2 LBS | HEART RATE: 61 BPM | SYSTOLIC BLOOD PRESSURE: 128 MMHG | OXYGEN SATURATION: 98 % | DIASTOLIC BLOOD PRESSURE: 74 MMHG | BODY MASS INDEX: 26.47 KG/M2

## 2025-01-08 DIAGNOSIS — E10.3299 TYPE 1 DIABETES MELLITUS WITH MILD NONPROLIFERATIVE RETINOPATHY WITHOUT MACULAR EDEMA, UNSPECIFIED LATERALITY (H): Primary | ICD-10-CM

## 2025-01-08 DIAGNOSIS — E03.9 HYPOTHYROIDISM, UNSPECIFIED TYPE: ICD-10-CM

## 2025-01-08 PROBLEM — G47.33 OSA (OBSTRUCTIVE SLEEP APNEA): Status: ACTIVE | Noted: 2024-09-06

## 2025-01-08 PROCEDURE — 99214 OFFICE O/P EST MOD 30 MIN: CPT | Performed by: NURSE PRACTITIONER

## 2025-01-08 RX ORDER — BLOOD-GLUCOSE METER
EACH MISCELLANEOUS
COMMUNITY
Start: 2024-06-29

## 2025-01-08 RX ORDER — TRIAMCINOLONE ACETONIDE 1 MG/G
CREAM TOPICAL
COMMUNITY
Start: 2024-08-07

## 2025-01-08 RX ORDER — LEVOTHYROXINE SODIUM 137 UG/1
TABLET ORAL
Qty: 90 TABLET | Refills: 1 | Status: SHIPPED | OUTPATIENT
Start: 2025-01-08

## 2025-01-08 NOTE — PROGRESS NOTES
"[facility] ENDOCRINOLOGY    Diabetes Note 1/8/2025    Vani Corona, 1980, [id number]          Reason for visit      1. Type 1 diabetes mellitus with mild nonproliferative retinopathy without macular edema, unspecified laterality (H)        HPI     Vani Corona is a very pleasant 44 year old old female who presents for follow up.  SUMMARY:    Vani is seen today in follow-up for DM 1. She continues to use the Tandem insulin pump with Dexcom 6 CGM, which was downloaded and data was reviewed.     TIR was 56%, Above, 43% and no hypoglycemia was recorded. GMI of 7.7. She reports that she is having post dinner highs, and then she will drop while she is sleeping. She has been using Flonase on her sites prior to putting on the sets/CGM, and that this has been helping with local reactions to the adhesive.     She reports that she was started on a Statin recently, but that she has stopped taking it because she \"started having stomach pains and hot flashes\" after starting it. This has resolved after stopping the medication.     Her most recent Renal function showed some dehydration. Hepatic function was normal.     Insulin Pump Settings     Basal Rate  TIME Units/HR   0000 - 1800 0.50   1800 - 0000 0.60                     Bolus: Insulin : CHO ratio  Time Units Grams CHO   0000 - 0000 1 15                          Correction  #Units Blood glucose >Target BG   1 58 110           Past Medical History     Patient Active Problem List   Diagnosis    Type 1 diabetes mellitus with mild nonproliferative retinopathy without macular edema (H)    Anxiety    Arthralgia of multiple joints    GERD (gastroesophageal reflux disease)    History of ileostomy    Hypothyroidism, unspecified type    Insomnia    Methamphetamine abuse in remission (H)    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)    Non-rheumatic mitral regurgitation    Skin mass    " Tobacco abuse, vaping    Severe episode of recurrent major depressive disorder, without psychotic features (H)    Cough, r/t COVID-19 infection 7/2022    Grief at loss of child    Sore throat    Migraine without aura and without status migrainosus, not intractable    ADELINA (obstructive sleep apnea)        Family History       family history includes Cerebrovascular Disease in her paternal grandmother; Diabetes in her paternal aunt and another family member; Hypertension in her paternal grandmother; No Known Problems in her daughter, sister, son, and other family members; Other - See Comments in her father; Thyroid Disease in her mother.    Social History      reports that she quit smoking about 7 years ago. Her smoking use included cigarettes. She has been exposed to tobacco smoke. She has never used smokeless tobacco. She reports current alcohol use. She reports that she does not use drugs.      Review of Systems     Patient has no polyuria or polydipsia, no chest pain, dyspnea or TIA's, no numbness, tingling or pain in extremities  Remainder negative except as noted in HPI.    Vital Signs     /74 (BP Location: Right arm, Patient Position: Sitting, Cuff Size: Adult Regular)   Pulse 61   Wt 69.9 kg (154 lb 3.2 oz)   SpO2 98%   BMI 26.47 kg/m    Wt Readings from Last 3 Encounters:   01/08/25 69.9 kg (154 lb 3.2 oz)   11/30/24 68 kg (149 lb 14.6 oz)   10/28/24 70.5 kg (155 lb 6.4 oz)       Physical Exam     Constitutional:  Well developed, Well nourished  HENT:  Normocephalic,   Neck: Thyroid normal, No lymph nodes, Supple  Eyes:  PERRL, Conjunctiva pink  Respiratory:  Normal breath sounds, No respiratory distress  Cardiovascular:  Normal heart rate, Normal rhythm, No murmurs  GI:  Bowel sounds normal, Soft, No tenderness  Musculoskeletal:  No gross deformity or lesions, normal dorsalis pedis pulses  Skin: No acanthosis nigricans, lipoatrophy or lipodystrophy  Neurologic:  Alert & oriented x 3,  nonfocal  Psychiatric:  Affect, Mood, Insight appropriate      Assessment     1. Type 1 diabetes mellitus with mild nonproliferative retinopathy without macular edema, unspecified laterality (H)        Plan     Vani directed to try taking her Dinner bolus earlier to see if this helps with the excursion as well as the hypoglycemia. Follow-up with me in 6 months.           Anayeli Dave NP  [facility]  1/8/2025  7:14 AM      Lab Results     Microalbumin Urine mg/dL   Date Value Ref Range Status   05/27/2022 0.77 0.00 - 1.99 mg/dL Final       Cholesterol   Date Value Ref Range Status   10/28/2024 171 <200 mg/dL Final     Direct Measure HDL   Date Value Ref Range Status   10/28/2024 80 >=50 mg/dL Final     Triglycerides   Date Value Ref Range Status   10/28/2024 127 <150 mg/dL Final       [unfilled]      Current Medications     Outpatient Medications Prior to Visit   Medication Sig Dispense Refill    ACCU-CHEK GUIDE test strip Use to test blood sugar 4 times daily or as directed. 400 strip 0    atorvastatin (LIPITOR) 10 MG tablet Take 1 tablet (10 mg) by mouth daily. 90 tablet 3    azelastine (ASTELIN) 0.1 % nasal spray       blood glucose (NO BRAND SPECIFIED) test strip Use to test blood sugar 4 times daily or as directed. 100 strip 2    blood glucose monitoring (NO BRAND SPECIFIED) meter device kit Use to test blood sugar 4 times daily or as directed. 1 kit 0    Blood Glucose Monitoring Suppl (CONTOUR NEXT GEN MONITOR) JESSICA       buPROPion (WELLBUTRIN XL) 300 MG 24 hr tablet TAKE 1 TABLET BY MOUTH EVERY MORNING 90 tablet 1    cetirizine (ZYRTEC) 10 MG tablet TAKE ONE TABLET BY MOUTH ONCE DAILY 90 tablet 0    Continuous Glucose Sensor (DEXCOM G7 SENSOR) MISC CHANGE EVERY 10 DAYS 9 each 0    escitalopram (LEXAPRO) 20 MG tablet Take 1 tablet (20 mg) by mouth daily 90 tablet 3    fluticasone (FLONASE) 50 MCG/ACT nasal spray       hydrOXYzine HCl (ATARAX) 25 MG tablet Take 1 tablet (25 mg) by mouth 3 times daily  as needed for anxiety. 30 tablet 2    insulin glargine (LANTUS SOLOSTAR) 100 UNIT/ML pen       Insulin Infusion Pump (T: SLIM X2 INS /CONTROL 7.4) JESSICA       Insulin Infusion Pump Supplies (AUTOSOFT XC INFUSION SET) MISC 1 each every 3 days 30 each 0    Insulin Infusion Pump Supplies (T:SLIM X2 3ML CARTRIDGE) MISC 1 each every 3 days 30 each 0    ketoconazole (NIZORAL) 2 % external cream Apply topically 2 times daily 30 g 0    levothyroxine (SYNTHROID) 137 MCG tablet TAKE 1 TABLET 1 TIME DAILY FOR HYPOTHYROIDISM 90 tablet 0    lisinopril (ZESTRIL) 2.5 MG tablet Take 1 tablet (2.5 mg) by mouth daily 90 tablet 3    nicotine (NICODERM CQ) 14 MG/24HR 24 hr patch Place 1 patch over 24 hours onto the skin every 24 hours. 30 patch 0    nicotine (NICODERM CQ) 7 MG/24HR 24 hr patch Place 1 patch over 24 hours onto the skin every 24 hours. 30 patch 0    NOVOLOG FLEXPEN 100 UNIT/ML soln USE DAILY IN PUMP, UP TO 50 UNITS ONCE DAILY 45 mL 1    NOVOLOG VIAL 100 UNIT/ML soln USE DAILY WITH PUMP, UP TO 50 UNITS ONCE DAILY 50 mL 0    ondansetron (ZOFRAN ODT) 4 MG ODT tab Take 1 tablet (4 mg) by mouth every 6 hours as needed for vomiting or nausea. 30 tablet 0    ondansetron (ZOFRAN) 4 MG tablet Take 1 tablet (4 mg) by mouth every 8 hours as needed for nausea 30 tablet 0    pantoprazole (PROTONIX) 40 MG EC tablet Take 1 tablet (40 mg) by mouth every morning 90 tablet 2    SUMAtriptan (IMITREX) 25 MG tablet Take 1 tablet (25 mg) by mouth at onset of headache for migraine May repeat in 2 hours. Max 8 tablets/24 hours 15 tablet 2    triamcinolone (KENALOG) 0.1 % external cream APPLY TO AFFECTED AREAS ON BACK 2X DAILY FOR UP TO 1-2 WEEKS. TAKE 2 WEEKS OFF AND REPEAT AS NEEDED.      dicyclomine (BENTYL) 20 MG tablet Take 1 tablet (20 mg) by mouth 4 times daily as needed (abdominal pain/cramping). 28 tablet 0     No facility-administered medications prior to visit.

## 2025-01-08 NOTE — LETTER
"1/8/2025      Vani Corona  [address] [place] [address]      Dear Colleague,    Thank you for referring your patient, Vani Corona, to the [facility]. Please see a copy of my visit note below.    [facility] ENDOCRINOLOGY    Diabetes Note 1/8/2025    Vani Corona, 1980, [id number]          Reason for visit      1. Type 1 diabetes mellitus with mild nonproliferative retinopathy without macular edema, unspecified laterality (H)        HPI     Vani Corona is a very pleasant 44 year old old female who presents for follow up.  SUMMARY:    Vani is seen today in follow-up for DM 1. She continues to use the Tandem insulin pump with Dexcom 6 CGM, which was downloaded and data was reviewed.     TIR was 56%, Above, 43% and no hypoglycemia was recorded. GMI of 7.7. She reports that she is having post dinner highs, and then she will drop while she is sleeping. She has been using Flonase on her sites prior to putting on the sets/CGM, and that this has been helping with local reactions to the adhesive.     She reports that she was started on a Statin recently, but that she has stopped taking it because she \"started having stomach pains and hot flashes\" after starting it. This has resolved after stopping the medication.     Her most recent Renal function showed some dehydration. Hepatic function was normal.     Insulin Pump Settings     Basal Rate  TIME Units/HR   0000 - 1800 0.50   1800 - 0000 0.60                     Bolus: Insulin : CHO ratio  Time Units Grams CHO   0000 - 0000 1 15                          Correction  #Units Blood glucose >Target BG   1 58 110           Past Medical History     Patient Active Problem List   Diagnosis     Type 1 diabetes mellitus with mild nonproliferative retinopathy without macular edema (H)     Anxiety     Arthralgia of multiple joints     GERD (gastroesophageal reflux disease)     History of ileostomy     Hypothyroidism, unspecified " type     Insomnia     Methamphetamine abuse in remission (H)     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)     Non-rheumatic mitral regurgitation     Skin mass     Tobacco abuse, vaping     Severe episode of recurrent major depressive disorder, without psychotic features (H)     Cough, r/t COVID-19 infection 7/2022     Grief at loss of child     Sore throat     Migraine without aura and without status migrainosus, not intractable     ADELINA (obstructive sleep apnea)        Family History       family history includes Cerebrovascular Disease in her paternal grandmother; Diabetes in her paternal aunt and another family member; Hypertension in her paternal grandmother; No Known Problems in her daughter, sister, son, and other family members; Other - See Comments in her father; Thyroid Disease in her mother.    Social History      reports that she quit smoking about 7 years ago. Her smoking use included cigarettes. She has been exposed to tobacco smoke. She has never used smokeless tobacco. She reports current alcohol use. She reports that she does not use drugs.      Review of Systems     Patient has no polyuria or polydipsia, no chest pain, dyspnea or TIA's, no numbness, tingling or pain in extremities  Remainder negative except as noted in HPI.    Vital Signs     /74 (BP Location: Right arm, Patient Position: Sitting, Cuff Size: Adult Regular)   Pulse 61   Wt 69.9 kg (154 lb 3.2 oz)   SpO2 98%   BMI 26.47 kg/m    Wt Readings from Last 3 Encounters:   01/08/25 69.9 kg (154 lb 3.2 oz)   11/30/24 68 kg (149 lb 14.6 oz)   10/28/24 70.5 kg (155 lb 6.4 oz)       Physical Exam     Constitutional:  Well developed, Well nourished  HENT:  Normocephalic,   Neck: Thyroid normal, No lymph nodes, Supple  Eyes:  PERRL, Conjunctiva pink  Respiratory:  Normal breath sounds, No respiratory distress  Cardiovascular:  Normal heart rate, Normal  rhythm, No murmurs  GI:  Bowel sounds normal, Soft, No tenderness  Musculoskeletal:  No gross deformity or lesions, normal dorsalis pedis pulses  Skin: No acanthosis nigricans, lipoatrophy or lipodystrophy  Neurologic:  Alert & oriented x 3, nonfocal  Psychiatric:  Affect, Mood, Insight appropriate      Assessment     1. Type 1 diabetes mellitus with mild nonproliferative retinopathy without macular edema, unspecified laterality (H)        Plan     Vani directed to try taking her Dinner bolus earlier to see if this helps with the excursion as well as the hypoglycemia. Follow-up with me in 6 months.           Anayeli Dave NP  [facility]  1/8/2025  7:14 AM      Lab Results     Microalbumin Urine mg/dL   Date Value Ref Range Status   05/27/2022 0.77 0.00 - 1.99 mg/dL Final       Cholesterol   Date Value Ref Range Status   10/28/2024 171 <200 mg/dL Final     Direct Measure HDL   Date Value Ref Range Status   10/28/2024 80 >=50 mg/dL Final     Triglycerides   Date Value Ref Range Status   10/28/2024 127 <150 mg/dL Final       [unfilled]      Current Medications     Outpatient Medications Prior to Visit   Medication Sig Dispense Refill     ACCU-CHEK GUIDE test strip Use to test blood sugar 4 times daily or as directed. 400 strip 0     atorvastatin (LIPITOR) 10 MG tablet Take 1 tablet (10 mg) by mouth daily. 90 tablet 3     azelastine (ASTELIN) 0.1 % nasal spray        blood glucose (NO BRAND SPECIFIED) test strip Use to test blood sugar 4 times daily or as directed. 100 strip 2     blood glucose monitoring (NO BRAND SPECIFIED) meter device kit Use to test blood sugar 4 times daily or as directed. 1 kit 0     Blood Glucose Monitoring Suppl (CONTOUR NEXT GEN MONITOR) JESSICA        buPROPion (WELLBUTRIN XL) 300 MG 24 hr tablet TAKE 1 TABLET BY MOUTH EVERY MORNING 90 tablet 1     cetirizine (ZYRTEC) 10 MG tablet TAKE ONE TABLET BY MOUTH ONCE DAILY 90 tablet 0     Continuous Glucose Sensor (DEXCOM G7 SENSOR) MISC  CHANGE EVERY 10 DAYS 9 each 0     escitalopram (LEXAPRO) 20 MG tablet Take 1 tablet (20 mg) by mouth daily 90 tablet 3     fluticasone (FLONASE) 50 MCG/ACT nasal spray        hydrOXYzine HCl (ATARAX) 25 MG tablet Take 1 tablet (25 mg) by mouth 3 times daily as needed for anxiety. 30 tablet 2     insulin glargine (LANTUS SOLOSTAR) 100 UNIT/ML pen        Insulin Infusion Pump (T: SLIM X2 INS /CONTROL 7.4) JESSICA        Insulin Infusion Pump Supplies (AUTOSOFT XC INFUSION SET) MISC 1 each every 3 days 30 each 0     Insulin Infusion Pump Supplies (T:SLIM X2 3ML CARTRIDGE) MISC 1 each every 3 days 30 each 0     ketoconazole (NIZORAL) 2 % external cream Apply topically 2 times daily 30 g 0     levothyroxine (SYNTHROID) 137 MCG tablet TAKE 1 TABLET 1 TIME DAILY FOR HYPOTHYROIDISM 90 tablet 0     lisinopril (ZESTRIL) 2.5 MG tablet Take 1 tablet (2.5 mg) by mouth daily 90 tablet 3     nicotine (NICODERM CQ) 14 MG/24HR 24 hr patch Place 1 patch over 24 hours onto the skin every 24 hours. 30 patch 0     nicotine (NICODERM CQ) 7 MG/24HR 24 hr patch Place 1 patch over 24 hours onto the skin every 24 hours. 30 patch 0     NOVOLOG FLEXPEN 100 UNIT/ML soln USE DAILY IN PUMP, UP TO 50 UNITS ONCE DAILY 45 mL 1     NOVOLOG VIAL 100 UNIT/ML soln USE DAILY WITH PUMP, UP TO 50 UNITS ONCE DAILY 50 mL 0     ondansetron (ZOFRAN ODT) 4 MG ODT tab Take 1 tablet (4 mg) by mouth every 6 hours as needed for vomiting or nausea. 30 tablet 0     ondansetron (ZOFRAN) 4 MG tablet Take 1 tablet (4 mg) by mouth every 8 hours as needed for nausea 30 tablet 0     pantoprazole (PROTONIX) 40 MG EC tablet Take 1 tablet (40 mg) by mouth every morning 90 tablet 2     SUMAtriptan (IMITREX) 25 MG tablet Take 1 tablet (25 mg) by mouth at onset of headache for migraine May repeat in 2 hours. Max 8 tablets/24 hours 15 tablet 2     triamcinolone (KENALOG) 0.1 % external cream APPLY TO AFFECTED AREAS ON BACK 2X DAILY FOR UP TO 1-2 WEEKS. TAKE 2 WEEKS OFF AND REPEAT  AS NEEDED.       dicyclomine (BENTYL) 20 MG tablet Take 1 tablet (20 mg) by mouth 4 times daily as needed (abdominal pain/cramping). 28 tablet 0     No facility-administered medications prior to visit.                 Again, thank you for allowing me to participate in the care of your patient.        Sincerely,        Anayeli Dave NP    Electronically signed

## 2025-01-19 DIAGNOSIS — E10.65 TYPE 1 DIABETES MELLITUS WITH HYPERGLYCEMIA (H): ICD-10-CM

## 2025-01-20 RX ORDER — ACYCLOVIR 400 MG/1
TABLET ORAL
Qty: 9 EACH | Refills: 0 | Status: SHIPPED | OUTPATIENT
Start: 2025-01-20

## 2025-01-21 DIAGNOSIS — E10.65 TYPE 1 DIABETES MELLITUS WITH HYPERGLYCEMIA (H): ICD-10-CM

## 2025-01-21 RX ORDER — ACYCLOVIR 400 MG/1
TABLET ORAL
Qty: 9 EACH | Refills: 0 | OUTPATIENT
Start: 2025-01-21

## 2025-01-21 NOTE — TELEPHONE ENCOUNTER
Rx sent 1/20    Requested Prescriptions   Pending Prescriptions Disp Refills    Continuous Glucose Sensor (DEXCOM G7 SENSOR) MISC 9 each 0     Sig: Change every 10 days.       There is no refill protocol information for this order

## 2025-03-26 DIAGNOSIS — E10.65 TYPE 1 DIABETES MELLITUS WITH HYPERGLYCEMIA (H): ICD-10-CM

## 2025-03-26 DIAGNOSIS — K21.9 GASTROESOPHAGEAL REFLUX DISEASE, UNSPECIFIED WHETHER ESOPHAGITIS PRESENT: ICD-10-CM

## 2025-03-26 DIAGNOSIS — F33.2 SEVERE EPISODE OF RECURRENT MAJOR DEPRESSIVE DISORDER, WITHOUT PSYCHOTIC FEATURES (H): ICD-10-CM

## 2025-03-26 RX ORDER — ESCITALOPRAM OXALATE 20 MG/1
20 TABLET ORAL DAILY
Qty: 90 TABLET | Refills: 1 | Status: SHIPPED | OUTPATIENT
Start: 2025-03-26

## 2025-03-26 RX ORDER — BUPROPION HYDROCHLORIDE 300 MG/1
300 TABLET ORAL EVERY MORNING
Qty: 90 TABLET | Refills: 1 | Status: SHIPPED | OUTPATIENT
Start: 2025-03-26

## 2025-03-26 RX ORDER — ESCITALOPRAM OXALATE 20 MG/1
20 TABLET ORAL DAILY
Qty: 90 TABLET | Refills: 3 | OUTPATIENT
Start: 2025-03-26

## 2025-03-26 RX ORDER — PANTOPRAZOLE SODIUM 40 MG/1
40 TABLET, DELAYED RELEASE ORAL DAILY
Qty: 90 TABLET | Refills: 1 | Status: SHIPPED | OUTPATIENT
Start: 2025-03-26

## 2025-03-26 RX ORDER — INSULIN ASPART 100 [IU]/ML
INJECTION, SOLUTION INTRAVENOUS; SUBCUTANEOUS
Qty: 50 ML | Refills: 0 | Status: SHIPPED | OUTPATIENT
Start: 2025-03-26

## 2025-03-26 NOTE — TELEPHONE ENCOUNTER
Requested Prescriptions   Pending Prescriptions Disp Refills    NOVOLOG VIAL 100 UNIT/ML soln 10 mL 0     Sig: USE DAILY WITH PUMP, UP TO 50 UNITS ONCE DAILY       Insulin Protocol Failed - 3/26/2025 10:03 AM        Failed - Chart review required     Review Chart.    Do not approve if insulin is used in a pump.  Instead, direct refill request to the patient's endocrinologist.  If the patient doesn't have an endocrinologist, then send the refill to the patient's PCP for review            Passed - HgbA1C in past 3 or 6 months     If HgbA1C is 8 or greater, it needs to be on file within the past 3 months.  If less than 8, must be on file within the past 6 months.     Recent Labs   Lab Test 10/28/24  2006   A1C 6.2*             Passed - Medication is active on med list and the sig matches. RN to manually verify dose and sig if red X/fail.     If the protocol passes (green check), you do not need to verify med dose and sig.    A prescription matches if they are the same clinical intention.    For Example: once daily and every morning are the same.    The protocol can not identify upper and lower case letters as matching and will fail.     For Example: Take 1 tablet (50 mg) by mouth daily     TAKE 1 TABLET (50 MG) BY MOUTH DAILY    For all fails (red x), verify dose and sig.    If the refill does match what is on file, the RN can still proceed to approve the refill request.       If they do not match, route to the appropriate provider.             Passed - Recent (6 mo) or future (90 days) visit within the authorizing provider's specialty     The patient must have completed an in-person or virtual visit within the past 6 months or has a future visit scheduled within the next 90 days with the authorizing provider s specialty.  Urgent care and e-visits do not quality as an office visit for this protocol.          Passed - Medication indicated for associated diagnosis     Medication is associated with one or more of the  following diagnoses:   - Type 1 diabetes mellitus  - Type 2 diabetes mellitus  - Diabetic nephropathy; Prophylaxis  - Neuropathy due to diabetes mellitus; Prophylaxis  - Retinopathy due to diabetes mellitus; Prophylaxis  - Diabetes mellitus associated with cystic fibrosis  - Disorder of cardiovascular system; Prophylaxis - Type 1 diabetes mellitus   - Disorder of cardiovascular system; Prophylaxis - Type 2 diabetes mellitus            Passed - Patient is 18 years of age or older

## 2025-04-01 ENCOUNTER — LAB REQUISITION (OUTPATIENT)
Dept: LAB | Facility: CLINIC | Age: 45
End: 2025-04-01

## 2025-04-01 DIAGNOSIS — N95.1 MENOPAUSAL AND FEMALE CLIMACTERIC STATES: ICD-10-CM

## 2025-04-01 PROCEDURE — 83001 ASSAY OF GONADOTROPIN (FSH): CPT | Performed by: OBSTETRICS & GYNECOLOGY

## 2025-04-01 PROCEDURE — 82670 ASSAY OF TOTAL ESTRADIOL: CPT | Performed by: OBSTETRICS & GYNECOLOGY

## 2025-04-02 LAB
ESTRADIOL SERPL-MCNC: 97 PG/ML
FSH SERPL IRP2-ACNC: 2.5 MIU/ML

## 2025-05-10 ENCOUNTER — HEALTH MAINTENANCE LETTER (OUTPATIENT)
Age: 45
End: 2025-05-10

## 2025-05-13 DIAGNOSIS — R80.9 TYPE 1 DIABETES MELLITUS WITH MICROALBUMINURIA (H): ICD-10-CM

## 2025-05-13 DIAGNOSIS — E10.29 TYPE 1 DIABETES MELLITUS WITH MICROALBUMINURIA (H): ICD-10-CM

## 2025-05-19 DIAGNOSIS — E10.29 TYPE 1 DIABETES MELLITUS WITH MICROALBUMINURIA (H): ICD-10-CM

## 2025-05-19 DIAGNOSIS — R80.9 TYPE 1 DIABETES MELLITUS WITH MICROALBUMINURIA (H): ICD-10-CM

## 2025-05-19 RX ORDER — LISINOPRIL 2.5 MG/1
2.5 TABLET ORAL DAILY
Qty: 90 TABLET | Refills: 0 | Status: SHIPPED | OUTPATIENT
Start: 2025-05-19

## 2025-05-20 RX ORDER — LISINOPRIL 2.5 MG/1
2.5 TABLET ORAL DAILY
Qty: 90 TABLET | Refills: 3 | OUTPATIENT
Start: 2025-05-20

## 2025-05-27 ENCOUNTER — LAB REQUISITION (OUTPATIENT)
Dept: LAB | Facility: CLINIC | Age: 45
End: 2025-05-27

## 2025-05-27 DIAGNOSIS — R87.618 OTHER ABNORMAL CYTOLOGICAL FINDINGS ON SPECIMENS FROM CERVIX UTERI: ICD-10-CM

## 2025-05-27 PROCEDURE — 87624 HPV HI-RISK TYP POOLED RSLT: CPT | Performed by: OBSTETRICS & GYNECOLOGY

## 2025-05-28 LAB
HPV HR 12 DNA CVX QL NAA+PROBE: NEGATIVE
HPV16 DNA CVX QL NAA+PROBE: NEGATIVE
HPV18 DNA CVX QL NAA+PROBE: NEGATIVE
HUMAN PAPILLOMA VIRUS FINAL DIAGNOSIS: NORMAL

## (undated) DEVICE — SYR 30ML LL W/O NDL 302832

## (undated) DEVICE — DAVINCI XI OBTURATOR BLADELESS 8MM 470359

## (undated) DEVICE — PAD POS XL 1X20X40IN PINK PIGAZZI

## (undated) DEVICE — SUTURE VICRYL+ 4-0 UNDYED PS-2 VCP496H

## (undated) DEVICE — GLOVE UNDER INDICATOR PI SZ 7.0 LF 41670

## (undated) DEVICE — SYR 50ML SLIP TIP W/O NDL 309654

## (undated) DEVICE — SU STRATAFIX PDS PLUS CT-1 30CM SXPP1A435

## (undated) DEVICE — SU VICRYL+ 3-0 27IN SH UND VCP416H

## (undated) DEVICE — DRAPE SHEET TABLE COVER KC 42301*

## (undated) DEVICE — LUBRICANT INST ELECTROLUBE EL101

## (undated) DEVICE — SYSTEM LAPAROVUE VISIBILITY LAPVUE10

## (undated) DEVICE — DRAPE SHEET REV FOLD 3/4 9349

## (undated) DEVICE — NEEDLE HYPO 18X1-1/2 SAFETY 305918

## (undated) DEVICE — PREP CHLORAPREP 26ML TINTED HI-LITE ORANGE 930815

## (undated) DEVICE — Device

## (undated) DEVICE — DRAPE U SPLIT 74X120" 29440

## (undated) DEVICE — DECANTER VIAL 2006S

## (undated) DEVICE — CUSTOM PACK LAP CHOLE SBA5BLCHEA

## (undated) DEVICE — DAVINCI XI DRAPE COLUMN 470341

## (undated) DEVICE — MARKER SURG SKIN STRL 77734

## (undated) DEVICE — SOL WATER IRRIG 1000ML BOTTLE 2F7114

## (undated) DEVICE — BLADE KNIFE SURG 11 371111

## (undated) DEVICE — DAVINCI HOT SHEARS TIP COVER  400180

## (undated) DEVICE — DAVINCI XI DRAPE ARM 470015

## (undated) DEVICE — GOWN XLG DISP 9545

## (undated) DEVICE — SU PDS II 2-0 SH 27" Z317H

## (undated) DEVICE — NDL INSUFFLATION 13GA 120MM C2201

## (undated) DEVICE — ADH SKIN CLOSURE PREMIERPRO EXOFIN 1.0ML 3470

## (undated) DEVICE — TUBING SMOKE EVAC PNEUMOCLEAR HIGH FLOW 0620050250

## (undated) DEVICE — SU VICRYL+ 0 27 UR6 VLT VCP603H

## (undated) DEVICE — DAVINCI XI SEAL UNIVERSAL 5-8MM 470361

## (undated) RX ORDER — KETAMINE HYDROCHLORIDE 10 MG/ML
INJECTION INTRAMUSCULAR; INTRAVENOUS
Status: DISPENSED
Start: 2022-08-18

## (undated) RX ORDER — ONDANSETRON 2 MG/ML
INJECTION INTRAMUSCULAR; INTRAVENOUS
Status: DISPENSED
Start: 2022-08-18

## (undated) RX ORDER — FENTANYL CITRATE 50 UG/ML
INJECTION, SOLUTION INTRAMUSCULAR; INTRAVENOUS
Status: DISPENSED
Start: 2022-08-18

## (undated) RX ORDER — PROPOFOL 10 MG/ML
INJECTION, EMULSION INTRAVENOUS
Status: DISPENSED
Start: 2022-08-18

## (undated) RX ORDER — LIDOCAINE HYDROCHLORIDE AND EPINEPHRINE 10; 10 MG/ML; UG/ML
INJECTION, SOLUTION INFILTRATION; PERINEURAL
Status: DISPENSED
Start: 2022-08-18